# Patient Record
Sex: FEMALE | Race: BLACK OR AFRICAN AMERICAN | NOT HISPANIC OR LATINO | URBAN - METROPOLITAN AREA
[De-identification: names, ages, dates, MRNs, and addresses within clinical notes are randomized per-mention and may not be internally consistent; named-entity substitution may affect disease eponyms.]

---

## 2018-11-18 ENCOUNTER — EMERGENCY (EMERGENCY)
Facility: HOSPITAL | Age: 68
LOS: 1 days | Discharge: HOME | End: 2018-11-18
Admitting: EMERGENCY MEDICINE
Payer: SELF-PAY

## 2018-11-18 VITALS
OXYGEN SATURATION: 98 % | DIASTOLIC BLOOD PRESSURE: 79 MMHG | RESPIRATION RATE: 16 BRPM | HEART RATE: 75 BPM | TEMPERATURE: 99 F | SYSTOLIC BLOOD PRESSURE: 172 MMHG

## 2018-11-18 VITALS
HEART RATE: 83 BPM | WEIGHT: 143.52 LBS | DIASTOLIC BLOOD PRESSURE: 91 MMHG | OXYGEN SATURATION: 98 % | RESPIRATION RATE: 17 BRPM | TEMPERATURE: 98 F | SYSTOLIC BLOOD PRESSURE: 143 MMHG

## 2018-11-18 DIAGNOSIS — M54.5 LOW BACK PAIN: ICD-10-CM

## 2018-11-18 DIAGNOSIS — K59.00 CONSTIPATION, UNSPECIFIED: ICD-10-CM

## 2018-11-18 DIAGNOSIS — I10 ESSENTIAL (PRIMARY) HYPERTENSION: ICD-10-CM

## 2018-11-18 DIAGNOSIS — E11.9 TYPE 2 DIABETES MELLITUS WITHOUT COMPLICATIONS: ICD-10-CM

## 2018-11-18 LAB
ALBUMIN SERPL ELPH-MCNC: 3.4 G/DL — SIGNIFICANT CHANGE UP (ref 3.4–5)
ALP SERPL-CCNC: 42 U/L — SIGNIFICANT CHANGE UP (ref 40–120)
ALT FLD-CCNC: 14 U/L — SIGNIFICANT CHANGE UP (ref 12–42)
ANION GAP SERPL CALC-SCNC: 12 MMOL/L — SIGNIFICANT CHANGE UP (ref 9–16)
APPEARANCE UR: CLEAR — SIGNIFICANT CHANGE UP
AST SERPL-CCNC: 22 U/L — SIGNIFICANT CHANGE UP (ref 15–37)
BASOPHILS NFR BLD AUTO: 0.4 % — SIGNIFICANT CHANGE UP (ref 0–2)
BILIRUB SERPL-MCNC: 0.7 MG/DL — SIGNIFICANT CHANGE UP (ref 0.2–1.2)
BILIRUB UR-MCNC: NEGATIVE — SIGNIFICANT CHANGE UP
BUN SERPL-MCNC: 14 MG/DL — SIGNIFICANT CHANGE UP (ref 7–23)
CALCIUM SERPL-MCNC: 8.8 MG/DL — SIGNIFICANT CHANGE UP (ref 8.5–10.5)
CHLORIDE SERPL-SCNC: 98 MMOL/L — SIGNIFICANT CHANGE UP (ref 96–108)
CO2 SERPL-SCNC: 24 MMOL/L — SIGNIFICANT CHANGE UP (ref 22–31)
COLOR SPEC: YELLOW — SIGNIFICANT CHANGE UP
CREAT SERPL-MCNC: 0.71 MG/DL — SIGNIFICANT CHANGE UP (ref 0.5–1.3)
DIFF PNL FLD: NEGATIVE — SIGNIFICANT CHANGE UP
EOSINOPHIL NFR BLD AUTO: 2.4 % — SIGNIFICANT CHANGE UP (ref 0–6)
GLUCOSE SERPL-MCNC: 328 MG/DL — HIGH (ref 70–99)
GLUCOSE UR QL: >=1000
HCT VFR BLD CALC: 40.8 % — SIGNIFICANT CHANGE UP (ref 34.5–45)
HGB BLD-MCNC: 13.5 G/DL — SIGNIFICANT CHANGE UP (ref 11.5–15.5)
IMM GRANULOCYTES NFR BLD AUTO: 0.2 % — SIGNIFICANT CHANGE UP (ref 0–1.5)
KETONES UR-MCNC: 15 MG/DL
LEUKOCYTE ESTERASE UR-ACNC: NEGATIVE — SIGNIFICANT CHANGE UP
LYMPHOCYTES # BLD AUTO: 47.5 % — HIGH (ref 13–44)
MAGNESIUM SERPL-MCNC: 1.4 MG/DL — LOW (ref 1.6–2.6)
MCHC RBC-ENTMCNC: 26.2 PG — LOW (ref 27–34)
MCHC RBC-ENTMCNC: 33.1 G/DL — SIGNIFICANT CHANGE UP (ref 32–36)
MCV RBC AUTO: 79.1 FL — LOW (ref 80–100)
MONOCYTES NFR BLD AUTO: 6.1 % — SIGNIFICANT CHANGE UP (ref 2–14)
NEUTROPHILS NFR BLD AUTO: 43.4 % — SIGNIFICANT CHANGE UP (ref 43–77)
NITRITE UR-MCNC: NEGATIVE — SIGNIFICANT CHANGE UP
PH UR: 6 — SIGNIFICANT CHANGE UP (ref 5–8)
PLATELET # BLD AUTO: 271 K/UL — SIGNIFICANT CHANGE UP (ref 150–400)
POTASSIUM SERPL-MCNC: 4.6 MMOL/L — SIGNIFICANT CHANGE UP (ref 3.5–5.3)
POTASSIUM SERPL-SCNC: 4.6 MMOL/L — SIGNIFICANT CHANGE UP (ref 3.5–5.3)
PROT SERPL-MCNC: 7.2 G/DL — SIGNIFICANT CHANGE UP (ref 6.4–8.2)
PROT UR-MCNC: NEGATIVE MG/DL — SIGNIFICANT CHANGE UP
RBC # BLD: 5.16 M/UL — SIGNIFICANT CHANGE UP (ref 3.8–5.2)
RBC # FLD: 12.2 % — SIGNIFICANT CHANGE UP (ref 10.3–14.5)
SODIUM SERPL-SCNC: 134 MMOL/L — SIGNIFICANT CHANGE UP (ref 132–145)
SP GR SPEC: 1.02 — SIGNIFICANT CHANGE UP (ref 1–1.03)
UROBILINOGEN FLD QL: 0.2 E.U./DL — SIGNIFICANT CHANGE UP
WBC # BLD: 4.6 K/UL — SIGNIFICANT CHANGE UP (ref 3.8–10.5)
WBC # FLD AUTO: 4.6 K/UL — SIGNIFICANT CHANGE UP (ref 3.8–10.5)

## 2018-11-18 PROCEDURE — 93010 ELECTROCARDIOGRAM REPORT: CPT

## 2018-11-18 PROCEDURE — 99284 EMERGENCY DEPT VISIT MOD MDM: CPT | Mod: 25

## 2018-11-18 PROCEDURE — 74177 CT ABD & PELVIS W/CONTRAST: CPT | Mod: 26

## 2018-11-18 RX ORDER — SODIUM CHLORIDE 9 MG/ML
1000 INJECTION INTRAMUSCULAR; INTRAVENOUS; SUBCUTANEOUS ONCE
Qty: 0 | Refills: 0 | Status: COMPLETED | OUTPATIENT
Start: 2018-11-18 | End: 2018-11-18

## 2018-11-18 RX ORDER — TRAMADOL HYDROCHLORIDE 50 MG/1
50 TABLET ORAL ONCE
Qty: 0 | Refills: 0 | Status: DISCONTINUED | OUTPATIENT
Start: 2018-11-18 | End: 2018-11-18

## 2018-11-18 RX ADMIN — TRAMADOL HYDROCHLORIDE 50 MILLIGRAM(S): 50 TABLET ORAL at 10:46

## 2018-11-18 RX ADMIN — SODIUM CHLORIDE 2000 MILLILITER(S): 9 INJECTION INTRAMUSCULAR; INTRAVENOUS; SUBCUTANEOUS at 09:58

## 2018-11-18 RX ADMIN — Medication 10 MILLIGRAM(S): at 13:35

## 2018-11-18 RX ADMIN — TRAMADOL HYDROCHLORIDE 50 MILLIGRAM(S): 50 TABLET ORAL at 11:46

## 2018-11-18 NOTE — ED ADULT NURSE NOTE - OBJECTIVE STATEMENT
Pt presents to ED with c/o lower abdominal pain. Apparently she was diagnosed with UTI, but she was unable to  her Rx. Patient is nontoxic appearing, no fever or CVA tenderness.

## 2018-11-18 NOTE — ED PROVIDER NOTE - MEDICAL DECISION MAKING DETAILS
Labs, UA, EKG. UTI vs Pyelonephritis vs MSK pain. Pt appearing well and tolerating PO. Labs, UA, EKG. UTI vs Pyelonephritis vs MSK pain. Pt appearing well and tolerating PO.  constipation on CT, labs and ua all wnl.  given IVF.  Pt given dulcolax to go suppository.  advised f/u with pmd re other nonspecific findings on CT and need for further nonemergent imaging.  pt tolerating PO and very well appearing.

## 2018-11-18 NOTE — ED PROVIDER NOTE - OBJECTIVE STATEMENT
67 y/o Female with a PMHx of type 2 DM and HTN reports several mechanical falls this past year and now has ongoing pain. She reports lower abdominal pain radiating to the right lower back for the last 1 week. Denies recent falls. She went to see her primary care MD earlier this week for pain and was told she had a UTI but she did not  her abx. Denies fever, chills, nausea, vomiting, diarrhea, CP, SOB, vaginal discharge, or vaginal bleeding. 67 y/o Female with a PMHx of type 2 DM and HTN reports several mechanical falls this past year and now has ongoing pain. She reports lower abdominal pain radiating to the right lower back for the last 1 week. Denies recent falls. She went to see her primary care MD earlier this week for pain and was told she had a UTI but she did not  her abx. Denies fever, chills, nausea, vomiting, diarrhea, CP, SOB, vaginal discharge, or vaginal bleeding.  Pt reports small hard bowel movement for the past several days.

## 2018-11-18 NOTE — ED ADULT NURSE NOTE - CHPI ED NUR SYMPTOMS NEG
no fever/no chills/no diarrhea/no blood in stool/no abdominal distension/no nausea/no dysuria/no hematuria

## 2018-11-18 NOTE — ED ADULT TRIAGE NOTE - CHIEF COMPLAINT QUOTE
c/o pelvic pain, right flank pain, and right leg pain x 1 week with nausea and one episode of vomiting approx 2 days ago. denies diarrhea, urinary sx, fevers. h/o DM2.

## 2018-11-18 NOTE — ED ADULT NURSE NOTE - NSIMPLEMENTINTERV_GEN_ALL_ED
Implemented All Universal Safety Interventions:  [place] to call system. Call bell, personal items and telephone within reach. Instruct patient to call for assistance. Room bathroom lighting operational. Non-slip footwear when patient is off stretcher. Physically safe environment: no spills, clutter or unnecessary equipment. Stretcher in lowest position, wheels locked, appropriate side rails in place.

## 2018-11-18 NOTE — ED PROVIDER NOTE - CARE PROVIDER_API CALL
Esteban Díaz), Gastroenterology  [address] [address]  Phone: [phone]  Fax: [phone]

## 2018-11-21 LAB
CULTURE RESULTS: SIGNIFICANT CHANGE UP
SPECIMEN SOURCE: SIGNIFICANT CHANGE UP

## 2018-12-27 ENCOUNTER — EMERGENCY (EMERGENCY)
Facility: HOSPITAL | Age: 68
LOS: 1 days | Discharge: HOME | End: 2018-12-27
Attending: EMERGENCY MEDICINE | Admitting: EMERGENCY MEDICINE
Payer: SELF-PAY

## 2018-12-27 VITALS
HEART RATE: 101 BPM | OXYGEN SATURATION: 98 % | SYSTOLIC BLOOD PRESSURE: 120 MMHG | DIASTOLIC BLOOD PRESSURE: 81 MMHG | RESPIRATION RATE: 17 BRPM | TEMPERATURE: 98 F

## 2018-12-27 DIAGNOSIS — R10.2 PELVIC AND PERINEAL PAIN: ICD-10-CM

## 2018-12-27 DIAGNOSIS — M79.669 PAIN IN UNSPECIFIED LOWER LEG: ICD-10-CM

## 2018-12-27 LAB
ALBUMIN SERPL ELPH-MCNC: 3.7 G/DL — SIGNIFICANT CHANGE UP (ref 3.4–5)
ALP SERPL-CCNC: 59 U/L — SIGNIFICANT CHANGE UP (ref 40–120)
ALT FLD-CCNC: 13 U/L — SIGNIFICANT CHANGE UP (ref 12–42)
ANION GAP SERPL CALC-SCNC: 9 MMOL/L — SIGNIFICANT CHANGE UP (ref 9–16)
AST SERPL-CCNC: 10 U/L — LOW (ref 15–37)
BASOPHILS NFR BLD AUTO: 0.3 % — SIGNIFICANT CHANGE UP (ref 0–2)
BILIRUB SERPL-MCNC: 0.4 MG/DL — SIGNIFICANT CHANGE UP (ref 0.2–1.2)
BUN SERPL-MCNC: 19 MG/DL — SIGNIFICANT CHANGE UP (ref 7–23)
CALCIUM SERPL-MCNC: 9.7 MG/DL — SIGNIFICANT CHANGE UP (ref 8.5–10.5)
CHLORIDE SERPL-SCNC: 97 MMOL/L — SIGNIFICANT CHANGE UP (ref 96–108)
CO2 SERPL-SCNC: 26 MMOL/L — SIGNIFICANT CHANGE UP (ref 22–31)
CREAT SERPL-MCNC: 0.93 MG/DL — SIGNIFICANT CHANGE UP (ref 0.5–1.3)
EOSINOPHIL NFR BLD AUTO: 1.4 % — SIGNIFICANT CHANGE UP (ref 0–6)
GLUCOSE SERPL-MCNC: 472 MG/DL — CRITICAL HIGH (ref 70–99)
HCT VFR BLD CALC: 44.2 % — SIGNIFICANT CHANGE UP (ref 34.5–45)
HGB BLD-MCNC: 14.5 G/DL — SIGNIFICANT CHANGE UP (ref 11.5–15.5)
IMM GRANULOCYTES NFR BLD AUTO: 0.2 % — SIGNIFICANT CHANGE UP (ref 0–1.5)
LYMPHOCYTES # BLD AUTO: 39.7 % — SIGNIFICANT CHANGE UP (ref 13–44)
MCHC RBC-ENTMCNC: 26.5 PG — LOW (ref 27–34)
MCHC RBC-ENTMCNC: 32.8 G/DL — SIGNIFICANT CHANGE UP (ref 32–36)
MCV RBC AUTO: 80.7 FL — SIGNIFICANT CHANGE UP (ref 80–100)
MONOCYTES NFR BLD AUTO: 6.3 % — SIGNIFICANT CHANGE UP (ref 2–14)
NEUTROPHILS NFR BLD AUTO: 52.1 % — SIGNIFICANT CHANGE UP (ref 43–77)
PLATELET # BLD AUTO: 291 K/UL — SIGNIFICANT CHANGE UP (ref 150–400)
POTASSIUM SERPL-MCNC: 4.6 MMOL/L — SIGNIFICANT CHANGE UP (ref 3.5–5.3)
POTASSIUM SERPL-SCNC: 4.6 MMOL/L — SIGNIFICANT CHANGE UP (ref 3.5–5.3)
PROT SERPL-MCNC: 7.8 G/DL — SIGNIFICANT CHANGE UP (ref 6.4–8.2)
RBC # BLD: 5.48 M/UL — HIGH (ref 3.8–5.2)
RBC # FLD: 13.2 % — SIGNIFICANT CHANGE UP (ref 10.3–14.5)
SODIUM SERPL-SCNC: 132 MMOL/L — SIGNIFICANT CHANGE UP (ref 132–145)
WBC # BLD: 5.9 K/UL — SIGNIFICANT CHANGE UP (ref 3.8–10.5)
WBC # FLD AUTO: 5.9 K/UL — SIGNIFICANT CHANGE UP (ref 3.8–10.5)

## 2018-12-27 PROCEDURE — 99053 MED SERV 10PM-8AM 24 HR FAC: CPT

## 2018-12-27 PROCEDURE — 99284 EMERGENCY DEPT VISIT MOD MDM: CPT | Mod: 25

## 2018-12-27 RX ORDER — INSULIN HUMAN 100 [IU]/ML
10 INJECTION, SOLUTION SUBCUTANEOUS ONCE
Refills: 0 | Status: COMPLETED | OUTPATIENT
Start: 2018-12-27 | End: 2018-12-27

## 2018-12-27 RX ORDER — SODIUM CHLORIDE 9 MG/ML
1000 INJECTION INTRAMUSCULAR; INTRAVENOUS; SUBCUTANEOUS ONCE
Refills: 0 | Status: COMPLETED | OUTPATIENT
Start: 2018-12-27 | End: 2018-12-27

## 2018-12-27 RX ORDER — TRAMADOL HYDROCHLORIDE 50 MG/1
50 TABLET ORAL ONCE
Refills: 0 | Status: DISCONTINUED | OUTPATIENT
Start: 2018-12-27 | End: 2018-12-27

## 2018-12-27 RX ADMIN — TRAMADOL HYDROCHLORIDE 50 MILLIGRAM(S): 50 TABLET ORAL at 23:06

## 2018-12-27 RX ADMIN — SODIUM CHLORIDE 1000 MILLILITER(S): 9 INJECTION INTRAMUSCULAR; INTRAVENOUS; SUBCUTANEOUS at 23:06

## 2018-12-27 NOTE — ED PROVIDER NOTE - MUSCULOSKELETAL, MLM
Spine appears normal, range of motion is not limited, no muscle or joint tenderness Spine appears normal, range of motion is not limited, no muscle or joint tenderness. RLE DPI NVI no swelling no cellulitis no deformity

## 2018-12-27 NOTE — ED PROVIDER NOTE - OBJECTIVE STATEMENT
68 y.o. female with hc DM on insulin metformin with c/o RLE pain ongoing for 3-4 days assoc with multiple gen sx, pt is a poor historian and vague when questioned about sx. states she feels weak and "unwell". Tried to take some tylenol at home with no relief of sx. no hx trauma no hx DVT, denies fever/chills, no urinary sx, no vomiting no abd pain no chest pain, no SOB.

## 2018-12-27 NOTE — ED PROVIDER NOTE - PROGRESS NOTE DETAILS
RLE US negative fr DVT now pt states her pain may be in the abdomen pelvis area, will order CT. also states shes run out of her meds, doesn't know type of insulin or dosage. spoke with daughter jacinto she gave me medication list. sign out to day team pending CT reading CT (+) constipation, will r x miralax, PMD followup

## 2018-12-27 NOTE — ED ADULT TRIAGE NOTE - CHIEF COMPLAINT QUOTE
Pt walked into clinic c.o pain of 7/10 in groin that radiates down right leg. Pt states pain started today suddenly. Pt states she was relaxing and could not find a comfortably position, Pt states foot feels like it is burning at this time. Pt noted OOB with steady gait.

## 2018-12-28 VITALS
OXYGEN SATURATION: 99 % | HEART RATE: 70 BPM | TEMPERATURE: 98 F | RESPIRATION RATE: 18 BRPM | SYSTOLIC BLOOD PRESSURE: 148 MMHG | DIASTOLIC BLOOD PRESSURE: 83 MMHG

## 2018-12-28 LAB
APPEARANCE UR: CLEAR — SIGNIFICANT CHANGE UP
BILIRUB UR-MCNC: NEGATIVE — SIGNIFICANT CHANGE UP
COLOR SPEC: YELLOW — SIGNIFICANT CHANGE UP
DIFF PNL FLD: NEGATIVE — SIGNIFICANT CHANGE UP
GLUCOSE BLDC GLUCOMTR-MCNC: 101 MG/DL — HIGH (ref 70–99)
GLUCOSE BLDC GLUCOMTR-MCNC: 303 MG/DL — HIGH (ref 70–99)
GLUCOSE UR QL: >=1000
KETONES UR-MCNC: 15 MG/DL
LEUKOCYTE ESTERASE UR-ACNC: NEGATIVE — SIGNIFICANT CHANGE UP
NITRITE UR-MCNC: NEGATIVE — SIGNIFICANT CHANGE UP
PH UR: 5.5 — SIGNIFICANT CHANGE UP (ref 5–8)
PROT UR-MCNC: NEGATIVE MG/DL — SIGNIFICANT CHANGE UP
SP GR SPEC: 1.01 — SIGNIFICANT CHANGE UP (ref 1–1.03)
UROBILINOGEN FLD QL: 0.2 E.U./DL — SIGNIFICANT CHANGE UP

## 2018-12-28 PROCEDURE — 76705 ECHO EXAM OF ABDOMEN: CPT | Mod: 26

## 2018-12-28 PROCEDURE — 73502 X-RAY EXAM HIP UNI 2-3 VIEWS: CPT | Mod: 26,RT

## 2018-12-28 PROCEDURE — 74177 CT ABD & PELVIS W/CONTRAST: CPT | Mod: 26

## 2018-12-28 RX ORDER — LISINOPRIL 2.5 MG/1
1 TABLET ORAL
Qty: 30 | Refills: 0
Start: 2018-12-28 | End: 2019-01-26

## 2018-12-28 RX ORDER — INSULIN HUMAN 100 [IU]/ML
6 INJECTION, SOLUTION SUBCUTANEOUS ONCE
Refills: 0 | Status: COMPLETED | OUTPATIENT
Start: 2018-12-28 | End: 2018-12-28

## 2018-12-28 RX ORDER — GABAPENTIN 400 MG/1
1 CAPSULE ORAL
Qty: 30 | Refills: 0
Start: 2018-12-28 | End: 2019-01-26

## 2018-12-28 RX ORDER — INSULIN ASPART 100 [IU]/ML
10 INJECTION, SUSPENSION SUBCUTANEOUS
Qty: 1 | Refills: 0
Start: 2018-12-28 | End: 2019-01-26

## 2018-12-28 RX ORDER — INSULIN DETEMIR 100/ML (3)
15 INSULIN PEN (ML) SUBCUTANEOUS
Qty: 1 | Refills: 0
Start: 2018-12-28 | End: 2019-01-26

## 2018-12-28 RX ORDER — METFORMIN HYDROCHLORIDE 850 MG/1
1 TABLET ORAL
Qty: 60 | Refills: 0
Start: 2018-12-28 | End: 2019-01-26

## 2018-12-28 RX ORDER — POLYETHYLENE GLYCOL 3350 17 G/17G
17 POWDER, FOR SOLUTION ORAL
Qty: 1 | Refills: 0
Start: 2018-12-28 | End: 2019-01-03

## 2018-12-28 RX ADMIN — INSULIN HUMAN 10 UNIT(S): 100 INJECTION, SOLUTION SUBCUTANEOUS at 01:18

## 2018-12-28 RX ADMIN — INSULIN HUMAN 6 UNIT(S): 100 INJECTION, SOLUTION SUBCUTANEOUS at 04:31

## 2018-12-28 RX ADMIN — TRAMADOL HYDROCHLORIDE 50 MILLIGRAM(S): 50 TABLET ORAL at 01:18

## 2020-03-17 NOTE — ED ADULT NURSE NOTE - CARDIO WDL
Normal rate, regular rhythm, normal S1, S2 heart sounds heard. ambulatory at [facility]

## 2020-08-31 NOTE — ED PROVIDER NOTE - NS ED MD DISPO DISCHARGE CCDA
--Clinically and biochemically euthyroid  --Continue levothyroxine 88 mcg PO daily  
--patient found to have elevated PTH with normal calcium  --In review of his labs he had normal serum calcium levels in 2012 and 2013 when creatinine was normal  --PTH was first checked when he developed INDIO on CKD and was elevated, and PTH has remained elevated now that he has CKD4  --Calcium has been low to normal for the most part so this most likely represents secondary HPT from CKD, phos has only been low on one occasion  --Will check urine calcium/creatinine ratio and bone density with forearm measurement to be sure this is not primary HPT given hx of renal stones which could be seen with primary HPT  --If Ca/Cr and bone density are normal would continue to monitor  --If serum calcium becomes consistently elevated over time would need to consider primary or tertiary HPT  
Patient/Caregiver provided printed discharge information.

## 2021-01-14 NOTE — ED ADULT NURSE NOTE - CHIEF COMPLAINT QUOTE
Will let them know that there is not yet a timeframe for when they will be able to receive the vaccine. Could you please advise on the medication they are inquiring about?    Pt walked into clinic c.o pain of 7/10 in groin that radiates down right leg. Pt states pain started today suddenly. Pt states she was relaxing and could not find a comfortably position, Pt states foot feels like it is burning at this time. Pt noted OOB with steady gait.

## 2021-04-08 ENCOUNTER — EMERGENCY (EMERGENCY)
Facility: HOSPITAL | Age: 71
LOS: 1 days | Discharge: ANOTHER HOSPITAL | End: 2021-04-08
Attending: EMERGENCY MEDICINE | Admitting: EMERGENCY MEDICINE
Payer: SELF-PAY

## 2021-04-08 VITALS
TEMPERATURE: 98 F | SYSTOLIC BLOOD PRESSURE: 199 MMHG | OXYGEN SATURATION: 96 % | DIASTOLIC BLOOD PRESSURE: 78 MMHG | WEIGHT: 199.96 LBS | RESPIRATION RATE: 18 BRPM | HEART RATE: 98 BPM | HEIGHT: 66 IN

## 2021-04-08 VITALS
HEART RATE: 78 BPM | RESPIRATION RATE: 16 BRPM | OXYGEN SATURATION: 94 % | TEMPERATURE: 98 F | DIASTOLIC BLOOD PRESSURE: 78 MMHG | SYSTOLIC BLOOD PRESSURE: 168 MMHG

## 2021-04-08 DIAGNOSIS — R42 DIZZINESS AND GIDDINESS: ICD-10-CM

## 2021-04-08 DIAGNOSIS — R11.2 NAUSEA WITH VOMITING, UNSPECIFIED: ICD-10-CM

## 2021-04-08 DIAGNOSIS — E11.9 TYPE 2 DIABETES MELLITUS WITHOUT COMPLICATIONS: ICD-10-CM

## 2021-04-08 DIAGNOSIS — I10 ESSENTIAL (PRIMARY) HYPERTENSION: ICD-10-CM

## 2021-04-08 DIAGNOSIS — M21.371 FOOT DROP, RIGHT FOOT: ICD-10-CM

## 2021-04-08 LAB
ALBUMIN SERPL ELPH-MCNC: 3.7 G/DL — SIGNIFICANT CHANGE UP (ref 3.4–5)
ALP SERPL-CCNC: 43 U/L — SIGNIFICANT CHANGE UP (ref 40–120)
ALT FLD-CCNC: 14 U/L — SIGNIFICANT CHANGE UP (ref 12–42)
ANION GAP SERPL CALC-SCNC: 7 MMOL/L — LOW (ref 9–16)
APPEARANCE UR: CLEAR — SIGNIFICANT CHANGE UP
APTT BLD: 28.8 SEC — SIGNIFICANT CHANGE UP (ref 27.5–35.5)
AST SERPL-CCNC: 16 U/L — SIGNIFICANT CHANGE UP (ref 15–37)
BACTERIA # UR AUTO: PRESENT /HPF
BASOPHILS # BLD AUTO: 0.02 K/UL — SIGNIFICANT CHANGE UP (ref 0–0.2)
BASOPHILS NFR BLD AUTO: 0.3 % — SIGNIFICANT CHANGE UP (ref 0–2)
BILIRUB SERPL-MCNC: 0.4 MG/DL — SIGNIFICANT CHANGE UP (ref 0.2–1.2)
BILIRUB UR-MCNC: NEGATIVE — SIGNIFICANT CHANGE UP
BUN SERPL-MCNC: 19 MG/DL — SIGNIFICANT CHANGE UP (ref 7–23)
CALCIUM SERPL-MCNC: 9.2 MG/DL — SIGNIFICANT CHANGE UP (ref 8.5–10.5)
CHLORIDE SERPL-SCNC: 98 MMOL/L — SIGNIFICANT CHANGE UP (ref 96–108)
CO2 SERPL-SCNC: 31 MMOL/L — SIGNIFICANT CHANGE UP (ref 22–31)
COLOR SPEC: YELLOW — SIGNIFICANT CHANGE UP
CREAT SERPL-MCNC: 0.68 MG/DL — SIGNIFICANT CHANGE UP (ref 0.5–1.3)
DIFF PNL FLD: NEGATIVE — SIGNIFICANT CHANGE UP
EOSINOPHIL # BLD AUTO: 0.05 K/UL — SIGNIFICANT CHANGE UP (ref 0–0.5)
EOSINOPHIL NFR BLD AUTO: 0.8 % — SIGNIFICANT CHANGE UP (ref 0–6)
EPI CELLS # UR: ABNORMAL /HPF (ref 0–5)
GLUCOSE BLDC GLUCOMTR-MCNC: 229 MG/DL — HIGH (ref 70–99)
GLUCOSE SERPL-MCNC: 271 MG/DL — HIGH (ref 70–99)
GLUCOSE UR QL: >=1000
HCT VFR BLD CALC: 41 % — SIGNIFICANT CHANGE UP (ref 34.5–45)
HGB BLD-MCNC: 13.4 G/DL — SIGNIFICANT CHANGE UP (ref 11.5–15.5)
IMM GRANULOCYTES NFR BLD AUTO: 0.5 % — SIGNIFICANT CHANGE UP (ref 0–1.5)
INR BLD: 0.87 — LOW (ref 0.88–1.16)
KETONES UR-MCNC: 15 MG/DL
LACTATE SERPL-SCNC: 0.9 MMOL/L — SIGNIFICANT CHANGE UP (ref 0.4–2)
LEUKOCYTE ESTERASE UR-ACNC: ABNORMAL
LIDOCAIN IGE QN: 169 U/L — SIGNIFICANT CHANGE UP (ref 73–393)
LYMPHOCYTES # BLD AUTO: 1.35 K/UL — SIGNIFICANT CHANGE UP (ref 1–3.3)
LYMPHOCYTES # BLD AUTO: 22.4 % — SIGNIFICANT CHANGE UP (ref 13–44)
MAGNESIUM SERPL-MCNC: 1.8 MG/DL — SIGNIFICANT CHANGE UP (ref 1.6–2.6)
MCHC RBC-ENTMCNC: 27 PG — SIGNIFICANT CHANGE UP (ref 27–34)
MCHC RBC-ENTMCNC: 32.7 GM/DL — SIGNIFICANT CHANGE UP (ref 32–36)
MCV RBC AUTO: 82.7 FL — SIGNIFICANT CHANGE UP (ref 80–100)
MONOCYTES # BLD AUTO: 0.24 K/UL — SIGNIFICANT CHANGE UP (ref 0–0.9)
MONOCYTES NFR BLD AUTO: 4 % — SIGNIFICANT CHANGE UP (ref 2–14)
NEUTROPHILS # BLD AUTO: 4.35 K/UL — SIGNIFICANT CHANGE UP (ref 1.8–7.4)
NEUTROPHILS NFR BLD AUTO: 72 % — SIGNIFICANT CHANGE UP (ref 43–77)
NITRITE UR-MCNC: NEGATIVE — SIGNIFICANT CHANGE UP
NRBC # BLD: 0 /100 WBCS — SIGNIFICANT CHANGE UP (ref 0–0)
PH UR: 7 — SIGNIFICANT CHANGE UP (ref 5–8)
PLATELET # BLD AUTO: 261 K/UL — SIGNIFICANT CHANGE UP (ref 150–400)
POTASSIUM SERPL-MCNC: 3.9 MMOL/L — SIGNIFICANT CHANGE UP (ref 3.5–5.3)
POTASSIUM SERPL-SCNC: 3.9 MMOL/L — SIGNIFICANT CHANGE UP (ref 3.5–5.3)
PROT SERPL-MCNC: 8 G/DL — SIGNIFICANT CHANGE UP (ref 6.4–8.2)
PROT UR-MCNC: NEGATIVE MG/DL — SIGNIFICANT CHANGE UP
PROTHROM AB SERPL-ACNC: 10.5 SEC — LOW (ref 10.6–13.6)
RBC # BLD: 4.96 M/UL — SIGNIFICANT CHANGE UP (ref 3.8–5.2)
RBC # FLD: 12.8 % — SIGNIFICANT CHANGE UP (ref 10.3–14.5)
RBC CASTS # UR COMP ASSIST: < 5 /HPF — SIGNIFICANT CHANGE UP
SARS-COV-2 RNA SPEC QL NAA+PROBE: SIGNIFICANT CHANGE UP
SODIUM SERPL-SCNC: 136 MMOL/L — SIGNIFICANT CHANGE UP (ref 132–145)
SP GR SPEC: 1.01 — SIGNIFICANT CHANGE UP (ref 1–1.03)
TROPONIN I SERPL-MCNC: <0.017 NG/ML — LOW (ref 0.02–0.06)
UROBILINOGEN FLD QL: 0.2 E.U./DL — SIGNIFICANT CHANGE UP
WBC # BLD: 6.04 K/UL — SIGNIFICANT CHANGE UP (ref 3.8–10.5)
WBC # FLD AUTO: 6.04 K/UL — SIGNIFICANT CHANGE UP (ref 3.8–10.5)
WBC UR QL: > 10 /HPF

## 2021-04-08 PROCEDURE — 70496 CT ANGIOGRAPHY HEAD: CPT | Mod: 26

## 2021-04-08 PROCEDURE — 72131 CT LUMBAR SPINE W/O DYE: CPT | Mod: 26

## 2021-04-08 PROCEDURE — 71045 X-RAY EXAM CHEST 1 VIEW: CPT | Mod: 26

## 2021-04-08 PROCEDURE — 93010 ELECTROCARDIOGRAM REPORT: CPT

## 2021-04-08 PROCEDURE — 72125 CT NECK SPINE W/O DYE: CPT | Mod: 26

## 2021-04-08 PROCEDURE — 70450 CT HEAD/BRAIN W/O DYE: CPT | Mod: 26,59

## 2021-04-08 PROCEDURE — 99285 EMERGENCY DEPT VISIT HI MDM: CPT | Mod: 25

## 2021-04-08 PROCEDURE — 70498 CT ANGIOGRAPHY NECK: CPT | Mod: 26

## 2021-04-08 RX ORDER — MECLIZINE HCL 12.5 MG
25 TABLET ORAL ONCE
Refills: 0 | Status: COMPLETED | OUTPATIENT
Start: 2021-04-08 | End: 2021-04-08

## 2021-04-08 RX ORDER — SODIUM CHLORIDE 9 MG/ML
1000 INJECTION INTRAMUSCULAR; INTRAVENOUS; SUBCUTANEOUS ONCE
Refills: 0 | Status: DISCONTINUED | OUTPATIENT
Start: 2021-04-08 | End: 2021-04-08

## 2021-04-08 RX ORDER — AMLODIPINE BESYLATE 2.5 MG/1
5 TABLET ORAL ONCE
Refills: 0 | Status: COMPLETED | OUTPATIENT
Start: 2021-04-08 | End: 2021-04-08

## 2021-04-08 RX ORDER — LISINOPRIL 2.5 MG/1
40 TABLET ORAL ONCE
Refills: 0 | Status: COMPLETED | OUTPATIENT
Start: 2021-04-08 | End: 2021-04-08

## 2021-04-08 RX ORDER — SODIUM CHLORIDE 9 MG/ML
3 INJECTION INTRAMUSCULAR; INTRAVENOUS; SUBCUTANEOUS EVERY 8 HOURS
Refills: 0 | Status: DISCONTINUED | OUTPATIENT
Start: 2021-04-08 | End: 2021-04-08

## 2021-04-08 RX ADMIN — SODIUM CHLORIDE 3 MILLILITER(S): 9 INJECTION INTRAMUSCULAR; INTRAVENOUS; SUBCUTANEOUS at 10:00

## 2021-04-08 RX ADMIN — Medication 25 MILLIGRAM(S): at 14:34

## 2021-04-08 RX ADMIN — AMLODIPINE BESYLATE 5 MILLIGRAM(S): 2.5 TABLET ORAL at 14:34

## 2021-04-08 RX ADMIN — LISINOPRIL 40 MILLIGRAM(S): 2.5 TABLET ORAL at 14:34

## 2021-04-08 NOTE — ED PROVIDER NOTE - NSRISKOFTRANSFER_ED_A_ED
Detail Level: Detailed X Size Of Lesion In Cm (Optional): 0 Deterioration of Condition En Route/Death or Disability/Increased Pain/Transportation Risk (There is always a risk of traffic delays resulting in deterioration of condition.)

## 2021-04-08 NOTE — ED PROVIDER NOTE - ATTENDING CONTRIBUTION TO CARE
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

## 2021-04-08 NOTE — ED PROVIDER NOTE - CROS ED NEURO ALL NEG
Quality 110: Preventive Care And Screening: Influenza Immunization: Influenza Immunization previously received during influenza season Quality 400a: One-Time Screening For Hepatitis C Virus (Hcv) For All Patients: Patient received one-time screening for HCV infection Quality 130: Documentation Of Current Medications In The Medical Record: Current Medications Documented Quality 358: Patient-Centered Surgical Risk Assessment And Communication: Documentation of patient-specific risk assessment with a risk calculator based on multi-institutional clinical data, the specific risk calculator used, and communication of risk assessment from risk calculator with the patient or family. Quality 265: Biopsy Follow-Up: Biopsy results reviewed, communicated, tracked, and documented Detail Level: Detailed - - -

## 2021-04-08 NOTE — ED PROVIDER NOTE - CLINICAL SUMMARY MEDICAL DECISION MAKING FREE TEXT BOX
72 y/o F presents to ED c/o dizziness described as spinning this morning.  Pt well appearing.  VS notable HTN, normal HR.  No numbness or headache. Labs unremarkable.  CXR wet read negative.  Exam notable for RLE weakness and foot drop.  Daughter called who describes her gait change as "draggy" x 2-3 weeks.  Pt has chronic poor vision of R eye.  These findings limit a good cerebellar exam.  CT head, CTA head and neck do not show any acute or subacute findings.  CT lumbar spine shows disc bulges abutting L3 and L4 nerve root.  Will admit for possible central CVA vs BPPV and eval of RLE weakness/foot drop and PT eval.

## 2021-04-08 NOTE — ED PROVIDER NOTE - PROGRESS NOTE DETAILS
Contacts: Isaac (granddaughter) [phone], Robbin (family) [phone], Laura (mother-in-law) [phone]. Pt discussed with Dr. Isaac, neurology.  Pt accepted to regional neuro.  Daughter, Charles updated.  Med list obtained from daughter. Contacts: Isaac (granddaughter) [phone], Robbin (family) [phone], Laura (daughter's mother-in-law) [phone]. Pt now requesting [facility] transfer.  Pt discussed with Dr. Marianela Hernandez, [facility] attending and accepted.  Will transfer to [facility].  Pt amenable.

## 2021-04-08 NOTE — ED ADULT TRIAGE NOTE - CHIEF COMPLAINT QUOTE
Patient to ED with complaint of nausea, vomiting, and generalized weakness beginning this morning.  Patient hx of type 2 diabetes.   in field.

## 2021-04-08 NOTE — ED ADULT NURSE NOTE - OBJECTIVE STATEMENT
Pt BIB EMS c/o generalized body weakness w/n/v for 1 x wk that has worsen since this AM, hx dm , meningitis tumor, a&ox3,in no acute distress jessica rn  CT complete ,pending bloodwork for cta scan jessica corbin

## 2021-04-10 LAB
CULTURE RESULTS: SIGNIFICANT CHANGE UP
SPECIMEN SOURCE: SIGNIFICANT CHANGE UP

## 2022-03-25 VITALS
RESPIRATION RATE: 20 BRPM | HEART RATE: 85 BPM | DIASTOLIC BLOOD PRESSURE: 95 MMHG | HEIGHT: 66 IN | SYSTOLIC BLOOD PRESSURE: 180 MMHG | TEMPERATURE: 98 F | OXYGEN SATURATION: 98 %

## 2022-03-25 LAB
ALBUMIN SERPL ELPH-MCNC: 3.5 G/DL — SIGNIFICANT CHANGE UP (ref 3.4–5)
ALP SERPL-CCNC: 44 U/L — SIGNIFICANT CHANGE UP (ref 40–120)
ALT FLD-CCNC: 9 U/L — LOW (ref 12–42)
ANION GAP SERPL CALC-SCNC: 7 MMOL/L — LOW (ref 9–16)
APAP SERPL-MCNC: <2 UG/ML — LOW (ref 10–30)
AST SERPL-CCNC: 29 U/L — SIGNIFICANT CHANGE UP (ref 15–37)
BASOPHILS # BLD AUTO: 0.01 K/UL — SIGNIFICANT CHANGE UP (ref 0–0.2)
BASOPHILS NFR BLD AUTO: 0.1 % — SIGNIFICANT CHANGE UP (ref 0–2)
BILIRUB SERPL-MCNC: 0.7 MG/DL — SIGNIFICANT CHANGE UP (ref 0.2–1.2)
BUN SERPL-MCNC: 14 MG/DL — SIGNIFICANT CHANGE UP (ref 7–23)
CALCIUM SERPL-MCNC: 9.5 MG/DL — SIGNIFICANT CHANGE UP (ref 8.5–10.5)
CHLORIDE SERPL-SCNC: 95 MMOL/L — LOW (ref 96–108)
CO2 SERPL-SCNC: 30 MMOL/L — SIGNIFICANT CHANGE UP (ref 22–31)
CREAT SERPL-MCNC: 0.51 MG/DL — SIGNIFICANT CHANGE UP (ref 0.5–1.3)
EGFR: 98 ML/MIN/1.73M2 — SIGNIFICANT CHANGE UP
EOSINOPHIL # BLD AUTO: 0.02 K/UL — SIGNIFICANT CHANGE UP (ref 0–0.5)
EOSINOPHIL NFR BLD AUTO: 0.3 % — SIGNIFICANT CHANGE UP (ref 0–6)
GLUCOSE SERPL-MCNC: 293 MG/DL — HIGH (ref 70–99)
HCT VFR BLD CALC: 41.7 % — SIGNIFICANT CHANGE UP (ref 34.5–45)
HGB BLD-MCNC: 13.3 G/DL — SIGNIFICANT CHANGE UP (ref 11.5–15.5)
IMM GRANULOCYTES NFR BLD AUTO: 0.4 % — SIGNIFICANT CHANGE UP (ref 0–1.5)
LYMPHOCYTES # BLD AUTO: 1.24 K/UL — SIGNIFICANT CHANGE UP (ref 1–3.3)
LYMPHOCYTES # BLD AUTO: 16.6 % — SIGNIFICANT CHANGE UP (ref 13–44)
MCHC RBC-ENTMCNC: 26.1 PG — LOW (ref 27–34)
MCHC RBC-ENTMCNC: 31.9 GM/DL — LOW (ref 32–36)
MCV RBC AUTO: 81.9 FL — SIGNIFICANT CHANGE UP (ref 80–100)
MONOCYTES # BLD AUTO: 0.37 K/UL — SIGNIFICANT CHANGE UP (ref 0–0.9)
MONOCYTES NFR BLD AUTO: 4.9 % — SIGNIFICANT CHANGE UP (ref 2–14)
NEUTROPHILS # BLD AUTO: 5.82 K/UL — SIGNIFICANT CHANGE UP (ref 1.8–7.4)
NEUTROPHILS NFR BLD AUTO: 77.7 % — HIGH (ref 43–77)
NRBC # BLD: 0 /100 WBCS — SIGNIFICANT CHANGE UP (ref 0–0)
PCO2 BLDV: 58 MMHG — HIGH (ref 39–42)
PH BLDV: 7.4 — SIGNIFICANT CHANGE UP (ref 7.32–7.43)
PLATELET # BLD AUTO: 292 K/UL — SIGNIFICANT CHANGE UP (ref 150–400)
PO2 BLDV: <35 MMHG — LOW (ref 25–45)
POTASSIUM SERPL-MCNC: 4.8 MMOL/L — SIGNIFICANT CHANGE UP (ref 3.5–5.3)
POTASSIUM SERPL-SCNC: 4.8 MMOL/L — SIGNIFICANT CHANGE UP (ref 3.5–5.3)
PROT SERPL-MCNC: 8 G/DL — SIGNIFICANT CHANGE UP (ref 6.4–8.2)
RBC # BLD: 5.09 M/UL — SIGNIFICANT CHANGE UP (ref 3.8–5.2)
RBC # FLD: 13.7 % — SIGNIFICANT CHANGE UP (ref 10.3–14.5)
SALICYLATES SERPL-MCNC: 1.7 MG/DL — LOW (ref 2.8–20)
SAO2 % BLDV: 24.9 % — LOW (ref 67–88)
SODIUM SERPL-SCNC: 132 MMOL/L — SIGNIFICANT CHANGE UP (ref 132–145)
WBC # BLD: 7.49 K/UL — SIGNIFICANT CHANGE UP (ref 3.8–10.5)
WBC # FLD AUTO: 7.49 K/UL — SIGNIFICANT CHANGE UP (ref 3.8–10.5)

## 2022-03-25 PROCEDURE — 93010 ELECTROCARDIOGRAM REPORT: CPT

## 2022-03-25 PROCEDURE — 71045 X-RAY EXAM CHEST 1 VIEW: CPT | Mod: 26

## 2022-03-25 PROCEDURE — 99284 EMERGENCY DEPT VISIT MOD MDM: CPT | Mod: 25

## 2022-03-25 RX ORDER — SODIUM CHLORIDE 9 MG/ML
1000 INJECTION INTRAMUSCULAR; INTRAVENOUS; SUBCUTANEOUS
Refills: 0 | Status: DISCONTINUED | OUTPATIENT
Start: 2022-03-25 | End: 2022-03-26

## 2022-03-25 RX ORDER — PANTOPRAZOLE SODIUM 20 MG/1
40 TABLET, DELAYED RELEASE ORAL ONCE
Refills: 0 | Status: COMPLETED | OUTPATIENT
Start: 2022-03-25 | End: 2022-03-25

## 2022-03-25 NOTE — ED ADULT TRIAGE NOTE - CHIEF COMPLAINT QUOTE
Pt BIBA from home after 1 episode of vomiting today and increased urine output. EMS states pts finger stick was 328. Pt admits to hx of DM and HTN, but overall poor medical historian, pt states her daughter takes care of her medications.

## 2022-03-25 NOTE — ED PROVIDER NOTE - PROGRESS NOTE DETAILS
CBC stable, no vomiting in ED, labs otherwise OK, BG elevated but no AG. will admit to [facility] medicine for GI eval and further workup of hemeatemesis

## 2022-03-25 NOTE — ED PROVIDER NOTE - OBJECTIVE STATEMENT
71 yo female - poor historian, as per daughter she had episode of vomiting with (+) blood and blood clots, no coffee ground material. No recent fever or chills, takes baby aspirin daily. Pt was in the shower and her daughter was helping her shower, then she felt nauseated and vomiting some blood with clots. 71 yo female - poor historian, as per daughter she had episode of vomiting with (+) blood and blood clots, no coffee ground material. No recent fever or chills, takes baby aspirin daily. Pt was in the shower and her daughter was helping her shower, then she felt nauseated and vomiting some blood with clots. Has had dec PO intake for a few days.

## 2022-03-25 NOTE — ED ADULT NURSE NOTE - OBJECTIVE STATEMENT
BIBA for episode of vomiting and nausea today glucose elevated as per patient at home; sleeping in stretcher and poor historian

## 2022-03-25 NOTE — ED PROVIDER NOTE - PHYSICAL EXAMINATION
VSS in NAD non toxic appearing   NCAT EOMI PERRL OP clear  heart RRR no murmur   lungs CTA no wheezing no rales no rhonchi   abd soft NT ND no CVAT no guarding no rebound

## 2022-03-26 ENCOUNTER — INPATIENT (INPATIENT)
Facility: HOSPITAL | Age: 72
LOS: 9 days | Discharge: HOME | DRG: 369 | End: 2022-04-05
Attending: STUDENT IN AN ORGANIZED HEALTH CARE EDUCATION/TRAINING PROGRAM
Payer: MEDICAID

## 2022-03-26 DIAGNOSIS — R29.898 OTHER SYMPTOMS AND SIGNS INVOLVING THE MUSCULOSKELETAL SYSTEM: ICD-10-CM

## 2022-03-26 DIAGNOSIS — R63.8 OTHER SYMPTOMS AND SIGNS CONCERNING FOOD AND FLUID INTAKE: ICD-10-CM

## 2022-03-26 DIAGNOSIS — I10 ESSENTIAL (PRIMARY) HYPERTENSION: ICD-10-CM

## 2022-03-26 DIAGNOSIS — E78.5 HYPERLIPIDEMIA, UNSPECIFIED: ICD-10-CM

## 2022-03-26 DIAGNOSIS — E11.40 TYPE 2 DIABETES MELLITUS WITH DIABETIC NEUROPATHY, UNSPECIFIED: ICD-10-CM

## 2022-03-26 DIAGNOSIS — I45.10 UNSPECIFIED RIGHT BUNDLE-BRANCH BLOCK: ICD-10-CM

## 2022-03-26 DIAGNOSIS — K92.0 HEMATEMESIS: ICD-10-CM

## 2022-03-26 DIAGNOSIS — E11.9 TYPE 2 DIABETES MELLITUS WITHOUT COMPLICATIONS: ICD-10-CM

## 2022-03-26 DIAGNOSIS — R93.3 ABNORMAL FINDINGS ON DIAGNOSTIC IMAGING OF OTHER PARTS OF DIGESTIVE TRACT: ICD-10-CM

## 2022-03-26 DIAGNOSIS — R11.2 NAUSEA WITH VOMITING, UNSPECIFIED: ICD-10-CM

## 2022-03-26 LAB
BASOPHILS # BLD AUTO: 0.01 K/UL — SIGNIFICANT CHANGE UP (ref 0–0.2)
BASOPHILS NFR BLD AUTO: 0.1 % — SIGNIFICANT CHANGE UP (ref 0–2)
EOSINOPHIL # BLD AUTO: 0.02 K/UL — SIGNIFICANT CHANGE UP (ref 0–0.5)
EOSINOPHIL NFR BLD AUTO: 0.3 % — SIGNIFICANT CHANGE UP (ref 0–6)
GLUCOSE BLDC GLUCOMTR-MCNC: 134 MG/DL — HIGH (ref 70–99)
GLUCOSE BLDC GLUCOMTR-MCNC: 156 MG/DL — HIGH (ref 70–99)
GLUCOSE BLDC GLUCOMTR-MCNC: 191 MG/DL — HIGH (ref 70–99)
HCT VFR BLD CALC: 36.7 % — SIGNIFICANT CHANGE UP (ref 34.5–45)
HGB BLD-MCNC: 11.9 G/DL — SIGNIFICANT CHANGE UP (ref 11.5–15.5)
IMM GRANULOCYTES NFR BLD AUTO: 0.3 % — SIGNIFICANT CHANGE UP (ref 0–1.5)
LYMPHOCYTES # BLD AUTO: 1.89 K/UL — SIGNIFICANT CHANGE UP (ref 1–3.3)
LYMPHOCYTES # BLD AUTO: 26.9 % — SIGNIFICANT CHANGE UP (ref 13–44)
MCHC RBC-ENTMCNC: 26.5 PG — LOW (ref 27–34)
MCHC RBC-ENTMCNC: 32.4 GM/DL — SIGNIFICANT CHANGE UP (ref 32–36)
MCV RBC AUTO: 81.7 FL — SIGNIFICANT CHANGE UP (ref 80–100)
MONOCYTES # BLD AUTO: 0.41 K/UL — SIGNIFICANT CHANGE UP (ref 0–0.9)
MONOCYTES NFR BLD AUTO: 5.8 % — SIGNIFICANT CHANGE UP (ref 2–14)
NEUTROPHILS # BLD AUTO: 4.67 K/UL — SIGNIFICANT CHANGE UP (ref 1.8–7.4)
NEUTROPHILS NFR BLD AUTO: 66.6 % — SIGNIFICANT CHANGE UP (ref 43–77)
NRBC # BLD: 0 /100 WBCS — SIGNIFICANT CHANGE UP (ref 0–0)
PLATELET # BLD AUTO: 269 K/UL — SIGNIFICANT CHANGE UP (ref 150–400)
RBC # BLD: 4.49 M/UL — SIGNIFICANT CHANGE UP (ref 3.8–5.2)
RBC # FLD: 13.6 % — SIGNIFICANT CHANGE UP (ref 10.3–14.5)
SARS-COV-2 RNA SPEC QL NAA+PROBE: SIGNIFICANT CHANGE UP
WBC # BLD: 7.02 K/UL — SIGNIFICANT CHANGE UP (ref 3.8–10.5)
WBC # FLD AUTO: 7.02 K/UL — SIGNIFICANT CHANGE UP (ref 3.8–10.5)

## 2022-03-26 PROCEDURE — 74176 CT ABD & PELVIS W/O CONTRAST: CPT | Mod: 26

## 2022-03-26 PROCEDURE — 93010 ELECTROCARDIOGRAM REPORT: CPT

## 2022-03-26 PROCEDURE — 99223 1ST HOSP IP/OBS HIGH 75: CPT | Mod: GC

## 2022-03-26 PROCEDURE — 99222 1ST HOSP IP/OBS MODERATE 55: CPT

## 2022-03-26 RX ORDER — LISINOPRIL 2.5 MG/1
40 TABLET ORAL DAILY
Refills: 0 | Status: DISCONTINUED | OUTPATIENT
Start: 2022-03-26 | End: 2022-04-05

## 2022-03-26 RX ORDER — DEXTROSE 50 % IN WATER 50 %
12.5 SYRINGE (ML) INTRAVENOUS ONCE
Refills: 0 | Status: DISCONTINUED | OUTPATIENT
Start: 2022-03-26 | End: 2022-03-27

## 2022-03-26 RX ORDER — INSULIN GLARGINE 100 [IU]/ML
15 INJECTION, SOLUTION SUBCUTANEOUS AT BEDTIME
Refills: 0 | Status: DISCONTINUED | OUTPATIENT
Start: 2022-03-26 | End: 2022-03-27

## 2022-03-26 RX ORDER — SODIUM CHLORIDE 9 MG/ML
1000 INJECTION, SOLUTION INTRAVENOUS
Refills: 0 | Status: DISCONTINUED | OUTPATIENT
Start: 2022-03-26 | End: 2022-04-05

## 2022-03-26 RX ORDER — SODIUM CHLORIDE 9 MG/ML
1000 INJECTION, SOLUTION INTRAVENOUS
Refills: 0 | Status: DISCONTINUED | OUTPATIENT
Start: 2022-03-26 | End: 2022-03-27

## 2022-03-26 RX ORDER — INSULIN LISPRO 100/ML
VIAL (ML) SUBCUTANEOUS EVERY 6 HOURS
Refills: 0 | Status: DISCONTINUED | OUTPATIENT
Start: 2022-03-26 | End: 2022-03-27

## 2022-03-26 RX ORDER — PANTOPRAZOLE SODIUM 20 MG/1
40 TABLET, DELAYED RELEASE ORAL EVERY 12 HOURS
Refills: 0 | Status: DISCONTINUED | OUTPATIENT
Start: 2022-03-26 | End: 2022-03-27

## 2022-03-26 RX ORDER — DEXTROSE 50 % IN WATER 50 %
25 SYRINGE (ML) INTRAVENOUS ONCE
Refills: 0 | Status: DISCONTINUED | OUTPATIENT
Start: 2022-03-26 | End: 2022-03-27

## 2022-03-26 RX ORDER — GABAPENTIN 400 MG/1
300 CAPSULE ORAL EVERY 8 HOURS
Refills: 0 | Status: DISCONTINUED | OUTPATIENT
Start: 2022-03-26 | End: 2022-03-31

## 2022-03-26 RX ORDER — DEXTROSE 50 % IN WATER 50 %
15 SYRINGE (ML) INTRAVENOUS ONCE
Refills: 0 | Status: DISCONTINUED | OUTPATIENT
Start: 2022-03-26 | End: 2022-04-05

## 2022-03-26 RX ORDER — GLUCAGON INJECTION, SOLUTION 0.5 MG/.1ML
1 INJECTION, SOLUTION SUBCUTANEOUS ONCE
Refills: 0 | Status: DISCONTINUED | OUTPATIENT
Start: 2022-03-26 | End: 2022-04-05

## 2022-03-26 RX ORDER — INSULIN GLARGINE 100 [IU]/ML
15 INJECTION, SOLUTION SUBCUTANEOUS
Qty: 0 | Refills: 0 | DISCHARGE

## 2022-03-26 RX ADMIN — Medication 2: at 12:40

## 2022-03-26 RX ADMIN — PANTOPRAZOLE SODIUM 40 MILLIGRAM(S): 20 TABLET, DELAYED RELEASE ORAL at 00:19

## 2022-03-26 RX ADMIN — Medication 2: at 18:10

## 2022-03-26 RX ADMIN — GABAPENTIN 300 MILLIGRAM(S): 400 CAPSULE ORAL at 15:02

## 2022-03-26 RX ADMIN — INSULIN GLARGINE 15 UNIT(S): 100 INJECTION, SOLUTION SUBCUTANEOUS at 22:57

## 2022-03-26 RX ADMIN — LISINOPRIL 40 MILLIGRAM(S): 2.5 TABLET ORAL at 15:02

## 2022-03-26 RX ADMIN — GABAPENTIN 300 MILLIGRAM(S): 400 CAPSULE ORAL at 22:56

## 2022-03-26 RX ADMIN — PANTOPRAZOLE SODIUM 40 MILLIGRAM(S): 20 TABLET, DELAYED RELEASE ORAL at 22:57

## 2022-03-26 RX ADMIN — SODIUM CHLORIDE 1000 MILLILITER(S): 9 INJECTION INTRAMUSCULAR; INTRAVENOUS; SUBCUTANEOUS at 01:30

## 2022-03-26 RX ADMIN — SODIUM CHLORIDE 250 MILLILITER(S): 9 INJECTION INTRAMUSCULAR; INTRAVENOUS; SUBCUTANEOUS at 00:19

## 2022-03-26 RX ADMIN — PANTOPRAZOLE SODIUM 40 MILLIGRAM(S): 20 TABLET, DELAYED RELEASE ORAL at 12:41

## 2022-03-26 NOTE — ED ADULT NURSE REASSESSMENT NOTE - NS ED NURSE REASSESS COMMENT FT1
Pt received from [facility]. Pt sleeping  comfortably in bed. No s/s of acute distress. Will continue to monitor. Waiting for transport to [facility]
Pts daughter #[phone].
Assumed care of pt at this time. Pt is axo x2, resting comfortably, in NAD. Rpt CBC sent.

## 2022-03-26 NOTE — CONSULT NOTE ADULT - SUBJECTIVE AND OBJECTIVE BOX
GASTROENTEROLOGY CONSULT NOTE  HPI:  HPI: Pt is a 72F w/ PMHx of HTN, DM c/b peripheral neuropathy BIBEMS w/ 1 episode of bloody vomit with clots. Per daughter, pt was feeling more lethargic than usual yesterday morning. After eating breakfast and lunch, pt reported nausea w/ multiple episodes of vomiting, pt unsure if bloody and not witnessed by daughter. Shortly thereafter, pt was helped into the shower by daughter where pt then had witnessed episode of emesis w/ blood clots and EMS was called. Pt denies any abdominal pain, but reports a "pulling sensation" in the midepigastric region. Denies any diarrhea or constipation, and is not aware of any blood in her stool. Pt reported last colonoscopy > 10 years prior, no endoscopy in past. Pt reports feeling well other than hunger, denies nausea, and her main complaint is lower extremity pain. Pt reports chronic, progressive paresthesias w/ pain of bilateral lower extremities, associated with heaviness. Pt has been unable to ambulate without a walker and assistance for several months, and has mostly been bedbound during this time. Pt denies diffuse headache, changes in vision. Denies cough, dyspnea, dysuria.    In the ED:  VS: Tmax: 97.8, HR: 85, BP: 180/95, RR: 20, O2: 98% on RA   Pertinent Labs: CBC wnl including Hgb of 13.3. Glucose 293, mild hypochloremia on BMP, otherwise wnl. VBG consistent w/ metabolic alkalosis.  Imaging:   - CXR: WNL  - CT abd: L mid abd w/ island of mesenteric fat devoid of bowel loops w/ swirling of mesenteric vessels. 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 No bowel dilatation, obstruction, or evidence of strangulation.  - EKG: Per provider note, RBBB.  Treatment/interventions: Started on NS 250cc/hr for 9 hours (2.25L total). IV protonix once.   (26 Mar 2022 10:37)    Allergies    No Known Allergies    Intolerances      Home Medications:  aspirin 81 mg oral tablet, chewable: 1 tab(s) orally once a day (26 Mar 2022 12:57)  gabapentin 300 mg oral capsule: 1 cap(s) orally 3 times a day (26 Mar 2022 12:56)  Lantus 100 units/mL subcutaneous solution: 18 unit(s) subcutaneous once a day (at bedtime) (26 Mar 2022 13:58)  lisinopril 40 mg oral tablet: 1 tab(s) orally once a day (26 Mar 2022 12:56)  metFORMIN 1000 mg oral tablet: 1 tab(s) orally 2 times a day (26 Mar 2022 12:56)  rosuvastatin 20 mg oral tablet: 1 tab(s) orally once a day (26 Mar 2022 12:56)    MEDICATIONS:  MEDICATIONS  (STANDING):  dextrose 5%. 1000 milliLiter(s) (50 mL/Hr) IV Continuous <Continuous>  dextrose 5%. 1000 milliLiter(s) (100 mL/Hr) IV Continuous <Continuous>  dextrose 50% Injectable 25 Gram(s) IV Push once  dextrose 50% Injectable 12.5 Gram(s) IV Push once  dextrose 50% Injectable 25 Gram(s) IV Push once  gabapentin 300 milliGRAM(s) Oral every 8 hours  glucagon  Injectable 1 milliGRAM(s) IntraMuscular once  insulin glargine Injectable (LANTUS) 15 Unit(s) SubCutaneous at bedtime  insulin lispro (ADMELOG) corrective regimen sliding scale   SubCutaneous every 6 hours  lisinopril 40 milliGRAM(s) Oral daily  pantoprazole  Injectable 40 milliGRAM(s) IV Push every 12 hours    MEDICATIONS  (PRN):  dextrose Oral Gel 15 Gram(s) Oral once PRN Blood Glucose LESS THAN 70 milliGRAM(s)/deciliter    PAST MEDICAL & SURGICAL HISTORY:  HTN (hypertension)    DM (diabetes mellitus)      FAMILY HISTORY:    SOCIAL HISTORY:  [place]: [ ] Current, [ ] Former, [ ] Never; Pack Years:  Alcohol:  Illicit Drugs:    REVIEW OF SYSTEMS:  CONSTITUTIONAL: No fevers or chills  HEENT: No visual changes; No vertigo or throat pain   NECK: No pain or stiffness  RESPIRATORY: No cough, wheezing, hemoptysis; No shortness of breath  CARDIOVASCULAR: No chest pain or palpitations  GASTROINTESTINAL: as above  GENITOURINARY: No dysuria, frequency or hematuria  NEUROLOGICAL: No numbness or weakness  SKIN: No itching, burning, rashes, or lesions   All other 10 review of systems is negative unless indicated above.    Vital Signs Last 24 Hrs  T(C): 36.3 (26 Mar 2022 13:56), Max: 36.8 (26 Mar 2022 09:17)  T(F): 97.4 (26 Mar 2022 13:56), Max: 98.3 (26 Mar 2022 09:17)  HR: 79 (26 Mar 2022 13:56) (77 - 95)  BP: 156/84 (26 Mar 2022 13:56) (156/84 - 191/90)  BP(mean): --  RR: 18 (26 Mar 2022 13:56) (16 - 20)  SpO2: 98% (26 Mar 2022 13:56) (97% - 100%)      PHYSICAL EXAM:    General: Well developed; in no acute distress  Eyes: Anicteric sclerae, moist conjunctivae  HENT: Moist mucous membranes  Neck: Trachea midline, supple  Lungs: Normal respiratory effort  Cardiovascular: RRR  Abdomen: Soft, non-tender non-distended; No rebound or guarding  Extremities: Normal range of motion, No clubbing, cyanosis or edema  Neurological: Alert and oriented x3  Skin: Warm and dry. No obvious rash    LABS:                        11.9   7.02  )-----------( 269      ( 26 Mar 2022 02:17 )             36.7     03-25    132  |  95<L>  |  14  ----------------------------<  293<H>  4.8   |  30  |  0.51    Ca    9.5      25 Mar 2022 22:16    TPro  8.0  /  Alb  3.5  /  TBili  0.7  /  DBili  x   /  AST  29  /  ALT  9<L>  /  AlkPhos  44  03-25            RADIOLOGY & ADDITIONAL STUDIES:     Reviewed

## 2022-03-26 NOTE — H&P ADULT - NSHPSOCIALHISTORY_GEN_ALL_CORE
Pt lives at home w/ daughter and granddaughters. Pt denies any history of alcohol, smoking, or illicit drug use.

## 2022-03-26 NOTE — H&P ADULT - PROBLEM SELECTOR PLAN 7
F: S/p 2.25L over 9 hrs in ED, No IVF  E: replete K<4, Mg<2  N: NPO pending GI recs    VTE Prophylaxis: SCDs given UGIB  GI: Pantoprazole 40 IV BID  C: Full Code (began discussion w/ daughter who is next of kin and primary caretaker)  D: Pending PT recs On rosuvastatin 20 qd  - C/w home rosuvastatin

## 2022-03-26 NOTE — H&P ADULT - PROBLEM SELECTOR PLAN 2
Likely 2/2 diabetic neuropathy, aggravated by deconditioning from long-term bedrest.   - F/u PT consult  - Management of diabetic neuropathy as below Most likely 2/2 gastroenteritis. Denies diarrhea or abdominal pain. Labs suggestive of multiple vomiting given metabolic acidosis w/ hypochloremia.  - F/u GI PCR

## 2022-03-26 NOTE — H&P ADULT - NSHPLABSRESULTS_GEN_ALL_CORE
LABS:                          11.9   7.02  )-----------( 269      ( 26 Mar 2022 02:17 )             36.7     03-25    132  |  95<L>  |  14  ----------------------------<  293<H>  4.8   |  30  |  0.51    Ca    9.5      25 Mar 2022 22:16    TPro  8.0  /  Alb  3.5  /  TBili  0.7  /  DBili  x   /  AST  29  /  ALT  9<L>  /  AlkPhos  44  03-25        IMAGING:

## 2022-03-26 NOTE — H&P ADULT - NSHPPHYSICALEXAM_GEN_ALL_CORE
T(C): 36.6 (03-26-22 @ 11:04), Max: 36.8 (03-26-22 @ 09:17)  HR: 80 (03-26-22 @ 11:04) (77 - 95)  BP: 180/91 (03-26-22 @ 11:04) (159/102 - 191/90)  RR: 18 (03-26-22 @ 11:04) (16 - 20)  SpO2: 100% (03-26-22 @ 11:04) (97% - 100%)    General: NAD, laying in bed, speaking in full sentences  HEENT: No conjunctival injection, EOMI, MMM  Neck: supple, JVD not visualized  Cardio: RRR, +S1/S2, no M/R/G  Resp: lungs CTAB, no cough/wheezes/rales/rhonchi  Abdo: soft, NT, ND, +bowel sounds x4, no organomegaly or palpable mass    Extremities: WWP, no edema/cyanosis/clubbing   Vasc: 2+ radial and DP pulses b/l  Neuro: A&Ox3. 5/5 strength of b/l UEs, 5/5 strength of b/l hips. 3/5 strength of b/l knees. 1/5 strength of b/l ankles.  Psych: speech non-pressured, thoughts goal-oriented  Skin: dry, intact, no visible jaundice   MSK: no joint swelling T(C): 36.6 (03-26-22 @ 11:04), Max: 36.8 (03-26-22 @ 09:17)  HR: 80 (03-26-22 @ 11:04) (77 - 95)  BP: 180/91 (03-26-22 @ 11:04) (159/102 - 191/90)  RR: 18 (03-26-22 @ 11:04) (16 - 20)  SpO2: 100% (03-26-22 @ 11:04) (97% - 100%)    General: NAD, laying in bed, speaking in full sentences  HEENT: No conjunctival injection, EOMI, MMM  Neck: supple, JVD not visualized  Cardio: RRR, +S1/S2, no M/R/G  Resp: lungs CTAB, no cough/wheezes/rales/rhonchi  Abdo: soft, NT, ND, +bowel sounds x4, no organomegaly or palpable mass    Extremities: WWP, no edema/cyanosis/clubbing   Vasc: 2+ radial and DP pulses b/l  Neuro: A&Ox3. 5/5 strength of b/l UEs, 5/5 strength of b/l hips. 3/5 strength of b/l knees. 1/5 strength of b/l ankles. Sensation intact and equal.  Psych: speech non-pressured, thoughts goal-oriented  Skin: dry, intact, no visible jaundice   MSK: no joint swelling

## 2022-03-26 NOTE — H&P ADULT - PROBLEM SELECTOR PLAN 5
Per daughter, pt was previously on gabapentin 600 TID which was decreased to 300 TID two days prior due to concern for contribution to weakness.  - C/w gabapentin 300 TID Pt presenting w/ HTNsive urgency w/  systolic. Asymptomatic. Appears euvolemic. Per daughter, pt on lisinopril and amlodipine, but only the former was confirmed by pharmacy.  - C/w home lisinopril 40 qd

## 2022-03-26 NOTE — CONSULT NOTE ADULT - ASSESSMENT
72F w/ PMHx of HTN, DM c/b peripheral neuropathy BIBEMS w/ 1 episode of bloody vomit following multiple episodes of vomiting, as well as pulling sensation" in the midepigastric region. Pt reported last colonoscopy > 10 years prior, no endoscopy in past.   VS stable Hb 13.3-->11.9 after IVF  CT abdomen: In the left mid abdomen, there is an island of mesenteric fat devoid of bowel loops which demonstrates swirling of the mesenteric vessels. ? partial mesenteric volvulus or presence   of an internal hernia.     # MV tear  # Vomiting likely related to gastroparesis  - PPI BID x 2 weeks then daily x 6 weeks  - check Hb a1c and optimize glycemic control  - low fat, low kendall diet with soluble fibers (avoid insoluble fibers)  - would need non-urgent EGD and GES inpatient vs outpatient  - discuss CT findings with surgery given no significant abd pain at this time    Recommendations discussed with primary team  Plan discussed with GI service attending    Avila Mireles MD  PGY-5 GI fellow  Pager: [phone]

## 2022-03-26 NOTE — H&P ADULT - PROBLEM SELECTOR PLAN 4
Per daughter, pt is on metformin 1000 BID, Lantus 20u, and Victoza, but only metformin was able to be confirmed.  - mISS, FSG QID  - F/u a1c Per daughter, pt is on metformin 1000 BID, Lantus 20u, and Victoza, but only metformin was confirmed by pharmacy.  - mISS, FSG QID  - F/u a1c Likely 2/2 diabetic neuropathy, aggravated by deconditioning from long-term bedrest.   - F/u PT consult  - Management of diabetic neuropathy as below

## 2022-03-26 NOTE — H&P ADULT - PROBLEM SELECTOR PLAN 1
One episode, none in ED or hospital thus far. Asymptomatic. Hgb stable, not hypotensive. CT showing possible partial volvulus vs internal hernia, indeterminate if this is cause for pain. Ddx gastritis, partial volvulus, gastric malignancy  - NPO pending GI recs  - IV Pantoprazole 40 bid One episode, none in ED or hospital thus far. Asymptomatic. Hgb stable, not hypotensive. CT showing possible partial volvulus vs internal hernia, indeterminate if this is cause for pain. Ddx gastritis, Naty Sanchez tear, gastric malignancy  - NPO pending GI recs  - IV Pantoprazole 40 bid One episode, none in ED or hospital thus far. Preceded by several likely non-bloody vomiting episodes. Asymptomatic. Hgb stable, not hypotensive. CT showing possible partial volvulus vs internal hernia, indeterminate if this is cause for pain. Ddx gastritis, Naty Sanhcez tear, gastric malignancy  - NPO pending GI recs  - IV Pantoprazole 40 bid One episode, none in ED or hospital thus far. Preceded by several likely non-bloody vomiting episodes. No pain. Hgb stable, not hypotensive. Most likely Naty pimentel tear, less likely gastritic ulcer vs gastric malignancy  - CLD advance, as tolerated  - F/u GI recs  - IV Pantoprazole 40 bid  - Can hold aspirin, unclear indication  - On discharge, pantoprazole 40 BID for 2 weeks followed by QD for 6 weeks, follow up with GI One episode, none in ED or hospital thus far. Preceded by several likely non-bloody vomiting episodes. No pain. Hgb stable, not hypotensive. Most likely Naty Sanchez tear, labs not consistent w/ chronic bleed given normal Hgb.  - CLD advance, as tolerated  - F/u GI recs  - IV Pantoprazole 40 bid  - Can hold aspirin, unclear indication  - On discharge, pantoprazole 40 BID for 2 weeks followed by QD for 6 weeks, follow up with GI

## 2022-03-26 NOTE — CONSULT NOTE ADULT - SUBJECTIVE AND OBJECTIVE BOX
Patient is a 72y old  Female who presents with a chief complaint of Hematemesis (26 Mar 2022 10:37)       HPI:  HPI: Pt is a 72F w/ PMHx of HTN, DM c/b peripheral neuropathy BIBEMS w/ 1 episode of bloody vomit with clots. Per daughter, pt was feeling more lethargic than usual yesterday morning. After eating breakfast and lunch, pt reported nausea w/ multiple episodes of vomiting, pt unsure if bloody and not witnessed by daughter. Shortly thereafter, pt was helped into the shower by daughter where she had witnessed episode of emesis w/ blood clots and EMS was called. Pt denies any abdominal pain, but reports a "pulling sensation" in the midepigastric region. Denies any diarrhea or constipation, and is not aware of any blood in her stool. Pt reports feeling well other than hunger, denies nausea, and her main complaint is lower extremity pain. Pt reports chronic, progressive paresthesias w/ pain of bilateral lower extremities, associated with heaviness. Pt has been unable to ambulate without a walker and assistance for several months, and has mostly been bedbound during this time. Pt denies diffuse headache, changes in vision. Denies cough, dyspnea, dysuria.    In the ED:  VS: Tmax: 97.8, HR: 85, BP: 180/95, RR: 20, O2: 98% on RA   Pertinent Labs: CBC wnl including Hgb of 13.3. Glucose 293, mild hypochloremia on BMP, otherwise wnl. VBG consistent w/ metabolic alkalosis.  Imaging:   - CXR: WNL  - CT abd: L mid abd w/ island of mesenteric fat devoid of bowel loops w/ swirling of mesenteric vessels. 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 No bowel dilatation, obstruction, or evidence of strangulation.  - EKG: Per provider note, RBBB.  Treatment/interventions: Started on NS 250cc/hr for 9 hours (2.25L total). IV protonix once.   (26 Mar 2022 10:37)      PAST MEDICAL & SURGICAL HISTORY:  HTN (hypertension)    DM (diabetes mellitus)        MEDICATIONS  (STANDING):  dextrose 5%. 1000 milliLiter(s) (50 mL/Hr) IV Continuous <Continuous>  dextrose 5%. 1000 milliLiter(s) (100 mL/Hr) IV Continuous <Continuous>  dextrose 50% Injectable 25 Gram(s) IV Push once  dextrose 50% Injectable 12.5 Gram(s) IV Push once  dextrose 50% Injectable 25 Gram(s) IV Push once  gabapentin 300 milliGRAM(s) Oral every 8 hours  glucagon  Injectable 1 milliGRAM(s) IntraMuscular once  insulin lispro (ADMELOG) corrective regimen sliding scale   SubCutaneous every 6 hours  lisinopril 40 milliGRAM(s) Oral daily  pantoprazole  Injectable 40 milliGRAM(s) IV Push every 12 hours    MEDICATIONS  (PRN):  dextrose Oral Gel 15 Gram(s) Oral once PRN Blood Glucose LESS THAN 70 milliGRAM(s)/deciliter        FAMILY HISTORY:      CBC Full  -  ( 26 Mar 2022 02:17 )  WBC Count : 7.02 K/uL  RBC Count : 4.49 M/uL  Hemoglobin : 11.9 g/dL  Hematocrit : 36.7 %  Platelet Count - Automated : 269 K/uL  Mean Cell Volume : 81.7 fl  Mean Cell Hemoglobin : 26.5 pg  Mean Cell Hemoglobin Concentration : 32.4 gm/dL  Auto Neutrophil # : 4.67 K/uL  Auto Lymphocyte # : 1.89 K/uL  Auto Monocyte # : 0.41 K/uL  Auto Eosinophil # : 0.02 K/uL  Auto Basophil # : 0.01 K/uL  Auto Neutrophil % : 66.6 %  Auto Lymphocyte % : 26.9 %  Auto Monocyte % : 5.8 %  Auto Eosinophil % : 0.3 %  Auto Basophil % : 0.1 %      03-25    132  |  95<L>  |  14  ----------------------------<  293<H>  4.8   |  30  |  0.51    Ca    9.5      25 Mar 2022 22:16    TPro  8.0  /  Alb  3.5  /  TBili  0.7  /  DBili  x   /  AST  29  /  ALT  9<L>  /  AlkPhos  44  03-25            Radiology:    < from: CT Abdomen and Pelvis No Cont (03.26.22 @ 03:40) >    ACC: [id number] EXAM:  CT ABDOMEN AND PELVIS                          PROCEDURE DATE:  03/26/2022          INTERPRETATION:  ATTENDING RADIOLOGIST ADDENDUM: AGREE WITH THE BELOW   REPORT WITH THE FOLLOWING ADDENDUM:    No bowel obstruction or internalhernia. Constipation.    Thickened distal esophagus, correlate with endoscopy if clinically   warranted.    ==================================================    PROCEDURE INFORMATION:  Exam: CT Abdomen And Pelvis Without Contrast  Exam date and time: 3/26/2022 3:36 AM  Age: 72 years old    Clinical indication: Abdominal pain; Patient HX: Rule out obstruction    TECHNIQUE:  Imaging protocol: Computed tomography of the abdomen and pelvis without   contrast.    COMPARISON:  CT ABDOMEN AND PELVIS WITH IV CONTRAST 11/18/2018 12:37 PM    FINDINGS:  Liver: Normal. No mass.  Gallbladder and bile ducts: Normal. No calcified stones. No ductal   dilation.  Pancreas: Unremarkable.  Spleen: Normal.  Adrenal glands: Normal. No mass.  Kidneys and ureters: Normal. No hydronephrosis.  Stomach and bowel: In the left mid abdomen, there is an island of   mesenteric fat devoid of bowel  loops which demonstrates swirling of the mesenteric vessels. This is new   compared to the prior study  from November 2018 and may signify a partial mesenteric volvulus or   presence of an internal hernia;  however, at this time there is no abnormal bowel dilatation, bowel   obstruction, closed loop  obstruction, or mesenteric edema/bowel wall thickening to indicate   strangulation. Whether or not this  is contributing to the patient's pain is indeterminate.  Appendix: Normal appendix.  Intraperitoneal space: No ascites, hemoperitoneum, inflammation, or   abscess.  Vasculature: Unremarkable.  Lymph nodes: Unremarkable.  Urinary bladder: Unremarkable as visualized.  Reproductive: Unremarkable as visualized.  Bones/joints: Unremarkable. No acute fracture.  Soft tissues: See "Stomach and bowel" finding.    IMPRESSION:  In the left mid abdomen, there is an island of mesenteric fat devoid of   bowel loops which  demonstrates swirling of the mesenteric vessels. This is new compared to   the prior study from  November 2018 and may signify a partial mesenteric volvulus or presence   of an internal hernia;  however, atthis time there is no abnormal bowel dilatation, bowel   obstruction, closed loop  obstruction, or mesenteric edema/bowel wall thickening to indicate   strangulation. Whether or not this  is contributing to the patient's pain is indeterminate.              Vital Signs Last 24 Hrs  T(C): 36.6 (26 Mar 2022 11:04), Max: 36.8 (26 Mar 2022 09:17)  T(F): 97.8 (26 Mar 2022 11:04), Max: 98.3 (26 Mar 2022 09:17)  HR: 80 (26 Mar 2022 11:04) (77 - 95)  BP: 180/91 (26 Mar 2022 11:04) (159/102 - 191/90)  BP(mean): --  RR: 18 (26 Mar 2022 11:04) (16 - 20)  SpO2: 100% (26 Mar 2022 11:04) (97% - 100%)        REVIEW OF SYSTEMS:    CONSTITUTIONAL: No fever, weight loss, or fatigue  EYES: No eye pain, visual disturbances, or discharge  ENMT:  No difficulty hearing, tinnitus, vertigo; No sinus or throat pain  NECK: No pain or stiffness  BREASTS: No pain, masses, or nipple discharge  RESPIRATORY: No cough, wheezing, chills or hemoptysis; No shortness of breath  CARDIOVASCULAR: No chest pain, palpitations, dizziness, or leg swelling  GASTROINTESTINAL: per HPI  GENITOURINARY: No dysuria, frequency, hematuria, or incontinence  NEUROLOGICAL: No headaches, memory loss, loss of strength, numbness, or tremors  SKIN: No itching, burning, rashes, or lesions   LYMPH NODES: No enlarged glands  ENDOCRINE: No heat or cold intolerance; No hair loss  MUSCULOSKELETAL: No joint pain or swelling; No muscle, back, or extremity pain  PSYCHIATRIC: No depression, anxiety, mood swings, or difficulty sleeping  HEME/LYMPH: No easy bruising, or bleeding gums  ALLERGY AND IMMUNOLOGIC: No hives or eczema  VASCULAR: no swelling, erythema,           Physical Exam:   71 yo AA woman lying in semi Anton's position, awake, no acute complaints    Head: normocephalic, atraumatic    Eyes: PERRLA, EOMI, no nystagmus, sclera anicteric    ENT: nasal discharge, uvula midline, no oropharyngeal erythema/exudate    Neck: supple, negative JVD, negative carotid bruits, no thyromegaly    Chest: CTA bilaterally, neg wheeze/ rhonchi/ rales/ crackles/ egophany    Cardiovascular: regular rate and rhythm, neg murmurs/rubs/gallops    Abdomen: soft, non distended, non tender to palpation in all 4 quadrants, negative rebound/guarding, normal bowel sounds    Extremities: WWP, neg cyanosis/clubbing/edema, negative calf tenderness to palpation, negative Luly's sign    Neurologic Exam:    Alert and oriented x 3  Motor Exam:    Right UE:             > 4/5    Left UE:               > 4/5      Right LE:             3-/5    Left LE:              3-/5                 Sensation:           intact to light touch x 4 extremities                                                  DTR:                  biceps/brachioradialis: equal                                                      patella/ankle: equal                         Gait:  not tested              PM&R Impression:    1) deconditioned  2) LE weakness    Recommendations/ Plan :    1) Physical / Occupational therapy focusing on therapeutic exercises, bed mobility/transfer out of bed evaluation, progressive ambulation with assistive devices prn.    2) Anticipated Disposition Plan/Recs:   pending functional progress, probable FELICIA

## 2022-03-26 NOTE — H&P ADULT - ASSESSMENT
Pt is a 72F w/ PMHx of HTN, DM c/b peripheral neuropathy and mostly bedbound for several months, BIBEMS w/ 1 episode of bloody vomit with clots, admitted for hematemesis evaluation. Pt is a 72F w/ PMHx of HTN, DM c/b peripheral neuropathy and mostly bedbound for several months, BIBEMS w/ 1 episode of bloody vomit with clots following a day of nausea/vomiting, admitted for hematemesis evaluation, found to have most likely MW tear.

## 2022-03-26 NOTE — CONSULT NOTE ADULT - ASSESSMENT
per Internal Medicine    72 y o F w/ PMHx of HTN, DM c/b peripheral neuropathy and mostly bedbound for several months, BIBEMS w/ 1 episode of bloody vomit with clots, admitted for hematemesis evaluation.    Problem/Plan - 1:  ·  Problem: Hematemesis.   ·  Plan: One episode, none in ED or hospital thus far. Preceded by several likely non-bloody vomiting episodes. Asymptomatic. Hgb stable, not hypotensive. CT showing possible partial volvulus vs internal hernia, indeterminate if this is cause for pain. Ddx gastritis, Naty Sanchez tear, gastric malignancy  - NPO pending GI recs  - IV Pantoprazole 40 bid.    Problem/Plan - 2:  ·  Problem: Leg weakness.   ·  Plan: Likely 2/2 diabetic neuropathy, aggravated by deconditioning from long-term bedrest.   - F/u PT consult  - Management of diabetic neuropathy as below.    Problem/Plan - 3:  ·  Problem: HTN (hypertension).   ·  Plan: Pt presenting w/ HTNsive urgency w/  systolic. Asymptomatic. Appears euvolemic. Per daughter, pt on lisinopril and amlodipine, but only the former was confirmed by pharmacy.  - C/w home lisinopril 40 qd.    Problem/Plan - 4:  ·  Problem: DM (diabetes mellitus).   ·  Plan: Per daughter, pt is on metformin 1000 BID, Lantus 20u, and Victoza, but only metformin was confirmed by pharmacy.  - mISS, FSG QID  - F/u a1c.    Problem/Plan - 5:  ·  Problem: Diabetic neuropathy.   ·  Plan: Per daughter, pt was previously on gabapentin 600 TID which was decreased to 300 TID two days prior due to concern for contribution to weakness.  - C/w gabapentin 300 TID.    Problem/Plan - 6:  ·  Problem: Right bundle branch block.   ·  Plan: Noted by ED physician. EKG not in pt's chart on admission.  - F/u repeat EKG.    Problem/Plan - 7:  ·  Problem: Nutrition, metabolism, and development symptoms.   ·  Plan: F: S/p 2.25L over 9 hrs in ED, No IVF  E: replete K<4, Mg<2  N: NPO pending GI recs    VTE Prophylaxis: SCDs given UGIB  GI: Pantoprazole 40 IV BID  C: Full Code (began discussion w/ daughter who is next of kin and primary caretaker)  D: Pending PT recs.

## 2022-03-26 NOTE — H&P ADULT - ATTENDING COMMENTS
73 y/o female w/ hx of DM complicated by diabetic neuropathy who presents from home following multiple episodes of nausea and vomiting, ultimately with mild hematemesis which has since stopped.     Hematemesis  - Patient with hematemesis after multiple episodes of vomiting clinically consistent with Naty Sanchez tear. No alarming symptoms noted on CT of the abdomen and pelvis. No further bleeding noted per patient and no melena as of yet.   - PPI BID  - GI f/u, appreciate recommendations     Nausea/vomiting   - Nausea and vomiting, suspect 2/2 gastroparesis given DM w/ multiple micro/macrovascular complications of DM.   - strict glycemic control   - outpatient EGD with GI to rule out physical obstruction, may consider trial of reglan if patient significantly nauseous     DM II  - complicated by peripheral neuropathy, vision loss and likely diabetic gastroparesis   - HbA1c  - lantus 15 U QHS   - PT given profound weakness     Dispo: pending PT, suspect may need FELICIA

## 2022-03-26 NOTE — H&P ADULT - PROBLEM SELECTOR PLAN 6
F: S/p 2.25L over 9 hrs in ED, No IVF  E: replete K<4, Mg<2  N: NPO pending GI recs    VTE Prophylaxis: SCDs given UGIB  GI: Pantoprazole 40 IV BID  C: Full Code (began discussion w/ daughter who is next of kin and primary caretaker)  D: Pending PT recs Noted by ED physician. EKG not in pt's chart on admission.  - F/u repeat EKG Per daughter, pt is on metformin 1000 BID, Lantus 18u, and Victoza, but only metformin was confirmed by pharmacy.  - C/w Lantus 15u for now  - mISS, FSG QID  - F/u a1c Per daughter, pt is on metformin 1000 BID, Lantus 18u, and Victoza, but only metformin was confirmed by pharmacy.  - C/w Lantus 15u for now, uptitrate as necessary  - mISS, FSG QID  - F/u a1c

## 2022-03-26 NOTE — H&P ADULT - HISTORY OF PRESENT ILLNESS
HPI: Pt is a 72F w/ PMHx of HTN, DM c/b peripheral neuropathy BIBEMS w/ 1 episode of bloody vomit with clots. Per daughter, pt was feeling more lethargic than usual yesterday morning. After eating breakfast and lunch, pt reported nausea w/ vomiting, unknown if bloody. Shortly thereafter, pt was helped into the shower by daughter where she had a witnessed emesis w/ blood clots and EMS was called. Pt denies any abdominal pain, but reports a "pulling sensation" in the midepigastric region. Denies any diarrhea or constipation, and is not aware of any blood in her stool. Pt reports feeling well other than hunger, and her main complaint is lower extremity pain. Pt reports paresthesias w/ pain of bilateral lower extremities, associated with weakness. Pt has been unable to ambulate without a walker and assistance for several months, and has mostly been bedbound. Pt denies diffuse headache, changes in vision. Denies cough, dyspnea, dysuria.    In the ED:  VS: Tmax: 97.8, HR: 85, BP: 180/95, RR: 20, O2: 98% on RA   Pertinent Labs: CBC wnl including Hgb of 13.3. Glucose 293, mild hypochloremia on BMP, otherwise wnl. VBG consistent w/ metabolic alkalosis.  Imaging:   - CXR: WNL  - CT abd: L mid abd w/ island of mesenteric fat devoid of bowel loops w/ swirling of mesenteric vessels. 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 No bowel dilatation, obstruction, or evidence of strangulation.  - EKG: Per provider note, RBBB.  Treatment/interventions: Started on NS 250cc/hr for 9 hours (2.25L total). IV protonix once.   HPI: Pt is a 72F w/ PMHx of HTN, DM c/b peripheral neuropathy BIBEMS w/ 1 episode of bloody vomit with clots. Per daughter, pt was feeling more lethargic than usual yesterday morning. After eating breakfast and lunch, pt reported nausea w/ vomiting, unknown if bloody. Shortly thereafter, pt was helped into the shower by daughter where she had a witnessed emesis w/ blood clots and EMS was called. Pt denies any abdominal pain, but reports a "pulling sensation" in the midepigastric region. Denies any diarrhea or constipation, and is not aware of any blood in her stool. Pt reports feeling well other than hunger, denies nausea, and her main complaint is lower extremity pain. Pt reports chronic, progressive paresthesias w/ pain of bilateral lower extremities, associated with heaviness. Pt has been unable to ambulate without a walker and assistance for several months, and has mostly been bedbound during this time. Pt denies diffuse headache, changes in vision. Denies cough, dyspnea, dysuria.    In the ED:  VS: Tmax: 97.8, HR: 85, BP: 180/95, RR: 20, O2: 98% on RA   Pertinent Labs: CBC wnl including Hgb of 13.3. Glucose 293, mild hypochloremia on BMP, otherwise wnl. VBG consistent w/ metabolic alkalosis.  Imaging:   - CXR: WNL  - CT abd: L mid abd w/ island of mesenteric fat devoid of bowel loops w/ swirling of mesenteric vessels. 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 No bowel dilatation, obstruction, or evidence of strangulation.  - EKG: Per provider note, RBBB.  Treatment/interventions: Started on NS 250cc/hr for 9 hours (2.25L total). IV protonix once.   HPI: Pt is a 72F w/ PMHx of HTN, DM c/b peripheral neuropathy BIBEMS w/ 1 episode of bloody vomit with clots. Per daughter, pt was feeling more lethargic than usual yesterday morning. After eating breakfast and lunch, pt reported nausea w/ multiple episodes of vomiting, pt unsure if bloody and not witnessed by daughter. Shortly thereafter, pt was helped into the shower by daughter where she had witnessed episode of emesis w/ blood clots and EMS was called. Pt denies any abdominal pain, but reports a "pulling sensation" in the midepigastric region. Denies any diarrhea or constipation, and is not aware of any blood in her stool. Pt reports feeling well other than hunger, denies nausea, and her main complaint is lower extremity pain. Pt reports chronic, progressive paresthesias w/ pain of bilateral lower extremities, associated with heaviness. Pt has been unable to ambulate without a walker and assistance for several months, and has mostly been bedbound during this time. Pt denies diffuse headache, changes in vision. Denies cough, dyspnea, dysuria.    In the ED:  VS: Tmax: 97.8, HR: 85, BP: 180/95, RR: 20, O2: 98% on RA   Pertinent Labs: CBC wnl including Hgb of 13.3. Glucose 293, mild hypochloremia on BMP, otherwise wnl. VBG consistent w/ metabolic alkalosis.  Imaging:   - CXR: WNL  - CT abd: L mid abd w/ island of mesenteric fat devoid of bowel loops w/ swirling of mesenteric vessels. 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 No bowel dilatation, obstruction, or evidence of strangulation.  - EKG: Per provider note, RBBB.  Treatment/interventions: Started on NS 250cc/hr for 9 hours (2.25L total). IV protonix once.   HPI: Pt is a 72F w/ PMHx of HTN, DM c/b peripheral neuropathy BIBEMS w/ 1 episode of bloody vomit with clots. Per daughter, pt was feeling more lethargic than usual yesterday morning. After eating breakfast and lunch, pt reported nausea w/ multiple episodes of vomiting, pt unsure if bloody and not witnessed by daughter. Shortly thereafter, pt was helped into the shower by daughter where she had witnessed episode of emesis w/ blood clots and EMS was called. Pt denies any abdominal pain, but reports a "pulling sensation" in the midepigastric region. Denies any diarrhea or constipation, and is not aware of any blood in her stool. Pt reported last colonoscopy > 10 years prior, no endoscopy in past. Pt reports feeling well other than hunger, denies nausea, and her main complaint is lower extremity pain. Pt reports chronic, progressive paresthesias w/ pain of bilateral lower extremities, associated with heaviness. Pt has been unable to ambulate without a walker and assistance for several months, and has mostly been bedbound during this time. Pt denies diffuse headache, changes in vision. Denies cough, dyspnea, dysuria.    In the ED:  VS: Tmax: 97.8, HR: 85, BP: 180/95, RR: 20, O2: 98% on RA   Pertinent Labs: CBC wnl including Hgb of 13.3. Glucose 293, mild hypochloremia on BMP, otherwise wnl. VBG consistent w/ metabolic alkalosis.  Imaging:   - CXR: WNL  - CT abd: L mid abd w/ island of mesenteric fat devoid of bowel loops w/ swirling of mesenteric vessels. 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 No bowel dilatation, obstruction, or evidence of strangulation.  - EKG: Per provider note, RBBB.  Treatment/interventions: Started on NS 250cc/hr for 9 hours (2.25L total). IV protonix once.   HPI: Pt is a 72F w/ PMHx of HTN, DM c/b peripheral neuropathy BIBEMS w/ 1 episode of bloody vomit with clots. Per daughter, pt was feeling more lethargic than usual yesterday morning. After eating breakfast and lunch, pt reported nausea w/ multiple episodes of vomiting, pt unsure if bloody and not witnessed by daughter. Shortly thereafter, pt was helped into the shower by daughter where pt then had witnessed episode of emesis w/ blood clots and EMS was called. Pt denies any abdominal pain, but reports a "pulling sensation" in the midepigastric region. Denies any diarrhea or constipation, and is not aware of any blood in her stool. Pt reported last colonoscopy > 10 years prior, no endoscopy in past. Pt reports feeling well other than hunger, denies nausea, and her main complaint is lower extremity pain. Pt reports chronic, progressive paresthesias w/ pain of bilateral lower extremities, associated with heaviness. Pt has been unable to ambulate without a walker and assistance for several months, and has mostly been bedbound during this time. Pt denies diffuse headache, changes in vision. Denies cough, dyspnea, dysuria.    In the ED:  VS: Tmax: 97.8, HR: 85, BP: 180/95, RR: 20, O2: 98% on RA   Pertinent Labs: CBC wnl including Hgb of 13.3. Glucose 293, mild hypochloremia on BMP, otherwise wnl. VBG consistent w/ metabolic alkalosis.  Imaging:   - CXR: WNL  - CT abd: L mid abd w/ island of mesenteric fat devoid of bowel loops w/ swirling of mesenteric vessels. 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 No bowel dilatation, obstruction, or evidence of strangulation.  - EKG: Per provider note, RBBB.  Treatment/interventions: Started on NS 250cc/hr for 9 hours (2.25L total). IV protonix once.   HPI: Pt is a 72F w/ PMHx of HTN, DM c/b peripheral neuropathy BIBEMS w/ 1 episode of bloody vomit with clots. Per daughter, pt was feeling more lethargic than usual yesterday morning. After eating breakfast and lunch, pt reported nausea w/ multiple episodes of vomiting, pt unsure if bloody and not witnessed by daughter. Shortly thereafter, pt was helped into the shower by daughter where pt then had witnessed episode of emesis w/ blood clots and EMS was called. Pt denies any abdominal pain, but reports a "pulling sensation" in the midepigastric region. Denies any diarrhea or constipation, and is not aware of any blood in her stool. Pt reported last colonoscopy > 10 years prior, no endoscopy in past. Pt reports feeling well other than hunger, denies nausea, and her main complaint is lower extremity pain. Pt reports chronic, progressive paresthesias w/ pain of bilateral lower extremities, associated with heaviness. Pt has been unable to ambulate without a walker and assistance for several months, and has mostly been bedbound during this time. Pt denies diffuse headache, changes in vision. Denies cough, dyspnea, dysuria.    In the ED:  VS: Tmax: 97.8, HR: 85, BP: 180/95, RR: 20, O2: 98% on RA   Pertinent Labs: CBC wnl including Hgb of 13.3. Glucose 293, mild hypochloremia on BMP, otherwise wnl. VBG consistent w/ metabolic alkalosis.  Imaging:   - CXR: WNL  - CT abd: L mid abd w/ island of mesenteric fat devoid of bowel loops w/ swirling of mesenteric vessels. 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 No bowel dilatation, obstruction, or evidence of strangulation.  - EKG: Incomplete RBBB  Treatment/interventions: Started on NS 250cc/hr for 9 hours (2.25L total). IV protonix once.

## 2022-03-26 NOTE — PATIENT PROFILE ADULT - HAVE YOU EXPERIENCED VIOLENCE OR A TRAUMATIC EVENT?
Erythromycin Counseling:  I discussed with the patient the risks of erythromycin including but not limited to GI upset, allergic reaction, drug rash, diarrhea, increase in liver enzymes, and yeast infections. no

## 2022-03-26 NOTE — PATIENT PROFILE ADULT - FALL HARM RISK - HARM RISK INTERVENTIONS

## 2022-03-26 NOTE — H&P ADULT - PROBLEM SELECTOR PLAN 8
Per daughter, pt was previously on gabapentin 600 TID which was decreased to 300 TID two days prior due to concern for contribution to weakness.  - C/w gabapentin 300 TID

## 2022-03-26 NOTE — H&P ADULT - PROBLEM SELECTOR PLAN 9
Noted by ED physician. EKG not in pt's chart on admission.  - F/u repeat EKG Noted by ED physician. EKG not in pt's chart on admission.  - EKG at [facility] showing incomplete RBBB.

## 2022-03-26 NOTE — H&P ADULT - PROBLEM SELECTOR PLAN 10
F: S/p 2.25L over 9 hrs in ED, No IVF  E: replete K<4, Mg<2  N: NPO pending GI recs    VTE Prophylaxis: SCDs given UGIB  GI: Pantoprazole 40 IV BID  C: Full Code (began discussion w/ daughter who is next of kin and primary caretaker)  D: Pending PT recs

## 2022-03-26 NOTE — H&P ADULT - PROBLEM SELECTOR PLAN 3
Pt presenting w/ HTNsive urgency w/  systolic. Asymptomatic. Per daughter, pt on lisinopril and amlodipine, but only the former could be confirmed.  - C/w home lisinopril 40 qd Pt presenting w/ HTNsive urgency w/  systolic. Asymptomatic. Appears euvolemic. Per daughter, pt on lisinopril and amlodipine, but only the former was confirmed by pharmacy.  - C/w home lisinopril 40 qd CT showing possible partial volvulus vs internal hernia, indeterminate if this is cause for pain.   - Consider surgery consult CT showing possible partial volvulus vs internal hernia, indeterminate if this is cause for pain.   - F/u surgery recs CT showing possible partial volvulus vs internal hernia, indeterminate if this is cause for pain. Per attending read, no internal hernia. Pt having BMs, no obstruction on imaging.  - F/u CT scan addendum to confirm no partial volvulus  - If signs of bowel obstruction e.g. continued vomiting, lack of BMs, consider consult surgery

## 2022-03-26 NOTE — PATIENT PROFILE ADULT - SURGICAL SITE INCISION
Consent: The patient's consent was obtained including but not limited to risks of crusting, scabbing, blistering, scarring, darker or lighter pigmentary change, recurrence, incomplete removal and infection.
Duration Of Freeze Thaw-Cycle (Seconds): 3
Render Post-Care Instructions In Note?: no
no
Detail Level: Detailed
Number Of Freeze-Thaw Cycles: 3 freeze-thaw cycles
Post-Care Instructions: I reviewed with the patient in detail post-care instructions. Patient is to wear sunprotection, and avoid picking at any of the treated lesions. Pt may apply Vaseline to crusted or scabbing areas.

## 2022-03-27 LAB
A1C WITH ESTIMATED AVERAGE GLUCOSE RESULT: 11.6 % — HIGH (ref 4–5.6)
ALBUMIN SERPL ELPH-MCNC: 3.1 G/DL — LOW (ref 3.3–5)
ALP SERPL-CCNC: 29 U/L — LOW (ref 40–120)
ALT FLD-CCNC: <5 U/L — LOW (ref 10–45)
ANION GAP SERPL CALC-SCNC: 9 MMOL/L — SIGNIFICANT CHANGE UP (ref 5–17)
AST SERPL-CCNC: 13 U/L — SIGNIFICANT CHANGE UP (ref 10–40)
BASOPHILS # BLD AUTO: 0.02 K/UL — SIGNIFICANT CHANGE UP (ref 0–0.2)
BASOPHILS NFR BLD AUTO: 0.4 % — SIGNIFICANT CHANGE UP (ref 0–2)
BILIRUB SERPL-MCNC: 0.4 MG/DL — SIGNIFICANT CHANGE UP (ref 0.2–1.2)
BUN SERPL-MCNC: 12 MG/DL — SIGNIFICANT CHANGE UP (ref 7–23)
CALCIUM SERPL-MCNC: 8.5 MG/DL — SIGNIFICANT CHANGE UP (ref 8.4–10.5)
CHLORIDE SERPL-SCNC: 99 MMOL/L — SIGNIFICANT CHANGE UP (ref 96–108)
CO2 SERPL-SCNC: 26 MMOL/L — SIGNIFICANT CHANGE UP (ref 22–31)
CREAT SERPL-MCNC: 0.53 MG/DL — SIGNIFICANT CHANGE UP (ref 0.5–1.3)
EGFR: 97 ML/MIN/1.73M2 — SIGNIFICANT CHANGE UP
EOSINOPHIL # BLD AUTO: 0.1 K/UL — SIGNIFICANT CHANGE UP (ref 0–0.5)
EOSINOPHIL NFR BLD AUTO: 1.9 % — SIGNIFICANT CHANGE UP (ref 0–6)
ESTIMATED AVERAGE GLUCOSE: 286 MG/DL — HIGH (ref 68–114)
GLUCOSE BLDC GLUCOMTR-MCNC: 121 MG/DL — HIGH (ref 70–99)
GLUCOSE BLDC GLUCOMTR-MCNC: 187 MG/DL — HIGH (ref 70–99)
GLUCOSE BLDC GLUCOMTR-MCNC: 230 MG/DL — HIGH (ref 70–99)
GLUCOSE BLDC GLUCOMTR-MCNC: 237 MG/DL — HIGH (ref 70–99)
GLUCOSE BLDC GLUCOMTR-MCNC: 270 MG/DL — HIGH (ref 70–99)
GLUCOSE BLDC GLUCOMTR-MCNC: 60 MG/DL — LOW (ref 70–99)
GLUCOSE BLDC GLUCOMTR-MCNC: 62 MG/DL — LOW (ref 70–99)
GLUCOSE SERPL-MCNC: 265 MG/DL — HIGH (ref 70–99)
HCT VFR BLD CALC: 34.3 % — LOW (ref 34.5–45)
HCV AB S/CO SERPL IA: 0.04 S/CO — SIGNIFICANT CHANGE UP
HCV AB SERPL-IMP: SIGNIFICANT CHANGE UP
HGB BLD-MCNC: 10.7 G/DL — LOW (ref 11.5–15.5)
IMM GRANULOCYTES NFR BLD AUTO: 0.4 % — SIGNIFICANT CHANGE UP (ref 0–1.5)
LYMPHOCYTES # BLD AUTO: 1.79 K/UL — SIGNIFICANT CHANGE UP (ref 1–3.3)
LYMPHOCYTES # BLD AUTO: 33.6 % — SIGNIFICANT CHANGE UP (ref 13–44)
MAGNESIUM SERPL-MCNC: 1.5 MG/DL — LOW (ref 1.6–2.6)
MCHC RBC-ENTMCNC: 26 PG — LOW (ref 27–34)
MCHC RBC-ENTMCNC: 31.2 GM/DL — LOW (ref 32–36)
MCV RBC AUTO: 83.3 FL — SIGNIFICANT CHANGE UP (ref 80–100)
MONOCYTES # BLD AUTO: 0.49 K/UL — SIGNIFICANT CHANGE UP (ref 0–0.9)
MONOCYTES NFR BLD AUTO: 9.2 % — SIGNIFICANT CHANGE UP (ref 2–14)
NEUTROPHILS # BLD AUTO: 2.9 K/UL — SIGNIFICANT CHANGE UP (ref 1.8–7.4)
NEUTROPHILS NFR BLD AUTO: 54.5 % — SIGNIFICANT CHANGE UP (ref 43–77)
NRBC # BLD: 0 /100 WBCS — SIGNIFICANT CHANGE UP (ref 0–0)
PHOSPHATE SERPL-MCNC: 4.2 MG/DL — SIGNIFICANT CHANGE UP (ref 2.5–4.5)
PLATELET # BLD AUTO: 237 K/UL — SIGNIFICANT CHANGE UP (ref 150–400)
POTASSIUM SERPL-MCNC: 3.6 MMOL/L — SIGNIFICANT CHANGE UP (ref 3.5–5.3)
POTASSIUM SERPL-SCNC: 3.6 MMOL/L — SIGNIFICANT CHANGE UP (ref 3.5–5.3)
PROT SERPL-MCNC: 5.4 G/DL — LOW (ref 6–8.3)
RBC # BLD: 4.12 M/UL — SIGNIFICANT CHANGE UP (ref 3.8–5.2)
RBC # FLD: 13.9 % — SIGNIFICANT CHANGE UP (ref 10.3–14.5)
SODIUM SERPL-SCNC: 134 MMOL/L — LOW (ref 135–145)
WBC # BLD: 5.32 K/UL — SIGNIFICANT CHANGE UP (ref 3.8–10.5)
WBC # FLD AUTO: 5.32 K/UL — SIGNIFICANT CHANGE UP (ref 3.8–10.5)

## 2022-03-27 PROCEDURE — 99232 SBSQ HOSP IP/OBS MODERATE 35: CPT

## 2022-03-27 RX ORDER — POTASSIUM CHLORIDE 20 MEQ
40 PACKET (EA) ORAL ONCE
Refills: 0 | Status: DISCONTINUED | OUTPATIENT
Start: 2022-03-27 | End: 2022-03-30

## 2022-03-27 RX ORDER — DEXTROSE 50 % IN WATER 50 %
12.5 SYRINGE (ML) INTRAVENOUS ONCE
Refills: 0 | Status: DISCONTINUED | OUTPATIENT
Start: 2022-03-27 | End: 2022-03-27

## 2022-03-27 RX ORDER — DEXTROSE 10 % IN WATER 10 %
150 INTRAVENOUS SOLUTION INTRAVENOUS ONCE
Refills: 0 | Status: COMPLETED | OUTPATIENT
Start: 2022-03-27 | End: 2022-03-27

## 2022-03-27 RX ORDER — INSULIN LISPRO 100/ML
VIAL (ML) SUBCUTANEOUS
Refills: 0 | Status: DISCONTINUED | OUTPATIENT
Start: 2022-03-27 | End: 2022-04-05

## 2022-03-27 RX ORDER — MAGNESIUM SULFATE 500 MG/ML
2 VIAL (ML) INJECTION ONCE
Refills: 0 | Status: DISCONTINUED | OUTPATIENT
Start: 2022-03-27 | End: 2022-03-29

## 2022-03-27 RX ORDER — PANTOPRAZOLE SODIUM 20 MG/1
40 TABLET, DELAYED RELEASE ORAL EVERY 12 HOURS
Refills: 0 | Status: DISCONTINUED | OUTPATIENT
Start: 2022-03-27 | End: 2022-03-30

## 2022-03-27 RX ORDER — DEXTROSE 10 % IN WATER 10 %
1000 INTRAVENOUS SOLUTION INTRAVENOUS
Refills: 0 | Status: DISCONTINUED | OUTPATIENT
Start: 2022-03-27 | End: 2022-03-27

## 2022-03-27 RX ADMIN — GABAPENTIN 300 MILLIGRAM(S): 400 CAPSULE ORAL at 21:48

## 2022-03-27 RX ADMIN — GABAPENTIN 300 MILLIGRAM(S): 400 CAPSULE ORAL at 13:47

## 2022-03-27 RX ADMIN — PANTOPRAZOLE SODIUM 40 MILLIGRAM(S): 20 TABLET, DELAYED RELEASE ORAL at 12:23

## 2022-03-27 RX ADMIN — Medication 30 MILLILITER(S): at 12:00

## 2022-03-27 RX ADMIN — Medication 4: at 11:45

## 2022-03-27 RX ADMIN — GABAPENTIN 300 MILLIGRAM(S): 400 CAPSULE ORAL at 06:43

## 2022-03-27 RX ADMIN — PANTOPRAZOLE SODIUM 40 MILLIGRAM(S): 20 TABLET, DELAYED RELEASE ORAL at 21:48

## 2022-03-27 RX ADMIN — Medication 3: at 21:47

## 2022-03-27 RX ADMIN — LISINOPRIL 40 MILLIGRAM(S): 2.5 TABLET ORAL at 06:43

## 2022-03-27 RX ADMIN — Medication 300 MILLILITER(S): at 09:30

## 2022-03-27 RX ADMIN — Medication 1: at 17:46

## 2022-03-27 NOTE — PHYSICAL THERAPY INITIAL EVALUATION ADULT - GROSSLY INTACT, SENSORY
Grossly intact to light touch sensation throughout BLE above knees. Reports dec sensation/numbness throughout BLE below knees. Reports no light touch sensation intact in R foot dorsal surface.

## 2022-03-27 NOTE — PROGRESS NOTE ADULT - SUBJECTIVE AND OBJECTIVE BOX
INTERVAL HPI/OVERNIGHT EVENTS: CAIT o/n. This AM slightly confused, found to be hypoglycemic. Given d10 with good response - now has returned to bsaeline. She continues to have electric shooting pains in the legs which is unchanged. 12 point ROS otherwise unchanged.     VITAL SIGNS:  T(F): 98.3 (03-27-22 @ 05:58)  HR: 70 (03-27-22 @ 05:58)  BP: 114/65 (03-27-22 @ 05:58)  RR: 19 (03-27-22 @ 05:58)  SpO2: 99% (03-27-22 @ 05:58)  Wt(kg): --    PHYSICAL EXAM:      Constitutional: NAD, well-groomed, well-developed  HEENT: PERRLA, EOMI, Normal Hearing, MMM  Neck: No LAD, No JVD  Back: Normal spine flexure, No CVA tenderness  Respiratory: CTAB  Cardiovascular: S1 and S2, RRR, apical holosystolic murmur radiating to axilla   Gastrointestinal: BS+, soft, NT/ND  Extremities: No peripheral edema  Vascular: 2+ peripheral pulses  Neurological: A/O x 3, no focal deficits  Psychiatric: Normal mood, normal affect  Musculoskeletal: 4/5 strength b/l upper and lower extremities  Skin: No rashes        MEDICATIONS  (STANDING):  dextrose 10%. 1000 milliLiter(s) (30 mL/Hr) IV Continuous <Continuous>  dextrose 5%. 1000 milliLiter(s) (100 mL/Hr) IV Continuous <Continuous>  dextrose 50% Injectable 25 Gram(s) IV Push once  dextrose 50% Injectable 12.5 Gram(s) IV Push once  dextrose 50% Injectable 25 Gram(s) IV Push once  gabapentin 300 milliGRAM(s) Oral every 8 hours  glucagon  Injectable 1 milliGRAM(s) IntraMuscular once  insulin lispro (ADMELOG) corrective regimen sliding scale   SubCutaneous every 6 hours  lisinopril 40 milliGRAM(s) Oral daily  pantoprazole  Injectable 40 milliGRAM(s) IV Push every 12 hours    MEDICATIONS  (PRN):  dextrose Oral Gel 15 Gram(s) Oral once PRN Blood Glucose LESS THAN 70 milliGRAM(s)/deciliter      Allergies    No Known Allergies    Intolerances        LABS:                        10.7   5.32  )-----------( 237      ( 27 Mar 2022 11:40 )             34.3     03-25    132  |  95<L>  |  14  ----------------------------<  293<H>  4.8   |  30  |  0.51    Ca    9.5      25 Mar 2022 22:16  Phos  4.2     03-27    TPro  8.0  /  Alb  3.5  /  TBili  0.7  /  DBili  x   /  AST  29  /  ALT  9<L>  /  AlkPhos  44  03-25          RADIOLOGY & ADDITIONAL TESTS:

## 2022-03-27 NOTE — PHYSICAL THERAPY INITIAL EVALUATION ADULT - PERTINENT HX OF CURRENT PROBLEM, REHAB EVAL
72F w/ PMHx of HTN, DM c/b peripheral neuropathy BIBEMS w/ 1 episode of bloody vomit with clots. Per daughter, pt was feeling more lethargic than usual yesterday morning. After eating breakfast and lunch, pt reported nausea w/ multiple episodes of vomiting, pt unsure if bloody and not witnessed by daughter. Shortly thereafter, pt was helped into the shower by daughter where pt then had witnessed episode of emesis w/ blood clots and EMS was called.

## 2022-03-27 NOTE — PROGRESS NOTE ADULT - PROBLEM SELECTOR PLAN 1
- likely 2/2 gwen pimentel tear from repeated vomiting. vomiting likely attributable to gastroparesis.

## 2022-03-27 NOTE — PHYSICAL THERAPY INITIAL EVALUATION ADULT - ADDITIONAL COMMENTS
Pt currently resides in elevator apartment w/ daughter and granddaughters, no VICTOR HUGO. Denies HHA, states family is able to assist w/ transfers, ambulation, groceries and meals however are not home 24/7 due to work/school. Primarily amb w/ RW & assist however has not amb "for a while now" due to BLE weakness. Encountered 1 fall within past year, and was brought to hospital due to head trauma.

## 2022-03-27 NOTE — PHYSICAL THERAPY INITIAL EVALUATION ADULT - TRANSFER SAFETY CONCERNS NOTED: SIT/STAND, REHAB EVAL
Attempted to perform transfer w/o success x2. Pt w/ difficulty clearing bilat ischial tuberosities off bed surface despite VC for proper transfer sequencing./decreased sequencing ability/decreased weight-shifting ability

## 2022-03-27 NOTE — PROGRESS NOTE ADULT - PROBLEM SELECTOR PLAN 6
Per daughter, pt is on metformin 1000 BID, Lantus 18u, and Victoza, but only metformin was confirmed by pharmacy.  - C/w Lantus 15u for now, uptitrate as necessary  - mISS, FSG QID  - F/u a1c

## 2022-03-27 NOTE — PROGRESS NOTE ADULT - PROBLEM SELECTOR PLAN 9
Noted by ED physician. EKG not in pt's chart on admission.  - EKG at [facility] showing incomplete RBBB.

## 2022-03-27 NOTE — PROGRESS NOTE ADULT - ASSESSMENT
Pt is a 72F w/ PMHx of HTN, DM c/b peripheral neuropathy and mostly bedbound for several months, BIBEMS w/ 1 episode of bloody vomit with clots following a day of nausea/vomiting, admitted for hematemesis evaluation, found to have most likely MW tear.

## 2022-03-27 NOTE — PHYSICAL THERAPY INITIAL EVALUATION ADULT - ACTIVE RANGE OF MOTION EXAMINATION, REHAB EVAL
bilat knee flex/ext WNL, bilat ankles in PF position at rest, w/ difficulty to DF 2/2 pain and weakness./Left UE Active ROM was WFL (within functional limits)/Right UE Active ROM was WFL (within functional limits)

## 2022-03-27 NOTE — PROGRESS NOTE ADULT - PROBLEM SELECTOR PLAN 5
Pt presenting w/ HTNsive urgency w/  systolic. Asymptomatic. Appears euvolemic. Per daughter, pt on lisinopril and amlodipine, but only the former was confirmed by pharmacy.  - C/w home lisinopril 40 qd

## 2022-03-27 NOTE — PHYSICAL THERAPY INITIAL EVALUATION ADULT - MANUAL MUSCLE TESTING RESULTS, REHAB EVAL
Grossly 3+/5 throughout BUE in shoulder flex/ext and elbow flex/ext movements. Grossly good minus bilateral  strength. Grossly 3-/5 throughout bilat knee ext and bilat hip flex. 1/5 throughout bilat ankle DF/PF limited by pain and weakness.

## 2022-03-27 NOTE — PROGRESS NOTE ADULT - PROBLEM SELECTOR PLAN 4
Likely 2/2 diabetic neuropathy, aggravated by deconditioning from long-term bedrest.   - F/u PT consult  - Management of diabetic neuropathy as below

## 2022-03-27 NOTE — PROGRESS NOTE ADULT - PROBLEM SELECTOR PLAN 8
Per daughter, pt was previously on gabapentin 600 TID which was decreased to 300 TID two days prior due to concern for contribution to weakness.  - C/w gabapentin 300 TID  - Pain management consult in AM

## 2022-03-28 LAB
ANION GAP SERPL CALC-SCNC: 10 MMOL/L — SIGNIFICANT CHANGE UP (ref 5–17)
BUN SERPL-MCNC: 7 MG/DL — SIGNIFICANT CHANGE UP (ref 7–23)
CALCIUM SERPL-MCNC: 8.5 MG/DL — SIGNIFICANT CHANGE UP (ref 8.4–10.5)
CHLORIDE SERPL-SCNC: 103 MMOL/L — SIGNIFICANT CHANGE UP (ref 96–108)
CO2 SERPL-SCNC: 24 MMOL/L — SIGNIFICANT CHANGE UP (ref 22–31)
CREAT SERPL-MCNC: 0.48 MG/DL — LOW (ref 0.5–1.3)
EGFR: 99 ML/MIN/1.73M2 — SIGNIFICANT CHANGE UP
GLUCOSE BLDC GLUCOMTR-MCNC: 201 MG/DL — HIGH (ref 70–99)
GLUCOSE BLDC GLUCOMTR-MCNC: 251 MG/DL — HIGH (ref 70–99)
GLUCOSE BLDC GLUCOMTR-MCNC: 380 MG/DL — HIGH (ref 70–99)
GLUCOSE BLDC GLUCOMTR-MCNC: 413 MG/DL — HIGH (ref 70–99)
GLUCOSE SERPL-MCNC: 229 MG/DL — HIGH (ref 70–99)
HCT VFR BLD CALC: 36.2 % — SIGNIFICANT CHANGE UP (ref 34.5–45)
HGB BLD-MCNC: 11.3 G/DL — LOW (ref 11.5–15.5)
MAGNESIUM SERPL-MCNC: 1.6 MG/DL — SIGNIFICANT CHANGE UP (ref 1.6–2.6)
MCHC RBC-ENTMCNC: 26 PG — LOW (ref 27–34)
MCHC RBC-ENTMCNC: 31.2 GM/DL — LOW (ref 32–36)
MCV RBC AUTO: 83.4 FL — SIGNIFICANT CHANGE UP (ref 80–100)
NRBC # BLD: 0 /100 WBCS — SIGNIFICANT CHANGE UP (ref 0–0)
PLATELET # BLD AUTO: 236 K/UL — SIGNIFICANT CHANGE UP (ref 150–400)
POTASSIUM SERPL-MCNC: 4.2 MMOL/L — SIGNIFICANT CHANGE UP (ref 3.5–5.3)
POTASSIUM SERPL-SCNC: 4.2 MMOL/L — SIGNIFICANT CHANGE UP (ref 3.5–5.3)
RBC # BLD: 4.34 M/UL — SIGNIFICANT CHANGE UP (ref 3.8–5.2)
RBC # FLD: 13.9 % — SIGNIFICANT CHANGE UP (ref 10.3–14.5)
SODIUM SERPL-SCNC: 137 MMOL/L — SIGNIFICANT CHANGE UP (ref 135–145)
WBC # BLD: 5.31 K/UL — SIGNIFICANT CHANGE UP (ref 3.8–10.5)
WBC # FLD AUTO: 5.31 K/UL — SIGNIFICANT CHANGE UP (ref 3.8–10.5)

## 2022-03-28 PROCEDURE — 99222 1ST HOSP IP/OBS MODERATE 55: CPT | Mod: GC

## 2022-03-28 PROCEDURE — 72148 MRI LUMBAR SPINE W/O DYE: CPT | Mod: 26

## 2022-03-28 PROCEDURE — 99231 SBSQ HOSP IP/OBS SF/LOW 25: CPT

## 2022-03-28 PROCEDURE — 99233 SBSQ HOSP IP/OBS HIGH 50: CPT | Mod: GC

## 2022-03-28 RX ORDER — INSULIN LISPRO 100/ML
6 VIAL (ML) SUBCUTANEOUS
Refills: 0 | Status: DISCONTINUED | OUTPATIENT
Start: 2022-03-28 | End: 2022-03-29

## 2022-03-28 RX ORDER — INSULIN GLARGINE 100 [IU]/ML
10 INJECTION, SOLUTION SUBCUTANEOUS AT BEDTIME
Refills: 0 | Status: DISCONTINUED | OUTPATIENT
Start: 2022-03-28 | End: 2022-03-29

## 2022-03-28 RX ADMIN — GABAPENTIN 300 MILLIGRAM(S): 400 CAPSULE ORAL at 21:54

## 2022-03-28 RX ADMIN — Medication 5: at 12:04

## 2022-03-28 RX ADMIN — Medication 3: at 21:53

## 2022-03-28 RX ADMIN — Medication 2: at 08:43

## 2022-03-28 RX ADMIN — INSULIN GLARGINE 10 UNIT(S): 100 INJECTION, SOLUTION SUBCUTANEOUS at 21:54

## 2022-03-28 RX ADMIN — GABAPENTIN 300 MILLIGRAM(S): 400 CAPSULE ORAL at 05:10

## 2022-03-28 RX ADMIN — PANTOPRAZOLE SODIUM 40 MILLIGRAM(S): 20 TABLET, DELAYED RELEASE ORAL at 21:54

## 2022-03-28 RX ADMIN — Medication 6: at 17:58

## 2022-03-28 RX ADMIN — PANTOPRAZOLE SODIUM 40 MILLIGRAM(S): 20 TABLET, DELAYED RELEASE ORAL at 09:18

## 2022-03-28 RX ADMIN — Medication 6 UNIT(S): at 18:01

## 2022-03-28 RX ADMIN — GABAPENTIN 300 MILLIGRAM(S): 400 CAPSULE ORAL at 12:58

## 2022-03-28 RX ADMIN — LISINOPRIL 40 MILLIGRAM(S): 2.5 TABLET ORAL at 05:11

## 2022-03-28 NOTE — PROGRESS NOTE ADULT - ASSESSMENT
per Internal Medicine     72 y o F w/ PMHx of HTN, DM c/b peripheral neuropathy and mostly bedbound for several months, BIBEMS w/ 1 episode of bloody vomit with clots following a day of nausea/vomiting, admitted for hematemesis evaluation, found to have most likely MW tear.    Problem/Plan - 1:  ·  Problem: Hematemesis.   ·  Plan: One episode, none in ED or hospital thus far. Preceded by several likely non-bloody vomiting episodes. No pain. Hgb stable, not hypotensive. Most likely Naty Sanchez tear, labs not consistent w/ chronic bleed given normal Hgb.    Plan:   - Advanced diet to CC w/ low fat   - F/u GI recs  - IV Pantoprazole 40 bid  - Can hold aspirin, unclear indication  - On discharge, pantoprazole 40 BID for 2 weeks followed by QD for 6 weeks, follow up with GI.    Problem/Plan - 2:  ·  Problem: Nausea and vomiting.   ·  Plan: Likely due to gastroparesis 2/2 to poor DM control.    Plan:   - Pending GI PCR.    Problem/Plan - 3:  ·  Problem: Leg weakness.   ·  Plan: Likely 2/2 diabetic neuropathy, aggravated by deconditioning from long-term bedrest.     Plan:   - F/u PT consult  - Management of diabetic neuropathy as below.    Problem/Plan - 4:  ·  Problem: HTN (hypertension).   ·  Plan: Pt presenting w/ HTNsive urgency w/  systolic. Asymptomatic. Appears euvolemic. Per daughter, pt on lisinopril and amlodipine, but only the former was confirmed by pharmacy.    Plan:  - C/w home lisinopril 40 qd.    Problem/Plan - 5:  ·  Problem: DM (diabetes mellitus).   ·  Plan: Per daughter, pt is on metformin 1000 BID, Lantus 18u, and Victoza, but only metformin was confirmed by pharmacy. A1C 11.6     Plan:   - Consulted jey whitmore recs   - mISS, FSG QID.    Problem/Plan - 6:  ·  Problem: Hyperlipidemia.   ·  Plan: On rosuvastatin 20 qd.    Plan:   - C/w home rosuvastatin.    Problem/Plan - 7:  ·  Problem: Diabetic neuropathy.   ·  Plan: Per daughter, pt was previously on gabapentin 600 TID which was decreased to 300 TID two days prior due to concern for contribution to weakness.    Plan:   - C/w gabapentin 300 TID  - F/u on lumbar MRI.    Problem/Plan - 8:  ·  Problem: Right bundle branch block.   ·  Plan: Noted by ED physician. EKG not in pt's chart on admission.  - EKG at [facility] showing incomplete RBBB.    Problem/Plan - 9:  ·  Problem: Nutrition, metabolism, and development symptoms.   ·  Plan: F: S/p 2.25L   E: replete K<4, Mg<2  N: CC  VTE Prophylaxis: SCDs given UGIB  GI: Pantoprazole 40 IV BID  C: Full Code (began discussion w/ daughter who is next of kin and primary caretaker)

## 2022-03-28 NOTE — CONSULT NOTE ADULT - SUBJECTIVE AND OBJECTIVE BOX
73 YO F admitted for hematemesis thought to be 2/2 MW tear, found to be in uncontrolled DM, for which endocrine is consulted.    73 YO F with PMH of DM2 (requiring insulin), HTN, DM presented 2 days ago after episodes of vomiting, with one episode of hematemesis, which is thought to be 2/2 Naty Sanchez tear. Upon my assessment, he did not have any complaints, her vomiting already stopped.     Of note, she appeared to be a poor historian possibly i/s/o some degree of dementia. She was oriented to place (hospital, but was not sure which city) and herself, not to time. She was deferring all questions to her daughter, whom I could not reach.    She stated that her DM was diagnosed 15 years. She is on insulin "for many years". She takes "different doses". She checks her FS - "sometimes it's high, sometimes low". She eats "food for breakfast, dinner, lunch - different things, ask my daughter".    VSS    Labs:  BMP with normal kidney function  CBC mostly unremarkable  A1C 11.6     Finger sticks on 3/27 after Lantus of 15 on 26/03  AM 62   L 237 - 0 + 4 Lispro  Dinner 187 0 + 1  E 270 3 + 0  (NO LANTUS give)    2 + 0   L 361    Physical Exam:  Gen: Elderly female in NAD, pleasantly confused  Neuro: AAOx2 (herself and hospital). Grossly no focal deficits, but reduced strength in bilateral lower extremities which seems to be her baseline  Heart: RRR, no rmg  Lungs: CTAB, no signs of increased WOB   Ext: Good distal pulses. Light touch sensation grossly intact

## 2022-03-28 NOTE — PROGRESS NOTE ADULT - ATTENDING COMMENTS
Pt seen and examined by me. Pt reports falls at home and difficult with ambulation along with numbness/pain in LE.    otherwise no acute complaints  VSS  exam  decreased motor strength LE 3/5 BL  decreased sensation/proprioception    1- Blood loss anemia- no further hematemesis  h/h stable  no plan for scope for GI  c/w PPI  2- Diabetic neuropathy  3- LE weakness/falls- obtain MRI spine   4- HTN- c/w current regimen Pt seen and examined by me. Pt reports falls at home and difficult with ambulation along with numbness/pain in LE.    otherwise no acute complaints  VSS  exam  decreased motor strength LE 3/5 BL  decreased sensation/proprioception    1- Blood loss anemia- no further hematemesis  h/h stable  no plan for scope for GI  c/w PPI  2- Diabetic neuropathy  3- LE weakness/falls- obtain MRI spine   4- HTN- c/w current regimen  5- Uncontrolled DM- AIc 11- endo consulted    plan - FELICIA - requested CM to send MUKUND  Tentative DC date- tomorrow

## 2022-03-28 NOTE — CONSULT NOTE ADULT - ASSESSMENT
71 YO F with PMH of DM2 (requiring insulin), HTN, DM presented with hematemesis, now resolved, thought to be 2/2 Naty Sanchez tear. She is also found to be in uncontrolled DM i/s/o A1C of 11.6.      # DM2. Uncontrolled - A1C is 11.6  - history is somewhat limited 2/2 her being an extremely poor historian i/s/o her ?dementia  - unclear what she is on at home - primary team spoke with her daughter - apparently she is on Metformin 1g BID, Victoza 1.2, and 18 units of Lantus daily. We were unable to reach her daughter today  - reports some tingling/burning in her feet/hands  - her FS dropped to 62 after Lantus 15, therefore will need decrease in basal insulin  - her pre lunch, pre dinner and evening FS are in 180 - 360 range, will need addition of premeal insulin    Plan   - start Lantus 10 units tonight  - start premeal Lispro 6 units premeal    Endocrine will continue to follow

## 2022-03-28 NOTE — PROGRESS NOTE ADULT - SUBJECTIVE AND OBJECTIVE BOX
INTERVAL HPI/OVERNIGHT EVENTS: CAIT o/n. Lantus was held last night due to hypoglycemia episode on 3/27. Continues to have electric shooting pains in the legs which is unchanged. No more nausea/vomiting. No complaints of HA, dizziness, palpitations, CP, skin lesions/bruising, or lower extremity swelling.     OBJECTIVE  Vital Signs Last 24 Hrs  T(F): 97.7 (28 Mar 2022 06:03), Max: 98.9 (27 Mar 2022 21:28)  HR: 86 (28 Mar 2022 06:03) (76 - 86)  BP: 136/77 (28 Mar 2022 06:03) (122/73 - 145/74)  RR: 18 (28 Mar 2022 06:03) (18 - 19)  SpO2: 98% (28 Mar 2022 06:03) (96% - 98%) RA      Physical Exam  Constitutional: NAD, well-groomed, well-developed  HEENT: PERRLA, EOMI, Normal Hearing, MMM  Neck: No LAD, No JVD  Back: Normal spine flexure, No CVA tenderness  Respiratory: CTAB  Cardiovascular: S1 and S2, RRR, apical holosystolic murmur radiating to axilla   Gastrointestinal: BS+, soft, NT/ND  Extremities: No peripheral edema  Vascular: 2+ peripheral pulses  Neuro: A&Ox3. 5/5 strength of b/l UEs, 5/5 strength of b/l hips. 3/5 strength of b/l knees. 1/5 strength of b/l ankles. Sensation intact and equal.  Skin: No rashes  Psychiatric: Normal mood, normal affect      MEDICATIONS  (STANDING):  dextrose 10%. 1000 milliLiter(s) (30 mL/Hr) IV Continuous <Continuous>  dextrose 5%. 1000 milliLiter(s) (100 mL/Hr) IV Continuous <Continuous>  dextrose 50% Injectable 25 Gram(s) IV Push once  dextrose 50% Injectable 12.5 Gram(s) IV Push once  dextrose 50% Injectable 25 Gram(s) IV Push once  gabapentin 300 milliGRAM(s) Oral every 8 hours  glucagon  Injectable 1 milliGRAM(s) IntraMuscular once  insulin lispro (ADMELOG) corrective regimen sliding scale   SubCutaneous every 6 hours  lisinopril 40 milliGRAM(s) Oral daily  pantoprazole  Injectable 40 milliGRAM(s) IV Push every 12 hours    MEDICATIONS  (PRN):  dextrose Oral Gel 15 Gram(s) Oral once PRN Blood Glucose LESS THAN 70 milliGRAM(s)/deciliter      Allergies    No Known Allergies    Intolerances      LABS:                                   11.3   5.31  )-----------( 236      ( 28 Mar 2022 09:05 )             36.2     03-28    137  |  103  |  7   ----------------------------<  229<H>  4.2   |  24  |  0.48<L>    Ca    8.5      28 Mar 2022 09:05  Phos  4.2     03-27  Mg     1.6     03-28    TPro  5.4<L>  /  Alb  3.1<L>  /  TBili  0.4  /  DBili  x   /  AST  13  /  ALT  <5<L>  /  AlkPhos  29<L>  03-27      RADIOLOGY & ADDITIONAL TESTS: reviewed

## 2022-03-28 NOTE — PROGRESS NOTE ADULT - PROBLEM SELECTOR PLAN 9
F: S/p 2.25L   E: replete K<4, Mg<2  N: CC  VTE Prophylaxis: SCDs given UGIB  GI: Pantoprazole 40 IV BID  C: Full Code (began discussion w/ daughter who is next of kin and primary caretaker)  D: Pending PT recs

## 2022-03-28 NOTE — PROGRESS NOTE ADULT - PROBLEM SELECTOR PLAN 4
Pt presenting w/ HTNsive urgency w/  systolic. Asymptomatic. Appears euvolemic. Per daughter, pt on lisinopril and amlodipine, but only the former was confirmed by pharmacy.    Plan:  - C/w home lisinopril 40 qd

## 2022-03-28 NOTE — PROGRESS NOTE ADULT - SUBJECTIVE AND OBJECTIVE BOX
Physical Medicine and Rehabilitation Progress Note:    Patient is a 72y old  Female who presents with a chief complaint of Hematemesis (28 Mar 2022 12:20)      HPI:  HPI: Pt is a 72F w/ PMHx of HTN, DM c/b peripheral neuropathy BIBEMS w/ 1 episode of bloody vomit with clots. Per daughter, pt was feeling more lethargic than usual yesterday morning. After eating breakfast and lunch, pt reported nausea w/ multiple episodes of vomiting, pt unsure if bloody and not witnessed by daughter. Shortly thereafter, pt was helped into the shower by daughter where pt then had witnessed episode of emesis w/ blood clots and EMS was called. Pt denies any abdominal pain, but reports a "pulling sensation" in the midepigastric region. Denies any diarrhea or constipation, and is not aware of any blood in her stool. Pt reported last colonoscopy > 10 years prior, no endoscopy in past. Pt reports feeling well other than hunger, denies nausea, and her main complaint is lower extremity pain. Pt reports chronic, progressive paresthesias w/ pain of bilateral lower extremities, associated with heaviness. Pt has been unable to ambulate without a walker and assistance for several months, and has mostly been bedbound during this time. Pt denies diffuse headache, changes in vision. Denies cough, dyspnea, dysuria.    In the ED:  VS: Tmax: 97.8, HR: 85, BP: 180/95, RR: 20, O2: 98% on RA   Pertinent Labs: CBC wnl including Hgb of 13.3. Glucose 293, mild hypochloremia on BMP, otherwise wnl. VBG consistent w/ metabolic alkalosis.  Imaging:   - CXR: WNL  - CT abd: L mid abd w/ island of mesenteric fat devoid of bowel loops w/ swirling of mesenteric vessels. 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 No bowel dilatation, obstruction, or evidence of strangulation.  - EKG: Incomplete RBBB  Treatment/interventions: Started on NS 250cc/hr for 9 hours (2.25L total). IV protonix once.   (26 Mar 2022 10:37)                            11.3   5.31  )-----------( 236      ( 28 Mar 2022 09:05 )             36.2       03-28    137  |  103  |  7   ----------------------------<  229<H>  4.2   |  24  |  0.48<L>    Ca    8.5      28 Mar 2022 09:05  Phos  4.2     03-27  Mg     1.6     03-28    TPro  5.4<L>  /  Alb  3.1<L>  /  TBili  0.4  /  DBili  x   /  AST  13  /  ALT  <5<L>  /  AlkPhos  29<L>  03-27    Vital Signs Last 24 Hrs  T(C): 36.8 (28 Mar 2022 13:12), Max: 37.2 (27 Mar 2022 21:28)  T(F): 98.3 (28 Mar 2022 13:12), Max: 98.9 (27 Mar 2022 21:28)  HR: 86 (28 Mar 2022 13:12) (76 - 86)  BP: 147/74 (28 Mar 2022 13:12) (136/77 - 147/74)  BP(mean): --  RR: 18 (28 Mar 2022 13:12) (18 - 18)  SpO2: 95% (28 Mar 2022 13:12) (95% - 98%)    MEDICATIONS  (STANDING):  dextrose 5%. 1000 milliLiter(s) (100 mL/Hr) IV Continuous <Continuous>  gabapentin 300 milliGRAM(s) Oral every 8 hours  glucagon  Injectable 1 milliGRAM(s) IntraMuscular once  insulin lispro (ADMELOG) corrective regimen sliding scale   SubCutaneous Before meals and at bedtime  lisinopril 40 milliGRAM(s) Oral daily  magnesium sulfate  IVPB 2 Gram(s) IV Intermittent once  pantoprazole    Tablet 40 milliGRAM(s) Oral every 12 hours  potassium chloride   Powder 40 milliEquivalent(s) Oral once    MEDICATIONS  (PRN):  dextrose Oral Gel 15 Gram(s) Oral once PRN Blood Glucose LESS THAN 70 milliGRAM(s)/deciliter    Currently Undergoing Physical/ Occupational Therapy at bedside.    PT/OT Functional Status Assessment:       Previous Level of Function:     · Additional Comments	Pt currently resides in elevator apartment w/ daughter and granddaughters, no VICTOR HUGO. Denies HHA, states family is able to assist w/ transfers, ambulation, groceries and meals however are not home 24/7 due to work/school. Primarily amb w/ RW & assist however has not amb "for a while now" due to BLE weakness. Encountered 1 fall within past year, and was brought to hospital due to head trauma.    Cognitive Status Examination:   · Orientation	oriented to person, place, time and situation  · Level of Consciousness	alert  · Follows Commands and Answers Questions	100% of the time  · Personal Safety and Judgment	intact    Range of Motion Exam:   · Active Range of Motion Examination	Left UE Active ROM was WFL (within functional limits); Right UE Active ROM was WFL (within functional limits); bilat knee flex/ext WNL, bilat ankles in PF position at rest, w/ difficulty to DF 2/2 pain and weakness.    Manual Muscle Testing:   · Manual Muscle Testing Results	Grossly 3+/5 throughout BUE in shoulder flex/ext and elbow flex/ext movements. Grossly good minus bilateral  strength. Grossly 3-/5 throughout bilat knee ext and bilat hip flex. 1/5 throughout bilat ankle DF/PF limited by pain and weakness.    Bed Mobility: Rolling/Turning:     · Level of [place]	minimum assist (75% patients effort)  · Physical Assist/Nonphysical Assist	1 person assist    Bed Mobility: Scooting/Bridging:     · Level of [place]	minimum assist (75% patients effort)  · Physical Assist/Nonphysical Assist	1 person assist    Bed Mobility: Sit to Supine:     · Level of [place]	moderate assist (50% patients effort)  · Physical Assist/Nonphysical Assist	1 person assist    Bed Mobility: Supine to Sit:     · Level of [place]	moderate assist (50% patients effort)  · Physical Assist/Nonphysical Assist	1 person assist    Bed Mobility Analysis:     · Bed Mobility Limitations	impaired ability to control trunk for mobility; decreased ability to use legs for bridging/pushing; decreased ability to use arms for pushing/pulling  · Impairments Contributing to Impaired Bed Mobility	impaired balance; impaired postural control; decreased strength; pain    Transfer: Sit to Stand:     · Level of [place]	maximum assist (25% patients effort)  · Physical Assist/Nonphysical Assist	1 person assist  · Assistive Device	rolling walker    Transfer: Stand to Sit:     · Level of [place]	maximum assist (25% patients effort)  · Physical Assist/Nonphysical Assist	1 person assist  · Assistive Device	rolling walker    Sit/Stand Transfer Safety Analysis:     · Transfer Safety Concerns Noted	decreased weight-shifting ability; decreased sequencing ability; Attempted to perform transfer w/o success x2. Pt w/ difficulty clearing bilat ischial tuberosities off bed surface despite VC for proper transfer sequencing.  · Impairments Contributing to Impaired Transfers	impaired balance; pain; impaired postural control; decreased strength    Balance Skills Assessment:     · Sitting Balance: Static	good minus  · Sitting Balance: Dynamic	good minus  · Systems Impairment Contributing to Balance Disturbance	musculoskeletal  · Identified Impairments Contributing to Balance Disturbance	decreased strength; pain    Sensory Examination:   Sensory Examination:    Grossly Intact:   · Gross Sensory Examination	Grossly intact to light touch sensation throughout BLE above knees. Reports dec sensation/numbness throughout BLE below knees. Reports no light touch sensation intact in R foot dorsal surface.      Clinical Impressions:   · Criteria for Skilled Therapeutic Interventions	impairments found; functional limitations in following categories; risk reduction/prevention; rehab potential; therapy frequency; anticipated discharge recommendation  · Impairments Found (describe specific impairments)	muscle strength; gross motor; gait, locomotion, and balance; posture; ROM  · Functional Limitations in Following Categories (describe specific limitations)	self-care; home management; work; community/leisure  · Rehab Potential	good, to achieve stated therapy goals  · Therapy Frequency	2-3x/week        PM&R Impression: as above    Current Disposition Plan Recommendations:    subacute rehab placement

## 2022-03-28 NOTE — PROGRESS NOTE ADULT - PROBLEM SELECTOR PLAN 3
Likely 2/2 diabetic neuropathy, aggravated by deconditioning from long-term bedrest.     Plan:   - F/u PT consult  - Management of diabetic neuropathy as below

## 2022-03-28 NOTE — PROGRESS NOTE ADULT - PROBLEM SELECTOR PLAN 1
One episode, none in ED or hospital thus far. Preceded by several likely non-bloody vomiting episodes. No pain. Hgb stable, not hypotensive. Most likely Naty Sanchez tear, labs not consistent w/ chronic bleed given normal Hgb.    Plan:   - Advanced diet to CC w/ low fat   - F/u GI recs  - IV Pantoprazole 40 bid  - Can hold aspirin, unclear indication  - On discharge, pantoprazole 40 BID for 2 weeks followed by QD for 6 weeks, follow up with GI.

## 2022-03-28 NOTE — PROGRESS NOTE ADULT - PROBLEM SELECTOR PLAN 5
Per daughter, pt is on metformin 1000 BID, Lantus 18u, and Victoza, but only metformin was confirmed by pharmacy. A1C 11.6     Plan:   - Consulted myranda, appreciate recs   - mISS, FSG QID

## 2022-03-28 NOTE — PROGRESS NOTE ADULT - PROBLEM SELECTOR PLAN 8
Noted by ED physician. EKG not in pt's chart on admission.  - EKG at [facility] showing incomplete RBBB.

## 2022-03-28 NOTE — PROGRESS NOTE ADULT - ASSESSMENT
72F w/ PMHx of HTN, DM c/b peripheral neuropathy BIBEMS w/ 1 episode of bloody vomit following multiple episodes of vomiting, as well as pulling sensation" in the midepigastric region. GI consulted for hematemesis.      # Naty-Sanchez Tear  # Vomiting likely related to gastroparesis  AVSS and hemodynamically stable, suspect initial hgb drop was from hemoconcentration.  Last colonoscopy >10 year and no prior endoscopy previous.  A1c of 11.6 on presentation, which is suggestive of diabetic gastroparesis as the etiology of nausea and vomiting.  Abd pain resolved.  - PPI BID x 2 weeks then daily x 6 weeks  - Optimize glycemic control  - Low fat, low kendall diet with soluble fibers (avoid insoluble fibers)  - Would need non-urgent EGD and GES inpatient vs outpatient, patient and daughter are amenable if remains inpatient    Recommendations d/w primary team  Case d/w [facility] [place]    Oscar Peterson MD  Gastroenterology Fellow  c: [phone]

## 2022-03-28 NOTE — PROGRESS NOTE ADULT - ATTENDING COMMENTS
Initial episode of hematemesis, resolved now, no recurrent episode, Hb stable, tolerating diet   Limited communication with patient, just answers yes and no  Plan for EGD inpatient vs outpatient pending disposition by team, family in agreement with the same.  Rest plan as per fellow note

## 2022-03-28 NOTE — PROGRESS NOTE ADULT - PROBLEM SELECTOR PLAN 7
Per daughter, pt was previously on gabapentin 600 TID which was decreased to 300 TID two days prior due to concern for contribution to weakness.    Plan:   - C/w gabapentin 300 TID  - F/u on lumbar MRI

## 2022-03-28 NOTE — PROGRESS NOTE ADULT - SUBJECTIVE AND OBJECTIVE BOX
Pt seen and examined at bedside.  No acute event overnight and no new complaints.  Reporting some discomfort from hunger and LE.  Denies melena, hematochezia, or hematemesis.    Allergies    No Known Allergies    Intolerances    MEDICATIONS:  MEDICATIONS  (STANDING):  dextrose 5%. 1000 milliLiter(s) (100 mL/Hr) IV Continuous <Continuous>  gabapentin 300 milliGRAM(s) Oral every 8 hours  glucagon  Injectable 1 milliGRAM(s) IntraMuscular once  insulin lispro (ADMELOG) corrective regimen sliding scale   SubCutaneous Before meals and at bedtime  lisinopril 40 milliGRAM(s) Oral daily  magnesium sulfate  IVPB 2 Gram(s) IV Intermittent once  pantoprazole    Tablet 40 milliGRAM(s) Oral every 12 hours  potassium chloride   Powder 40 milliEquivalent(s) Oral once    MEDICATIONS  (PRN):  dextrose Oral Gel 15 Gram(s) Oral once PRN Blood Glucose LESS THAN 70 milliGRAM(s)/deciliter    Vital Signs Last 24 Hrs  T(C): 36.5 (28 Mar 2022 06:03), Max: 37.2 (27 Mar 2022 21:28)  T(F): 97.7 (28 Mar 2022 06:03), Max: 98.9 (27 Mar 2022 21:28)  HR: 86 (28 Mar 2022 06:03) (76 - 86)  BP: 136/77 (28 Mar 2022 06:03) (122/73 - 145/74)  BP(mean): --  RR: 18 (28 Mar 2022 06:03) (18 - 19)  SpO2: 98% (28 Mar 2022 06:03) (96% - 98%)    PHYSICAL EXAM:    General: Comfortable in bed, in no acute distress  HEENT: MMM, conjunctiva and sclera clear  Gastrointestinal: Soft, nontender, non-distended; No rebound or guarding  Skin: Warm and dry    LABS:                        11.3   5.31  )-----------( 236      ( 28 Mar 2022 09:05 )             36.2     03-28    137  |  103  |  7   ----------------------------<  229<H>  4.2   |  24  |  0.48<L>    Ca    8.5      28 Mar 2022 09:05  Phos  4.2     03-27  Mg     1.6     03-28    TPro  5.4<L>  /  Alb  3.1<L>  /  TBili  0.4  /  DBili  x   /  AST  13  /  ALT  <5<L>  /  AlkPhos  29<L>  03-27    RADIOLOGY & ADDITIONAL STUDIES: Reviewed

## 2022-03-29 ENCOUNTER — FORM ENCOUNTER (OUTPATIENT)
Age: 72
End: 2022-03-29

## 2022-03-29 LAB
ANION GAP SERPL CALC-SCNC: 10 MMOL/L — SIGNIFICANT CHANGE UP (ref 5–17)
BUN SERPL-MCNC: 10 MG/DL — SIGNIFICANT CHANGE UP (ref 7–23)
CALCIUM SERPL-MCNC: 8.5 MG/DL — SIGNIFICANT CHANGE UP (ref 8.4–10.5)
CHLORIDE SERPL-SCNC: 98 MMOL/L — SIGNIFICANT CHANGE UP (ref 96–108)
CO2 SERPL-SCNC: 26 MMOL/L — SIGNIFICANT CHANGE UP (ref 22–31)
CREAT SERPL-MCNC: 0.53 MG/DL — SIGNIFICANT CHANGE UP (ref 0.5–1.3)
EGFR: 97 ML/MIN/1.73M2 — SIGNIFICANT CHANGE UP
GLUCOSE BLDC GLUCOMTR-MCNC: 156 MG/DL — HIGH (ref 70–99)
GLUCOSE BLDC GLUCOMTR-MCNC: 285 MG/DL — HIGH (ref 70–99)
GLUCOSE BLDC GLUCOMTR-MCNC: 307 MG/DL — HIGH (ref 70–99)
GLUCOSE BLDC GLUCOMTR-MCNC: 315 MG/DL — HIGH (ref 70–99)
GLUCOSE BLDC GLUCOMTR-MCNC: 350 MG/DL — HIGH (ref 70–99)
GLUCOSE SERPL-MCNC: 212 MG/DL — HIGH (ref 70–99)
HCT VFR BLD CALC: 34.2 % — LOW (ref 34.5–45)
HGB BLD-MCNC: 10.7 G/DL — LOW (ref 11.5–15.5)
MAGNESIUM SERPL-MCNC: 1.4 MG/DL — LOW (ref 1.6–2.6)
MCHC RBC-ENTMCNC: 26.2 PG — LOW (ref 27–34)
MCHC RBC-ENTMCNC: 31.3 GM/DL — LOW (ref 32–36)
MCV RBC AUTO: 83.8 FL — SIGNIFICANT CHANGE UP (ref 80–100)
NRBC # BLD: 0 /100 WBCS — SIGNIFICANT CHANGE UP (ref 0–0)
PHOSPHATE SERPL-MCNC: 2.4 MG/DL — LOW (ref 2.5–4.5)
PLATELET # BLD AUTO: 225 K/UL — SIGNIFICANT CHANGE UP (ref 150–400)
POTASSIUM SERPL-MCNC: 3.7 MMOL/L — SIGNIFICANT CHANGE UP (ref 3.5–5.3)
POTASSIUM SERPL-SCNC: 3.7 MMOL/L — SIGNIFICANT CHANGE UP (ref 3.5–5.3)
RBC # BLD: 4.08 M/UL — SIGNIFICANT CHANGE UP (ref 3.8–5.2)
RBC # FLD: 13.7 % — SIGNIFICANT CHANGE UP (ref 10.3–14.5)
SODIUM SERPL-SCNC: 134 MMOL/L — LOW (ref 135–145)
WBC # BLD: 3.52 K/UL — LOW (ref 3.8–10.5)
WBC # FLD AUTO: 3.52 K/UL — LOW (ref 3.8–10.5)

## 2022-03-29 PROCEDURE — 99231 SBSQ HOSP IP/OBS SF/LOW 25: CPT | Mod: GC

## 2022-03-29 PROCEDURE — 99222 1ST HOSP IP/OBS MODERATE 55: CPT

## 2022-03-29 PROCEDURE — 99233 SBSQ HOSP IP/OBS HIGH 50: CPT | Mod: GC

## 2022-03-29 RX ORDER — POTASSIUM CHLORIDE 20 MEQ
20 PACKET (EA) ORAL
Refills: 0 | Status: COMPLETED | OUTPATIENT
Start: 2022-03-29 | End: 2022-03-29

## 2022-03-29 RX ORDER — INSULIN LISPRO 100/ML
9 VIAL (ML) SUBCUTANEOUS
Refills: 0 | Status: DISCONTINUED | OUTPATIENT
Start: 2022-03-29 | End: 2022-03-30

## 2022-03-29 RX ORDER — INSULIN GLARGINE 100 [IU]/ML
13 INJECTION, SOLUTION SUBCUTANEOUS AT BEDTIME
Refills: 0 | Status: DISCONTINUED | OUTPATIENT
Start: 2022-03-29 | End: 2022-03-30

## 2022-03-29 RX ORDER — ACETAMINOPHEN 500 MG
650 TABLET ORAL EVERY 6 HOURS
Refills: 0 | Status: DISCONTINUED | OUTPATIENT
Start: 2022-03-29 | End: 2022-04-05

## 2022-03-29 RX ORDER — MAGNESIUM SULFATE 500 MG/ML
2 VIAL (ML) INJECTION ONCE
Refills: 0 | Status: COMPLETED | OUTPATIENT
Start: 2022-03-29 | End: 2022-03-29

## 2022-03-29 RX ADMIN — Medication 6 UNIT(S): at 13:00

## 2022-03-29 RX ADMIN — Medication 6 UNIT(S): at 08:37

## 2022-03-29 RX ADMIN — INSULIN GLARGINE 13 UNIT(S): 100 INJECTION, SOLUTION SUBCUTANEOUS at 22:19

## 2022-03-29 RX ADMIN — Medication 1: at 13:00

## 2022-03-29 RX ADMIN — Medication 4: at 22:19

## 2022-03-29 RX ADMIN — Medication 3: at 18:11

## 2022-03-29 RX ADMIN — GABAPENTIN 300 MILLIGRAM(S): 400 CAPSULE ORAL at 22:19

## 2022-03-29 RX ADMIN — PANTOPRAZOLE SODIUM 40 MILLIGRAM(S): 20 TABLET, DELAYED RELEASE ORAL at 10:25

## 2022-03-29 RX ADMIN — GABAPENTIN 300 MILLIGRAM(S): 400 CAPSULE ORAL at 12:36

## 2022-03-29 RX ADMIN — Medication 20 MILLIEQUIVALENT(S): at 17:40

## 2022-03-29 RX ADMIN — Medication 20 MILLIEQUIVALENT(S): at 13:23

## 2022-03-29 RX ADMIN — GABAPENTIN 300 MILLIGRAM(S): 400 CAPSULE ORAL at 05:37

## 2022-03-29 RX ADMIN — Medication 9 UNIT(S): at 18:11

## 2022-03-29 RX ADMIN — Medication 25 GRAM(S): at 13:22

## 2022-03-29 RX ADMIN — Medication 4: at 08:36

## 2022-03-29 RX ADMIN — LISINOPRIL 40 MILLIGRAM(S): 2.5 TABLET ORAL at 05:37

## 2022-03-29 NOTE — PROGRESS NOTE ADULT - PROBLEM SELECTOR PLAN 3
Likely 2/2 diabetic neuropathy, aggravated by deconditioning from long-term bedrest.     Plan:   - F/u PT consult and neurology, appreciate recs   - Management of diabetic neuropathy as below

## 2022-03-29 NOTE — CONSULT NOTE ADULT - SUBJECTIVE AND OBJECTIVE BOX
***INCOMPLETE***  NEUROLOGY CONSULT    HPI:  HPI: Pt is a 72F w/ PMHx of HTN, DM c/b peripheral neuropathy BIBEMS w/ 1 episode of bloody vomit with clots. Per daughter, pt was feeling more lethargic than usual yesterday morning. After eating breakfast and lunch, pt reported nausea w/ multiple episodes of vomiting, pt unsure if bloody and not witnessed by daughter. Shortly thereafter, pt was helped into the shower by daughter where pt then had witnessed episode of emesis w/ blood clots and EMS was called. Pt denies any abdominal pain, but reports a "pulling sensation" in the midepigastric region. Denies any diarrhea or constipation, and is not aware of any blood in her stool. Pt reported last colonoscopy > 10 years prior, no endoscopy in past. Pt reports feeling well other than hunger, denies nausea, and her main complaint is lower extremity pain. Pt reports chronic, progressive paresthesias w/ pain of bilateral lower extremities, associated with heaviness. Pt has been unable to ambulate without a walker and assistance for several months, and has mostly been bedbound during this time. Pt denies diffuse headache, changes in vision. Denies cough, dyspnea, dysuria.    In the ED:  VS: Tmax: 97.8, HR: 85, BP: 180/95, RR: 20, O2: 98% on RA   Pertinent Labs: CBC wnl including Hgb of 13.3. Glucose 293, mild hypochloremia on BMP, otherwise wnl. VBG consistent w/ metabolic alkalosis.  Imaging:   - CXR: WNL  - CT abd: L mid abd w/ island of mesenteric fat devoid of bowel loops w/ swirling of mesenteric vessels. 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 No bowel dilatation, obstruction, or evidence of strangulation.  - EKG: Incomplete RBBB  Treatment/interventions: Started on NS 250cc/hr for 9 hours (2.25L total). IV protonix once.   (26 Mar 2022 10:37)      HOSPITAL COURSE:    SUBJECTIVE: Pt seen and examined at bedside. States her lower extremity pain has been ongoing since this hospitalization. However per collateral from her daughter and primary team, the weakness began about a year ago in her feet, and has been getting progressively worse. For the past 6 months the patient has been essentially bedbound, unable to ambulate at all. Pt describes electrical type pain starting at bilateral knees and radiates down to the foot. Has never followed up with a neurologist or podiatrist as far as she knows.     REVIEW OF SYSTEMS:  Constitutional: No fever, chills, fatigue, weakness  Eyes: no eye pain, visual disturbances, or discharge  ENT:  No difficulty hearing, tinnitus, vertigo; No sinus or throat pain  Neck: No pain or stiffness  Respiratory: No cough, dyspnea, wheezing   Cardiovascular: No chest pain, palpitations,   Gastrointestinal: per HPI  Genitourinary: No dysuria, frequency, hematuria or incontinence  Neurological: No headaches, lightheadedness, vertigo, numbness or tremors  Psychiatric: No depression, anxiety, mood swings or difficulty sleeping  Musculoskeletal: No joint pain or swelling; No muscle, back or extremity pain  Skin: No itching, burning, rashes or lesions   Lymph Nodes: No enlarged glands  Endocrine: No heat or cold intolerance; No hair loss, No h/o diabetes or thyroid dysfunction  Allergy and Immunologic: No hives or eczema    MEDICATIONS  Home Medications:  aspirin 81 mg oral tablet, chewable: 1 tab(s) orally once a day (26 Mar 2022 12:57)  gabapentin 300 mg oral capsule: 1 cap(s) orally 3 times a day (26 Mar 2022 12:56)  Lantus 100 units/mL subcutaneous solution: 18 unit(s) subcutaneous once a day (at bedtime) (26 Mar 2022 13:58)  lisinopril 40 mg oral tablet: 1 tab(s) orally once a day (26 Mar 2022 12:56)  metFORMIN 1000 mg oral tablet: 1 tab(s) orally 2 times a day (26 Mar 2022 12:56)  rosuvastatin 20 mg oral tablet: 1 tab(s) orally once a day (26 Mar 2022 12:56)    MEDICATIONS  (STANDING):  dextrose 5%. 1000 milliLiter(s) (100 mL/Hr) IV Continuous <Continuous>  gabapentin 300 milliGRAM(s) Oral every 8 hours  glucagon  Injectable 1 milliGRAM(s) IntraMuscular once  insulin glargine Injectable (LANTUS) 10 Unit(s) SubCutaneous at bedtime  insulin lispro (ADMELOG) corrective regimen sliding scale   SubCutaneous Before meals and at bedtime  insulin lispro Injectable (ADMELOG) 6 Unit(s) SubCutaneous three times a day before meals  lisinopril 40 milliGRAM(s) Oral daily  pantoprazole    Tablet 40 milliGRAM(s) Oral every 12 hours  potassium chloride    Tablet ER 20 milliEquivalent(s) Oral every 2 hours  potassium chloride   Powder 40 milliEquivalent(s) Oral once    MEDICATIONS  (PRN):  acetaminophen     Tablet .. 650 milliGRAM(s) Oral every 6 hours PRN Temp greater or equal to 38C (100.4F), Mild Pain (1 - 3), Moderate Pain (4 - 6)  dextrose Oral Gel 15 Gram(s) Oral once PRN Blood Glucose LESS THAN 70 milliGRAM(s)/deciliter      FAMILY HISTORY:  No pertinent family history in first degree relatives      SOCIAL HISTORY: negative for tobacco, alcohol, or ilicit drug use.    Allergies    No Known Allergies    Intolerances            Vital Signs Last 24 Hrs  T(C): 37.1 (29 Mar 2022 14:18), Max: 37.1 (29 Mar 2022 06:14)  T(F): 98.7 (29 Mar 2022 14:18), Max: 98.8 (29 Mar 2022 06:14)  HR: 72 (29 Mar 2022 14:18) (72 - 90)  BP: 144/85 (29 Mar 2022 14:18) (132/69 - 177/90)  BP(mean): 105 (29 Mar 2022 14:18) (105 - 105)  RR: 18 (29 Mar 2022 14:18) (18 - 18)  SpO2: 98% (29 Mar 2022 14:18) (96% - 99%)      PHYSICAL EXAMINATION:  General: Comfortable, pleasant/anxious/agitated, Ill-appearing, well-nourished/frail/cachectic, comfortable / in distress  Neurologic:     -Mental Status: AAOx2 to person and [facility], year was 2002. Speech is fluent with intact naming, repetition, and comprehension, no dysarthria. Recent and remote memory intact. Follows commands. Attention/concentration intact. Fund of knowledge appropriate.     -Cranial Nerves:          II: Completely impaired vision in L eye.           III, IV, VI: EOMI without nystagmus. Pupils reactive in R eye, L eye 4mm and fixed.           V:  Facial sensation V1-V3 equal and intact           VII: Face is symmetric with normal eye closure and smile          VIII: Hearing is bilaterally intact to finger rub          IX, X: Uvula is midline and soft palate rises symmetrically          XI: Head turning and shoulder shrug are intact.          XII: Tongue protrudes midline     -Motor: Normal bulk and tone. No involuntary movements (tremor, myoclonus, chorea, athetosis, dystonia)          Upper extremities: 5/5 shoulder abduction, 5/5 elbow flexion/extension,5/5 wrist flexion/extension, 5/5 handgrip in bilateral UE.           Lower extremities: 2/5 hip flexion, 4/5 knee extension/flexion, plantar flexion RLE 2/5 LLE 3/5, ankle dorsiflexion 0/5 RLE 3/5 LLE          No pronator drift. Rapid alternating movements intact and symmetric     -Sensation: Intact to light touch.           No neglect or extinction on double simultaneous testing.     -Coordination: No dysmetria on finger-to-nose     -Reflexes: 2+ and symmetric at biceps, brachioradialis. Unable to elicit LLE and RLE ankle and patellar reflex.            Plantar reflexes up going bilaterally.      -Gait: narrow and steady      LABS:                        10.7   3.52  )-----------( 225      ( 29 Mar 2022 11:27 )             34.2     03-29    134<L>  |  98  |  10  ----------------------------<  212<H>  3.7   |  26  |  0.53    Ca    8.5      29 Mar 2022 11:27  Phos  2.4     03-29  Mg     1.4     03-29      Hemoglobin A1C:   Vitamin B12     CAPILLARY BLOOD GLUCOSE      POCT Blood Glucose.: 156 mg/dL (29 Mar 2022 12:13)              Microbiology:      RADIOLOGY, EKG AND ADDITIONAL TESTS: Reviewed.    < from: MR Lumbar Spine No Cont (03.28.22 @ 16:21) >  Grade 1 anterolisthesis of L3 on L4. Degenerative disc   disease at L3/4 and L4/5 with superimposed small left foraminal disc   herniation at L3/4. When accounting for differences in angulation and   technique, these findings appear stable.    < end of copied text >         NEUROLOGY CONSULT    HPI:  HPI: Pt is a 72F w/ PMHx of HTN, DM c/b peripheral neuropathy BIBEMS w/ 1 episode of bloody vomit with clots. Per daughter, pt was feeling more lethargic than usual yesterday morning. After eating breakfast and lunch, pt reported nausea w/ multiple episodes of vomiting, pt unsure if bloody and not witnessed by daughter. Shortly thereafter, pt was helped into the shower by daughter where pt then had witnessed episode of emesis w/ blood clots and EMS was called. Pt denies any abdominal pain, but reports a "pulling sensation" in the midepigastric region. Denies any diarrhea or constipation, and is not aware of any blood in her stool. Pt reported last colonoscopy > 10 years prior, no endoscopy in past. Pt reports feeling well other than hunger, denies nausea, and her main complaint is lower extremity pain. Pt reports chronic, progressive paresthesias w/ pain of bilateral lower extremities, associated with heaviness. Pt has been unable to ambulate without a walker and assistance for several months, and has mostly been bedbound during this time. Pt denies diffuse headache, changes in vision. Denies cough, dyspnea, dysuria.    In the ED:  VS: Tmax: 97.8, HR: 85, BP: 180/95, RR: 20, O2: 98% on RA   Pertinent Labs: CBC wnl including Hgb of 13.3. Glucose 293, mild hypochloremia on BMP, otherwise wnl. VBG consistent w/ metabolic alkalosis.  Imaging:   - CXR: WNL  - CT abd: L mid abd w/ island of mesenteric fat devoid of bowel loops w/ swirling of mesenteric vessels. 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 No bowel dilatation, obstruction, or evidence of strangulation.  - EKG: Incomplete RBBB  Treatment/interventions: Started on NS 250cc/hr for 9 hours (2.25L total). IV protonix once.   (26 Mar 2022 10:37)      HOSPITAL COURSE:    SUBJECTIVE: Pt seen and examined at bedside. States her lower extremity pain has been ongoing since this hospitalization. However per collateral from her daughter and primary team, the weakness began about a year ago in her feet, and has been getting progressively worse. For the past 6 months the patient has been essentially bedbound, unable to ambulate at all. Pt describes electrical type pain starting at bilateral knees and radiates down to the foot. Has never followed up with a neurologist or podiatrist as far as she knows.     REVIEW OF SYSTEMS:  Constitutional: No fever, chills, fatigue, weakness  Eyes: no eye pain, visual disturbances, or discharge  ENT:  No difficulty hearing, tinnitus, vertigo; No sinus or throat pain  Neck: No pain or stiffness  Respiratory: No cough, dyspnea, wheezing   Cardiovascular: No chest pain, palpitations,   Gastrointestinal: per HPI  Genitourinary: No dysuria, frequency, hematuria or incontinence  Neurological: No headaches, lightheadedness, vertigo, numbness or tremors  Psychiatric: No depression, anxiety, mood swings or difficulty sleeping  Musculoskeletal: No joint pain or swelling; No muscle, back or extremity pain  Skin: No itching, burning, rashes or lesions   Lymph Nodes: No enlarged glands  Endocrine: No heat or cold intolerance; No hair loss, No h/o diabetes or thyroid dysfunction  Allergy and Immunologic: No hives or eczema    MEDICATIONS  Home Medications:  aspirin 81 mg oral tablet, chewable: 1 tab(s) orally once a day (26 Mar 2022 12:57)  gabapentin 300 mg oral capsule: 1 cap(s) orally 3 times a day (26 Mar 2022 12:56)  Lantus 100 units/mL subcutaneous solution: 18 unit(s) subcutaneous once a day (at bedtime) (26 Mar 2022 13:58)  lisinopril 40 mg oral tablet: 1 tab(s) orally once a day (26 Mar 2022 12:56)  metFORMIN 1000 mg oral tablet: 1 tab(s) orally 2 times a day (26 Mar 2022 12:56)  rosuvastatin 20 mg oral tablet: 1 tab(s) orally once a day (26 Mar 2022 12:56)    MEDICATIONS  (STANDING):  dextrose 5%. 1000 milliLiter(s) (100 mL/Hr) IV Continuous <Continuous>  gabapentin 300 milliGRAM(s) Oral every 8 hours  glucagon  Injectable 1 milliGRAM(s) IntraMuscular once  insulin glargine Injectable (LANTUS) 10 Unit(s) SubCutaneous at bedtime  insulin lispro (ADMELOG) corrective regimen sliding scale   SubCutaneous Before meals and at bedtime  insulin lispro Injectable (ADMELOG) 6 Unit(s) SubCutaneous three times a day before meals  lisinopril 40 milliGRAM(s) Oral daily  pantoprazole    Tablet 40 milliGRAM(s) Oral every 12 hours  potassium chloride    Tablet ER 20 milliEquivalent(s) Oral every 2 hours  potassium chloride   Powder 40 milliEquivalent(s) Oral once    MEDICATIONS  (PRN):  acetaminophen     Tablet .. 650 milliGRAM(s) Oral every 6 hours PRN Temp greater or equal to 38C (100.4F), Mild Pain (1 - 3), Moderate Pain (4 - 6)  dextrose Oral Gel 15 Gram(s) Oral once PRN Blood Glucose LESS THAN 70 milliGRAM(s)/deciliter      FAMILY HISTORY:  No pertinent family history in first degree relatives      SOCIAL HISTORY: negative for tobacco, alcohol, or ilicit drug use.    Allergies    No Known Allergies    Intolerances            Vital Signs Last 24 Hrs  T(C): 37.1 (29 Mar 2022 14:18), Max: 37.1 (29 Mar 2022 06:14)  T(F): 98.7 (29 Mar 2022 14:18), Max: 98.8 (29 Mar 2022 06:14)  HR: 72 (29 Mar 2022 14:18) (72 - 90)  BP: 144/85 (29 Mar 2022 14:18) (132/69 - 177/90)  BP(mean): 105 (29 Mar 2022 14:18) (105 - 105)  RR: 18 (29 Mar 2022 14:18) (18 - 18)  SpO2: 98% (29 Mar 2022 14:18) (96% - 99%)      PHYSICAL EXAMINATION:  General: Comfortable, pleasant/anxious/agitated, Ill-appearing, well-nourished/frail/cachectic, comfortable / in distress  Neurologic:     -Mental Status: AAOx2 to [facility], year was 2002. Speech is fluent with intact naming, repetition, and comprehension, no dysarthria. Recent and remote memory intact. Follows commands. Attention/concentration intact. Fund of knowledge appropriate.     -Cranial Nerves:          II: Completely impaired vision in L eye.           III, IV, VI: EOMI without nystagmus. Pupils reactive in R eye, L eye 4mm and fixed.           V:  Facial sensation V1-V3 equal and intact           VII: Face is symmetric with normal eye closure and smile          VIII: Hearing is bilaterally intact to finger rub          IX, X: Uvula is midline and soft palate rises symmetrically          XI: Head turning and shoulder shrug are intact.          XII: Tongue protrudes midline     -Motor: Normal bulk and tone. No involuntary movements (tremor, myoclonus, chorea, athetosis, dystonia)          Upper extremities: 5/5 shoulder abduction, 5/5 elbow flexion/extension,5/5 wrist flexion/extension, 5/5 handgrip in bilateral UE.           Lower extremities: 2/5 hip flexion, 4/5 knee extension/flexion, plantar flexion RLE 2/5 LLE 3/5, ankle dorsiflexion 0/5 RLE 3/5 LLE          No pronator drift. Rapid alternating movements intact and symmetric     -Sensation: Intact to light touch.           No neglect or extinction on double simultaneous testing.     -Coordination: No dysmetria on finger-to-nose     -Reflexes: 2+ and symmetric at biceps, brachioradialis. Unable to elicit LLE and RLE ankle and patellar reflex.            Plantar reflexes up going bilaterally.      -Gait: narrow and steady      LABS:                        10.7   3.52  )-----------( 225      ( 29 Mar 2022 11:27 )             34.2     03-29    134<L>  |  98  |  10  ----------------------------<  212<H>  3.7   |  26  |  0.53    Ca    8.5      29 Mar 2022 11:27  Phos  2.4     03-29  Mg     1.4     03-29      Hemoglobin A1C:   Vitamin B12     CAPILLARY BLOOD GLUCOSE      POCT Blood Glucose.: 156 mg/dL (29 Mar 2022 12:13)              Microbiology:      RADIOLOGY, EKG AND ADDITIONAL TESTS: Reviewed.    < from: MR Lumbar Spine No Cont (03.28.22 @ 16:21) >  Grade 1 anterolisthesis of L3 on L4. Degenerative disc   disease at L3/4 and L4/5 with superimposed small left foraminal disc   herniation at L3/4. When accounting for differences in angulation and   technique, these findings appear stable.    < end of copied text >         NEUROLOGY CONSULT    HPI:  HPI: Pt is a 72F w/ PMHx of HTN, DM c/b peripheral neuropathy BIBEMS w/ 1 episode of bloody vomit with clots. Per daughter, pt was feeling more lethargic than usual yesterday morning. After eating breakfast and lunch, pt reported nausea w/ multiple episodes of vomiting, pt unsure if bloody and not witnessed by daughter. Shortly thereafter, pt was helped into the shower by daughter where pt then had witnessed episode of emesis w/ blood clots and EMS was called. Pt denies any abdominal pain, but reports a "pulling sensation" in the midepigastric region. Denies any diarrhea or constipation, and is not aware of any blood in her stool. Pt reported last colonoscopy > 10 years prior, no endoscopy in past. Pt reports feeling well other than hunger, denies nausea, and her main complaint is lower extremity pain. Pt reports chronic, progressive paresthesias w/ pain of bilateral lower extremities, associated with heaviness. Pt has been unable to ambulate without a walker and assistance for several months, and has mostly been bedbound during this time. Pt denies diffuse headache, changes in vision. Denies cough, dyspnea, dysuria.    In the ED:  VS: Tmax: 97.8, HR: 85, BP: 180/95, RR: 20, O2: 98% on RA   Pertinent Labs: CBC wnl including Hgb of 13.3. Glucose 293, mild hypochloremia on BMP, otherwise wnl. VBG consistent w/ metabolic alkalosis.  Imaging:   - CXR: WNL  - CT abd: L mid abd w/ island of mesenteric fat devoid of bowel loops w/ swirling of mesenteric vessels. 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 No bowel dilatation, obstruction, or evidence of strangulation.  - EKG: Incomplete RBBB  Treatment/interventions: Started on NS 250cc/hr for 9 hours (2.25L total). IV protonix once.   (26 Mar 2022 10:37)      HOSPITAL COURSE:    SUBJECTIVE: Pt seen and examined at bedside. States her lower extremity pain has been ongoing since this hospitalization. However per collateral from her daughter and primary team, the weakness began about a year ago in her feet, and has been getting progressively worse. For the past 6 months the patient has been essentially bedbound, unable to ambulate at all. Pt describes electrical type pain starting at bilateral knees and radiates down to the foot. Has never followed up with a neurologist or podiatrist as far as she knows.     REVIEW OF SYSTEMS:  Constitutional: No fever, chills, fatigue, weakness  Eyes: no eye pain, visual disturbances, or discharge  ENT:  No difficulty hearing, tinnitus, vertigo; No sinus or throat pain  Neck: No pain or stiffness  Respiratory: No cough, dyspnea, wheezing   Cardiovascular: No chest pain, palpitations,   Gastrointestinal: per HPI  Genitourinary: No dysuria, frequency, hematuria or incontinence  Neurological: No headaches, lightheadedness, vertigo, numbness or tremors  Psychiatric: No depression, anxiety, mood swings or difficulty sleeping  Musculoskeletal: No joint pain or swelling; No muscle, back or extremity pain  Skin: No itching, burning, rashes or lesions   Lymph Nodes: No enlarged glands  Endocrine: No heat or cold intolerance; No hair loss, No h/o diabetes or thyroid dysfunction  Allergy and Immunologic: No hives or eczema    MEDICATIONS  Home Medications:  aspirin 81 mg oral tablet, chewable: 1 tab(s) orally once a day (26 Mar 2022 12:57)  gabapentin 300 mg oral capsule: 1 cap(s) orally 3 times a day (26 Mar 2022 12:56)  Lantus 100 units/mL subcutaneous solution: 18 unit(s) subcutaneous once a day (at bedtime) (26 Mar 2022 13:58)  lisinopril 40 mg oral tablet: 1 tab(s) orally once a day (26 Mar 2022 12:56)  metFORMIN 1000 mg oral tablet: 1 tab(s) orally 2 times a day (26 Mar 2022 12:56)  rosuvastatin 20 mg oral tablet: 1 tab(s) orally once a day (26 Mar 2022 12:56)    MEDICATIONS  (STANDING):  dextrose 5%. 1000 milliLiter(s) (100 mL/Hr) IV Continuous <Continuous>  gabapentin 300 milliGRAM(s) Oral every 8 hours  glucagon  Injectable 1 milliGRAM(s) IntraMuscular once  insulin glargine Injectable (LANTUS) 10 Unit(s) SubCutaneous at bedtime  insulin lispro (ADMELOG) corrective regimen sliding scale   SubCutaneous Before meals and at bedtime  insulin lispro Injectable (ADMELOG) 6 Unit(s) SubCutaneous three times a day before meals  lisinopril 40 milliGRAM(s) Oral daily  pantoprazole    Tablet 40 milliGRAM(s) Oral every 12 hours  potassium chloride    Tablet ER 20 milliEquivalent(s) Oral every 2 hours  potassium chloride   Powder 40 milliEquivalent(s) Oral once    MEDICATIONS  (PRN):  acetaminophen     Tablet .. 650 milliGRAM(s) Oral every 6 hours PRN Temp greater or equal to 38C (100.4F), Mild Pain (1 - 3), Moderate Pain (4 - 6)  dextrose Oral Gel 15 Gram(s) Oral once PRN Blood Glucose LESS THAN 70 milliGRAM(s)/deciliter      FAMILY HISTORY:  No pertinent family history in first degree relatives      SOCIAL HISTORY: negative for tobacco, alcohol, or ilicit drug use.    Allergies    No Known Allergies    Intolerances            Vital Signs Last 24 Hrs  T(C): 37.1 (29 Mar 2022 14:18), Max: 37.1 (29 Mar 2022 06:14)  T(F): 98.7 (29 Mar 2022 14:18), Max: 98.8 (29 Mar 2022 06:14)  HR: 72 (29 Mar 2022 14:18) (72 - 90)  BP: 144/85 (29 Mar 2022 14:18) (132/69 - 177/90)  BP(mean): 105 (29 Mar 2022 14:18) (105 - 105)  RR: 18 (29 Mar 2022 14:18) (18 - 18)  SpO2: 98% (29 Mar 2022 14:18) (96% - 99%)      PHYSICAL EXAMINATION:  General: Comfortable, pleasant/anxious/agitated, Ill-appearing, well-nourished/frail/cachectic, comfortable / in distress  Neurologic:     -Mental Status: AAOx2 to [facility], year was 2002. Speech is fluent with intact naming, repetition, and comprehension, no dysarthria. Recent and remote memory intact. Follows commands. Attention/concentration intact. Fund of knowledge appropriate.     -Cranial Nerves:          II: Completely impaired vision in L eye.           III, IV, VI: EOMI without nystagmus. Pupils reactive in R eye, L eye 4mm and fixed.           V:  Facial sensation V1-V3 equal and intact           VII: Face is symmetric with normal eye closure and smile          VIII: Hearing is bilaterally intact to finger rub          IX, X: Uvula is midline and soft palate rises symmetrically          XI: Head turning and shoulder shrug are intact.          XII: Tongue protrudes midline     -Motor: Normal bulk and tone. No involuntary movements (tremor, myoclonus, chorea, athetosis, dystonia)          Upper extremities: 5/5 shoulder abduction, 5/5 elbow flexion/extension,5/5 wrist flexion/extension, 5/5 handgrip in bilateral UE.           Lower extremities: 2/5 hip flexion, 4/5 knee extension/flexion, plantar flexion RLE 2/5 LLE 3/5, ankle dorsiflexion 0/5 RLE 3/5 LLE          No pronator drift. Rapid alternating movements intact and symmetric     -Sensation: Intact to light touch.           No neglect or extinction on double simultaneous testing.     -Coordination: No dysmetria on finger-to-nose     -Reflexes: 2+ and symmetric at biceps, brachioradialis. 2+ patellar and ankle reflex on exam          Plantar reflexes up going bilaterally.      -Gait: narrow and steady      LABS:                        10.7   3.52  )-----------( 225      ( 29 Mar 2022 11:27 )             34.2     03-29    134<L>  |  98  |  10  ----------------------------<  212<H>  3.7   |  26  |  0.53    Ca    8.5      29 Mar 2022 11:27  Phos  2.4     03-29  Mg     1.4     03-29      Hemoglobin A1C:   Vitamin B12     CAPILLARY BLOOD GLUCOSE      POCT Blood Glucose.: 156 mg/dL (29 Mar 2022 12:13)              Microbiology:      RADIOLOGY, EKG AND ADDITIONAL TESTS: Reviewed.    < from: MR Lumbar Spine No Cont (03.28.22 @ 16:21) >  Grade 1 anterolisthesis of L3 on L4. Degenerative disc   disease at L3/4 and L4/5 with superimposed small left foraminal disc   herniation at L3/4. When accounting for differences in angulation and   technique, these findings appear stable.    < end of copied text >

## 2022-03-29 NOTE — CONSULT NOTE ADULT - ATTENDING COMMENTS
Pt seen on rounds this afternoon.  As noted above, pt is an extremely poor historian due to her dementia.  Daughter was not available to provide additional history in terms of insulin regimen at home and ?? compliance.  (Appears that she was prescribed basal insulin but was only taking metformin).  Was hypoglycemic to 62 mg% yesterday AM after 15 units of Lantus the night before, 201 this morning with no Lantus having been given last night.  Post-prandial glucoses are quite high with pt receiving only sliding scale pre-meal  Reason for the low basal insulin requirements is unclear, but will decrease the Lantus to 10 units.  Will start 6 units pre-meal for now.  The severity of the post-prandial hyperglycemia in the hospital makes it questionable whether an oral secretagogue will work as an outpatient.  Need to find out her living situation and whether the daughter would be giving her the insuin (and whether the pt is compliant with diet at home)
chronic and progressive LE weakness with upgoing toes warrants MRI T spine and labs to eval for myelopathy  also should have EMG/NCS but given that this problem is chronic can be done as an outpatient  no change in gabapentin 300mg TID for neuropathic pain

## 2022-03-29 NOTE — PROGRESS NOTE ADULT - PROBLEM SELECTOR PLAN 9
F: S/p 2.25L   E: replete K<4, Mg<2  N: CC  VTE Prophylaxis: SCDs given UGIB  GI: Pantoprazole 40 PO BID  C: Full Code (began discussion w/ daughter who is next of kin and primary caretaker)  D: Pending PT recs

## 2022-03-29 NOTE — PROGRESS NOTE ADULT - SUBJECTIVE AND OBJECTIVE BOX
INTERVAL HPI/OVERNIGHT EVENTS: CAIT o/n. endo recommended 10 lantus and 6 premeals; AM fingerstick, 307. Continues to have electric shooting pains in the legs which is unchanged. No complaints of HA, dizziness, palpitations, CP, abd pain, n/v/d, skin lesions/bruising, or lower extremity swelling.     OBJECTIVE  Vital Signs Last 24 Hrs  T(C): 37.1 (29 Mar 2022 14:18), Max: 37.1 (29 Mar 2022 06:14)  T(F): 98.7 (29 Mar 2022 14:18), Max: 98.8 (29 Mar 2022 06:14)  HR: 72 (29 Mar 2022 14:18) (72 - 90)  BP: 144/85 (29 Mar 2022 14:18) (132/69 - 177/90)  RR: 18 (29 Mar 2022 14:18) (18 - 18)  SpO2: 98% (29 Mar 2022 14:18) (96% - 99%) on RA       Physical Exam  Constitutional: NAD, well-groomed, well-developed  HEENT: PERRLA, EOMI, Normal Hearing, MMM  Neck: No LAD, No JVD  Back: Normal spine flexure, No CVA tenderness  Respiratory: CTAB  Cardiovascular: S1 and S2, RRR, apical holosystolic murmur radiating to axilla   Gastrointestinal: BS+, soft, NT/ND  Extremities: No peripheral edema  Vascular: 2+ peripheral pulses  Neuro: A&Ox3. 5/5 strength of b/l UEs, 5/5 strength of b/l hips. 3/5 strength of b/l knees. 1/5 strength of b/l ankles. Sensation intact and equal  Skin: No rashes  Psychiatric: Normal mood, normal affect      MEDICATIONS  (STANDING):  dextrose 10%. 1000 milliLiter(s) (30 mL/Hr) IV Continuous <Continuous>  dextrose 5%. 1000 milliLiter(s) (100 mL/Hr) IV Continuous <Continuous>  dextrose 50% Injectable 25 Gram(s) IV Push once  dextrose 50% Injectable 12.5 Gram(s) IV Push once  dextrose 50% Injectable 25 Gram(s) IV Push once  gabapentin 300 milliGRAM(s) Oral every 8 hours  glucagon  Injectable 1 milliGRAM(s) IntraMuscular once  insulin lispro (ADMELOG) corrective regimen sliding scale   SubCutaneous every 6 hours  lisinopril 40 milliGRAM(s) Oral daily  pantoprazole  Injectable 40 milliGRAM(s) IV Push every 12 hours    MEDICATIONS  (PRN):  dextrose Oral Gel 15 Gram(s) Oral once PRN Blood Glucose LESS THAN 70 milliGRAM(s)/deciliter      Allergies    No Known Allergies    Intolerances      LABS:                             10.7   3.52  )-----------( 225      ( 29 Mar 2022 11:27 )             34.2     03-29    134<L>  |  98  |  10  ----------------------------<  212<H>  3.7   |  26  |  0.53    Ca    8.5      29 Mar 2022 11:27  Phos  2.4     03-29  Mg     1.4     03-29      RADIOLOGY & ADDITIONAL TESTS: reviewed   < from: MR Lumbar Spine No Cont (03.28.22 @ 16:21) >  Grade 1 anterolisthesis of L3 on L4. Degenerative disc   disease at L3/4 and L4/5 with superimposed small left foraminal disc   herniation at L3/4. When accounting for differences in angulation and   technique, these findings appear stable.    < end of copied text >

## 2022-03-29 NOTE — PROGRESS NOTE ADULT - PROBLEM SELECTOR PLAN 5
Per daughter, pt is on metformin 1000 BID, Lantus 18u, and Victoza, but only metformin was confirmed by pharmacy. A1C 11.6     Plan:   - Consulted endo, appreciate recs: 6 premeal and 10 lantus   - mISS, FSG QID

## 2022-03-29 NOTE — CONSULT NOTE ADULT - ASSESSMENT
Pt is a 72F w/ PMHx of HTN, DM c/b peripheral neuropathy and mostly bedbound for several months, BIBEMS w/ 1 episode of bloody vomit with clots following a day of nausea/vomiting, admitted for hematemesis evaluation, found to have most likely MW tear.  Neurology consulted for lower extremity weakness bilaterally.     #Bilateral Neuropathy  Pt with chronic neuropathy for about one year described as electrical type pain. However pt also experiencing bilateral paresis slightly worse on R>L. Pt is only on gabapentin 300mg TID, however pt states this does not help with pain at all. MR lumbar with degenerative disc disease at L3/4 L4/5 with small disc herniation at L3/4 which is stable.   - Should be evaluated with outpatient EMG testing to evaluated bilateral paresis.   - C/w gabapentin 300 TID for neuropathy now.       Recommendations not finalized until discussed with attending.  Pt is a 72F w/ PMHx of HTN, DM c/b peripheral neuropathy and mostly bedbound for several months, BIBEMS w/ 1 episode of bloody vomit with clots following a day of nausea/vomiting, admitted for hematemesis evaluation, found to have most likely MW tear.  Neurology consulted for lower extremity weakness bilaterally.     #Bilateral Neuropathy  Pt with chronic neuropathy for about one year described as electrical type pain. However pt also experiencing bilateral paresis slightly worse on R>L. Pt is only on gabapentin 300mg TID, however pt states this does not help with pain at all. MR lumbar with degenerative disc disease at L3/4 L4/5 with small disc herniation at L3/4 which is stable.   - Order MR Thoracic non con to evaluate thoracic spine   - Check B12, Vitamin E, and thiamine levels.  - C/w gabapentin 300 TID for neuropathy now, will discuss other options with patient tomorrow      Discussed with attending, Dr. Griffith.

## 2022-03-29 NOTE — PROGRESS NOTE ADULT - ATTENDING COMMENTS
Pt seen on rounds this afternoon.  Cognitive status seems to have improved--was able to recall what she ate at her recent meals.  Glucoses still quite high late yesterday(413/251), did not decrease overnight with 10 units Lantus  Will increase the Lantus to 13 units, pre-meal lispro to 9 units as noted above

## 2022-03-29 NOTE — PROGRESS NOTE ADULT - PROBLEM SELECTOR PLAN 8
Noted by ED physician. EKG not in pt's chart on admission.  - EKG at [facility] showing incomplete RBBB.

## 2022-03-29 NOTE — PROGRESS NOTE ADULT - SUBJECTIVE AND OBJECTIVE BOX
INTERVAL HPI/OVERNIGHT EVENTS:    Patient is a 72y old  Female who presents with a chief complaint of Hematemesis (29 Mar 2022 15:47)      Pt reports the following symptoms:    CONSTITUTIONAL:  Negative fever or chills, feels well, good appetite  EYES:  Negative  blurry vision or double vision  CARDIOVASCULAR:  Negative for chest pain or palpitations  RESPIRATORY:  Negative for cough, wheezing, or SOB   GASTROINTESTINAL:  Negative for nausea, vomiting, diarrhea, constipation, or abdominal pain  GENITOURINARY:  Negative frequency, urgency or dysuria  NEUROLOGIC:  No headache, confusion, dizziness, lightheadedness    MEDICATIONS  (STANDING):  dextrose 5%. 1000 milliLiter(s) (100 mL/Hr) IV Continuous <Continuous>  gabapentin 300 milliGRAM(s) Oral every 8 hours  glucagon  Injectable 1 milliGRAM(s) IntraMuscular once  insulin glargine Injectable (LANTUS) 13 Unit(s) SubCutaneous at bedtime  insulin lispro (ADMELOG) corrective regimen sliding scale   SubCutaneous Before meals and at bedtime  insulin lispro Injectable (ADMELOG) 9 Unit(s) SubCutaneous three times a day before meals  lisinopril 40 milliGRAM(s) Oral daily  pantoprazole    Tablet 40 milliGRAM(s) Oral every 12 hours  potassium chloride   Powder 40 milliEquivalent(s) Oral once    MEDICATIONS  (PRN):  acetaminophen     Tablet .. 650 milliGRAM(s) Oral every 6 hours PRN Temp greater or equal to 38C (100.4F), Mild Pain (1 - 3), Moderate Pain (4 - 6)  dextrose Oral Gel 15 Gram(s) Oral once PRN Blood Glucose LESS THAN 70 milliGRAM(s)/deciliter      PHYSICAL EXAM  Vital Signs Last 24 Hrs  T(C): 37.1 (29 Mar 2022 14:18), Max: 37.1 (29 Mar 2022 06:14)  T(F): 98.7 (29 Mar 2022 14:18), Max: 98.8 (29 Mar 2022 06:14)  HR: 72 (29 Mar 2022 14:18) (72 - 90)  BP: 144/85 (29 Mar 2022 14:18) (132/69 - 177/90)  BP(mean): 105 (29 Mar 2022 14:18) (105 - 105)  RR: 18 (29 Mar 2022 14:18) (18 - 18)  SpO2: 98% (29 Mar 2022 14:18) (96% - 99%)    Constitutional: NAD.   HEENT: NCAT, MMM, OP clear, EOMI, , no proptosis or lid retraction  Neck: no thyromegaly or palpable thyroid nodules   Respiratory: lungs CTAB.  Cardiovascular: regular rhythm, normal S1 and S2, no audible murmurs, no peripheral edema  GI: soft, NT/ND, no masses/HSM appreciated.  Neurology: no tremors, DTR 2+  Skin: no visible rashes/lesions  Psychiatric: AAO x 2, normal affect/mood.    LABS:                        10.7   3.52  )-----------( 225      ( 29 Mar 2022 11:27 )             34.2     03-29    134<L>  |  98  |  10  ----------------------------<  212<H>  3.7   |  26  |  0.53    Ca    8.5      29 Mar 2022 11:27  Phos  2.4     03-29  Mg     1.4     03-29              HbA1C:         Insulin Sliding Scale requirements X 24 Hours:      RADIOLOGY & ADDITIONAL TESTS:      Assessment and Recommendation:   · Assessment	  73 YO F with PMH of DM2 (requiring insulin), HTN, DM presented with hematemesis, now resolved, thought to be 2/2 Naty Sanchez tear. She is also found to be in uncontrolled DM i/s/o A1C of 11.6.      # DM2. Uncontrolled - A1C is 11.6  - history is somewhat limited 2/2 her being an extremely poor historian i/s/o her ?dementia  - home regimen: Metformin 1g BID, Victoza 1.2, and 18 units of Lantus daily.       Plan   - start Lantus 13 units tonight  - start premeal Lispro 9 units premeal  Dispo: [facility]    Endocrine will continue to follow   INTERVAL HPI/OVERNIGHT EVENTS:    Patient is a 72y old  Female who presents with a chief complaint of Hematemesis (29 Mar 2022 15:47)  No additional episodes of vomiting. Appears more alert and aware today. She was able to recall what she ate for breakfast which was eggs and cream of wheat. No complaints today.     Pt reports the following symptoms:    CONSTITUTIONAL:  Negative fever or chills, feels well, good appetite  EYES:  Negative  blurry vision or double vision  CARDIOVASCULAR:  Negative for chest pain or palpitations  RESPIRATORY:  Negative for cough, wheezing, or SOB   GASTROINTESTINAL:  Negative for nausea, vomiting, diarrhea, constipation, or abdominal pain  GENITOURINARY:  Negative frequency, urgency or dysuria  NEUROLOGIC:  No headache, confusion, dizziness, lightheadedness    MEDICATIONS  (STANDING):  dextrose 5%. 1000 milliLiter(s) (100 mL/Hr) IV Continuous <Continuous>  gabapentin 300 milliGRAM(s) Oral every 8 hours  glucagon  Injectable 1 milliGRAM(s) IntraMuscular once  insulin glargine Injectable (LANTUS) 13 Unit(s) SubCutaneous at bedtime  insulin lispro (ADMELOG) corrective regimen sliding scale   SubCutaneous Before meals and at bedtime  insulin lispro Injectable (ADMELOG) 9 Unit(s) SubCutaneous three times a day before meals  lisinopril 40 milliGRAM(s) Oral daily  pantoprazole    Tablet 40 milliGRAM(s) Oral every 12 hours  potassium chloride   Powder 40 milliEquivalent(s) Oral once    MEDICATIONS  (PRN):  acetaminophen     Tablet .. 650 milliGRAM(s) Oral every 6 hours PRN Temp greater or equal to 38C (100.4F), Mild Pain (1 - 3), Moderate Pain (4 - 6)  dextrose Oral Gel 15 Gram(s) Oral once PRN Blood Glucose LESS THAN 70 milliGRAM(s)/deciliter      PHYSICAL EXAM  Vital Signs Last 24 Hrs  T(C): 37.1 (29 Mar 2022 14:18), Max: 37.1 (29 Mar 2022 06:14)  T(F): 98.7 (29 Mar 2022 14:18), Max: 98.8 (29 Mar 2022 06:14)  HR: 72 (29 Mar 2022 14:18) (72 - 90)  BP: 144/85 (29 Mar 2022 14:18) (132/69 - 177/90)  BP(mean): 105 (29 Mar 2022 14:18) (105 - 105)  RR: 18 (29 Mar 2022 14:18) (18 - 18)  SpO2: 98% (29 Mar 2022 14:18) (96% - 99%)    Physical Exam:  Gen: Elderly female in NAD, pleasantly confused  Neuro: AAOx2 (herself and hospital). Grossly no focal deficits, but reduced strength in bilateral lower extremities which seems to be her baseline  Heart: RRR, no rmg  Lungs: CTAB, no signs of increased WOB   Ext: Good distal pulses. Light touch sensation grossly intact      LABS:                        10.7   3.52  )-----------( 225      ( 29 Mar 2022 11:27 )             34.2     03-29    134<L>  |  98  |  10  ----------------------------<  212<H>  3.7   |  26  |  0.53    Ca    8.5      29 Mar 2022 11:27  Phos  2.4     03-29  Mg     1.4     03-29              HbA1C:         Insulin Sliding Scale requirements X 24 Hours:      RADIOLOGY & ADDITIONAL TESTS:      Assessment and Recommendation:   · Assessment	  71 YO F with PMH of DM2 (requiring insulin), HTN, DM presented with hematemesis, now resolved, thought to be 2/2 Naty Sanchez tear. She is also found to be in uncontrolled DM i/s/o A1C of 11.6.      # DM2. Uncontrolled - A1C is 11.6  - history is somewhat limited 2/2 her being an extremely poor historian i/s/o her ?dementia  - home regimen: Metformin 1g BID, Victoza 1.2, and 18 units of Lantus daily.       Plan:  AM blood sugar indicating 10 Units Lantus was not sufficient.    - start Lantus 13 units tonight  - start premeal Lispro 9 units premeal  Dispo: [facility]    Endocrine will continue to follow

## 2022-03-29 NOTE — PROGRESS NOTE ADULT - ATTENDING COMMENTS
Pt states she is hungry. No nausea, vomiting, abd pain. States she started using a walker approx 1 year ago. Feels her feet are heavier than usual which has worsened since being admitted. She reports pain radiating down back of L leg. No paresthesias. MRI Lumbar spine discussed w patient  Exam w 4-/5 in b/l hip and knee flex/ext. 0-1/5 in b/l plantarflex/ext. No patellar reflex.     Plan  Neuro C/S due to b/l foot drop  f/u Endocrine recs for insulin. BGs remain elevated  Collateral from family, PMD regarding chronicity of weakness and difficulty ambulating    DISPO: [facility] pending neuro recs, endo final recs - improvement in BGs. Anticipate 24-48h

## 2022-03-29 NOTE — PROGRESS NOTE ADULT - PROBLEM SELECTOR PLAN 1
One episode, none in ED or hospital thus far. Preceded by several likely non-bloody vomiting episodes. No pain. Hgb stable, not hypotensive. Most likely Naty Sanchez tear, labs not consistent w/ chronic bleed given normal Hgb.    Plan:   - Advanced diet to CC w/ low fat   - F/u GI recs  - IV Pantoprazole 40 bid  - Can hold aspirin, unclear indication  - On discharge, pantoprazole 40 BID for 2 weeks followed by QD for 6 weeks, follow up with GI.  - Pending outpatient v. inpatient gastroparesis study

## 2022-03-30 ENCOUNTER — TRANSCRIPTION ENCOUNTER (OUTPATIENT)
Age: 72
End: 2022-03-30

## 2022-03-30 DIAGNOSIS — I63.9 CEREBRAL INFARCTION, UNSPECIFIED: ICD-10-CM

## 2022-03-30 DIAGNOSIS — E11.43 TYPE 2 DIABETES MELLITUS WITH DIABETIC AUTONOMIC (POLY)NEUROPATHY: ICD-10-CM

## 2022-03-30 DIAGNOSIS — R41.89 OTHER SYMPTOMS AND SIGNS INVOLVING COGNITIVE FUNCTIONS AND AWARENESS: ICD-10-CM

## 2022-03-30 LAB
ALBUMIN SERPL ELPH-MCNC: 2.9 G/DL — LOW (ref 3.3–5)
ALBUMIN SERPL ELPH-MCNC: 3.2 G/DL — LOW (ref 3.3–5)
ALP SERPL-CCNC: 32 U/L — LOW (ref 40–120)
ALP SERPL-CCNC: SIGNIFICANT CHANGE UP U/L (ref 40–120)
ALT FLD-CCNC: <5 U/L — LOW (ref 10–45)
ALT FLD-CCNC: SIGNIFICANT CHANGE UP U/L (ref 10–45)
ANION GAP SERPL CALC-SCNC: 11 MMOL/L — SIGNIFICANT CHANGE UP (ref 5–17)
ANION GAP SERPL CALC-SCNC: 7 MMOL/L — SIGNIFICANT CHANGE UP (ref 5–17)
APTT BLD: 35.9 SEC — HIGH (ref 27.5–35.5)
AST SERPL-CCNC: 9 U/L — LOW (ref 10–40)
AST SERPL-CCNC: SIGNIFICANT CHANGE UP U/L (ref 10–40)
BILIRUB SERPL-MCNC: 0.2 MG/DL — SIGNIFICANT CHANGE UP (ref 0.2–1.2)
BILIRUB SERPL-MCNC: 0.3 MG/DL — SIGNIFICANT CHANGE UP (ref 0.2–1.2)
BUN SERPL-MCNC: 12 MG/DL — SIGNIFICANT CHANGE UP (ref 7–23)
BUN SERPL-MCNC: 14 MG/DL — SIGNIFICANT CHANGE UP (ref 7–23)
CALCIUM SERPL-MCNC: 8.1 MG/DL — LOW (ref 8.4–10.5)
CALCIUM SERPL-MCNC: 8.3 MG/DL — LOW (ref 8.4–10.5)
CHLORIDE SERPL-SCNC: 98 MMOL/L — SIGNIFICANT CHANGE UP (ref 96–108)
CHLORIDE SERPL-SCNC: 98 MMOL/L — SIGNIFICANT CHANGE UP (ref 96–108)
CO2 SERPL-SCNC: 25 MMOL/L — SIGNIFICANT CHANGE UP (ref 22–31)
CO2 SERPL-SCNC: 28 MMOL/L — SIGNIFICANT CHANGE UP (ref 22–31)
CREAT SERPL-MCNC: 0.51 MG/DL — SIGNIFICANT CHANGE UP (ref 0.5–1.3)
CREAT SERPL-MCNC: 0.62 MG/DL — SIGNIFICANT CHANGE UP (ref 0.5–1.3)
EGFR: 93 ML/MIN/1.73M2 — SIGNIFICANT CHANGE UP
EGFR: 98 ML/MIN/1.73M2 — SIGNIFICANT CHANGE UP
GLUCOSE BLDC GLUCOMTR-MCNC: 127 MG/DL — HIGH (ref 70–99)
GLUCOSE BLDC GLUCOMTR-MCNC: 152 MG/DL — HIGH (ref 70–99)
GLUCOSE BLDC GLUCOMTR-MCNC: 165 MG/DL — HIGH (ref 70–99)
GLUCOSE BLDC GLUCOMTR-MCNC: 259 MG/DL — HIGH (ref 70–99)
GLUCOSE BLDC GLUCOMTR-MCNC: 272 MG/DL — HIGH (ref 70–99)
GLUCOSE SERPL-MCNC: 153 MG/DL — HIGH (ref 70–99)
GLUCOSE SERPL-MCNC: 283 MG/DL — HIGH (ref 70–99)
HCT VFR BLD CALC: 33.8 % — LOW (ref 34.5–45)
HCT VFR BLD CALC: 37.7 % — SIGNIFICANT CHANGE UP (ref 34.5–45)
HGB BLD-MCNC: 10.6 G/DL — LOW (ref 11.5–15.5)
HGB BLD-MCNC: 12 G/DL — SIGNIFICANT CHANGE UP (ref 11.5–15.5)
INR BLD: 0.9 — SIGNIFICANT CHANGE UP (ref 0.88–1.16)
LACTATE SERPL-SCNC: 2.6 MMOL/L — HIGH (ref 0.5–2)
MAGNESIUM SERPL-MCNC: 1.8 MG/DL — SIGNIFICANT CHANGE UP (ref 1.6–2.6)
MCHC RBC-ENTMCNC: 26.5 PG — LOW (ref 27–34)
MCHC RBC-ENTMCNC: 27.1 PG — SIGNIFICANT CHANGE UP (ref 27–34)
MCHC RBC-ENTMCNC: 31.4 GM/DL — LOW (ref 32–36)
MCHC RBC-ENTMCNC: 31.8 GM/DL — LOW (ref 32–36)
MCV RBC AUTO: 84.5 FL — SIGNIFICANT CHANGE UP (ref 80–100)
MCV RBC AUTO: 85.1 FL — SIGNIFICANT CHANGE UP (ref 80–100)
NRBC # BLD: 0 /100 WBCS — SIGNIFICANT CHANGE UP (ref 0–0)
NRBC # BLD: 0 /100 WBCS — SIGNIFICANT CHANGE UP (ref 0–0)
PHOSPHATE SERPL-MCNC: 2.7 MG/DL — SIGNIFICANT CHANGE UP (ref 2.5–4.5)
PLATELET # BLD AUTO: 239 K/UL — SIGNIFICANT CHANGE UP (ref 150–400)
PLATELET # BLD AUTO: 245 K/UL — SIGNIFICANT CHANGE UP (ref 150–400)
POTASSIUM SERPL-MCNC: 4.1 MMOL/L — SIGNIFICANT CHANGE UP (ref 3.5–5.3)
POTASSIUM SERPL-MCNC: SIGNIFICANT CHANGE UP MMOL/L (ref 3.5–5.3)
POTASSIUM SERPL-SCNC: 4.1 MMOL/L — SIGNIFICANT CHANGE UP (ref 3.5–5.3)
POTASSIUM SERPL-SCNC: SIGNIFICANT CHANGE UP MMOL/L (ref 3.5–5.3)
PROT SERPL-MCNC: 5.8 G/DL — LOW (ref 6–8.3)
PROT SERPL-MCNC: 6.5 G/DL — SIGNIFICANT CHANGE UP (ref 6–8.3)
PROTHROM AB SERPL-ACNC: 10.7 SEC — SIGNIFICANT CHANGE UP (ref 10.5–13.4)
RBC # BLD: 4 M/UL — SIGNIFICANT CHANGE UP (ref 3.8–5.2)
RBC # BLD: 4.43 M/UL — SIGNIFICANT CHANGE UP (ref 3.8–5.2)
RBC # FLD: 13.5 % — SIGNIFICANT CHANGE UP (ref 10.3–14.5)
RBC # FLD: 14 % — SIGNIFICANT CHANGE UP (ref 10.3–14.5)
SODIUM SERPL-SCNC: 133 MMOL/L — LOW (ref 135–145)
SODIUM SERPL-SCNC: 134 MMOL/L — LOW (ref 135–145)
WBC # BLD: 3.81 K/UL — SIGNIFICANT CHANGE UP (ref 3.8–10.5)
WBC # BLD: 4.33 K/UL — SIGNIFICANT CHANGE UP (ref 3.8–10.5)
WBC # FLD AUTO: 3.81 K/UL — SIGNIFICANT CHANGE UP (ref 3.8–10.5)
WBC # FLD AUTO: 4.33 K/UL — SIGNIFICANT CHANGE UP (ref 3.8–10.5)

## 2022-03-30 PROCEDURE — 0042T: CPT

## 2022-03-30 PROCEDURE — 70450 CT HEAD/BRAIN W/O DYE: CPT | Mod: 26,59

## 2022-03-30 PROCEDURE — 70498 CT ANGIOGRAPHY NECK: CPT | Mod: 26

## 2022-03-30 PROCEDURE — 99232 SBSQ HOSP IP/OBS MODERATE 35: CPT | Mod: GC

## 2022-03-30 PROCEDURE — 99291 CRITICAL CARE FIRST HOUR: CPT

## 2022-03-30 PROCEDURE — 72146 MRI CHEST SPINE W/O DYE: CPT | Mod: 26

## 2022-03-30 PROCEDURE — 71045 X-RAY EXAM CHEST 1 VIEW: CPT | Mod: 26

## 2022-03-30 PROCEDURE — 99233 SBSQ HOSP IP/OBS HIGH 50: CPT | Mod: GC

## 2022-03-30 PROCEDURE — 70551 MRI BRAIN STEM W/O DYE: CPT | Mod: 26

## 2022-03-30 PROCEDURE — 70496 CT ANGIOGRAPHY HEAD: CPT | Mod: 26

## 2022-03-30 PROCEDURE — 95718 EEG PHYS/QHP 2-12 HR W/VEEG: CPT

## 2022-03-30 RX ORDER — ATORVASTATIN CALCIUM 80 MG/1
80 TABLET, FILM COATED ORAL AT BEDTIME
Refills: 0 | Status: DISCONTINUED | OUTPATIENT
Start: 2022-03-30 | End: 2022-04-05

## 2022-03-30 RX ORDER — METOCLOPRAMIDE HCL 10 MG
5 TABLET ORAL EVERY 8 HOURS
Refills: 0 | Status: DISCONTINUED | OUTPATIENT
Start: 2022-03-30 | End: 2022-03-31

## 2022-03-30 RX ORDER — ASPIRIN/CALCIUM CARB/MAGNESIUM 324 MG
81 TABLET ORAL DAILY
Refills: 0 | Status: DISCONTINUED | OUTPATIENT
Start: 2022-03-30 | End: 2022-04-05

## 2022-03-30 RX ORDER — INSULIN LISPRO 100/ML
11 VIAL (ML) SUBCUTANEOUS
Refills: 0 | Status: DISCONTINUED | OUTPATIENT
Start: 2022-03-30 | End: 2022-04-01

## 2022-03-30 RX ORDER — PANTOPRAZOLE SODIUM 20 MG/1
40 TABLET, DELAYED RELEASE ORAL EVERY 12 HOURS
Refills: 0 | Status: DISCONTINUED | OUTPATIENT
Start: 2022-03-30 | End: 2022-03-31

## 2022-03-30 RX ORDER — INSULIN GLARGINE 100 [IU]/ML
18 INJECTION, SOLUTION SUBCUTANEOUS AT BEDTIME
Refills: 0 | Status: DISCONTINUED | OUTPATIENT
Start: 2022-03-30 | End: 2022-04-01

## 2022-03-30 RX ORDER — HYDRALAZINE HCL 50 MG
10 TABLET ORAL ONCE
Refills: 0 | Status: COMPLETED | OUTPATIENT
Start: 2022-03-30 | End: 2022-03-30

## 2022-03-30 RX ORDER — MAGNESIUM SULFATE 500 MG/ML
1 VIAL (ML) INJECTION ONCE
Refills: 0 | Status: COMPLETED | OUTPATIENT
Start: 2022-03-30 | End: 2022-03-30

## 2022-03-30 RX ADMIN — LISINOPRIL 40 MILLIGRAM(S): 2.5 TABLET ORAL at 05:27

## 2022-03-30 RX ADMIN — Medication 30 MILLILITER(S): at 21:30

## 2022-03-30 RX ADMIN — Medication 3: at 08:58

## 2022-03-30 RX ADMIN — Medication 10 MILLIGRAM(S): at 17:30

## 2022-03-30 RX ADMIN — ATORVASTATIN CALCIUM 80 MILLIGRAM(S): 80 TABLET, FILM COATED ORAL at 21:41

## 2022-03-30 RX ADMIN — PANTOPRAZOLE SODIUM 40 MILLIGRAM(S): 20 TABLET, DELAYED RELEASE ORAL at 09:47

## 2022-03-30 RX ADMIN — INSULIN GLARGINE 18 UNIT(S): 100 INJECTION, SOLUTION SUBCUTANEOUS at 21:40

## 2022-03-30 RX ADMIN — PANTOPRAZOLE SODIUM 40 MILLIGRAM(S): 20 TABLET, DELAYED RELEASE ORAL at 21:40

## 2022-03-30 RX ADMIN — GABAPENTIN 300 MILLIGRAM(S): 400 CAPSULE ORAL at 21:41

## 2022-03-30 RX ADMIN — PANTOPRAZOLE SODIUM 40 MILLIGRAM(S): 20 TABLET, DELAYED RELEASE ORAL at 02:55

## 2022-03-30 RX ADMIN — Medication 3: at 21:39

## 2022-03-30 RX ADMIN — GABAPENTIN 300 MILLIGRAM(S): 400 CAPSULE ORAL at 05:27

## 2022-03-30 RX ADMIN — Medication 100 GRAM(S): at 09:45

## 2022-03-30 RX ADMIN — Medication 81 MILLIGRAM(S): at 21:40

## 2022-03-30 RX ADMIN — Medication 5 MILLIGRAM(S): at 17:38

## 2022-03-30 RX ADMIN — Medication 5 MILLIGRAM(S): at 21:16

## 2022-03-30 RX ADMIN — Medication 9 UNIT(S): at 08:59

## 2022-03-30 NOTE — PROGRESS NOTE ADULT - PROBLEM SELECTOR PLAN 6
Per daughter, pt is on metformin 1000 BID, Lantus 18u, and Victoza, but only metformin was confirmed by pharmacy. A1C 11.6     Plan:   - Consulted endo, appreciate recs  - basal bolus with ISS Pt presenting w/ HTNsive urgency w/  systolic. Asymptomatic. Appears euvolemic. Per daughter, pt on lisinopril and amlodipine, but only the former was confirmed by pharmacy.    Plan:  - C/w home lisinopril 40 qd

## 2022-03-30 NOTE — DIETITIAN INITIAL EVALUATION ADULT. - ORAL INTAKE PTA/DIET HISTORY
Patient poor historian.RD will attempt to speak with daughter regarding diet at home.Suspected poor compliance based on HGBA1C of 11.6

## 2022-03-30 NOTE — DIETITIAN INITIAL EVALUATION ADULT. - PROBLEM SELECTOR PLAN 2
Most likely 2/2 gastroenteritis. Denies diarrhea or abdominal pain. Labs suggestive of multiple vomiting given metabolic acidosis w/ hypochloremia.  - F/u GI PCR

## 2022-03-30 NOTE — CHART NOTE - NSCHARTNOTEFT_GEN_A_CORE
~12pm PCA identified patient slumped over with food in mouth, immediately brought RN to bedside. RN noted patient unresponsive and called Rapid Response; simultaneously myself and floor attending entered room to evaluate. Patient slumped with head tilted anteriorly to R side with prominent food in mouth. Unresponsive, strong pulse, confirmed unresponsive. Immediate efforts made to clear mouth. CN[facility] staff arrived and additional RRT help allowed patient to be transferred to bed and aggressive suctioning done in mouth. Intubation was planned for unprotected airway with GCS 3. Bagged with 100% FiO2 in meantime. Then ~2minutes into rapid patient spontaneously opened eyes and was noted to be breathing normally without increased WOB. Around this time, patient hooked up to monitor with adequate pulse ox waveform showing 100% SpO2, patient de-escalated to NRB then to RA saturating 96%. HR and BP within normal ranges at this time. On neurologic exam, patient with altered sensorium, slurring speech, prominent R lower facial droop with forehead sparing, strong L gaze preference not blinking to threat on R side, CNXI and CNXII intact, patient responding to verbal commands. Upper extremities at baseline weak 5/5 strength. Stroke code called due to cranial nerve deficits. Last known normal between 11 and 11:30 when interviewed and examined by primary team.  Assessment:  Unclear if encephalopathy led to aspiration or mechanical aspiration led to hypoxia and encephalopathy. High suspicion of vascular event given sudden onset and focal deficits. May be residual deficits from previously undiagnosed CVA in setting of post-ictal period, but no tongue biting or voiding. Mechanical aspiration leading to transient hypoxia and loss of consciousness also possible given rapid improvement with brief bagging. It is unclear if she was ever truly hypoxic as there was no cyanosis or SpO2 finding showing low values with adequate waveform. Metabolic encephalopathy less likely given acute onset and limited lab derangements but also possible  Plan:  - code stroke imaging, consider MR brain if negative  - lactate slightly elevated 2.6 on fresh stick; unclear etiology still, reasonable to trend  - low enough suspicion of seizure that probably limited utility in vEEG, can consider  - CXR unremarkable during RRT, trend to assess for aspiration pneumonia  - consider ceftriaxone prophylaxis given observed aspiration  - can defer MR for LE weakness until stability of mental status confirmed

## 2022-03-30 NOTE — DISCHARGE NOTE PROVIDER - NSDCCPCAREPLAN_GEN_ALL_CORE_FT
PRINCIPAL DISCHARGE DIAGNOSIS  Diagnosis: Naty-Sanchez tear  Assessment and Plan of Treatment:       SECONDARY DISCHARGE DIAGNOSES  Diagnosis: Transient ischemic attack (TIA)  Assessment and Plan of Treatment:      PRINCIPAL DISCHARGE DIAGNOSIS  Diagnosis: Naty-Sanchez tear  Assessment and Plan of Treatment: You were initially admitted because you had an episode of bloody vomit. Our GI team saw you and want you to be on a medication to help with your acid production. Please follow-up with the gastroenterologists to see if you will need further studying. Your hemoglobin was stable since your admission and didn't need a transfusion.      SECONDARY DISCHARGE DIAGNOSES  Diagnosis: Transient ischemic attack (TIA)  Assessment and Plan of Treatment: You had a couple of episodes where you became unresponsive. We did extensive testing to see if you had any acute changes and found that you had chronic infarcts. Please visit our neurologist(s) information located in the paperwork to make a follow-up appointment. We did testing to see what could be the cause of your peripheral neuropathy/weakness other than deconditioning from your diabetes. We also want you to continue the current medications:  - lipitor 80 mg everyday  - aspirin 81 mg everyday  - insulin 14 units at night; 14 units before breakfast; 10 units before lunch and dinner  Please visit with your primary care physician to follow-up on your blood sugar levels and to provide resources for other needs for healthcare maintenance.     PRINCIPAL DISCHARGE DIAGNOSIS  Diagnosis: Naty-Sanchez tear  Assessment and Plan of Treatment: You were initially admitted because you had an episode of bloody vomit. Our GI team saw you and want you to be on a medication to help with your acid production. Please follow-up with the gastroenterologists to see if you will need further studying. Your hemoglobin was stable since your admission and didn't need a transfusion.      SECONDARY DISCHARGE DIAGNOSES  Diagnosis: Transient ischemic attack (TIA)  Assessment and Plan of Treatment: You had a couple of episodes where you became unresponsive. We did extensive testing to see if you had any acute changes and found that you had chronic infarcts. Please visit our neurologist(s) information located in the paperwork to make a follow-up appointment. We did testing to see what could be the cause of your peripheral neuropathy/weakness other than deconditioning from your diabetes. We also want you to continue the current medications:  - lipitor 80 mg everyday  - aspirin 81 mg everyday  - insulin 14 units at night; 14 units before breakfast; 10 units before lunch and dinner  Please visit with your primary care physician to follow-up on your blood sugar levels and to provide resources for other needs for healthcare maintenance.    Diagnosis: Type 2 diabetes mellitus  Assessment and Plan of Treatment: By not controlling your blood sugars, you are at risk of having stroke, hypertension, neuropathy, and peripheral vascular disease. Your Hgb A1C was severely elevated with the home regimen that you had. Our endocrinology team provided you with a home regimen that helped control your blood sugars. We want you to visit them in clinic to continue managing your diabetes. Your current insulin regimen is the followin units of lispro before breakfast, 10 units of lispro before lunch, 10 units before dinner, and 14 units at night.

## 2022-03-30 NOTE — PROGRESS NOTE ADULT - ATTENDING COMMENTS
Pt seen earlier in AM. Seated in chair. States she felt tired but otherwise no complaints. A/O x self, place but not date, moving all extremities. 0/5 in b/l plantarflex/ext which is same vs yesterday.     Rapid called approx 1200h. Pt found unresponsive with food in her mouth. Pulses present, tachypneic. . pt did NOT receive pre meal insulin. Food suctioned out, RRT team prepared BVM for intubation when patient became more responsive. Opening eyes and able to state name but noted to have dysarthria, decreased mentation, R sided facial droop, weakness in BUE. Stroke code called. Satting well on RA, SBP 150s.     DDx include CVA, Seizure, Aspiration. Lactate noted to be elevated.     Regarding workup for b/l foot drop - neuro following. MRI T spine ordered. Would consider adding C spine as well. Daughter reports pt has been bedbound for months; previously ambulated using a walker    DISPO: Transferred to . For [facility] - however SW noted pt does not have [facility] benefits.

## 2022-03-30 NOTE — DISCHARGE NOTE PROVIDER - NPI NUMBER (FOR SYSADMIN USE ONLY) :
[id number]] [id number] [[id number]] [[id number]]

## 2022-03-30 NOTE — CONSULT NOTE ADULT - SUBJECTIVE AND OBJECTIVE BOX
STROKE CODE CONSULT NOTE    Last known well time/Time of onset of symptoms: ~ 11am on 03/30 ( pt was evaluated appr 30minutes prior to stroke code and stroke code was called @ 11:35am on 03/30).    HPI: This pt is a 71 yo F w/ PMHx of HTN, DM c/b peripheral neuropathy BIBEMS w/ 1 episode of bloody vomit with clots. Per daughter, pt was feeling more lethargic than usual the day prior to admission (3/26). She was admitted for concern of gwen-pimentel tear after having 1 episode of bloody emesis w/ blood clots. Per daughter, she had multiple episodes of vomiting prior to admission. Pt reported last colonoscopy > 10 years prior, no endoscopy in past; GI was consulted and recommended that she be started on PPI IV 40 mg BID for 2 weeks and then upon discharge switch to Qd. Per collateral from the daughter, the patient has had progressive weakness over the past 1 year wherein she has had less walking. She attributed this weakness to her diabetic neuropathy as the patient c/o of chronic burning pain; it has gotten worse over the past 6 months where the patient has been bedbound and unable to ambulate without a walker. On 3/28 MRI of lumbar spine showed no acute processes to explain the leg weakness. On 03/30, RRT was called initially as pt was found unresponsive while eating her lunch, slumped over in the chair in sitting position, unresponsive to sternal rub with food bolus in her mouth. Pt was transferred to bed and was preparing to intubate 2/2 C/F airway protection, Pt noted to be more responsive but at that point noted to have R facial droop and slurred speech and hence stroke code was called. Upon initial assessment, pt minimally responsive to sternal rub/noxious by moaning, unable to answer questions or follow commands. Pt had stat labs being drawn and a CXR potable being done at bedside for C/F aspiration and hence delay in transporting pt to CT scanner in 3rd floor. Stroke code CTH showed no acute intracranial pathology, CTA showing no LVO or high grade stenosis and a CTP with mismatch volume of 121ml, but exam degraded by motion and incomplete transit of contrast bolus. Enroute from CT to floor, pt noted to be much more alert, awake, following commands and no facial droop noted at this point of time. Case D/W Neuro stroke attending Dr. Cross and agreed that pt is not a candidate for tPA 2/2 rapidly improving symptoms and recent hematemesis. Pt not an EVT candidate 2/2 absence of LVO.        T(C): 36.8 (03-30-22 @ 06:22), Max: 36.8 (03-30-22 @ 06:22)  HR: 70 (03-30-22 @ 13:13) (70 - 76)  BP: 158/86 (03-30-22 @ 13:13) (158/86 - 168/80)  RR: 18 (03-30-22 @ 13:13) (18 - 19)  SpO2: 94% (03-30-22 @ 13:13) (94% - 100%)    PAST MEDICAL & SURGICAL HISTORY:  HTN (hypertension)    DM (diabetes mellitus)    No significant past surgical history        FAMILY HISTORY:  No pertinent family history in first degree relatives    ROS: Unable to obtain 2/2 pt's mental status and limited participation    MEDICATIONS  (STANDING):  dextrose 5%. 1000 milliLiter(s) (100 mL/Hr) IV Continuous <Continuous>  gabapentin 300 milliGRAM(s) Oral every 8 hours  glucagon  Injectable 1 milliGRAM(s) IntraMuscular once  insulin glargine Injectable (LANTUS) 13 Unit(s) SubCutaneous at bedtime  insulin lispro (ADMELOG) corrective regimen sliding scale   SubCutaneous Before meals and at bedtime  insulin lispro Injectable (ADMELOG) 9 Unit(s) SubCutaneous three times a day before meals  lisinopril 40 milliGRAM(s) Oral daily  pantoprazole    Tablet 40 milliGRAM(s) Oral every 12 hours  potassium chloride   Powder 40 milliEquivalent(s) Oral once    MEDICATIONS  (PRN):  acetaminophen     Tablet .. 650 milliGRAM(s) Oral every 6 hours PRN Temp greater or equal to 38C (100.4F), Mild Pain (1 - 3), Moderate Pain (4 - 6)  dextrose Oral Gel 15 Gram(s) Oral once PRN Blood Glucose LESS THAN 70 milliGRAM(s)/deciliter    Allergies    No Known Allergies    Intolerances      Vital Signs Last 24 Hrs  T(C): 36.8 (30 Mar 2022 06:22), Max: 36.8 (30 Mar 2022 06:22)  T(F): 98.2 (30 Mar 2022 06:22), Max: 98.2 (30 Mar 2022 06:22)  HR: 70 (30 Mar 2022 13:13) (70 - 76)  BP: 158/86 (30 Mar 2022 13:13) (158/86 - 168/80)  BP(mean): 117 (30 Mar 2022 13:13) (117 - 117)  RR: 18 (30 Mar 2022 13:13) (18 - 19)  SpO2: 94% (30 Mar 2022 13:13) (94% - 100%)    Physical exam(Prior to CT, upon initial assessment during stroke code)  Constitutional: minimally responsive with moaning for noxious stimuli. Not interactive. Unable to follow commands.  Eyes: Anicteric sclerae.  Neck: trachea midline, FROM.  Cardiovascular: Regular rate and rhythm on the monitor  Pulmonary: Anterior breath sounds clear bilaterally.  GI: Abdomen soft, non-distended, non-tender  Extremities : No edema.      Neurologic:  -Mental status: minimally responsive to noxious stimuli by moaning. Unable to follow commands.   -Cranial nerves:   II: Attempts to resist eye opening.  III, IV, VI:  Pupils equally round and reactive to light. L gaze preference but able to cross midline.  V:  unable to test 2/2 pt's mental status and participation.  VII: Face appears asymmetric with R facial droop.  Motor: Normal tone. spontaneous movements + RUE, No withdrawal to noxious stimuli on LUE initially. B/L LE no withdrawal to noxious stimuli(pt with known B/L LE weakness and is being s/b gen Neuro).  Sensation: Withdraws to noxious stimuli RUE, no withdrawal to noxious stimuli LUE and B/L LE.  Coordination: Pt unable to follow commands.  Reflexes: Downgoing toes bilaterally   Gait: Deferred    Physical exam(Post CT, upon Re evaluation returning from CT scanner to floor)  Constitutional: resting in bed, NAD  Eyes: Anicteric sclerae.  Neck: trachea midline, FROM.  Cardiovascular: Regular rate and rhythm on the monitor  Pulmonary: Anterior breath sounds clear bilaterally.  GI: Abdomen soft, non-distended, non-tender  Extremities : No edema.    NIHSS: 25 (Known B/L LE weakness taken into consideration)    Neurologic:  -Mental status: Awake, alert, oriented X 2(self and place-states as hospital) and not oriented to time. Poor historian. Unable to state why she is in the hospital or what happened to her. States"my daughter says Im sick". Mild dysarthria +, but can be understood completely. Dysarthria appreciated when pt attempts to speak fast.  -Cranial nerves:   II: Visual fields are full to confrontation.  III, IV, VI: Extraocular movements are intact without nystagmus. Pupils equally round and reactive to light  V:  Facial sensation V1-V3 equal and intact   VII: Face is symmetric with normal eye closure and smile (Resolved facial droop at this point of time)  IX, X: Uvula is midline and soft palate rises symmetrically  XI: Head turning and shoulder shrug are intact.  XII: Tongue protrudes midline  Motor: Normal bulk and tone. No pronator drift. Strength bilateral upper extremity 4+/5, bilateral lower extremities 2/5. Unable to lift B/L LE off the bed, bends her knees with help.  Rapid alternating movements intact and symmetric  Sensation: Intact to light touch bilaterally. No neglect or extinction on double simultaneous testing.  Coordination: No dysmetria on finger-to-nose.  Reflexes: Downgoing toes bilaterally   Gait: Deferred.    NIHSS: 8    Fingerstick Blood Glucose: CAPILLARY BLOOD GLUCOSE  152 (30 Mar 2022 13:34)      LABS:                        12.0   4.33  )-----------( 245      ( 30 Mar 2022 12:45 )             37.7     03-30    134<L>  |  98  |  12  ----------------------------<  153<H>  see note   |  25  |  0.51    Ca    8.3<L>      30 Mar 2022 12:45  Phos  2.7     03-30  Mg     1.8     03-30    TPro  6.5  /  Alb  3.2<L>  /  TBili  0.2  /  DBili  x   /  AST  see note  /  ALT  see note  /  AlkPhos  see note  03-30    PT/INR - ( 30 Mar 2022 12:45 )   PT: 10.7 sec;   INR: 0.90          PTT - ( 30 Mar 2022 12:45 )  PTT:35.9 sec      RADIOLOGY & ADDITIONAL STUDIES:     CT Brain Stroke Protocol (03.30.22 @ 13:04)   IMPRESSION: No intracranial hemorrhage or acute transcortical infarct.   Stable exam.    CT Angio Head w/ IV Cont (03.30.22 @ 13:09)   IMPRESSION:    Intracranial CTA: No large vessel occlusion or high-grade stenosis.    Extracranial CTA: Unremarkable CTA neck.    CT Perfusion w/ Maps w/ IV Cont (03.30.22 @ 13:05)   FINDINGS: The examination is degraded by motion as well as incomplete   transit of contrast bolus. The CBF <30% volume is 0 mL.  The Tmax > 6s   volume is 121 mL in the right greater than left parieto-occipital lobes   as well as the right frontal lobe.  The mismatch volume is 121 mL.    IMPRESSION: Examination is degraded by motion and incomplete transit of   contrast bolus. CT perfusion metrics as above.          -----------------------------------------------------------------------------------------------------------------  IV-tPA (Y/N):   No                               Reason IV-tPA not given: Rapidly improving symptoms    STROKE CODE CONSULT NOTE    Last known well time/Time of onset of symptoms: ~ 11am on 03/30 ( pt was evaluated appr 30minutes prior to stroke code and stroke code was called @ 11:35am on 03/30).    HPI: This pt is a 71 yo F w/ PMHx of HTN, DM c/b peripheral neuropathy BIBEMS w/ 1 episode of bloody vomit with clots. Per daughter, pt was feeling more lethargic than usual the day prior to admission (3/26). She was admitted for concern of gwen-pimentel tear after having 1 episode of bloody emesis w/ blood clots. Per daughter, she had multiple episodes of vomiting prior to admission. Pt reported last colonoscopy > 10 years prior, no endoscopy in past; GI was consulted and recommended that she be started on PPI IV 40 mg BID for 2 weeks and then upon discharge switch to Qd. Per collateral from the daughter, the patient has had progressive weakness over the past 1 year wherein she has had less walking. She attributed this weakness to her diabetic neuropathy as the patient c/o of chronic burning pain; it has gotten worse over the past 6 months where the patient has been bedbound and unable to ambulate without a walker. On 3/28 MRI of lumbar spine showed no acute processes to explain the leg weakness. On 03/30, RRT was called initially as pt was found unresponsive while eating her lunch, slumped over in the chair in sitting position, unresponsive to sternal rub with food bolus in her mouth. Pt was transferred to bed and was preparing to intubate 2/2 C/F airway protection, Pt noted to be more responsive but at that point noted to have R facial droop and slurred speech and hence stroke code was called. Upon initial assessment, pt minimally responsive to sternal rub/noxious by moaning, unable to answer questions or follow commands. Pt had stat labs being drawn and a CXR potable being done at bedside for C/F aspiration and hence delay in transporting pt to CT scanner in 3rd floor. Stroke code CTH showed no acute intracranial pathology, CTA showing no LVO or high grade stenosis and a CTP with mismatch volume of 121ml, but exam degraded by motion and incomplete transit of contrast bolus. Enroute from CT to floor, pt noted to be much more alert, awake, following commands and no facial droop noted at this point of time. Case D/W Neuro stroke attending Dr. Cross and agreed that pt is not a candidate for tPA 2/2 rapidly improving symptoms and recent hematemesis. Pt not an EVT candidate 2/2 absence of LVO.        T(C): 36.8 (03-30-22 @ 06:22), Max: 36.8 (03-30-22 @ 06:22)  HR: 70 (03-30-22 @ 13:13) (70 - 76)  BP: 158/86 (03-30-22 @ 13:13) (158/86 - 168/80)  RR: 18 (03-30-22 @ 13:13) (18 - 19)  SpO2: 94% (03-30-22 @ 13:13) (94% - 100%)    PAST MEDICAL & SURGICAL HISTORY:  HTN (hypertension)    DM (diabetes mellitus)    No significant past surgical history        FAMILY HISTORY:  No pertinent family history in first degree relatives    ROS: Unable to obtain 2/2 pt's mental status and limited participation    MEDICATIONS  (STANDING):  dextrose 5%. 1000 milliLiter(s) (100 mL/Hr) IV Continuous <Continuous>  gabapentin 300 milliGRAM(s) Oral every 8 hours  glucagon  Injectable 1 milliGRAM(s) IntraMuscular once  insulin glargine Injectable (LANTUS) 13 Unit(s) SubCutaneous at bedtime  insulin lispro (ADMELOG) corrective regimen sliding scale   SubCutaneous Before meals and at bedtime  insulin lispro Injectable (ADMELOG) 9 Unit(s) SubCutaneous three times a day before meals  lisinopril 40 milliGRAM(s) Oral daily  pantoprazole    Tablet 40 milliGRAM(s) Oral every 12 hours  potassium chloride   Powder 40 milliEquivalent(s) Oral once    MEDICATIONS  (PRN):  acetaminophen     Tablet .. 650 milliGRAM(s) Oral every 6 hours PRN Temp greater or equal to 38C (100.4F), Mild Pain (1 - 3), Moderate Pain (4 - 6)  dextrose Oral Gel 15 Gram(s) Oral once PRN Blood Glucose LESS THAN 70 milliGRAM(s)/deciliter    Allergies    No Known Allergies    Intolerances      Vital Signs Last 24 Hrs  T(C): 36.8 (30 Mar 2022 06:22), Max: 36.8 (30 Mar 2022 06:22)  T(F): 98.2 (30 Mar 2022 06:22), Max: 98.2 (30 Mar 2022 06:22)  HR: 70 (30 Mar 2022 13:13) (70 - 76)  BP: 158/86 (30 Mar 2022 13:13) (158/86 - 168/80)  BP(mean): 117 (30 Mar 2022 13:13) (117 - 117)  RR: 18 (30 Mar 2022 13:13) (18 - 19)  SpO2: 94% (30 Mar 2022 13:13) (94% - 100%)    Physical exam(Prior to CT, upon initial assessment during stroke code)  Constitutional: minimally responsive with moaning for noxious stimuli. Not interactive. Unable to follow commands.  Eyes: Anicteric sclerae.  Neck: trachea midline, FROM.  Cardiovascular: Regular rate and rhythm on the monitor  Pulmonary: Anterior breath sounds clear bilaterally.  GI: Abdomen soft, non-distended, non-tender  Extremities : No edema.      Neurologic:  -Mental status: minimally responsive to noxious stimuli by moaning. Unable to follow commands.   -Cranial nerves:   II: Attempts to resist eye opening.  III, IV, VI:  Pupils equally round and reactive to light. L gaze preference but able to cross midline.  V:  unable to test 2/2 pt's mental status and participation.  VII: Face appears asymmetric with R facial droop.  Motor: Normal tone. spontaneous movements + RUE, No withdrawal to noxious stimuli on LUE initially. B/L LE no withdrawal to noxious stimuli(pt with known B/L LE weakness and is being s/b gen Neuro). R foot drop +.   Sensation: Withdraws to noxious stimuli RUE, no withdrawal to noxious stimuli LUE and B/L LE.  Coordination: Pt unable to follow commands.  Reflexes: B/L mute toes ?2/2 peripheral neuropathy.  Gait: Deferred    Physical exam(Post CT, upon Re evaluation returning from CT scanner to floor)  Constitutional: resting in bed, NAD  Eyes: Anicteric sclerae.  Neck: trachea midline, FROM.  Cardiovascular: Regular rate and rhythm on the monitor  Pulmonary: Anterior breath sounds clear bilaterally.  GI: Abdomen soft, non-distended, non-tender  Extremities : No edema.    NIHSS: 25 (Known B/L LE weakness taken into consideration)    Neurologic:  -Mental status: Awake, alert, oriented X 2(self and place-states as hospital) and not oriented to time. Poor historian. Unable to state why she is in the hospital or what happened to her. States"my daughter says Im sick". Mild dysarthria +, but can be understood completely. Dysarthria appreciated when pt attempts to speak fast.  -Cranial nerves:   II: Visual fields are full to confrontation.  III, IV, VI: Extraocular movements are intact without nystagmus. Pupils equally round and reactive to light  V:  Facial sensation V1-V3 equal and intact   VII: Face is symmetric with normal eye closure and smile (Resolved facial droop at this point of time)  IX, X: Uvula is midline and soft palate rises symmetrically  XI: Head turning and shoulder shrug are intact.  XII: Tongue protrudes midline  Motor: Normal bulk and tone. No pronator drift. Strength bilateral upper extremity 4+/5, bilateral lower extremities 2/5. Unable to lift B/L LE off the bed, bends her knees with help.  Rapid alternating movements intact and symmetric  Sensation: Intact to light touch bilaterally. No neglect or extinction on double simultaneous testing.  Coordination: No dysmetria on finger-to-nose.  Reflexes: Downgoing toes bilaterally   Gait: Deferred.    NIHSS: 8    Fingerstick Blood Glucose: CAPILLARY BLOOD GLUCOSE  152 (30 Mar 2022 13:34)      LABS:                        12.0   4.33  )-----------( 245      ( 30 Mar 2022 12:45 )             37.7     03-30    134<L>  |  98  |  12  ----------------------------<  153<H>  see note   |  25  |  0.51    Ca    8.3<L>      30 Mar 2022 12:45  Phos  2.7     03-30  Mg     1.8     03-30    TPro  6.5  /  Alb  3.2<L>  /  TBili  0.2  /  DBili  x   /  AST  see note  /  ALT  see note  /  AlkPhos  see note  03-30    PT/INR - ( 30 Mar 2022 12:45 )   PT: 10.7 sec;   INR: 0.90          PTT - ( 30 Mar 2022 12:45 )  PTT:35.9 sec      RADIOLOGY & ADDITIONAL STUDIES:     CT Brain Stroke Protocol (03.30.22 @ 13:04)   IMPRESSION: No intracranial hemorrhage or acute transcortical infarct.   Stable exam.    CT Angio Head w/ IV Cont (03.30.22 @ 13:09)   IMPRESSION:    Intracranial CTA: No large vessel occlusion or high-grade stenosis.    Extracranial CTA: Unremarkable CTA neck.    CT Perfusion w/ Maps w/ IV Cont (03.30.22 @ 13:05)   FINDINGS: The examination is degraded by motion as well as incomplete   transit of contrast bolus. The CBF <30% volume is 0 mL.  The Tmax > 6s   volume is 121 mL in the right greater than left parieto-occipital lobes   as well as the right frontal lobe.  The mismatch volume is 121 mL.    IMPRESSION: Examination is degraded by motion and incomplete transit of   contrast bolus. CT perfusion metrics as above.          -----------------------------------------------------------------------------------------------------------------  IV-tPA (Y/N):   No                               Reason IV-tPA not given: Rapidly improving symptoms    STROKE CODE CONSULT NOTE    Last known well time/Time of onset of symptoms: ~ 11am on 03/30 ( pt was evaluated appr 30minutes prior to stroke code and stroke code was called @ 11:35am on 03/30).    HPI: This pt is a 73 yo F w/ PMHx of HTN, DM c/b peripheral neuropathy BIBEMS w/ 1 episode of bloody vomit with clots. Per daughter, pt was feeling more lethargic than usual the day prior to admission (3/26). She was admitted for concern of gwen-pimentel tear after having 1 episode of bloody emesis w/ blood clots. Per daughter, she had multiple episodes of vomiting prior to admission. Pt reported last colonoscopy > 10 years prior, no endoscopy in past; GI was consulted and recommended that she be started on PPI IV 40 mg BID for 2 weeks and then upon discharge switch to Qd. Per collateral from the daughter, the patient has had progressive weakness over the past 1 year wherein she has had less walking. She attributed this weakness to her diabetic neuropathy as the patient c/o of chronic burning pain; it has gotten worse over the past 6 months where the patient has been bedbound and unable to ambulate without a walker. On 3/28 MRI of lumbar spine showed no acute processes to explain the leg weakness. On 03/30, RRT was called initially as pt was found unresponsive while eating her lunch, slumped over in the chair in sitting position, unresponsive to sternal rub with food bolus in her mouth. Pt was transferred to bed and was preparing to intubate 2/2 C/F airway protection, Pt noted to be more responsive but at that point noted to have R facial droop and slurred speech and hence stroke code was called. Upon initial assessment, pt minimally responsive to sternal rub/noxious by moaning, unable to answer questions or follow commands. Pt had stat labs being drawn and a CXR potable being done at bedside for C/F aspiration and hence delay in transporting pt to CT scanner in 3rd floor. Stroke code CTH showed no acute intracranial pathology, CTA showing no LVO or high grade stenosis and a CTP with mismatch volume of 121ml, but exam degraded by motion and incomplete transit of contrast bolus. Enroute from CT to floor, pt noted to be much more alert, awake, following commands and no facial droop noted at this point of time. Case D/W Neuro stroke attending Dr. Cross and agreed that pt is not a candidate for tPA 2/2 rapidly improving symptoms and recent hematemesis. Pt not an EVT candidate 2/2 absence of LVO.        T(C): 36.8 (03-30-22 @ 06:22), Max: 36.8 (03-30-22 @ 06:22)  HR: 70 (03-30-22 @ 13:13) (70 - 76)  BP: 158/86 (03-30-22 @ 13:13) (158/86 - 168/80)  RR: 18 (03-30-22 @ 13:13) (18 - 19)  SpO2: 94% (03-30-22 @ 13:13) (94% - 100%)    PAST MEDICAL & SURGICAL HISTORY:  HTN (hypertension)    DM (diabetes mellitus)    No significant past surgical history        FAMILY HISTORY:  No pertinent family history in first degree relatives    ROS: Unable to obtain 2/2 pt's mental status and limited participation    MEDICATIONS  (STANDING):  dextrose 5%. 1000 milliLiter(s) (100 mL/Hr) IV Continuous <Continuous>  gabapentin 300 milliGRAM(s) Oral every 8 hours  glucagon  Injectable 1 milliGRAM(s) IntraMuscular once  insulin glargine Injectable (LANTUS) 13 Unit(s) SubCutaneous at bedtime  insulin lispro (ADMELOG) corrective regimen sliding scale   SubCutaneous Before meals and at bedtime  insulin lispro Injectable (ADMELOG) 9 Unit(s) SubCutaneous three times a day before meals  lisinopril 40 milliGRAM(s) Oral daily  pantoprazole    Tablet 40 milliGRAM(s) Oral every 12 hours  potassium chloride   Powder 40 milliEquivalent(s) Oral once    MEDICATIONS  (PRN):  acetaminophen     Tablet .. 650 milliGRAM(s) Oral every 6 hours PRN Temp greater or equal to 38C (100.4F), Mild Pain (1 - 3), Moderate Pain (4 - 6)  dextrose Oral Gel 15 Gram(s) Oral once PRN Blood Glucose LESS THAN 70 milliGRAM(s)/deciliter    Allergies    No Known Allergies    Intolerances      Vital Signs Last 24 Hrs  T(C): 36.8 (30 Mar 2022 06:22), Max: 36.8 (30 Mar 2022 06:22)  T(F): 98.2 (30 Mar 2022 06:22), Max: 98.2 (30 Mar 2022 06:22)  HR: 70 (30 Mar 2022 13:13) (70 - 76)  BP: 158/86 (30 Mar 2022 13:13) (158/86 - 168/80)  BP(mean): 117 (30 Mar 2022 13:13) (117 - 117)  RR: 18 (30 Mar 2022 13:13) (18 - 19)  SpO2: 94% (30 Mar 2022 13:13) (94% - 100%)    Physical exam(Prior to CT, upon initial assessment during stroke code)  Constitutional: minimally responsive with moaning for noxious stimuli. Not interactive. Unable to follow commands.  Eyes: Anicteric sclerae.  Neck: trachea midline, FROM.  Cardiovascular: Regular rate and rhythm on the monitor  Pulmonary: Anterior breath sounds clear bilaterally.  GI: Abdomen soft, non-distended, non-tender  Extremities : No edema.      Neurologic:  -Mental status: minimally responsive to noxious stimuli by moaning. Unable to follow commands.   -Cranial nerves:   II: Attempts to resist eye opening.  III, IV, VI:  Pupils equally round and reactive to light. L gaze preference but able to cross midline.  V:  unable to test 2/2 pt's mental status and participation.  VII: Face appears asymmetric with R facial droop.  Motor: Normal tone. spontaneous movements + RUE, No withdrawal to noxious stimuli on LUE initially. B/L LE no withdrawal to noxious stimuli(pt with known B/L LE weakness and is being s/b gen Neuro). R foot drop +.   Sensation: Withdraws to noxious stimuli RUE, no withdrawal to noxious stimuli LUE and B/L LE.  Coordination: Pt unable to follow commands.  Reflexes: B/L mute toes ?2/2 peripheral neuropathy.  Gait: Deferred    Physical exam(Post CT, upon Re evaluation returning from CT scanner to floor)  Constitutional: resting in bed, NAD  Eyes: Anicteric sclerae.  Neck: trachea midline, FROM.  Cardiovascular: Regular rate and rhythm on the monitor  Pulmonary: Anterior breath sounds clear bilaterally.  GI: Abdomen soft, non-distended, non-tender  Extremities : No edema.    NIHSS: 25 (Known B/L LE weakness taken into consideration)    Neurologic:  -Mental status: Awake, alert, oriented X 2(self and place-states as hospital) and not oriented to time. Poor historian. Unable to state why she is in the hospital or what happened to her. States"my daughter says Im sick". Mild dysarthria +, but can be understood completely. Dysarthria appreciated when pt attempts to speak fast.  -Cranial nerves:   II: Visual fields are full to confrontation.  III, IV, VI: Extraocular movements are intact without nystagmus. Pupils equally round and reactive to light  V:  Facial sensation V1-V3 equal and intact   VII: Face is symmetric with normal eye closure and smile (Resolved facial droop at this point of time)  IX, X: Uvula is midline and soft palate rises symmetrically  XI: Head turning and shoulder shrug are intact.  XII: Tongue protrudes midline  Motor: Normal bulk and tone. No pronator drift. Strength bilateral upper extremity 4+/5, bilateral lower extremities 2/5. Unable to lift B/L LE off the bed, bends her knees with help. R foot drop +.  Rapid alternating movements intact and symmetric  Sensation: Intact to light touch bilaterally. No neglect or extinction on double simultaneous testing.  Coordination: No dysmetria on finger-to-nose.  Reflexes:  B/L mute toes ?2/2 peripheral neuropathy.  Gait: Deferred.    NIHSS: 8    Fingerstick Blood Glucose: CAPILLARY BLOOD GLUCOSE  152 (30 Mar 2022 13:34)      LABS:                        12.0   4.33  )-----------( 245      ( 30 Mar 2022 12:45 )             37.7     03-30    134<L>  |  98  |  12  ----------------------------<  153<H>  see note   |  25  |  0.51    Ca    8.3<L>      30 Mar 2022 12:45  Phos  2.7     03-30  Mg     1.8     03-30    TPro  6.5  /  Alb  3.2<L>  /  TBili  0.2  /  DBili  x   /  AST  see note  /  ALT  see note  /  AlkPhos  see note  03-30    PT/INR - ( 30 Mar 2022 12:45 )   PT: 10.7 sec;   INR: 0.90          PTT - ( 30 Mar 2022 12:45 )  PTT:35.9 sec      RADIOLOGY & ADDITIONAL STUDIES:     CT Brain Stroke Protocol (03.30.22 @ 13:04)   IMPRESSION: No intracranial hemorrhage or acute transcortical infarct.   Stable exam.    CT Angio Head w/ IV Cont (03.30.22 @ 13:09)   IMPRESSION:    Intracranial CTA: No large vessel occlusion or high-grade stenosis.    Extracranial CTA: Unremarkable CTA neck.    CT Perfusion w/ Maps w/ IV Cont (03.30.22 @ 13:05)   FINDINGS: The examination is degraded by motion as well as incomplete   transit of contrast bolus. The CBF <30% volume is 0 mL.  The Tmax > 6s   volume is 121 mL in the right greater than left parieto-occipital lobes   as well as the right frontal lobe.  The mismatch volume is 121 mL.    IMPRESSION: Examination is degraded by motion and incomplete transit of   contrast bolus. CT perfusion metrics as above.          -----------------------------------------------------------------------------------------------------------------  IV-tPA (Y/N):   No                               Reason IV-tPA not given: Rapidly improving symptoms

## 2022-03-30 NOTE — PROCEDURE NOTE - NSICDXPROCEDURE_GEN_ALL_CORE_FT
PROCEDURES:  Insertion, needle, vein 30-Mar-2022 13:52:14  Kristy Zarco  Critical care for 30 to 74 minutes 30-Mar-2022 13:52:24  Kristy Zarco

## 2022-03-30 NOTE — PROGRESS NOTE ADULT - PROBLEM SELECTOR PLAN 8
Per daughter, pt was previously on gabapentin 600 TID which was decreased to 300 TID two days prior due to concern for contribution to weakness. Lumbar MRI - negative     Plan:   - C/w gabapentin 300 TID

## 2022-03-30 NOTE — PROGRESS NOTE ADULT - PROBLEM SELECTOR PLAN 5
Pt presenting w/ HTNsive urgency w/  systolic. Asymptomatic. Appears euvolemic. Per daughter, pt on lisinopril and amlodipine, but only the former was confirmed by pharmacy.    Plan:  - C/w home lisinopril 40 qd Likely 2/2 diabetic neuropathy, aggravated by deconditioning from long-term bedrest.     Plan:   - F/u PT consult and neurology, appreciate recs   - Management of diabetic neuropathy as below

## 2022-03-30 NOTE — PROGRESS NOTE ADULT - ATTENDING COMMENTS
Pt seen on rounds this afternoon.  As noted by Dr. Tamez, RRT was called early this afternoon after pt was found unconscious over her lunch tray.  Apparently had neuro findings (facial droop, etc) which subsequently resolved.  Needed to have food suctioned out of her mouth.  Pt herself says simply that she "fell asleep" because she did not sleep last night.   She was quite alert and interactive at the time of our visit in the late afternoon.  Glucoses remain quite high (280-350) except for a mild drop to 165 pre-lunch today.    Said that she was quite hungry and was asking for supper.  Clearly needs a higher dose of Lantus--increased only to 13 units yesterday because of previous episode of AM hypoglycemia on 15 units  Will increase the Lantus to 18 units, increase the pre-meal slightly to 11 units

## 2022-03-30 NOTE — DISCHARGE NOTE PROVIDER - CARE PROVIDERS DIRECT ADDRESSES
,DirectAddress_Unknown ,DirectAddress_Unknown,DirectAddress_Unknown ,DirectAddress_Unknown,DirectAddress_Unknown,DirectAddress_Unknown ,DirectAddress_Unknown,DirectAddress_Unknown,DirectAddress_Unknown,[email or web address]

## 2022-03-30 NOTE — PROGRESS NOTE ADULT - PROBLEM SELECTOR PLAN 1
Pt was found to be unresponsive at 1200. Fingerstick ~160s, /80s, SpO2 100% on RA, no iwob, no accessory muscle use. Pt developed R facial droop with slurred speech. Stroke code called, stroke workup negative. Ddx: rule out stroke, rule out seizure, possible aspiration event     Plan:  - vEEG to R/O Seizure   - f/u CXR to r/o aspiration event   - Obtain MRI Brain  - Obtain TTE  - Obtain stroke labs - TSH, Lipid panel.  - SCDs for DVT PPX. Pt was found to be unresponsive at 1200. Fingerstick ~160s, /80s, SpO2 100% on RA, no iwob, no accessory muscle use. Pt developed R facial droop with slurred speech. Stroke code called, stroke workup negative. Ddx: rule out stroke, rule out seizure, possible aspiration event     Plan:  -start ASA 81mg po daily and atorvastatin 80mg daily   - vEEG to R/O Seizure   - f/u CXR to r/o aspiration event   - Obtain MRI Brain  - Obtain TTE  - SCDs for DVT PPX.

## 2022-03-30 NOTE — DISCHARGE NOTE PROVIDER - PROVIDER TOKENS
PROVIDER:[TOKEN:[[id number]],FOLLOWUP:[1 week]] PROVIDER:[TOKEN:[[id number]],FOLLOWUP:[1 week]],PROVIDER:[TOKEN:[[id number]],FOLLOWUP:[1 week]] PROVIDER:[TOKEN:[[id number]],FOLLOWUP:[1 week]],PROVIDER:[TOKEN:[[id number]],FOLLOWUP:[1 week]],PROVIDER:[TOKEN:[[id number]],FOLLOWUP:[1 week]] PROVIDER:[TOKEN:[[id number]],FOLLOWUP:[1 week]],PROVIDER:[TOKEN:[[id number]],FOLLOWUP:[1 week]],PROVIDER:[TOKEN:[[id number]],FOLLOWUP:[1 week]],PROVIDER:[TOKEN:[[id number]],FOLLOWUP:[1 week]]

## 2022-03-30 NOTE — DISCHARGE NOTE PROVIDER - CARE PROVIDER_API CALL
Bradly Griffith)  Clinical Neurophysiology; Neurology  [address] [id number]  Phone: [phone]  Fax: [phone]  Follow Up Time: 1 week   Bradly Griffith)  Clinical Neurophysiology; Neurology  [address] [place] [address]  Phone: [phone]  Fax: [phone]  Follow Up Time: 1 week    Wei Mehta)  Internal Medicine  [address] [place] [address]  Phone: [phone]  Fax: [phone]  Follow Up Time: 1 week   Bradly Griffith)  Clinical Neurophysiology; Neurology  [address] [place] [address]  Phone: [phone]  Fax: [phone]  Follow Up Time: 1 week    Wei Mehta)  Internal Medicine  [address] [place] [address]  Phone: [phone]  Fax: [phone]  Follow Up Time: 1 week    Hafsa Livingston)  Internal Medicine  [address] [place] [address], Room [address]  [place] [address]  Phone: [phone]  Fax: [phone]  Follow Up Time: 1 week   Bradly Griffith)  Clinical Neurophysiology; Neurology  [address] [address]  Phone: [phone]  Fax: [phone]  Follow Up Time: 1 week    Wei Mehta)  Gastroenterology; Internal Medicine  [address] [address]  Phone: [phone]  Fax: [phone]  Follow Up Time: 1 week    Hafsa Livingston)  Internal Medicine  [address] [place] [address], Room [address]61  [place] [address]  Phone: [phone]  Fax: [phone]  Follow Up Time: 1 week    Ke Hassan)  EndocrinologyMetabDiabetes; Internal Medicine  [address] [place] [address]  Phone: [phone]  Fax: [phone]  Follow Up Time: 1 week

## 2022-03-30 NOTE — PROVIDER CONTACT NOTE (OTHER) - ACTION/TREATMENT ORDERED:
Rapid response and code stroke called.  Oral suctioned to clear airway, airway clearance restored, pt remains on Room Air.  Labs collected, Chest x-ray performed. Stroke bundle ordered.

## 2022-03-30 NOTE — PROGRESS NOTE ADULT - PROBLEM SELECTOR PLAN 10
F: S/p 2.25L   E: replete K<4, Mg<2  N: CC  VTE Prophylaxis: SCDs given UGIB  GI: Pantoprazole 40 PO BID  C: Full Code (began discussion w/ daughter who is next of kin and primary caretaker)  D: Pending PT recs Noted by ED physician. EKG not in pt's chart on admission.  - EKG at [facility] showing incomplete RBBB.

## 2022-03-30 NOTE — PROGRESS NOTE ADULT - SUBJECTIVE AND OBJECTIVE BOX
***Transfer Note 7Uris to Tele***  Hospital Course  This pt is a 73 yo F w/ PMHx of HTN, DM c/b peripheral neuropathy BIBEMS w/ 1 episode of bloody vomit with clots. Per daughter, pt was feeling more lethargic than usual the day prior to admission (3/26). She was admitted for concern of gwen-pimentel tear after having 1 episode of bloody emesis w/ blood clots. Per daughter, she had multiple episodes of vomiting prior to admission. Pt reported last colonoscopy > 10 years prior, no endoscopy in past; GI was consulted and recommended that she be started on PPI IV 40 mg BID for 2 weeks and then upon discharge switch to Qd. Per collateral from the daughter, the patient has had progressive weakness over the past 1 year wherein she has had less walking. She attributed this weakness to her diabetic neuropathy as the patient c/o of chronic burning pain; it has gotten worse over the past 6 months where the patient has been bedbound and unable to ambulate without a walker. On 3/28 MRI of lumbar spine showed no acute processes to explain the leg weakness. A foot drop was elicited upon exam on 3/29, but pt didn't report any other focal deficits. On 3/30, a stroke code was called as the patient was found unresponsive while eating her lunch for about 2 minutes. On neuro exam, the patient developed a R facial droop and slurred speech which was not at her baseline; baseline neuro exam: b/l 1/5 weakness in ankle, 3/5 weakness in knee; 5/5 weakness in b/l UE; no pronator drift; CN 2-12 intact; decreased sensation in the b/l shins L > R from shins down.     INTERVAL HPI/OVERNIGHT EVENTS: CAIT o/n. endo recommended 13 lantus and 9 premeals; AM fingerstick, 259. Continues to have electric shooting pains in the legs which is unchanged. No complaints of HA, dizziness, palpitations, CP, abd pain, n/v/d, skin lesions/bruising, or lower extremity swelling. Stroke code at ~1200, with symptoms/physical exam as above.     OBJECTIVE  Vital Signs Last 24 Hrs  T(F): 98.2 (30 Mar 2022 06:22), Max: 98.7 (29 Mar 2022 14:18)  HR: 76 (30 Mar 2022 06:22) (72 - 76)  BP: 168/80 (30 Mar 2022 06:22) (144/85 - 168/80)  RR: 19 (30 Mar 2022 06:22) (18 - 19)  SpO2: 100% (30 Mar 2022 06:22) (98% - 100%) on RA     Physical Exam  Constitutional: NAD, well-groomed, well-developed  HEENT: PERRLA, EOMI, Normal Hearing, MMM  Neck: No LAD, No JVD  Back: Normal spine flexure, No CVA tenderness  Respiratory: CTAB  Cardiovascular: S1 and S2, RRR, apical holosystolic murmur radiating to axilla   Gastrointestinal: BS+, soft, NT/ND  Extremities: No peripheral edema  Vascular: 2+ peripheral pulses  Neuro: A&Ox2. 5/5 strength of b/l UEs, 5/5 strength of b/l hips. 3/5 strength of b/l knees. 1/5 strength of b/l ankles. Sensation intact but decreased L > R. Slurred speech. R facial deficits.   Skin: No rashes  Psychiatric: Normal mood, normal affect      MEDICATIONS  (STANDING):  dextrose 10%. 1000 milliLiter(s) (30 mL/Hr) IV Continuous <Continuous>  dextrose 5%. 1000 milliLiter(s) (100 mL/Hr) IV Continuous <Continuous>  dextrose 50% Injectable 25 Gram(s) IV Push once  dextrose 50% Injectable 12.5 Gram(s) IV Push once  dextrose 50% Injectable 25 Gram(s) IV Push once  gabapentin 300 milliGRAM(s) Oral every 8 hours  glucagon  Injectable 1 milliGRAM(s) IntraMuscular once  insulin lispro (ADMELOG) corrective regimen sliding scale   SubCutaneous every 6 hours  lisinopril 40 milliGRAM(s) Oral daily  pantoprazole  Injectable 40 milliGRAM(s) IV Push every 12 hours    MEDICATIONS  (PRN):  dextrose Oral Gel 15 Gram(s) Oral once PRN Blood Glucose LESS THAN 70 milliGRAM(s)/deciliter      Allergies    No Known Allergies    Intolerances      LABS:                                        12.0   4.33  )-----------( 245      ( 30 Mar 2022 12:45 )             37.7     03-30    134<L>  |  98  |  12  ----------------------------<  153<H>  see note   |  25  |  0.51    Ca    8.3<L>      30 Mar 2022 12:45  Phos  2.7     03-30  Mg     1.8     03-30    TPro  6.5  /  Alb  3.2<L>  /  TBili  0.2  /  DBili  x   /  AST  see note  /  ALT  see note  /  AlkPhos  see note  03-30      RADIOLOGY & ADDITIONAL TESTS: reviewed     Pending CT head 3/30    < from: MR Lumbar Spine No Cont (03.28.22 @ 16:21) >  Grade 1 anterolisthesis of L3 on L4. Degenerative disc   disease at L3/4 and L4/5 with superimposed small left foraminal disc   herniation at L3/4. When accounting for differences in angulation and   technique, these findings appear stable.    < end of copied text >

## 2022-03-30 NOTE — CONSULT NOTE ADULT - CONSULT REASON
lower extremity weakness
new R facial droop and slurred speech
Rehab evaluation
Management of diabetes
diarrhea

## 2022-03-30 NOTE — DISCHARGE NOTE PROVIDER - HOSPITAL COURSE
#Discharge: do not delete    Patient is __ yo M/F with past medical history of _____     Presented with _____, found to have _____    Inpatient treatment course:    Problem List/Main Diagnoses (system-based):         Patient was discharged to: [facility]  New medications:  Changes to old medications:  Medications that were stopped:   Items to follow up as outpatient:  Physical exam at the time of discharge: #Discharge: do not delete      Problem List/Main Diagnoses (system-based):         Patient was discharged to: [facility]  New medications: cymbalta 30 mg QD  Changes to old medications: none  Medications that were stopped: gabapentin  Items to follow up as outpatient: neurology follow-up appointment     Physical exam at the time of discharge: #Discharge: do not delete  This pt is a 71 yo F w/ PMHx of HTN, DM c/b peripheral neuropathy BIBEMS w/ 1 episode of bloody vomit with clots. Per daughter, pt was feeling more lethargic than usual the day prior to admission (3/26). She was admitted for concern of gwen-sanchez tear after having 1 episode of bloody emesis w/ blood clots. Per daughter, she had multiple episodes of vomiting prior to admission. Pt reported last colonoscopy > 10 years prior, no endoscopy in past; GI was consulted and recommended that she be started on PPI IV 40 mg BID for 2 weeks and then upon discharge switch to Qd. Per collateral from the daughter, the patient has had progressive weakness over the past 1 year wherein she has had less walking. She attributed this weakness to her diabetic neuropathy as the patient c/o of chronic burning pain; it has gotten worse over the past 6 months where the patient has been bedbound and unable to ambulate without a walker. On 3/28 MRI of lumbar spine showed no acute processes to explain the leg weakness. A foot drop was elicited upon exam on 3/29, but pt didn't report any other focal deficits. On 3/30, a stroke code was called as the patient was found unresponsive while eating her lunch for about 2 minutes. On neuro exam, the patient developed a R facial droop and slurred speech which was not at her baseline; baseline neuro exam: b/l 1/5 weakness in ankle, 3/5 weakness in knee; 5/5 weakness in b/l UE; no pronator drift; CN 2-12 intact; decreased sensation in the b/l shins L > R from shins down. Differential of unresponsiveness during rapid response includes TIA/stroke vs. aspiration event vs. seizure. On 7Lach, patient did NOT have right facial droop, no slurred speech. On exam, CN were intact and sensation intact throughout. Bilateral LE were 1/5 (chronic) with sensation intact. Per neuro recs, patient started on ASA 81mg and atorva 80. Patient placed on vEEG to rule out seizure. Patient was initially NPO out of concern for aspiration event during rapid but passed bedside dysphagia and resumed diet. CT head, CTA, MRI head were negative for acute pathology. Patient stable for stepdown to [facility].      Problem List/Main Diagnoses (system-based):  Problem: TIA  Pt was found to be unresponsive at 1200 on 3/30/31. Fingerstick ~160s, /80s, SpO2 100% on RA, no iwob, no accessory muscle use. Pt developed R facial droop with slurred speech. Stroke code called, patient stepped up to 7Lach. CTH and CTA from stroke code are negative. 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 Patient now stable for stepdown to [facility]. Ddx: rule out stroke/TIA, rule out seizure, possible aspiration event. Neurology following - recommend vEEG; possibly that patient is having seizures and in between episodes is in a post-ictal state related to an amyloid etiology. vEEG shows R cerebral hemisphere dysfunction but no epileptiform activity. F/U CT head noncont and MRI show no acute pathology. [place] that there is a hx of meningioma back in 2020 that has not been resected. But current imaging shows no increased growth of tumor. TTE negative for PFO, thrombus, mild reg    Plan:  - ASA 81mg po daily and atorvastatin 80mg.    Problem: Hematemesis.   ·  Plan: One episode, none in ED or hospital thus far. Preceded by several likely non-bloody vomiting episodes. No pain. Hgb stable, not hypotensive. Most likely Gwen Sanchez tear, labs not consistent w/ chronic bleed given normal Hgb.    Plan:   - Pantoprazole 40 BID for 2 weeks followed by QD for 6 weeks, follow up with GI.    Problem: Diabetic gastroparesis.   Gastroparesis 2/2 to poor DM control.    Plan:   - Reglan 5mg IV q8h PRN for motility  - F/u w/ outpatient GI for possible gastroporesis study     Problem: Leg weakness.  Chronic and present on arrival. Likely 2/2 diabetic neuropathy, aggravated by deconditioning from long-term bedrest. MR thoracic without signs of cord compression. However Mild posterior bulge at T2-3, and T-4-5 to T10-11 with mild to moderate canal narrowing worst at T10-11 level    Plan:   - F/u PT consult and neurology, appreciate recs   - Started on 30 mg QD cymbalta for neuropathy 4/4; discontinued gabapentin   - F/u on MMA level, B12, SPEP, serum immunofixation, RPR, copper level.    Problem: HTN (hypertension).  Pt presenting w/ HTNsive urgency w/  systolic. Asymptomatic. Appears euvolemic. Per daughter, pt on lisinopril and amlodipine, but only the former was confirmed by pharmacy.    Plan:  - C/w home lisinopril 40 qd.    Problem: DM (diabetes mellitus).  Per daughter, pt is on metformin 1000 BID, Lantus 18u, and Victoza, but only metformin was confirmed by pharmacy. A1C 11.6     Plan:   - Consulted endo, appreciate recs  - c/w basal bolus regimen with ISS; lantus 14 and 10 bolus.    Problem: Hyperlipidemia.  Plan:   - start 80 atorvastatin per neurology.    Problem: Diabetic neuropathy.  Per daughter, pt was previously on gabapentin 600 TID which was decreased to 300 TID two days prior due to concern for contribution to weakness. Lumbar MRI - negative     Plan:   - Rx cymbalta 30 mg QD 4/4.    Problem: Right bundle branch block.  Noted by ED physician. EKG not in pt's chart on admission.  - EKG at [facility] showing incomplete RBBB.    Problem: Bacterial UTI.  Urine cx positive on 4/2 for staph aureus. Pt currently states no dysuria, frequency, urgency, or bleeding.    Plan:  - IV CFTX 4/2-4/4, pending sensitivities.    Patient was discharged to: [facility]  Medications that were stopped: gabapentin 300 mg; METFORMIN??   Items to follow up as outpatient: neurology follow-up appointment     Physical exam at the time of discharge: #Discharge: do not delete  This pt is a 71 yo F w/ PMHx of HTN, DM c/b peripheral neuropathy BIBEMS w/ 1 episode of bloody vomit with clots. Per daughter, pt was feeling more lethargic than usual the day prior to admission (3/26). She was admitted for concern of gwen-sanchez tear after having 1 episode of bloody emesis w/ blood clots. Per daughter, she had multiple episodes of vomiting prior to admission. Pt reported last colonoscopy > 10 years prior, no endoscopy in past; GI was consulted and recommended that she be started on PPI IV 40 mg BID for 2 weeks and then upon discharge switch to Qd. Per collateral from the daughter, the patient has had progressive weakness over the past 1 year wherein she has had less walking. She attributed this weakness to her diabetic neuropathy as the patient c/o of chronic burning pain; it has gotten worse over the past 6 months where the patient has been bedbound and unable to ambulate without a walker. On 3/28 MRI of lumbar spine showed no acute processes to explain the leg weakness. A foot drop was elicited upon exam on 3/29, but pt didn't report any other focal deficits. On 3/30, a stroke code was called as the patient was found unresponsive while eating her lunch for about 2 minutes. On neuro exam, the patient developed a R facial droop and slurred speech which was not at her baseline; baseline neuro exam: b/l 1/5 weakness in ankle, 3/5 weakness in knee; 5/5 weakness in b/l UE; no pronator drift; CN 2-12 intact; decreased sensation in the b/l shins L > R from shins down. Differential of unresponsiveness during rapid response includes TIA/stroke vs. aspiration event vs. seizure. On 7Lach, patient did NOT have right facial droop, no slurred speech. On exam, CN were intact and sensation intact throughout. Bilateral LE were 1/5 (chronic) with sensation intact. Per neuro recs, patient started on ASA 81mg and atorva 80. Patient placed on vEEG to rule out seizure. Patient was initially NPO out of concern for aspiration event during rapid but passed bedside dysphagia and resumed diet. CT head, CTA, MRI head were negative for acute pathology. Patient stable for stepdown to [facility].      Problem List/Main Diagnoses (system-based):  Problem: TIA  Pt was found to be unresponsive at 1200 on 3/30/31. Fingerstick ~160s, /80s, SpO2 100% on RA, no iwob, no accessory muscle use. Pt developed R facial droop with slurred speech. Stroke code called, patient stepped up to 7Lach. CTH and CTA from stroke code are negative. 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 Patient now stable for stepdown to [facility]. Ddx: rule out stroke/TIA, rule out seizure, possible aspiration event. Neurology following - recommend vEEG; possibly that patient is having seizures and in between episodes is in a post-ictal state related to an amyloid etiology. vEEG shows R cerebral hemisphere dysfunction but no epileptiform activity. F/U CT head noncont and MRI show no acute pathology. [place] that there is a hx of meningioma back in 2020 that has not been resected. But current imaging shows no increased growth of tumor. TTE negative for PFO, thrombus, mild reg    Plan:  - ASA 81mg po daily and atorvastatin 80mg.    Problem: Hematemesis.   ·  Plan: One episode, none in ED or hospital thus far. Preceded by several likely non-bloody vomiting episodes. No pain. Hgb stable, not hypotensive. Most likely Gwen Sanchez tear, labs not consistent w/ chronic bleed given normal Hgb.    Plan:   - Pantoprazole 40 BID for 2 weeks (finish on 4/8) followed by QD for 6 weeks, follow up with GI.    Problem: Diabetic gastroparesis.   Gastroparesis 2/2 to poor DM control.    Plan:   - Reglan 5mg IV q8h PRN for motility  - F/u w/ outpatient GI for possible gastroporesis study     Problem: Leg weakness.  Chronic and present on arrival. Likely 2/2 diabetic neuropathy, aggravated by deconditioning from long-term bedrest. MR thoracic without signs of cord compression. However Mild posterior bulge at T2-3, and T-4-5 to T10-11 with mild to moderate canal narrowing worst at T10-11 level    Plan:   - F/u PT consult and neurology, appreciate recs   - Started on 30 mg QD cymbalta for neuropathy 4/4; discontinued gabapentin   - F/u on MMA level, B12, SPEP, serum immunofixation, RPR, copper level.    Problem: HTN (hypertension).  Pt presenting w/ HTNsive urgency w/  systolic. Asymptomatic. Appears euvolemic. Per daughter, pt on lisinopril and amlodipine, but only the former was confirmed by pharmacy.    Plan:  - C/w home lisinopril 40 qd.    Problem: DM (diabetes mellitus).  Per daughter, pt is on metformin 1000 BID, Lantus 18u, and Victoza, but only metformin was confirmed by pharmacy. A1C 11.6     Plan:   - Consulted endo, appreciate recs  - c/w basal bolus regimen with ISS; lantus 14 and 10 bolus.    Problem: Hyperlipidemia.  Plan:   - start 80 atorvastatin per neurology.    Problem: Diabetic neuropathy.  Per daughter, pt was previously on gabapentin 600 TID which was decreased to 300 TID two days prior due to concern for contribution to weakness. Lumbar MRI - negative     Plan:   - Rx cymbalta 30 mg QD 4/4.    Problem: Right bundle branch block.  Noted by ED physician. EKG not in pt's chart on admission.  - EKG at [facility] showing incomplete RBBB.    Problem: Bacterial UTI.  Urine cx positive on 4/2 for staph aureus. Pt currently states no dysuria, frequency, urgency, or bleeding.    Plan:  - IV CFTX 4/2-4/4, pending sensitivities.    Patient was discharged to: [facility]  Medications that were stopped: gabapentin 300 mg; METFORMIN??   Items to follow up as outpatient: neurology follow-up appointment     Physical exam at the time of discharge: #Discharge: do not delete  This pt is a 71 yo F w/ PMHx of HTN, DM c/b peripheral neuropathy BIBEMS w/ 1 episode of bloody vomit with clots. Per daughter, pt was feeling more lethargic than usual the day prior to admission (3/26). She was admitted for concern of gwen-sanchez tear after having 1 episode of bloody emesis w/ blood clots. Per daughter, she had multiple episodes of vomiting prior to admission. Pt reported last colonoscopy > 10 years prior, no endoscopy in past; GI was consulted and recommended that she be started on PPI IV 40 mg BID for 2 weeks and then upon discharge switch to Qd. Per collateral from the daughter, the patient has had progressive weakness over the past 1 year wherein she has had less walking. She attributed this weakness to her diabetic neuropathy as the patient c/o of chronic burning pain; it has gotten worse over the past 6 months where the patient has been bedbound and unable to ambulate without a walker. On 3/28 MRI of lumbar spine showed no acute processes to explain the leg weakness. A foot drop was elicited upon exam on 3/29, but pt didn't report any other focal deficits. On 3/30, a stroke code was called as the patient was found unresponsive while eating her lunch for about 2 minutes. On neuro exam, the patient developed a R facial droop and slurred speech which was not at her baseline; baseline neuro exam: b/l 1/5 weakness in ankle, 3/5 weakness in knee; 5/5 weakness in b/l UE; no pronator drift; CN 2-12 intact; decreased sensation in the b/l shins L > R from shins down. Differential of unresponsiveness during rapid response includes TIA/stroke vs. aspiration event vs. seizure. On 7Lach, patient did NOT have right facial droop, no slurred speech. On exam, CN were intact and sensation intact throughout. Bilateral LE were 1/5 (chronic) with sensation intact. Per neuro recs, patient started on ASA 81mg and atorva 80. Patient placed on vEEG to rule out seizure. Patient was initially NPO out of concern for aspiration event during rapid but passed bedside dysphagia and resumed diet. CT head, CTA, MRI head were negative for acute pathology. Patient stable for stepdown to [facility].      Problem List/Main Diagnoses (system-based):  Problem: TIA  Pt was found to be unresponsive at 1200 on 3/30/31. Fingerstick ~160s, /80s, SpO2 100% on RA, no iwob, no accessory muscle use. Pt developed R facial droop with slurred speech. Stroke code called, patient stepped up to 7Lach. CTH and CTA from stroke code are negative. 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 Patient now stable for stepdown to [facility]. Ddx: rule out stroke/TIA, rule out seizure, possible aspiration event. Neurology following - recommend vEEG; possibly that patient is having seizures and in between episodes is in a post-ictal state related to an amyloid etiology. vEEG shows R cerebral hemisphere dysfunction but no epileptiform activity. F/U CT head noncont and MRI show no acute pathology. [place] that there is a hx of meningioma back in 2020 that has not been resected. But current imaging shows no increased growth of tumor. TTE negative for PFO, thrombus, mild reg    Plan:  - ASA 81mg po daily and atorvastatin 80mg.    Problem: Hematemesis.   ·  Plan: One episode, none in ED or hospital thus far. Preceded by several likely non-bloody vomiting episodes. No pain. Hgb stable, not hypotensive. Most likely Gwen Sanchez tear, labs not consistent w/ chronic bleed given normal Hgb.    Plan:   - Pantoprazole 40 BID for 2 weeks (finish on 4/8) followed by QD for 6 weeks, follow up with GI.    Problem: Diabetic gastroparesis.   Gastroparesis 2/2 to poor DM control.    Plan:   - Reglan 5mg IV q8h PRN for motility  - F/u w/ outpatient GI for possible gastroporesis study     Problem: Leg weakness.  Chronic and present on arrival. Likely 2/2 diabetic neuropathy, aggravated by deconditioning from long-term bedrest. MR thoracic without signs of cord compression. However Mild posterior bulge at T2-3, and T-4-5 to T10-11 with mild to moderate canal narrowing worst at T10-11 level    Plan:   - F/u PT consult and neurology, appreciate recs   - Started on 30 mg QD cymbalta for neuropathy 4/4; discontinued gabapentin   - F/u on MMA level, B12, SPEP, serum immunofixation, RPR, copper level.    Problem: HTN (hypertension).  Pt presenting w/ HTNsive urgency w/  systolic. Asymptomatic. Appears euvolemic. Per daughter, pt on lisinopril and amlodipine, but only the former was confirmed by pharmacy.    Plan:  - C/w home lisinopril 40 qd.    Problem: DM (diabetes mellitus).  Per daughter, pt is on metformin 1000 BID, Lantus 18u, and Victoza, but only metformin was confirmed by pharmacy. A1C 11.6     Plan:   - Consulted endo, appreciate recs  - c/w basal bolus regimen with ISS; lantus 14 and 10 bolus.    Problem: Hyperlipidemia.  Plan:   - start 80 atorvastatin per neurology.    Problem: Diabetic neuropathy.  Per daughter, pt was previously on gabapentin 600 TID which was decreased to 300 TID two days prior due to concern for contribution to weakness. Lumbar MRI - negative     Plan:   - Rx cymbalta 30 mg QD 4/4.    Problem: Right bundle branch block.  Noted by ED physician. EKG not in pt's chart on admission.  - EKG at [facility] showing incomplete RBBB.    Problem: Bacterial UTI - RESOLVED.  Urine cx positive on 4/2 for staph aureus. Pt currently states no dysuria, frequency, urgency, or bleeding. IV CFTX 4/2-4/4    Plan:  - NTD    Patient was discharged to: [facility]  New medications: cymbalta 20 mg QD  Medications that were stopped: gabapentin  Items to follow up as outpatient: neurology follow-up appointment     Physical exam at the time of discharge:  General: frail elderly woman in NAD  HEENT: NC/AT; EOMI, PERRLA, anicteric sclera; dry mucosal membranes; poor dentition  Neck: supple, trachea midline  Cardiovascular: RRR, +S1/S2; NO M/R/G  Respiratory: CTA B/L; no W/R/R  Gastrointestinal: soft, NT/ND; +BSx4  Extremities: WWP; no edema or cyanosis  Vascular: 2+ radial, DP/PT pulses B/L  Neuro: A&Ox2. 5/5 strength of b/l UEs; 1/5 strength of b/l ankles. Sensation intact but decreased b/l; CN 2-12 intact.

## 2022-03-30 NOTE — PROGRESS NOTE ADULT - SUBJECTIVE AND OBJECTIVE BOX
INTERVAL HPI/OVERNIGHT EVENTS:  Patient is a 72y old  Female who presents with a chief complaint of Hematemesis (29 Mar 2022 15:47)  Stroke called today for LOC while eating lunch, dysarthria and right facial droop. CTH negative. Now on EEG. Moved to 7L.  She remembers falling asleep during lunch because she was tired. She did not remember being suctioned during the event.   She reports being hungry for dinner and will be on modified diet.    FSG & insulin:  Yesterday:  Dinner . Lispro 9+3  Bedtime . Lantus 13 and lispro 4.  Today:  Breakfast . Lispro 9.3  Lunch     Pt reports the following symptoms:    CONSTITUTIONAL:  Negative fever or chills, good appetite  EYES:  Negative  blurry vision or double vision  CARDIOVASCULAR:  Negative for chest pain or palpitations  RESPIRATORY:  Negative for cough, wheezing, or SOB   GASTROINTESTINAL:  Negative for nausea, vomiting, diarrhea, constipation, or abdominal pain  GENITOURINARY:  Negative frequency, urgency or dysuria  NEUROLOGIC:  No headache, confusion, dizziness, lightheadedness    MEDICATIONS  (STANDING):  aspirin  chewable 81 milliGRAM(s) Oral daily  atorvastatin 80 milliGRAM(s) Oral at bedtime  dextrose 5%. 1000 milliLiter(s) (100 mL/Hr) IV Continuous <Continuous>  gabapentin 300 milliGRAM(s) Oral every 8 hours  glucagon  Injectable 1 milliGRAM(s) IntraMuscular once  hydrALAZINE Injectable 10 milliGRAM(s) IV Push once  insulin glargine Injectable (LANTUS) 18 Unit(s) SubCutaneous at bedtime  insulin lispro (ADMELOG) corrective regimen sliding scale   SubCutaneous Before meals and at bedtime  insulin lispro Injectable (ADMELOG) 11 Unit(s) SubCutaneous three times a day before meals  lisinopril 40 milliGRAM(s) Oral daily  metoclopramide Injectable 5 milliGRAM(s) IV Push every 8 hours  pantoprazole  Injectable 40 milliGRAM(s) IV Push every 12 hours    MEDICATIONS  (PRN):  acetaminophen     Tablet .. 650 milliGRAM(s) Oral every 6 hours PRN Temp greater or equal to 38C (100.4F), Mild Pain (1 - 3), Moderate Pain (4 - 6)  dextrose Oral Gel 15 Gram(s) Oral once PRN Blood Glucose LESS THAN 70 milliGRAM(s)/deciliter      PHYSICAL EXAM  Vital Signs Last 24 Hrs  T(C): 36.4 (30 Mar 2022 14:00), Max: 36.8 (30 Mar 2022 06:22)  T(F): 97.6 (30 Mar 2022 14:00), Max: 98.2 (30 Mar 2022 06:22)  HR: 78 (30 Mar 2022 18:05) (63 - 78)  BP: 155/74 (30 Mar 2022 18:05) (118/59 - 192/88)  BP(mean): 106 (30 Mar 2022 18:05) (106 - 126)  RR: 18 (30 Mar 2022 18:05) (12 - 19)  SpO2: 99% (30 Mar 2022 18:05) (94% - 100%)    Physical Exam:  Gen: Elderly female in NAD, pleasantly confused  Neuro: AAOx2 (herself and hospital). Grossly no focal deficits, but reduced strength in bilateral lower extremities which seems to be her baseline  Heart: RRR, no rmg  Lungs: CTAB, no signs of increased WOB   Ext: Good distal pulses. Light touch sensation grossly intact    LABS:                        12.0   4.33  )-----------( 245      ( 30 Mar 2022 12:45 )             37.7     03-30    134<L>  |  98  |  12  ----------------------------<  153<H>  see note   |  25  |  0.51    Ca    8.3<L>      30 Mar 2022 12:45  Phos  2.7     03-30  Mg     1.8     03-30    TPro  6.5  /  Alb  3.2<L>  /  TBili  0.2  /  DBili  x   /  AST  see note  /  ALT  see note  /  AlkPhos  see note  03-30    PT/INR - ( 30 Mar 2022 12:45 )   PT: 10.7 sec;   INR: 0.90          PTT - ( 30 Mar 2022 12:45 )  PTT:35.9 sec        HbA1C:   CAPILLARY BLOOD GLUCOSE  152 (30 Mar 2022 13:34)      POCT Blood Glucose.: 127 mg/dL (30 Mar 2022 17:06)  POCT Blood Glucose.: 152 mg/dL (30 Mar 2022 12:18)  POCT Blood Glucose.: 165 mg/dL (30 Mar 2022 12:04)  POCT Blood Glucose.: 259 mg/dL (30 Mar 2022 08:31)  POCT Blood Glucose.: 350 mg/dL (29 Mar 2022 21:48)

## 2022-03-30 NOTE — PROGRESS NOTE ADULT - ASSESSMENT
Pt is a 72F w/ PMHx of HTN, DM c/b peripheral neuropathy and mostly bedbound for several months, BIBEMS w/ 1 episode of bloody vomit with clots following a day of nausea/vomiting, admitted for hematemesis evaluation, found to have most likely MW tear. Stroke code call on 3/30 and found to have R facial droop w/ slurred speech.  Stepped up to tele for further workup/observation.

## 2022-03-30 NOTE — PROGRESS NOTE ADULT - PROBLEM SELECTOR PLAN 1
Uncontrolled - A1C is 11.6  - history is somewhat limited 2/2 her being an extremely poor historian i/s/o her ?dementia  - home regimen: Metformin 1g BID, Victoza 1.2, and 18 units of Lantus daily.     Plan:  Increase Lantus to 18 units tonight  Increase nutritional Lispro to 11 units before meals.  Continue lispo JANICE before meals and at bedtime.  Dispo: [facility]    Endocrine will continue to follow

## 2022-03-30 NOTE — CHART NOTE - NSCHARTNOTEFT_GEN_A_CORE
***Rapid Response Clinical Impact Advanced Care Provider Note***    Patient is a 72y w/ PMHx of HTN, DM c/b peripheral neuropathy p/w one episode of bloody emesis w/clots, HDS, likely iso Naty Sanchez tear, admitted to [facility] for further management.    Rapid response team called because patient found unresponsive in chair while eating lunch. Upon arrival, patient sitting slumped over in chair, unresponsive to sternal rub, with food bolus in mouth. Transferred patient to bed and placed on monitor, all VSS, POC glucose 150s, and began suctioning patient. Patient remained unresponsive without a gag reflex, so began bagging patient with BVM while preparing for intubation. At this time, patient became more responsive, was gradually able to state name and follows commands. Sitting up in bed, patient able to successfully remove remainder of food bolus, HR 70s, BP 160s/80s, O2 100% on RA, RR 15, protecting airway. Upon continuation of exam, patient noted to be more somnolent than baseline with a new right sided facial droop, so stroke code called. Additional PIV placed and patient transported to CT w/stroke team at bedside than transferred to [facility] for further management.    Patient was seen and examined at the bedside by the rapid response team.    Review of Systems: Unable to obtain d/t patient obtunded.    Allergies    No Known Allergies    Intolerances        PAST MEDICAL & SURGICAL HISTORY:  HTN (hypertension)    DM (diabetes mellitus)    No significant past surgical history        Vital Signs Last 24 Hrs  T(C): 36.8 (30 Mar 2022 06:22), Max: 37.1 (29 Mar 2022 14:18)  T(F): 98.2 (30 Mar 2022 06:22), Max: 98.7 (29 Mar 2022 14:18)  HR: 76 (30 Mar 2022 06:22) (72 - 76)  BP: 168/80 (30 Mar 2022 06:22) (144/85 - 168/80)  BP(mean): 105 (29 Mar 2022 14:18) (105 - 105)  RR: 19 (30 Mar 2022 06:22) (18 - 19)  SpO2: 100% (30 Mar 2022 06:22) (98% - 100%)          GENERAL: The patient appears frail & somnolent in bed w/right sided facial droop noted at rest.  HEENT: Head is normocephalic and atraumatic. Extraocular muscles are intact. Mucous membranes are moist. No throat erythema/exudates no lymphadenopathy, no JVD,   NECK: Supple.  LUNGS: Clear to auscultation BL without wheezing, rales or rhonchi; respirations unlabored  HEART: Regular rate and rhythm ,+S1/+S2, no murmurs, rubs, gallops  ABDOMEN: Soft, nontender, and nondistended, no rebound, guarding rigidity, bowel sounds in all 4 quadrants  EXTREMITIES: Without any cyanosis, clubbing, rash, lesions or edema.  SKIN: No new rashes or lesions.  MSK: BUE w/purposeful ROM, strength weak bilaterally; BLE weakness  VASCULAR: Radial and Dorsal pedal pulses palpable BL  NEUROLOGIC: AAOx1, somnolent, w/new right sided facial droop, otherwise intact, pupils 3mm AMPARO intact  PSYCH: Increased somnolence, otherwise, no new changes.                          12.0   4.33  )-----------( 245      ( 30 Mar 2022 12:45 )             37.7     03-30    134<L>  |  98  |  12  ----------------------------<  153<H>  see note   |  25  |  0.51    Ca    8.3<L>      30 Mar 2022 12:45  Phos  2.7     03-30  Mg     1.8     03-30    TPro  6.5  /  Alb  3.2<L>  /  TBili  0.2  /  DBili  x   /  AST  see note  /  ALT  see note  /  AlkPhos  see note  03-30         LIVER FUNCTIONS - ( 30 Mar 2022 12:45 )  Alb: 3.2 g/dL / Pro: 6.5 g/dL / ALK PHOS: see note U/L / ALT: see note U/L / AST: see note U/L / GGT: x              PT/INR - ( 30 Mar 2022 12:45 )   PT: 10.7 sec;   INR: 0.90          PTT - ( 30 Mar 2022 12:45 )  PTT:35.9 sec    MEDICATIONS  (STANDING):  dextrose 5%. 1000 milliLiter(s) (100 mL/Hr) IV Continuous <Continuous>  gabapentin 300 milliGRAM(s) Oral every 8 hours  glucagon  Injectable 1 milliGRAM(s) IntraMuscular once  insulin glargine Injectable (LANTUS) 13 Unit(s) SubCutaneous at bedtime  insulin lispro (ADMELOG) corrective regimen sliding scale   SubCutaneous Before meals and at bedtime  insulin lispro Injectable (ADMELOG) 9 Unit(s) SubCutaneous three times a day before meals  lisinopril 40 milliGRAM(s) Oral daily  pantoprazole    Tablet 40 milliGRAM(s) Oral every 12 hours  potassium chloride   Powder 40 milliEquivalent(s) Oral once    MEDICATIONS  (PRN):  acetaminophen     Tablet .. 650 milliGRAM(s) Oral every 6 hours PRN Temp greater or equal to 38C (100.4F), Mild Pain (1 - 3), Moderate Pain (4 - 6)  dextrose Oral Gel 15 Gram(s) Oral once PRN Blood Glucose LESS THAN 70 milliGRAM(s)/deciliter      Assessment & Plan- Rapid Response called for 72y year old Female w/PMHx of HTN, DM c/b peripheral neuropathy admitted to [facility] for Naty Sanchez tear (HDS), rapid response called for AMS while eating lunch likely iso stroke vs. aspiration/choking vs seizure, transferred to [facility] for further management.  -f/u head CT & neuro recs  -stat CBC, CMP, lactate, coags, troponin, type & screens sent  -stat CXR to assess for ?aspiration  -NPO iso of ?aspiration event, will need speech & swallow consult  -if lactate elevated, and c/f seizure, obtain vEEG  -transfer to monitored setting ([facility])  -frequent neuro checks  -avoid sedatives or meds that may decrease LOC    I have personally and independently provided 31 minutes of critical care services.  This excludes any time spent on separate procedures or teaching. ***Rapid Response Clinical Impact Advanced Care Provider Note***    Patient is a 72y w/ PMHx of HTN, DM c/b peripheral neuropathy p/w one episode of bloody emesis w/clots, HDS, likely iso Naty Sanchez tear, admitted to [facility] for further management.    Rapid response team called because patient found unresponsive in chair while eating lunch. Upon arrival, patient sitting slumped over in chair, unresponsive to sternal rub, with food bolus in mouth. Transferred patient to bed and placed on monitor, all VSS, POC glucose 150s, and began suctioning patient. Patient remained unresponsive without a gag reflex, so began bagging patient with BVM while preparing for intubation. At this time, patient became more responsive, was gradually able to state name and follows commands. Sitting up in bed, patient able to successfully remove remainder of food bolus, HR 70s, BP 160s/80s, O2 100% on RA, RR 15, protecting airway. Upon continuation of exam, patient noted to be more somnolent than baseline with a new right sided facial droop, so stroke code called. Additional PIV placed and patient transported to CT w/stroke team at bedside than transferred to [facility] for further management.    Patient was seen and examined at the bedside by the rapid response team.    Review of Systems: Unable to obtain d/t patient obtunded.    Allergies    No Known Allergies    Intolerances        PAST MEDICAL & SURGICAL HISTORY:  HTN (hypertension)    DM (diabetes mellitus)    No significant past surgical history        Vital Signs Last 24 Hrs  T(C): 36.8 (30 Mar 2022 06:22), Max: 37.1 (29 Mar 2022 14:18)  T(F): 98.2 (30 Mar 2022 06:22), Max: 98.7 (29 Mar 2022 14:18)  HR: 76 (30 Mar 2022 06:22) (72 - 76)  BP: 168/80 (30 Mar 2022 06:22) (144/85 - 168/80)  BP(mean): 105 (29 Mar 2022 14:18) (105 - 105)  RR: 19 (30 Mar 2022 06:22) (18 - 19)  SpO2: 100% (30 Mar 2022 06:22) (98% - 100%)          GENERAL: The patient appears frail & somnolent in bed w/right sided facial droop noted at rest.  HEENT: Head is normocephalic and atraumatic. Extraocular muscles are intact. Mucous membranes are moist. No throat erythema/exudates no lymphadenopathy, no JVD,   NECK: Supple.  LUNGS: Clear to auscultation BL without wheezing, rales or rhonchi; respirations unlabored  HEART: Regular rate and rhythm ,+S1/+S2, no murmurs, rubs, gallops  ABDOMEN: Soft, nontender, and nondistended, no rebound, guarding rigidity, bowel sounds in all 4 quadrants  EXTREMITIES: Without any cyanosis, clubbing, rash, lesions or edema.  SKIN: No new rashes or lesions.  MSK: BUE w/purposeful ROM, strength weak bilaterally; BLE weakness  VASCULAR: Radial and Dorsal pedal pulses palpable BL  NEUROLOGIC: AAOx1, somnolent, w/new right sided facial droop, otherwise intact, pupils 3mm AMPARO intact  PSYCH: Increased somnolence, otherwise, no new changes.                          12.0   4.33  )-----------( 245      ( 30 Mar 2022 12:45 )             37.7     03-30    134<L>  |  98  |  12  ----------------------------<  153<H>  see note   |  25  |  0.51    Ca    8.3<L>      30 Mar 2022 12:45  Phos  2.7     03-30  Mg     1.8     03-30    TPro  6.5  /  Alb  3.2<L>  /  TBili  0.2  /  DBili  x   /  AST  see note  /  ALT  see note  /  AlkPhos  see note  03-30         LIVER FUNCTIONS - ( 30 Mar 2022 12:45 )  Alb: 3.2 g/dL / Pro: 6.5 g/dL / ALK PHOS: see note U/L / ALT: see note U/L / AST: see note U/L / GGT: x              PT/INR - ( 30 Mar 2022 12:45 )   PT: 10.7 sec;   INR: 0.90          PTT - ( 30 Mar 2022 12:45 )  PTT:35.9 sec    MEDICATIONS  (STANDING):  dextrose 5%. 1000 milliLiter(s) (100 mL/Hr) IV Continuous <Continuous>  gabapentin 300 milliGRAM(s) Oral every 8 hours  glucagon  Injectable 1 milliGRAM(s) IntraMuscular once  insulin glargine Injectable (LANTUS) 13 Unit(s) SubCutaneous at bedtime  insulin lispro (ADMELOG) corrective regimen sliding scale   SubCutaneous Before meals and at bedtime  insulin lispro Injectable (ADMELOG) 9 Unit(s) SubCutaneous three times a day before meals  lisinopril 40 milliGRAM(s) Oral daily  pantoprazole    Tablet 40 milliGRAM(s) Oral every 12 hours  potassium chloride   Powder 40 milliEquivalent(s) Oral once    MEDICATIONS  (PRN):  acetaminophen     Tablet .. 650 milliGRAM(s) Oral every 6 hours PRN Temp greater or equal to 38C (100.4F), Mild Pain (1 - 3), Moderate Pain (4 - 6)  dextrose Oral Gel 15 Gram(s) Oral once PRN Blood Glucose LESS THAN 70 milliGRAM(s)/deciliter      Assessment & Plan- Rapid Response called for 72y year old Female w/PMHx of HTN, DM c/b peripheral neuropathy admitted to [facility] for Naty Sanchez tear (HDS), rapid response called for AMS while eating lunch likely iso aspiration/choking event vs stroke vs seizure, transferred to [facility] for further management.  -f/u head CT & neuro recs  -stat CBC, CMP, lactate, coags, troponin, type & screens sent  -stat CXR to assess for ?aspiration  -NPO iso of ?aspiration event, will need speech & swallow consult  -if lactate elevated, and c/f seizure, obtain vEEG  -transfer to monitored setting ([facility])  -frequent neuro checks  -avoid sedatives or meds that may decrease LOC    I have personally and independently provided 31 minutes of critical care services.  This excludes any time spent on separate procedures or teaching.

## 2022-03-30 NOTE — PROGRESS NOTE ADULT - PROBLEM SELECTOR PLAN 7
On rosuvastatin 20 qd.    Plan:   - C/w home rosuvastatin Per daughter, pt is on metformin 1000 BID, Lantus 18u, and Victoza, but only metformin was confirmed by pharmacy. A1C 11.6     Plan:   - Consulted endo, appreciate recs  - basal bolus with ISS

## 2022-03-30 NOTE — DIETITIAN INITIAL EVALUATION ADULT. - PROBLEM SELECTOR PLAN 1
One episode, none in ED or hospital thus far. Preceded by several likely non-bloody vomiting episodes. No pain. Hgb stable, not hypotensive. Most likely Naty Sanchez tear, labs not consistent w/ chronic bleed given normal Hgb.  - CLD advance, as tolerated  - F/u GI recs  - IV Pantoprazole 40 bid  - Can hold aspirin, unclear indication  - On discharge, pantoprazole 40 BID for 2 weeks followed by QD for 6 weeks, follow up with GI

## 2022-03-30 NOTE — DIETITIAN INITIAL EVALUATION ADULT. - PROBLEM SELECTOR PROBLEM 9
[address] [address]  Phone: [phone]  Fax: [phone]    March 3, 2021        Joel Pike  [id number]  [facility] [address]          To whom it may concern:    RE: Joel Pike    Patient was seen and treated today at our clinic. Patient may return to work starting Monday March 15th the following restrictions:  -Weeks of March 15th and March 22nd; can work a maximum of 6 hours a day and 1 day a week  -Weeks of March 29th and April 5th: can work a maximum of 6 hours a day and 2 days a week  -Starting week of April 12th: can return to 8 hours a day and 3 days a week.         Please contact me for questions or concerns.      Sincerely,        SYLVIA Stevens, CNP  Orthopedic Surgery       Right bundle branch block

## 2022-03-30 NOTE — PROGRESS NOTE ADULT - ASSESSMENT
per Internal Medicine     72 y o F w/ PMHx of HTN, DM c/b peripheral neuropathy and mostly bedbound for several months, BIBEMS w/ 1 episode of bloody vomit with clots following a day of nausea/vomiting, admitted for hematemesis evaluation, found to have most likely MW tear.    Problem/Plan - 1:  ·  Problem: Hematemesis.   ·  Plan: One episode, none in ED or hospital thus far. Preceded by several likely non-bloody vomiting episodes. No pain. Hgb stable, not hypotensive. Most likely Naty Sanchez tear, labs not consistent w/ chronic bleed given normal Hgb.    Plan:   - Advanced diet to CC w/ low fat   - F/u GI recs  - IV Pantoprazole 40 bid  - Can hold aspirin, unclear indication  - On discharge, pantoprazole 40 BID for 2 weeks followed by QD for 6 weeks, follow up with GI.  - Pending outpatient v. inpatient gastroparesis study.    Problem/Plan - 2:  ·  Problem: Nausea and vomiting.   ·  Plan: RESOLVED. Likely due to gastroparesis 2/2 to poor DM control.    Problem/Plan - 3:  ·  Problem: Leg weakness.   ·  Plan: Likely 2/2 diabetic neuropathy, aggravated by deconditioning from long-term bedrest.     Plan:   - F/u PT consult and neurology, appreciate recs   - Management of diabetic neuropathy as below.    Problem/Plan - 4:  ·  Problem: HTN (hypertension).   ·  Plan: Pt presenting w/ HTNsive urgency w/  systolic. Asymptomatic. Appears euvolemic. Per daughter, pt on lisinopril and amlodipine, but only the former was confirmed by pharmacy.    Plan:  - C/w home lisinopril 40 qd.    Problem/Plan - 5:  ·  Problem: DM (diabetes mellitus).   ·  Plan: Per daughter, pt is on metformin 1000 BID, Lantus 18u, and Victoza, but only metformin was confirmed by pharmacy. A1C 11.6     Plan:   - Consulted endo, appreciate recs: 6 premeal and 10 lantus   - mISS, FSG QID.    Problem/Plan - 6:  ·  Problem: Hyperlipidemia.   ·  Plan: On rosuvastatin 20 qd.    Plan:   - C/w home rosuvastatin.    Problem/Plan - 7:  ·  Problem: Diabetic neuropathy.   ·  Plan: Per daughter, pt was previously on gabapentin 600 TID which was decreased to 300 TID two days prior due to concern for contribution to weakness. Lumbar MRI - negative     Plan:   - C/w gabapentin 300 TID.    Problem/Plan - 8:  ·  Problem: Right bundle branch block.   ·  Plan: Noted by ED physician. EKG not in pt's chart on admission.  - EKG at [facility] showing incomplete RBBB.    Problem/Plan - 9:  ·  Problem: Nutrition, metabolism, and development symptoms.   ·  Plan: F: S/p 2.25L   E: replete K<4, Mg<2  N: CC  VTE Prophylaxis: SCDs given UGIB  GI: Pantoprazole 40 PO BID  C: Full Code (began discussion w/ daughter who is next of kin and primary caretaker)

## 2022-03-30 NOTE — PROGRESS NOTE ADULT - PROBLEM SELECTOR PLAN 8
Per daughter, pt was previously on gabapentin 600 TID which was decreased to 300 TID two days prior due to concern for contribution to weakness. Lumbar MRI - negative     Plan:   - C/w gabapentin 300 TID On rosuvastatin 20 qd.    Plan:   - C/w home rosuvastatin On rosuvastatin 20 qd.    Plan:   - start 80 atorvastatin after stroke code

## 2022-03-30 NOTE — DIETITIAN INITIAL EVALUATION ADULT. - PROBLEM SELECTOR PLAN 3
CT showing possible partial volvulus vs internal hernia, indeterminate if this is cause for pain. Per attending read, no internal hernia. Pt having BMs, no obstruction on imaging.  - F/u CT scan addendum to confirm no partial volvulus  - If signs of bowel obstruction e.g. continued vomiting, lack of BMs, consider consult surgery

## 2022-03-30 NOTE — PROGRESS NOTE ADULT - PROBLEM SELECTOR PLAN 3
RESOLVED. Likely due to gastroparesis 2/2 to poor DM control. Gastroparesis 2/2 to poor DM control.  -reglan 5mg IV q8h standing

## 2022-03-30 NOTE — PROGRESS NOTE ADULT - ATTENDING COMMENTS
Events noted. Pt found altered and stroke code called. Food stuff in her mouth noted and unclear if aspirated. Agree with transfer to [facility] for monitoring and further Neuro eval. Upon arrival pt more a wake and alert. EEG now in place. No evidence of pneumonia or sepsis.

## 2022-03-30 NOTE — PROGRESS NOTE ADULT - SUBJECTIVE AND OBJECTIVE BOX
***Transfer Note [facility] to Tele***  Hospital Course  This pt is a 71 yo F w/ PMHx of HTN, DM c/b peripheral neuropathy BIBEMS w/ 1 episode of bloody vomit with clots. Per daughter, pt was feeling more lethargic than usual the day prior to admission (3/26). She was admitted for concern of gwen-ipmentel tear after having 1 episode of bloody emesis w/ blood clots. Per daughter, she had multiple episodes of vomiting prior to admission. Pt reported last colonoscopy > 10 years prior, no endoscopy in past; GI was consulted and recommended that she be started on PPI IV 40 mg BID for 2 weeks and then upon discharge switch to Qd. Per collateral from the daughter, the patient has had progressive weakness over the past 1 year wherein she has had less walking. She attributed this weakness to her diabetic neuropathy as the patient c/o of chronic burning pain; it has gotten worse over the past 6 months where the patient has been bedbound and unable to ambulate without a walker. On 3/28 MRI of lumbar spine showed no acute processes to explain the leg weakness. A foot drop was elicited upon exam on 3/29, but pt didn't report any other focal deficits. On 3/30, a stroke code was called as the patient was found unresponsive while eating her lunch for about 2 minutes. On neuro exam, the patient developed a R facial droop and slurred speech which was not at her baseline; baseline neuro exam: b/l 1/5 weakness in ankle, 3/5 weakness in knee; 5/5 weakness in b/l UE; no pronator drift; CN 2-12 intact; decreased sensation in the b/l shins L > R from shins down.       MORGAN RAVI, 72y, Female  MRN-[id number]  Patient is a 72y old  Female who presents with a chief complaint of Hematemesis (30 Mar 2022 14:35)      OVERNIGHT EVENTS: naeo     SUBJECTIVE: Patient seen and examined at bedside. Reports feeling hungry. Denies fevers, chills, HA, chest pain, sob, nausea, vomiting, abdominal pain, diarrhea, constipation, dysuria.     12 Point ROS Negative unless noted otherwise above.  -------------------------------------------------------------------------------  VITAL SIGNS:  Vital Signs Last 24 Hrs  T(C): 36.4 (30 Mar 2022 14:00), Max: 36.8 (30 Mar 2022 06:22)  T(F): 97.6 (30 Mar 2022 14:00), Max: 98.2 (30 Mar 2022 06:22)  HR: 70 (30 Mar 2022 13:13) (63 - 76)  BP: 158/86 (30 Mar 2022 13:13) (118/59 - 168/80)  BP(mean): 117 (30 Mar 2022 13:13) (117 - 117)  RR: 18 (30 Mar 2022 13:13) (12 - 19)  SpO2: 94% (30 Mar 2022 13:13) (94% - 100%)  I&O's Summary      PHYSICAL EXAM:    General: NAD, well developed  HEENT: NC/AT; EOMI, PERRLA, anicteric sclera; moist mucosal membranes.  Neck: supple, trachea midline  Cardiovascular: RRR, +S1/S2; NO M/R/G  Respiratory: CTA B/L; no W/R/R  Gastrointestinal: soft, NT/ND; +BSx4  Extremities: WWP; no edema or cyanosis  Vascular: 2+ radial, DP/PT pulses B/L  Neurological: AAOx3; no focal deficits    ALLERGIES:  Allergies    No Known Allergies    Intolerances        MEDICATIONS:  MEDICATIONS  (STANDING):  dextrose 5%. 1000 milliLiter(s) (100 mL/Hr) IV Continuous <Continuous>  gabapentin 300 milliGRAM(s) Oral every 8 hours  glucagon  Injectable 1 milliGRAM(s) IntraMuscular once  insulin glargine Injectable (LANTUS) 13 Unit(s) SubCutaneous at bedtime  insulin lispro (ADMELOG) corrective regimen sliding scale   SubCutaneous Before meals and at bedtime  insulin lispro Injectable (ADMELOG) 9 Unit(s) SubCutaneous three times a day before meals  lisinopril 40 milliGRAM(s) Oral daily  metoclopramide Injectable 5 milliGRAM(s) IV Push every 8 hours  pantoprazole  Injectable 40 milliGRAM(s) IV Push every 12 hours  potassium chloride   Powder 40 milliEquivalent(s) Oral once    MEDICATIONS  (PRN):  acetaminophen     Tablet .. 650 milliGRAM(s) Oral every 6 hours PRN Temp greater or equal to 38C (100.4F), Mild Pain (1 - 3), Moderate Pain (4 - 6)  dextrose Oral Gel 15 Gram(s) Oral once PRN Blood Glucose LESS THAN 70 milliGRAM(s)/deciliter      -------------------------------------------------------------------------------  LABS:                        12.0   4.33  )-----------( 245      ( 30 Mar 2022 12:45 )             37.7     03-30    134<L>  |  98  |  12  ----------------------------<  153<H>  see note   |  25  |  0.51    Ca    8.3<L>      30 Mar 2022 12:45  Phos  2.7     03-30  Mg     1.8     03-30    TPro  6.5  /  Alb  3.2<L>  /  TBili  0.2  /  DBili  x   /  AST  see note  /  ALT  see note  /  AlkPhos  see note  03-30    LIVER FUNCTIONS - ( 30 Mar 2022 12:45 )  Alb: 3.2 g/dL / Pro: 6.5 g/dL / ALK PHOS: see note U/L / ALT: see note U/L / AST: see note U/L / GGT: x           PT/INR - ( 30 Mar 2022 12:45 )   PT: 10.7 sec;   INR: 0.90          PTT - ( 30 Mar 2022 12:45 )  PTT:35.9 sec    CAPILLARY BLOOD GLUCOSE  152 (30 Mar 2022 13:34)      POCT Blood Glucose.: 152 mg/dL (30 Mar 2022 12:18)      COVID-19 PCR: NotDetec (26 Mar 2022 00:22)      RADIOLOGY & ADDITIONAL TESTS: Reviewed.   ***Transfer Note [facility] to Tele***  Hospital Course  This pt is a 73 yo F w/ PMHx of HTN, DM c/b peripheral neuropathy BIBEMS w/ 1 episode of bloody vomit with clots. Per daughter, pt was feeling more lethargic than usual the day prior to admission (3/26). She was admitted for concern of gewn-pimentel tear after having 1 episode of bloody emesis w/ blood clots. Per daughter, she had multiple episodes of vomiting prior to admission. Pt reported last colonoscopy > 10 years prior, no endoscopy in past; GI was consulted and recommended that she be started on PPI IV 40 mg BID for 2 weeks and then upon discharge switch to Qd. Per collateral from the daughter, the patient has had progressive weakness over the past 1 year wherein she has had less walking. She attributed this weakness to her diabetic neuropathy as the patient c/o of chronic burning pain; it has gotten worse over the past 6 months where the patient has been bedbound and unable to ambulate without a walker. On 3/28 MRI of lumbar spine showed no acute processes to explain the leg weakness. A foot drop was elicited upon exam on 3/29, but pt didn't report any other focal deficits. On 3/30, a stroke code was called as the patient was found unresponsive while eating her lunch for about 2 minutes. On neuro exam, the patient developed a R facial droop and slurred speech which was not at her baseline; baseline neuro exam: b/l 1/5 weakness in ankle, 3/5 weakness in knee; 5/5 weakness in b/l UE; no pronator drift; CN 2-12 intact; decreased sensation in the b/l shins L > R from shins down.       MORGAN RAVI, 72y, Female  MRN-[id number]  Patient is a 72y old  Female who presents with a chief complaint of Hematemesis (30 Mar 2022 14:35)      OVERNIGHT EVENTS: naeo     SUBJECTIVE: Patient seen and examined at bedside. Reports feeling hungry. Denies fevers, chills, HA, chest pain, sob, nausea, vomiting, abdominal pain, diarrhea, constipation, dysuria.     12 Point ROS Negative unless noted otherwise above.  -------------------------------------------------------------------------------  VITAL SIGNS:  Vital Signs Last 24 Hrs  T(C): 36.4 (30 Mar 2022 14:00), Max: 36.8 (30 Mar 2022 06:22)  T(F): 97.6 (30 Mar 2022 14:00), Max: 98.2 (30 Mar 2022 06:22)  HR: 70 (30 Mar 2022 13:13) (63 - 76)  BP: 158/86 (30 Mar 2022 13:13) (118/59 - 168/80)  BP(mean): 117 (30 Mar 2022 13:13) (117 - 117)  RR: 18 (30 Mar 2022 13:13) (12 - 19)  SpO2: 94% (30 Mar 2022 13:13) (94% - 100%)  I&O's Summary      PHYSICAL EXAM:    General: frail elderly woman in NAD  HEENT: NC/AT; EOMI, PERRLA, anicteric sclera; dry mucosal membranes.  Neck: supple, trachea midline  Cardiovascular: RRR, +S1/S2; NO M/R/G  Respiratory: CTA B/L; no W/R/R  Gastrointestinal: soft, NT/ND; +BSx4  Extremities: WWP; no edema or cyanosis  Vascular: 2+ radial, DP/PT pulses B/L  Neurological: AAOx3; CN intact; decreased strength in LE 1/5    ALLERGIES:  Allergies    No Known Allergies    Intolerances        MEDICATIONS:  MEDICATIONS  (STANDING):  dextrose 5%. 1000 milliLiter(s) (100 mL/Hr) IV Continuous <Continuous>  gabapentin 300 milliGRAM(s) Oral every 8 hours  glucagon  Injectable 1 milliGRAM(s) IntraMuscular once  insulin glargine Injectable (LANTUS) 13 Unit(s) SubCutaneous at bedtime  insulin lispro (ADMELOG) corrective regimen sliding scale   SubCutaneous Before meals and at bedtime  insulin lispro Injectable (ADMELOG) 9 Unit(s) SubCutaneous three times a day before meals  lisinopril 40 milliGRAM(s) Oral daily  metoclopramide Injectable 5 milliGRAM(s) IV Push every 8 hours  pantoprazole  Injectable 40 milliGRAM(s) IV Push every 12 hours  potassium chloride   Powder 40 milliEquivalent(s) Oral once    MEDICATIONS  (PRN):  acetaminophen     Tablet .. 650 milliGRAM(s) Oral every 6 hours PRN Temp greater or equal to 38C (100.4F), Mild Pain (1 - 3), Moderate Pain (4 - 6)  dextrose Oral Gel 15 Gram(s) Oral once PRN Blood Glucose LESS THAN 70 milliGRAM(s)/deciliter      -------------------------------------------------------------------------------  LABS:                        12.0   4.33  )-----------( 245      ( 30 Mar 2022 12:45 )             37.7     03-30    134<L>  |  98  |  12  ----------------------------<  153<H>  see note   |  25  |  0.51    Ca    8.3<L>      30 Mar 2022 12:45  Phos  2.7     03-30  Mg     1.8     03-30    TPro  6.5  /  Alb  3.2<L>  /  TBili  0.2  /  DBili  x   /  AST  see note  /  ALT  see note  /  AlkPhos  see note  03-30    LIVER FUNCTIONS - ( 30 Mar 2022 12:45 )  Alb: 3.2 g/dL / Pro: 6.5 g/dL / ALK PHOS: see note U/L / ALT: see note U/L / AST: see note U/L / GGT: x           PT/INR - ( 30 Mar 2022 12:45 )   PT: 10.7 sec;   INR: 0.90          PTT - ( 30 Mar 2022 12:45 )  PTT:35.9 sec    CAPILLARY BLOOD GLUCOSE  152 (30 Mar 2022 13:34)      POCT Blood Glucose.: 152 mg/dL (30 Mar 2022 12:18)      COVID-19 PCR: NotDetec (26 Mar 2022 00:22)      RADIOLOGY & ADDITIONAL TESTS: Reviewed.

## 2022-03-30 NOTE — CONSULT NOTE ADULT - ASSESSMENT
ASSESSMENT AND PLAN:   This pt is a 73 yo F w/ PMHx of HTN, DM c/b peripheral neuropathy BIBEMS w/ 1 episode of bloody vomit with clots. Per daughter, pt was feeling more lethargic than usual the day prior to admission (3/26). Poor Historian and as per chart review most of the history obtained from pt's daughter. pt admitted for hematemesis to R/O Naty pimentel tear 2/2 DM gastroparesis with GI consult recommending PPI. Per pt's daughter, pt has been having progressive weakness X 1year ? DM neuropathy B/L LE as pt has had less walking and also C/O chronic burning pain and has been bed bound X 6 months, has been using walker to ambulate. MRi was obtained on 3/28 by primary team for B/L LE weakness and Gen Neuro consulted. On 03/30, pt was found unresponsive while eating lunch sitting in the chair, slumped over, unresponsive to noxious stimuli with food bolus in her mouth. Initially RRT was called, pt was transferred to bed and as the team was preparing to intubate the pt, pt was found to be more responsive, more alert, but noted to have a new R sided facial droop and slurred speech for which stroke code was called. Stroke code CTH negative for any acute intracranial pathology, CTA with no LVO/high grade stenosis and CTP with mismatch volume of 121ml, but exam degraded by motion as well as incomplete transit of contrast bolus. Case D/W Neuro stroke attending Dr. Cross and agreed that pt not a candidate for tPA 2/2 rapidly improving symptoms and not an EVT candidate 2/2 absence of LVO. Pt noted to have rapid improvement in mental status(back to baseline), facial droop and slurred speech with improvement of NIHSS from 25 to 8 and also with L gaze preference with L sided weakness which rapidly improved, pt woke up confused in CT scanner with eventual return to baseline upon returning to floor is likely not vascular(stroke) in etiology and may possibly be a seizure.    Plan :  - Would recommend ASA 81mg po daily  - Would recommend vEEG to R/O Seizure   - Obtain MRI Brain  - Poorly controlled DM with A1C of 11.6  - Diabetic education  - Obtain stroke labs - TSH, Lipid panel.  - SCDs for DVT PPX.  - recommend q4hr stroke neuro checks        Discussed with Neurology Attending Dr. Cross. ASSESSMENT AND PLAN:   This pt is a 71 yo F w/ PMHx of HTN, DM c/b peripheral neuropathy BIBEMS w/ 1 episode of bloody vomit with clots. Per daughter, pt was feeling more lethargic than usual the day prior to admission (3/26). Poor Historian and as per chart review most of the history obtained from pt's daughter. pt admitted for hematemesis to R/O Naty pimentel tear 2/2 DM gastroparesis with GI consult recommending PPI. Per pt's daughter, pt has been having progressive weakness X 1year ? DM neuropathy B/L LE as pt has had less walking and also C/O chronic burning pain and has been bed bound X 6 months, has been using walker to ambulate. MRi was obtained on 3/28 by primary team for B/L LE weakness and Gen Neuro consulted. On 03/30, pt was found unresponsive while eating lunch sitting in the chair, slumped over, unresponsive to noxious stimuli with food bolus in her mouth. Initially RRT was called, pt was transferred to bed and as the team was preparing to intubate the pt, pt was found to be more responsive, more alert, but noted to have a new R sided facial droop and slurred speech for which stroke code was called. Stroke code CTH negative for any acute intracranial pathology, CTA with no LVO/high grade stenosis and CTP with mismatch volume of 121ml, but exam degraded by motion as well as incomplete transit of contrast bolus. Case D/W Neuro stroke attending Dr. Cross and agreed that pt not a candidate for tPA 2/2 rapidly improving symptoms and not an EVT candidate 2/2 absence of LVO. Pt noted to have rapid improvement in mental status(back to baseline), facial droop and slurred speech with improvement of NIHSS from 25 to 8 and also with L gaze preference with L sided weakness which rapidly improved, pt woke up confused in CT scanner with eventual return to baseline upon returning to floor is likely not vascular(stroke) in etiology and may possibly be a seizure.    Plan :  - Would recommend ASA 81mg po daily and Atorvastatin  - Would recommend vEEG to R/O Seizure   - Obtain MRI Brain  - Obtain TTE  - Poorly controlled DM with A1C of 11.6  - Diabetic education  - Obtain stroke labs - TSH, Lipid panel.  - SCDs for DVT PPX.  - recommend q4hr  neuro checks        Discussed with Neurology Attending Dr. Cross. ASSESSMENT AND PLAN:   This pt is a 71 yo F w/ PMHx of HTN, DM c/b peripheral neuropathy BIBEMS w/ 1 episode of bloody vomit with clots. Per daughter, pt was feeling more lethargic than usual the day prior to admission (3/26). Poor Historian and as per chart review most of the history obtained from pt's daughter. pt admitted for hematemesis to R/O Naty pimentel tear 2/2 DM gastroparesis with GI consult recommending PPI. Per pt's daughter, pt has been having progressive weakness X 1year ? DM neuropathy B/L LE as pt has had less walking and also C/O chronic burning pain and has been bed bound X 6 months, has been using walker to ambulate. MRi was obtained on 3/28 by primary team for B/L LE weakness and Gen Neuro consulted. On 03/30, pt was found unresponsive while eating lunch sitting in the chair, slumped over, unresponsive to noxious stimuli with food bolus in her mouth. Initially RRT was called, pt was transferred to bed and as the team was preparing to intubate the pt, pt was found to be more responsive, more alert, but noted to have a new R sided facial droop and slurred speech for which stroke code was called. Stroke code CTH negative for any acute intracranial pathology, CTA with no LVO/high grade stenosis and CTP with mismatch volume of 121ml, but exam degraded by motion as well as incomplete transit of contrast bolus. Case D/W Neuro stroke attending Dr. Cross and agreed that pt not a candidate for tPA 2/2 rapidly improving symptoms and not an EVT candidate 2/2 absence of LVO. Pt noted to have rapid improvement in mental status(back to baseline), facial droop and slurred speech with improvement of NIHSS from 25 to 8 and also with L gaze preference with L sided weakness which rapidly improved, pt woke up confused in CT scanner with eventual return to baseline upon returning to floor is likely not vascular(stroke) in etiology and may be highly suggestive of TIA Vs Seizure.     Plan :  - Would recommend ASA 81mg po daily and Atorvastatin  - Would recommend vEEG to R/O Seizure   - Obtain MRI Brain  - Obtain TTE  - Poorly controlled DM with A1C of 11.6  - Diabetic education  - Obtain stroke labs - TSH, Lipid panel.  - SCDs for DVT PPX.  - recommend q4hr  neuro checks        Discussed with Neurology Attending Dr. rCoss. ASSESSMENT AND PLAN:   This pt is a 71 yo F w/ PMHx of HTN, DM c/b peripheral neuropathy BIBEMS w/ 1 episode of bloody vomit with clots. Per daughter, pt was feeling more lethargic than usual the day prior to admission (3/26). Poor Historian and as per chart review most of the history obtained from pt's daughter. pt admitted for hematemesis to R/O Naty pimentel tear 2/2 DM gastroparesis with GI consult recommending PPI. Per pt's daughter, pt has been having progressive weakness X 1year ? DM neuropathy B/L LE as pt has had less walking and also C/O chronic burning pain and has been bed bound X 6 months, has been using walker to ambulate. MRi was obtained on 3/28 by primary team for B/L LE weakness and Gen Neuro consulted. On 03/30, pt was found unresponsive while eating lunch sitting in the chair, slumped over, unresponsive to noxious stimuli with food bolus in her mouth. Initially RRT was called, pt was transferred to bed and as the team was preparing to intubate the pt, pt was found to be more responsive, more alert, but noted to have a new R sided facial droop and slurred speech for which stroke code was called. Stroke code CTH negative for any acute intracranial pathology, CTA with no LVO/high grade stenosis and CTP with mismatch volume of 121ml, but exam degraded by motion as well as incomplete transit of contrast bolus. Case D/W Neuro stroke attending Dr. Cross and agreed that pt not a candidate for tPA 2/2 rapidly improving symptoms and not an EVT candidate 2/2 absence of LVO. Pt noted to have rapid improvement in mental status(back to baseline), facial droop and slurred speech with improvement of NIHSS from 25 to 8 and also with L gaze preference with L sided weakness which rapidly improved, pt woke up confused in CT scanner with eventual return to baseline upon returning to floor is likely not vascular(stroke) in etiology and may be highly suggestive of TIA Vs Seizure.     Plan :  - Would recommend ASA 81mg po daily and Atorvastatin  - Would recommend vEEG to R/O Seizure   - Obtain MRI Brain  - Obtain TTE  - Poorly controlled DM with A1C of 11.6  - Dysphagia screen  - Diabetic education  - Obtain stroke labs - TSH, Lipid panel.  - SCDs for DVT PPX.  - recommend q4hr  neuro checks        Discussed with Neurology Attending Dr. Cross. ASSESSMENT AND PLAN:   This pt is a 73 yo F w/ PMHx of HTN, DM c/b peripheral neuropathy BIBEMS w/ 1 episode of bloody vomit with clots. Per daughter, pt was feeling more lethargic than usual the day prior to admission (3/26). Poor Historian and as per chart review most of the history obtained from pt's daughter. pt admitted for hematemesis to R/O Naty pimentel tear 2/2 DM gastroparesis with GI consult recommending PPI. Per pt's daughter, pt has been having progressive weakness X 1year ? DM neuropathy B/L LE as pt has had less walking and also C/O chronic burning pain and has been bed bound X 6 months, has been using walker to ambulate. MRi was obtained on 3/28 by primary team for B/L LE weakness and Gen Neuro consulted. On 03/30, pt was found unresponsive while eating lunch sitting in the chair, slumped over, unresponsive to noxious stimuli with food bolus in her mouth. Initially RRT was called, pt was transferred to bed and as the team was preparing to intubate the pt, pt was found to be more responsive, more alert, but noted to have a new R sided facial droop and slurred speech for which stroke code was called. Stroke code CTH negative for any acute intracranial pathology, CTA with no LVO/high grade stenosis and CTP with mismatch volume of 121ml, but exam degraded by motion as well as incomplete transit of contrast bolus. Case D/W Neuro stroke attending Dr. Cross and agreed that pt not a candidate for tPA 2/2 rapidly improving symptoms and not an EVT candidate 2/2 absence of LVO. Pt noted to have rapid improvement in mental status(back to baseline), facial droop and slurred speech with improvement of NIHSS from 25 to 8 and also with L gaze preference with L sided weakness which rapidly improved, pt woke up confused in CT scanner with eventual return to baseline upon returning to floor is likely not vascular(stroke) in etiology and may be highly suggestive of TIA Vs Seizure.     Plan :  - Would recommend ASA 81mg po daily and Atorvastatin  - Would recommend vEEG to R/O Seizure   - Obtain MRI Brain  - Obtain TTE  - Poorly controlled DM with A1C of 11.6  - Dysphagia screen  - Diabetic education  - Obtain stroke labs - TSH, Lipid panel.  - SCDs for DVT PPX.  - would recommend SQL for DVT PPx.  - recommend q4hr  neuro checks        Discussed with Neurology Attending Dr. Cross.

## 2022-03-30 NOTE — PROGRESS NOTE ADULT - PROBLEM SELECTOR PLAN 1
Pt was found to be unresponsive at 1200. Fingerstick ~160s, /80s, SpO2 100% on RA, no iwob, no accessory muscle use. Pt developed R facial droop with slurred speech. Stroke code called, awaiting CT head.     Plan:  - F/u CT head, EKG  - F/u stroke recs  - Frequent neuro checks, Q15min  - F/u MRI thoracic  - vEEG   - F/u on trops, lactate, CMP, CBC, T&S from code Pt was found to be unresponsive at 1200. Fingerstick ~160s, /80s, SpO2 100% on RA, no iwob, no accessory muscle use. Pt developed R facial droop with slurred speech. Stroke code called, awaiting CT head. Multifactorial etiology - CVA - ischemia v. hemorrhagic, metabolic, ACS, pulmonology, seizure    Plan:  - F/u CT head, EKG, CXR   - F/u stroke recs  - Frequent neuro checks, Q15min  - F/u MRI thoracic  - vEEG   - F/u on trops, lactate, CMP, CBC, T&S from code

## 2022-03-30 NOTE — PROGRESS NOTE ADULT - PROBLEM SELECTOR PLAN 4
Likely 2/2 diabetic neuropathy, aggravated by deconditioning from long-term bedrest.     Plan:   - F/u PT consult and neurology, appreciate recs   - Management of diabetic neuropathy as below RESOLVED. Likely due to gastroparesis 2/2 to poor DM control.

## 2022-03-30 NOTE — PROGRESS NOTE ADULT - PROBLEM SELECTOR PLAN 9
Noted by ED physician. EKG not in pt's chart on admission.  - EKG at [facility] showing incomplete RBBB.

## 2022-03-30 NOTE — PROGRESS NOTE ADULT - SUBJECTIVE AND OBJECTIVE BOX
Physical Medicine and Rehabilitation Progress Note:    Patient is a 72y old  Female who presents with a chief complaint of Hematemesis and Weakness (30 Mar 2022 09:12)      HPI:  HPI: Pt is a 72F w/ PMHx of HTN, DM c/b peripheral neuropathy BIBEMS w/ 1 episode of bloody vomit with clots. Per daughter, pt was feeling more lethargic than usual yesterday morning. After eating breakfast and lunch, pt reported nausea w/ multiple episodes of vomiting, pt unsure if bloody and not witnessed by daughter. Shortly thereafter, pt was helped into the shower by daughter where pt then had witnessed episode of emesis w/ blood clots and EMS was called. Pt denies any abdominal pain, but reports a "pulling sensation" in the midepigastric region. Denies any diarrhea or constipation, and is not aware of any blood in her stool. Pt reported last colonoscopy > 10 years prior, no endoscopy in past. Pt reports feeling well other than hunger, denies nausea, and her main complaint is lower extremity pain. Pt reports chronic, progressive paresthesias w/ pain of bilateral lower extremities, associated with heaviness. Pt has been unable to ambulate without a walker and assistance for several months, and has mostly been bedbound during this time. Pt denies diffuse headache, changes in vision. Denies cough, dyspnea, dysuria.    In the ED:  VS: Tmax: 97.8, HR: 85, BP: 180/95, RR: 20, O2: 98% on RA   Pertinent Labs: CBC wnl including Hgb of 13.3. Glucose 293, mild hypochloremia on BMP, otherwise wnl. VBG consistent w/ metabolic alkalosis.  Imaging:   - CXR: WNL  - CT abd: L mid abd w/ island of mesenteric fat devoid of bowel loops w/ swirling of mesenteric vessels. 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 No bowel dilatation, obstruction, or evidence of strangulation.  - EKG: Incomplete RBBB  Treatment/interventions: Started on NS 250cc/hr for 9 hours (2.25L total). IV protonix once.   (26 Mar 2022 10:37)                            12.0   4.33  )-----------( 245      ( 30 Mar 2022 12:45 )             37.7       03-30    x   |  x   |  12  ----------------------------<  153<H>  x    |  25  |  0.51    Ca    8.1<L>      30 Mar 2022 07:37  Phos  2.7     03-30  Mg     1.8     03-30    TPro  5.8<L>  /  Alb  2.9<L>  /  TBili  0.3  /  DBili  x   /  AST  9<L>  /  ALT  <5<L>  /  AlkPhos  32<L>  03-30    Vital Signs Last 24 Hrs  T(C): 36.8 (30 Mar 2022 06:22), Max: 37.1 (29 Mar 2022 14:18)  T(F): 98.2 (30 Mar 2022 06:22), Max: 98.7 (29 Mar 2022 14:18)  HR: 76 (30 Mar 2022 06:22) (72 - 76)  BP: 168/80 (30 Mar 2022 06:22) (144/85 - 168/80)  BP(mean): 105 (29 Mar 2022 14:18) (105 - 105)  RR: 19 (30 Mar 2022 06:22) (18 - 19)  SpO2: 100% (30 Mar 2022 06:22) (98% - 100%)    MEDICATIONS  (STANDING):  dextrose 5%. 1000 milliLiter(s) (100 mL/Hr) IV Continuous <Continuous>  gabapentin 300 milliGRAM(s) Oral every 8 hours  glucagon  Injectable 1 milliGRAM(s) IntraMuscular once  insulin glargine Injectable (LANTUS) 13 Unit(s) SubCutaneous at bedtime  insulin lispro (ADMELOG) corrective regimen sliding scale   SubCutaneous Before meals and at bedtime  insulin lispro Injectable (ADMELOG) 9 Unit(s) SubCutaneous three times a day before meals  lisinopril 40 milliGRAM(s) Oral daily  pantoprazole    Tablet 40 milliGRAM(s) Oral every 12 hours  potassium chloride   Powder 40 milliEquivalent(s) Oral once    MEDICATIONS  (PRN):  acetaminophen     Tablet .. 650 milliGRAM(s) Oral every 6 hours PRN Temp greater or equal to 38C (100.4F), Mild Pain (1 - 3), Moderate Pain (4 - 6)  dextrose Oral Gel 15 Gram(s) Oral once PRN Blood Glucose LESS THAN 70 milliGRAM(s)/deciliter    Currently Undergoing Physical/ Occupational Therapy at bedside.    PT/OT Functional Status Assessment:       Therapeutic Interventions      Bed Mobility  Bed Mobility Training Rehab Potential: fair, will monitor progress closely  Bed Mobility Training Symptoms Noted During/After Treatment: fatigue  Bed Mobility Training Rolling/Turning: moderate assist (50% patient effort);  1 person assist;  nonverbal cues (demo/gestures)  Bed Mobility Training Scooting: moderate assist (50% patient effort);  2 person assist  Bed Mobility Training Supine-to-Sit: moderate assist (50% patient effort);  2 person assist  Bed Mobility Training Limitations: decreased strength;  impaired balance;  impaired motor control;  impaired postural control;  decreased ability to use legs for bridging/pushing    Sit-Stand Transfer Training  Sit-to-Stand Transfer Training Rehab Potential: fair, will monitor progress closely  Sit-to-Stand Transfer Training Symptoms Noted During/After Treatment: fatigue  Transfer Training Sit-to-Stand Transfer: moderate assist (50% patient effort);  2 person assist;  rolling walker  Transfer Training Stand-to-Sit Transfer: moderate assist (50% patient effort);  2 person assist;  rolling walker  Sit-to-Stand Transfer Training Transfer Safety Analysis: decreased balance;  decreased strength    Gait Training  Gait Training Rehab Potential: fair, will monitor progress closely  Gait Training Symptoms Noted During/After Treatment: fatigue  Gait Training: moderate assist (50% patient effort);  2 person assist;  rolling walker;  bed to chair;  4 shuffle side steps  Gait Analysis: increased time in double stance;  retropulsion;  shuffling;  decreased step length;  decreased toe clearance;  dec ability to advance LLE posteriorly;  decreased strength;  impaired balance;  impaired motor control;  impaired postural control    Therapeutic Exercise  Therapeutic Exercise Detail: supine therapeutic exercise: AAROM heel slides, AAROM RLE ankle pumps x 10 repsseated therapeutic exercise: B long arc quads, marching, ankle pumps , elbow flex/ext x 10 reps           PM&R Impression: as above    Current Disposition Plan Recommendations:    subacute rehab placement

## 2022-03-30 NOTE — DISCHARGE NOTE PROVIDER - NSDCMRMEDTOKEN_GEN_ALL_CORE_FT
aspirin 81 mg oral tablet, chewable: 1 tab(s) orally once a day  gabapentin 300 mg oral capsule: 1 cap(s) orally 3 times a day  Lantus 100 units/mL subcutaneous solution: 18 unit(s) subcutaneous once a day (at bedtime)  lisinopril 40 mg oral tablet: 1 tab(s) orally once a day  metFORMIN 1000 mg oral tablet: 1 tab(s) orally 2 times a day  rosuvastatin 20 mg oral tablet: 1 tab(s) orally once a day   aspirin 81 mg oral tablet, chewable: 1 tab(s) orally once a day  atorvastatin 80 mg oral tablet: 1 tab(s) orally once a day (at bedtime)  DULoxetine 30 mg oral delayed release capsule: 1 cap(s) orally once a day  insulin lispro 100 units/mL injectable solution: 14 unit(s) injectable before breakfast. 10 unit(s) injectable before lunch and dinner.   Lantus 100 units/mL subcutaneous solution: 18 unit(s) subcutaneous once a day (at bedtime)  lisinopril 40 mg oral tablet: 1 tab(s) orally once a day  pantoprazole 40 mg oral delayed release tablet: 1 tab(s) orally 2 times a day  pantoprazole 40 mg oral delayed release tablet: 1 tab(s) orally once a day    aspirin 81 mg oral tablet, chewable: 1 tab(s) orally once a day  atorvastatin 80 mg oral tablet: 1 tab(s) orally once a day (at bedtime)  DULoxetine 30 mg oral delayed release capsule: 1 cap(s) orally once a day  insulin lispro 100 units/mL injectable solution: 14 unit(s) injectable before breakfast. 10 unit(s) injectable before lunch and dinner.   Lantus 100 units/mL subcutaneous solution: 18 unit(s) subcutaneous once a day (at bedtime)  lisinopril 40 mg oral tablet: 1 tab(s) orally once a day  pantoprazole 40 mg oral delayed release tablet: 1 tab(s) orally 2 times a day until 4/8. Then take 1 tablet once a day for 6 weeks.    aspirin 81 mg oral tablet, chewable: 1 tab(s) orally once a day  atorvastatin 80 mg oral tablet: 1 tab(s) orally once a day (at bedtime)  Cymbalta 20 mg oral delayed release capsule: 1 cap(s) orally once a day   insulin lispro 100 units/mL injectable solution: 14 unit(s) injectable before breakfast. 10 unit(s) injectable before lunch and dinner.   Lantus 100 units/mL subcutaneous solution: 18 unit(s) subcutaneous once a day (at bedtime)  lisinopril 40 mg oral tablet: 1 tab(s) orally once a day  pantoprazole 40 mg oral delayed release tablet: 1 tab(s) orally 2 times a day until 4/8. Then take 1 tablet once a day for 6 weeks.

## 2022-03-30 NOTE — DIETITIAN INITIAL EVALUATION ADULT. - OTHER INFO
Pt is a 72F w/ PMHx of HTN, DM c/b peripheral neuropathy and mostly bedbound for several months, BIBEMS w/ 1 episode of bloody vomit with clots following a day of nausea/vomiting, admitted for hematemesis evaluation, found to have most likely MW tear.  This am, patient able to feed self with slight assistance. Complaining of not receiving enough food" I want bread". No N/V/D/C .BM 3/26.Skin intact PU wise. Patient with complaints of pain in legs. Ate 80% of breakfast. Patient unable to quantify UBW at this time. Question weights from April 2021 [facility] admission:90.7kg,Noted 2018 weight :65.1kg,Present weight:57.6kg,IBW:59,98% of IBW .RD will recommend d/c of low fat restriction .RD to follow up with daughter regarding diet PTA. Now pending FELICIA.

## 2022-03-30 NOTE — CONSULT NOTE ADULT - NS ATTEND AMEND GEN_ALL_CORE FT
The patient is a 72-year-old female with a history of hypertension, hyperlipidemia, and type 2 diabetes with neuropathy who was admitted 3/26 after experiencing hematemesis thought secondary to Naty-Sanchez tear.  Stroke was consulted today after the patient was found unresponsive with right facial droop, dysarthria and left-sided weakness upon awakening.  She had a left-gaze preference as well. CT head, CTA, and CTP were unremarkable.  The patient's mental status gradually improved as did her focal deficits. Differential includes brainstem TIA vs seizure. Plan for MRI and cEEG. TTE. Consider restarting aspirin and statin. Agree with telemetry. Q4H Neuro checks.

## 2022-03-30 NOTE — PROGRESS NOTE ADULT - PROBLEM SELECTOR PLAN 9
Noted by ED physician. EKG not in pt's chart on admission.  - EKG at [facility] showing incomplete RBBB. Per daughter, pt was previously on gabapentin 600 TID which was decreased to 300 TID two days prior due to concern for contribution to weakness. Lumbar MRI - negative     Plan:   - C/w gabapentin 300 TID

## 2022-03-30 NOTE — DIETITIAN INITIAL EVALUATION ADULT. - PROBLEM SELECTOR PLAN 9
Noted by ED physician. EKG not in pt's chart on admission.  - EKG at [facility] showing incomplete RBBB.

## 2022-03-30 NOTE — PROGRESS NOTE ADULT - PROBLEM SELECTOR PLAN 2
One episode, none in ED or hospital thus far. Preceded by several likely non-bloody vomiting episodes. No pain. Hgb stable, not hypotensive. Most likely Naty Sanchez tear, labs not consistent w/ chronic bleed given normal Hgb.    Plan:   - NPO after rapid response today 3/30  - F/u GI recs  - IV Pantoprazole 40 bid  - Can hold aspirin, unclear indication  - On discharge, pantoprazole 40 BID for 2 weeks followed by QD for 6 weeks, follow up with GI.  - Pending outpatient v. inpatient gastroparesis study

## 2022-03-30 NOTE — PROGRESS NOTE ADULT - ASSESSMENT
71 YO F with PMH of DM2 (requiring insulin), HTN, DM presented with hematemesis, now resolved, thought to be 2/2 Naty Sanchez tear. She is also found to be in uncontrolled DM i/s/o A1C of 11.6.

## 2022-03-31 LAB
ALBUMIN SERPL ELPH-MCNC: 2.8 G/DL — LOW (ref 3.3–5)
ALP SERPL-CCNC: 32 U/L — LOW (ref 40–120)
ALT FLD-CCNC: <5 U/L — LOW (ref 10–45)
ANION GAP SERPL CALC-SCNC: 8 MMOL/L — SIGNIFICANT CHANGE UP (ref 5–17)
ANION GAP SERPL CALC-SCNC: 8 MMOL/L — SIGNIFICANT CHANGE UP (ref 5–17)
AST SERPL-CCNC: 12 U/L — SIGNIFICANT CHANGE UP (ref 10–40)
BASE EXCESS BLDA CALC-SCNC: 4.8 MMOL/L — HIGH (ref -2–3)
BASOPHILS # BLD AUTO: 0.02 K/UL — SIGNIFICANT CHANGE UP (ref 0–0.2)
BASOPHILS NFR BLD AUTO: 0.4 % — SIGNIFICANT CHANGE UP (ref 0–2)
BILIRUB SERPL-MCNC: 0.2 MG/DL — SIGNIFICANT CHANGE UP (ref 0.2–1.2)
BUN SERPL-MCNC: 12 MG/DL — SIGNIFICANT CHANGE UP (ref 7–23)
BUN SERPL-MCNC: 9 MG/DL — SIGNIFICANT CHANGE UP (ref 7–23)
CALCIUM SERPL-MCNC: 8 MG/DL — LOW (ref 8.4–10.5)
CALCIUM SERPL-MCNC: 8.3 MG/DL — LOW (ref 8.4–10.5)
CHLORIDE SERPL-SCNC: 100 MMOL/L — SIGNIFICANT CHANGE UP (ref 96–108)
CHLORIDE SERPL-SCNC: 100 MMOL/L — SIGNIFICANT CHANGE UP (ref 96–108)
CO2 BLDA-SCNC: 31 MMOL/L — HIGH (ref 19–24)
CO2 SERPL-SCNC: 26 MMOL/L — SIGNIFICANT CHANGE UP (ref 22–31)
CO2 SERPL-SCNC: 28 MMOL/L — SIGNIFICANT CHANGE UP (ref 22–31)
CREAT SERPL-MCNC: 0.57 MG/DL — SIGNIFICANT CHANGE UP (ref 0.5–1.3)
CREAT SERPL-MCNC: 0.68 MG/DL — SIGNIFICANT CHANGE UP (ref 0.5–1.3)
EGFR: 91 ML/MIN/1.73M2 — SIGNIFICANT CHANGE UP
EGFR: 95 ML/MIN/1.73M2 — SIGNIFICANT CHANGE UP
EOSINOPHIL # BLD AUTO: 0.06 K/UL — SIGNIFICANT CHANGE UP (ref 0–0.5)
EOSINOPHIL NFR BLD AUTO: 1.2 % — SIGNIFICANT CHANGE UP (ref 0–6)
GLUCOSE BLDC GLUCOMTR-MCNC: 118 MG/DL — HIGH (ref 70–99)
GLUCOSE BLDC GLUCOMTR-MCNC: 143 MG/DL — HIGH (ref 70–99)
GLUCOSE BLDC GLUCOMTR-MCNC: 167 MG/DL — HIGH (ref 70–99)
GLUCOSE BLDC GLUCOMTR-MCNC: 194 MG/DL — HIGH (ref 70–99)
GLUCOSE BLDC GLUCOMTR-MCNC: 196 MG/DL — HIGH (ref 70–99)
GLUCOSE BLDC GLUCOMTR-MCNC: 241 MG/DL — HIGH (ref 70–99)
GLUCOSE SERPL-MCNC: 212 MG/DL — HIGH (ref 70–99)
GLUCOSE SERPL-MCNC: 94 MG/DL — SIGNIFICANT CHANGE UP (ref 70–99)
HCO3 BLDA-SCNC: 30 MMOL/L — HIGH (ref 21–28)
HCT VFR BLD CALC: 33.2 % — LOW (ref 34.5–45)
HCT VFR BLD CALC: 37.2 % — SIGNIFICANT CHANGE UP (ref 34.5–45)
HGB BLD-MCNC: 10.6 G/DL — LOW (ref 11.5–15.5)
HGB BLD-MCNC: 11.5 G/DL — SIGNIFICANT CHANGE UP (ref 11.5–15.5)
IMM GRANULOCYTES NFR BLD AUTO: 0.2 % — SIGNIFICANT CHANGE UP (ref 0–1.5)
LACTATE SERPL-SCNC: 1.7 MMOL/L — SIGNIFICANT CHANGE UP (ref 0.5–2)
LYMPHOCYTES # BLD AUTO: 1.86 K/UL — SIGNIFICANT CHANGE UP (ref 1–3.3)
LYMPHOCYTES # BLD AUTO: 35.7 % — SIGNIFICANT CHANGE UP (ref 13–44)
MAGNESIUM SERPL-MCNC: 1.8 MG/DL — SIGNIFICANT CHANGE UP (ref 1.6–2.6)
MAGNESIUM SERPL-MCNC: 1.9 MG/DL — SIGNIFICANT CHANGE UP (ref 1.6–2.6)
MCHC RBC-ENTMCNC: 26.6 PG — LOW (ref 27–34)
MCHC RBC-ENTMCNC: 26.8 PG — LOW (ref 27–34)
MCHC RBC-ENTMCNC: 30.9 GM/DL — LOW (ref 32–36)
MCHC RBC-ENTMCNC: 31.9 GM/DL — LOW (ref 32–36)
MCV RBC AUTO: 83.8 FL — SIGNIFICANT CHANGE UP (ref 80–100)
MCV RBC AUTO: 85.9 FL — SIGNIFICANT CHANGE UP (ref 80–100)
MONOCYTES # BLD AUTO: 0.77 K/UL — SIGNIFICANT CHANGE UP (ref 0–0.9)
MONOCYTES NFR BLD AUTO: 14.8 % — HIGH (ref 2–14)
NEUTROPHILS # BLD AUTO: 2.49 K/UL — SIGNIFICANT CHANGE UP (ref 1.8–7.4)
NEUTROPHILS NFR BLD AUTO: 47.7 % — SIGNIFICANT CHANGE UP (ref 43–77)
NRBC # BLD: 0 /100 WBCS — SIGNIFICANT CHANGE UP (ref 0–0)
NRBC # BLD: 0 /100 WBCS — SIGNIFICANT CHANGE UP (ref 0–0)
PCO2 BLDA: 45 MMHG — HIGH (ref 32–35)
PH BLDA: 7.43 — SIGNIFICANT CHANGE UP (ref 7.35–7.45)
PHOSPHATE SERPL-MCNC: 2.6 MG/DL — SIGNIFICANT CHANGE UP (ref 2.5–4.5)
PHOSPHATE SERPL-MCNC: 3 MG/DL — SIGNIFICANT CHANGE UP (ref 2.5–4.5)
PLATELET # BLD AUTO: 244 K/UL — SIGNIFICANT CHANGE UP (ref 150–400)
PLATELET # BLD AUTO: 252 K/UL — SIGNIFICANT CHANGE UP (ref 150–400)
PO2 BLDA: 72 MMHG — LOW (ref 83–108)
POTASSIUM SERPL-MCNC: 4.2 MMOL/L — SIGNIFICANT CHANGE UP (ref 3.5–5.3)
POTASSIUM SERPL-MCNC: 4.6 MMOL/L — SIGNIFICANT CHANGE UP (ref 3.5–5.3)
POTASSIUM SERPL-SCNC: 4.2 MMOL/L — SIGNIFICANT CHANGE UP (ref 3.5–5.3)
POTASSIUM SERPL-SCNC: 4.6 MMOL/L — SIGNIFICANT CHANGE UP (ref 3.5–5.3)
PROT SERPL-MCNC: 6 G/DL — SIGNIFICANT CHANGE UP (ref 6–8.3)
RBC # BLD: 3.96 M/UL — SIGNIFICANT CHANGE UP (ref 3.8–5.2)
RBC # BLD: 4.33 M/UL — SIGNIFICANT CHANGE UP (ref 3.8–5.2)
RBC # FLD: 13.8 % — SIGNIFICANT CHANGE UP (ref 10.3–14.5)
RBC # FLD: 14.1 % — SIGNIFICANT CHANGE UP (ref 10.3–14.5)
SAO2 % BLDA: 95.6 % — SIGNIFICANT CHANGE UP (ref 94–98)
SODIUM SERPL-SCNC: 134 MMOL/L — LOW (ref 135–145)
SODIUM SERPL-SCNC: 136 MMOL/L — SIGNIFICANT CHANGE UP (ref 135–145)
WBC # BLD: 4.11 K/UL — SIGNIFICANT CHANGE UP (ref 3.8–10.5)
WBC # BLD: 5.21 K/UL — SIGNIFICANT CHANGE UP (ref 3.8–10.5)
WBC # FLD AUTO: 4.11 K/UL — SIGNIFICANT CHANGE UP (ref 3.8–10.5)
WBC # FLD AUTO: 5.21 K/UL — SIGNIFICANT CHANGE UP (ref 3.8–10.5)

## 2022-03-31 PROCEDURE — 99231 SBSQ HOSP IP/OBS SF/LOW 25: CPT | Mod: GC

## 2022-03-31 PROCEDURE — 93010 ELECTROCARDIOGRAM REPORT: CPT

## 2022-03-31 PROCEDURE — 99232 SBSQ HOSP IP/OBS MODERATE 35: CPT | Mod: GC

## 2022-03-31 PROCEDURE — 99233 SBSQ HOSP IP/OBS HIGH 50: CPT | Mod: GC

## 2022-03-31 PROCEDURE — 99232 SBSQ HOSP IP/OBS MODERATE 35: CPT

## 2022-03-31 PROCEDURE — 71045 X-RAY EXAM CHEST 1 VIEW: CPT | Mod: 26

## 2022-03-31 RX ORDER — METOCLOPRAMIDE HCL 10 MG
5 TABLET ORAL EVERY 8 HOURS
Refills: 0 | Status: DISCONTINUED | OUTPATIENT
Start: 2022-03-31 | End: 2022-04-05

## 2022-03-31 RX ORDER — ENOXAPARIN SODIUM 100 MG/ML
40 INJECTION SUBCUTANEOUS EVERY 24 HOURS
Refills: 0 | Status: DISCONTINUED | OUTPATIENT
Start: 2022-04-01 | End: 2022-04-05

## 2022-03-31 RX ORDER — PANTOPRAZOLE SODIUM 20 MG/1
40 TABLET, DELAYED RELEASE ORAL
Refills: 0 | Status: DISCONTINUED | OUTPATIENT
Start: 2022-03-31 | End: 2022-04-05

## 2022-03-31 RX ORDER — HEPARIN SODIUM 5000 [USP'U]/ML
5000 INJECTION INTRAVENOUS; SUBCUTANEOUS EVERY 12 HOURS
Refills: 0 | Status: DISCONTINUED | OUTPATIENT
Start: 2022-03-31 | End: 2022-03-31

## 2022-03-31 RX ADMIN — Medication 11 UNIT(S): at 18:29

## 2022-03-31 RX ADMIN — Medication 81 MILLIGRAM(S): at 13:20

## 2022-03-31 RX ADMIN — Medication 5 MILLIGRAM(S): at 06:43

## 2022-03-31 RX ADMIN — Medication 11 UNIT(S): at 08:38

## 2022-03-31 RX ADMIN — Medication 11 UNIT(S): at 13:20

## 2022-03-31 RX ADMIN — INSULIN GLARGINE 18 UNIT(S): 100 INJECTION, SOLUTION SUBCUTANEOUS at 23:12

## 2022-03-31 RX ADMIN — Medication 2: at 06:44

## 2022-03-31 RX ADMIN — GABAPENTIN 300 MILLIGRAM(S): 400 CAPSULE ORAL at 13:19

## 2022-03-31 RX ADMIN — PANTOPRAZOLE SODIUM 40 MILLIGRAM(S): 20 TABLET, DELAYED RELEASE ORAL at 09:24

## 2022-03-31 RX ADMIN — PANTOPRAZOLE SODIUM 40 MILLIGRAM(S): 20 TABLET, DELAYED RELEASE ORAL at 18:34

## 2022-03-31 RX ADMIN — GABAPENTIN 300 MILLIGRAM(S): 400 CAPSULE ORAL at 06:43

## 2022-03-31 RX ADMIN — Medication 1: at 18:29

## 2022-03-31 RX ADMIN — HEPARIN SODIUM 5000 UNIT(S): 5000 INJECTION INTRAVENOUS; SUBCUTANEOUS at 09:25

## 2022-03-31 RX ADMIN — LISINOPRIL 40 MILLIGRAM(S): 2.5 TABLET ORAL at 06:43

## 2022-03-31 NOTE — OCCUPATIONAL THERAPY INITIAL EVALUATION ADULT - PERTINENT HX OF CURRENT PROBLEM, REHAB EVAL
Pt is a 72F w/ PMHx of HTN, DM c/b peripheral neuropathy and mostly bedbound for several months, BIBEMS w/ 1 episode of bloody vomit with clots following a day of nausea/vomiting, admitted for hematemesis evaluation, found to have most likely MW tear.  Neurology consulted for lower extremity weakness bilaterally.

## 2022-03-31 NOTE — OCCUPATIONAL THERAPY INITIAL EVALUATION ADULT - ADDITIONAL COMMENTS
Pt has not amb "for a while now" due to BLE weakness. Encountered 1 fall within past year, and was brought to hospital due to head trauma.

## 2022-03-31 NOTE — PROGRESS NOTE ADULT - SUBJECTIVE AND OBJECTIVE BOX
Stepdown 7L to [facility]     Hospital Course  This pt is a 73 yo F w/ PMHx of HTN, DM c/b peripheral neuropathy BIBEMS w/ 1 episode of bloody vomit with clots. Per daughter, pt was feeling more lethargic than usual the day prior to admission (3/26). She was admitted for concern of gwen-pimentel tear after having 1 episode of bloody emesis w/ blood clots. Per daughter, she had multiple episodes of vomiting prior to admission. Pt reported last colonoscopy > 10 years prior, no endoscopy in past; GI was consulted and recommended that she be started on PPI IV 40 mg BID for 2 weeks and then upon discharge switch to Qd. Per collateral from the daughter, the patient has had progressive weakness over the past 1 year wherein she has had less walking. She attributed this weakness to her diabetic neuropathy as the patient c/o of chronic burning pain; it has gotten worse over the past 6 months where the patient has been bedbound and unable to ambulate without a walker. On 3/28 MRI of lumbar spine showed no acute processes to explain the leg weakness. A foot drop was elicited upon exam on 3/29, but pt didn't report any other focal deficits. On 3/30, a stroke code was called as the patient was found unresponsive while eating her lunch for about 2 minutes. On neuro exam, the patient developed a R facial droop and slurred speech which was not at her baseline; baseline neuro exam: b/l 1/5 weakness in ankle, 3/5 weakness in knee; 5/5 weakness in b/l UE; no pronator drift; CN 2-12 intact; decreased sensation in the b/l shins L > R from shins down. Differential of unresponsiveness during rapid response includes TIA/stroke vs. aspiration event vs. seizure. On 7Lach, patient did NOT have right facial droop, no slurred speech. On exam, CN were intact and sensation intact throughout. Bilateral LE were 1/5 (chronic) with sensation intact. Per neuro recs, patient started on ASA 81mg and atorva 80. Patient placed on vEEG to rule out seizure. Patient was initially NPO out of concern for aspiration event during rapid but passed bedside dysphagia and resumed diet. CT head, CTA, MRI head were negative for acute pathology. Patient stable for stepdown to [facility].          MORGAN RAVI, 72y, Female  MRN-[id number]  Patient is a 72y old  Female who presents with a chief complaint of Hematemesis (30 Mar 2022 18:08)      OVERNIGHT EVENTS: nauseous after eating dinner - given Maalox x 1 and reglan early. Rectal T 100. Went for MRI    SUBJECTIVE: Patient seen and examined at bedside. Reports feeling fine. Denies fevers, chills, HA, chest pain, sob, nausea, vomiting, abdominal pain, diarrhea, constipation, dysuria.     12 Point ROS Negative unless noted otherwise above.  -------------------------------------------------------------------------------  VITAL SIGNS:  Vital Signs Last 24 Hrs  T(C): 37.3 (31 Mar 2022 07:01), Max: 37.8 (31 Mar 2022 00:23)  T(F): 99.2 (31 Mar 2022 07:01), Max: 100 (31 Mar 2022 00:23)  HR: 80 (31 Mar 2022 08:33) (63 - 106)  BP: 131/69 (31 Mar 2022 08:33) (118/59 - 192/88)  BP(mean): 94 (31 Mar 2022 08:33) (94 - 126)  RR: 18 (31 Mar 2022 08:33) (12 - 19)  SpO2: 93% (31 Mar 2022 08:33) (93% - 100%)  I&O's Summary    30 Mar 2022 07:01  -  31 Mar 2022 07:00  --------------------------------------------------------  IN: 0 mL / OUT: 800 mL / NET: -800 mL        PHYSICAL EXAM:    General: frail elderly woman in NAD  HEENT: NC/AT; EOMI, PERRLA, anicteric sclera; dry mucosal membranes.  Neck: supple, trachea midline  Cardiovascular: RRR, +S1/S2; NO M/R/G  Respiratory: CTA B/L; no W/R/R  Gastrointestinal: soft, NT/ND; +BSx4  Extremities: WWP; no edema or cyanosis  Vascular: 2+ radial, DP/PT pulses B/L  Neurological: AAOx3; LE weakness 1/5, UE 5/5, CN intact and no sensory deficits noted    ALLERGIES:  Allergies    No Known Allergies    Intolerances        MEDICATIONS:  MEDICATIONS  (STANDING):  aspirin  chewable 81 milliGRAM(s) Oral daily  atorvastatin 80 milliGRAM(s) Oral at bedtime  dextrose 5%. 1000 milliLiter(s) (100 mL/Hr) IV Continuous <Continuous>  gabapentin 300 milliGRAM(s) Oral every 8 hours  glucagon  Injectable 1 milliGRAM(s) IntraMuscular once  heparin   Injectable 5000 Unit(s) SubCutaneous every 12 hours  insulin glargine Injectable (LANTUS) 18 Unit(s) SubCutaneous at bedtime  insulin lispro (ADMELOG) corrective regimen sliding scale   SubCutaneous Before meals and at bedtime  insulin lispro Injectable (ADMELOG) 11 Unit(s) SubCutaneous three times a day before meals  lisinopril 40 milliGRAM(s) Oral daily  metoclopramide Injectable 5 milliGRAM(s) IV Push every 8 hours  pantoprazole  Injectable 40 milliGRAM(s) IV Push every 12 hours    MEDICATIONS  (PRN):  acetaminophen     Tablet .. 650 milliGRAM(s) Oral every 6 hours PRN Temp greater or equal to 38C (100.4F), Mild Pain (1 - 3), Moderate Pain (4 - 6)  dextrose Oral Gel 15 Gram(s) Oral once PRN Blood Glucose LESS THAN 70 milliGRAM(s)/deciliter      -------------------------------------------------------------------------------  LABS:                        11.5   5.21  )-----------( 252      ( 31 Mar 2022 06:07 )             37.2     03-31    134<L>  |  100  |  9   ----------------------------<  212<H>  4.2   |  26  |  0.57    Ca    8.0<L>      31 Mar 2022 06:07  Phos  3.0     03-31  Mg     1.9     03-31    TPro  6.0  /  Alb  2.8<L>  /  TBili  0.2  /  DBili  x   /  AST  12  /  ALT  <5<L>  /  AlkPhos  32<L>  03-31    LIVER FUNCTIONS - ( 31 Mar 2022 06:07 )  Alb: 2.8 g/dL / Pro: 6.0 g/dL / ALK PHOS: 32 U/L / ALT: <5 U/L / AST: 12 U/L / GGT: x           PT/INR - ( 30 Mar 2022 12:45 )   PT: 10.7 sec;   INR: 0.90          PTT - ( 30 Mar 2022 12:45 )  PTT:35.9 sec    CAPILLARY BLOOD GLUCOSE  152 (30 Mar 2022 13:34)      POCT Blood Glucose.: 194 mg/dL (31 Mar 2022 08:38)      COVID-19 PCR: NotDetec (26 Mar 2022 00:22)      RADIOLOGY & ADDITIONAL TESTS: Reviewed.   Stepdown 7L to [facility]     Hospital Course  This pt is a 73 yo F w/ PMHx of HTN, DM c/b peripheral neuropathy BIBEMS w/ 1 episode of bloody vomit with clots. Per daughter, pt was feeling more lethargic than usual the day prior to admission (3/26). She was admitted for concern of gwen-pimentel tear after having 1 episode of bloody emesis w/ blood clots. Per daughter, she had multiple episodes of vomiting prior to admission. Pt reported last colonoscopy > 10 years prior, no endoscopy in past; GI was consulted and recommended that she be started on PPI IV 40 mg BID for 2 weeks and then upon discharge switch to Qd. Per collateral from the daughter, the patient has had progressive weakness over the past 1 year wherein she has had less walking. She attributed this weakness to her diabetic neuropathy as the patient c/o of chronic burning pain; it has gotten worse over the past 6 months where the patient has been bedbound and unable to ambulate without a walker. On 3/28 MRI of lumbar spine showed no acute processes to explain the leg weakness. A foot drop was elicited upon exam on 3/29, but pt didn't report any other focal deficits. On 3/30, a stroke code was called as the patient was found unresponsive while eating her lunch for about 2 minutes. On neuro exam, the patient developed a R facial droop and slurred speech which was not at her baseline; baseline neuro exam: b/l 1/5 weakness in ankle, 3/5 weakness in knee; 5/5 weakness in b/l UE; no pronator drift; CN 2-12 intact; decreased sensation in the b/l shins L > R from shins down. Differential of unresponsiveness during rapid response includes TIA/stroke vs. aspiration event vs. seizure. On 7Lach, patient did NOT have right facial droop, no slurred speech. On exam, CN were intact and sensation intact throughout. Bilateral LE were 1/5 (chronic) with sensation intact. Per neuro recs, patient started on ASA 81mg and atorva 80. Patient placed on vEEG to rule out seizure. Patient was initially NPO out of concern for aspiration event during rapid but passed bedside dysphagia and resumed diet. CT head, CTA, MRI head were negative for acute pathology. Patient stable for stepdown to [facility].        OVERNIGHT EVENTS: nauseous after eating dinner - given Maalox x 1 and reglan early. Rectal T 100. Went for MRI    SUBJECTIVE: Patient seen and examined at bedside. Reports feeling fine. When asked about the events that occurred yesterday, she attributes to being very tired. Denies fevers, chills, HA, chest pain, sob, nausea, vomiting, abdominal pain, diarrhea, constipation, dysuria.     12 Point ROS Negative unless noted otherwise above.  -------------------------------------------------------------------------------  VITAL SIGNS:  Vital Signs Last 24 Hrs  T(C): 36.2 (31 Mar 2022 10:18), Max: 37.8 (31 Mar 2022 00:23)  T(F): 97.2 (31 Mar 2022 10:18), Max: 100 (31 Mar 2022 00:23)  HR: 87 (31 Mar 2022 10:15) (69 - 106)  BP: 137/72 (31 Mar 2022 10:15) (131/60 - 192/88)  RR: 18 (31 Mar 2022 10:15) (16 - 19)  SpO2: 97% (31 Mar 2022 10:15) (93% - 100%) on RA       PHYSICAL EXAM  General: frail elderly woman in NAD  HEENT: NC/AT; EOMI, PERRLA, anicteric sclera; dry mucosal membranes.  Neck: supple, trachea midline  Cardiovascular: RRR, +S1/S2; NO M/R/G  Respiratory: CTA B/L; no W/R/R  Gastrointestinal: soft, NT/ND; +BSx4  Extremities: WWP; no edema or cyanosis  Vascular: 2+ radial, DP/PT pulses B/L  Neuro: A&Ox2. 5/5 strength of b/l UEs, 5/5 strength of b/l hips. 3/5 strength of b/l knees. 1/5 strength of b/l ankles. Sensation intact but decreased b/l    ALLERGIES:  Allergies    No Known Allergies    Intolerances      MEDICATIONS:  MEDICATIONS  (STANDING):  aspirin  chewable 81 milliGRAM(s) Oral daily  atorvastatin 80 milliGRAM(s) Oral at bedtime  dextrose 5%. 1000 milliLiter(s) (100 mL/Hr) IV Continuous <Continuous>  gabapentin 300 milliGRAM(s) Oral every 8 hours  glucagon  Injectable 1 milliGRAM(s) IntraMuscular once  heparin   Injectable 5000 Unit(s) SubCutaneous every 12 hours  insulin glargine Injectable (LANTUS) 18 Unit(s) SubCutaneous at bedtime  insulin lispro (ADMELOG) corrective regimen sliding scale   SubCutaneous Before meals and at bedtime  insulin lispro Injectable (ADMELOG) 11 Unit(s) SubCutaneous three times a day before meals  lisinopril 40 milliGRAM(s) Oral daily  metoclopramide Injectable 5 milliGRAM(s) IV Push every 8 hours  pantoprazole  Injectable 40 milliGRAM(s) IV Push every 12 hours    MEDICATIONS  (PRN):  acetaminophen     Tablet .. 650 milliGRAM(s) Oral every 6 hours PRN Temp greater or equal to 38C (100.4F), Mild Pain (1 - 3), Moderate Pain (4 - 6)  dextrose Oral Gel 15 Gram(s) Oral once PRN Blood Glucose LESS THAN 70 milliGRAM(s)/deciliter      -------------------------------------------------------------------------------  LABS:                        11.5   5.21  )-----------( 252      ( 31 Mar 2022 06:07 )             37.2     03-31    134<L>  |  100  |  9   ----------------------------<  212<H>  4.2   |  26  |  0.57    Ca    8.0<L>      31 Mar 2022 06:07  Phos  3.0     03-31  Mg     1.9     03-31    TPro  6.0  /  Alb  2.8<L>  /  TBili  0.2  /  DBili  x   /  AST  12  /  ALT  <5<L>  /  AlkPhos  32<L>  03-31    LIVER FUNCTIONS - ( 31 Mar 2022 06:07 )  Alb: 2.8 g/dL / Pro: 6.0 g/dL / ALK PHOS: 32 U/L / ALT: <5 U/L / AST: 12 U/L / GGT: x           PT/INR - ( 30 Mar 2022 12:45 )   PT: 10.7 sec;   INR: 0.90          PTT - ( 30 Mar 2022 12:45 )  PTT:35.9 sec    CAPILLARY BLOOD GLUCOSE  152 (30 Mar 2022 13:34)      POCT Blood Glucose.: 194 mg/dL (31 Mar 2022 08:38)      COVID-19 PCR: NotDetec (26 Mar 2022 00:22)      RADIOLOGY & ADDITIONAL TESTS: Reviewed.  < from: MR Head No Cont (03.30.22 @ 23:02) >  No acute intracranial abnormality.    < end of copied text >  < from: MR Head No Cont (03.30.22 @ 23:02) >  chronic lacunar infarctions   within the bilateral basal ganglia and thalami.      < end of copied text >  < from: MR Thoracic Spine No Cont (03.30.22 @ 23:09) >  Mild-to-moderate degenerative disc changes. No evidence of cord   compression.    < end of copied text >

## 2022-03-31 NOTE — PROGRESS NOTE ADULT - ATTENDING COMMENTS
Pt seen on rounds this afternoon.  Was fast asleep at the time of our visit and could not be aroused except with extremely loud verbal stimuli.  Extremities were completely limp.  She barely responded even to noxious stimuli.  EEG may (unfortunately) not have been recording at that time.  Glucoses have been intermittently elevated, cordell overnight--AM was still high at 241 today  --Would obviously be curious to see EEG during episodes of what appear to be uncontrolled sleep during the day  --Increase the Lantus further to 24 units, but decrease the lispro premeal to 10 units given the post-breakfast drop to 118 today

## 2022-03-31 NOTE — PROGRESS NOTE ADULT - SUBJECTIVE AND OBJECTIVE BOX
OVERNIGHT EVENTS:    SUBJECTIVE / INTERVAL HPI:   When seen by resident alone patient was AAOx2, sitting on a bed, responsive. On PM rounds she was again initially unresponsive, then wake up and followed basic commands, but still obtunded. Primary team made aware            VITAL SIGNS:  T(C): 36.2 (03-31-22 @ 10:18), Max: 37.8 (03-31-22 @ 00:23)  HR: 87 (03-31-22 @ 10:15) (74 - 106)  BP: 137/72 (03-31-22 @ 10:15) (131/60 - 159/79)  RR: 18 (03-31-22 @ 10:15) (17 - 19)  SpO2: 97% (03-31-22 @ 10:15) (93% - 99%)    MEDICATIONS:  MEDICATIONS  (STANDING):  aspirin  chewable 81 milliGRAM(s) Oral daily  atorvastatin 80 milliGRAM(s) Oral at bedtime  dextrose 5%. 1000 milliLiter(s) (100 mL/Hr) IV Continuous <Continuous>  glucagon  Injectable 1 milliGRAM(s) IntraMuscular once  insulin glargine Injectable (LANTUS) 18 Unit(s) SubCutaneous at bedtime  insulin lispro (ADMELOG) corrective regimen sliding scale   SubCutaneous Before meals and at bedtime  insulin lispro Injectable (ADMELOG) 11 Unit(s) SubCutaneous three times a day before meals  lisinopril 40 milliGRAM(s) Oral daily  pantoprazole    Tablet 40 milliGRAM(s) Oral two times a day    MEDICATIONS  (PRN):  acetaminophen     Tablet .. 650 milliGRAM(s) Oral every 6 hours PRN Temp greater or equal to 38C (100.4F), Mild Pain (1 - 3), Moderate Pain (4 - 6)  dextrose Oral Gel 15 Gram(s) Oral once PRN Blood Glucose LESS THAN 70 milliGRAM(s)/deciliter  metoclopramide 5 milliGRAM(s) Oral every 8 hours PRN Nausea/Vomiting      LABS:                        10.6   4.11  )-----------( 244      ( 31 Mar 2022 15:49 )             33.2     03-31    136  |  100  |  12  ----------------------------<  94  4.6   |  28  |  0.68    Ca    8.3<L>      31 Mar 2022 15:49  Phos  2.6     03-31  Mg     1.8     03-31    TPro  6.0  /  Alb  2.8<L>  /  TBili  0.2  /  DBili  x   /  AST  12  /  ALT  <5<L>  /  AlkPhos  32<L>  03-31    PT/INR - ( 30 Mar 2022 12:45 )   PT: 10.7 sec;   INR: 0.90          PTT - ( 30 Mar 2022 12:45 )  PTT:35.9 sec          PHYSICAL EXAM:    Physical Exam:  Gen: Elderly female in NAD, pleasantly confused  Neuro: AAOx2 (herself and hospital). Grossly no focal deficits, but reduced strength in bilateral lower extremities which seems to be her baseline.  Heart: RRR, no rmg  Lungs: CTAB, no signs of increased WOB   Ext: Good distal pulses. Light touch sensation grossly intact    Above exam when seen by resident alone. Later on rounds patient initially unresponsive, then opening her eyes and following basic commands but obtunded. Primary team made aware            Microbiology:     RADIOLOGY & ADDITIONAL TESTS: Reviewed.

## 2022-03-31 NOTE — EEG REPORT - NS EEG TEXT BOX
[facility] Department of Neurology  Inpatient Continuous video-Electroencephalogram    Patient Name:	MORGAN RAVI    :	1950  MRN:	[id number]    Study Start Date/Time:  3/30/2022, 1:51:30 PM  Study End Date/Time:	in progress    Referred by: Felicia Cross MD    Brief Clinical History:  MORGAN RAVI is a 72 year old Female with hx of diabetes, hypertension, peripheral neuropathy, admitted for hematemesis, course remarkable for acute unresponsiveness episode; study performed to investigate for seizures or markers of epilepsy.    Diagnosis Code:   R56.9 convulsions/seizure  CPT: 38197 EEG with video 12-26h  Technical CPT: 67084 set-up +  03996 vEEG unmonitored 12-26h    Pertinent Medications:  n/a    Acquisition Details:  Electroencephalography was acquired using a minimum of 21 channels on an [employer] system v 8.5.1 with electrode placement according to the standard International 10-20 system following ACNS (American Clinical Neurophysiology Society) guidelines for Long-Term Video EEG monitoring.  Anterior temporal T1 and T2 electrodes were utilized whenever possible.   The [employer] automated spike & seizure detections were all reviewed in detail, in addition to extensive portions of raw EEG.    Day 1: 3/30/2022 @ 1:51 PM to 9:21 PM (leads removed for MRI)  Background:  continuous, with predominantly alpha and beta frequencies.  Symmetry:  No persistent asymmetries of voltage or frequency.  Posterior Dominant Rhythm:  9 Hz symmetric, well-organized, and well-modulated.  Organization: Appropriate anterior-posterior gradient.  Voltage:  Normal (20+ uV)  Variability: Yes. 		Reactivity: Yes.  N2 sleep: Not achieved  Spontaneous Activity:  No epileptiform discharges.  Periodic/rhythmic activity:  None.  Events:  No electrographic seizures or significant clinical events.  Provocations:  Hyperventilation and Photic stimulation: was not performed.    Daily Summary:    Normal awake and drowsy (~7 hour) continuous video EEG recording.     Benjamin Waterman DO  CNP Fellow    Abhilash Pimentel MD  Attending Neurologist, [facility]

## 2022-03-31 NOTE — PROGRESS NOTE ADULT - SUBJECTIVE AND OBJECTIVE BOX
Neurology stroke progress note :     Interval HPI/Overnight events : Pt examined @ bedside. Pt much more awake, alert, more participating in exam and conversant. Noted to be sitting comfortably in the bed with breakfast tray.       ROS: Unable to obtain 2/2 pt's mental status and limited participation      MEDICATIONS  (STANDING):  aspirin  chewable 81 milliGRAM(s) Oral daily  atorvastatin 80 milliGRAM(s) Oral at bedtime  dextrose 5%. 1000 milliLiter(s) (100 mL/Hr) IV Continuous <Continuous>  glucagon  Injectable 1 milliGRAM(s) IntraMuscular once  insulin glargine Injectable (LANTUS) 18 Unit(s) SubCutaneous at bedtime  insulin lispro (ADMELOG) corrective regimen sliding scale   SubCutaneous Before meals and at bedtime  insulin lispro Injectable (ADMELOG) 11 Unit(s) SubCutaneous three times a day before meals  lisinopril 40 milliGRAM(s) Oral daily  pantoprazole    Tablet 40 milliGRAM(s) Oral two times a day    MEDICATIONS  (PRN):  acetaminophen     Tablet .. 650 milliGRAM(s) Oral every 6 hours PRN Temp greater or equal to 38C (100.4F), Mild Pain (1 - 3), Moderate Pain (4 - 6)  dextrose Oral Gel 15 Gram(s) Oral once PRN Blood Glucose LESS THAN 70 milliGRAM(s)/deciliter  metoclopramide 5 milliGRAM(s) Oral every 8 hours PRN Nausea/Vomiting      Allergies    No Known Allergies    Intolerances      Vital Signs Last 24 Hrs  T(C): 36.2 (31 Mar 2022 10:18), Max: 37.8 (31 Mar 2022 00:23)  T(F): 97.2 (31 Mar 2022 10:18), Max: 100 (31 Mar 2022 00:23)  HR: 87 (31 Mar 2022 10:15) (74 - 106)  BP: 137/72 (31 Mar 2022 10:15) (131/60 - 159/79)  BP(mean): 87 (31 Mar 2022 10:04) (87 - 112)  RR: 18 (31 Mar 2022 10:15) (17 - 19)  SpO2: 97% (31 Mar 2022 10:15) (93% - 99%)      Physical exam  Constitutional: resting in bed, NAD  Eyes: Anicteric sclerae.  Neck: trachea midline, FROM.  Cardiovascular: Regular rate and rhythm on the monitor  Pulmonary: Anterior breath sounds clear bilaterally.  GI: Abdomen soft, non-distended, non-tender  Extremities : No edema.    Neurologic:  -Mental status: Awake, alert, oriented X 3(self and place-states as hospital and month and year. States"my daughter says Im sick and I vomited couple of times". No dysarthria appreciated.  -Cranial nerves:   II: Visual fields are full to confrontation.  III, IV, VI: Extraocular movements are intact without nystagmus. Pupils equally round and reactive to light  V:  Facial sensation V1-V3 equal and intact   VII: Face is symmetric with normal eye closure and smile . unsure if there is a R facial droop when pt speaks or notable difference in R lower face intermittently when speaking.  IX, X: Uvula is midline and soft palate rises symmetrically  XI: Head turning and shoulder shrug are intact.  XII: Tongue protrudes midline  Motor: Normal bulk and tone. No pronator drift. Strength bilateral upper extremity 4+/5, bilateral lower extremities 2/5. Unable to lift B/L LE off the bed, bends her knees with help. R foot drop +(not new, has been progressively getting worse).  Rapid alternating movements intact and symmetric  Sensation: Intact to light touch bilaterally. No neglect or extinction on double simultaneous testing.  Coordination: No dysmetria on finger-to-nose.  Reflexes:  B/L mute toes ?2/2 peripheral neuropathy.  Gait: Deferred.      LABS:                                   10.6   4.11  )-----------( 244      ( 31 Mar 2022 15:49 )             33.2     03-31    136  |  100  |  12  ----------------------------<  94  4.6   |  28  |  0.68    Ca    8.3<L>      31 Mar 2022 15:49  Phos  2.6     03-31  Mg     1.8     03-31    TPro  6.0  /  Alb  2.8<L>  /  TBili  0.2  /  DBili  x   /  AST  12  /  ALT  <5<L>  /  AlkPhos  32<L>  03-31      RADIOLOGY & ADDITIONAL STUDIES:     CT Brain Stroke Protocol (03.30.22 @ 13:04)   IMPRESSION: No intracranial hemorrhage or acute transcortical infarct.   Stable exam.    CT Angio Head w/ IV Cont (03.30.22 @ 13:09)   IMPRESSION:    Intracranial CTA: No large vessel occlusion or high-grade stenosis.    Extracranial CTA: Unremarkable CTA neck.    CT Perfusion w/ Maps w/ IV Cont (03.30.22 @ 13:05)   FINDINGS: The examination is degraded by motion as well as incomplete   transit of contrast bolus. The CBF <30% volume is 0 mL.  The Tmax > 6s   volume is 121 mL in the right greater than left parieto-occipital lobes   as well as the right frontal lobe.  The mismatch volume is 121 mL.    IMPRESSION: Examination is degraded by motion and incomplete transit of   contrast bolus. CT perfusion metrics as above.          -----------------------------------------------------------------------------------------------------------------

## 2022-03-31 NOTE — PROGRESS NOTE ADULT - PROBLEM SELECTOR PLAN 1
Pt was found to be unresponsive at 1200 on 3/30/31. Fingerstick ~160s, /80s, SpO2 100% on RA, no iwob, no accessory muscle use. Pt developed R facial droop with slurred speech. Stroke code called, patient stepped up to 7Lach. CTH and CTA from stroke code are negative. MRI negative. Patient now stable for stepdown to F. Ddx: rule out stroke/TIA, rule out seizure, possible aspiration event     Plan:  -start ASA 81mg po daily and atorvastatin 80mg daily per neurology rec   - vEEG to R/O Seizure   - f/u CXR to r/o aspiration event   - patient passed bedside dysphagia on 7Lach, resumed diet   - Obtain TTE  - SCDs for DVT PPX. Pt was found to be unresponsive at 1200 on 3/30/31. Fingerstick ~160s, /80s, SpO2 100% on RA, no iwob, no accessory muscle use. Pt developed R facial droop with slurred speech. Stroke code called, patient stepped up to 7Lach. CTH and CTA from stroke code are negative. MRI negative. Patient now stable for stepdown to F. Ddx: rule out stroke/TIA, rule out seizure, possible aspiration event     Plan:  - start ASA 81mg po daily and atorvastatin 80mg daily per neurology rec   - vEEG to R/O Seizure   - patient passed bedside dysphagia on 7Lach, resumed diet   - Obtain TTE  - SCDs for DVT PPX

## 2022-03-31 NOTE — PROGRESS NOTE ADULT - ATTENDING COMMENTS
Date of service 3/31/22    Pt seen after arriving on 7U. Somnolent - required vigourous shoulder shaking to awake. Able to state name, move all ext, stick out tongue. Progressively more awake w reassessement. Blood gas, CBC, CMP, BG, TSH ordered.     Plan  Encephalopathy multifactorial - ddx includes seizure - EEG not on during episode, meds (gabapentin), lack of sleep/delirium.  DC Gabapentin  Transition BID PPI from IV to PO; Reglan to TID PO PRN as pt tolerating diet, passing BM, no nausea    d/w medical team, Dr. Crews

## 2022-03-31 NOTE — PROGRESS NOTE ADULT - PROBLEM SELECTOR PLAN 5
Chronic and present on arrival. Likely 2/2 diabetic neuropathy, aggravated by deconditioning from long-term bedrest.     Plan:   - F/u PT consult and neurology, appreciate recs   - Management of diabetic neuropathy

## 2022-03-31 NOTE — PROGRESS NOTE ADULT - SUBJECTIVE AND OBJECTIVE BOX
NEUROLOGY CONSULT: PROGRESS NOTE    INTERVAL HISTORY:  Pt was a rapid response and then stroke code yesterday for unresponsiveness, R sided facial droop, L gaze preference, dysarthria, and L sided weakness. All VSS during rapid response. However all stroke imaging thus far has been negative for new acute stroke.    SUBJECTIVE:  Pt seen and examined at bedside this AM. Pt states she was sleeping yesterday, but does not clearly remember what happened. Pt very drowsy on exam and continues to go in and out of sleep.     MEDICATIONS:  acetaminophen     Tablet .. 650 milliGRAM(s) Oral every 6 hours PRN  aspirin  chewable 81 milliGRAM(s) Oral daily  atorvastatin 80 milliGRAM(s) Oral at bedtime  dextrose 5%. 1000 milliLiter(s) IV Continuous <Continuous>  dextrose Oral Gel 15 Gram(s) Oral once PRN  gabapentin 300 milliGRAM(s) Oral every 8 hours  glucagon  Injectable 1 milliGRAM(s) IntraMuscular once  heparin   Injectable 5000 Unit(s) SubCutaneous every 12 hours  insulin glargine Injectable (LANTUS) 18 Unit(s) SubCutaneous at bedtime  insulin lispro (ADMELOG) corrective regimen sliding scale   SubCutaneous Before meals and at bedtime  insulin lispro Injectable (ADMELOG) 11 Unit(s) SubCutaneous three times a day before meals  lisinopril 40 milliGRAM(s) Oral daily  metoclopramide Injectable 5 milliGRAM(s) IV Push every 8 hours  pantoprazole  Injectable 40 milliGRAM(s) IV Push every 12 hours    VITAL SIGNS:  Vital Signs Last 24 Hrs  T(C): 36.2 (31 Mar 2022 10:18), Max: 37.8 (31 Mar 2022 00:23)  T(F): 97.2 (31 Mar 2022 10:18), Max: 100 (31 Mar 2022 00:23)  HR: 87 (31 Mar 2022 10:15) (63 - 106)  BP: 137/72 (31 Mar 2022 10:15) (118/59 - 192/88)  BP(mean): 87 (31 Mar 2022 10:04) (87 - 126)  RR: 18 (31 Mar 2022 10:15) (12 - 19)  SpO2: 97% (31 Mar 2022 10:15) (93% - 100%)    PHYSICAL EXAMINATION:  General: Comfortable, drowsy  Neurologic:     -Mental Status: AAOx2 to person and [facility] and year. Speech is fluent with intact naming, repetition, and comprehension, no dysarthria. Recent and remote memory intact. Follows commands. Attention/concentration impaired as she continues to dose off during interview. Fund of knowledge appropriate.     -Cranial Nerves:          II: Impaired L sided vision.          III, IV, VI: EOMI without nystagmus. L eye pupil 3-4mm and fixed.          V:  Facial sensation V1-V3 equal and intact           VII: Face is symmetric with normal eye closure and smile          VIII: Hearing is bilaterally intact to finger rub          IX, X: Uvula is midline and soft palate rises symmetrically          XI: Head turning and shoulder shrug are intact.          XII: Tongue protrudes midline     -Motor: Normal bulk and tone. No involuntary movements (tremor, myoclonus, chorea, athetosis, dystonia)     -Sensation: Intact to light touch.  Vibration sense intact.           No neglect or extinction on double simultaneous testing.     -Coordination: Bilateral dysmetria on finger to nose testing.      -Reflexes: 2+ and symmetric at biceps, triceps patellar, ankles              LABS:                          11.5   5.21  )-----------( 252      ( 31 Mar 2022 06:07 )             37.2     03-31    134<L>  |  100  |  9   ----------------------------<  212<H>  4.2   |  26  |  0.57    Ca    8.0<L>      31 Mar 2022 06:07  Phos  3.0     03-31  Mg     1.9     03-31    TPro  6.0  /  Alb  2.8<L>  /  TBili  0.2  /  DBili  x   /  AST  12  /  ALT  <5<L>  /  AlkPhos  32<L>  03-31    PT/INR - ( 30 Mar 2022 12:45 )   PT: 10.7 sec;   INR: 0.90          PTT - ( 30 Mar 2022 12:45 )  PTT:35.9 sec      RADIOLOGY & ADDITIONAL STUDIES:  reviewed

## 2022-03-31 NOTE — PROGRESS NOTE ADULT - PROBLEM SELECTOR PLAN 7
Per daughter, pt is on metformin 1000 BID, Lantus 18u, and Victoza, but only metformin was confirmed by pharmacy. A1C 11.6     Plan:   - Consulted endo, appreciate recs  - c/w basal bolus regimen with ISS Per daughter, pt is on metformin 1000 BID, Lantus 18u, and Victoza, but only metformin was confirmed by pharmacy. A1C 11.6     Plan:   - Consulted endo, appreciate recs  - c/w basal bolus regimen with ISS; lantus 18 and 11 bolus

## 2022-03-31 NOTE — PROGRESS NOTE ADULT - ASSESSMENT
73 YO F with PMH of DM2 (requiring insulin), HTN, DM presented with hematemesis, now resolved, thought to be 2/2 Naty Sanchez tear. She is also found to be in uncontrolled DM i/s/o A1C of 11.6. Her hospital course is also notable for episodes of unresponsiveness (?TIA, ?Seizures).       # DM2. Uncontrolled - A1C is 11.6  - history is somewhat limited 2/2 her being an extremely poor historian i/s/o her ?dementia. Unclear what she is on at home - primary team spoke with her daughter - apparently she is on Metformin 1g BID, Victoza 1.2, and 18 units of Lantus daily. We were unable to reach her daughter today. Reports some tingling/burning in her feet/hands  - fasting sugars are uncontrolled on lantus 18 - today , therefore basal insulin needs to be increased  - postprandial FS somewhat to tight controlled, can decrease premeal Lispro        Plan   - increase Lantus to 24 unit in PM   - decrease Lispro to 10 premeals if she is able to tolerate PO     Endocrine will continue to follow

## 2022-03-31 NOTE — PROGRESS NOTE ADULT - ASSESSMENT
ASSESSMENT AND PLAN:   This pt is a 71 yo F w/ PMHx of HTN, DM c/b peripheral neuropathy BIBEMS w/ 1 episode of bloody vomit with clots. Per daughter, pt was feeling more lethargic than usual the day prior to admission (3/26). Poor Historian and as per chart review most of the history obtained from pt's daughter. pt admitted for hematemesis to R/O Naty pimentel tear 2/2 DM gastroparesis with GI consult recommending PPI. Per pt's daughter, pt has been having progressive weakness X 1year ? DM neuropathy B/L LE as pt has had less walking and also C/O chronic burning pain and has been bed bound X 6 months, has been using walker to ambulate. MRi was obtained on 3/28 by primary team for B/L LE weakness and Gen Neuro consulted. On 03/30, pt was found unresponsive while eating lunch sitting in the chair, slumped over, unresponsive to noxious stimuli with food bolus in her mouth. Initially RRT was called, pt was transferred to bed and as the team was preparing to intubate the pt, pt was found to be more responsive, more alert, but noted to have a new R sided facial droop and slurred speech for which stroke code was called. Stroke code CTH negative for any acute intracranial pathology, CTA with no LVO/high grade stenosis and CTP with mismatch volume of 121ml, but exam degraded by motion as well as incomplete transit of contrast bolus. Case D/W Neuro stroke attending Dr. Cross and agreed that pt not a candidate for tPA 2/2 rapidly improving symptoms and not an EVT candidate 2/2 absence of LVO. Pt noted to have rapid improvement in mental status(back to baseline), facial droop and slurred speech with improvement of NIHSS from 25 to 8 and also with L gaze preference with L sided weakness which rapidly improved, pt woke up confused in CT scanner with eventual return to baseline upon returning to floor is likely not vascular(stroke) in etiology and may be highly suggestive of TIA Vs Seizure.     Plan :  - C/W ASA 81mg po daily and Atorvastatin  - C/W vEEG to R/O Seizure   - MRI Brain ; negative  - Obtain TTE : pending  - Poorly controlled DM with A1C of 11.6  - Dysphagia screen  - Diabetic education  - Obtain stroke labs - TSH, Lipid panel.  - SCDs for DVT PPX.  - would recommend SQL for DVT PPx.  - recommend q4hr  neuro checks        Discussed with Neurology Attending Dr. Cross.

## 2022-03-31 NOTE — PROGRESS NOTE ADULT - ATTENDING COMMENTS
Pt awake and alert. Comfortable on RA and no evidence of stroke on diagnostic w/u or seizure activity.

## 2022-03-31 NOTE — PROGRESS NOTE ADULT - PROBLEM SELECTOR PLAN 10
Noted by ED physician. EKG not in pt's chart on admission.  - EKG at [facility] showing incomplete RBBB.

## 2022-03-31 NOTE — PROGRESS NOTE ADULT - ATTENDING COMMENTS
unclear etiology of spells, now that they are recurrent, TIA unlikely.  need to r/o seizures with vEEG    weakness in legs must be due to polyneuropathy plus deconditioning in light of negative MRI

## 2022-03-31 NOTE — SWALLOW BEDSIDE ASSESSMENT ADULT - SLP PERTINENT HISTORY OF CURRENT PROBLEM
Pt is a 72F w/ PMHx of HTN, DM c/b peripheral neuropathy and mostly bedbound for several months, BIBEMS w/ 1 episode of bloody vomit with clots following a day of nausea/vomiting, admitted for hematemesis evaluation, found to have most likely MW tear. Stroke code call on 3/30 and found to have R facial droop w/ slurred speech.  Stepped up to tele for further workup/observation with neuro following. Patient stable for step down on 3/31.

## 2022-03-31 NOTE — PROGRESS NOTE ADULT - PROBLEM SELECTOR PLAN 1
Pt was found to be unresponsive at 1200 on 3/30/31. Fingerstick ~160s, /80s, SpO2 100% on RA, no iwob, no accessory muscle use. Pt developed R facial droop with slurred speech. Stroke code called, patient stepped up to 7Lach. CTH and CTA from stroke code are negative. MRI negative. Patient now stable for stepdown to F. Ddx: rule out stroke/TIA, rule out seizure, possible aspiration event     Plan:  -start ASA 81mg po daily and atorvastatin 80mg daily per neurology rec   - vEEG to R/O Seizure   - f/u CXR to r/o aspiration event   - patient passed bedside dysphagia on 7Lach, resumed diet   - Obtain TTE  - SCDs for DVT PPX.

## 2022-03-31 NOTE — OCCUPATIONAL THERAPY INITIAL EVALUATION ADULT - STANDING BALANCE: DYNAMIC, REHAB EVAL
Pt performed 3 sit to stand transfers with maxAx2 and RW. Pt with limited b/l hip extension and requiring max verbal cues for postural awareness./poor minus

## 2022-03-31 NOTE — PROGRESS NOTE ADULT - ASSESSMENT
Pt is a 72F w/ PMHx of HTN, DM c/b peripheral neuropathy and mostly bedbound for several months, BIBEMS w/ 1 episode of bloody vomit with clots following a day of nausea/vomiting, admitted for hematemesis evaluation, found to have most likely MW tear. Stroke code call on 3/30 and found to have R facial droop w/ slurred speech.  Stepped up to tele for further workup/observation with neuro following. Patient stable for step down on 3/31

## 2022-03-31 NOTE — OCCUPATIONAL THERAPY INITIAL EVALUATION ADULT - MD ORDER
Pt was stroke code 3/30/22 with initial NIHSS 25 which reduced down to 8. Pt with improved symptoms today, no R facial droop, L sided weakness, or dysarthria. All imaging negative. TIA possible.

## 2022-03-31 NOTE — PROGRESS NOTE ADULT - PROBLEM SELECTOR PLAN 10
Noted by ED physician. EKG not in pt's chart on admission.  - EKG at [facility] showing incomplete RBBB.

## 2022-03-31 NOTE — PROGRESS NOTE ADULT - PROBLEM SELECTOR PLAN 2
One episode, none in ED or hospital thus far. Preceded by several likely non-bloody vomiting episodes. No pain. Hgb stable, not hypotensive. Most likely Naty Sanchez tear, labs not consistent w/ chronic bleed given normal Hgb.    Plan:   - F/u GI recs  - IV Pantoprazole 40 bid  - Can c/w aspirin per neurology rec after stroke code   - On discharge, pantoprazole 40 BID for 2 weeks followed by QD for 6 weeks, follow up with GI.  - Pending outpatient v. inpatient gastroparesis study

## 2022-03-31 NOTE — PROGRESS NOTE ADULT - SUBJECTIVE AND OBJECTIVE BOX
MORGAN RAVI, 72y, Female  MRN-[id number]  Patient is a 72y old  Female who presents with a chief complaint of Hematemesis (30 Mar 2022 18:08)      OVERNIGHT EVENTS: nauseous after eating dinner - given Maalox x 1 and reglan early. Rectal T 100. Went for MRI    SUBJECTIVE: Patient seen and examined at bedside. Reports feeling fine. Denies fevers, chills, HA, chest pain, sob, nausea, vomiting, abdominal pain, diarrhea, constipation, dysuria.     12 Point ROS Negative unless noted otherwise above.  -------------------------------------------------------------------------------  VITAL SIGNS:  Vital Signs Last 24 Hrs  T(C): 37.3 (31 Mar 2022 07:01), Max: 37.8 (31 Mar 2022 00:23)  T(F): 99.2 (31 Mar 2022 07:01), Max: 100 (31 Mar 2022 00:23)  HR: 80 (31 Mar 2022 08:33) (63 - 106)  BP: 131/69 (31 Mar 2022 08:33) (118/59 - 192/88)  BP(mean): 94 (31 Mar 2022 08:33) (94 - 126)  RR: 18 (31 Mar 2022 08:33) (12 - 19)  SpO2: 93% (31 Mar 2022 08:33) (93% - 100%)  I&O's Summary    30 Mar 2022 07:01  -  31 Mar 2022 07:00  --------------------------------------------------------  IN: 0 mL / OUT: 800 mL / NET: -800 mL        PHYSICAL EXAM:    General: frail elderly woman in NAD  HEENT: NC/AT; EOMI, PERRLA, anicteric sclera; dry mucosal membranes.  Neck: supple, trachea midline  Cardiovascular: RRR, +S1/S2; NO M/R/G  Respiratory: CTA B/L; no W/R/R  Gastrointestinal: soft, NT/ND; +BSx4  Extremities: WWP; no edema or cyanosis  Vascular: 2+ radial, DP/PT pulses B/L  Neurological: AAOx3; LE weakness 1/5, UE 5/5, CN intact and no sensory deficits noted    ALLERGIES:  Allergies    No Known Allergies    Intolerances        MEDICATIONS:  MEDICATIONS  (STANDING):  aspirin  chewable 81 milliGRAM(s) Oral daily  atorvastatin 80 milliGRAM(s) Oral at bedtime  dextrose 5%. 1000 milliLiter(s) (100 mL/Hr) IV Continuous <Continuous>  gabapentin 300 milliGRAM(s) Oral every 8 hours  glucagon  Injectable 1 milliGRAM(s) IntraMuscular once  heparin   Injectable 5000 Unit(s) SubCutaneous every 12 hours  insulin glargine Injectable (LANTUS) 18 Unit(s) SubCutaneous at bedtime  insulin lispro (ADMELOG) corrective regimen sliding scale   SubCutaneous Before meals and at bedtime  insulin lispro Injectable (ADMELOG) 11 Unit(s) SubCutaneous three times a day before meals  lisinopril 40 milliGRAM(s) Oral daily  metoclopramide Injectable 5 milliGRAM(s) IV Push every 8 hours  pantoprazole  Injectable 40 milliGRAM(s) IV Push every 12 hours    MEDICATIONS  (PRN):  acetaminophen     Tablet .. 650 milliGRAM(s) Oral every 6 hours PRN Temp greater or equal to 38C (100.4F), Mild Pain (1 - 3), Moderate Pain (4 - 6)  dextrose Oral Gel 15 Gram(s) Oral once PRN Blood Glucose LESS THAN 70 milliGRAM(s)/deciliter      -------------------------------------------------------------------------------  LABS:                        11.5   5.21  )-----------( 252      ( 31 Mar 2022 06:07 )             37.2     03-31    134<L>  |  100  |  9   ----------------------------<  212<H>  4.2   |  26  |  0.57    Ca    8.0<L>      31 Mar 2022 06:07  Phos  3.0     03-31  Mg     1.9     03-31    TPro  6.0  /  Alb  2.8<L>  /  TBili  0.2  /  DBili  x   /  AST  12  /  ALT  <5<L>  /  AlkPhos  32<L>  03-31    LIVER FUNCTIONS - ( 31 Mar 2022 06:07 )  Alb: 2.8 g/dL / Pro: 6.0 g/dL / ALK PHOS: 32 U/L / ALT: <5 U/L / AST: 12 U/L / GGT: x           PT/INR - ( 30 Mar 2022 12:45 )   PT: 10.7 sec;   INR: 0.90          PTT - ( 30 Mar 2022 12:45 )  PTT:35.9 sec    CAPILLARY BLOOD GLUCOSE  152 (30 Mar 2022 13:34)      POCT Blood Glucose.: 194 mg/dL (31 Mar 2022 08:38)      COVID-19 PCR: NotDetec (26 Mar 2022 00:22)      RADIOLOGY & ADDITIONAL TESTS: Reviewed.   Stepdown 7L to [facility]     Hospital Course  This pt is a 73 yo F w/ PMHx of HTN, DM c/b peripheral neuropathy BIBEMS w/ 1 episode of bloody vomit with clots. Per daughter, pt was feeling more lethargic than usual the day prior to admission (3/26). She was admitted for concern of gwen-pimentel tear after having 1 episode of bloody emesis w/ blood clots. Per daughter, she had multiple episodes of vomiting prior to admission. Pt reported last colonoscopy > 10 years prior, no endoscopy in past; GI was consulted and recommended that she be started on PPI IV 40 mg BID for 2 weeks and then upon discharge switch to Qd. Per collateral from the daughter, the patient has had progressive weakness over the past 1 year wherein she has had less walking. She attributed this weakness to her diabetic neuropathy as the patient c/o of chronic burning pain; it has gotten worse over the past 6 months where the patient has been bedbound and unable to ambulate without a walker. On 3/28 MRI of lumbar spine showed no acute processes to explain the leg weakness. A foot drop was elicited upon exam on 3/29, but pt didn't report any other focal deficits. On 3/30, a stroke code was called as the patient was found unresponsive while eating her lunch for about 2 minutes. On neuro exam, the patient developed a R facial droop and slurred speech which was not at her baseline; baseline neuro exam: b/l 1/5 weakness in ankle, 3/5 weakness in knee; 5/5 weakness in b/l UE; no pronator drift; CN 2-12 intact; decreased sensation in the b/l shins L > R from shins down. Differential of unresponsiveness during rapid response includes TIA/stroke vs. aspiration event vs. seizure. On 7Lach, patient did NOT have right facial droop, no slurred speech. On exam, CN were intact and sensation intact throughout. Bilateral LE were 1/5 (chronic) with sensation intact. Per neuro recs, patient started on ASA 81mg and atorva 80. Patient placed on vEEG to rule out seizure. Patient was initially NPO out of concern for aspiration event during rapid but passed bedside dysphagia and resumed diet. CT head, CTA, MRI head were negative for acute pathology. Patient stable for stepdown to [facility].          MORGAN RAVI, 72y, Female  MRN-[id number]  Patient is a 72y old  Female who presents with a chief complaint of Hematemesis (30 Mar 2022 18:08)      OVERNIGHT EVENTS: nauseous after eating dinner - given Maalox x 1 and reglan early. Rectal T 100. Went for MRI    SUBJECTIVE: Patient seen and examined at bedside. Reports feeling fine. Denies fevers, chills, HA, chest pain, sob, nausea, vomiting, abdominal pain, diarrhea, constipation, dysuria.     12 Point ROS Negative unless noted otherwise above.  -------------------------------------------------------------------------------  VITAL SIGNS:  Vital Signs Last 24 Hrs  T(C): 37.3 (31 Mar 2022 07:01), Max: 37.8 (31 Mar 2022 00:23)  T(F): 99.2 (31 Mar 2022 07:01), Max: 100 (31 Mar 2022 00:23)  HR: 80 (31 Mar 2022 08:33) (63 - 106)  BP: 131/69 (31 Mar 2022 08:33) (118/59 - 192/88)  BP(mean): 94 (31 Mar 2022 08:33) (94 - 126)  RR: 18 (31 Mar 2022 08:33) (12 - 19)  SpO2: 93% (31 Mar 2022 08:33) (93% - 100%)  I&O's Summary    30 Mar 2022 07:01  -  31 Mar 2022 07:00  --------------------------------------------------------  IN: 0 mL / OUT: 800 mL / NET: -800 mL        PHYSICAL EXAM:    General: frail elderly woman in NAD  HEENT: NC/AT; EOMI, PERRLA, anicteric sclera; dry mucosal membranes.  Neck: supple, trachea midline  Cardiovascular: RRR, +S1/S2; NO M/R/G  Respiratory: CTA B/L; no W/R/R  Gastrointestinal: soft, NT/ND; +BSx4  Extremities: WWP; no edema or cyanosis  Vascular: 2+ radial, DP/PT pulses B/L  Neurological: AAOx3; LE weakness 1/5, UE 5/5, CN intact and no sensory deficits noted    ALLERGIES:  Allergies    No Known Allergies    Intolerances        MEDICATIONS:  MEDICATIONS  (STANDING):  aspirin  chewable 81 milliGRAM(s) Oral daily  atorvastatin 80 milliGRAM(s) Oral at bedtime  dextrose 5%. 1000 milliLiter(s) (100 mL/Hr) IV Continuous <Continuous>  gabapentin 300 milliGRAM(s) Oral every 8 hours  glucagon  Injectable 1 milliGRAM(s) IntraMuscular once  heparin   Injectable 5000 Unit(s) SubCutaneous every 12 hours  insulin glargine Injectable (LANTUS) 18 Unit(s) SubCutaneous at bedtime  insulin lispro (ADMELOG) corrective regimen sliding scale   SubCutaneous Before meals and at bedtime  insulin lispro Injectable (ADMELOG) 11 Unit(s) SubCutaneous three times a day before meals  lisinopril 40 milliGRAM(s) Oral daily  metoclopramide Injectable 5 milliGRAM(s) IV Push every 8 hours  pantoprazole  Injectable 40 milliGRAM(s) IV Push every 12 hours    MEDICATIONS  (PRN):  acetaminophen     Tablet .. 650 milliGRAM(s) Oral every 6 hours PRN Temp greater or equal to 38C (100.4F), Mild Pain (1 - 3), Moderate Pain (4 - 6)  dextrose Oral Gel 15 Gram(s) Oral once PRN Blood Glucose LESS THAN 70 milliGRAM(s)/deciliter      -------------------------------------------------------------------------------  LABS:                        11.5   5.21  )-----------( 252      ( 31 Mar 2022 06:07 )             37.2     03-31    134<L>  |  100  |  9   ----------------------------<  212<H>  4.2   |  26  |  0.57    Ca    8.0<L>      31 Mar 2022 06:07  Phos  3.0     03-31  Mg     1.9     03-31    TPro  6.0  /  Alb  2.8<L>  /  TBili  0.2  /  DBili  x   /  AST  12  /  ALT  <5<L>  /  AlkPhos  32<L>  03-31    LIVER FUNCTIONS - ( 31 Mar 2022 06:07 )  Alb: 2.8 g/dL / Pro: 6.0 g/dL / ALK PHOS: 32 U/L / ALT: <5 U/L / AST: 12 U/L / GGT: x           PT/INR - ( 30 Mar 2022 12:45 )   PT: 10.7 sec;   INR: 0.90          PTT - ( 30 Mar 2022 12:45 )  PTT:35.9 sec    CAPILLARY BLOOD GLUCOSE  152 (30 Mar 2022 13:34)      POCT Blood Glucose.: 194 mg/dL (31 Mar 2022 08:38)      COVID-19 PCR: NotDetec (26 Mar 2022 00:22)      RADIOLOGY & ADDITIONAL TESTS: Reviewed.

## 2022-03-31 NOTE — PROGRESS NOTE ADULT - ASSESSMENT
Pt is a 72F w/ PMHx of HTN, DM c/b peripheral neuropathy and mostly bedbound for several months, BIBEMS w/ 1 episode of bloody vomit with clots following a day of nausea/vomiting, admitted for hematemesis evaluation, found to have most likely MW tear.  Neurology consulted for lower extremity weakness bilaterally.     #TIA  Pt was stroke code yesterday with initial NIHSS 25 which reduced down to 8. Pt with improved symptoms today, no R facial droop, L sided weakness, or dysarthria. vEEG was placed which reveals normal, yet drowsy pattern without evidence of seizure like activity.  - Agree with neuro stroke team that patient likely had TIA at the time that has now resolved. MR head with chronic microvascular ischemic changes and lacunar infarct in bilateral basal ganglia and thalami.  - Would optimize her risk factors such as her DM with A1c of 11.  - C/w AsA 81mg and statin    #Bilateral Neuropathy  Pt with chronic neuropathy for about one year described as electrical type pain. However pt also experiencing bilateral paresis slightly worse on R>L. Pt is only on gabapentin 300mg TID, however pt states this does not help with pain at all. MR lumbar with degenerative disc disease at L3/4 L4/5 with small disc herniation at L3/4 which is stable.  - MR thoracic without signs of cord compression. However Mild posterior bulge at T2-3, and T-4-5 to T10-11 with mild to moderate canal narrowing worst at T10-11 level.  - Pt neuropathy likely 2/2 to both poorly controlled diabetes and the above findings of MR thoracic spine.  - C/w gabapentin 300 TID for neuropathy   - Should receive outpatient neurology follow up.     Discussed with attending, Dr. Griffith.

## 2022-03-31 NOTE — SWALLOW BEDSIDE ASSESSMENT ADULT - SWALLOW EVAL: DIAGNOSIS
Pt presents with a functional oropharyngeal swallow. Please re-consult SLP with any concerns or change in status.

## 2022-03-31 NOTE — SWALLOW BEDSIDE ASSESSMENT ADULT - COMMENTS
Stroke code 3/30, pt was unresponsive while eating for about 2 minutes, with R facial droop and slurred speech, now resolved. Imaging negative for new CVA. Likely TIA per neuro.

## 2022-03-31 NOTE — OCCUPATIONAL THERAPY INITIAL EVALUATION ADULT - PLANNED THERAPY INTERVENTIONS, OT EVAL
ADL retraining/IADL retraining/balance training/bed mobility training/cognitive, visual perceptual/fine motor coordination training/strengthening/transfer training

## 2022-03-31 NOTE — PROGRESS NOTE ADULT - PROBLEM SELECTOR PLAN 7
Per daughter, pt is on metformin 1000 BID, Lantus 18u, and Victoza, but only metformin was confirmed by pharmacy. A1C 11.6     Plan:   - Consulted endo, appreciate recs  - c/w basal bolus regimen with ISS

## 2022-04-01 LAB
ALBUMIN SERPL ELPH-MCNC: 2.8 G/DL — LOW (ref 3.3–5)
ALP SERPL-CCNC: 31 U/L — LOW (ref 40–120)
ALT FLD-CCNC: <5 U/L — LOW (ref 10–45)
ANION GAP SERPL CALC-SCNC: 7 MMOL/L — SIGNIFICANT CHANGE UP (ref 5–17)
AST SERPL-CCNC: 12 U/L — SIGNIFICANT CHANGE UP (ref 10–40)
BASOPHILS # BLD AUTO: 0.02 K/UL — SIGNIFICANT CHANGE UP (ref 0–0.2)
BASOPHILS NFR BLD AUTO: 0.5 % — SIGNIFICANT CHANGE UP (ref 0–2)
BILIRUB SERPL-MCNC: 0.2 MG/DL — SIGNIFICANT CHANGE UP (ref 0.2–1.2)
BUN SERPL-MCNC: 10 MG/DL — SIGNIFICANT CHANGE UP (ref 7–23)
CALCIUM SERPL-MCNC: 8.2 MG/DL — LOW (ref 8.4–10.5)
CHLORIDE SERPL-SCNC: 102 MMOL/L — SIGNIFICANT CHANGE UP (ref 96–108)
CHOLEST SERPL-MCNC: 109 MG/DL — SIGNIFICANT CHANGE UP
CO2 SERPL-SCNC: 27 MMOL/L — SIGNIFICANT CHANGE UP (ref 22–31)
CREAT SERPL-MCNC: 0.58 MG/DL — SIGNIFICANT CHANGE UP (ref 0.5–1.3)
EGFR: 95 ML/MIN/1.73M2 — SIGNIFICANT CHANGE UP
EOSINOPHIL # BLD AUTO: 0.03 K/UL — SIGNIFICANT CHANGE UP (ref 0–0.5)
EOSINOPHIL NFR BLD AUTO: 0.7 % — SIGNIFICANT CHANGE UP (ref 0–6)
GLUCOSE BLDC GLUCOMTR-MCNC: 134 MG/DL — HIGH (ref 70–99)
GLUCOSE BLDC GLUCOMTR-MCNC: 165 MG/DL — HIGH (ref 70–99)
GLUCOSE BLDC GLUCOMTR-MCNC: 257 MG/DL — HIGH (ref 70–99)
GLUCOSE BLDC GLUCOMTR-MCNC: 72 MG/DL — SIGNIFICANT CHANGE UP (ref 70–99)
GLUCOSE SERPL-MCNC: 63 MG/DL — LOW (ref 70–99)
HCT VFR BLD CALC: 31.8 % — LOW (ref 34.5–45)
HDLC SERPL-MCNC: 56 MG/DL — SIGNIFICANT CHANGE UP
HGB BLD-MCNC: 10.1 G/DL — LOW (ref 11.5–15.5)
IMM GRANULOCYTES NFR BLD AUTO: 0.2 % — SIGNIFICANT CHANGE UP (ref 0–1.5)
LIPID PNL WITH DIRECT LDL SERPL: 42 MG/DL — SIGNIFICANT CHANGE UP
LYMPHOCYTES # BLD AUTO: 2.04 K/UL — SIGNIFICANT CHANGE UP (ref 1–3.3)
LYMPHOCYTES # BLD AUTO: 46.6 % — HIGH (ref 13–44)
MAGNESIUM SERPL-MCNC: 1.8 MG/DL — SIGNIFICANT CHANGE UP (ref 1.6–2.6)
MCHC RBC-ENTMCNC: 26.2 PG — LOW (ref 27–34)
MCHC RBC-ENTMCNC: 31.8 GM/DL — LOW (ref 32–36)
MCV RBC AUTO: 82.6 FL — SIGNIFICANT CHANGE UP (ref 80–100)
MONOCYTES # BLD AUTO: 0.51 K/UL — SIGNIFICANT CHANGE UP (ref 0–0.9)
MONOCYTES NFR BLD AUTO: 11.6 % — SIGNIFICANT CHANGE UP (ref 2–14)
NEUTROPHILS # BLD AUTO: 1.77 K/UL — LOW (ref 1.8–7.4)
NEUTROPHILS NFR BLD AUTO: 40.4 % — LOW (ref 43–77)
NON HDL CHOLESTEROL: 53 MG/DL — SIGNIFICANT CHANGE UP
NRBC # BLD: 0 /100 WBCS — SIGNIFICANT CHANGE UP (ref 0–0)
PHOSPHATE SERPL-MCNC: 3.4 MG/DL — SIGNIFICANT CHANGE UP (ref 2.5–4.5)
PLATELET # BLD AUTO: 253 K/UL — SIGNIFICANT CHANGE UP (ref 150–400)
POTASSIUM SERPL-MCNC: 4 MMOL/L — SIGNIFICANT CHANGE UP (ref 3.5–5.3)
POTASSIUM SERPL-SCNC: 4 MMOL/L — SIGNIFICANT CHANGE UP (ref 3.5–5.3)
PROT SERPL-MCNC: 5.8 G/DL — LOW (ref 6–8.3)
RBC # BLD: 3.85 M/UL — SIGNIFICANT CHANGE UP (ref 3.8–5.2)
RBC # FLD: 13.6 % — SIGNIFICANT CHANGE UP (ref 10.3–14.5)
SARS-COV-2 RNA SPEC QL NAA+PROBE: SIGNIFICANT CHANGE UP
SODIUM SERPL-SCNC: 136 MMOL/L — SIGNIFICANT CHANGE UP (ref 135–145)
TRIGL SERPL-MCNC: 55 MG/DL — SIGNIFICANT CHANGE UP
TSH SERPL-MCNC: 1.12 UIU/ML — SIGNIFICANT CHANGE UP (ref 0.27–4.2)
WBC # BLD: 4.38 K/UL — SIGNIFICANT CHANGE UP (ref 3.8–10.5)
WBC # FLD AUTO: 4.38 K/UL — SIGNIFICANT CHANGE UP (ref 3.8–10.5)

## 2022-04-01 PROCEDURE — 99231 SBSQ HOSP IP/OBS SF/LOW 25: CPT | Mod: GC

## 2022-04-01 PROCEDURE — 99232 SBSQ HOSP IP/OBS MODERATE 35: CPT

## 2022-04-01 PROCEDURE — 70450 CT HEAD/BRAIN W/O DYE: CPT | Mod: 26

## 2022-04-01 PROCEDURE — 93306 TTE W/DOPPLER COMPLETE: CPT | Mod: 26

## 2022-04-01 PROCEDURE — 99223 1ST HOSP IP/OBS HIGH 75: CPT

## 2022-04-01 PROCEDURE — 95720 EEG PHY/QHP EA INCR W/VEEG: CPT

## 2022-04-01 PROCEDURE — 99233 SBSQ HOSP IP/OBS HIGH 50: CPT | Mod: GC

## 2022-04-01 RX ORDER — INSULIN LISPRO 100/ML
10 VIAL (ML) SUBCUTANEOUS
Refills: 0 | Status: DISCONTINUED | OUTPATIENT
Start: 2022-04-01 | End: 2022-04-04

## 2022-04-01 RX ORDER — INSULIN GLARGINE 100 [IU]/ML
16 INJECTION, SOLUTION SUBCUTANEOUS AT BEDTIME
Refills: 0 | Status: DISCONTINUED | OUTPATIENT
Start: 2022-04-01 | End: 2022-04-02

## 2022-04-01 RX ADMIN — LISINOPRIL 40 MILLIGRAM(S): 2.5 TABLET ORAL at 06:20

## 2022-04-01 RX ADMIN — Medication 81 MILLIGRAM(S): at 13:27

## 2022-04-01 RX ADMIN — Medication 1: at 22:12

## 2022-04-01 RX ADMIN — Medication 11 UNIT(S): at 13:28

## 2022-04-01 RX ADMIN — ATORVASTATIN CALCIUM 80 MILLIGRAM(S): 80 TABLET, FILM COATED ORAL at 22:11

## 2022-04-01 RX ADMIN — PANTOPRAZOLE SODIUM 40 MILLIGRAM(S): 20 TABLET, DELAYED RELEASE ORAL at 06:20

## 2022-04-01 RX ADMIN — Medication 3: at 13:27

## 2022-04-01 RX ADMIN — Medication 10 UNIT(S): at 18:09

## 2022-04-01 RX ADMIN — INSULIN GLARGINE 16 UNIT(S): 100 INJECTION, SOLUTION SUBCUTANEOUS at 22:11

## 2022-04-01 RX ADMIN — PANTOPRAZOLE SODIUM 40 MILLIGRAM(S): 20 TABLET, DELAYED RELEASE ORAL at 17:40

## 2022-04-01 RX ADMIN — ENOXAPARIN SODIUM 40 MILLIGRAM(S): 100 INJECTION SUBCUTANEOUS at 09:45

## 2022-04-01 NOTE — PROGRESS NOTE ADULT - SUBJECTIVE AND OBJECTIVE BOX
INTERVAL HPI/OVERNIGHT EVENTS:  Patient is a 72y old  Female who presents with a chief complaint of Hematemesis (29 Mar 2022 15:47)  Per chart, she had AMS overnight. Still on EEG. Pending CTH and TTE.    FSG & insulin:  Yesterday:  Dinner . Lispro 11+1  Bedtime . Lantus 18  Today:  Breakfast FSG 72. Lispro held.  Lunch FSG     Pt reports the following symptoms:    CONSTITUTIONAL:  Negative fever or chills, good appetite  EYES:  Negative  blurry vision or double vision  CARDIOVASCULAR:  Negative for chest pain or palpitations  RESPIRATORY:  Negative for cough, wheezing, or SOB   GASTROINTESTINAL:  Negative for nausea, vomiting, diarrhea, constipation, or abdominal pain  GENITOURINARY:  Negative frequency, urgency or dysuria  NEUROLOGIC:  No headache, confusion, dizziness, lightheadedness    MEDICATIONS  (STANDING):  aspirin  chewable 81 milliGRAM(s) Oral daily  atorvastatin 80 milliGRAM(s) Oral at bedtime  dextrose 5%. 1000 milliLiter(s) (100 mL/Hr) IV Continuous <Continuous>  gabapentin 300 milliGRAM(s) Oral every 8 hours  glucagon  Injectable 1 milliGRAM(s) IntraMuscular once  hydrALAZINE Injectable 10 milliGRAM(s) IV Push once  insulin glargine Injectable (LANTUS) 18 Unit(s) SubCutaneous at bedtime  insulin lispro (ADMELOG) corrective regimen sliding scale   SubCutaneous Before meals and at bedtime  insulin lispro Injectable (ADMELOG) 11 Unit(s) SubCutaneous three times a day before meals  lisinopril 40 milliGRAM(s) Oral daily  metoclopramide Injectable 5 milliGRAM(s) IV Push every 8 hours  pantoprazole  Injectable 40 milliGRAM(s) IV Push every 12 hours    MEDICATIONS  (PRN):  acetaminophen     Tablet .. 650 milliGRAM(s) Oral every 6 hours PRN Temp greater or equal to 38C (100.4F), Mild Pain (1 - 3), Moderate Pain (4 - 6)  dextrose Oral Gel 15 Gram(s) Oral once PRN Blood Glucose LESS THAN 70 milliGRAM(s)/deciliter      PHYSICAL EXAM  Vital Signs Last 24 Hrs  T(C): 36.4 (30 Mar 2022 14:00), Max: 36.8 (30 Mar 2022 06:22)  T(F): 97.6 (30 Mar 2022 14:00), Max: 98.2 (30 Mar 2022 06:22)  HR: 78 (30 Mar 2022 18:05) (63 - 78)  BP: 155/74 (30 Mar 2022 18:05) (118/59 - 192/88)  BP(mean): 106 (30 Mar 2022 18:05) (106 - 126)  RR: 18 (30 Mar 2022 18:05) (12 - 19)  SpO2: 99% (30 Mar 2022 18:05) (94% - 100%)    Physical Exam:  Gen: Elderly female in NAD, pleasantly confused  Neuro: AAOx2 (herself and hospital). Grossly no focal deficits, but reduced strength in bilateral lower extremities which seems to be her baseline  Heart: RRR, no rmg  Lungs: CTAB, no signs of increased WOB   Ext: Good distal pulses. Light touch sensation grossly intact    LABS:                        12.0   4.33  )-----------( 245      ( 30 Mar 2022 12:45 )             37.7     03-30    134<L>  |  98  |  12  ----------------------------<  153<H>  see note   |  25  |  0.51    Ca    8.3<L>      30 Mar 2022 12:45  Phos  2.7     03-30  Mg     1.8     03-30    TPro  6.5  /  Alb  3.2<L>  /  TBili  0.2  /  DBili  x   /  AST  see note  /  ALT  see note  /  AlkPhos  see note  03-30    PT/INR - ( 30 Mar 2022 12:45 )   PT: 10.7 sec;   INR: 0.90          PTT - ( 30 Mar 2022 12:45 )  PTT:35.9 sec        HbA1C:   CAPILLARY BLOOD GLUCOSE  152 (30 Mar 2022 13:34)      POCT Blood Glucose.: 127 mg/dL (30 Mar 2022 17:06)  POCT Blood Glucose.: 152 mg/dL (30 Mar 2022 12:18)  POCT Blood Glucose.: 165 mg/dL (30 Mar 2022 12:04)  POCT Blood Glucose.: 259 mg/dL (30 Mar 2022 08:31)  POCT Blood Glucose.: 350 mg/dL (29 Mar 2022 21:48)

## 2022-04-01 NOTE — BH CONSULTATION LIAISON ASSESSMENT NOTE - HPI (INCLUDE ILLNESS QUALITY, SEVERITY, DURATION, TIMING, CONTEXT, MODIFYING FACTORS, ASSOCIATED SIGNS AND SYMPTOMS)
Pt found awake, alert, calm, oriented to self, hospital in [place] (unable to state name), month/date but not year (""), largely disoriented to situation. Pt initially denied knowing daughter sitting at bedside, with prompting from daughter she was able to state daughter's name. Pt reports that she has been experiencing foot pain but denies weakness or trouble ambulating (states that she just spends too much time in bed). She denies any episodes of altered mental status or decreased responsiveness in the hospital, and states that she was "just asleep" when informed about the concern from her doctors. Pt reports emotional adjustment to her mother's death (from ?covid) four months ago but denies sadness or sustained low mood, denies anhedonia, denies hopelessness/helplessness, denies disturbance in sleep or appetite, states that it is the course of life when asked about how the death has affected her. States "are you crazy? I love life and I love God" when asked about any SI. Denies subjective memory loss. Denies any alcohol or illicit drug use. No abnormal movements appreciated on exam.    Per discussion with pt's daughter (with pt's consent), pt has never been in psychiatric care and never been diagnosed with any psychiatric condition. Over the last ~year, daughter has noticed short term memory loss and personality change (particularly verbal aggression, never physical), similar to that in pt's mother before she . In the last several ?days-weeks, daughter has noticed pt to appear much weaker, with more slowed and quiet speech, cognitively slowed. Daughter wondered whether this could be due to depression, though pt denies. Daughter confirmed never any concern for SI. Per H&P, "72F w/ PMHx of HTN, DM c/b peripheral neuropathy BIBEMS w/ 1 episode of bloody vomit with clots. Per daughter, pt was feeling more lethargic than usual yesterday morning. After eating breakfast and lunch, pt reported nausea w/ multiple episodes of vomiting, pt unsure if bloody and not witnessed by daughter. Shortly thereafter, pt was helped into the shower by daughter where pt then had witnessed episode of emesis w/ blood clots and EMS was called. Pt denies any abdominal pain, but reports a "pulling sensation" in the midepigastric region. Denies any diarrhea or constipation, and is not aware of any blood in her stool. Pt reported last colonoscopy > 10 years prior, no endoscopy in past. Pt reports feeling well other than hunger, denies nausea, and her main complaint is lower extremity pain. Pt reports chronic, progressive paresthesias w/ pain of bilateral lower extremities, associated with heaviness. Pt has been unable to ambulate without a walker and assistance for several months, and has mostly been bedbound during this time. Pt denies diffuse headache, changes in vision. Denies cough, dyspnea, dysuria." Pt admitted for further evaluation, found to have Naty Sanchez tears, lower extremity weakness. Admission also complicated by episodes of unresponsiveness for which pt undergoing further evaluation with head imaging, vEEG with concern for possible TIA or seizure. Psychiatry consulted for possible role of depression (noted recent death of mother) and possible intermittent catatonic features.    On evaluation this afternoon, pt found awake, alert, calm, oriented to self, hospital in [place] (unable to state name), month/date but not year (""), largely disoriented to situation. Pt initially denied knowing daughter sitting at bedside, with prompting from daughter she was able to state daughter's name. Pt reports that she has been experiencing foot pain but denies weakness or trouble ambulating (states that she just spends too much time in bed). She denies any episodes of altered mental status or decreased responsiveness in the hospital, and states that she was "just asleep" when informed about the concern from her doctors. Pt reports emotional adjustment to her mother's death (from ?covid) four months ago but denies sadness or sustained low mood, denies anhedonia, denies hopelessness/helplessness, denies disturbance in sleep or appetite, states that it is the course of life when asked about how the death has affected her. States "are you crazy? I love life and I love God" when asked about any SI. Denies subjective memory loss. Denies any alcohol or illicit drug use. No abnormal movements, no echophenomena appreciated on exam.    Per discussion with pt's daughter (with pt's consent), pt has never been in psychiatric care and never been diagnosed with any psychiatric condition. Over the last ~year, daughter has noticed short term memory loss and personality change (particularly verbal aggression, never physical), similar to that in pt's mother before she . In the last several ?days-weeks, daughter has noticed pt to appear much weaker, with more slowed and quiet speech, cognitively slowed. Daughter wondered whether this could be due to depression, though pt denies. Daughter confirmed never any concern for SI.

## 2022-04-01 NOTE — PROGRESS NOTE ADULT - ASSESSMENT
Pt is a 72F w/ PMHx of HTN, DM c/b peripheral neuropathy and mostly bedbound for several months, BIBEMS w/ 1 episode of bloody vomit with clots following a day of nausea/vomiting, admitted for hematemesis evaluation, found to have most likely MW tear.  Neurology consulted for lower extremity weakness bilaterally.     #Fluctuating Cognition.   Pt was stroke code3/30 with initial NIHSS 25 which reduced down to 8. Pt with improved symptoms no R facial droop, L sided weakness, or dysarthria. vEEG was placed which reveals normal, yet drowsy pattern without evidence of seizure like activity. However pt with recurrent episodes of waxing and waning cognition most recently 4/1 at 1AM, however pt had pulled off VEEG 15 mins prior to event. Pt not likely to be having TIA event given that cognition fluctuation is recurrent. DDx includes seizures vs. delirium vs. possible catatonia as well.   - Obtain repeat MR Head non contrast to compare to prior for any evolvement or change.   - Epilepsy team notified to reattach leads to patient for VEEG. Please keep VEEG to assess for possible seizure activity during changes in cognition. Would fully rule out seizures prior to involving psychiatry for catatonia evaluation.   - Could consider 5mg Valium or 2mg IVP Ativan during periods of decreased cognition to see if improvement pointing to either seizure or catatonia cause to episodes.   - D/c gabapentin.     #Bilateral Neuropathy  Pt with chronic neuropathy for about one year described as electrical type pain. However pt also experiencing bilateral paresis slightly worse on R>L. Pt is only on gabapentin 300mg TID, however pt states this does not help with pain at all. MR lumbar with degenerative disc disease at L3/4 L4/5 with small disc herniation at L3/4 which is stable.  - MR thoracic without signs of cord compression. However Mild posterior bulge at T2-3, and T-4-5 to T10-11 with mild to moderate canal narrowing worst at T10-11 level.  - Pt neuropathy likely 2/2 to both poorly controlled diabetes and the above findings of MR thoracic spine.  - Should receive outpatient neurology follow up.   - Stop gabapentin given changing cognition.     Discussed with attending, Dr. Griffith.

## 2022-04-01 NOTE — BH CONSULTATION LIAISON ASSESSMENT NOTE - DIFFERENTIAL
Delirium, dementia, r/o dementia with lewy bodies given episodes of fluctuating arousal if not better explained  No catatonic features at the time of exam Delirium, dementia, r/o dementia with lewy bodies given episodes of fluctuating arousal if not better explained  No catatonic features at the time of exam  bereavement  r/o depressive d/o

## 2022-04-01 NOTE — BH CONSULTATION LIAISON ASSESSMENT NOTE - NSBHCHARTREVIEWINVESTIGATE_PSY_A_CORE FT
CT HEAD 4/1  Findings:    The ventricles are stable in size and configuration from prior   examination.    No acute intracranial hemorrhage, extra-axial fluid collection, mass   effect or midline shift.. Again demonstrated is midline parietal   partially calcified lesion stable from prior exam.    There is no interval demarcated territorial infarction..    There is no acute calvarial fracture..    The paranasal sinuses and bilateral mastoid complexes are well aerated.    Impression:    No acute intracranial hemorrhage, mass effect or demarcated territorial   infarction. Stable examination.    MR BRAIN 3/30  Agree that there is no acute infarction.    Again demonstrated is a partially calcified lesion in the parietal region   extending into the posterior sagittal sinus as well as the overlying   calvarium as demonstrated on prior CTA head and neck.    There are patchy areas of T2/FLAIR hyperintensity within the   periventricular subcortical white matter the basis of chronic   microvascular ischemic changes. There are chronic lacunar infarctions   within the bilateral basal ganglia and thalami.    ******PRELIMINARY REPORT******      PROCEDURE INFORMATION:  Exam: MR Head Without Contrast  Exam date and time: 3/30/2022 10:56 PM  Age: 72 years old  Clinical indication: Speech disturbance; Slurred speech    TECHNIQUE:  Imaging protocol: MR of the head without contrast.    COMPARISON:  CT HEAD STROKE PROTOCOL 3/30/2022 12:50 PM    FINDINGS:  Brain: Age appropriate atrophy and small vessel ischemic change. No   evidence of intracranial hemorrhage, mass effect, midline shift or   extra-axial fluid collections. Midline structures are normal.  Gray-white matter differentiation is normal. There is no restricted   diffusion.  Cerebral ventricles: Normal. No ventriculomegaly.  Bones/joints: Unremarkable.  Paranasal sinuses: Normal as visualized. No acute sinusitis.  Mastoid air cells: Normal as visualized. No mastoid effusion.  Orbital cavities: Unremarkable.  Soft tissues: Unremarkable.    IMPRESSION:    No acute intracranial abnormality.

## 2022-04-01 NOTE — BH CONSULTATION LIAISON ASSESSMENT NOTE - NSBHADMITCOUNSELOTHER_PSY_A_CORE FT
counseling about purpose of psychiatric evaluation, assessment of cognitive function  emotional support re: loss of mother  clarification of hx with pt's daughter

## 2022-04-01 NOTE — BH CONSULTATION LIAISON ASSESSMENT NOTE - OTHER
grossly attentive throughout, impaired concentration, many errors in spelling WORLD in reverse on testing suspiciousness about purpose of questions asked. No clear voiced delusions. No reported hopelessness or guilt. No SI or HI impaired normal strength and tone in upper extremities grossly linear, occasionally illogical/with impaired reasoning limited

## 2022-04-01 NOTE — BH CONSULTATION LIAISON ASSESSMENT NOTE - CURRENT MEDICATION
MEDICATIONS  (STANDING):  aspirin  chewable 81 milliGRAM(s) Oral daily  atorvastatin 80 milliGRAM(s) Oral at bedtime  dextrose 5%. 1000 milliLiter(s) (100 mL/Hr) IV Continuous <Continuous>  enoxaparin Injectable 40 milliGRAM(s) SubCutaneous every 24 hours  glucagon  Injectable 1 milliGRAM(s) IntraMuscular once  insulin glargine Injectable (LANTUS) 18 Unit(s) SubCutaneous at bedtime  insulin lispro (ADMELOG) corrective regimen sliding scale   SubCutaneous Before meals and at bedtime  insulin lispro Injectable (ADMELOG) 11 Unit(s) SubCutaneous three times a day before meals  lisinopril 40 milliGRAM(s) Oral daily  pantoprazole    Tablet 40 milliGRAM(s) Oral two times a day    MEDICATIONS  (PRN):  acetaminophen     Tablet .. 650 milliGRAM(s) Oral every 6 hours PRN Temp greater or equal to 38C (100.4F), Mild Pain (1 - 3), Moderate Pain (4 - 6)  dextrose Oral Gel 15 Gram(s) Oral once PRN Blood Glucose LESS THAN 70 milliGRAM(s)/deciliter  LORazepam   Injectable 2 milliGRAM(s) IV Push once PRN seizure > 1 minute  metoclopramide 5 milliGRAM(s) Oral every 8 hours PRN Nausea/Vomiting

## 2022-04-01 NOTE — BH CONSULTATION LIAISON ASSESSMENT NOTE - NSBHCHARTREVIEWLAB_PSY_A_CORE FT
10.1   4.38  )-----------( 253      ( 01 Apr 2022 07:54 )             31.8     03-31    136  |  100  |  12  ----------------------------<  94  4.6   |  28  |  0.68    Ca    8.3<L>      31 Mar 2022 15:49  Phos  2.6     03-31  Mg     1.8     03-31    TPro  6.0  /  Alb  2.8<L>  /  TBili  0.2  /  DBili  x   /  AST  12  /  ALT  <5<L>  /  AlkPhos  32<L>  03-31    TSH WNL  B12, folate pending

## 2022-04-01 NOTE — BH CONSULTATION LIAISON ASSESSMENT NOTE - SUMMARY
RECOMMENDATIONS  -no need for 1:1 for suicidality or indication for psychiatric admission  -agree with evaluation for reversible contributors to cognitive decline (check vitamin levels, consider RPR). Appreciate neuro input  -no acute indication for psychotropic medication for mood. Could consider low dose SSRI if irritability persists beyond acute phase of hospitalization  -delirium precautions  -consider obtaining records from [place] admission in 2020  -d/w pt's daughter 71yo woman with no reported psychiatric hx, concern for cognitive and physical decline over last year per daughter, additional PMH including HTN, DMT2, and ?meningioma per outside records who presents with irritable affect and fluctuations in arousal, disorientation, short-term memory and concentration deficits, suggestive of delirium potentially superimposed on a neurocognitive d/o. No signs of catatonia appreciated at the time of my evaluation. Bereavement likely in setting of death of mother but no significant s/s suggestive of a major depressive episode. Agree with additional neuro workup - would hold off on any standing psychotropic medication for now.    RECOMMENDATIONS  -no need for 1:1 for suicidality or indication for psychiatric admission  -agree with evaluation for reversible contributors to cognitive decline (check vitamin levels, consider RPR). Appreciate neuro input and seizure evaluation.  -no acute indication for psychotropic medication for mood. Could consider low dose SSRI if irritability persists beyond acute phase of hospitalization  -delirium precautions  -consider obtaining records from [place] admission in 2020, ?if past similar episodes of AMS  -d/w pt's daughter

## 2022-04-01 NOTE — PROGRESS NOTE ADULT - SUBJECTIVE AND OBJECTIVE BOX
NEUROLOGY CONSULT: PROGRESS NOTE    INTERVAL HISTORY:  noted.    SUBJECTIVE:  Pt seen and examined at bedside this AM. Pt is A&Ox2 to person and hospital this morning. Speech is mildly slowed. Still complaining of cramping within feet.     MEDICATIONS:  acetaminophen     Tablet .. 650 milliGRAM(s) Oral every 6 hours PRN  aspirin  chewable 81 milliGRAM(s) Oral daily  atorvastatin 80 milliGRAM(s) Oral at bedtime  dextrose 5%. 1000 milliLiter(s) IV Continuous <Continuous>  dextrose Oral Gel 15 Gram(s) Oral once PRN  enoxaparin Injectable 40 milliGRAM(s) SubCutaneous every 24 hours  glucagon  Injectable 1 milliGRAM(s) IntraMuscular once  insulin glargine Injectable (LANTUS) 18 Unit(s) SubCutaneous at bedtime  insulin lispro (ADMELOG) corrective regimen sliding scale   SubCutaneous Before meals and at bedtime  insulin lispro Injectable (ADMELOG) 11 Unit(s) SubCutaneous three times a day before meals  lisinopril 40 milliGRAM(s) Oral daily  metoclopramide 5 milliGRAM(s) Oral every 8 hours PRN  pantoprazole    Tablet 40 milliGRAM(s) Oral two times a day    VITAL SIGNS:  Vital Signs Last 24 Hrs  T(C): 36.9 (01 Apr 2022 06:13), Max: 37.8 (31 Mar 2022 14:56)  T(F): 98.5 (01 Apr 2022 06:13), Max: 100.1 (31 Mar 2022 14:56)  HR: 68 (01 Apr 2022 06:13) (68 - 77)  BP: 144/73 (01 Apr 2022 06:13) (102/57 - 177/84)  BP(mean): --  RR: 18 (01 Apr 2022 06:13) (18 - 18)  SpO2: 100% (01 Apr 2022 06:13) (96% - 100%)    PHYSICAL EXAMINATION:  General: Comfortable.  Neurologic:     -Mental Status: AAOx2. Speech is fluent with intact naming, repetition, and comprehension, no dysarthria. Recent and remote memory intact. Follows commands. Attention/concentration intact. Fund of knowledge appropriate.     -Cranial Nerves:          II: Impaired L vision.           III, IV, VI: EOMI without nystagmus. L pupil 4mm and fixed. R pupil responsive to light.           V:  Facial sensation V1-V3 equal and intact           VII: Face is symmetric with normal eye closure and smile          VIII: Hearing is bilaterally intact to finger rub          IX, X: Uvula is midline and soft palate rises symmetrically          XI: Head turning and shoulder shrug are intact.          XII: Tongue protrudes midline     -Motor: Normal bulk and tone. No involuntary movements (tremor, myoclonus, chorea, athetosis, dystonia)          Upper extremities: 5/5 shoulder abduction,5/5 elbow flexion/extension, 5/5 wrist flexion/extension, 5/5 handgrip          Lower extremities: 3/5 hip flexion, 3/5  knee extension/flexion,1/5 plantar flexion, 1/5ankle dorsiflexion          No pronator drift. Rapid alternating movements intact and symmetric     -Sensation: Intact to light touch. Vibratory sense decreased bilateral lower extremity.          -Coordination: Dysmetria on finger to nose particularly on L side, however improved from yesterday     -Reflexes: 2+ and symmetric at biceps, triceps, brachioradialis, patellar, ankles          Plantar reflexes mute.      LABS:                          10.1   4.38  )-----------( 253      ( 01 Apr 2022 07:54 )             31.8     04-01    136  |  102  |  10  ----------------------------<  63<L>  4.0   |  27  |  0.58    Ca    8.2<L>      01 Apr 2022 07:54  Phos  3.4     04-01  Mg     1.8     04-01    TPro  5.8<L>  /  Alb  2.8<L>  /  TBili  0.2  /  DBili  x   /  AST  12  /  ALT  <5<L>  /  AlkPhos  31<L>  04-01    PT/INR - ( 30 Mar 2022 12:45 )   PT: 10.7 sec;   INR: 0.90          PTT - ( 30 Mar 2022 12:45 )  PTT:35.9 sec      RADIOLOGY & ADDITIONAL STUDIES:  reviewed

## 2022-04-01 NOTE — BH CONSULTATION LIAISON ASSESSMENT NOTE - NSSUICPROTFACT_PSY_ALL_CORE
Identifies reasons for living/Supportive social network of family or friends/Cultural, spiritual and/or moral attitudes against suicide/Taoist beliefs

## 2022-04-01 NOTE — PROGRESS NOTE ADULT - ATTENDING COMMENTS
72F w HTN, DM c/b peripheral neuropathy, HLD, recent history of BLE weakness p/w bloody vomiting c/f MW tear - improving however noted to have b/l foot drop noted for months, MRI T/L showing mild disc disease, rapid to 7L for encephalopathy, stroke code 3/30 - CTA/CTP neg but MRI stroke protocol shows chronic microvascular ischemic changes an lacunar infarcts in b/l Basal ganglia and thalami - thought to be TIA - started on ASA/Atorva, started on vEEG, returned to [facility] w continued episodes of somnolence - now pending MRI brain w/wo contrast    Pt seen working w PT. States legs feel weak. Mild pain in b/l feet. No lightheadedness, dizziness. A/O to self, location. 0/5 in plantarflex/ext - daughter states this has been baseline now for several months. Has grown weaker since Oct-Nov 2021. Feels more recently pt is less like herself, appearing more tired, less engaged, and did not recognize her grandchildren.     #Encephalopathy - continues to occur - manifests w somnolence. no lab abnormalities. DDx includes Seizure d/o, recent ?TIA, delirium  #b/l weakness - seems to have progressed over several months. pt w b/l foot drop for months. DDx includes peripheral neuropathy. MRI T/L spine w some disc/spinal disease  #DM - BGs controlled. c/w basal bolus; endo following   #Gastroparesis - pt tolerating diet. Passing gas and BMs wo nausea   - PRN PO reglan  #HTN - c/w ACEi  #?TIA -   #MW tear - no further episodes of vomiting, hgb stable. c/w BID PPI x2wk    Plan  More awake today - working w PT  Psych c/s - eval for possible adjustment disorder, complicated grief, depression contributing to delirium  MRI brain w/wo contrast  Note -- pt's gabapentin DCd on 3/31 - would slowly restart if BLE pain becomes uncontrolled  f/u vEEG, neuro recs    DISPO: [facility] - however pt does not have [facility] benefit - thus will need to return home w family support (daughter

## 2022-04-01 NOTE — PROGRESS NOTE ADULT - ATTENDING COMMENTS
we had the opportunity to discuss her condition with her daughter this morning.  in addition to spells, the daughter reports the patient is not at her baseline throughout the hospitalization, apathetic, less responsive than usual, not remembering things. also confirms that there were pre-hospital memory issues.  so far, neurological and medical workup cannot explain her change from baseline.    she had another spell but had taken off her EEG leads prior so we did not capture it.  we should capture a spell to r/o seizure as the cause with vEEG during an episode    right now I am considering that the best explanation for both inter-ictal departure from baseline and ictal events could be manifestations of catatonia.  we first need to r/o epilepsy.  also would recommend MRI brain w/o to r/o any developing brain pathology. I am considering a therapeutic trial of ativan 2mg IV the next time she has an event

## 2022-04-01 NOTE — PROGRESS NOTE ADULT - NS ATTEND AMEND GEN_ALL_CORE FT
The patient is a 72-year-old female with a history of hypertension, hyperlipidemia, and type 2 diabetes with neuropathy who was admitted 3/26 after experiencing hematemesis thought secondary to Naty-Sanchez tear.  Stroke was consulted yesterday after the patient was found unresponsive with right facial droop (?baseline), dysarthria and left-sided weakness upon awakening.  She had a left-gaze preference as well. .  The patient's mental status gradually improved as did her focal deficits. Differential includes brainstem TIA vs seizure. EEG negative thus far. Will d/c if negative this PM.  MRI negative. TTE. Continue aspirin and statin.
The patient is a 72-year-old female with a history of hypertension, hyperlipidemia, and type 2 diabetes with neuropathy who was admitted 3/26 after experiencing hematemesis thought secondary to Naty-Sanchez tear.  Stroke was consulted yesterday after the patient was found unresponsive with right facial droop (?baseline), dysarthria and left-sided weakness upon awakening.  She had a left-gaze preference as well. .  The patient's mental status gradually improved as did her focal deficits. Differential includes brainstem TIA vs seizure. EEG negative thus far. Will d/c if negative this PM.  MRI negative. TTE. Continue aspirin and statin.
Pt seen on rounds this afternoon.  Was reasonably alert at the time of our visit, but was unable to recall what she ate at her last two meals.  Fingersticks were in target range late yesterday, but glucose dropped overnight to 72 this morning after 18 units of Lantus last night.  The spike to 257 pre-lunch was the expected result of her not having received her standing pre-breakfast insulin  Will decrease the Lantus back to 16 units.  The reason for the fluctuating basal insulin requirements is unclear  Will also decrease the premeal lispro to 10 units

## 2022-04-01 NOTE — BH CONSULTATION LIAISON ASSESSMENT NOTE - NSBHCONSULTFOLLOWAFTERCARE_PSY_A_CORE FT
Encourage additional neurocognitive assessment as outpt. please provide referral information for:  •	[facility])  o	[email or web address]  o	Geared towards an aging population, with the understanding that their clients will need increasingly comprehensive services.  Must be over 55.    o	[address] [place] [address]  [id number] [phone]

## 2022-04-01 NOTE — PROGRESS NOTE ADULT - SUBJECTIVE AND OBJECTIVE BOX
OVERNIGHT EVENTS: Please see chart note from neuro. Pt had a similar episode of unresponsiveness/somnolence; agitated and pulled off vEEG leads. waxing and waning mental status. pending CT noncon head    SUBJECTIVE: Patient seen and examined at bedside. Reports feeling fine. Answers questions but hard to maintain alertness during the exam. Complains of throat pain. Denies fevers, chills, HA, chest pain, sob, nausea, vomiting, abdominal pain, diarrhea, constipation, dysuria.     OBJECTIVE  Vital Signs Last 24 Hrs  T(C): 36.9 (01 Apr 2022 06:13), Max: 37.8 (31 Mar 2022 14:56)  T(F): 98.5 (01 Apr 2022 06:13), Max: 100.1 (31 Mar 2022 14:56)  HR: 68 (01 Apr 2022 06:13) (68 - 87)  BP: 144/73 (01 Apr 2022 06:13) (102/57 - 177/84)  RR: 18 (01 Apr 2022 06:13) (18 - 18)  SpO2: 100% (01 Apr 2022 06:13) (93% - 100%) on RA       PHYSICAL EXAM  General: frail elderly woman in NAD  HEENT: NC/AT; EOMI, PERRLA, anicteric sclera; dry mucosal membranes; poor dentition  Neck: supple, trachea midline  Cardiovascular: RRR, +S1/S2; NO M/R/G  Respiratory: CTA B/L; no W/R/R  Gastrointestinal: soft, NT/ND; +BSx4  Extremities: WWP; no edema or cyanosis  Vascular: 2+ radial, DP/PT pulses B/L  Neuro: A&Ox2. 5/5 strength of b/l UEs; 1/5 strength of b/l ankles. Sensation intact but decreased b/l; CN 2-12 intact.     ALLERGIES:  Allergies    No Known Allergies    Intolerances      MEDICATIONS:  MEDICATIONS  (STANDING):  aspirin  chewable 81 milliGRAM(s) Oral daily  atorvastatin 80 milliGRAM(s) Oral at bedtime  dextrose 5%. 1000 milliLiter(s) (100 mL/Hr) IV Continuous <Continuous>  gabapentin 300 milliGRAM(s) Oral every 8 hours  glucagon  Injectable 1 milliGRAM(s) IntraMuscular once  heparin   Injectable 5000 Unit(s) SubCutaneous every 12 hours  insulin glargine Injectable (LANTUS) 18 Unit(s) SubCutaneous at bedtime  insulin lispro (ADMELOG) corrective regimen sliding scale   SubCutaneous Before meals and at bedtime  insulin lispro Injectable (ADMELOG) 11 Unit(s) SubCutaneous three times a day before meals  lisinopril 40 milliGRAM(s) Oral daily  metoclopramide Injectable 5 milliGRAM(s) IV Push every 8 hours  pantoprazole  Injectable 40 milliGRAM(s) IV Push every 12 hours    MEDICATIONS  (PRN):  acetaminophen     Tablet .. 650 milliGRAM(s) Oral every 6 hours PRN Temp greater or equal to 38C (100.4F), Mild Pain (1 - 3), Moderate Pain (4 - 6)  dextrose Oral Gel 15 Gram(s) Oral once PRN Blood Glucose LESS THAN 70 milliGRAM(s)/deciliter      -------------------------------------------------------------------------------  LABS:                        10.1   4.38  )-----------( 253      ( 01 Apr 2022 07:54 )             31.8     03-31    136  |  100  |  12  ----------------------------<  94  4.6   |  28  |  0.68    Ca    8.3<L>      31 Mar 2022 15:49  Phos  2.6     03-31  Mg     1.8     03-31    TPro  6.0  /  Alb  2.8<L>  /  TBili  0.2  /  DBili  x   /  AST  12  /  ALT  <5<L>  /  AlkPhos  32<L>  03-31    Blood Gas Profile - Arterial (03.31.22 @ 16:05)   pH, Arterial: 7.43   pCO2, Arterial: 45 mmHg   pO2, Arterial: 72 mmHg   HCO3, Arterial: 30 mmol/L   Base Excess, Arterial: 4.8 mmol/L   Oxygen Saturation, Arterial: 95.6 %   Total CO2, Arterial: 31 mmol/L     COVID-19 PCR: NotDetec (26 Mar 2022 00:22)      RADIOLOGY & ADDITIONAL TESTS: Reviewed.  < from: MR Head No Cont (03.30.22 @ 23:02) >  No acute intracranial abnormality.    < end of copied text >  < from: MR Head No Cont (03.30.22 @ 23:02) >  chronic lacunar infarctions   within the bilateral basal ganglia and thalami.      < end of copied text >  < from: MR Thoracic Spine No Cont (03.30.22 @ 23:09) >  Mild-to-moderate degenerative disc changes. No evidence of cord   compression.    < end of copied text >

## 2022-04-01 NOTE — PROGRESS NOTE ADULT - PROBLEM SELECTOR PLAN 1
Uncontrolled - A1C is 11.6  - history is somewhat limited 2/2 her being an extremely poor historian i/s/o her ?dementia  - home regimen: Metformin 1g BID, Victoza 1.2, and 18 units of Lantus daily.     Plan:  Increase Lantus to  units tonight  Increase nutritional Lispro to  units before meals.  Continue lispo JANICE before meals and at bedtime.  Dispo: [facility]    Endocrine will continue to follow Uncontrolled - A1C is 11.6  - history is somewhat limited 2/2 her being an extremely poor historian i/s/o her ?dementia  - home regimen: Metformin 1g BID, Victoza 1.2, and 18 units of Lantus daily.     Plan:   Lantus 16  units tonight   nutritional Lispro 10  units before meals.  Continue lispo JANICE before meals and at bedtime.  Dispo: [facility]    Endocrine will continue to follow

## 2022-04-01 NOTE — PROGRESS NOTE ADULT - ASSESSMENT
This pt is a 71 yo F w/ PMHx of HTN, DM c/b peripheral neuropathy BIBEMS w/ 1 episode of bloody vomit with clots. Per daughter, pt was feeling more lethargic than usual the day prior to admission (3/26). Poor Historian and as per chart review most of the history obtained from pt's daughter. pt admitted for hematemesis to R/O Naty pimentel tear 2/2 DM gastroparesis with GI consult recommending PPI. Per pt's daughter, pt has been having progressive weakness X 1year ? DM neuropathy B/L LE as pt has had less walking and also C/O chronic burning pain and has been bed bound X 6 months, has been using walker to ambulate. MRi was obtained on 3/28 by primary team for B/L LE weakness and Gen Neuro consulted. On 03/30, pt was found unresponsive while eating lunch sitting in the chair, slumped over, unresponsive to noxious stimuli with food bolus in her mouth. Initially RRT was called, pt was transferred to bed and as the team was preparing to intubate the pt, pt was found to be more responsive, more alert, but noted to have a new R sided facial droop and slurred speech for which stroke code was called. Stroke code CTH negative for any acute intracranial pathology, CTA with no LVO/high grade stenosis and CTP with mismatch volume of 121ml, but exam degraded by motion as well as incomplete transit of contrast bolus. Case D/W Neuro stroke attending Dr. Cross and agreed that pt not a candidate for tPA 2/2 rapidly improving symptoms and not an EVT candidate 2/2 absence of LVO. Pt noted to have rapid improvement in mental status(back to baseline), facial droop and slurred speech with improvement of NIHSS from 25 to 8 and also with L gaze preference with L sided weakness which rapidly improved, pt woke up confused in CT scanner with eventual return to baseline upon returning to floor is likely not vascular(stroke) in etiology and may be highly suggestive of TIA Vs Seizure vs. hospital delirium.    Plan :  - C/W ASA 81mg po daily and Atorvastatin  - EEG negative for seizure activity, okay to be discontinued  - MRI Brain ; negative  - Obtain TTE : pending  - Poorly controlled DM with A1C of 11.6 - diabetes specialist has been consulted  - Diabetic education  - TSH - 1.120  - LDL - 42  - SCDs and lovenox for DVT PPX  - continue q4hr neuro checks    Pending TTE results, stroke will sign off. Please reach out with any further questions or concerns.     Case discussed with Dr. Felicia Cross This pt is a 71 yo F w/ PMHx of HTN, DM c/b peripheral neuropathy BIBEMS w/ 1 episode of bloody vomit with clots. Per daughter, pt was feeling more lethargic than usual the day prior to admission (3/26). Poor Historian and as per chart review most of the history obtained from pt's daughter. pt admitted for hematemesis to R/O Naty pimentel tear 2/2 DM gastroparesis with GI consult recommending PPI. Per pt's daughter, pt has been having progressive weakness X 1year ? DM neuropathy B/L LE as pt has had less walking and also C/O chronic burning pain and has been bed bound X 6 months, has been using walker to ambulate. MRi was obtained on 3/28 by primary team for B/L LE weakness and Gen Neuro consulted. On 03/30, pt was found unresponsive while eating lunch sitting in the chair, slumped over, unresponsive to noxious stimuli with food bolus in her mouth. Initially RRT was called, pt was transferred to bed and as the team was preparing to intubate the pt, pt was found to be more responsive, more alert, but noted to have a new R sided facial droop and slurred speech for which stroke code was called. Stroke code CTH negative for any acute intracranial pathology, CTA with no LVO/high grade stenosis and CTP with mismatch volume of 121ml, but exam degraded by motion as well as incomplete transit of contrast bolus. Case D/W Neuro stroke attending Dr. Cross and agreed that pt not a candidate for tPA 2/2 rapidly improving symptoms and not an EVT candidate 2/2 absence of LVO. Pt noted to have rapid improvement in mental status(back to baseline), facial droop and slurred speech with improvement of NIHSS from 25 to 8 and also with L gaze preference with L sided weakness which rapidly improved, pt woke up confused in CT scanner with eventual return to baseline upon returning to floor is likely not vascular(stroke) in etiology and may be highly suggestive of seizure vs. hospital delirium.    Plan :  - C/W ASA 81mg po daily and Atorvastatin  - EEG negative for seizure activity, however, patient pulled off leads prior to event overnight. Consider reattaching as patient is less agitated currently.  - MRI Brain ; negative on 3/30.  - Obtain TTE : pending  - Poorly controlled DM with A1C of 11.6 - diabetes specialist has been consulted  - Diabetic education  - TSH - 1.120  - LDL - 42  - SCDs and lovenox for DVT PPX  - continue q4hr neuro checks    Pending TTE results, stroke will sign off. Please reach out with any further questions or concerns.     Case discussed with Dr. Felicia Cross This pt is a 71 yo F w/ PMHx of HTN, DM c/b peripheral neuropathy BIBEMS w/ 1 episode of bloody vomit with clots. Per daughter, pt was feeling more lethargic than usual the day prior to admission (3/26). Poor Historian and as per chart review most of the history obtained from pt's daughter. pt admitted for hematemesis to R/O Naty pimentel tear 2/2 DM gastroparesis with GI consult recommending PPI. Per pt's daughter, pt has been having progressive weakness X 1year ? DM neuropathy B/L LE as pt has had less walking and also C/O chronic burning pain and has been bed bound X 6 months, has been using walker to ambulate. MRi was obtained on 3/28 by primary team for B/L LE weakness and Gen Neuro consulted. On 03/30, pt was found unresponsive while eating lunch sitting in the chair, slumped over, unresponsive to noxious stimuli with food bolus in her mouth. Initially RRT was called, pt was transferred to bed and as the team was preparing to intubate the pt, pt was found to be more responsive, more alert, but noted to have a new R sided facial droop and slurred speech for which stroke code was called. Stroke code CTH negative for any acute intracranial pathology, CTA with no LVO/high grade stenosis and CTP with mismatch volume of 121ml, but exam degraded by motion as well as incomplete transit of contrast bolus. Case D/W Neuro stroke attending Dr. Cross and agreed that pt not a candidate for tPA 2/2 rapidly improving symptoms and not an EVT candidate 2/2 absence of LVO. Pt noted to have rapid improvement in mental status(back to baseline), facial droop and slurred speech with improvement of NIHSS from 25 to 8 and also with L gaze preference with L sided weakness which rapidly improved, pt woke up confused in CT scanner with eventual return to baseline upon returning to floor is likely not vascular(stroke) in etiology and may be highly suggestive of seizure vs. hospital delirium.    Plan :  - C/W ASA 81mg po daily and Atorvastatin  - EEG negative for seizure activity, however, patient pulled off leads prior to event overnight. Consider reattaching as patient is less agitated currently.  - MRI Brain ; negative on 3/30.  - Obtain TTE : EF 83%, LA normal in size. mild symmetric LVH, mild/moderate aortic stenosis and trace AR  - Poorly controlled DM with A1C of 11.6 - diabetes specialist has been consulted  - Diabetic education  - TSH - 1.120  - LDL - 42  - SCDs and lovenox for DVT PPX  - continue q4hr neuro checks    Stroke will sign off. Please reach out with any further questions or concerns.     Case discussed with Dr. Felicia Cross

## 2022-04-01 NOTE — CHART NOTE - NSCHARTNOTEFT_GEN_A_CORE
Informed by primary team of change in mental status. Early in the evening, patient was seen talking on her phone and at her baseline. Around 1:15am, patient was found altered and agitated, talking nonsense (mentioning people "Corin" who was not there), and not answering questions appropriately. Blood pressure elevated SBP 170s. Examined patient around 1:30, was resting in bed with eyes closed, eeg leads pulled off, exam below. Patient was calm enough for blood pressure to be repeated () while before, she was aggressive toward staff.     Re-assessed patient around 2:30am and found patient fiddling with gown, oriented to self only, similar to exam as prior.     Mental status: Eyes closed, but will intermittently open them with repeat verbal stimuli. Oriented to person only, not place (says home). Answers some questions inappropriately. Does mumble when not prompted, but unable to make out words, otherwise one word answers with no dysarthria appreciated. Follows commands with repeated encouragement.   -Cranial nerves:   II: Visual fields are grossly full, able to count fingers.   III, IV, VI: Extraocular movements are intact without nystagmus. Pupils equally round and reactive to light  VII: Face is appears symmetric  Motor: Moving b/l UE more than LE. B/l UE at least 3/5 strength, able to lift off bed and grab target. B/l at least 2/5, able to bend at knee.   Sensation: Grimaces and groans to noxious stimuli along all four extremities.     A/P:  73 yo F w/ PMHx of HTN, DM c/b peripheral neuropathy BIBEMS w/ 1 episode of bloody vomit with clots. Admitted to r/p Naty Sanchez tear 2/2 DM gastroparesis. General neurology consulted for progressive b/l LE weakness x 1 year. Stroke code called 3/30 when patient was found unresponsive while eating lunch and noted with R sided facial droop and slurred speech. During course of code, mental status improved back to baseline. Etiology suggestive of TIA vs seizure.  EEG placed.     Symptoms seem episodic in nature. Event may be seizure related with post-ictal confusion as it would be unlikely for an transient ischemic event to occur in the same location with no steno-occlusive disease seen on CTA head and neck. There may also be a component of hospital acquired delirium.      - f/u eeg read. If negative, can consider CTH non con  - no evidence of toxic metabolic processes   - will discuss with neurology team in am gait training/strengthening/transfer training/balance training/stairs training

## 2022-04-01 NOTE — BH CONSULTATION LIAISON ASSESSMENT NOTE - NSBHCHARTREVIEWVS_PSY_A_CORE FT
Vital Signs Last 24 Hrs  T(C): 36.9 (01 Apr 2022 06:13), Max: 37.8 (31 Mar 2022 14:56)  T(F): 98.5 (01 Apr 2022 06:13), Max: 100.1 (31 Mar 2022 14:56)  HR: 68 (01 Apr 2022 06:13) (68 - 77)  BP: 144/73 (01 Apr 2022 06:13) (102/57 - 177/84)  BP(mean): --  RR: 18 (01 Apr 2022 06:13) (18 - 18)  SpO2: 100% (01 Apr 2022 06:13) (96% - 100%)

## 2022-04-01 NOTE — PROGRESS NOTE ADULT - PROBLEM SELECTOR PLAN 10
Noted by ED physician. EKG not in pt's chart on admission.  - EKG at [facility] showing incomplete RBBB.

## 2022-04-01 NOTE — PROGRESS NOTE ADULT - PROBLEM SELECTOR PLAN 7
Per daughter, pt is on metformin 1000 BID, Lantus 18u, and Victoza, but only metformin was confirmed by pharmacy. A1C 11.6     Plan:   - Consulted endo, appreciate recs  - c/w basal bolus regimen with ISS; lantus 18 and 11 bolus

## 2022-04-01 NOTE — EEG REPORT - NS EEG TEXT BOX
Day 2: 3/31/2022 @ 10:07 AM to 4/1/2022 @ 12:57 AM (~ 14 hrs; patient removed leads)  Pertinent medications: n/a  Awake Background:  continuous, with predominantly alpha and theta frequencies.  Symmetry:  No persistent asymmetries of voltage or frequency.  Organization: Appropriate anterior-posterior gradient.  Posterior Dominant Rhythm: 8.5 Hz symmetric, well-organized, and well-modulated.  Voltage:  Normal (20+ uV)  Variability: Yes. 		Reactivity: Yes.  State changes: Present with rudimentary N2 sleep transients  Spontaneous Activity:  No epileptiform discharges.  Periodic/rhythmic activity: None.  Events:    1)	At ~ 0100, a neurology staff member can be seen examining patient and notes she is confused – when asked what her last name his, replies “Iris.” Patient appears drowsy, and was a restless sleep immediately prior to this (patient pulled her EEG leads while in a drowsy state out of sleep). Patient is then seen following commands.   2)         There were no abnormal movements or staring just prior to this exam.   2)	There was no epileptiform activity or electrographic seizures seen throughout the recording.   Provocations:  Hyperventilation and Photic stimulation: not performed.    Daily Summary:    Normal awake and asleep EEG recording.   Noted clinical confusion at 1:00 AM is more consistent with acute delirium, though EEG electrodes were removed just prior to the event. Clinical correlation is advised.     Benjamin Waterman DO  CNP Fellow    Abhilash Pimentel MD  Attending Neurologist, [facility]ilepsy [facility]

## 2022-04-01 NOTE — BH CONSULTATION LIAISON ASSESSMENT NOTE - RISK ASSESSMENT
Assessed at low acute risk for suicide Assessed at low acute risk for suicide. No current or known past SI, future oriented, strong [employer] cathy, support from daughter  static risk factors include age, recent death of mother  modifiable risk factors include acute illness in hospital, lack of engagement in mental health treatment

## 2022-04-01 NOTE — PROGRESS NOTE ADULT - PROBLEM SELECTOR PLAN 1
Pt was found to be unresponsive at 1200 on 3/30/31. Fingerstick ~160s, /80s, SpO2 100% on RA, no iwob, no accessory muscle use. Pt developed R facial droop with slurred speech. Stroke code called, patient stepped up to [facility]. CTH and CTA from stroke code are negative. 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 Patient now stable for stepdown to [facility]. Ddx: rule out stroke/TIA, rule out seizure, possible aspiration event. Most likely TIA.    Plan:  - start ASA 81mg po daily and atorvastatin 80mg daily per neurology rec   - vEEG to R/O Seizure   - F/u CT head noncont   - Obtain TTE  - SCDs for DVT PPX  - F/u lipid panel Pt was found to be unresponsive at 1200 on 3/30/31. Fingerstick ~160s, /80s, SpO2 100% on RA, no iwob, no accessory muscle use. Pt developed R facial droop with slurred speech. Stroke code called, patient stepped up to [facility]. CTH and CTA from stroke code are negative. 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 Patient now stable for stepdown to [facility]. Ddx: rule out stroke/TIA, rule out seizure, possible aspiration event. Neurology following - recommend vEEG; possibly that patient is having seizures and in between episodes is in a post-ictal state related to an amyloid etiology.     Plan:  - start ASA 81mg po daily and atorvastatin 80mg daily per neurology rec   - vEEG to R/O Seizure   - F/u CT head noncont   - Obtain TTE  - SCDs for DVT PPX  - Rx PRN ativan 2 mg for seizure-like activity

## 2022-04-01 NOTE — PROGRESS NOTE ADULT - ASSESSMENT
per Internal Medicine    72 y o F w/ PMHx of HTN, DM c/b peripheral neuropathy and mostly bedbound for several months, BIBEMS w/ 1 episode of bloody vomit with clots following a day of nausea/vomiting, admitted for hematemesis evaluation, found to have most likely MW tear. Stroke code call on 3/30 and found to have R facial droop w/ slurred speech.  Stepped up to [facility] for further workup/observation with neuro following. Patient stable for step down on 3/31     Problem/Plan - 1:  ·  Problem: Episode of unresponsiveness.   ·  Plan: Pt was found to be unresponsive at 1200 on 3/30/31. Fingerstick ~160s, /80s, SpO2 100% on RA, no iwob, no accessory muscle use. Pt developed R facial droop with slurred speech. Stroke code called, patient stepped up to [facility]. CTH and CTA from stroke code are negative. 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 Patient now stable for stepdown to [facility]. Ddx: rule out stroke/TIA, rule out seizure, possible aspiration event. Most likely TIA.    Plan:  - start ASA 81mg po daily and atorvastatin 80mg daily per neurology rec   - vEEG to R/O Seizure   - F/u CT head noncont   - Obtain TTE  - SCDs for DVT PPX  - F/u lipid panel.    Problem/Plan - 2:  ·  Problem: Hematemesis.   ·  Plan: One episode, none in ED or hospital thus far. Preceded by several likely non-bloody vomiting episodes. No pain. Hgb stable, not hypotensive. Most likely Naty Sanchez tear, labs not consistent w/ chronic bleed given normal Hgb.    Plan:   - F/u GI recs  - IV Pantoprazole 40 bid  - Can c/w aspirin per neurology rec after stroke code   - On discharge, pantoprazole 40 BID for 2 weeks followed by QD for 6 weeks, follow up with GI.  - Pending outpatient v. inpatient gastroparesis study.    Problem/Plan - 3:  ·  Problem: Diabetic gastroparesis.   ·  Plan: Gastroparesis 2/2 to poor DM control.  -reglan 5mg IV q8h PRN for motility.    Problem/Plan - 4:  ·  Problem: Nausea and vomiting.   ·  Plan: RESOLVED. Likely due to gastroparesis 2/2 to poor DM control.    Problem/Plan - 5:  ·  Problem: Leg weakness.   ·  Plan: Chronic and present on arrival. Likely 2/2 diabetic neuropathy, aggravated by deconditioning from long-term bedrest.     Plan:   - F/u PT consult and neurology, appreciate recs   - Management of diabetic neuropathy.    Problem/Plan - 6:  ·  Problem: HTN (hypertension).   ·  Plan: Pt presenting w/ HTNsive urgency w/  systolic. Asymptomatic. Appears euvolemic. Per daughter, pt on lisinopril and amlodipine, but only the former was confirmed by pharmacy.    Plan:  - C/w home lisinopril 40 qd.    Problem/Plan - 7:  ·  Problem: DM (diabetes mellitus).   ·  Plan: Per daughter, pt is on metformin 1000 BID, Lantus 18u, and Victoza, but only metformin was confirmed by pharmacy. A1C 11.6     Plan:   - Consulted endo, appreciate recs  - c/w basal bolus regimen with ISS; lantus 18 and 11 bolus.    Problem/Plan - 8:  ·  Problem: Hyperlipidemia.   ·  Plan: On rosuvastatin 20 qd.    Plan:   - start 80 atorvastatin per neurology rec after stroke code.    Problem/Plan - 9:  ·  Problem: Diabetic neuropathy.   ·  Plan: Per daughter, pt was previously on gabapentin 600 TID which was decreased to 300 TID two days prior due to concern for contribution to weakness. Lumbar MRI - negative     Plan:   - C/w gabapentin 300 TID.    Problem/Plan - 10:  ·  Problem: Right bundle branch block.   ·  Plan; Noted by ED physician. EKG not in pt's chart on admission.  - EKG at [facility] showing incomplete RBBB.    Problem/Plan - 11:  ·  Problem: Nutrition, metabolism, and development symptoms.   ·  Plan: F: S/p 2.25L   E: replete K<4, Mg<2  N: CC  VTE Prophylaxis: SCDs given UGIB  GI: Pantoprazole 40 PO BID  C: Full Code (began discussion w/ daughter who is next of kin and primary caretaker)

## 2022-04-01 NOTE — PROGRESS NOTE ADULT - SUBJECTIVE AND OBJECTIVE BOX
Neurology Stroke Progress Note    INTERVAL HPI/OVERNIGHT EVENTS:  Patient seen and examined. Patient is sitting upright in bed having breakfast, denies having any complaints at this time.    MEDICATIONS  (STANDING):  aspirin  chewable 81 milliGRAM(s) Oral daily  atorvastatin 80 milliGRAM(s) Oral at bedtime  dextrose 5%. 1000 milliLiter(s) (100 mL/Hr) IV Continuous <Continuous>  enoxaparin Injectable 40 milliGRAM(s) SubCutaneous every 24 hours  glucagon  Injectable 1 milliGRAM(s) IntraMuscular once  insulin glargine Injectable (LANTUS) 18 Unit(s) SubCutaneous at bedtime  insulin lispro (ADMELOG) corrective regimen sliding scale   SubCutaneous Before meals and at bedtime  insulin lispro Injectable (ADMELOG) 11 Unit(s) SubCutaneous three times a day before meals  lisinopril 40 milliGRAM(s) Oral daily  pantoprazole    Tablet 40 milliGRAM(s) Oral two times a day    MEDICATIONS  (PRN):  acetaminophen     Tablet .. 650 milliGRAM(s) Oral every 6 hours PRN Temp greater or equal to 38C (100.4F), Mild Pain (1 - 3), Moderate Pain (4 - 6)  dextrose Oral Gel 15 Gram(s) Oral once PRN Blood Glucose LESS THAN 70 milliGRAM(s)/deciliter  LORazepam   Injectable 2 milliGRAM(s) IV Push once PRN seizure > 1 minute  metoclopramide 5 milliGRAM(s) Oral every 8 hours PRN Nausea/Vomiting      Allergies    No Known Allergies    Intolerances    Vital Signs Last 24 Hrs  T(C): 36.9 (01 Apr 2022 06:13), Max: 37.8 (31 Mar 2022 14:56)  T(F): 98.5 (01 Apr 2022 06:13), Max: 100.1 (31 Mar 2022 14:56)  HR: 68 (01 Apr 2022 06:13) (68 - 77)  BP: 144/73 (01 Apr 2022 06:13) (102/57 - 177/84)  BP(mean): --  RR: 18 (01 Apr 2022 06:13) (18 - 18)  SpO2: 100% (01 Apr 2022 06:13) (96% - 100%)    Physical exam:  General: No acute distress, awake and alert  Eyes: Anicteric sclerae, moist conjunctivae, see below for CNs  Neck: trachea midline  Cardiovascular: Regular rate and rhythm  Pulmonary: No use of accessory muscles  GI: Abdomen soft, non-distended, non-tender  Extremities: no edema    Neurologic:  -Mental status: Awake, alert, flat affect. Able to correctly state name, when asked where she is she says "hospital" but unable to state which hospital. When asked the year she first states "2020" but then corrects herself and states "2022"  -Cranial nerves:   II: Visual fields are full to confrontation.  III, IV, VI: Pupils equally round and reactive to light  VII: Face is symmetric with normal eye closure and smile  XII: Tongue protrudes midline  Motor: Normal bulk and tone. Strength bilateral upper extremity 5/5. Left lower extremity 2/5, right lower extremity 2-/5 with weak plantar flexion compared to left lower extremity.  Sensation: Intact to light touch bilaterally. Extinction present of left lower extremity on double simultaneous testing  Coordination: No dysmetria on finger-to-nose     LABS:                        10.1   4.38  )-----------( 253      ( 01 Apr 2022 07:54 )             31.8     04-01    136  |  102  |  10  ----------------------------<  63<L>  4.0   |  27  |  0.58    Ca    8.2<L>      01 Apr 2022 07:54  Phos  3.4     04-01  Mg     1.8     04-01    TPro  5.8<L>  /  Alb  2.8<L>  /  TBili  0.2  /  DBili  x   /  AST  12  /  ALT  <5<L>  /  AlkPhos  31<L>  04-01    PT/INR - ( 30 Mar 2022 12:45 )   PT: 10.7 sec;   INR: 0.90          PTT - ( 30 Mar 2022 12:45 )  PTT:35.9 sec      RADIOLOGY & ADDITIONAL TESTS:  < from: CT Head No Cont (04.01.22 @ 11:14) >  Impression:    No acute intracranial hemorrhage, mass effect or demarcated territorial   infarction. Stable examination.    < end of copied text >

## 2022-04-01 NOTE — PROGRESS NOTE ADULT - SUBJECTIVE AND OBJECTIVE BOX
Physical Medicine and Rehabilitation Progress Note:    Patient is a 72y old  Female who presents with a chief complaint of Hematemesis (01 Apr 2022 08:23)      HPI:  HPI: Pt is a 72F w/ PMHx of HTN, DM c/b peripheral neuropathy BIBEMS w/ 1 episode of bloody vomit with clots. Per daughter, pt was feeling more lethargic than usual yesterday morning. After eating breakfast and lunch, pt reported nausea w/ multiple episodes of vomiting, pt unsure if bloody and not witnessed by daughter. Shortly thereafter, pt was helped into the shower by daughter where pt then had witnessed episode of emesis w/ blood clots and EMS was called. Pt denies any abdominal pain, but reports a "pulling sensation" in the midepigastric region. Denies any diarrhea or constipation, and is not aware of any blood in her stool. Pt reported last colonoscopy > 10 years prior, no endoscopy in past. Pt reports feeling well other than hunger, denies nausea, and her main complaint is lower extremity pain. Pt reports chronic, progressive paresthesias w/ pain of bilateral lower extremities, associated with heaviness. Pt has been unable to ambulate without a walker and assistance for several months, and has mostly been bedbound during this time. Pt denies diffuse headache, changes in vision. Denies cough, dyspnea, dysuria.    In the ED:  VS: Tmax: 97.8, HR: 85, BP: 180/95, RR: 20, O2: 98% on RA   Pertinent Labs: CBC wnl including Hgb of 13.3. Glucose 293, mild hypochloremia on BMP, otherwise wnl. VBG consistent w/ metabolic alkalosis.  Imaging:   - CXR: WNL  - CT abd: L mid abd w/ island of mesenteric fat devoid of bowel loops w/ swirling of mesenteric vessels. 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 No bowel dilatation, obstruction, or evidence of strangulation.  - EKG: Incomplete RBBB  Treatment/interventions: Started on NS 250cc/hr for 9 hours (2.25L total). IV protonix once.   (26 Mar 2022 10:37)                            10.1   4.38  )-----------( 253      ( 01 Apr 2022 07:54 )             31.8       04-01    136  |  102  |  10  ----------------------------<  63<L>  4.0   |  27  |  0.58    Ca    8.2<L>      01 Apr 2022 07:54  Phos  3.4     04-01  Mg     1.8     04-01    TPro  5.8<L>  /  Alb  2.8<L>  /  TBili  0.2  /  DBili  x   /  AST  12  /  ALT  <5<L>  /  AlkPhos  31<L>  04-01    Vital Signs Last 24 Hrs  T(C): 36.9 (01 Apr 2022 06:13), Max: 37.8 (31 Mar 2022 14:56)  T(F): 98.5 (01 Apr 2022 06:13), Max: 100.1 (31 Mar 2022 14:56)  HR: 68 (01 Apr 2022 06:13) (68 - 87)  BP: 144/73 (01 Apr 2022 06:13) (102/57 - 177/84)  BP(mean): 87 (31 Mar 2022 10:04) (87 - 87)  RR: 18 (01 Apr 2022 06:13) (18 - 18)  SpO2: 100% (01 Apr 2022 06:13) (94% - 100%)    MEDICATIONS  (STANDING):  aspirin  chewable 81 milliGRAM(s) Oral daily  atorvastatin 80 milliGRAM(s) Oral at bedtime  dextrose 5%. 1000 milliLiter(s) (100 mL/Hr) IV Continuous <Continuous>  enoxaparin Injectable 40 milliGRAM(s) SubCutaneous every 24 hours  glucagon  Injectable 1 milliGRAM(s) IntraMuscular once  insulin glargine Injectable (LANTUS) 18 Unit(s) SubCutaneous at bedtime  insulin lispro (ADMELOG) corrective regimen sliding scale   SubCutaneous Before meals and at bedtime  insulin lispro Injectable (ADMELOG) 11 Unit(s) SubCutaneous three times a day before meals  lisinopril 40 milliGRAM(s) Oral daily  pantoprazole    Tablet 40 milliGRAM(s) Oral two times a day    MEDICATIONS  (PRN):  acetaminophen     Tablet .. 650 milliGRAM(s) Oral every 6 hours PRN Temp greater or equal to 38C (100.4F), Mild Pain (1 - 3), Moderate Pain (4 - 6)  dextrose Oral Gel 15 Gram(s) Oral once PRN Blood Glucose LESS THAN 70 milliGRAM(s)/deciliter  metoclopramide 5 milliGRAM(s) Oral every 8 hours PRN Nausea/Vomiting    Currently Undergoing Physical/ Occupational Therapy at bedside.    PT/OT Functional Status Assessment:   3/31/2022    Cognitive/Neuro/Behavioral  Level of Consciousness: somnolent  Arousal Level: arouses to voice  Orientation: oriented x 4  Speech: hypophonic  Mood/Behavior: cooperative;  calm    Language Assistance  Preferred Language to Address Healthcare Preferred Language to Address Healthcare: English    Therapeutic Interventions      Bed Mobility  Bed Mobility Training Rolling/Turning: minimum assist (75% patient effort);  1 person assist  Bed Mobility Training Scooting: minimum assist (75% patient effort);  2 person assist  Bed Mobility Training Sit-to-Supine: maximum assist (25% patient effort);  2 person assist  Bed Mobility Training Supine-to-Sit: maximum assist (25% patient effort);  2 person assist  Bed Mobility Training Limitations: decreased ability to use legs for bridging/pushing;  impaired ability to control trunk for mobility;  decreased ability to use arms for pushing/pulling;  decreased strength;  impaired balance;  impaired postural control    Sit-Stand Transfer Training  Transfer Training Sit-to-Stand Transfer: maximum assist (25% patient effort);  2 person assist;  rolling walker  Transfer Training Stand-to-Sit Transfer: maximum assist (25% patient effort);  2 person assist;  standard walker  Sit-to-Stand Transfer Training Transfer Safety Analysis: decreased weight-shifting ability;  decreased balance;  decreased sequencing ability;  impaired postural control;  decreased strength;  rolling walker    Gait Training  Gait Training Treatment not Performed: Pt w/ dec standing balance, w/ difficulty negotiating side steps at EOB      Visual Assessment:   · Visual Acuity	Pt with L eye blindness.  · Visual Field Cuts	Pt with L eye blindness. Pt with field cut of nasal upper/lower quadrant of R eye.  · Visual Scanning	moderate impairment    Fine Motor Coordination:     Fine Motor Coordination:   · Left Hand, Finger to Nose	normal performance  · Right Hand, Finger to Nose	mild impairment  · Left Hand Thumb/Finger Opposition Skills	normal performance  · Right Hand Thumb/Finger Opposition Skills	normal performance  · Left Hand, Manipulation of Objects	normal performance  · Right Hand, Manipulation of Objects	normal performance                PM&R Impression: as above    Current Disposition Plan Recommendations:    subacute rehab placement

## 2022-04-01 NOTE — PROGRESS NOTE ADULT - ASSESSMENT
73 YO F with PMH of DM2 (requiring insulin), HTN, DM presented with hematemesis, now resolved, thought to be 2/2 Naty Sanchez tear. She is also found to be in uncontrolled DM i/s/o A1C of 11.6.

## 2022-04-02 LAB
ALBUMIN SERPL ELPH-MCNC: 3.2 G/DL — LOW (ref 3.3–5)
ALP SERPL-CCNC: 36 U/L — LOW (ref 40–120)
ALT FLD-CCNC: 6 U/L — LOW (ref 10–45)
AMPHET UR-MCNC: NEGATIVE — SIGNIFICANT CHANGE UP
ANION GAP SERPL CALC-SCNC: 11 MMOL/L — SIGNIFICANT CHANGE UP (ref 5–17)
APPEARANCE UR: ABNORMAL
AST SERPL-CCNC: 15 U/L — SIGNIFICANT CHANGE UP (ref 10–40)
BACTERIA # UR AUTO: ABNORMAL /HPF
BARBITURATES UR SCN-MCNC: NEGATIVE — SIGNIFICANT CHANGE UP
BENZODIAZ UR-MCNC: NEGATIVE — SIGNIFICANT CHANGE UP
BILIRUB SERPL-MCNC: 0.3 MG/DL — SIGNIFICANT CHANGE UP (ref 0.2–1.2)
BILIRUB UR-MCNC: NEGATIVE — SIGNIFICANT CHANGE UP
BUN SERPL-MCNC: 12 MG/DL — SIGNIFICANT CHANGE UP (ref 7–23)
CALCIUM SERPL-MCNC: 8.8 MG/DL — SIGNIFICANT CHANGE UP (ref 8.4–10.5)
CHLORIDE SERPL-SCNC: 101 MMOL/L — SIGNIFICANT CHANGE UP (ref 96–108)
CO2 SERPL-SCNC: 27 MMOL/L — SIGNIFICANT CHANGE UP (ref 22–31)
COCAINE METAB.OTHER UR-MCNC: NEGATIVE — SIGNIFICANT CHANGE UP
COLOR SPEC: YELLOW — SIGNIFICANT CHANGE UP
COMMENT - URINE: SIGNIFICANT CHANGE UP
CREAT SERPL-MCNC: 0.63 MG/DL — SIGNIFICANT CHANGE UP (ref 0.5–1.3)
DIFF PNL FLD: ABNORMAL
EGFR: 93 ML/MIN/1.73M2 — SIGNIFICANT CHANGE UP
EPI CELLS # UR: SIGNIFICANT CHANGE UP /HPF (ref 0–5)
GLUCOSE BLDC GLUCOMTR-MCNC: 117 MG/DL — HIGH (ref 70–99)
GLUCOSE BLDC GLUCOMTR-MCNC: 137 MG/DL — HIGH (ref 70–99)
GLUCOSE BLDC GLUCOMTR-MCNC: 141 MG/DL — HIGH (ref 70–99)
GLUCOSE BLDC GLUCOMTR-MCNC: 291 MG/DL — HIGH (ref 70–99)
GLUCOSE SERPL-MCNC: 73 MG/DL — SIGNIFICANT CHANGE UP (ref 70–99)
GLUCOSE UR QL: 100
HCT VFR BLD CALC: 38.9 % — SIGNIFICANT CHANGE UP (ref 34.5–45)
HGB BLD-MCNC: 12.2 G/DL — SIGNIFICANT CHANGE UP (ref 11.5–15.5)
KETONES UR-MCNC: NEGATIVE — SIGNIFICANT CHANGE UP
LEUKOCYTE ESTERASE UR-ACNC: ABNORMAL
MAGNESIUM SERPL-MCNC: 1.7 MG/DL — SIGNIFICANT CHANGE UP (ref 1.6–2.6)
MCHC RBC-ENTMCNC: 26.3 PG — LOW (ref 27–34)
MCHC RBC-ENTMCNC: 31.4 GM/DL — LOW (ref 32–36)
MCV RBC AUTO: 83.8 FL — SIGNIFICANT CHANGE UP (ref 80–100)
METHADONE UR-MCNC: NEGATIVE — SIGNIFICANT CHANGE UP
NITRITE UR-MCNC: POSITIVE
NRBC # BLD: 0 /100 WBCS — SIGNIFICANT CHANGE UP (ref 0–0)
OPIATES UR-MCNC: NEGATIVE — SIGNIFICANT CHANGE UP
PCP SPEC-MCNC: SIGNIFICANT CHANGE UP
PCP SPEC-MCNC: SIGNIFICANT CHANGE UP
PCP UR-MCNC: NEGATIVE — SIGNIFICANT CHANGE UP
PH UR: 7 — SIGNIFICANT CHANGE UP (ref 5–8)
PHOSPHATE SERPL-MCNC: 3.8 MG/DL — SIGNIFICANT CHANGE UP (ref 2.5–4.5)
PLATELET # BLD AUTO: 293 K/UL — SIGNIFICANT CHANGE UP (ref 150–400)
POTASSIUM SERPL-MCNC: 4.3 MMOL/L — SIGNIFICANT CHANGE UP (ref 3.5–5.3)
POTASSIUM SERPL-SCNC: 4.3 MMOL/L — SIGNIFICANT CHANGE UP (ref 3.5–5.3)
PROT SERPL-MCNC: 6.6 G/DL — SIGNIFICANT CHANGE UP (ref 6–8.3)
PROT UR-MCNC: 30 MG/DL
RBC # BLD: 4.64 M/UL — SIGNIFICANT CHANGE UP (ref 3.8–5.2)
RBC # FLD: 13.7 % — SIGNIFICANT CHANGE UP (ref 10.3–14.5)
RBC CASTS # UR COMP ASSIST: ABNORMAL /HPF
SODIUM SERPL-SCNC: 139 MMOL/L — SIGNIFICANT CHANGE UP (ref 135–145)
SP GR SPEC: 1.02 — SIGNIFICANT CHANGE UP (ref 1–1.03)
THC UR QL: NEGATIVE — SIGNIFICANT CHANGE UP
UROBILINOGEN FLD QL: 0.2 E.U./DL — SIGNIFICANT CHANGE UP
WBC # BLD: 4.66 K/UL — SIGNIFICANT CHANGE UP (ref 3.8–10.5)
WBC # FLD AUTO: 4.66 K/UL — SIGNIFICANT CHANGE UP (ref 3.8–10.5)
WBC UR QL: ABNORMAL /HPF

## 2022-04-02 PROCEDURE — 70551 MRI BRAIN STEM W/O DYE: CPT | Mod: 26

## 2022-04-02 PROCEDURE — 99233 SBSQ HOSP IP/OBS HIGH 50: CPT | Mod: GC

## 2022-04-02 PROCEDURE — 99232 SBSQ HOSP IP/OBS MODERATE 35: CPT

## 2022-04-02 PROCEDURE — 95720 EEG PHY/QHP EA INCR W/VEEG: CPT

## 2022-04-02 RX ORDER — LABETALOL HCL 100 MG
10 TABLET ORAL ONCE
Refills: 0 | Status: COMPLETED | OUTPATIENT
Start: 2022-04-02 | End: 2022-04-02

## 2022-04-02 RX ORDER — CEFTRIAXONE 500 MG/1
1000 INJECTION, POWDER, FOR SOLUTION INTRAMUSCULAR; INTRAVENOUS EVERY 24 HOURS
Refills: 0 | Status: COMPLETED | OUTPATIENT
Start: 2022-04-02 | End: 2022-04-04

## 2022-04-02 RX ORDER — HYDRALAZINE HCL 50 MG
10 TABLET ORAL ONCE
Refills: 0 | Status: COMPLETED | OUTPATIENT
Start: 2022-04-02 | End: 2022-04-02

## 2022-04-02 RX ORDER — HYDRALAZINE HCL 50 MG
10 TABLET ORAL ONCE
Refills: 0 | Status: DISCONTINUED | OUTPATIENT
Start: 2022-04-02 | End: 2022-04-02

## 2022-04-02 RX ORDER — INSULIN GLARGINE 100 [IU]/ML
12 INJECTION, SOLUTION SUBCUTANEOUS AT BEDTIME
Refills: 0 | Status: DISCONTINUED | OUTPATIENT
Start: 2022-04-02 | End: 2022-04-03

## 2022-04-02 RX ADMIN — CEFTRIAXONE 100 MILLIGRAM(S): 500 INJECTION, POWDER, FOR SOLUTION INTRAMUSCULAR; INTRAVENOUS at 17:34

## 2022-04-02 RX ADMIN — Medication 81 MILLIGRAM(S): at 11:26

## 2022-04-02 RX ADMIN — INSULIN GLARGINE 12 UNIT(S): 100 INJECTION, SOLUTION SUBCUTANEOUS at 22:09

## 2022-04-02 RX ADMIN — Medication 10 UNIT(S): at 09:15

## 2022-04-02 RX ADMIN — Medication 10 UNIT(S): at 17:38

## 2022-04-02 RX ADMIN — Medication 3: at 17:37

## 2022-04-02 RX ADMIN — PANTOPRAZOLE SODIUM 40 MILLIGRAM(S): 20 TABLET, DELAYED RELEASE ORAL at 16:57

## 2022-04-02 RX ADMIN — Medication 10 UNIT(S): at 13:35

## 2022-04-02 RX ADMIN — ENOXAPARIN SODIUM 40 MILLIGRAM(S): 100 INJECTION SUBCUTANEOUS at 08:15

## 2022-04-02 RX ADMIN — Medication 10 MILLIGRAM(S): at 06:03

## 2022-04-02 RX ADMIN — LISINOPRIL 40 MILLIGRAM(S): 2.5 TABLET ORAL at 05:43

## 2022-04-02 RX ADMIN — Medication 10 MILLIGRAM(S): at 07:31

## 2022-04-02 RX ADMIN — ATORVASTATIN CALCIUM 80 MILLIGRAM(S): 80 TABLET, FILM COATED ORAL at 22:09

## 2022-04-02 RX ADMIN — PANTOPRAZOLE SODIUM 40 MILLIGRAM(S): 20 TABLET, DELAYED RELEASE ORAL at 05:43

## 2022-04-02 NOTE — PROGRESS NOTE ADULT - ATTENDING COMMENTS
Pt seen at bedside  Agree with above  pt on regular, non diabetic diet, multiple sugar packets at bedside  can continue 12 units lantus at bedtime, lispro 10 units tid with meals  will follow

## 2022-04-02 NOTE — PROGRESS NOTE ADULT - ASSESSMENT
This is a 73 yo F w/ PMHx of HTN, DM c/b peripheral neuropathy and mostly bedbound for several months, BIBEMS w/ 1 episode of bloody vomit with clots following a day of nausea/vomiting, admitted for hematemesis evaluation, found to have most likely MW tear.  Neurology consulted for waxing and waning cognition and lower extremity weakness bilaterally. DDx for cognitive changes includes hospital acquired delirium for altering cognition vs less likely catatonia. DDx for LE weakness, sensory issues can be related to polyneuropathy vs diabetic neuropathy.    Plan:   - MR Head non contrast reviewed, no acute ischemia, showed extensive small vessel ischemic disease and chronic right thalamic lacunar infarct  - MR thoracic without signs of cord compression. However Mild posterior bulge at T2-3, and T-4-5 to T10-11 with mild to moderate canal narrowing worst at T10-11 level  - EEG monitoring revealed cerebral dysfunction in the right hemisphere, mild diffuse nonspecific dysfunction, no epileptiform abnormalities noted  - Can d/c EEG at this time  - For suspected polyneuropathy, will recommend neuropathy labs including MMA level, B12, SPEP, serum immunofixation, RPR, copper level  - Continue to hold gabapentin given fluctuation in cognition  - Recommend outpatient neurology follow up  - Neurology will continue to follow  - Continue rest of care as per primary team

## 2022-04-02 NOTE — PROGRESS NOTE ADULT - PROBLEM SELECTOR PLAN 1
Pt was found to be unresponsive at 1200 on 3/30/31. Fingerstick ~160s, /80s, SpO2 100% on RA, no iwob, no accessory muscle use. Pt developed R facial droop with slurred speech. Stroke code called, patient stepped up to 7[facility]. CTH and CTA from stroke code are negative. 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 Patient now stable for stepdown to [facility]. Ddx: rule out stroke/TIA, rule out seizure, possible aspiration event. Neurology following - recommend vEEG; possibly that patient is having seizures and in between episodes is in a post-ictal state related to an amyloid etiology.     Plan:  - start ASA 81mg po daily and atorvastatin 80mg daily per neurology rec   - vEEG to R/O Seizure   - F/u CT head noncont   - Obtain TTE  - SCDs for DVT PPX  - Rx PRN ativan 2 mg for seizure-like activity

## 2022-04-02 NOTE — PROGRESS NOTE ADULT - PROBLEM SELECTOR PLAN 9
Per daughter, pt was previously on gabapentin 600 TID which was decreased to 300 TID two days prior due to concern for contribution to weakness. Lumbar MRI - negative     Plan:   - gabapentin on hold

## 2022-04-02 NOTE — PROGRESS NOTE ADULT - SUBJECTIVE AND OBJECTIVE BOX
INTERVAL HPI/OVERNIGHT EVENTS:    Patient is a 72y old  Female who presents with a chief complaint of Hematemesis (02 Apr 2022 14:52)  Patient found to have UTI after workup for worsening AMS. Blood sugars moderately in control. Reports to be eating well, however unreliable historian.      ROS unable to obtain due to poor historian     MEDICATIONS  (STANDING):  aspirin  chewable 81 milliGRAM(s) Oral daily  atorvastatin 80 milliGRAM(s) Oral at bedtime  cefTRIAXone   IVPB 1000 milliGRAM(s) IV Intermittent every 24 hours  dextrose 5%. 1000 milliLiter(s) (100 mL/Hr) IV Continuous <Continuous>  enoxaparin Injectable 40 milliGRAM(s) SubCutaneous every 24 hours  glucagon  Injectable 1 milliGRAM(s) IntraMuscular once  insulin glargine Injectable (LANTUS) 16 Unit(s) SubCutaneous at bedtime  insulin lispro (ADMELOG) corrective regimen sliding scale   SubCutaneous Before meals and at bedtime  insulin lispro Injectable (ADMELOG) 10 Unit(s) SubCutaneous three times a day before meals  lisinopril 40 milliGRAM(s) Oral daily  pantoprazole    Tablet 40 milliGRAM(s) Oral two times a day    MEDICATIONS  (PRN):  acetaminophen     Tablet .. 650 milliGRAM(s) Oral every 6 hours PRN Temp greater or equal to 38C (100.4F), Mild Pain (1 - 3), Moderate Pain (4 - 6)  dextrose Oral Gel 15 Gram(s) Oral once PRN Blood Glucose LESS THAN 70 milliGRAM(s)/deciliter  LORazepam   Injectable 2 milliGRAM(s) IV Push once PRN seizure > 1 minute  metoclopramide 5 milliGRAM(s) Oral every 8 hours PRN Nausea/Vomiting      PHYSICAL EXAM  Vital Signs Last 24 Hrs  T(C): 36.6 (02 Apr 2022 15:51), Max: 36.9 (01 Apr 2022 20:36)  T(F): 97.8 (02 Apr 2022 15:51), Max: 98.4 (01 Apr 2022 20:36)  HR: 77 (02 Apr 2022 15:51) (70 - 77)  BP: 155/79 (02 Apr 2022 15:51) (123/63 - 201/89)  BP(mean): --  RR: 17 (02 Apr 2022 15:51) (17 - 19)  SpO2: 98% (02 Apr 2022 15:51) (97% - 99%)    Physical Exam:  Gen: Elderly female in NAD, pleasantly confused  Neuro: AAOx1 (herself). Grossly no focal deficits, but reduced strength in bilateral lower extremities which seems to be her baseline  Heart: RRR, no rmg  Lungs: CTAB, no signs of increased WOB   Ext: Good distal pulses. Light touch sensation grossly intact    LABS:                        12.2   4.66  )-----------( 293      ( 02 Apr 2022 07:29 )             38.9     04-02    139  |  101  |  12  ----------------------------<  73  4.3   |  27  |  0.63    Ca    8.8      02 Apr 2022 07:29  Phos  3.8     04-02  Mg     1.7     04-02    TPro  6.6  /  Alb  3.2<L>  /  TBili  0.3  /  DBili  x   /  AST  15  /  ALT  6<L>  /  AlkPhos  36<L>  04-02      Urinalysis Basic - ( 02 Apr 2022 13:58 )    Color: x / Appearance: x / SG: x / pH: x  Gluc: x / Ketone: x  / Bili: x / Urobili: x   Blood: x / Protein: x / Nitrite: x   Leuk Esterase: x / RBC: 5-10 /HPF / WBC Many /HPF   Sq Epi: x / Non Sq Epi: 0-5 /HPF / Bacteria: Many /HPF      Thyroid Stimulating Hormone, Serum: 1.120 uIU/mL (04-01 @ 07:54)      HbA1C:         Insulin Sliding Scale requirements X 24 Hours:      RADIOLOGY & ADDITIONAL TESTS:      Assessment and Plan:   · Assessment	  73 YO F with PMH of DM2 (requiring insulin), HTN, DM presented with hematemesis, now resolved, thought to be 2/2 Naty Sanchez tear. She is also found to be in uncontrolled DM i/s/o A1C of 11.6.     Problem/Plan - 1:  ·  Problem: DM (diabetes mellitus).   ·  Plan: Uncontrolled - A1C is 11.6  - history is somewhat limited 2/2 her being an extremely poor historian i/s/o her ?dementia  - home regimen: Metformin 1g BID, Victoza 1.2, and 18 units of Lantus daily.     Plan:  Lantus 12  units tonight  nutritional Lispro 10  units before meals.  Continue lispo JANICE before meals and at bedtime.  Dispo: [facility]    Endocrine will continue to follow.

## 2022-04-02 NOTE — PROGRESS NOTE ADULT - SUBJECTIVE AND OBJECTIVE BOX
Patient is a 72y old  Female who presents with a chief complaint of Hematemesis (01 Apr 2022 13:00)        SUBJECTIVE:  Patient was seen and examined at bedside.    Overnight Events : resting in bed, was able to follow 1 step instructions , denied headache ,visual changes       Review of systems: 12 point Review of systems negative unless otherwise documented elsewhere in note.         MEDICATIONS:  MEDICATIONS  (STANDING):  aspirin  chewable 81 milliGRAM(s) Oral daily  atorvastatin 80 milliGRAM(s) Oral at bedtime  dextrose 5%. 1000 milliLiter(s) (100 mL/Hr) IV Continuous <Continuous>  enoxaparin Injectable 40 milliGRAM(s) SubCutaneous every 24 hours  glucagon  Injectable 1 milliGRAM(s) IntraMuscular once  insulin glargine Injectable (LANTUS) 16 Unit(s) SubCutaneous at bedtime  insulin lispro (ADMELOG) corrective regimen sliding scale   SubCutaneous Before meals and at bedtime  insulin lispro Injectable (ADMELOG) 10 Unit(s) SubCutaneous three times a day before meals  lisinopril 40 milliGRAM(s) Oral daily  pantoprazole    Tablet 40 milliGRAM(s) Oral two times a day    MEDICATIONS  (PRN):  acetaminophen     Tablet .. 650 milliGRAM(s) Oral every 6 hours PRN Temp greater or equal to 38C (100.4F), Mild Pain (1 - 3), Moderate Pain (4 - 6)  dextrose Oral Gel 15 Gram(s) Oral once PRN Blood Glucose LESS THAN 70 milliGRAM(s)/deciliter  LORazepam   Injectable 2 milliGRAM(s) IV Push once PRN seizure > 1 minute  metoclopramide 5 milliGRAM(s) Oral every 8 hours PRN Nausea/Vomiting      Allergies    No Known Allergies    Intolerances        OBJECTIVE:  Vital Signs Last 24 Hrs  T(C): 36.7 (02 Apr 2022 12:01), Max: 36.9 (01 Apr 2022 20:36)  T(F): 98 (02 Apr 2022 12:01), Max: 98.4 (01 Apr 2022 20:36)  HR: 74 (02 Apr 2022 12:01) (70 - 76)  BP: 166/80 (02 Apr 2022 12:01) (150/70 - 201/89)  BP(mean): --  RR: 18 (02 Apr 2022 12:01) (18 - 19)  SpO2: 98% (02 Apr 2022 12:01) (97% - 99%)  I&O's Summary      PHYSICAL EXAM:  General: frail elderly woman in NAD  HEENT: NC/AT; EOMI, PERRLA, anicteric sclera; dry mucosal membranes; poor dentition  Neck: supple, trachea midline  Cardiovascular: RRR, +S1/S2; NO M/R/G  Respiratory: CTA B/L; no W/R/R  Gastrointestinal: soft, NT/ND; +BSx4  Extremities: WWP; no edema or cyanosis  Vascular: 2+ radial, DP/PT pulses B/L  Neuro: A&Ox2. 5/5 strength of b/l UEs; 1/5 strength of b/l ankles. Sensation intact but decreased b/l; CN 2-12 intact.   Psych: affect and behavior appropriate, pleasant at time of interview    LABS:                        12.2   4.66  )-----------( 293      ( 02 Apr 2022 07:29 )             38.9     04-02    139  |  101  |  12  ----------------------------<  73  4.3   |  27  |  0.63    Ca    8.8      02 Apr 2022 07:29  Phos  3.8     04-02  Mg     1.7     04-02    TPro  6.6  /  Alb  3.2<L>  /  TBili  0.3  /  DBili  x   /  AST  15  /  ALT  6<L>  /  AlkPhos  36<L>  04-02    LIVER FUNCTIONS - ( 02 Apr 2022 07:29 )  Alb: 3.2 g/dL / Pro: 6.6 g/dL / ALK PHOS: 36 U/L / ALT: 6 U/L / AST: 15 U/L / GGT: x             CAPILLARY BLOOD GLUCOSE      POCT Blood Glucose.: 141 mg/dL (02 Apr 2022 13:32)  POCT Blood Glucose.: 117 mg/dL (02 Apr 2022 09:10)  POCT Blood Glucose.: 165 mg/dL (01 Apr 2022 21:42)  POCT Blood Glucose.: 134 mg/dL (01 Apr 2022 17:12)    Urinalysis Basic - ( 02 Apr 2022 13:58 )    Color: x / Appearance: x / SG: x / pH: x  Gluc: x / Ketone: x  / Bili: x / Urobili: x   Blood: x / Protein: x / Nitrite: x   Leuk Esterase: x / RBC: 5-10 /HPF / WBC Many /HPF   Sq Epi: x / Non Sq Epi: 0-5 /HPF / Bacteria: Many /HPF        MICRODATA:    Urinalysis with Rflx Culture (collected 02 Apr 2022 13:55)        RADIOLOGY/OTHER STUDIES:

## 2022-04-02 NOTE — EEG REPORT - NS EEG TEXT BOX
Day 3: 4/1/2022 @ 14:08 PM to 4/2/2022 @ 7:00 AM   Pertinent medications: n/a  Awake Background:  , with predominantly alpha and theta frequencies.  Intermittent polymorphic delta and theta slowing  Symmetry:  Intermittent polymorphic delta slowing over the right hemisphere  Organization: .  Posterior Dominant Rhythm:  .  Voltage:    Variability: . 		Reactivity: .  State changes: Present with rudimentary N2 sleep transients  Spontaneous Activity:  .  Periodic/rhythmic activity: .  Events:  None  Provocations:  Hyperventilation and Photic stimulation: not performed.    Daily Summary:    Cerebral dysfunction in the right hemisphere, and mild diffuse nonspecific dysfunction.  No epileptiform abnormalities were recorded.    Arun Arenas MD  Attending Neurologist, [facility]

## 2022-04-02 NOTE — PROGRESS NOTE ADULT - ATTENDING COMMENTS
Mood seems relatively improved today.  No epileptiform abnormalities on EEG.  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    Arun Arenas MD  Neurology Attending Physician

## 2022-04-02 NOTE — PROGRESS NOTE ADULT - SUBJECTIVE AND OBJECTIVE BOX
Neurology Progress Note    Interval History:    Patient seen and examined at bedside. There were no acute events overnight. Patient complaining of lower extremity cramping, denied any other complaints.      PAST MEDICAL & SURGICAL HISTORY:  HTN (hypertension)  DM (diabetes mellitus)  No significant past surgical history      Medications:  acetaminophen     Tablet .. 650 milliGRAM(s) Oral every 6 hours PRN  aspirin  chewable 81 milliGRAM(s) Oral daily  atorvastatin 80 milliGRAM(s) Oral at bedtime  dextrose 5%. 1000 milliLiter(s) IV Continuous <Continuous>  dextrose Oral Gel 15 Gram(s) Oral once PRN  enoxaparin Injectable 40 milliGRAM(s) SubCutaneous every 24 hours  glucagon  Injectable 1 milliGRAM(s) IntraMuscular once  insulin glargine Injectable (LANTUS) 16 Unit(s) SubCutaneous at bedtime  insulin lispro (ADMELOG) corrective regimen sliding scale   SubCutaneous Before meals and at bedtime  insulin lispro Injectable (ADMELOG) 10 Unit(s) SubCutaneous three times a day before meals  lisinopril 40 milliGRAM(s) Oral daily  LORazepam   Injectable 2 milliGRAM(s) IV Push once PRN  metoclopramide 5 milliGRAM(s) Oral every 8 hours PRN  pantoprazole    Tablet 40 milliGRAM(s) Oral two times a day      Vital Signs Last 24 Hrs  T(C): 36.7 (02 Apr 2022 12:01), Max: 36.9 (01 Apr 2022 20:36)  T(F): 98 (02 Apr 2022 12:01), Max: 98.4 (01 Apr 2022 20:36)  HR: 74 (02 Apr 2022 12:01) (70 - 76)  BP: 166/80 (02 Apr 2022 12:01) (150/70 - 201/89)  RR: 18 (02 Apr 2022 12:01) (18 - 19)  SpO2: 98% (02 Apr 2022 12:01) (97% - 99%)      Neurological Exam:   Mental status: Awake, alert and oriented x2 to person, place. Speech is slow. No dysarthria, no aphasia. Follows commands.  Cranial nerves: L pupil fixed, 4mm, R pupil responsive to light, extraocular muscles intact, L visual field deficit, V1 through V3 intact bilaterally and symmetric, face symmetric, tongue was midline.  Motor:  MRC grading 5/5 B/L UE.  4-/5 B/L LE hip flexion, 3/5 B/L LE knee flexion/extension, 2/5 B/L plantarflexion and dorsiflexion.  strength 5/5.  Normal tone and bulk.  No abnormal movements.    Sensation: Intact to light touch. Not intact to vibration and proprioception in B/L LE.   Coordination: Slight dysmetria on finger-to-nose on L side.  Reflexes: 1+ in B/L knee, ankle reflexes. 2+ in bilateral UE, downgoing toes bilaterally.  Gait: Narrow and steady. No ataxia.  Romberg negative      Labs:  CBC Full  -  ( 02 Apr 2022 07:29 )  WBC Count : 4.66 K/uL  RBC Count : 4.64 M/uL  Hemoglobin : 12.2 g/dL  Hematocrit : 38.9 %  Platelet Count - Automated : 293 K/uL  Mean Cell Volume : 83.8 fl  Mean Cell Hemoglobin : 26.3 pg  Mean Cell Hemoglobin Concentration : 31.4 gm/dL      04-02    139  |  101  |  12  ----------------------------<  73  4.3   |  27  |  0.63    Ca    8.8      02 Apr 2022 07:29  Phos  3.8     04-02  Mg     1.7     04-02    TPro  6.6  /  Alb  3.2<L>  /  TBili  0.3  /  DBili  x   /  AST  15  /  ALT  6<L>  /  AlkPhos  36<L>  04-02    LIVER FUNCTIONS - ( 02 Apr 2022 07:29 )  Alb: 3.2 g/dL / Pro: 6.6 g/dL / ALK PHOS: 36 U/L / ALT: 6 U/L / AST: 15 U/L / GGT: x             Urinalysis Basic - ( 02 Apr 2022 13:58 )    Color: x / Appearance: x / SG: x / pH: x  Gluc: x / Ketone: x  / Bili: x / Urobili: x   Blood: x / Protein: x / Nitrite: x   Leuk Esterase: x / RBC: 5-10 /HPF / WBC Many /HPF   Sq Epi: x / Non Sq Epi: 0-5 /HPF / Bacteria: Many /HPF

## 2022-04-02 NOTE — PROGRESS NOTE ADULT - PROBLEM SELECTOR PLAN 7
Per daughter, pt is on metformin 1000 BID, Lantus 18u, and Victoza, but only metformin was confirmed by pharmacy. A1C 11.6     Plan:   - Consulted endo, appreciate recs  - c/w basal bolus regimen with ISS; lantus 16 and 10 bolus

## 2022-04-03 DIAGNOSIS — Z29.9 ENCOUNTER FOR PROPHYLACTIC MEASURES, UNSPECIFIED: ICD-10-CM

## 2022-04-03 LAB
ALBUMIN SERPL ELPH-MCNC: 2.8 G/DL — LOW (ref 3.3–5)
ALP SERPL-CCNC: 34 U/L — LOW (ref 40–120)
ALT FLD-CCNC: 6 U/L — LOW (ref 10–45)
ANION GAP SERPL CALC-SCNC: 7 MMOL/L — SIGNIFICANT CHANGE UP (ref 5–17)
AST SERPL-CCNC: 12 U/L — SIGNIFICANT CHANGE UP (ref 10–40)
BILIRUB SERPL-MCNC: 0.2 MG/DL — SIGNIFICANT CHANGE UP (ref 0.2–1.2)
BUN SERPL-MCNC: 17 MG/DL — SIGNIFICANT CHANGE UP (ref 7–23)
CALCIUM SERPL-MCNC: 8.3 MG/DL — LOW (ref 8.4–10.5)
CHLORIDE SERPL-SCNC: 101 MMOL/L — SIGNIFICANT CHANGE UP (ref 96–108)
CO2 SERPL-SCNC: 27 MMOL/L — SIGNIFICANT CHANGE UP (ref 22–31)
CREAT SERPL-MCNC: 0.59 MG/DL — SIGNIFICANT CHANGE UP (ref 0.5–1.3)
EGFR: 95 ML/MIN/1.73M2 — SIGNIFICANT CHANGE UP
GLUCOSE BLDC GLUCOMTR-MCNC: 145 MG/DL — HIGH (ref 70–99)
GLUCOSE BLDC GLUCOMTR-MCNC: 236 MG/DL — HIGH (ref 70–99)
GLUCOSE BLDC GLUCOMTR-MCNC: 244 MG/DL — HIGH (ref 70–99)
GLUCOSE BLDC GLUCOMTR-MCNC: 312 MG/DL — HIGH (ref 70–99)
GLUCOSE SERPL-MCNC: 260 MG/DL — HIGH (ref 70–99)
HCT VFR BLD CALC: 31.8 % — LOW (ref 34.5–45)
HGB BLD-MCNC: 10.2 G/DL — LOW (ref 11.5–15.5)
MAGNESIUM SERPL-MCNC: 1.7 MG/DL — SIGNIFICANT CHANGE UP (ref 1.6–2.6)
MCHC RBC-ENTMCNC: 26.9 PG — LOW (ref 27–34)
MCHC RBC-ENTMCNC: 32.1 GM/DL — SIGNIFICANT CHANGE UP (ref 32–36)
MCV RBC AUTO: 83.9 FL — SIGNIFICANT CHANGE UP (ref 80–100)
NRBC # BLD: 0 /100 WBCS — SIGNIFICANT CHANGE UP (ref 0–0)
PHOSPHATE SERPL-MCNC: 4.1 MG/DL — SIGNIFICANT CHANGE UP (ref 2.5–4.5)
PLATELET # BLD AUTO: 283 K/UL — SIGNIFICANT CHANGE UP (ref 150–400)
POTASSIUM SERPL-MCNC: 4.7 MMOL/L — SIGNIFICANT CHANGE UP (ref 3.5–5.3)
POTASSIUM SERPL-SCNC: 4.7 MMOL/L — SIGNIFICANT CHANGE UP (ref 3.5–5.3)
PROT SERPL-MCNC: 5.4 G/DL — LOW (ref 6–8.3)
PROT SERPL-MCNC: 5.4 G/DL — LOW (ref 6–8.3)
PROT SERPL-MCNC: 5.8 G/DL — LOW (ref 6–8.3)
RBC # BLD: 3.79 M/UL — LOW (ref 3.8–5.2)
RBC # FLD: 13.7 % — SIGNIFICANT CHANGE UP (ref 10.3–14.5)
SODIUM SERPL-SCNC: 135 MMOL/L — SIGNIFICANT CHANGE UP (ref 135–145)
VIT B12 SERPL-MCNC: 712 PG/ML — SIGNIFICANT CHANGE UP (ref 232–1245)
WBC # BLD: 4.35 K/UL — SIGNIFICANT CHANGE UP (ref 3.8–10.5)
WBC # FLD AUTO: 4.35 K/UL — SIGNIFICANT CHANGE UP (ref 3.8–10.5)

## 2022-04-03 PROCEDURE — 99233 SBSQ HOSP IP/OBS HIGH 50: CPT | Mod: GC

## 2022-04-03 PROCEDURE — 99232 SBSQ HOSP IP/OBS MODERATE 35: CPT

## 2022-04-03 RX ORDER — MAGNESIUM SULFATE 500 MG/ML
1 VIAL (ML) INJECTION ONCE
Refills: 0 | Status: COMPLETED | OUTPATIENT
Start: 2022-04-03 | End: 2022-04-03

## 2022-04-03 RX ORDER — INSULIN GLARGINE 100 [IU]/ML
14 INJECTION, SOLUTION SUBCUTANEOUS AT BEDTIME
Refills: 0 | Status: DISCONTINUED | OUTPATIENT
Start: 2022-04-03 | End: 2022-04-05

## 2022-04-03 RX ORDER — MAGNESIUM SULFATE 500 MG/ML
2 VIAL (ML) INJECTION ONCE
Refills: 0 | Status: COMPLETED | OUTPATIENT
Start: 2022-04-03 | End: 2022-04-03

## 2022-04-03 RX ADMIN — Medication 2: at 08:12

## 2022-04-03 RX ADMIN — PANTOPRAZOLE SODIUM 40 MILLIGRAM(S): 20 TABLET, DELAYED RELEASE ORAL at 06:32

## 2022-04-03 RX ADMIN — Medication 25 GRAM(S): at 09:44

## 2022-04-03 RX ADMIN — ENOXAPARIN SODIUM 40 MILLIGRAM(S): 100 INJECTION SUBCUTANEOUS at 08:32

## 2022-04-03 RX ADMIN — Medication 10 UNIT(S): at 08:13

## 2022-04-03 RX ADMIN — ATORVASTATIN CALCIUM 80 MILLIGRAM(S): 80 TABLET, FILM COATED ORAL at 21:37

## 2022-04-03 RX ADMIN — Medication 10 UNIT(S): at 17:22

## 2022-04-03 RX ADMIN — Medication 2: at 17:22

## 2022-04-03 RX ADMIN — Medication 81 MILLIGRAM(S): at 12:03

## 2022-04-03 RX ADMIN — PANTOPRAZOLE SODIUM 40 MILLIGRAM(S): 20 TABLET, DELAYED RELEASE ORAL at 17:17

## 2022-04-03 RX ADMIN — Medication 4: at 12:21

## 2022-04-03 RX ADMIN — LISINOPRIL 40 MILLIGRAM(S): 2.5 TABLET ORAL at 06:32

## 2022-04-03 RX ADMIN — INSULIN GLARGINE 14 UNIT(S): 100 INJECTION, SOLUTION SUBCUTANEOUS at 21:38

## 2022-04-03 RX ADMIN — Medication 10 UNIT(S): at 12:21

## 2022-04-03 RX ADMIN — CEFTRIAXONE 100 MILLIGRAM(S): 500 INJECTION, POWDER, FOR SOLUTION INTRAMUSCULAR; INTRAVENOUS at 17:08

## 2022-04-03 RX ADMIN — Medication 100 GRAM(S): at 12:03

## 2022-04-03 NOTE — PROGRESS NOTE ADULT - SUBJECTIVE AND OBJECTIVE BOX
Neurology Progress Note    INTERVAL HPI/OVERNIGHT EVENTS: NAEO    SUBJECTIVE: Patient seen and examined at bedside. Patient reports some difficulty seeing, but claims that shes had issues for years. Denies fever, headache, nausea, vomiting, chest pain, shortness of breath, abdominal pain, changes in bowel movements or urination.     OBJECTIVE:    VITAL SIGNS:  ICU Vital Signs Last 24 Hrs  T(C): 36.4 (03 Apr 2022 12:59), Max: 36.9 (03 Apr 2022 06:28)  T(F): 97.6 (03 Apr 2022 12:59), Max: 98.5 (03 Apr 2022 06:28)  HR: 77 (03 Apr 2022 12:59) (75 - 77)  BP: 155/30 (03 Apr 2022 12:59) (121/74 - 155/79)  BP(mean): --  ABP: --  ABP(mean): --  RR: 17 (03 Apr 2022 12:59) (17 - 19)  SpO2: 97% (03 Apr 2022 12:59) (96% - 98%)        CAPILLARY BLOOD GLUCOSE      POCT Blood Glucose.: 312 mg/dL (03 Apr 2022 12:10)      PHYSICAL EXAM:  Neurological Exam:   Mental status: Awake, alert and oriented x2 to person, place. Speech is slow. No dysarthria, no aphasia. Follows commands.  Cranial nerves: L pupil fixed, 4mm, R pupil responsive to light, extraocular muscles intact, L visual field deficit (no light or movement), V1 through V3 intact bilaterally and symmetric, face symmetric, tongue was midline.  Motor:  MRC grading 5/5 B/L UE.  4-/5 B/L LE hip flexion, 3/5 B/L LE knee flexion/extension, 2/5 B/L plantarflexion and dorsiflexion.  strength 5/5.  Normal tone and bulk.  No abnormal movements.    Sensation: Intact to light touch. Not intact to vibration and proprioception in B/L LE.   Coordination: Slight dysmetria on finger-to-nose on L side.  Reflexes: 1+ in B/L knee, ankle reflexes. 2+ in bilateral UE, downgoing toes bilaterally.  Gait: Deferred today    MEDICATIONS:  MEDICATIONS  (STANDING):  aspirin  chewable 81 milliGRAM(s) Oral daily  atorvastatin 80 milliGRAM(s) Oral at bedtime  cefTRIAXone   IVPB 1000 milliGRAM(s) IV Intermittent every 24 hours  dextrose 5%. 1000 milliLiter(s) (100 mL/Hr) IV Continuous <Continuous>  enoxaparin Injectable 40 milliGRAM(s) SubCutaneous every 24 hours  glucagon  Injectable 1 milliGRAM(s) IntraMuscular once  insulin glargine Injectable (LANTUS) 14 Unit(s) SubCutaneous at bedtime  insulin lispro (ADMELOG) corrective regimen sliding scale   SubCutaneous Before meals and at bedtime  insulin lispro Injectable (ADMELOG) 10 Unit(s) SubCutaneous three times a day before meals  lisinopril 40 milliGRAM(s) Oral daily  pantoprazole    Tablet 40 milliGRAM(s) Oral two times a day    MEDICATIONS  (PRN):  acetaminophen     Tablet .. 650 milliGRAM(s) Oral every 6 hours PRN Temp greater or equal to 38C (100.4F), Mild Pain (1 - 3), Moderate Pain (4 - 6)  dextrose Oral Gel 15 Gram(s) Oral once PRN Blood Glucose LESS THAN 70 milliGRAM(s)/deciliter  LORazepam   Injectable 2 milliGRAM(s) IV Push once PRN seizure > 1 minute  metoclopramide 5 milliGRAM(s) Oral every 8 hours PRN Nausea/Vomiting      ALLERGIES:  Allergies    No Known Allergies    Intolerances        LABS:                        10.2   4.35  )-----------( 283      ( 03 Apr 2022 06:44 )             31.8     04-03    135  |  101  |  17  ----------------------------<  260<H>  4.7   |  27  |  0.59    Ca    8.3<L>      03 Apr 2022 06:44  Phos  4.1     04-03  Mg     1.7     04-03    TPro  5.4<L>  /  Alb  x   /  TBili  x   /  DBili  x   /  AST  x   /  ALT  x   /  AlkPhos  x   04-03      Urinalysis Basic - ( 02 Apr 2022 13:58 )    Color: x / Appearance: x / SG: x / pH: x  Gluc: x / Ketone: x  / Bili: x / Urobili: x   Blood: x / Protein: x / Nitrite: x   Leuk Esterase: x / RBC: 5-10 /HPF / WBC Many /HPF   Sq Epi: x / Non Sq Epi: 0-5 /HPF / Bacteria: Many /HPF                  RADIOLOGY & ADDITIONAL TESTS: Reviewed.

## 2022-04-03 NOTE — PROGRESS NOTE ADULT - ASSESSMENT
Pt is a 72F w/ PMHx of HTN, DM c/b peripheral neuropathy and mostly bedbound for several months, BIBEMS w/ 1 episode of bloody vomit with clots following a day of nausea/vomiting, admitted for hematemesis evaluation, found to have most likely MW tear. Stroke code call on 3/30 and found to have R facial droop w/ slurred speech.  Stepped up to tele for further workup/observation with neuro following, stepped back down 3/31. Course complicated by HTNsive urgency on 4/2 w/ headache and SBP >200. Now resolved.

## 2022-04-03 NOTE — PROGRESS NOTE ADULT - PROBLEM SELECTOR PLAN 6
Pt presented w/ HTNsive urgency w/ . Asymptomatic. Appeared euvolemic. Per daughter, pt on lisinopril and amlodipine, but only the former was confirmed by pharmacy.    Plan:  - c/w home lisinopril 40mg qd

## 2022-04-03 NOTE — PROGRESS NOTE ADULT - PROBLEM SELECTOR PLAN 1
Pt was found to be unresponsive at 1200 on 3/30/31. Fingerstick ~160s, /80s, SpO2 100% on RA, no iwob, no accessory muscle use. Pt developed R facial droop with slurred speech. Stroke code called, patient stepped up to 7[facility]. CTH and CTA from stroke code are negative. 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 Patient now stable for stepdown to [facility]. Ddx: rule out stroke/TIA, rule out seizure, possible aspiration event. Neurology following - recommend vEEG; possibly that patient is having seizures and in between episodes is in a post-ictal state related to an amyloid etiology.     Plan:  - c/w ASA 81mg qd and atorvastatin 80mg qd per neurology recs   - s/p vEEG monitoring, no seizure activity detected   - CTH noncon 4/1: No acute ICH, mass effect or demarcated territorial infarction. Stable examination.  - MR head noncon 4/2: No acute ischemia. Extensive small vessel ischemic disease. Old R thalamic lacunar infarct.  - TTE 4/1: no evidence of intracardiac shunt w/ bubble study, hyperdynamic LV w/ EF 83%, trace MR/TR/AR/VT  - lovenox 40mg qd for DVT ppx

## 2022-04-03 NOTE — PROGRESS NOTE ADULT - SUBJECTIVE AND OBJECTIVE BOX
OVERNIGHT EVENTS: jayro    SUBJECTIVE:  Patient seen and examined at bedside. States she feels well this morning, denies headaches and was eating her breakfast.    Vital Signs Last 12 Hrs  T(F): 97.6 (04-03-22 @ 12:59), Max: 98.5 (04-03-22 @ 06:28)  HR: 77 (04-03-22 @ 12:59) (75 - 77)  BP: 155/30 (04-03-22 @ 12:59) (121/74 - 155/30)  BP(mean): --  RR: 17 (04-03-22 @ 12:59) (17 - 19)  SpO2: 97% (04-03-22 @ 12:59) (96% - 97%)  I&O's Summary      PHYSICAL EXAM:  General: frail elderly woman sitting up in bed eating breakfast, in no apparent distress, blind in L eye  HEENT: NC/AT, PERRLA, anicteric sclera, dry MM, poor dentition  Neck: supple  Cardiovascular: RRR, +S1/S2; no murmurs  Respiratory: CTAB  Gastrointestinal: soft, NT/ND; +BSx4  Extremities: WWP; no edema or cyanosis  Vascular: 2+ radial, DP pulses b/l  Neuro: A&Ox2 (self, place).        LABS:                        10.2   4.35  )-----------( 283      ( 03 Apr 2022 06:44 )             31.8     04-03    135  |  101  |  17  ----------------------------<  260<H>  4.7   |  27  |  0.59    Ca    8.3<L>      03 Apr 2022 06:44  Phos  4.1     04-03  Mg     1.7     04-03    TPro  5.4<L>  /  Alb  x   /  TBili  x   /  DBili  x   /  AST  x   /  ALT  x   /  AlkPhos  x   04-03      Urinalysis Basic - ( 02 Apr 2022 13:58 )    Color: x / Appearance: x / SG: x / pH: x  Gluc: x / Ketone: x  / Bili: x / Urobili: x   Blood: x / Protein: x / Nitrite: x   Leuk Esterase: x / RBC: 5-10 /HPF / WBC Many /HPF   Sq Epi: x / Non Sq Epi: 0-5 /HPF / Bacteria: Many /HPF          RADIOLOGY & ADDITIONAL TESTS:    MEDICATIONS  (STANDING):  aspirin  chewable 81 milliGRAM(s) Oral daily  atorvastatin 80 milliGRAM(s) Oral at bedtime  cefTRIAXone   IVPB 1000 milliGRAM(s) IV Intermittent every 24 hours  dextrose 5%. 1000 milliLiter(s) (100 mL/Hr) IV Continuous <Continuous>  enoxaparin Injectable 40 milliGRAM(s) SubCutaneous every 24 hours  glucagon  Injectable 1 milliGRAM(s) IntraMuscular once  insulin glargine Injectable (LANTUS) 14 Unit(s) SubCutaneous at bedtime  insulin lispro (ADMELOG) corrective regimen sliding scale   SubCutaneous Before meals and at bedtime  insulin lispro Injectable (ADMELOG) 10 Unit(s) SubCutaneous three times a day before meals  lisinopril 40 milliGRAM(s) Oral daily  pantoprazole    Tablet 40 milliGRAM(s) Oral two times a day    MEDICATIONS  (PRN):  acetaminophen     Tablet .. 650 milliGRAM(s) Oral every 6 hours PRN Temp greater or equal to 38C (100.4F), Mild Pain (1 - 3), Moderate Pain (4 - 6)  dextrose Oral Gel 15 Gram(s) Oral once PRN Blood Glucose LESS THAN 70 milliGRAM(s)/deciliter  LORazepam   Injectable 2 milliGRAM(s) IV Push once PRN seizure > 1 minute  metoclopramide 5 milliGRAM(s) Oral every 8 hours PRN Nausea/Vomiting

## 2022-04-03 NOTE — PROGRESS NOTE ADULT - PROBLEM SELECTOR PLAN 2
One episode, none in ED or hospital thus far. Preceded by several likely non-bloody vomiting episodes. No pain. Hgb stable, not hypotensive. Most likely Naty Sanchez tear, labs not consistent w/ chronic bleed given normal Hgb.    Plan:   - F/u GI recs  - c/w Pantoprazole 40mg PO BID  - c/w ASA (ok w/ neuro)  - On discharge, pantoprazole 40mg BID for 2 weeks followed by QD for 6 weeks, follow up with GI.  - Pending outpatient v. inpatient gastroparesis study

## 2022-04-03 NOTE — PROGRESS NOTE ADULT - ASSESSMENT
This is a 71 yo F w/ PMHx of HTN, DM c/b peripheral neuropathy and mostly bedbound for several months, BIBEMS w/ 1 episode of bloody vomit with clots following a day of nausea/vomiting, admitted for hematemesis evaluation, found to have most likely MW tear.  Neurology consulted for waxing and waning cognition and lower extremity weakness bilaterally. DDx for cognitive changes includes hospital acquired delirium for altering cognition vs less likely catatonia. DDx for LE weakness, sensory issues can be related to polyneuropathy vs diabetic neuropathy.    Plan:   - MR Head non contrast reviewed, no acute ischemia, showed extensive small vessel ischemic disease and chronic right thalamic lacunar infarct  - MR thoracic without signs of cord compression. However Mild posterior bulge at T2-3, and T-4-5 to T10-11 with mild to moderate canal narrowing worst at T10-11 level  - EEG monitoring revealed cerebral dysfunction in the right hemisphere, mild diffuse nonspecific dysfunction, no epileptiform abnormalities noted  - For suspected polyneuropathy, will recommend neuropathy labs including MMA level, B12, SPEP, serum immunofixation, RPR, copper level  - Continue to hold gabapentin given fluctuation in cognition  - Recommend outpatient neurology follow up  - Neurology will continue to follow  - Continue rest of care as per primary team    Discussed with Attending Neurologist Dr. Arenas. Recommendations are considered final after attending attestation.

## 2022-04-03 NOTE — PROGRESS NOTE ADULT - PROBLEM SELECTOR PLAN 5
Chronic and present on arrival. Likely 2/2 diabetic neuropathy, aggravated by deconditioning from long-term bedrest.     Plan:   - PT/OT recs 3/27 - [facility]  - appreciate neurology recs   - Management of diabetic neuropathy

## 2022-04-03 NOTE — PROGRESS NOTE ADULT - PROBLEM SELECTOR PLAN 9
Per daughter, pt was previously on gabapentin 600mg TID which was decreased to 300mg TID two days prior to admission due to concern for contribution to weakness. Lumbar MRI - negative.    Plan:   - gabapentin on hold

## 2022-04-03 NOTE — PROGRESS NOTE ADULT - SUBJECTIVE AND OBJECTIVE BOX
INTERVAL HPI/OVERNIGHT EVENTS:    Patient is a 72y old  Female who presents with a chief complaint of Hematemesis (02 Apr 2022 19:20)  Patient noted to not be on consistent carbohydrate diet, had regular sugar at bedside. Blood sugars elevated.    Pt reports the following symptoms:    CONSTITUTIONAL:  Negative fever or chills, feels well, good appetite  EYES:  Negative  blurry vision or double vision  CARDIOVASCULAR:  Negative for chest pain or palpitations  RESPIRATORY:  Negative for cough, wheezing, or SOB   GASTROINTESTINAL:  Negative for nausea, vomiting, diarrhea, constipation, or abdominal pain  GENITOURINARY:  Negative frequency, urgency or dysuria  NEUROLOGIC:  No headache, confusion, dizziness, lightheadedness    MEDICATIONS  (STANDING):  aspirin  chewable 81 milliGRAM(s) Oral daily  atorvastatin 80 milliGRAM(s) Oral at bedtime  cefTRIAXone   IVPB 1000 milliGRAM(s) IV Intermittent every 24 hours  dextrose 5%. 1000 milliLiter(s) (100 mL/Hr) IV Continuous <Continuous>  enoxaparin Injectable 40 milliGRAM(s) SubCutaneous every 24 hours  glucagon  Injectable 1 milliGRAM(s) IntraMuscular once  insulin glargine Injectable (LANTUS) 12 Unit(s) SubCutaneous at bedtime  insulin lispro (ADMELOG) corrective regimen sliding scale   SubCutaneous Before meals and at bedtime  insulin lispro Injectable (ADMELOG) 10 Unit(s) SubCutaneous three times a day before meals  lisinopril 40 milliGRAM(s) Oral daily  pantoprazole    Tablet 40 milliGRAM(s) Oral two times a day    MEDICATIONS  (PRN):  acetaminophen     Tablet .. 650 milliGRAM(s) Oral every 6 hours PRN Temp greater or equal to 38C (100.4F), Mild Pain (1 - 3), Moderate Pain (4 - 6)  dextrose Oral Gel 15 Gram(s) Oral once PRN Blood Glucose LESS THAN 70 milliGRAM(s)/deciliter  LORazepam   Injectable 2 milliGRAM(s) IV Push once PRN seizure > 1 minute  metoclopramide 5 milliGRAM(s) Oral every 8 hours PRN Nausea/Vomiting      PHYSICAL EXAM  Vital Signs Last 24 Hrs  T(C): 36.9 (03 Apr 2022 06:28), Max: 36.9 (03 Apr 2022 06:28)  T(F): 98.5 (03 Apr 2022 06:28), Max: 98.5 (03 Apr 2022 06:28)  HR: 75 (03 Apr 2022 06:28) (75 - 77)  BP: 121/74 (03 Apr 2022 06:28) (121/74 - 155/79)  BP(mean): --  RR: 19 (03 Apr 2022 06:28) (17 - 19)  SpO2: 96% (03 Apr 2022 06:28) (96% - 99%)    Constitutional: wn/wd in NAD.   HEENT: NCAT, MMM, OP clear, EOMI, , no proptosis or lid retraction  Neck: no thyromegaly or palpable thyroid nodules   Respiratory: lungs CTAB.  Cardiovascular: regular rhythm, normal S1 and S2, no audible murmurs, no peripheral edema  GI: soft, NT/ND, no masses/HSM appreciated.  Neurology: no tremors, DTR 2+  Skin: no visible rashes/lesions  Psychiatric: AAO x 3, normal affect/mood.    LABS:                        10.2   4.35  )-----------( 283      ( 03 Apr 2022 06:44 )             31.8     04-03    135  |  101  |  17  ----------------------------<  260<H>  4.7   |  27  |  0.59    Ca    8.3<L>      03 Apr 2022 06:44  Phos  4.1     04-03  Mg     1.7     04-03    TPro  5.4<L>  /  Alb  x   /  TBili  x   /  DBili  x   /  AST  x   /  ALT  x   /  AlkPhos  x   04-03      Urinalysis Basic - ( 02 Apr 2022 13:58 )    Color: x / Appearance: x / SG: x / pH: x  Gluc: x / Ketone: x  / Bili: x / Urobili: x   Blood: x / Protein: x / Nitrite: x   Leuk Esterase: x / RBC: 5-10 /HPF / WBC Many /HPF   Sq Epi: x / Non Sq Epi: 0-5 /HPF / Bacteria: Many /HPF      Thyroid Stimulating Hormone, Serum: 1.120 uIU/mL (04-01 @ 07:54)      HbA1C:         Insulin Sliding Scale requirements X 24 Hours:      RADIOLOGY & ADDITIONAL TESTS:      Assessment and Plan:   · Assessment	  73 YO F with PMH of DM2 (requiring insulin), HTN, DM presented with hematemesis, now resolved, thought to be 2/2 Naty Sanchez tear. She is also found to be in uncontrolled DM i/s/o A1C of 11.6.     Problem/Plan - 1:  ·  Problem: DM (diabetes mellitus).   ·  Plan: Uncontrolled - A1C is 11.6  - history is somewhat limited 2/2 her being an extremely poor historian i/s/o her ?dementia  - home regimen: Metformin 1g BID, Victoza 1.2, and 18 units of Lantus daily.     Plan:  Lantus 14  units tonight  nutritional Lispro 10  units before meals.  Continue lispo JANICE before meals and at bedtime.  Dispo: [facility]    Endocrine will continue to follow.     INTERVAL HPI/OVERNIGHT EVENTS:    Patient is a 72y old  Female who presents with a chief complaint of Hematemesis (02 Apr 2022 19:20)  Patient noted to not be on consistent carbohydrate diet, had regular sugar at bedside. Blood sugars elevated.    Pt reports the following symptoms:    CONSTITUTIONAL:  Negative fever or chills, feels well, good appetite  EYES:  Negative  blurry vision or double vision  CARDIOVASCULAR:  Negative for chest pain or palpitations  RESPIRATORY:  Negative for cough, wheezing, or SOB   GASTROINTESTINAL:  Negative for nausea, vomiting, diarrhea, constipation, or abdominal pain  GENITOURINARY:  Negative frequency, urgency or dysuria  NEUROLOGIC:  No headache, confusion, dizziness, lightheadedness    MEDICATIONS  (STANDING):  aspirin  chewable 81 milliGRAM(s) Oral daily  atorvastatin 80 milliGRAM(s) Oral at bedtime  cefTRIAXone   IVPB 1000 milliGRAM(s) IV Intermittent every 24 hours  dextrose 5%. 1000 milliLiter(s) (100 mL/Hr) IV Continuous <Continuous>  enoxaparin Injectable 40 milliGRAM(s) SubCutaneous every 24 hours  glucagon  Injectable 1 milliGRAM(s) IntraMuscular once  insulin glargine Injectable (LANTUS) 12 Unit(s) SubCutaneous at bedtime  insulin lispro (ADMELOG) corrective regimen sliding scale   SubCutaneous Before meals and at bedtime  insulin lispro Injectable (ADMELOG) 10 Unit(s) SubCutaneous three times a day before meals  lisinopril 40 milliGRAM(s) Oral daily  pantoprazole    Tablet 40 milliGRAM(s) Oral two times a day    MEDICATIONS  (PRN):  acetaminophen     Tablet .. 650 milliGRAM(s) Oral every 6 hours PRN Temp greater or equal to 38C (100.4F), Mild Pain (1 - 3), Moderate Pain (4 - 6)  dextrose Oral Gel 15 Gram(s) Oral once PRN Blood Glucose LESS THAN 70 milliGRAM(s)/deciliter  LORazepam   Injectable 2 milliGRAM(s) IV Push once PRN seizure > 1 minute  metoclopramide 5 milliGRAM(s) Oral every 8 hours PRN Nausea/Vomiting      PHYSICAL EXAM  Vital Signs Last 24 Hrs  T(C): 36.9 (03 Apr 2022 06:28), Max: 36.9 (03 Apr 2022 06:28)  T(F): 98.5 (03 Apr 2022 06:28), Max: 98.5 (03 Apr 2022 06:28)  HR: 75 (03 Apr 2022 06:28) (75 - 77)  BP: 121/74 (03 Apr 2022 06:28) (121/74 - 155/79)  BP(mean): --  RR: 19 (03 Apr 2022 06:28) (17 - 19)  SpO2: 96% (03 Apr 2022 06:28) (96% - 99%)    Physical Exam:  Gen: Elderly female in NAD, pleasantly confused  Neuro: AAOx1 (herself). Grossly no focal deficits, but reduced strength in bilateral lower extremities which seems to be her baseline  Heart: RRR, no rmg  Lungs: CTAB, no signs of increased WOB   Ext: Good distal pulses. Light touch sensation grossly intact      LABS:                        10.2   4.35  )-----------( 283      ( 03 Apr 2022 06:44 )             31.8     04-03    135  |  101  |  17  ----------------------------<  260<H>  4.7   |  27  |  0.59    Ca    8.3<L>      03 Apr 2022 06:44  Phos  4.1     04-03  Mg     1.7     04-03    TPro  5.4<L>  /  Alb  x   /  TBili  x   /  DBili  x   /  AST  x   /  ALT  x   /  AlkPhos  x   04-03      Urinalysis Basic - ( 02 Apr 2022 13:58 )    Color: x / Appearance: x / SG: x / pH: x  Gluc: x / Ketone: x  / Bili: x / Urobili: x   Blood: x / Protein: x / Nitrite: x   Leuk Esterase: x / RBC: 5-10 /HPF / WBC Many /HPF   Sq Epi: x / Non Sq Epi: 0-5 /HPF / Bacteria: Many /HPF      Thyroid Stimulating Hormone, Serum: 1.120 uIU/mL (04-01 @ 07:54)      HbA1C:         Insulin Sliding Scale requirements X 24 Hours:      RADIOLOGY & ADDITIONAL TESTS:      Assessment and Plan:   · Assessment	  73 YO F with PMH of DM2 (requiring insulin), HTN, DM presented with hematemesis, now resolved, thought to be 2/2 Naty Sanchez tear. She is also found to be in uncontrolled DM i/s/o A1C of 11.6.     Problem/Plan - 1:  ·  Problem: DM (diabetes mellitus).   ·  Plan: Uncontrolled - A1C is 11.6  - history is somewhat limited 2/2 her being an extremely poor historian i/s/o her ?dementia  - home regimen: Metformin 1g BID, Victoza 1.2, and 18 units of Lantus daily.     Plan:  Lantus 14  units tonight  nutritional Lispro 10  units before meals.  Continue lispo JANICE before meals and at bedtime.  Diet changed to consistent carb   Dispo: [facility]    Endocrine will continue to follow.

## 2022-04-03 NOTE — PROGRESS NOTE ADULT - ATTENDING COMMENTS
Patient was seen and examined by me at bedside. I agree with resident's note, subjective, objective physical exam, assessment and plan with following modifications/additions.    UTI - started on ceftriaxone ( 4/2/22) for 3 days , f/u urine cx Patient was seen and examined by me at bedside. I agree with resident's note, subjective, objective physical exam, assessment and plan with following modifications/additions.    UTI - started on ceftriaxone ( 4/2/22) for 3 days , f/u urine cx  Insulin Dependent Diabetes Mellitus with hyperglycemia - endocrine managing , may need to increase insulin dose  , will leave it to endocrine team

## 2022-04-03 NOTE — PROGRESS NOTE ADULT - ATTENDING COMMENTS
Mood is improved. Labs for alternative causes of polyneuropathy pending.      Arun Arenas MD  Neurology Attending Physician

## 2022-04-03 NOTE — PROGRESS NOTE ADULT - PROBLEM SELECTOR PLAN 7
Per daughter, pt is on metformin 1000 BID, Lantus 18u, and Victoza, but only metformin was confirmed by pharmacy. A1C 11.6% (3/27).    Plan:   - Consulted endo, appreciate recs  - c/w basal bolus regimen with ISS; lantus 14u qhs and lispro 10u TID

## 2022-04-03 NOTE — PROGRESS NOTE ADULT - NSPROGADDITIONALINFOA_GEN_ALL_CORE
Agree with above  can continue 14 units lantus at bedtime,lispro 10 units tid with meals  consistent carb diet

## 2022-04-03 NOTE — PROGRESS NOTE ADULT - PROBLEM SELECTOR PLAN 10
F: none  E: replete PRN  N: minced and moist consistent carb  GI ppx: protonix 40mg PO BID  VTE ppx: lovenox 40mg qd  Dispo: [facility]

## 2022-04-03 NOTE — PROGRESS NOTE ADULT - PROBLEM SELECTOR PLAN 8
Known h/o HLD, takes Rosuvastatin 20mg qd at home.    Plan:   - c/w Atorvastatin 80mg qd per neurology recs; plan to dc on this

## 2022-04-04 DIAGNOSIS — N39.0 URINARY TRACT INFECTION, SITE NOT SPECIFIED: ICD-10-CM

## 2022-04-04 LAB
-  CEFAZOLIN: SIGNIFICANT CHANGE UP
-  LINEZOLID: SIGNIFICANT CHANGE UP
-  NITROFURANTOIN: SIGNIFICANT CHANGE UP
-  OXACILLIN: SIGNIFICANT CHANGE UP
-  RIFAMPIN: SIGNIFICANT CHANGE UP
-  TRIMETHOPRIM/SULFAMETHOXAZOLE: SIGNIFICANT CHANGE UP
-  VANCOMYCIN: SIGNIFICANT CHANGE UP
ANION GAP SERPL CALC-SCNC: 10 MMOL/L — SIGNIFICANT CHANGE UP (ref 5–17)
BUN SERPL-MCNC: 16 MG/DL — SIGNIFICANT CHANGE UP (ref 7–23)
CALCIUM SERPL-MCNC: 8.6 MG/DL — SIGNIFICANT CHANGE UP (ref 8.4–10.5)
CHLORIDE SERPL-SCNC: 101 MMOL/L — SIGNIFICANT CHANGE UP (ref 96–108)
CO2 SERPL-SCNC: 26 MMOL/L — SIGNIFICANT CHANGE UP (ref 22–31)
CREAT SERPL-MCNC: 0.6 MG/DL — SIGNIFICANT CHANGE UP (ref 0.5–1.3)
CULTURE RESULTS: SIGNIFICANT CHANGE UP
EGFR: 94 ML/MIN/1.73M2 — SIGNIFICANT CHANGE UP
GLUCOSE BLDC GLUCOMTR-MCNC: 121 MG/DL — HIGH (ref 70–99)
GLUCOSE BLDC GLUCOMTR-MCNC: 150 MG/DL — HIGH (ref 70–99)
GLUCOSE BLDC GLUCOMTR-MCNC: 185 MG/DL — HIGH (ref 70–99)
GLUCOSE BLDC GLUCOMTR-MCNC: 356 MG/DL — HIGH (ref 70–99)
GLUCOSE SERPL-MCNC: 143 MG/DL — HIGH (ref 70–99)
HCT VFR BLD CALC: 33.3 % — LOW (ref 34.5–45)
HGB BLD-MCNC: 10.6 G/DL — LOW (ref 11.5–15.5)
MAGNESIUM SERPL-MCNC: 2 MG/DL — SIGNIFICANT CHANGE UP (ref 1.6–2.6)
MCHC RBC-ENTMCNC: 26.8 PG — LOW (ref 27–34)
MCHC RBC-ENTMCNC: 31.8 GM/DL — LOW (ref 32–36)
MCV RBC AUTO: 84.3 FL — SIGNIFICANT CHANGE UP (ref 80–100)
METHOD TYPE: SIGNIFICANT CHANGE UP
NRBC # BLD: 0 /100 WBCS — SIGNIFICANT CHANGE UP (ref 0–0)
ORGANISM # SPEC MICROSCOPIC CNT: SIGNIFICANT CHANGE UP
ORGANISM # SPEC MICROSCOPIC CNT: SIGNIFICANT CHANGE UP
PLATELET # BLD AUTO: 304 K/UL — SIGNIFICANT CHANGE UP (ref 150–400)
POTASSIUM SERPL-MCNC: 4.1 MMOL/L — SIGNIFICANT CHANGE UP (ref 3.5–5.3)
POTASSIUM SERPL-SCNC: 4.1 MMOL/L — SIGNIFICANT CHANGE UP (ref 3.5–5.3)
RBC # BLD: 3.95 M/UL — SIGNIFICANT CHANGE UP (ref 3.8–5.2)
RBC # FLD: 13.3 % — SIGNIFICANT CHANGE UP (ref 10.3–14.5)
SODIUM SERPL-SCNC: 137 MMOL/L — SIGNIFICANT CHANGE UP (ref 135–145)
SPECIMEN SOURCE: SIGNIFICANT CHANGE UP
T PALLIDUM AB TITR SER: NEGATIVE — SIGNIFICANT CHANGE UP
WBC # BLD: 5.03 K/UL — SIGNIFICANT CHANGE UP (ref 3.8–10.5)
WBC # FLD AUTO: 5.03 K/UL — SIGNIFICANT CHANGE UP (ref 3.8–10.5)

## 2022-04-04 PROCEDURE — 99231 SBSQ HOSP IP/OBS SF/LOW 25: CPT | Mod: GC

## 2022-04-04 PROCEDURE — 99232 SBSQ HOSP IP/OBS MODERATE 35: CPT | Mod: GC

## 2022-04-04 PROCEDURE — 99232 SBSQ HOSP IP/OBS MODERATE 35: CPT

## 2022-04-04 RX ORDER — METFORMIN HYDROCHLORIDE 850 MG/1
1 TABLET ORAL
Qty: 0 | Refills: 0 | DISCHARGE

## 2022-04-04 RX ORDER — DULOXETINE HYDROCHLORIDE 30 MG/1
20 CAPSULE, DELAYED RELEASE ORAL DAILY
Refills: 0 | Status: DISCONTINUED | OUTPATIENT
Start: 2022-04-04 | End: 2022-04-05

## 2022-04-04 RX ORDER — GABAPENTIN 400 MG/1
1 CAPSULE ORAL
Qty: 0 | Refills: 0 | DISCHARGE

## 2022-04-04 RX ORDER — DULOXETINE HYDROCHLORIDE 30 MG/1
1 CAPSULE, DELAYED RELEASE ORAL
Qty: 14 | Refills: 0
Start: 2022-04-04 | End: 2022-04-17

## 2022-04-04 RX ORDER — ATORVASTATIN CALCIUM 80 MG/1
1 TABLET, FILM COATED ORAL
Qty: 14 | Refills: 0
Start: 2022-04-04 | End: 2022-04-17

## 2022-04-04 RX ORDER — INSULIN LISPRO 100/ML
14 VIAL (ML) SUBCUTANEOUS
Qty: 0 | Refills: 0 | DISCHARGE
Start: 2022-04-04

## 2022-04-04 RX ORDER — INSULIN LISPRO 100/ML
10 VIAL (ML) SUBCUTANEOUS
Refills: 0 | Status: DISCONTINUED | OUTPATIENT
Start: 2022-04-04 | End: 2022-04-05

## 2022-04-04 RX ORDER — DULOXETINE HYDROCHLORIDE 30 MG/1
30 CAPSULE, DELAYED RELEASE ORAL DAILY
Refills: 0 | Status: DISCONTINUED | OUTPATIENT
Start: 2022-04-04 | End: 2022-04-04

## 2022-04-04 RX ORDER — INSULIN LISPRO 100/ML
14 VIAL (ML) SUBCUTANEOUS
Refills: 0 | Status: DISCONTINUED | OUTPATIENT
Start: 2022-04-05 | End: 2022-04-05

## 2022-04-04 RX ORDER — INSULIN LISPRO 100/ML
10 VIAL (ML) SUBCUTANEOUS
Refills: 0 | Status: DISCONTINUED | OUTPATIENT
Start: 2022-04-04 | End: 2022-04-04

## 2022-04-04 RX ORDER — INSULIN LISPRO 100/ML
10 VIAL (ML) SUBCUTANEOUS
Refills: 0 | Status: DISCONTINUED | OUTPATIENT
Start: 2022-04-05 | End: 2022-04-05

## 2022-04-04 RX ORDER — ROSUVASTATIN CALCIUM 5 MG/1
1 TABLET ORAL
Qty: 0 | Refills: 0 | DISCHARGE

## 2022-04-04 RX ADMIN — Medication 1: at 22:41

## 2022-04-04 RX ADMIN — Medication 5: at 13:08

## 2022-04-04 RX ADMIN — PANTOPRAZOLE SODIUM 40 MILLIGRAM(S): 20 TABLET, DELAYED RELEASE ORAL at 17:48

## 2022-04-04 RX ADMIN — DULOXETINE HYDROCHLORIDE 30 MILLIGRAM(S): 30 CAPSULE, DELAYED RELEASE ORAL at 12:33

## 2022-04-04 RX ADMIN — Medication 10 UNIT(S): at 18:04

## 2022-04-04 RX ADMIN — INSULIN GLARGINE 14 UNIT(S): 100 INJECTION, SOLUTION SUBCUTANEOUS at 22:44

## 2022-04-04 RX ADMIN — Medication 10 UNIT(S): at 09:12

## 2022-04-04 RX ADMIN — PANTOPRAZOLE SODIUM 40 MILLIGRAM(S): 20 TABLET, DELAYED RELEASE ORAL at 06:37

## 2022-04-04 RX ADMIN — CEFTRIAXONE 100 MILLIGRAM(S): 500 INJECTION, POWDER, FOR SOLUTION INTRAMUSCULAR; INTRAVENOUS at 17:48

## 2022-04-04 RX ADMIN — ATORVASTATIN CALCIUM 80 MILLIGRAM(S): 80 TABLET, FILM COATED ORAL at 22:44

## 2022-04-04 RX ADMIN — ENOXAPARIN SODIUM 40 MILLIGRAM(S): 100 INJECTION SUBCUTANEOUS at 09:14

## 2022-04-04 RX ADMIN — Medication 10 UNIT(S): at 13:09

## 2022-04-04 RX ADMIN — Medication 81 MILLIGRAM(S): at 12:33

## 2022-04-04 RX ADMIN — LISINOPRIL 40 MILLIGRAM(S): 2.5 TABLET ORAL at 06:37

## 2022-04-04 NOTE — PROGRESS NOTE ADULT - ASSESSMENT
Pt is a 72F w/ PMHx of HTN, DM c/b peripheral neuropathy and mostly bedbound for several months, BIBEMS w/ 1 episode of bloody vomit with clots following a day of nausea/vomiting, admitted for hematemesis evaluation, found to have most likely MW tear. Stroke code call on 3/30 and found to have R facial droop w/ slurred speech.  Stepped up to tele for further workup/observation with neuro following. Patient stable for step down on 3/31  Pt is a 72F w/ PMHx of HTN, DM c/b peripheral neuropathy and mostly bedbound for several months, BIBEMS w/ 1 episode of bloody vomit with clots following a day of nausea/vomiting, admitted for hematemesis evaluation, found to have most likely MW tear. Stroke code call on 3/30 and found to have R facial droop w/ slurred speech.  Stepped up to tele for further workup/observation with neuro following. Patient stable for step down on 3/31 and awaiting disposition, [facility] v. home.

## 2022-04-04 NOTE — PROGRESS NOTE ADULT - PROBLEM SELECTOR PLAN 11
F: S/p 2.25L   E: replete K<4, Mg<2  N: CC  VTE Prophylaxis: SCDs given UGIB  GI: Pantoprazole 40 PO BID  C: Full Code (began discussion w/ daughter who is next of kin and primary caretaker)  D: Pending PT recs F: S/p 2.25L   E: replete K<4, Mg<2  N: CC  VTE Prophylaxis: SCDs given UGIB  GI: Pantoprazole 40 PO BID  C: Full Code (began discussion w/ daughter who is next of kin and primary caretaker)

## 2022-04-04 NOTE — PROGRESS NOTE ADULT - PROBLEM SELECTOR PLAN 1
Pt was found to be unresponsive at 1200 on 3/30/31. Fingerstick ~160s, /80s, SpO2 100% on RA, no iwob, no accessory muscle use. Pt developed R facial droop with slurred speech. Stroke code called, patient stepped up to [facility]. CTH and CTA from stroke code are negative. 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 Patient now stable for stepdown to [facility]. Ddx: rule out stroke/TIA, rule out seizure, possible aspiration event. Neurology following - recommend vEEG; possibly that patient is having seizures and in between episodes is in a post-ictal state related to an amyloid etiology.     Plan:  - start ASA 81mg po daily and atorvastatin 80mg daily per neurology rec   - vEEG to R/O Seizure   - F/u CT head noncont   - Obtain TTE  - SCDs for DVT PPX  - Rx PRN ativan 2 mg for seizure-like activity Pt was found to be unresponsive at 1200 on 3/30/31. Fingerstick ~160s, /80s, SpO2 100% on RA, no iwob, no accessory muscle use. Pt developed R facial droop with slurred speech. Stroke code called, patient stepped up to [facility]. CTH and CTA from stroke code are negative. 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 Patient now stable for stepdown to RMF. Ddx: rule out stroke/TIA, rule out seizure, possible aspiration event. Neurology following - recommend vEEG; possibly that patient is having seizures and in between episodes is in a post-ictal state related to an amyloid etiology. vEEG shows R cerebral hemisphere dysfunction but no epileptiform activity. F/U CT head noncont and MRI show no acute pathology. [place] that there is a hx of meningioma back in 2020 that has not been resected. But current imaging shows no increased growth of tumor. TTE negative for PFO, thrombus, mild reg    Plan:  - ASA 81mg po daily and atorvastatin 80mg

## 2022-04-04 NOTE — PROGRESS NOTE ADULT - PROBLEM SELECTOR PLAN 9
Per daughter, pt was previously on gabapentin 600 TID which was decreased to 300 TID two days prior due to concern for contribution to weakness. Lumbar MRI - negative     Plan:   - C/w gabapentin 300 TID Noted by ED physician. EKG not in pt's chart on admission.  - EKG at [facility] showing incomplete RBBB.

## 2022-04-04 NOTE — PROGRESS NOTE ADULT - TIME BILLING
review of patient information including recent vital signs, labs, imaging, and notes; assessing, examining patient; updating patient/family; discussion and coordination of care with multidisciplinary team.
Insulin adjustment
review of patient information including recent vital signs, labs, imaging, and notes; assessing, examining patient; updating patient/family; discussion and coordination of care with multidisciplinary team.
Insulin adjustment
Insulin adjustment

## 2022-04-04 NOTE — PROGRESS NOTE ADULT - PROBLEM SELECTOR PLAN 2
One episode, none in ED or hospital thus far. Preceded by several likely non-bloody vomiting episodes. No pain. Hgb stable, not hypotensive. Most likely Naty Sanchez tear, labs not consistent w/ chronic bleed given normal Hgb.    Plan:   - F/u GI recs  - IV Pantoprazole 40 bid  - Can c/w aspirin per neurology rec after stroke code   - On discharge, pantoprazole 40 BID for 2 weeks followed by QD for 6 weeks, follow up with GI.  - Pending outpatient v. inpatient gastroparesis study One episode, none in ED or hospital thus far. Preceded by several likely non-bloody vomiting episodes. No pain. Hgb stable, not hypotensive. Most likely Naty Sanchez tear, labs not consistent w/ chronic bleed given normal Hgb.    Plan:   - PPI 40 BID   - On discharge, pantoprazole 40 BID for 2 weeks followed by QD for 6 weeks, follow up with GI

## 2022-04-04 NOTE — PROGRESS NOTE ADULT - SUBJECTIVE AND OBJECTIVE BOX
NEUROLOGY CONSULT: PROGRESS NOTE    INTERVAL HISTORY: Noted.     SUBJECTIVE:  Pt seen and examined at bedside this AM. No further episodes of waxing and waning cognition over the weekend. Still complains of pain and cramping in bilateral LE.     MEDICATIONS:  acetaminophen     Tablet .. 650 milliGRAM(s) Oral every 6 hours PRN  aspirin  chewable 81 milliGRAM(s) Oral daily  atorvastatin 80 milliGRAM(s) Oral at bedtime  cefTRIAXone   IVPB 1000 milliGRAM(s) IV Intermittent every 24 hours  dextrose 5%. 1000 milliLiter(s) IV Continuous <Continuous>  dextrose Oral Gel 15 Gram(s) Oral once PRN  DULoxetine 30 milliGRAM(s) Oral daily  enoxaparin Injectable 40 milliGRAM(s) SubCutaneous every 24 hours  glucagon  Injectable 1 milliGRAM(s) IntraMuscular once  insulin glargine Injectable (LANTUS) 14 Unit(s) SubCutaneous at bedtime  insulin lispro (ADMELOG) corrective regimen sliding scale   SubCutaneous Before meals and at bedtime  insulin lispro Injectable (ADMELOG) 10 Unit(s) SubCutaneous three times a day before meals  lisinopril 40 milliGRAM(s) Oral daily  LORazepam   Injectable 2 milliGRAM(s) IV Push once PRN  metoclopramide 5 milliGRAM(s) Oral every 8 hours PRN  pantoprazole    Tablet 40 milliGRAM(s) Oral two times a day    VITAL SIGNS:  Vital Signs Last 24 Hrs  T(C): 36.8 (04 Apr 2022 09:45), Max: 37.3 (03 Apr 2022 20:47)  T(F): 98.3 (04 Apr 2022 09:45), Max: 99.2 (03 Apr 2022 20:47)  HR: 75 (04 Apr 2022 09:45) (68 - 78)  BP: 106/65 (04 Apr 2022 09:45) (106/65 - 155/30)  BP(mean): --  RR: 18 (04 Apr 2022 09:45) (17 - 18)  SpO2: 100% (04 Apr 2022 09:45) (97% - 100%)    PHYSICAL EXAMINATION:  General: Comfortable.  Neurologic:     -Mental Status: AAOx2. Speech is fluent with intact naming, repetition, and comprehension, no dysarthria. Recent and remote memory intact. Follows commands. Attention/concentration intact. Fund of knowledge appropriate.     -Cranial Nerves:          II: Impaired L vision.           III, IV, VI: EOMI without nystagmus. L pupil 4mm and fixed. R pupil responsive to light.           V:  Facial sensation V1-V3 equal and intact           VII: Face is symmetric with normal eye closure and smile          VIII: Hearing is bilaterally intact to finger rub          IX, X: Uvula is midline and soft palate rises symmetrically          XI: Head turning and shoulder shrug are intact.          XII: Tongue protrudes midline     -Motor: Normal bulk and tone. No involuntary movements (tremor, myoclonus, chorea, athetosis, dystonia)          Upper extremities: 5/5 shoulder abduction,5/5 elbow flexion/extension, 5/5 wrist flexion/extension, 5/5 handgrip          Lower extremities: 3/5 hip flexion, 3/5  knee extension/flexion,0/5 plantar flexion, 0/5ankle dorsiflexion          No pronator drift. Rapid alternating movements intact and symmetric     -Sensation: Intact to light touch. Vibratory sense decreased bilateral lower extremity.      -Coordination: Dysmetria on finger to nose improved.      -Reflexes: 2+ and symmetric at biceps, triceps, brachioradialis, patellar, ankles          Plantar reflexes mute.    LABS:                          10.6   5.03  )-----------( 304      ( 04 Apr 2022 06:42 )             33.3     04-04    137  |  101  |  16  ----------------------------<  143<H>  4.1   |  26  |  0.60    Ca    8.6      04 Apr 2022 06:42  Phos  4.1     04-03  Mg     2.0     04-04    TPro  5.4<L>  /  Alb  x   /  TBili  x   /  DBili  x   /  AST  x   /  ALT  x   /  AlkPhos  x   04-03      Urinalysis Basic - ( 02 Apr 2022 13:58 )    Color: x / Appearance: x / SG: x / pH: x  Gluc: x / Ketone: x  / Bili: x / Urobili: x   Blood: x / Protein: x / Nitrite: x   Leuk Esterase: x / RBC: 5-10 /HPF / WBC Many /HPF   Sq Epi: x / Non Sq Epi: 0-5 /HPF / Bacteria: Many /HPF        RADIOLOGY & ADDITIONAL STUDIES:  reviewed

## 2022-04-04 NOTE — PROGRESS NOTE ADULT - PROBLEM SELECTOR PLAN 6
Pt presenting w/ HTNsive urgency w/  systolic. Asymptomatic. Appears euvolemic. Per daughter, pt on lisinopril and amlodipine, but only the former was confirmed by pharmacy.    Plan:  - C/w home lisinopril 40 qd Per daughter, pt is on metformin 1000 BID, Lantus 18u, and Victoza, but only metformin was confirmed by pharmacy. A1C 11.6     Plan:   - Consulted endo, appreciate recs  - c/w basal bolus regimen with ISS; lantus 14 and 10 bolus

## 2022-04-04 NOTE — PROGRESS NOTE ADULT - PROBLEM SELECTOR PLAN 7
Per daughter, pt is on metformin 1000 BID, Lantus 18u, and Victoza, but only metformin was confirmed by pharmacy. A1C 11.6     Plan:   - Consulted endo, appreciate recs  - c/w basal bolus regimen with ISS; lantus 18 and 11 bolus On rosuvastatin 20 qd.    Plan:   - start 80 atorvastatin per neurology

## 2022-04-04 NOTE — PROGRESS NOTE ADULT - NS ATTEST RISK GEN_ALL_CORE
Risk Statement (NON-critical care)

## 2022-04-04 NOTE — PROGRESS NOTE ADULT - ATTENDING COMMENTS
Pt seen on rounds this afternoon, and events of the weekend reviewed.  Insulin requirements continue to fluctuate, though pattern of spiking after breakfast persists.  Needed to be changed back to consistent carb diet yesterday.  PO intake improved--seen after lunch today, and she had finished everything on the tray  Is more alert than last week  --Glucoses stable at satisfactory overnight (145 to 150), so will stay with 14 units of Lantus  --Will increase breakfast to 14 units, keep the other meals at 10 units, though all of these doses may need further increase with better PO intake

## 2022-04-04 NOTE — PROGRESS NOTE ADULT - ATTENDING SUPERVISION STATEMENT
Fellow
Resident
Fellow
Resident
Fellow
Fellow
Resident
Fellow
Resident

## 2022-04-04 NOTE — PROGRESS NOTE ADULT - PROBLEM SELECTOR PLAN 4
RESOLVED. Likely due to gastroparesis 2/2 to poor DM control. Chronic and present on arrival. Likely 2/2 diabetic neuropathy, aggravated by deconditioning from long-term bedrest. MR thoracic without signs of cord compression. However Mild posterior bulge at T2-3, and T-4-5 to T10-11 with mild to moderate canal narrowing worst at T10-11 level    Plan:   - F/u PT consult and neurology, appreciate recs   - Started on 30 mg QD cymbalta for neuropathy 4/4; discontinued gabapentin   - F/u on MMA level, B12, SPEP, serum immunofixation, RPR, copper level

## 2022-04-04 NOTE — PROGRESS NOTE ADULT - SUBJECTIVE AND OBJECTIVE BOX
INTERVAL HPI/OVERNIGHT EVENTS:    Patient is a 72y old  Female who presents with a chief complaint of Hematemesis (04 Apr 2022 11:51)      Pt reports the following symptoms:    CONSTITUTIONAL:  Negative fever or chills, feels well, good appetite  EYES:  Negative  blurry vision or double vision  CARDIOVASCULAR:  Negative for chest pain or palpitations  RESPIRATORY:  Negative for cough, wheezing, or SOB   GASTROINTESTINAL:  Negative for nausea, vomiting, diarrhea, constipation, or abdominal pain  GENITOURINARY:  Negative frequency, urgency or dysuria  NEUROLOGIC:  No headache, confusion, dizziness, lightheadedness    MEDICATIONS  (STANDING):  aspirin  chewable 81 milliGRAM(s) Oral daily  atorvastatin 80 milliGRAM(s) Oral at bedtime  cefTRIAXone   IVPB 1000 milliGRAM(s) IV Intermittent every 24 hours  dextrose 5%. 1000 milliLiter(s) (100 mL/Hr) IV Continuous <Continuous>  DULoxetine 30 milliGRAM(s) Oral daily  enoxaparin Injectable 40 milliGRAM(s) SubCutaneous every 24 hours  glucagon  Injectable 1 milliGRAM(s) IntraMuscular once  insulin glargine Injectable (LANTUS) 14 Unit(s) SubCutaneous at bedtime  insulin lispro (ADMELOG) corrective regimen sliding scale   SubCutaneous Before meals and at bedtime  insulin lispro Injectable (ADMELOG) 10 Unit(s) SubCutaneous three times a day before meals  lisinopril 40 milliGRAM(s) Oral daily  pantoprazole    Tablet 40 milliGRAM(s) Oral two times a day    MEDICATIONS  (PRN):  acetaminophen     Tablet .. 650 milliGRAM(s) Oral every 6 hours PRN Temp greater or equal to 38C (100.4F), Mild Pain (1 - 3), Moderate Pain (4 - 6)  dextrose Oral Gel 15 Gram(s) Oral once PRN Blood Glucose LESS THAN 70 milliGRAM(s)/deciliter  LORazepam   Injectable 2 milliGRAM(s) IV Push once PRN seizure > 1 minute  metoclopramide 5 milliGRAM(s) Oral every 8 hours PRN Nausea/Vomiting      PHYSICAL EXAM  Vital Signs Last 24 Hrs  T(C): 36.8 (04 Apr 2022 09:45), Max: 37.3 (03 Apr 2022 20:47)  T(F): 98.3 (04 Apr 2022 09:45), Max: 99.2 (03 Apr 2022 20:47)  HR: 75 (04 Apr 2022 09:45) (68 - 78)  BP: 106/65 (04 Apr 2022 09:45) (106/65 - 151/78)  BP(mean): --  RR: 18 (04 Apr 2022 09:45) (18 - 18)  SpO2: 100% (04 Apr 2022 09:45) (97% - 100%)    Constitutional: wn/wd in NAD.   HEENT: NCAT, MMM, OP clear, EOMI, , no proptosis or lid retraction  Neck: no thyromegaly or palpable thyroid nodules   Respiratory: lungs CTAB.  Cardiovascular: regular rhythm, normal S1 and S2, no audible murmurs, no peripheral edema  GI: soft, NT/ND, no masses/HSM appreciated.  Neurology: no tremors, DTR 2+  Skin: no visible rashes/lesions  Psychiatric: AAO x 3, normal affect/mood.    LABS:                        10.6   5.03  )-----------( 304      ( 04 Apr 2022 06:42 )             33.3     04-04    137  |  101  |  16  ----------------------------<  143<H>  4.1   |  26  |  0.60    Ca    8.6      04 Apr 2022 06:42  Phos  4.1     04-03  Mg     2.0     04-04    TPro  5.4<L>  /  Alb  x   /  TBili  x   /  DBili  x   /  AST  x   /  ALT  x   /  AlkPhos  x   04-03      Urinalysis Basic - ( 02 Apr 2022 13:58 )    Color: x / Appearance: x / SG: x / pH: x  Gluc: x / Ketone: x  / Bili: x / Urobili: x   Blood: x / Protein: x / Nitrite: x   Leuk Esterase: x / RBC: 5-10 /HPF / WBC Many /HPF   Sq Epi: x / Non Sq Epi: 0-5 /HPF / Bacteria: Many /HPF      Thyroid Stimulating Hormone, Serum: 1.120 uIU/mL (04-01 @ 07:54)      HbA1C:         Insulin Sliding Scale requirements X 24 Hours:      RADIOLOGY & ADDITIONAL TESTS:      Assessment and Plan:   · Assessment	  71 YO F with PMH of DM2 (requiring insulin), HTN, DM presented with hematemesis, now resolved, thought to be 2/2 Naty Sanchez tear. She is also found to be in uncontrolled DM i/s/o A1C of 11.6.     Problem/Plan - 1:  ·  Problem: DM (diabetes mellitus).   ·  Plan: Uncontrolled - A1C is 11.6  - history is somewhat limited 2/2 her being an extremely poor historian i/s/o her ?dementia  - home regimen: Metformin 1g BID, Victoza 1.2, and 18 units of Lantus daily.     Plan:  Lantus   units tonight  nutritional Lispro   units before meals.  Continue lispo JANICE before meals and at bedtime.  Diet changed to consistent carb   Dispo: [facility]    Endocrine will continue to follow.       INTERVAL HPI/OVERNIGHT EVENTS:    Patient is a 72y old  Female who presents with a chief complaint of Hematemesis (04 Apr 2022 11:51)  Patient eating well, appears to eat all her food. Diet was changed to consistent carbohydrates yesterday, sugar remains high for lunch.     Pt reports the following symptoms:    CONSTITUTIONAL:  Negative fever or chills, feels well, good appetite  EYES:  Negative  blurry vision or double vision  CARDIOVASCULAR:  Negative for chest pain or palpitations  RESPIRATORY:  Negative for cough, wheezing, or SOB   GASTROINTESTINAL:  Negative for nausea, vomiting, diarrhea, constipation, or abdominal pain  GENITOURINARY:  Negative frequency, urgency or dysuria  NEUROLOGIC:  No headache, confusion, dizziness, lightheadedness    MEDICATIONS  (STANDING):  aspirin  chewable 81 milliGRAM(s) Oral daily  atorvastatin 80 milliGRAM(s) Oral at bedtime  cefTRIAXone   IVPB 1000 milliGRAM(s) IV Intermittent every 24 hours  dextrose 5%. 1000 milliLiter(s) (100 mL/Hr) IV Continuous <Continuous>  DULoxetine 30 milliGRAM(s) Oral daily  enoxaparin Injectable 40 milliGRAM(s) SubCutaneous every 24 hours  glucagon  Injectable 1 milliGRAM(s) IntraMuscular once  insulin glargine Injectable (LANTUS) 14 Unit(s) SubCutaneous at bedtime  insulin lispro (ADMELOG) corrective regimen sliding scale   SubCutaneous Before meals and at bedtime  insulin lispro Injectable (ADMELOG) 10 Unit(s) SubCutaneous three times a day before meals  lisinopril 40 milliGRAM(s) Oral daily  pantoprazole    Tablet 40 milliGRAM(s) Oral two times a day    MEDICATIONS  (PRN):  acetaminophen     Tablet .. 650 milliGRAM(s) Oral every 6 hours PRN Temp greater or equal to 38C (100.4F), Mild Pain (1 - 3), Moderate Pain (4 - 6)  dextrose Oral Gel 15 Gram(s) Oral once PRN Blood Glucose LESS THAN 70 milliGRAM(s)/deciliter  LORazepam   Injectable 2 milliGRAM(s) IV Push once PRN seizure > 1 minute  metoclopramide 5 milliGRAM(s) Oral every 8 hours PRN Nausea/Vomiting      PHYSICAL EXAM  Vital Signs Last 24 Hrs  T(C): 36.8 (04 Apr 2022 09:45), Max: 37.3 (03 Apr 2022 20:47)  T(F): 98.3 (04 Apr 2022 09:45), Max: 99.2 (03 Apr 2022 20:47)  HR: 75 (04 Apr 2022 09:45) (68 - 78)  BP: 106/65 (04 Apr 2022 09:45) (106/65 - 151/78)  BP(mean): --  RR: 18 (04 Apr 2022 09:45) (18 - 18)  SpO2: 100% (04 Apr 2022 09:45) (97% - 100%)    Physical Exam:  Gen: Elderly female in NAD, pleasantly confused  Neuro: AAOx2 (herself). Grossly no focal deficits, but reduced strength in bilateral lower extremities which seems to be her baseline  Heart: RRR, no rmg  Lungs: CTAB, no signs of increased WOB   Ext: Good distal pulses. Light touch sensation grossly     LABS:                        10.6   5.03  )-----------( 304      ( 04 Apr 2022 06:42 )             33.3     04-04    137  |  101  |  16  ----------------------------<  143<H>  4.1   |  26  |  0.60    Ca    8.6      04 Apr 2022 06:42  Phos  4.1     04-03  Mg     2.0     04-04    TPro  5.4<L>  /  Alb  x   /  TBili  x   /  DBili  x   /  AST  x   /  ALT  x   /  AlkPhos  x   04-03      Urinalysis Basic - ( 02 Apr 2022 13:58 )    Color: x / Appearance: x / SG: x / pH: x  Gluc: x / Ketone: x  / Bili: x / Urobili: x   Blood: x / Protein: x / Nitrite: x   Leuk Esterase: x / RBC: 5-10 /HPF / WBC Many /HPF   Sq Epi: x / Non Sq Epi: 0-5 /HPF / Bacteria: Many /HPF      Thyroid Stimulating Hormone, Serum: 1.120 uIU/mL (04-01 @ 07:54)      HbA1C:         Insulin Sliding Scale requirements X 24 Hours:      RADIOLOGY & ADDITIONAL TESTS:      Assessment and Plan:   · Assessment	  71 YO F with PMH of DM2 (requiring insulin), HTN, DM presented with hematemesis, now resolved, thought to be 2/2 Naty Sanchez tear. She is also found to be in uncontrolled DM i/s/o A1C of 11.6.     Problem/Plan - 1:  ·  Problem: DM (diabetes mellitus).   ·  Plan: Uncontrolled - A1C is 11.6  - history is somewhat limited 2/2 her being an extremely poor historian i/s/o her ?dementia  - home regimen: Metformin 1g BID, Victoza 1.2, and 18 units of Lantus daily.     Plan:  Lantus 14  units tonight  nutritional Lispro 14  units before breakfast and 10 units before lunch and dinner.  Continue lispo JANICE before meals and at bedtime.  Diet changed to consistent carb   Dispo: [facility]    Endocrine will continue to follow.

## 2022-04-04 NOTE — PROGRESS NOTE ADULT - SUBJECTIVE AND OBJECTIVE BOX
*INCOMPLETE NOTE* OVERNIGHT/INTERVAL EVENTS: pt started on IV ABX for UTI.     SUBJECTIVE: Patient seen and examined at bedside. Reports feeling fine. She says that she continues to have pain. Denies any fevers, chills, HA, chest pain, sob, nausea, vomiting, abdominal pain, diarrhea, constipation, dysuria.     OBJECTIVE        PHYSICAL EXAM  General: frail elderly woman in NAD  HEENT: NC/AT; EOMI, PERRLA, anicteric sclera; dry mucosal membranes; poor dentition  Neck: supple, trachea midline  Cardiovascular: RRR, +S1/S2; NO M/R/G  Respiratory: CTA B/L; no W/R/R  Gastrointestinal: soft, NT/ND; +BSx4  Extremities: WWP; no edema or cyanosis  Vascular: 2+ radial, DP/PT pulses B/L  Neuro: A&Ox2. 5/5 strength of b/l UEs; 1/5 strength of b/l ankles. Sensation intact but decreased b/l; CN 2-12 intact.     ALLERGIES:  Allergies    No Known Allergies    Intolerances      MEDICATIONS:  MEDICATIONS  (STANDING):  aspirin  chewable 81 milliGRAM(s) Oral daily  atorvastatin 80 milliGRAM(s) Oral at bedtime  dextrose 5%. 1000 milliLiter(s) (100 mL/Hr) IV Continuous <Continuous>  gabapentin 300 milliGRAM(s) Oral every 8 hours  glucagon  Injectable 1 milliGRAM(s) IntraMuscular once  heparin   Injectable 5000 Unit(s) SubCutaneous every 12 hours  insulin glargine Injectable (LANTUS) 18 Unit(s) SubCutaneous at bedtime  insulin lispro (ADMELOG) corrective regimen sliding scale   SubCutaneous Before meals and at bedtime  insulin lispro Injectable (ADMELOG) 11 Unit(s) SubCutaneous three times a day before meals  lisinopril 40 milliGRAM(s) Oral daily  metoclopramide Injectable 5 milliGRAM(s) IV Push every 8 hours  pantoprazole  Injectable 40 milliGRAM(s) IV Push every 12 hours    MEDICATIONS  (PRN):  acetaminophen     Tablet .. 650 milliGRAM(s) Oral every 6 hours PRN Temp greater or equal to 38C (100.4F), Mild Pain (1 - 3), Moderate Pain (4 - 6)  dextrose Oral Gel 15 Gram(s) Oral once PRN Blood Glucose LESS THAN 70 milliGRAM(s)/deciliter      -------------------------------------------------------------------------------  LABS:                        10.1   4.38  )-----------( 253      ( 01 Apr 2022 07:54 )             31.8     03-31    136  |  100  |  12  ----------------------------<  94  4.6   |  28  |  0.68    Ca    8.3<L>      31 Mar 2022 15:49  Phos  2.6     03-31  Mg     1.8     03-31    TPro  6.0  /  Alb  2.8<L>  /  TBili  0.2  /  DBili  x   /  AST  12  /  ALT  <5<L>  /  AlkPhos  32<L>  03-31    Blood Gas Profile - Arterial (03.31.22 @ 16:05)   pH, Arterial: 7.43   pCO2, Arterial: 45 mmHg   pO2, Arterial: 72 mmHg   HCO3, Arterial: 30 mmol/L   Base Excess, Arterial: 4.8 mmol/L   Oxygen Saturation, Arterial: 95.6 %   Total CO2, Arterial: 31 mmol/L     COVID-19 PCR: NotDetec (26 Mar 2022 00:22)      RADIOLOGY & ADDITIONAL TESTS: Reviewed.  < from: MR Head No Cont (03.30.22 @ 23:02) >  No acute intracranial abnormality.    < end of copied text >  < from: MR Head No Cont (03.30.22 @ 23:02) >  chronic lacunar infarctions   within the bilateral basal ganglia and thalami.      < end of copied text >  < from: MR Thoracic Spine No Cont (03.30.22 @ 23:09) >  Mild-to-moderate degenerative disc changes. No evidence of cord   compression.    < end of copied text >     OVERNIGHT/INTERVAL EVENTS: pt started on IV ABX for UTI on 4/2. No other episodes of unresponsiveness.    SUBJECTIVE: Patient seen and examined at bedside. Reports feeling fine. She says that she continues to have pain. Denies any fevers, chills, HA, chest pain, sob, nausea, vomiting, abdominal pain, diarrhea, constipation, dysuria.     OBJECTIVE  Vital Signs Last 24 Hrs  T(C): 36.8 (04 Apr 2022 09:45), Max: 37.3 (03 Apr 2022 20:47)  T(F): 98.3 (04 Apr 2022 09:45), Max: 99.2 (03 Apr 2022 20:47)  HR: 75 (04 Apr 2022 09:45) (68 - 78)  BP: 106/65 (04 Apr 2022 09:45) (106/65 - 155/30)  RR: 18 (04 Apr 2022 09:45) (17 - 18)  SpO2: 100% (04 Apr 2022 09:45) (97% - 100%) on RA       PHYSICAL EXAM  General: frail elderly woman in NAD  HEENT: NC/AT; EOMI, PERRLA, anicteric sclera; dry mucosal membranes; poor dentition  Neck: supple, trachea midline  Cardiovascular: RRR, +S1/S2; NO M/R/G  Respiratory: CTA B/L; no W/R/R  Gastrointestinal: soft, NT/ND; +BSx4  Extremities: WWP; no edema or cyanosis  Vascular: 2+ radial, DP/PT pulses B/L  Neuro: A&Ox2. 5/5 strength of b/l UEs; 1/5 strength of b/l ankles. Sensation intact but decreased b/l; CN 2-12 intact.     ALLERGIES:  Allergies    No Known Allergies    Intolerances      MEDICATIONS:  MEDICATIONS  (STANDING):  aspirin  chewable 81 milliGRAM(s) Oral daily  atorvastatin 80 milliGRAM(s) Oral at bedtime  dextrose 5%. 1000 milliLiter(s) (100 mL/Hr) IV Continuous <Continuous>  gabapentin 300 milliGRAM(s) Oral every 8 hours  glucagon  Injectable 1 milliGRAM(s) IntraMuscular once  heparin   Injectable 5000 Unit(s) SubCutaneous every 12 hours  insulin glargine Injectable (LANTUS) 18 Unit(s) SubCutaneous at bedtime  insulin lispro (ADMELOG) corrective regimen sliding scale   SubCutaneous Before meals and at bedtime  insulin lispro Injectable (ADMELOG) 11 Unit(s) SubCutaneous three times a day before meals  lisinopril 40 milliGRAM(s) Oral daily  metoclopramide Injectable 5 milliGRAM(s) IV Push every 8 hours  pantoprazole  Injectable 40 milliGRAM(s) IV Push every 12 hours    MEDICATIONS  (PRN):  acetaminophen     Tablet .. 650 milliGRAM(s) Oral every 6 hours PRN Temp greater or equal to 38C (100.4F), Mild Pain (1 - 3), Moderate Pain (4 - 6)  dextrose Oral Gel 15 Gram(s) Oral once PRN Blood Glucose LESS THAN 70 milliGRAM(s)/deciliter      -------------------------------------------------------------------------------  LABS:                          10.6   5.03  )-----------( 304      ( 04 Apr 2022 06:42 )             33.3     04-04    137  |  101  |  16  ----------------------------<  143<H>  4.1   |  26  |  0.60    Ca    8.6      04 Apr 2022 06:42  Phos  4.1     04-03  Mg     2.0     04-04    TPro  5.4<L>  /  Alb  x   /  TBili  x   /  DBili  x   /  AST  x   /  ALT  x   /  AlkPhos  x   04-03      RADIOLOGY & ADDITIONAL TESTS: Reviewed.  < from: MR Head No Cont (03.30.22 @ 23:02) >  No acute intracranial abnormality.    < end of copied text >  < from: MR Head No Cont (03.30.22 @ 23:02) >  chronic lacunar infarctions   within the bilateral basal ganglia and thalami.      < end of copied text >  < from: MR Thoracic Spine No Cont (03.30.22 @ 23:09) >  Mild-to-moderate degenerative disc changes. No evidence of cord   compression.    < end of copied text >    < from: Echocardiogram w/ Bubble and Doppler (04.01.22 @ 11:52) >  CONCLUSIONS:     1. Normal left ventricular cavity size.   2. Mild symmetric left ventricular hypertrophy.   3. Hyperdynamic left ventricular systolic function.   4. Grade I left ventricular diastolic dysfunction with normal filling   pressure.   5. Normal right ventricular cavity size and systolic function.  6. Mild-to-moderate aortic stenosis.   7. Trace aortic regurgitation.   8. No evidence of pulmonary hypertension, pulmonary artery systolic   pressure is 18 mmHg.   9. No pericardial effusion.  10. No evidence of an intracardiac shunt via agitated saline injection   (negative "bubble" study).    < end of copied text >

## 2022-04-04 NOTE — PROGRESS NOTE ADULT - ATTENDING COMMENTS
72F w HTN, DM c/b peripheral neuropathy, HLD, recent history of BLE weakness p/w bloody vomiting c/f MW tear - improving however noted to have b/l foot drop noted for months, MRI T/L showing mild disc disease, rapid to 7L for encephalopathy, stroke code 3/30 - CTA/CTP neg but MRI stroke protocol shows chronic microvascular ischemic changes an lacunar infarcts in b/l Basal ganglia and thalami suspicious for TIA, somnolence episodes, EEG negative, gabapentin DCd, MRI w/wo contrast negative, now improving.     Pt reports pain and heaviness in b/l feet and legs. No further episodes of somnolence. More alert. A/O x2.     #Encephalopathy - Appears to have resolved. vEEG negative. Psych c/s, no acute psych process. Possibly 2/2 gabapentin vs after effects of TIA  #b/l weakness - seems to have progressed over several months. pt w b/l foot drop for months. DDx includes peripheral neuropathy. MRI T/L spine w some disc/spinal disease. MRI brain w/wo showing chronic microvascular changes  #DM - BGs controlled. c/w basal bolus; endo following   #Gastroparesis - pt tolerating diet. Passing gas and BMs wo nausea   - PRN PO reglan  #HTN - c/w ACEi  #?TIA - on ASA, Atorva  #MW tear - no further episodes of vomiting, hgb stable. c/w BID PPI x2wk  #UTI - Sensitive Staph. to 3d course of IV CTX thru 4/4    Plan  More awake today. Start trial of duloxetine for neuropathy  Optimized for disposition to home w family support; f/u PMD and Neuro as outpatient    DISPO: FELIC[facility] - however pt does not have FELICIA benefit - thus will need to return home w family support (daughter .

## 2022-04-04 NOTE — PROGRESS NOTE ADULT - ASSESSMENT
71 yo F w/ PMHx of HTN, DM c/b peripheral neuropathy and mostly bedbound for several months, BIBEMS w/ 1 episode of bloody vomit with clots following a day of nausea/vomiting, admitted for hematemesis evaluation, found to have most likely MW tear.  Neurology consulted for waxing and waning cognition and lower extremity weakness bilaterally.     #Medication induced AMS  Pt was stroke code3/30 with initial NIHSS 25 which reduced down to 8. Pt with improved symptoms no R facial droop, L sided weakness, or dysarthria. vEEG was placed which reveals normal, yet drowsy pattern without evidence of seizure like activity. However pt with recurrent episodes of waxing and waning cognition most recently 4/1 at 1AM, however pt had pulled off VEEG 15 mins prior to event. Pt not likely to be having TIA event given that cognition fluctuation is recurrent.   - Pt likely had gabapentin related sedation and AMS in the setting of baseline mild cognitive dysfunction   - D/c gabapentin.     #Bilateral Neuropathy  Pt with chronic neuropathy for about one year described as electrical type pain. However pt also experiencing bilateral paresis slightly worse on R>L.  MR lumbar with degenerative disc disease at L3/4 L4/5 with small disc herniation at L3/4 which is stable.  - MR thoracic without signs of cord compression. However Mild posterior bulge at T2-3, and T-4-5 to T10-11 with mild to moderate canal narrowing worst at T10-11 level.  - Pt neuropathy likely 2/2 to both poorly controlled diabetes and the above findings of MR thoracic spine.  - Start Cymbalta 20mg for neuropathy.

## 2022-04-04 NOTE — PROGRESS NOTE ADULT - ATTENDING COMMENTS
no further episodes of AMS, makes them most likely due to gabapentin.  no need for further workup for these.  regarding polyneuropathy, she does have neuropathic pain in feet, instead of gabapentin would try cymbalta 20mg daily for this pain  she should f/u outpatient for both polyneuropathy and memory issues

## 2022-04-04 NOTE — PROGRESS NOTE ADULT - PROBLEM SELECTOR PLAN 8
On rosuvastatin 20 qd.    Plan:   - start 80 atorvastatin per neurology rec after stroke code Per daughter, pt was previously on gabapentin 600 TID which was decreased to 300 TID two days prior due to concern for contribution to weakness. Lumbar MRI - negative     Plan:   - Rx cymbalta 30 mg QD 4/4

## 2022-04-04 NOTE — PROGRESS NOTE ADULT - PROBLEM SELECTOR PLAN 10
Noted by ED physician. EKG not in pt's chart on admission.  - EKG at [facility] showing incomplete RBBB. Urine cx positive on 4/2 for staph aureus. Pt currently states no dysuria, frequency, urgency, or bleeding.    Plan:  - IV CFTX 4/2-4/4, pending sensitivities

## 2022-04-04 NOTE — PROGRESS NOTE ADULT - NS ATTEST RISK PROBLEM GEN_ALL_CORE FT
Continued hyperglycemia
Persistent hyperglycemia despite insulin
Uncontrolled type 2 DM
Fluctuations in glucose do not impact her overall clinical status
Significant hyperglycemia in pt with recent neuro event

## 2022-04-04 NOTE — CHART NOTE - NSCHARTNOTEFT_GEN_A_CORE
Admitting Diagnosis:   Patient is a 72y old  Female who presents with a chief complaint of Hematemesis (04 Apr 2022 08:49)      PAST MEDICAL & SURGICAL HISTORY:  HTN (hypertension)    DM (diabetes mellitus)    No significant past surgical history        Current Nutrition Order:Minced moist/C-CHO with no snack/Low fat       PO Intake: Good (%) [ x  ]  Fair (50-75%) [   ] Poor (<25%) [   ]Observed eating >75% of meals    GI Issues: BM 4/2    Pain:denied    Skin Integrity:Intact    Labs:   04-04    137  |  101  |  16  ----------------------------<  143<H>  4.1   |  26  |  0.60    Ca    8.6      04 Apr 2022 06:42  Phos  4.1     04-03  Mg     2.0     04-04    TPro  5.4<L>  /  Alb  x   /  TBili  x   /  DBili  x   /  AST  x   /  ALT  x   /  AlkPhos  x   04-03    CAPILLARY BLOOD GLUCOSE      POCT Blood Glucose.: 150 mg/dL (04 Apr 2022 09:05)  POCT Blood Glucose.: 145 mg/dL (03 Apr 2022 21:37)  POCT Blood Glucose.: 236 mg/dL (03 Apr 2022 17:16)  POCT Blood Glucose.: 312 mg/dL (03 Apr 2022 12:10)      Medications:  MEDICATIONS  (STANDING):  aspirin  chewable 81 milliGRAM(s) Oral daily  atorvastatin 80 milliGRAM(s) Oral at bedtime  cefTRIAXone   IVPB 1000 milliGRAM(s) IV Intermittent every 24 hours  dextrose 5%. 1000 milliLiter(s) (100 mL/Hr) IV Continuous <Continuous>  DULoxetine 30 milliGRAM(s) Oral daily  enoxaparin Injectable 40 milliGRAM(s) SubCutaneous every 24 hours  glucagon  Injectable 1 milliGRAM(s) IntraMuscular once  insulin glargine Injectable (LANTUS) 14 Unit(s) SubCutaneous at bedtime  insulin lispro (ADMELOG) corrective regimen sliding scale   SubCutaneous Before meals and at bedtime  insulin lispro Injectable (ADMELOG) 10 Unit(s) SubCutaneous three times a day before meals  lisinopril 40 milliGRAM(s) Oral daily  pantoprazole    Tablet 40 milliGRAM(s) Oral two times a day    MEDICATIONS  (PRN):  acetaminophen     Tablet .. 650 milliGRAM(s) Oral every 6 hours PRN Temp greater or equal to 38C (100.4F), Mild Pain (1 - 3), Moderate Pain (4 - 6)  dextrose Oral Gel 15 Gram(s) Oral once PRN Blood Glucose LESS THAN 70 milliGRAM(s)/deciliter  LORazepam   Injectable 2 milliGRAM(s) IV Push once PRN seizure > 1 minute  metoclopramide 5 milliGRAM(s) Oral every 8 hours PRN Nausea/Vomiting      Weight:57.6kg  Daily     Daily     Weight Change: No updated weights.IBW:59kg,98% of IBW    Estimated energy needs: Based on ABW due to between % of IBW.ABW:57.6kg k23-52mgqn:1440-1728kcal and 1-1.2gmprotein:57.6-69.1gmprotein and fluids per team    Subjective: Pt is a 72F w/ PMHx of HTN, DM c/b peripheral neuropathy and mostly bedbound for several months, BIBEMS w/ 1 episode of bloody vomit with clots following a day of nausea/vomiting, admitted for hematemesis evaluation, found to have most likely MW tear. Stroke code call on 3/30 and found to have R facial droop w/ slurred speech.  Stepped up to tele for further workup/observation with neuro following. Patient stable for step down on 3/31 and awaiting disposition, [facility] v. home.   This am patient sitting up in chair OOB eating 80% of meals. No complaints offered. Receiving diabetic diet as prescribed.    Previous Nutrition Diagnosis: Altered nutritionally related labs r/t suspected poor diet compliance AEB: HGBA1C:11.6    Active [ x  ]  Resolved [   ]Improved with close endocrine follow-up    If resolved, new PES:     Goal :Endocrine improvement with FS<120  Recommendations:1.Updated weights    Education: initiated    Risk Level: High [   ] Moderate [  x ] Low [   ]

## 2022-04-04 NOTE — PROGRESS NOTE ADULT - PROBLEM SELECTOR PROBLEM 2
Hematemesis
Nausea and vomiting
Hematemesis
Hematemesis
Nausea and vomiting
Hematemesis
Nausea and vomiting
Hematemesis
Hematemesis

## 2022-04-04 NOTE — PROGRESS NOTE ADULT - PROBLEM SELECTOR PLAN 5
Chronic and present on arrival. Likely 2/2 diabetic neuropathy, aggravated by deconditioning from long-term bedrest.     Plan:   - F/u PT consult and neurology, appreciate recs   - Management of diabetic neuropathy Pt presenting w/ HTNsive urgency w/  systolic. Asymptomatic. Appears euvolemic. Per daughter, pt on lisinopril and amlodipine, but only the former was confirmed by pharmacy.    Plan:  - C/w home lisinopril 40 qd

## 2022-04-05 ENCOUNTER — TRANSCRIPTION ENCOUNTER (OUTPATIENT)
Age: 72
End: 2022-04-05

## 2022-04-05 VITALS
SYSTOLIC BLOOD PRESSURE: 164 MMHG | RESPIRATION RATE: 19 BRPM | HEART RATE: 70 BPM | TEMPERATURE: 98 F | DIASTOLIC BLOOD PRESSURE: 77 MMHG | OXYGEN SATURATION: 97 %

## 2022-04-05 LAB
ANION GAP SERPL CALC-SCNC: 8 MMOL/L — SIGNIFICANT CHANGE UP (ref 5–17)
BUN SERPL-MCNC: 14 MG/DL — SIGNIFICANT CHANGE UP (ref 7–23)
CALCIUM SERPL-MCNC: 8.4 MG/DL — SIGNIFICANT CHANGE UP (ref 8.4–10.5)
CHLORIDE SERPL-SCNC: 99 MMOL/L — SIGNIFICANT CHANGE UP (ref 96–108)
CO2 SERPL-SCNC: 27 MMOL/L — SIGNIFICANT CHANGE UP (ref 22–31)
CREAT SERPL-MCNC: 0.51 MG/DL — SIGNIFICANT CHANGE UP (ref 0.5–1.3)
EGFR: 98 ML/MIN/1.73M2 — SIGNIFICANT CHANGE UP
GLUCOSE BLDC GLUCOMTR-MCNC: 160 MG/DL — HIGH (ref 70–99)
GLUCOSE BLDC GLUCOMTR-MCNC: 167 MG/DL — HIGH (ref 70–99)
GLUCOSE BLDC GLUCOMTR-MCNC: 184 MG/DL — HIGH (ref 70–99)
GLUCOSE SERPL-MCNC: 185 MG/DL — HIGH (ref 70–99)
HCT VFR BLD CALC: 32.5 % — LOW (ref 34.5–45)
HGB BLD-MCNC: 10.5 G/DL — LOW (ref 11.5–15.5)
MAGNESIUM SERPL-MCNC: 1.8 MG/DL — SIGNIFICANT CHANGE UP (ref 1.6–2.6)
MCHC RBC-ENTMCNC: 26.9 PG — LOW (ref 27–34)
MCHC RBC-ENTMCNC: 32.3 GM/DL — SIGNIFICANT CHANGE UP (ref 32–36)
MCV RBC AUTO: 83.1 FL — SIGNIFICANT CHANGE UP (ref 80–100)
NRBC # BLD: 0 /100 WBCS — SIGNIFICANT CHANGE UP (ref 0–0)
PHOSPHATE SERPL-MCNC: 3.1 MG/DL — SIGNIFICANT CHANGE UP (ref 2.5–4.5)
PLATELET # BLD AUTO: 327 K/UL — SIGNIFICANT CHANGE UP (ref 150–400)
POTASSIUM SERPL-MCNC: 4.2 MMOL/L — SIGNIFICANT CHANGE UP (ref 3.5–5.3)
POTASSIUM SERPL-SCNC: 4.2 MMOL/L — SIGNIFICANT CHANGE UP (ref 3.5–5.3)
RBC # BLD: 3.91 M/UL — SIGNIFICANT CHANGE UP (ref 3.8–5.2)
RBC # FLD: 13.3 % — SIGNIFICANT CHANGE UP (ref 10.3–14.5)
SODIUM SERPL-SCNC: 134 MMOL/L — LOW (ref 135–145)
WBC # BLD: 4.39 K/UL — SIGNIFICANT CHANGE UP (ref 3.8–10.5)
WBC # FLD AUTO: 4.39 K/UL — SIGNIFICANT CHANGE UP (ref 3.8–10.5)

## 2022-04-05 PROCEDURE — 70450 CT HEAD/BRAIN W/O DYE: CPT

## 2022-04-05 PROCEDURE — 80053 COMPREHEN METABOLIC PANEL: CPT

## 2022-04-05 PROCEDURE — 36415 COLL VENOUS BLD VENIPUNCTURE: CPT

## 2022-04-05 PROCEDURE — 72148 MRI LUMBAR SPINE W/O DYE: CPT

## 2022-04-05 PROCEDURE — 93306 TTE W/DOPPLER COMPLETE: CPT

## 2022-04-05 PROCEDURE — 81001 URINALYSIS AUTO W/SCOPE: CPT

## 2022-04-05 PROCEDURE — 85730 THROMBOPLASTIN TIME PARTIAL: CPT

## 2022-04-05 PROCEDURE — 84165 PROTEIN E-PHORESIS SERUM: CPT

## 2022-04-05 PROCEDURE — 82607 VITAMIN B-12: CPT

## 2022-04-05 PROCEDURE — 96374 THER/PROPH/DIAG INJ IV PUSH: CPT

## 2022-04-05 PROCEDURE — 84155 ASSAY OF PROTEIN SERUM: CPT

## 2022-04-05 PROCEDURE — 86334 IMMUNOFIX E-PHORESIS SERUM: CPT

## 2022-04-05 PROCEDURE — 99285 EMERGENCY DEPT VISIT HI MDM: CPT

## 2022-04-05 PROCEDURE — 83921 ORGANIC ACID SINGLE QUANT: CPT

## 2022-04-05 PROCEDURE — 87186 SC STD MICRODIL/AGAR DIL: CPT

## 2022-04-05 PROCEDURE — 82962 GLUCOSE BLOOD TEST: CPT

## 2022-04-05 PROCEDURE — 97116 GAIT TRAINING THERAPY: CPT

## 2022-04-05 PROCEDURE — 82525 ASSAY OF COPPER: CPT

## 2022-04-05 PROCEDURE — 84443 ASSAY THYROID STIM HORMONE: CPT

## 2022-04-05 PROCEDURE — 97535 SELF CARE MNGMENT TRAINING: CPT

## 2022-04-05 PROCEDURE — 85025 COMPLETE CBC W/AUTO DIFF WBC: CPT

## 2022-04-05 PROCEDURE — 85027 COMPLETE CBC AUTOMATED: CPT

## 2022-04-05 PROCEDURE — 83036 HEMOGLOBIN GLYCOSYLATED A1C: CPT

## 2022-04-05 PROCEDURE — U0003: CPT

## 2022-04-05 PROCEDURE — 97161 PT EVAL LOW COMPLEX 20 MIN: CPT

## 2022-04-05 PROCEDURE — 74176 CT ABD & PELVIS W/O CONTRAST: CPT

## 2022-04-05 PROCEDURE — 85610 PROTHROMBIN TIME: CPT

## 2022-04-05 PROCEDURE — 84100 ASSAY OF PHOSPHORUS: CPT

## 2022-04-05 PROCEDURE — 86803 HEPATITIS C AB TEST: CPT

## 2022-04-05 PROCEDURE — 97530 THERAPEUTIC ACTIVITIES: CPT

## 2022-04-05 PROCEDURE — 86780 TREPONEMA PALLIDUM: CPT

## 2022-04-05 PROCEDURE — 86900 BLOOD TYPING SEROLOGIC ABO: CPT

## 2022-04-05 PROCEDURE — 99231 SBSQ HOSP IP/OBS SF/LOW 25: CPT

## 2022-04-05 PROCEDURE — U0005: CPT

## 2022-04-05 PROCEDURE — 80048 BASIC METABOLIC PNL TOTAL CA: CPT

## 2022-04-05 PROCEDURE — 95714 VEEG EA 12-26 HR UNMNTR: CPT

## 2022-04-05 PROCEDURE — 80061 LIPID PANEL: CPT

## 2022-04-05 PROCEDURE — 72146 MRI CHEST SPINE W/O DYE: CPT

## 2022-04-05 PROCEDURE — 70496 CT ANGIOGRAPHY HEAD: CPT

## 2022-04-05 PROCEDURE — 97164 PT RE-EVAL EST PLAN CARE: CPT

## 2022-04-05 PROCEDURE — 70551 MRI BRAIN STEM W/O DYE: CPT

## 2022-04-05 PROCEDURE — 82803 BLOOD GASES ANY COMBINATION: CPT

## 2022-04-05 PROCEDURE — 0042T: CPT

## 2022-04-05 PROCEDURE — 86850 RBC ANTIBODY SCREEN: CPT

## 2022-04-05 PROCEDURE — 71045 X-RAY EXAM CHEST 1 VIEW: CPT

## 2022-04-05 PROCEDURE — 86901 BLOOD TYPING SEROLOGIC RH(D): CPT

## 2022-04-05 PROCEDURE — 70498 CT ANGIOGRAPHY NECK: CPT

## 2022-04-05 PROCEDURE — 80307 DRUG TEST PRSMV CHEM ANLYZR: CPT

## 2022-04-05 PROCEDURE — 99233 SBSQ HOSP IP/OBS HIGH 50: CPT | Mod: GC

## 2022-04-05 PROCEDURE — 83735 ASSAY OF MAGNESIUM: CPT

## 2022-04-05 PROCEDURE — 95700 EEG CONT REC W/VID EEG TECH: CPT

## 2022-04-05 PROCEDURE — 87635 SARS-COV-2 COVID-19 AMP PRB: CPT

## 2022-04-05 PROCEDURE — 97110 THERAPEUTIC EXERCISES: CPT

## 2022-04-05 PROCEDURE — 83605 ASSAY OF LACTIC ACID: CPT

## 2022-04-05 PROCEDURE — 93005 ELECTROCARDIOGRAM TRACING: CPT

## 2022-04-05 PROCEDURE — 87086 URINE CULTURE/COLONY COUNT: CPT

## 2022-04-05 PROCEDURE — 92610 EVALUATE SWALLOWING FUNCTION: CPT

## 2022-04-05 RX ORDER — PANTOPRAZOLE SODIUM 20 MG/1
1 TABLET, DELAYED RELEASE ORAL
Qty: 8 | Refills: 0
Start: 2022-04-05 | End: 2022-04-08

## 2022-04-05 RX ORDER — PANTOPRAZOLE SODIUM 20 MG/1
1 TABLET, DELAYED RELEASE ORAL
Qty: 50 | Refills: 0
Start: 2022-04-05 | End: 2022-05-20

## 2022-04-05 RX ADMIN — LISINOPRIL 40 MILLIGRAM(S): 2.5 TABLET ORAL at 07:20

## 2022-04-05 RX ADMIN — Medication 14 UNIT(S): at 10:07

## 2022-04-05 RX ADMIN — PANTOPRAZOLE SODIUM 40 MILLIGRAM(S): 20 TABLET, DELAYED RELEASE ORAL at 07:20

## 2022-04-05 RX ADMIN — ENOXAPARIN SODIUM 40 MILLIGRAM(S): 100 INJECTION SUBCUTANEOUS at 10:00

## 2022-04-05 RX ADMIN — Medication 81 MILLIGRAM(S): at 12:05

## 2022-04-05 RX ADMIN — DULOXETINE HYDROCHLORIDE 20 MILLIGRAM(S): 30 CAPSULE, DELAYED RELEASE ORAL at 12:05

## 2022-04-05 RX ADMIN — Medication 1: at 10:07

## 2022-04-05 NOTE — PROGRESS NOTE ADULT - PROVIDER SPECIALTY LIST ADULT
Internal Medicine
Endocrinology
Endocrinology
Internal Medicine
Internal Medicine
Neurology
Endocrinology
Neurology
Rehab Medicine
Rehab Medicine
Endocrinology
Gastroenterology
Rehab Medicine
Endocrinology
Hospitalist
Internal Medicine

## 2022-04-05 NOTE — PROGRESS NOTE ADULT - REASON FOR ADMISSION
Hematemesis

## 2022-04-05 NOTE — PROGRESS NOTE ADULT - SUBJECTIVE AND OBJECTIVE BOX
INTERVAL HPI/OVERNIGHT EVENTS:  Patient is a 72y old  Female who presents with a chief complaint of Hematemesis (29 Mar 2022 15:47)  Pt seen at bedside eating lunch. Minced and moist diet, which she did not like. Otherwise no complaints. Planned for discharge home today. Daughter will assist with insulin injections.    FSG & insulin:  Yesterday:  Dinner FSG  121. Lispro 10.  Bedtime . Lantus 14 + lispro 1  Today:  Breakfast . Lispro 14+1  Lunch FSG  160.    Pt reports the following symptoms:    CONSTITUTIONAL:  Negative fever or chills, good appetite  EYES:  Negative  blurry vision or double vision  CARDIOVASCULAR:  Negative for chest pain or palpitations  RESPIRATORY:  Negative for cough, wheezing, or SOB   GASTROINTESTINAL:  Negative for nausea, vomiting, diarrhea, constipation, or abdominal pain  GENITOURINARY:  Negative frequency, urgency or dysuria  NEUROLOGIC:  No headache, confusion, dizziness, lightheadedness    MEDICATIONS  (STANDING):  aspirin  chewable 81 milliGRAM(s) Oral daily  atorvastatin 80 milliGRAM(s) Oral at bedtime  dextrose 5%. 1000 milliLiter(s) (100 mL/Hr) IV Continuous <Continuous>  gabapentin 300 milliGRAM(s) Oral every 8 hours  glucagon  Injectable 1 milliGRAM(s) IntraMuscular once  hydrALAZINE Injectable 10 milliGRAM(s) IV Push once  insulin glargine Injectable (LANTUS) 18 Unit(s) SubCutaneous at bedtime  insulin lispro (ADMELOG) corrective regimen sliding scale   SubCutaneous Before meals and at bedtime  insulin lispro Injectable (ADMELOG) 11 Unit(s) SubCutaneous three times a day before meals  lisinopril 40 milliGRAM(s) Oral daily  metoclopramide Injectable 5 milliGRAM(s) IV Push every 8 hours  pantoprazole  Injectable 40 milliGRAM(s) IV Push every 12 hours    MEDICATIONS  (PRN):  acetaminophen     Tablet .. 650 milliGRAM(s) Oral every 6 hours PRN Temp greater or equal to 38C (100.4F), Mild Pain (1 - 3), Moderate Pain (4 - 6)  dextrose Oral Gel 15 Gram(s) Oral once PRN Blood Glucose LESS THAN 70 milliGRAM(s)/deciliter      PHYSICAL EXAM  Vital Signs Last 24 Hrs  T(C): 36.4 (30 Mar 2022 14:00), Max: 36.8 (30 Mar 2022 06:22)  T(F): 97.6 (30 Mar 2022 14:00), Max: 98.2 (30 Mar 2022 06:22)  HR: 78 (30 Mar 2022 18:05) (63 - 78)  BP: 155/74 (30 Mar 2022 18:05) (118/59 - 192/88)  BP(mean): 106 (30 Mar 2022 18:05) (106 - 126)  RR: 18 (30 Mar 2022 18:05) (12 - 19)  SpO2: 99% (30 Mar 2022 18:05) (94% - 100%)    Physical Exam:  Gen: Elderly female in NAD, pleasantly confused  Neuro: AAOx2 (herself and hospital). Grossly no focal deficits, but reduced strength in bilateral lower extremities which seems to be her baseline  Heart: RRR, no rmg  Lungs: CTAB, no signs of increased WOB   Ext: Good distal pulses. Light touch sensation grossly intact    LABS:                        12.0   4.33  )-----------( 245      ( 30 Mar 2022 12:45 )             37.7     03-30    134<L>  |  98  |  12  ----------------------------<  153<H>  see note   |  25  |  0.51    Ca    8.3<L>      30 Mar 2022 12:45  Phos  2.7     03-30  Mg     1.8     03-30    TPro  6.5  /  Alb  3.2<L>  /  TBili  0.2  /  DBili  x   /  AST  see note  /  ALT  see note  /  AlkPhos  see note  03-30    PT/INR - ( 30 Mar 2022 12:45 )   PT: 10.7 sec;   INR: 0.90          PTT - ( 30 Mar 2022 12:45 )  PTT:35.9 sec        HbA1C:   CAPILLARY BLOOD GLUCOSE  152 (30 Mar 2022 13:34)      POCT Blood Glucose.: 127 mg/dL (30 Mar 2022 17:06)  POCT Blood Glucose.: 152 mg/dL (30 Mar 2022 12:18)  POCT Blood Glucose.: 165 mg/dL (30 Mar 2022 12:04)  POCT Blood Glucose.: 259 mg/dL (30 Mar 2022 08:31)  POCT Blood Glucose.: 350 mg/dL (29 Mar 2022 21:48)

## 2022-04-05 NOTE — PROGRESS NOTE ADULT - PROBLEM SELECTOR PROBLEM 1
DM (diabetes mellitus)
DM (diabetes mellitus)
Episode of unresponsiveness
Episode of unresponsiveness
Hematemesis
DM (diabetes mellitus)
Episode of unresponsiveness
Hematemesis
Episode of unresponsiveness
Hematemesis
Episode of unresponsiveness

## 2022-04-05 NOTE — PROGRESS NOTE ADULT - PROBLEM SELECTOR PLAN 1
Uncontrolled - A1C is 11.6  - history is somewhat limited 2/2 her being an extremely poor historian i/s/o her ?dementia  - home regimen: Metformin 1g BID, Victoza 1.2, and 18 units of Lantus daily.     Plan:   Lantus 14  units at bedtime   nutritional Lispro 14  units before breakfast and 10 units before lunch and dinner.  Continue lispo JANICE before meals and at bedtime.  Dispo: home.  Lantus 14  units at bedtime, and nutritional Lispro 14  units before breakfast and 10 units before lunch and dinner. No metformin or victoza    Endocrine will continue to follow

## 2022-04-05 NOTE — DISCHARGE NOTE NURSING/CASE MANAGEMENT/SOCIAL WORK - PATIENT PORTAL LINK FT
You can access the FollowMyHealth Patient Portal offered by [facility] by registering at the following website: [email or web address] By joining [employer]’s FollowMyHealth portal, you will also be able to view your health information using other applications (apps) compatible with our system.

## 2022-04-05 NOTE — DISCHARGE NOTE NURSING/CASE MANAGEMENT/SOCIAL WORK - NSDCPEFALRISK_GEN_ALL_CORE
For information on Fall & Injury Prevention, visit: [email or web address] OR  [email or web address] OR  https://www.cdc.gov/steadi/patient.html

## 2022-04-07 LAB
% ALBUMIN: 47 % — SIGNIFICANT CHANGE UP
% ALPHA 1: 5.4 % — SIGNIFICANT CHANGE UP
% ALPHA 2: 14.5 % — SIGNIFICANT CHANGE UP
% BETA: 13 % — SIGNIFICANT CHANGE UP
% GAMMA: 20.1 % — SIGNIFICANT CHANGE UP
ALBUMIN SERPL ELPH-MCNC: 2.5 G/DL — LOW (ref 3.6–5.5)
ALBUMIN/GLOB SERPL ELPH: 0.9 RATIO — SIGNIFICANT CHANGE UP
ALPHA1 GLOB SERPL ELPH-MCNC: 0.3 G/DL — SIGNIFICANT CHANGE UP (ref 0.1–0.4)
ALPHA2 GLOB SERPL ELPH-MCNC: 0.8 G/DL — SIGNIFICANT CHANGE UP (ref 0.5–1)
B-GLOBULIN SERPL ELPH-MCNC: 0.7 G/DL — SIGNIFICANT CHANGE UP (ref 0.5–1)
COPPER SERPL-MCNC: 127 UG/DL — SIGNIFICANT CHANGE UP (ref 80–158)
GAMMA GLOBULIN: 1.1 G/DL — SIGNIFICANT CHANGE UP (ref 0.6–1.6)
INTERPRETATION SERPL IFE-IMP: SIGNIFICANT CHANGE UP
PROT PATTERN SERPL ELPH-IMP: SIGNIFICANT CHANGE UP

## 2022-04-08 ENCOUNTER — INPATIENT (INPATIENT)
Facility: HOSPITAL | Age: 72
LOS: 79 days | Discharge: HOME HEALTH | DRG: 689 | End: 2022-06-27
Attending: PSYCHIATRY & NEUROLOGY | Admitting: PSYCHIATRY & NEUROLOGY
Payer: MEDICAID

## 2022-04-08 ENCOUNTER — FORM ENCOUNTER (OUTPATIENT)
Age: 72
End: 2022-04-08

## 2022-04-08 VITALS
RESPIRATION RATE: 18 BRPM | HEART RATE: 64 BPM | DIASTOLIC BLOOD PRESSURE: 76 MMHG | WEIGHT: 139.99 LBS | HEIGHT: 66 IN | SYSTOLIC BLOOD PRESSURE: 165 MMHG | OXYGEN SATURATION: 98 % | TEMPERATURE: 98 F

## 2022-04-08 DIAGNOSIS — Z20.822 CONTACT WITH AND (SUSPECTED) EXPOSURE TO COVID-19: ICD-10-CM

## 2022-04-08 DIAGNOSIS — K92.0 HEMATEMESIS: ICD-10-CM

## 2022-04-08 DIAGNOSIS — E87.3 ALKALOSIS: ICD-10-CM

## 2022-04-08 DIAGNOSIS — G45.9 TRANSIENT CEREBRAL ISCHEMIC ATTACK, UNSPECIFIED: ICD-10-CM

## 2022-04-08 DIAGNOSIS — R47.81 SLURRED SPEECH: ICD-10-CM

## 2022-04-08 DIAGNOSIS — E11.65 TYPE 2 DIABETES MELLITUS WITH HYPERGLYCEMIA: ICD-10-CM

## 2022-04-08 DIAGNOSIS — Z79.82 LONG TERM (CURRENT) USE OF ASPIRIN: ICD-10-CM

## 2022-04-08 DIAGNOSIS — Z79.899 OTHER LONG TERM (CURRENT) DRUG THERAPY: ICD-10-CM

## 2022-04-08 DIAGNOSIS — M21.371 FOOT DROP, RIGHT FOOT: ICD-10-CM

## 2022-04-08 DIAGNOSIS — G93.49 OTHER ENCEPHALOPATHY: ICD-10-CM

## 2022-04-08 DIAGNOSIS — K22.6 GASTRO-ESOPHAGEAL LACERATION-HEMORRHAGE SYNDROME: ICD-10-CM

## 2022-04-08 DIAGNOSIS — Z79.4 LONG TERM (CURRENT) USE OF INSULIN: ICD-10-CM

## 2022-04-08 DIAGNOSIS — R29.810 FACIAL WEAKNESS: ICD-10-CM

## 2022-04-08 DIAGNOSIS — F05 DELIRIUM DUE TO KNOWN PHYSIOLOGICAL CONDITION: ICD-10-CM

## 2022-04-08 DIAGNOSIS — N39.0 URINARY TRACT INFECTION, SITE NOT SPECIFIED: ICD-10-CM

## 2022-04-08 DIAGNOSIS — K31.84 GASTROPARESIS: ICD-10-CM

## 2022-04-08 DIAGNOSIS — E11.40 TYPE 2 DIABETES MELLITUS WITH DIABETIC NEUROPATHY, UNSPECIFIED: ICD-10-CM

## 2022-04-08 DIAGNOSIS — B95.61 METHICILLIN SUSCEPTIBLE STAPHYLOCOCCUS AUREUS INFECTION AS THE CAUSE OF DISEASES CLASSIFIED ELSEWHERE: ICD-10-CM

## 2022-04-08 DIAGNOSIS — E87.8 OTHER DISORDERS OF ELECTROLYTE AND FLUID BALANCE, NOT ELSEWHERE CLASSIFIED: ICD-10-CM

## 2022-04-08 DIAGNOSIS — D50.0 IRON DEFICIENCY ANEMIA SECONDARY TO BLOOD LOSS (CHRONIC): ICD-10-CM

## 2022-04-08 DIAGNOSIS — E11.43 TYPE 2 DIABETES MELLITUS WITH DIABETIC AUTONOMIC (POLY)NEUROPATHY: ICD-10-CM

## 2022-04-08 DIAGNOSIS — I45.10 UNSPECIFIED RIGHT BUNDLE-BRANCH BLOCK: ICD-10-CM

## 2022-04-08 DIAGNOSIS — E78.5 HYPERLIPIDEMIA, UNSPECIFIED: ICD-10-CM

## 2022-04-08 DIAGNOSIS — E11.649 TYPE 2 DIABETES MELLITUS WITH HYPOGLYCEMIA WITHOUT COMA: ICD-10-CM

## 2022-04-08 LAB
ALBUMIN SERPL ELPH-MCNC: 2.8 G/DL — LOW (ref 3.4–5)
ALP SERPL-CCNC: 39 U/L — LOW (ref 40–120)
ALT FLD-CCNC: 19 U/L — SIGNIFICANT CHANGE UP (ref 12–42)
AMMONIA BLD-MCNC: <10 UMOL/L — LOW (ref 11–32)
ANION GAP SERPL CALC-SCNC: 4 MMOL/L — LOW (ref 9–16)
APPEARANCE UR: CLEAR — SIGNIFICANT CHANGE UP
APTT BLD: 33.8 SEC — SIGNIFICANT CHANGE UP (ref 27.5–35.5)
AST SERPL-CCNC: 17 U/L — SIGNIFICANT CHANGE UP (ref 15–37)
BASOPHILS # BLD AUTO: 0.02 K/UL — SIGNIFICANT CHANGE UP (ref 0–0.2)
BASOPHILS NFR BLD AUTO: 0.4 % — SIGNIFICANT CHANGE UP (ref 0–2)
BILIRUB SERPL-MCNC: 0.4 MG/DL — SIGNIFICANT CHANGE UP (ref 0.2–1.2)
BILIRUB UR-MCNC: NEGATIVE — SIGNIFICANT CHANGE UP
BUN SERPL-MCNC: 9 MG/DL — SIGNIFICANT CHANGE UP (ref 7–23)
CALCIUM SERPL-MCNC: 8.6 MG/DL — SIGNIFICANT CHANGE UP (ref 8.5–10.5)
CHLORIDE SERPL-SCNC: 101 MMOL/L — SIGNIFICANT CHANGE UP (ref 96–108)
CO2 SERPL-SCNC: 32 MMOL/L — HIGH (ref 22–31)
COLOR SPEC: YELLOW — SIGNIFICANT CHANGE UP
CREAT SERPL-MCNC: 0.65 MG/DL — SIGNIFICANT CHANGE UP (ref 0.5–1.3)
DIFF PNL FLD: NEGATIVE — SIGNIFICANT CHANGE UP
EGFR: 92 ML/MIN/1.73M2 — SIGNIFICANT CHANGE UP
EOSINOPHIL # BLD AUTO: 0.15 K/UL — SIGNIFICANT CHANGE UP (ref 0–0.5)
EOSINOPHIL NFR BLD AUTO: 3.1 % — SIGNIFICANT CHANGE UP (ref 0–6)
FLUAV H1 2009 PAND RNA SPEC QL NAA+PROBE: SIGNIFICANT CHANGE UP
FLUAV H1 RNA SPEC QL NAA+PROBE: SIGNIFICANT CHANGE UP
FLUAV H3 RNA SPEC QL NAA+PROBE: SIGNIFICANT CHANGE UP
FLUAV SUBTYP SPEC NAA+PROBE: SIGNIFICANT CHANGE UP
FLUBV RNA SPEC QL NAA+PROBE: SIGNIFICANT CHANGE UP
GLUCOSE BLDC GLUCOMTR-MCNC: 163 MG/DL — HIGH (ref 70–99)
GLUCOSE BLDC GLUCOMTR-MCNC: 219 MG/DL — HIGH (ref 70–99)
GLUCOSE SERPL-MCNC: 149 MG/DL — HIGH (ref 70–99)
GLUCOSE UR QL: >=1000
HADV DNA SPEC QL NAA+PROBE: SIGNIFICANT CHANGE UP
HCOV PNL SPEC NAA+PROBE: SIGNIFICANT CHANGE UP
HCT VFR BLD CALC: 37.2 % — SIGNIFICANT CHANGE UP (ref 34.5–45)
HGB BLD-MCNC: 11.8 G/DL — SIGNIFICANT CHANGE UP (ref 11.5–15.5)
HMPV RNA SPEC QL NAA+PROBE: SIGNIFICANT CHANGE UP
HPIV1 RNA SPEC QL NAA+PROBE: SIGNIFICANT CHANGE UP
HPIV2 RNA SPEC QL NAA+PROBE: SIGNIFICANT CHANGE UP
HPIV3 RNA SPEC QL NAA+PROBE: SIGNIFICANT CHANGE UP
HPIV4 RNA SPEC QL NAA+PROBE: SIGNIFICANT CHANGE UP
IMM GRANULOCYTES NFR BLD AUTO: 0.6 % — SIGNIFICANT CHANGE UP (ref 0–1.5)
INR BLD: 1 — SIGNIFICANT CHANGE UP (ref 0.88–1.16)
KETONES UR-MCNC: NEGATIVE — SIGNIFICANT CHANGE UP
LACTATE SERPL-SCNC: 0.7 MMOL/L — SIGNIFICANT CHANGE UP (ref 0.4–2)
LEUKOCYTE ESTERASE UR-ACNC: NEGATIVE — SIGNIFICANT CHANGE UP
LIDOCAIN IGE QN: 117 U/L — SIGNIFICANT CHANGE UP (ref 73–393)
LYMPHOCYTES # BLD AUTO: 1.57 K/UL — SIGNIFICANT CHANGE UP (ref 1–3.3)
LYMPHOCYTES # BLD AUTO: 32.5 % — SIGNIFICANT CHANGE UP (ref 13–44)
MCHC RBC-ENTMCNC: 26.5 PG — LOW (ref 27–34)
MCHC RBC-ENTMCNC: 31.7 GM/DL — LOW (ref 32–36)
MCV RBC AUTO: 83.6 FL — SIGNIFICANT CHANGE UP (ref 80–100)
METHYLMALONATE SERPL-SCNC: 256 NMOL/L — SIGNIFICANT CHANGE UP (ref 0–378)
MONOCYTES # BLD AUTO: 0.34 K/UL — SIGNIFICANT CHANGE UP (ref 0–0.9)
MONOCYTES NFR BLD AUTO: 7 % — SIGNIFICANT CHANGE UP (ref 2–14)
NEUTROPHILS # BLD AUTO: 2.72 K/UL — SIGNIFICANT CHANGE UP (ref 1.8–7.4)
NEUTROPHILS NFR BLD AUTO: 56.4 % — SIGNIFICANT CHANGE UP (ref 43–77)
NITRITE UR-MCNC: NEGATIVE — SIGNIFICANT CHANGE UP
NRBC # BLD: 0 /100 WBCS — SIGNIFICANT CHANGE UP (ref 0–0)
PH UR: 7.5 — SIGNIFICANT CHANGE UP (ref 5–8)
PLATELET # BLD AUTO: 392 K/UL — SIGNIFICANT CHANGE UP (ref 150–400)
POTASSIUM SERPL-MCNC: 3.6 MMOL/L — SIGNIFICANT CHANGE UP (ref 3.5–5.3)
POTASSIUM SERPL-SCNC: 3.6 MMOL/L — SIGNIFICANT CHANGE UP (ref 3.5–5.3)
PROT SERPL-MCNC: 7 G/DL — SIGNIFICANT CHANGE UP (ref 6.4–8.2)
PROT UR-MCNC: ABNORMAL MG/DL
PROTHROM AB SERPL-ACNC: 11.7 SEC — SIGNIFICANT CHANGE UP (ref 10.5–13.4)
RAPID RVP RESULT: SIGNIFICANT CHANGE UP
RBC # BLD: 4.45 M/UL — SIGNIFICANT CHANGE UP (ref 3.8–5.2)
RBC # FLD: 13.1 % — SIGNIFICANT CHANGE UP (ref 10.3–14.5)
RSV RNA SPEC QL NAA+PROBE: SIGNIFICANT CHANGE UP
RV+EV RNA SPEC QL NAA+PROBE: SIGNIFICANT CHANGE UP
SARS-COV-2 RNA SPEC QL NAA+PROBE: SIGNIFICANT CHANGE UP
SODIUM SERPL-SCNC: 137 MMOL/L — SIGNIFICANT CHANGE UP (ref 132–145)
SP GR SPEC: 1.02 — SIGNIFICANT CHANGE UP (ref 1–1.03)
TROPONIN I, HIGH SENSITIVITY RESULT: 10 NG/L — SIGNIFICANT CHANGE UP
UROBILINOGEN FLD QL: 0.2 E.U./DL — SIGNIFICANT CHANGE UP
WBC # BLD: 4.83 K/UL — SIGNIFICANT CHANGE UP (ref 3.8–10.5)
WBC # FLD AUTO: 4.83 K/UL — SIGNIFICANT CHANGE UP (ref 3.8–10.5)

## 2022-04-08 PROCEDURE — 70551 MRI BRAIN STEM W/O DYE: CPT | Mod: 26

## 2022-04-08 PROCEDURE — 71045 X-RAY EXAM CHEST 1 VIEW: CPT | Mod: 26

## 2022-04-08 PROCEDURE — 99285 EMERGENCY DEPT VISIT HI MDM: CPT

## 2022-04-08 PROCEDURE — 70450 CT HEAD/BRAIN W/O DYE: CPT | Mod: 26

## 2022-04-08 PROCEDURE — 99222 1ST HOSP IP/OBS MODERATE 55: CPT

## 2022-04-08 RX ORDER — LISINOPRIL 2.5 MG/1
40 TABLET ORAL ONCE
Refills: 0 | Status: COMPLETED | OUTPATIENT
Start: 2022-04-08 | End: 2022-04-08

## 2022-04-08 RX ORDER — INSULIN LISPRO 100/ML
10 VIAL (ML) SUBCUTANEOUS
Refills: 0 | Status: DISCONTINUED | OUTPATIENT
Start: 2022-04-09 | End: 2022-04-10

## 2022-04-08 RX ORDER — LISINOPRIL 2.5 MG/1
1 TABLET ORAL
Qty: 0 | Refills: 0 | DISCHARGE

## 2022-04-08 RX ORDER — SODIUM CHLORIDE 9 MG/ML
1000 INJECTION, SOLUTION INTRAVENOUS
Refills: 0 | Status: DISCONTINUED | OUTPATIENT
Start: 2022-04-08 | End: 2022-06-27

## 2022-04-08 RX ORDER — LISINOPRIL 2.5 MG/1
40 TABLET ORAL DAILY
Refills: 0 | Status: DISCONTINUED | OUTPATIENT
Start: 2022-04-08 | End: 2022-04-09

## 2022-04-08 RX ORDER — GLUCAGON INJECTION, SOLUTION 0.5 MG/.1ML
1 INJECTION, SOLUTION SUBCUTANEOUS ONCE
Refills: 0 | Status: DISCONTINUED | OUTPATIENT
Start: 2022-04-08 | End: 2022-06-27

## 2022-04-08 RX ORDER — DEXTROSE 50 % IN WATER 50 %
25 SYRINGE (ML) INTRAVENOUS ONCE
Refills: 0 | Status: DISCONTINUED | OUTPATIENT
Start: 2022-04-08 | End: 2022-06-27

## 2022-04-08 RX ORDER — HEPARIN SODIUM 5000 [USP'U]/ML
5000 INJECTION INTRAVENOUS; SUBCUTANEOUS EVERY 8 HOURS
Refills: 0 | Status: DISCONTINUED | OUTPATIENT
Start: 2022-04-08 | End: 2022-04-18

## 2022-04-08 RX ORDER — DEXTROSE 50 % IN WATER 50 %
12.5 SYRINGE (ML) INTRAVENOUS ONCE
Refills: 0 | Status: DISCONTINUED | OUTPATIENT
Start: 2022-04-08 | End: 2022-06-27

## 2022-04-08 RX ORDER — INSULIN GLARGINE 100 [IU]/ML
12 INJECTION, SOLUTION SUBCUTANEOUS AT BEDTIME
Refills: 0 | Status: DISCONTINUED | OUTPATIENT
Start: 2022-04-08 | End: 2022-04-10

## 2022-04-08 RX ORDER — INSULIN LISPRO 100/ML
8 VIAL (ML) SUBCUTANEOUS
Refills: 0 | Status: DISCONTINUED | OUTPATIENT
Start: 2022-04-09 | End: 2022-04-10

## 2022-04-08 RX ORDER — ATORVASTATIN CALCIUM 80 MG/1
40 TABLET, FILM COATED ORAL AT BEDTIME
Refills: 0 | Status: DISCONTINUED | OUTPATIENT
Start: 2022-04-08 | End: 2022-06-27

## 2022-04-08 RX ORDER — INSULIN LISPRO 100/ML
VIAL (ML) SUBCUTANEOUS
Refills: 0 | Status: DISCONTINUED | OUTPATIENT
Start: 2022-04-08 | End: 2022-04-12

## 2022-04-08 RX ORDER — DEXTROSE 50 % IN WATER 50 %
15 SYRINGE (ML) INTRAVENOUS ONCE
Refills: 0 | Status: DISCONTINUED | OUTPATIENT
Start: 2022-04-08 | End: 2022-06-27

## 2022-04-08 RX ORDER — PANTOPRAZOLE SODIUM 20 MG/1
40 TABLET, DELAYED RELEASE ORAL
Refills: 0 | Status: DISCONTINUED | OUTPATIENT
Start: 2022-04-08 | End: 2022-06-27

## 2022-04-08 RX ORDER — DULOXETINE HYDROCHLORIDE 30 MG/1
20 CAPSULE, DELAYED RELEASE ORAL DAILY
Refills: 0 | Status: DISCONTINUED | OUTPATIENT
Start: 2022-04-08 | End: 2022-04-16

## 2022-04-08 RX ORDER — ATORVASTATIN CALCIUM 80 MG/1
80 TABLET, FILM COATED ORAL AT BEDTIME
Refills: 0 | Status: DISCONTINUED | OUTPATIENT
Start: 2022-04-08 | End: 2022-04-08

## 2022-04-08 RX ORDER — ASPIRIN/CALCIUM CARB/MAGNESIUM 324 MG
81 TABLET ORAL DAILY
Refills: 0 | Status: DISCONTINUED | OUTPATIENT
Start: 2022-04-08 | End: 2022-04-08

## 2022-04-08 RX ADMIN — ATORVASTATIN CALCIUM 40 MILLIGRAM(S): 80 TABLET, FILM COATED ORAL at 21:25

## 2022-04-08 RX ADMIN — DULOXETINE HYDROCHLORIDE 20 MILLIGRAM(S): 30 CAPSULE, DELAYED RELEASE ORAL at 18:41

## 2022-04-08 RX ADMIN — Medication 2: at 18:40

## 2022-04-08 RX ADMIN — LISINOPRIL 40 MILLIGRAM(S): 2.5 TABLET ORAL at 14:03

## 2022-04-08 RX ADMIN — HEPARIN SODIUM 5000 UNIT(S): 5000 INJECTION INTRAVENOUS; SUBCUTANEOUS at 21:25

## 2022-04-08 RX ADMIN — Medication 1: at 21:25

## 2022-04-08 RX ADMIN — LISINOPRIL 40 MILLIGRAM(S): 2.5 TABLET ORAL at 18:40

## 2022-04-08 NOTE — ED PROVIDER NOTE - CHPI ED SYMPTOMS NEG
no chills, no dysuria, no chest pain, no shortness of breath, no dysarthria, no tingling/no fever/no nausea/no numbness/no vomiting

## 2022-04-08 NOTE — ED PROVIDER NOTE - CLINICAL SUMMARY MEDICAL DECISION MAKING FREE TEXT BOX
71 y/o female with recent admission and workup for Naty Sanchez tear and TIA presents with episode of confusion this morning. Patient no longer confused in ED upon presentation. Patient neuro intact and following commands. +A and O x 3. Will do infectious workup, get CT scan.    CT scan performed and is negative. 73 y/o female with recent admission and workup for Naty Sanchez tear and TIA presents with episode of confusion this morning. Patient no longer confused in ED upon presentation. Patient neuro intact and following commands. +A and O x 3. Will do infectious workup, get CT scan.    CT scan performed and is negative. labs wnl, ua neg    [facility] pt not a canidate for TPA given recent mallary sanchez tear, [facility]  for further evaluation

## 2022-04-08 NOTE — H&P ADULT - NSHPPHYSICALEXAM_GEN_ALL_CORE
Physical exam:  General: No acute distress, awake and alert, flat affect  Cardiovascular: Regular rate and rhythm, no murmurs, rubs, or gallops. No bruits  Pulmonary: Anterior breath sounds clear bilaterally, no crackles or wheezing. No use of accessory muscles  GI: Abdomen soft, non-tender, non-distended  Extremities: Radial and DP pulses +2, no edema, + lipoma on LUE    Neurologic:  -Mental status: Awake, alert, oriented to person, place, and time. Speech is moderate and slow with intact naming, repetition, and comprehension, no dysarthria. Recent and remote memory intact. Follows commands. Attention/concentration intact. Fund of knowledge appropriate.  -Cranial nerves:   II: Visual fields are full to confrontation.  III, IV, VI: Extraocular movements are intact without nystagmus. Pupils equally round and reactive to light  V:  Facial sensation V1-V3 equal and intact   VII: Face is symmetric with normal eye closure and smile  VIII: Hearing is grossly intact  IX, X: Uvula is midline and soft palate rises symmetrically  XI: Head turning and shoulder shrug are intact.  XII: Tongue protrudes midline  Motor: Normal bulk and tone in b/l UE. No pronator drift. Strength bilateral upper extremity 5-/5. Right LE 3-/5, +foot drop, increased tone. Left LE 3/5, increased tone.   Sensation: Intact to light touch bilaterally. No neglect or extinction on double simultaneous testing.  Coordination: No dysmetria on finger-to-nose bilaterally, unable to assess heel to shin on b/l LE d/t weakness and increased tone  Reflexes: Downgoing toes bilaterally   Gait: deferred    NIHSS: 2

## 2022-04-08 NOTE — H&P ADULT - NSHPSOCIALHISTORY_GEN_ALL_CORE
SOCIAL HISTORY:   Patient lives with daughter in apartment  Smoking status: denies  Drinking: denies  Drug Use: denies

## 2022-04-08 NOTE — ED PROVIDER NOTE - ATTENDING CONTRIBUTION TO CARE
I have seen the pt, reviewed all pertinent clinical data, and I agree with the documentation/care/plan executed by CLAUDE Scott.

## 2022-04-08 NOTE — ED ADULT TRIAGE NOTE - CHIEF COMPLAINT QUOTE
patient was found by daughter to a be a little " confused and not herself " (unknown onset) pt with h/o TIA, dm, htn , hld- on arrival by EMS pt was back to baseline- FS in the field 162

## 2022-04-08 NOTE — PATIENT PROFILE ADULT - TRANSPORTATION
Lieutenant Amaro   MRN: [id number]    Department:  [facility] Emergency Department   Date of Visit:  11/28/2017           Disclosure     Insurance plans vary and the physician(s) referred by the ER may not be covered by your plan.  Please contact your tell this physician (or your personal doctor if your instructions are to return to your personal doctor) about any new or lasting problems. The primary care or specialist physician will see patients referred from the [facility] Emergency Department.  Aman Maher no

## 2022-04-08 NOTE — H&P ADULT - ASSESSMENT
72y Female with PMHx of T2DM c/b peripheral neuropathy, HTN, recent admission and workup for Naty Sanchez tear, TIA on 3/30 (MR negative) presents to [facility] as transfer from [facility] ED for episode of AMS, back to baseline in [facility] ED. CTH negative in ED for acute pathology. Pt admitted to stroke service for TIA vs seizure.     Neuro  #CVA workup  - continue aspirin 81mg and plavix 75mg daily  - continue atorvastatin 80mg daily  - q4hr stroke neuro checks and vitals  - obtain MRI Brain without contrast  - Stroke Code HCT Results:   - Stroke Code CTA Results:  - Stroke education    Cards  #HTN  - permissive hypertension, Goal -180  - hold home blood pressure medication for now  - obtain TTE with bubble  - Stroke Code EKG Results:    #HLD  - high dose statin as above in CVA  - LDL results:     Pulm  - call provider if SPO2 < 94%    GI  #Nutrition/Fluids/Electrolytes   - replete K<4 and Mg <2  - Diet:  - IVF:     Renal  -     Infectious Disease  - Stroke Code CXR results:     Endocrine  #DM  - A1C results:   - ISS    - TSH results:    DVT Prophylaxis  - lovenox sq for DVT prophylaxis   - SCDs for DVT prophylaxis       IDR Goals: Goals reviewed at interdisciplinary rounds with case management, social work, physical therapy, occupational therapy, and speech language pathology.   Please see specific therapy  notes for in depth goals.  Dispo: **********(Acute rehab - can tolerate 3 hours of therapy)     Discussed daily hospital plans and goals with patient and family at bedside. (Called and updated family.)    Discussed with Neurology Attending   Problem/Plan - 2:  ·  Problem: Hematemesis.   ·  Plan: One episode, none in ED or hospital thus far. Preceded by several likely non-bloody vomiting episodes. No pain. Hgb stable, not hypotensive. Most likely Naty Sanchez tear, labs not consistent w/ chronic bleed given normal Hgb.    Plan:   - PPI 40 BID   - On discharge, pantoprazole 40 BID for 2 weeks followed by QD for 6 weeks, follow up with GI.     Problem/Plan - 3:  ·  Problem: Diabetic gastroparesis.   ·  Plan: Gastroparesis 2/2 to poor DM control.  -reglan 5mg IV q8h PRN for motility.     Problem/Plan - 4:  ·  Problem: Leg weakness.   ·  Plan: Chronic and present on arrival. Likely 2/2 diabetic neuropathy, aggravated by deconditioning from long-term bedrest. MR thoracic without signs of cord compression. However Mild posterior bulge at T2-3, and T-4-5 to T10-11 with mild to moderate canal narrowing worst at T10-11 level    Plan:   - F/u PT consult and neurology, appreciate recs   - Started on 30 mg QD cymbalta for neuropathy 4/4; discontinued gabapentin   - F/u on MMA level, B12, SPEP, serum immunofixation, RPR, copper level.     Problem/Plan - 5:  ·  Problem: HTN (hypertension).   ·  Plan: Pt presenting w/ HTNsive urgency w/  systolic. Asymptomatic. Appears euvolemic. Per daughter, pt on lisinopril and amlodipine, but only the former was confirmed by pharmacy.    Plan:  - C/w home lisinopril 40 qd.     Problem/Plan - 6:  ·  Problem: DM (diabetes mellitus).   ·  Plan: Per daughter, pt is on metformin 1000 BID, Lantus 18u, and Victoza, but only metformin was confirmed by pharmacy. A1C 11.6     Plan:   - Consulted endo, jey recs  - c/w basal bolus regimen with ISS; lantus 14 and 10 bolus.     Problem/Plan - 7:  ·  Problem: Hyperlipidemia.   ·  Plan: On rosuvastatin 20 qd.    Plan:   - start 80 atorvastatin per neurology.     Problem/Plan - 8:  ·  Problem: Diabetic neuropathy.   ·  Plan: Per daughter, pt was previously on gabapentin 600 TID which was decreased to 300 TID two days prior due to concern for contribution to weakness. Lumbar MRI - negative     Plan:   - Rx cymbalta 30 mg QD 4/4.     Problem/Plan - 9:  ·  Problem: Right bundle branch block.   ·  Plan: Noted by ED physician. EKG not in pt's chart on admission.  - EKG at [facility] showing incomplete RBBB.     Problem/Plan - 10:  ·  Problem: Bacterial UTI.   ·  Plan; Urine cx positive on 4/2 for staph aureus. Pt currently states no dysuria, frequency, urgency, or bleeding.    Plan:  - IV CFTX 4/2-4/4, pending sensitivities.     Problem/Plan - 11:  ·  Problem: Nutrition, metabolism, and development symptoms.   ·  Plan: F: S/p 2.25L   E: replete K<4, Mg<2  N: CC  VTE Prophylaxis: SCDs given UGIB  GI: Pantoprazole 40 PO BID  C: Full Code (began discussion w/ daughter who is next of kin and primary caretaker).   72y Female with PMHx of T2DM c/b peripheral neuropathy, HTN, recent admission and workup for Gwen Sanchez tear, TIA on 3/30 (MR negative) presents to [facility] as transfer from [facility] ED for episode of AMS, back to baseline in [facility] ED. CTH negative in ED for acute pathology. Pt admitted to stroke service for TIA vs seizure.     Neuro  #TIA vs Seizure workup  - hold dapt at this time due to hx of gwen sanchez tear in prior admission 3/25/22  - start atorvastatin 40mg daily  - q4hr stroke neuro checks and vitals  - obtain MRI Brain without contrast short stroke protocol  - ED HCT Results: negative for acute pathology  - Stroke education    #leg weakness likely 2/2 diabetic neuropathy  - continue home cymbalta 20mg qd for neuropathy    Cards  #HTN  - permissive hypertension, Goal -180  - hold home blood pressure medication for now (lisinopril 40mg qd.)  - TTE with bubble done prior admission 4/1: grade 1 left ventricular diastolic dysfunction, no PFO, EF >83% hyperdynamic left ventricular systolic function    #HLD  - continue lipitor 40mg qhs  - LDL results: 42    Pulm  - call provider if SPO2 < 94%    GI  #Nutrition/Fluids/Electrolytes   - replete K<4 and Mg <2  - Diet: Regular  - IVF: none    #hx of Gwen Sanchez tear  - continue home protonix 40mg IV bid    #diabetic gastroparesis  - reglan 5mg IV q8H prn for motility    Renal  - monitor and trend Cr  - monitor I&Os    Infectious Disease  - monitor and trend WBCs    Endocrine  #DM  - A1C results: 11.6  - ISS  - on home lantus 18u, will start with 12u and titrate up  - received lispro 10u at home prior to meals, will add as needed based on fingersticks  - may consult endocrine for diabetes management.     - TSH results: 1.120    DVT Prophylaxis  - lovenox sq for DVT prophylaxis   - SCDs for DVT prophylaxis     Dispo: Pending PT/OT     Discussed daily hospital plans and goals with patient and family at bedside. (Called and updated family.)    Discussed with Neurology Attending Dr. Srinivasan 72y Female with PMHx of T2DM c/b peripheral neuropathy, HTN, recent admission and workup for Gwen Sanchez tear, TIA on 3/30 (MR negative) presents to [facility] as transfer from [facility] ED for episode of AMS, back to baseline in [facility] ED. CTH negative in ED for acute pathology. Pt admitted to stroke service for TIA vs seizure.     Neuro  #TIA vs Seizure workup  - hold dapt at this time due to hx of gwen sanchez tear in prior admission 3/25/22  - start atorvastatin 40mg daily  - q4hr stroke neuro checks and vitals  - obtain MRI Brain without contrast short stroke protocol  - ED HCT Results: negative for acute pathology  - Stroke education    #leg weakness likely 2/2 diabetic neuropathy  - continue home cymbalta 20mg qd for neuropathy    Cards  #HTN  - permissive hypertension, Goal -180  - hold home blood pressure medication for now (lisinopril 40mg qd.)  - TTE with bubble done prior admission 4/1: grade 1 left ventricular diastolic dysfunction, no PFO, EF >83% hyperdynamic left ventricular systolic function    #HLD  - continue lipitor 40mg qhs  - LDL results: 42    Pulm  - call provider if SPO2 < 94%    GI  #Nutrition/Fluids/Electrolytes   - replete K<4 and Mg <2  - Diet: Regular  - IVF: none    #hx of Gwen Sanchez tear  - continue home protonix 40mg IV bid    #diabetic gastroparesis  - reglan 5mg IV q8H prn for motility    Renal  - monitor and trend Cr  - monitor I&Os    Infectious Disease  - monitor and trend WBCs    Endocrine  #DM  - A1C results: 11.6  - ISS  - on home lantus 18u, will start with 12u and titrate up  - started lispro 10u prior to breakfast, lispro 8u prior to lunch and dinner  - may consult endocrine for diabetes management.     - TSH results: 1.120    DVT Prophylaxis  - lovenox sq for DVT prophylaxis   - SCDs for DVT prophylaxis     Dispo: Pending PT/OT     Discussed daily hospital plans and goals with patient and family at bedside. (Called and updated family.)    Discussed with Neurology Attending Dr. Srinivasan

## 2022-04-08 NOTE — H&P ADULT - HISTORY OF PRESENT ILLNESS
71 y/o female with PMHx of DM, HTN, and recent admission and workup for Naty Sanchez tear, as well as TIA on 3/30 with negative workup including MRI (negative for chronic infarcts in lacuna and bilateral thalami) presents with episode of confusion which began at 8 am this morning, as per patient's daughter. Daughter states that patient had clothes on this morning when she went to check on her, and when patient was undressed, daughter noticed she appeared unwell. Patient was also not answering certain questions, so daughter called 911, and patient was brought to ED. Patient with chronic right eye blurriness from cataract and is alert and oriented x 3 in ED. Patient lives with daughter. Denies nausea, vomiting, fever, chills, dysuria, dysarthria, chest pain, shortness of breath, numbness or tingling.     **STROKE HPI***    HPI: 72y Female with PMHx of T2DM, HTN, recent admission and workup for Naty Sanchez tear, TIA on 3/30 (MR negative) presents to [facility] as transfer from [facility] ED for episode of confusion which began at 0800 this AM. Per patient's daughter     PAST MEDICAL & SURGICAL HISTORY:  HTN (hypertension)    DM (diabetes mellitus)    TIA (transient ischemic attack)    No significant past surgical history        FAMILY HISTORY:  No pertinent family history in first degree relatives        SOCIAL HISTORY:   Patient lives with *** at ***.   Smoking status:  Drinking:  Drug Use:     ROS: ***  Constitutional: No fever, weight loss or fatigue  Eyes: No eye pain, visual disturbances, or discharge  ENMT:  No difficulty hearing, tinnitus, vertigo; No sinus or throat pain  Neck: No pain or stiffness  Respiratory: No cough, wheezing, chills or hemoptysis  Cardiovascular: No chest pain, palpitations, shortness of breath, dizziness or leg swelling  Gastrointestinal: No abdominal pain. No nausea, vomiting or hematemesis; No diarrhea or constipation. Nohematochezia.  Genitourinary: No dysuria, frequency, hematuria or incontinence  Neurological: As per HPI  Skin: No itching, burning, rashes or lesions   Endocrine: No heat or cold intolerance; No hair loss  Musculoskeletal: No joint pain or swelling; No muscle, back or extremity pain  Psychiatric: No depression, anxiety, mood swings or difficulty sleeping  Heme/Lymph: No easy bruising or bleeding gums    T(C): 36.9 (22 @ 13:48), Max: 36.9 (22 @ 13:48)  HR: 61 (22 @ 15:02) (61 - 70)  BP: 138/79 (22 @ 15:02) (138/79 - )  RR: 18 (22 @ 15:02) (18 - 19)  SpO2: 99% (22 @ 15:02) (95% - 99%)    MEDICATION RECONCILIATION   MEDICATIONS  (STANDING):  aspirin  chewable 81 milliGRAM(s) Oral daily  atorvastatin 80 milliGRAM(s) Oral at bedtime  dextrose 5%. 1000 milliLiter(s) (50 mL/Hr) IV Continuous <Continuous>  dextrose 5%. 1000 milliLiter(s) (100 mL/Hr) IV Continuous <Continuous>  dextrose 50% Injectable 25 Gram(s) IV Push once  dextrose 50% Injectable 12.5 Gram(s) IV Push once  dextrose 50% Injectable 25 Gram(s) IV Push once  DULoxetine 20 milliGRAM(s) Oral daily  glucagon  Injectable 1 milliGRAM(s) IntraMuscular once  heparin   Injectable 5000 Unit(s) SubCutaneous every 8 hours  insulin glargine Injectable (LANTUS) 12 Unit(s) SubCutaneous at bedtime  insulin lispro (ADMELOG) corrective regimen sliding scale   SubCutaneous Before meals and at bedtime  lisinopril 40 milliGRAM(s) Oral daily  pantoprazole    Tablet 40 milliGRAM(s) Oral before breakfast    MEDICATIONS  (PRN):  dextrose Oral Gel 15 Gram(s) Oral once PRN Blood Glucose LESS THAN 70 milliGRAM(s)/deciliter    Allergies    No Known Allergies    Intolerances      Vital Signs Last 24 Hrs  T(C): 36.9 (2022 13:48), Max: 36.9 (2022 13:48)  T(F): 98.4 (2022 13:48), Max: 98.4 (2022 13:48)  HR: 61 (2022 15:02) (61 - 70)  BP: 138/79 (2022 15:02) (138/79 - )  BP(mean): 103 (2022 15:02) (103 - 103)  RR: 18 (2022 15:02) (18 - 19)  SpO2: 99% (2022 15:02) (95% - 99%)    Physical exam:  General: No acute distress, awake and alert  Cardiovascular: Regular rate and rhythm, no murmurs, rubs, or gallops. No bruits  Pulmonary: Anterior breath sounds clear bilaterally, no crackles or wheezing. No use of accessory muscles  GI: Abdomen soft, non-tender, non-distended    Neurologic:  -Mental status: Awake, alert, oriented to person, place, and time. Speech is fluent with intact naming, repetition, and comprehension, no dysarthria. Recent and remote memory intact. Follows commands. Attention/concentration intact. Fund of knowledge appropriate.  -Cranial nerves:   II: Visual fields are full to confrontation.  III, IV, VI: Extraocular movements are intact without nystagmus. Pupils equally round and reactive to light  V:  Facial sensation V1-V3 equal and intact   VII: Face is symmetric with normal eye closure and smile  VIII: Hearing is bilaterally intact to finger rub  IX, X: Uvula is midline and soft palate rises symmetrically  XI: Head turning and shoulder shrug are intact.  XII: Tongue protrudes midline  Motor: Normal bulk and tone. No pronator drift. Strength bilateral upper extremity 5/5, bilateral lower extremities 5/5.  Rapid alternating movements intact and symmetric  Sensation: Intact to light touch bilaterally. No neglect or extinction on double simultaneous testing.  Coordination: No dysmetria on finger-to-nose and heel-to-shin bilaterally  Reflexes: Downgoing toes bilaterally   Gait: Narrow gait and steady    NIHSS: **** ASPECT Score: *** ICH Score: *** (GCS)    Fingerstick Blood Glucose: CAPILLARY BLOOD GLUCOSE      POCT Blood Glucose.: 125 mg/dL (2022 10:04)    LABS:                        11.8   4.83  )-----------( 392      ( 2022 09:55 )             37.2     -    137  |  101  |  9   ----------------------------<  149<H>  3.6   |  32<H>  |  0.65    Ca    8.6      2022 09:55    TPro  7.0  /  Alb  2.8<L>  /  TBili  0.4  /  DBili  x   /  AST  17  /  ALT  19  /  AlkPhos  39<L>  04-08    PT/INR - ( 2022 09:55 )   PT: 11.7 sec;   INR: 1.00          PTT - ( 2022 09:55 )  PTT:33.8 sec      Urinalysis Basic - ( 2022 10:46 )    Color: Yellow / Appearance: Clear / S.020 / pH: x  Gluc: x / Ketone: NEGATIVE  / Bili: NEGATIVE / Urobili: 0.2 E.U./dL   Blood: x / Protein: Trace mg/dL / Nitrite: NEGATIVE   Leuk Esterase: NEGATIVE / RBC: < 5 /HPF / WBC > 10 /HPF   Sq Epi: x / Non Sq Epi: 0-5 /HPF / Bacteria: Present /HPF        RADIOLOGY & ADDITIONAL STUDIES:    HCT:     CTA:    -----------------------------------------------------------------------------------------    ASSESSMENT/PLAN:    72y Female w/ PMH ***. HCT showed ***. CTA showed ***. Given *** tPA was ***. Patient was admitted to **** for further workup   **STROKE HPI***    HPI: 72y Female with PMHx of T2DM, HTN, recent admission and workup for Naty Sanchez tear, TIA on 3/30 (MR negative) presents to [facility] as transfer from [facility] ED for episode of confusion which began at 0800 this AM. Spoke to patient's daughter Efe on the phone, states she was changing her and getting her ready for the day. Gave her blood pressure medication, went to give her breakfast and pt was found slumped over, was confused, mumbling words. Daughter states her eyes were glossy and pt could not recognize who she was. At that point daughter took her oxygen levels and was 93% RA. She states the episode at least lasted 15-20 mins, she was still altered when EMS came. Denies any obvious jerking/shaking. Pt was not answering some questions appropriately and daughter called 911, pt brought to [facility] ED. On arrival to ED, pt back to baseline, was A&Ox3. Pt was transferred to stroke service for TIA vs seizure. On arrival to [facility], pt is A&Ox3, answering questions appropriately, following all commands. Pt does not remember the episode this AM. States she is very hungry. Denies any pain, visual disturbance, headache, dizziness, difficulty speaking, chest pain, sob, abd pain, n/v, new weakness/numbness of extremities.     Of note, uses walker at home. Pt has had gait difficulties for years per daughter. No new weakness that she noted.

## 2022-04-08 NOTE — ED PROVIDER NOTE - NS ED ATTENDING STATEMENT MOD
This was a shared visit with the ROSINA. I reviewed and verified the documentation and independently performed the documented:

## 2022-04-08 NOTE — H&P ADULT - NSHPREVIEWOFSYSTEMS_GEN_ALL_CORE
ROS:  Constitutional: No fever, weight loss or fatigue  Eyes: No eye pain, visual disturbance  Neck: No pain or stiffness  Respiratory: No cough, wheezing, chills or hemoptysis  Cardiovascular: No chest pain, palpitations, shortness of breath, dizziness or leg swelling  Gastrointestinal: No abdominal pain. No nausea, vomiting or hematemesis  Genitourinary: No dysuria, frequency, hematuria or incontinence  Neurological: As per HPI

## 2022-04-08 NOTE — H&P ADULT - NSHPLABSRESULTS_GEN_ALL_CORE
Vital Signs Last 24 Hrs  T(C): 36.9 (2022 13:48), Max: 36.9 (2022 13:48)  T(F): 98.4 (2022 13:48), Max: 98.4 (2022 13:48)  HR: 61 (2022 15:02) (61 - 70)  BP: 138/79 (2022 15:02) (138/79 - 202/86)  BP(mean): 103 (2022 15:02) (103 - 103)  RR: 18 (2022 15:02) (18 - 19)  SpO2: 99% (2022 15:02) (95% - 99%)    Fingerstick Blood Glucose: CAPILLARY BLOOD GLUCOSE    POCT Blood Glucose.: 125 mg/dL (2022 10:04)    LABS:                        11.8   4.83  )-----------( 392      ( 2022 09:55 )             37.2     04-    137  |  101  |  9   ----------------------------<  149<H>  3.6   |  32<H>  |  0.65    Ca    8.6      2022 09:55    TPro  7.0  /  Alb  2.8<L>  /  TBili  0.4  /  DBili  x   /  AST  17  /  ALT  19  /  AlkPhos  39<L>  04-08    PT/INR - ( 2022 09:55 )   PT: 11.7 sec;   INR: 1.00          PTT - ( 2022 09:55 )  PTT:33.8 sec    Urinalysis Basic - ( 2022 10:46 )    Color: Yellow / Appearance: Clear / S.020 / pH: x  Gluc: x / Ketone: NEGATIVE  / Bili: NEGATIVE / Urobili: 0.2 E.U./dL   Blood: x / Protein: Trace mg/dL / Nitrite: NEGATIVE   Leuk Esterase: NEGATIVE / RBC: < 5 /HPF / WBC > 10 /HPF   Sq Epi: x / Non Sq Epi: 0-5 /HPF / Bacteria: Present /HPF    RADIOLOGY & ADDITIONAL STUDIES:    < from: CT Head No Cont (22 @ 10:06) >  IMPRESSION: No intracranial hemorrhage or acute transcortical infarct. Stable exam.

## 2022-04-08 NOTE — ED PROVIDER NOTE - NSICDXPASTMEDICALHX_GEN_ALL_CORE_FT
PAST MEDICAL HISTORY:  DM (diabetes mellitus)     HTN (hypertension)       TIA (transient ischemic attack)

## 2022-04-08 NOTE — ED PROVIDER NOTE - OBJECTIVE STATEMENT
71 y/o female with PMHx of recent admission and workup for Naty Sanchez tear and TIA presents with episode of confusion which began at 8 am this morning, as per patient's daughter. Daughter states that patient had clothes on this morning when she went to check on her, and when patient was undressed, daughter noticed she appeared unwell. Patient was also not answering certain questions, so daughter called 911, and patient was brought to ED. Patient with chronic right eye blurriness from cataract and is alert and oriented x 3 in ED. Patient lives with daughter. Denies nausea, vomiting, fever, chills, dysuria, dysarthria, chest pain, shortness of breath, numbness or tingling. 71 y/o female with PMHx of DM, HTN, and recent admission and workup for Naty Sanchez tear, as well as TIA on 3/30 with negative workup including MRI (negative for chronic infarcts in lacuna and bilateral thalami) presents with episode of confusion which began at 8 am this morning, as per patient's daughter. Daughter states that patient had clothes on this morning when she went to check on her, and when patient was undressed, daughter noticed she appeared unwell. Patient was also not answering certain questions, so daughter called 911, and patient was brought to ED. Patient with chronic right eye blurriness from cataract and is alert and oriented x 3 in ED. Patient lives with daughter. Denies nausea, vomiting, fever, chills, dysuria, dysarthria, chest pain, shortness of breath, numbness or tingling.

## 2022-04-08 NOTE — PATIENT PROFILE ADULT - FALL HARM RISK - HARM RISK INTERVENTIONS

## 2022-04-09 DIAGNOSIS — E11.40 TYPE 2 DIABETES MELLITUS WITH DIABETIC NEUROPATHY, UNSPECIFIED: ICD-10-CM

## 2022-04-09 DIAGNOSIS — I10 ESSENTIAL (PRIMARY) HYPERTENSION: ICD-10-CM

## 2022-04-09 DIAGNOSIS — G93.41 METABOLIC ENCEPHALOPATHY: ICD-10-CM

## 2022-04-09 DIAGNOSIS — E11.9 TYPE 2 DIABETES MELLITUS WITHOUT COMPLICATIONS: ICD-10-CM

## 2022-04-09 DIAGNOSIS — E78.5 HYPERLIPIDEMIA, UNSPECIFIED: ICD-10-CM

## 2022-04-09 DIAGNOSIS — K22.6 GASTRO-ESOPHAGEAL LACERATION-HEMORRHAGE SYNDROME: ICD-10-CM

## 2022-04-09 DIAGNOSIS — E11.43 TYPE 2 DIABETES MELLITUS WITH DIABETIC AUTONOMIC (POLY)NEUROPATHY: ICD-10-CM

## 2022-04-09 LAB
A1C WITH ESTIMATED AVERAGE GLUCOSE RESULT: 11 % — HIGH (ref 4–5.6)
ANION GAP SERPL CALC-SCNC: 6 MMOL/L — SIGNIFICANT CHANGE UP (ref 5–17)
BUN SERPL-MCNC: 12 MG/DL — SIGNIFICANT CHANGE UP (ref 7–23)
CALCIUM SERPL-MCNC: 8.7 MG/DL — SIGNIFICANT CHANGE UP (ref 8.4–10.5)
CHLORIDE SERPL-SCNC: 98 MMOL/L — SIGNIFICANT CHANGE UP (ref 96–108)
CHOLEST SERPL-MCNC: 118 MG/DL — SIGNIFICANT CHANGE UP
CO2 SERPL-SCNC: 31 MMOL/L — SIGNIFICANT CHANGE UP (ref 22–31)
CREAT SERPL-MCNC: 0.51 MG/DL — SIGNIFICANT CHANGE UP (ref 0.5–1.3)
EGFR: 98 ML/MIN/1.73M2 — SIGNIFICANT CHANGE UP
ESTIMATED AVERAGE GLUCOSE: 269 MG/DL — HIGH (ref 68–114)
GLUCOSE BLDC GLUCOMTR-MCNC: 131 MG/DL — HIGH (ref 70–99)
GLUCOSE BLDC GLUCOMTR-MCNC: 195 MG/DL — HIGH (ref 70–99)
GLUCOSE BLDC GLUCOMTR-MCNC: 214 MG/DL — HIGH (ref 70–99)
GLUCOSE BLDC GLUCOMTR-MCNC: 238 MG/DL — HIGH (ref 70–99)
GLUCOSE BLDC GLUCOMTR-MCNC: 77 MG/DL — SIGNIFICANT CHANGE UP (ref 70–99)
GLUCOSE SERPL-MCNC: 140 MG/DL — HIGH (ref 70–99)
HCT VFR BLD CALC: 35.3 % — SIGNIFICANT CHANGE UP (ref 34.5–45)
HDLC SERPL-MCNC: 66 MG/DL — SIGNIFICANT CHANGE UP
HGB BLD-MCNC: 11.2 G/DL — LOW (ref 11.5–15.5)
LIPID PNL WITH DIRECT LDL SERPL: 45 MG/DL — SIGNIFICANT CHANGE UP
MAGNESIUM SERPL-MCNC: 1.7 MG/DL — SIGNIFICANT CHANGE UP (ref 1.6–2.6)
MCHC RBC-ENTMCNC: 26.2 PG — LOW (ref 27–34)
MCHC RBC-ENTMCNC: 31.7 GM/DL — LOW (ref 32–36)
MCV RBC AUTO: 82.7 FL — SIGNIFICANT CHANGE UP (ref 80–100)
NON HDL CHOLESTEROL: 52 MG/DL — SIGNIFICANT CHANGE UP
NRBC # BLD: 0 /100 WBCS — SIGNIFICANT CHANGE UP (ref 0–0)
PHOSPHATE SERPL-MCNC: 3.8 MG/DL — SIGNIFICANT CHANGE UP (ref 2.5–4.5)
PLATELET # BLD AUTO: 400 K/UL — SIGNIFICANT CHANGE UP (ref 150–400)
POTASSIUM SERPL-MCNC: 3.7 MMOL/L — SIGNIFICANT CHANGE UP (ref 3.5–5.3)
POTASSIUM SERPL-SCNC: 3.7 MMOL/L — SIGNIFICANT CHANGE UP (ref 3.5–5.3)
RBC # BLD: 4.27 M/UL — SIGNIFICANT CHANGE UP (ref 3.8–5.2)
RBC # FLD: 13.4 % — SIGNIFICANT CHANGE UP (ref 10.3–14.5)
SODIUM SERPL-SCNC: 135 MMOL/L — SIGNIFICANT CHANGE UP (ref 135–145)
TRIGL SERPL-MCNC: 33 MG/DL — SIGNIFICANT CHANGE UP
TSH SERPL-MCNC: 1.87 UIU/ML — SIGNIFICANT CHANGE UP (ref 0.27–4.2)
WBC # BLD: 4.95 K/UL — SIGNIFICANT CHANGE UP (ref 3.8–10.5)
WBC # FLD AUTO: 4.95 K/UL — SIGNIFICANT CHANGE UP (ref 3.8–10.5)

## 2022-04-09 PROCEDURE — 99255 IP/OBS CONSLTJ NEW/EST HI 80: CPT

## 2022-04-09 PROCEDURE — 99233 SBSQ HOSP IP/OBS HIGH 50: CPT

## 2022-04-09 PROCEDURE — 95720 EEG PHY/QHP EA INCR W/VEEG: CPT

## 2022-04-09 RX ORDER — LISINOPRIL 2.5 MG/1
40 TABLET ORAL EVERY 24 HOURS
Refills: 0 | Status: DISCONTINUED | OUTPATIENT
Start: 2022-04-10 | End: 2022-06-27

## 2022-04-09 RX ORDER — POTASSIUM CHLORIDE 20 MEQ
20 PACKET (EA) ORAL ONCE
Refills: 0 | Status: COMPLETED | OUTPATIENT
Start: 2022-04-09 | End: 2022-04-09

## 2022-04-09 RX ORDER — AMLODIPINE BESYLATE 2.5 MG/1
5 TABLET ORAL EVERY 24 HOURS
Refills: 0 | Status: DISCONTINUED | OUTPATIENT
Start: 2022-04-09 | End: 2022-04-10

## 2022-04-09 RX ORDER — SODIUM CHLORIDE 9 MG/ML
500 INJECTION INTRAMUSCULAR; INTRAVENOUS; SUBCUTANEOUS ONCE
Refills: 0 | Status: COMPLETED | OUTPATIENT
Start: 2022-04-09 | End: 2022-04-09

## 2022-04-09 RX ORDER — MAGNESIUM SULFATE 500 MG/ML
2 VIAL (ML) INJECTION ONCE
Refills: 0 | Status: COMPLETED | OUTPATIENT
Start: 2022-04-09 | End: 2022-04-09

## 2022-04-09 RX ORDER — PANTOPRAZOLE SODIUM 20 MG/1
1 TABLET, DELAYED RELEASE ORAL
Qty: 14 | Refills: 0
Start: 2022-04-09 | End: 2022-04-22

## 2022-04-09 RX ADMIN — Medication 8 UNIT(S): at 12:38

## 2022-04-09 RX ADMIN — HEPARIN SODIUM 5000 UNIT(S): 5000 INJECTION INTRAVENOUS; SUBCUTANEOUS at 06:55

## 2022-04-09 RX ADMIN — Medication 25 GRAM(S): at 12:26

## 2022-04-09 RX ADMIN — Medication 2: at 22:53

## 2022-04-09 RX ADMIN — SODIUM CHLORIDE 500 MILLILITER(S): 9 INJECTION INTRAMUSCULAR; INTRAVENOUS; SUBCUTANEOUS at 12:26

## 2022-04-09 RX ADMIN — ATORVASTATIN CALCIUM 40 MILLIGRAM(S): 80 TABLET, FILM COATED ORAL at 22:51

## 2022-04-09 RX ADMIN — Medication 1: at 06:54

## 2022-04-09 RX ADMIN — INSULIN GLARGINE 12 UNIT(S): 100 INJECTION, SOLUTION SUBCUTANEOUS at 01:31

## 2022-04-09 RX ADMIN — HEPARIN SODIUM 5000 UNIT(S): 5000 INJECTION INTRAVENOUS; SUBCUTANEOUS at 14:57

## 2022-04-09 RX ADMIN — HEPARIN SODIUM 5000 UNIT(S): 5000 INJECTION INTRAVENOUS; SUBCUTANEOUS at 22:50

## 2022-04-09 RX ADMIN — PANTOPRAZOLE SODIUM 40 MILLIGRAM(S): 20 TABLET, DELAYED RELEASE ORAL at 06:54

## 2022-04-09 RX ADMIN — DULOXETINE HYDROCHLORIDE 20 MILLIGRAM(S): 30 CAPSULE, DELAYED RELEASE ORAL at 12:26

## 2022-04-09 RX ADMIN — INSULIN GLARGINE 12 UNIT(S): 100 INJECTION, SOLUTION SUBCUTANEOUS at 22:50

## 2022-04-09 RX ADMIN — Medication 10 UNIT(S): at 09:29

## 2022-04-09 RX ADMIN — LISINOPRIL 40 MILLIGRAM(S): 2.5 TABLET ORAL at 06:53

## 2022-04-09 RX ADMIN — AMLODIPINE BESYLATE 5 MILLIGRAM(S): 2.5 TABLET ORAL at 15:01

## 2022-04-09 RX ADMIN — Medication 20 MILLIEQUIVALENT(S): at 12:26

## 2022-04-09 NOTE — PHYSICAL THERAPY INITIAL EVALUATION ADULT - MODALITIES TREATMENT COMMENTS
Cranial Nerves II - XII: II: PERRLA; visual fields are full to confrontation III, IV, VI: EOMI, no nystagmus appreciated. Vision H-Test: bilateral tracking and smooth pursuit grossly intact; Vision Quadrant Test: unable to perform on L eye (baseline cataract), reporting decreased vision in R eye since admission V: facial sensation intact to light touch V1-V3 b/l VII: no ptosis, no facial droop, symmetric eyebrow raise and closure VIII: hearing intact to finger rub b/l ; unable to hear finger rub >5ft XI: head turning and shoulder shrug intact b/l XII: tongue protrusion midline.

## 2022-04-09 NOTE — PHYSICAL THERAPY INITIAL EVALUATION ADULT - ADDITIONAL COMMENTS
Per recent admission: Pt states family is able to assist w/ transfers, ambulation, groceries and meals however are not home 24/7 due to work/school. Primarily amb w/ RW & assist however has not amb "for a while now" due to BLE weakness. Encountered 1 fall within past year, and was brought to hospital due to head trauma.

## 2022-04-09 NOTE — PROGRESS NOTE ADULT - ASSESSMENT
72y Female with PMHx of T2DM c/b peripheral neuropathy, HTN, recent admission and workup for Gwen Sanchez tear, TIA on 3/30 (MR negative) presents to [facility] as transfer from [facility] ED for episode of AMS, back to baseline in [facility] ED. CTH negative in ED for acute pathology. Pt admitted to stroke service for TIA vs seizure.     Neuro  #TIA vs Seizure workup  - hold dapt at this time due to hx of gwen sanchez tear in prior admission 3/25/22  - start atorvastatin 40mg daily  - q4hr stroke neuro checks and vitals  - obtain MRI Brain without contrast short stroke protocol  - ED HCT Results: negative for acute pathology  - Stroke education    #leg weakness likely 2/2 diabetic neuropathy  - continue home cymbalta 20mg qd for neuropathy    Cards  #HTN  - permissive hypertension, Goal -180  - hold home blood pressure medication for now (lisinopril 40mg qd.)  - TTE with bubble done prior admission 4/1: grade 1 left ventricular diastolic dysfunction, no PFO, EF >83% hyperdynamic left ventricular systolic function    #HLD  - continue lipitor 40mg qhs  - LDL results: 42    Pulm  - call provider if SPO2 < 94%    GI  #Nutrition/Fluids/Electrolytes   - replete K<4 and Mg <2  - Diet: Regular  - IVF: none    #hx of Gwen Sanchez tear  - continue home protonix 40mg IV bid    #diabetic gastroparesis  - reglan 5mg IV q8H prn for motility    Renal  - monitor and trend Cr  - monitor I&Os    Infectious Disease  - monitor and trend WBCs    Endocrine  #DM  - A1C results: 11.6  - ISS  - on home lantus 18u, will start with 12u and titrate up  - started lispro 10u prior to breakfast, lispro 8u prior to lunch and dinner  - may consult endocrine for diabetes management.     - TSH results: 1.120    DVT Prophylaxis  - lovenox sq for DVT prophylaxis   - SCDs for DVT prophylaxis     Dispo: Pending PT/OT 72y Female with PMHx of T2DM c/b peripheral neuropathy, HTN, recent admission and workup for Gwen Sanchez tear, TIA on 3/30 (MR negative) presents to [facility] as transfer from [facility] ED for episode of AMS, back to baseline in [facility] ED. CTH negative in ED for acute pathology. Pt admitted to stroke service for TIA vs seizure.     Neuro  #TIA vs Seizure workup  - hold dapt at this time due to hx of gwen sanchez tear in prior admission 3/25/22  - start atorvastatin 40mg daily  - q4hr stroke neuro checks and vitals  - s/p MRI Brain without contrast short stroke protocol (pending final read)   - ED HCT Results: negative for acute pathology  - Stroke education    #leg weakness likely 2/2 diabetic neuropathy  - continue home cymbalta 20mg qd for neuropathy    Cards  #HTN  - permissive hypertension, Goal -180  - c/w home blood pressure medication for now (lisinopril 40mg qd.) due to high BPs   - TTE with bubble done prior admission 4/1: grade 1 left ventricular diastolic dysfunction, no PFO, EF >83% hyperdynamic left ventricular systolic function    #HLD  - continue lipitor 40mg qhs  - LDL results: 42    Pulm  - call provider if SPO2 < 94%    GI  #Nutrition/Fluids/Electrolytes   - replete K<4 and Mg <2  - Diet: Regular  - IVF: none    #hx of Gwen Sanchez tear  - continue home protonix 40mg IV bid    #diabetic gastroparesis  - reglan 5mg IV q8H prn for motility    Renal  - monitor and trend Cr  - monitor I&Os    Infectious Disease  - monitor and trend WBCs    Endocrine  #DM  - A1C results: 11.6  - ISS  - on home lantus 18u, will start with 12u and titrate up  - started lispro 10u prior to breakfast, lispro 8u prior to lunch and dinner    - TSH results: 1.120    DVT Prophylaxis  - heparin sq for DVT prophylaxis   - SCDs for DVT prophylaxis     Dispo: Pending PT/OT

## 2022-04-09 NOTE — CONSULT NOTE ADULT - ASSESSMENT
2y Female with PMHx of T2DM, HTN, recent Naty Sanchez tear, TIA on 3/30 (MR negative), possible diabetic gastroparesis, and severe diabetic neuropathy who presents from home for an episode of altered mental status.

## 2022-04-09 NOTE — OCCUPATIONAL THERAPY INITIAL EVALUATION ADULT - PLANNED THERAPY INTERVENTIONS, OT EVAL
ADL retraining/IADL retraining/balance training/bed mobility training/fine motor coordination training/strengthening/transfer training

## 2022-04-09 NOTE — PHYSICAL THERAPY INITIAL EVALUATION ADULT - GENERAL OBSERVATIONS, REHAB EVAL
PT IE Completed. MRS: #5. Pt received semi-supine in bed, +heplock, +tele, +RA,  in NAD, DINO Zhou notified and agreeable to work with PT. OT Montse present throughout. Pt left in semi-supine, +bed alarm, +call arguello within reach, DINO Zhou notified. Pt can benefit from PT in order to improve quality of life by increasing strength/endurance/balance with functional mobility and ADLS.

## 2022-04-09 NOTE — OCCUPATIONAL THERAPY INITIAL EVALUATION ADULT - MANUAL MUSCLE TESTING RESULTS, REHAB EVAL
BUE 5/5 except b/l elbow flexion 4-/5. CN Testing:  II: Visual fields are full to confrontation.  III, IV, VI: Extraocular movements are intact without nystagmus. Pupils equally round and reactive to light  V:  Facial sensation V1-V3 equal and intact   VII: Face is symmetric with normal eye closure and smile  XI: Head turning and shoulder shrug are intact.  XII: Tongue protrudes midline.

## 2022-04-09 NOTE — PHYSICAL THERAPY INITIAL EVALUATION ADULT - LEVEL OF INDEPENDENCE: SCOOT/BRIDGE, REHAB EVAL
Supervision for scooting to EOB in sitting; Chelsea x1 for scooting to HOB in supine/supervision/minimum assist (75% patients effort)

## 2022-04-09 NOTE — PHYSICAL THERAPY INITIAL EVALUATION ADULT - PERTINENT HX OF CURRENT PROBLEM, REHAB EVAL
72y Female with PMHx of T2DM c/b peripheral neuropathy, HTN, recent admission and workup for Naty Sanchez tear, TIA on 3/30 (MR negative) presents to [facility] as transfer from [facility] ED for episode of AMS, back to baseline in [facility] ED. CTH negative in ED for acute pathology. Pt admitted to stroke service for TIA vs seizure.

## 2022-04-09 NOTE — OCCUPATIONAL THERAPY INITIAL EVALUATION ADULT - VISUAL ACUITY
pt states no vision in L eye 2/2 cataract. Pt reports decreased vision in R eye since admission, although able to accurately read clock on wall from distance of 10 ft.

## 2022-04-09 NOTE — PROGRESS NOTE ADULT - SUBJECTIVE AND OBJECTIVE BOX
**Incomplete Note**    INTERVAL HPI/OVERNIGHT EVENTS:    SUBJECTIVE:   Patient seen and examined at bedside.    OBJECTIVE:    VITAL SIGNS:  ICU Vital Signs Last 24 Hrs  T(C): 36.4 (2022 05:28), Max: 37.1 (2022 01:32)  T(F): 97.6 (2022 05:28), Max: 98.8 (2022 01:32)  HR: 66 (2022 04:20) (61 - 70)  BP: 165/85 (2022 04:20) (138/79 - 202/86)  BP(mean): 118 (2022 04:20) (100 - 118)  ABP: --  ABP(mean): --  RR: 18 (2022 04:20) (17 - 19)  SpO2: 97% (2022 04:20) (95% - 100%)        04-08 @ 07:01  -  04-09 @ 07:00  --------------------------------------------------------  IN: 0 mL / OUT: 200 mL / NET: -200 mL      CAPILLARY BLOOD GLUCOSE      POCT Blood Glucose.: 195 mg/dL (2022 06:16)      PHYSICAL EXAM:    General: NAD; Lying comfortably in bed  HEENT: NC/AT; PERRL, clear conjunctiva; MMM  Neck: Supple. No JVD or thyromegaly   Respiratory: CTA b/l. No wheezes, rhonchi, rales.  Cardiovascular: +S1/S2; RRR; No murmurs, rubs, gallops.  Abdomen: soft, NT/ND; +BS x4  Extremities: WWP, 2+ peripheral pulses b/l; no LE edema  Skin: normal color and turgor; no rash  Neurological: AOx3    MEDICATIONS:  MEDICATIONS  (STANDING):  atorvastatin 40 milliGRAM(s) Oral at bedtime  dextrose 5%. 1000 milliLiter(s) (50 mL/Hr) IV Continuous <Continuous>  dextrose 5%. 1000 milliLiter(s) (100 mL/Hr) IV Continuous <Continuous>  dextrose 50% Injectable 25 Gram(s) IV Push once  dextrose 50% Injectable 12.5 Gram(s) IV Push once  dextrose 50% Injectable 25 Gram(s) IV Push once  DULoxetine 20 milliGRAM(s) Oral daily  glucagon  Injectable 1 milliGRAM(s) IntraMuscular once  heparin   Injectable 5000 Unit(s) SubCutaneous every 8 hours  insulin glargine Injectable (LANTUS) 12 Unit(s) SubCutaneous at bedtime  insulin lispro (ADMELOG) corrective regimen sliding scale   SubCutaneous Before meals and at bedtime  insulin lispro Injectable (ADMELOG) 10 Unit(s) SubCutaneous before breakfast  insulin lispro Injectable (ADMELOG) 8 Unit(s) SubCutaneous before lunch  insulin lispro Injectable (ADMELOG) 8 Unit(s) SubCutaneous before dinner  lisinopril 40 milliGRAM(s) Oral daily  pantoprazole    Tablet 40 milliGRAM(s) Oral before breakfast    MEDICATIONS  (PRN):  dextrose Oral Gel 15 Gram(s) Oral once PRN Blood Glucose LESS THAN 70 milliGRAM(s)/deciliter      ALLERGIES:  Allergies    No Known Allergies    Intolerances        LABS:                        11.8   4.83  )-----------( 392      ( 2022 09:55 )             37.2     04-    137  |  101  |  9   ----------------------------<  149<H>  3.6   |  32<H>  |  0.65    Ca    8.6      2022 09:55    TPro  7.0  /  Alb  2.8<L>  /  TBili  0.4  /  DBili  x   /  AST  17  /  ALT  19  /  AlkPhos  39<L>  04-08    PT/INR - ( 2022 09:55 )   PT: 11.7 sec;   INR: 1.00          PTT - ( 2022 09:55 )  PTT:33.8 sec  Urinalysis Basic - ( 2022 10:46 )    Color: Yellow / Appearance: Clear / S.020 / pH: x  Gluc: x / Ketone: NEGATIVE  / Bili: NEGATIVE / Urobili: 0.2 E.U./dL   Blood: x / Protein: Trace mg/dL / Nitrite: NEGATIVE   Leuk Esterase: NEGATIVE / RBC: < 5 /HPF / WBC > 10 /HPF   Sq Epi: x / Non Sq Epi: 0-5 /HPF / Bacteria: Present /HPF        RADIOLOGY & ADDITIONAL TESTS: Reviewed. INTERVAL HPI/OVERNIGHT EVENTS:  MRI performed.     SUBJECTIVE:   Patient seen and examined at bedside. States feeling down/tired from being in the hospital but without any acute medical complaints. No nausea/vomiting, diarrhea, dysuria. No abd pain. Eager to work with PT.     OBJECTIVE:    VITAL SIGNS:  ICU Vital Signs Last 24 Hrs  T(C): 36.4 (2022 05:28), Max: 37.1 (2022 01:32)  T(F): 97.6 (2022 05:28), Max: 98.8 (2022 01:32)  HR: 66 (2022 04:20) (61 - 70)  BP: 165/85 (2022 04:20) (138/79 - 202/86)  BP(mean): 118 (2022 04:20) (100 - 118)  RR: 18 (2022 04:20) (17 - 19)  SpO2: 97% (2022 04:20) (95% - 100%)        04-08 @ 07:01  -  04-09 @ 07:00  --------------------------------------------------------  IN: 0 mL / OUT: 200 mL / NET: -200 mL      CAPILLARY BLOOD GLUCOSE      POCT Blood Glucose.: 195 mg/dL (2022 06:16)      PHYSICAL EXAM:    General: NAD; Lying comfortably in bed   HEENT: NC/AT; PERRL, clear conjunctiva; dry MM  Neck: Supple. No JVD or thyromegaly   Respiratory: CTA b/l. No wheezes, rhonchi, rales.  Cardiovascular: +S1/S2; RRR; No murmurs, rubs, gallops.  Abdomen: soft, NT/ND; +BS x4  Extremities: WWP, 2+ peripheral pulses b/l; no LE edema. LUE lipoma.     Neurologic:  -Mental status: Awake, alert, oriented to person, place, and time. Speech is moderate and slow with intact naming, repetition, and comprehension, no dysarthria. Recent and remote memory intact. Follows commands. Attention/concentration intact. Fund of knowledge appropriate.  -Cranial nerves:   II: Visual fields are full to confrontation.  III, IV, VI: Extraocular movements are intact without nystagmus. Pupils equally round and reactive to light  V:  Facial sensation V1-V3 equal and intact   VII: Face is symmetric with normal eye closure and smile  VIII: Hearing is grossly intact  IX, X: Uvula is midline and soft palate rises symmetrically  XI: Head turning and shoulder shrug are intact.  XII: Tongue protrudes midline  Motor: Normal bulk and tone in b/l UE. No pronator drift. Strength bilateral upper extremity 5-/5. Right LE 3-/5, +foot drop, increased tone. Left LE 3/5, increased tone.   Sensation: Intact to light touch bilaterally. No neglect or extinction on double simultaneous testing.  Coordination: No dysmetria on finger-to-nose bilaterally, unable to assess heel to shin on b/l LE d/t weakness and increased tone  Reflexes: Downgoing toes bilaterally   Gait: deferred    MEDICATIONS:  MEDICATIONS  (STANDING):  atorvastatin 40 milliGRAM(s) Oral at bedtime  dextrose 5%. 1000 milliLiter(s) (50 mL/Hr) IV Continuous <Continuous>  dextrose 5%. 1000 milliLiter(s) (100 mL/Hr) IV Continuous <Continuous>  dextrose 50% Injectable 25 Gram(s) IV Push once  dextrose 50% Injectable 12.5 Gram(s) IV Push once  dextrose 50% Injectable 25 Gram(s) IV Push once  DULoxetine 20 milliGRAM(s) Oral daily  glucagon  Injectable 1 milliGRAM(s) IntraMuscular once  heparin   Injectable 5000 Unit(s) SubCutaneous every 8 hours  insulin glargine Injectable (LANTUS) 12 Unit(s) SubCutaneous at bedtime  insulin lispro (ADMELOG) corrective regimen sliding scale   SubCutaneous Before meals and at bedtime  insulin lispro Injectable (ADMELOG) 10 Unit(s) SubCutaneous before breakfast  insulin lispro Injectable (ADMELOG) 8 Unit(s) SubCutaneous before lunch  insulin lispro Injectable (ADMELOG) 8 Unit(s) SubCutaneous before dinner  lisinopril 40 milliGRAM(s) Oral daily  pantoprazole    Tablet 40 milliGRAM(s) Oral before breakfast    MEDICATIONS  (PRN):  dextrose Oral Gel 15 Gram(s) Oral once PRN Blood Glucose LESS THAN 70 milliGRAM(s)/deciliter      ALLERGIES:  Allergies    No Known Allergies    Intolerances        LABS:                        11.8   4.83  )-----------( 392      ( 2022 09:55 )             37.2     04-    137  |  101  |  9   ----------------------------<  149<H>  3.6   |  32<H>  |  0.65    Ca    8.6      2022 09:55    TPro  7.0  /  Alb  2.8<L>  /  TBili  0.4  /  DBili  x   /  AST  17  /  ALT  19  /  AlkPhos  39<L>  04-08    PT/INR - ( 2022 09:55 )   PT: 11.7 sec;   INR: 1.00          PTT - ( 2022 09:55 )  PTT:33.8 sec  Urinalysis Basic - ( 2022 10:46 )    Color: Yellow / Appearance: Clear / S.020 / pH: x  Gluc: x / Ketone: NEGATIVE  / Bili: NEGATIVE / Urobili: 0.2 E.U./dL   Blood: x / Protein: Trace mg/dL / Nitrite: NEGATIVE   Leuk Esterase: NEGATIVE / RBC: < 5 /HPF / WBC > 10 /HPF   Sq Epi: x / Non Sq Epi: 0-5 /HPF / Bacteria: Present /HPF        RADIOLOGY & ADDITIONAL TESTS: Reviewed.

## 2022-04-09 NOTE — CONSULT NOTE ADULT - SUBJECTIVE AND OBJECTIVE BOX
INTERNAL MEDICINE SERVICE INITIAL CONSULT NOTE    HPI: 72y Female with PMHx of T2DM, HTN, recent admission and workup for Naty Sanchez tear, TIA on 3/30 (MR negative) presents to [facility] as transfer from [facility] ED for episode of confusion which began at 0800 this AM. Spoke to patient's daughter Efe on the phone, states she was changing her and getting her ready for the day. Gave her blood pressure medication, went to give her breakfast and pt was found slumped over, was confused, mumbling words. Daughter states her eyes were glossy and pt could not recognize who she was. At that point daughter took her oxygen levels and was 93% RA. She states the episode at least lasted 15-20 mins, she was still altered when EMS came. Denies any obvious jerking/shaking. Pt was not answering some questions appropriately and daughter called 911, pt brought to [facility] ED. On arrival to ED, pt back to baseline, was A&Ox3. Pt was transferred to stroke service for TIA vs seizure. On arrival to [facility], pt is A&Ox3, answering questions appropriately, following all commands. Pt does not remember the episode this AM. States she is very hungry. Denies any pain, visual disturbance, headache, dizziness, difficulty speaking, chest pain, sob, abd pain, n/v, new weakness/numbness of extremities.     Of note, uses walker at home. Pt has had gait difficulties for years per daughter. No new weakness that she noted.   (2022 17:37)    REVIEW OF SYSTEMS:   Otherwise negative except as specified in HPI    PAST MEDICAL & SURGICAL HISTORY:  HTN (hypertension)    DM (diabetes mellitus)    TIA (transient ischemic attack)    No significant past surgical history      FAMILY HISTORY:  No pertinent family history in first degree relatives    Social History:  SOCIAL HISTORY:   Patient lives with daughter in apartment  Smoking status: denies  Drinking: denies  Drug Use: denies (2022 17:37)      MEDICATIONS:  MEDICATIONS  (STANDING):  atorvastatin 40 milliGRAM(s) Oral at bedtime  dextrose 5%. 1000 milliLiter(s) (50 mL/Hr) IV Continuous <Continuous>  dextrose 5%. 1000 milliLiter(s) (100 mL/Hr) IV Continuous <Continuous>  dextrose 50% Injectable 25 Gram(s) IV Push once  dextrose 50% Injectable 12.5 Gram(s) IV Push once  dextrose 50% Injectable 25 Gram(s) IV Push once  DULoxetine 20 milliGRAM(s) Oral daily  glucagon  Injectable 1 milliGRAM(s) IntraMuscular once  heparin   Injectable 5000 Unit(s) SubCutaneous every 8 hours  insulin glargine Injectable (LANTUS) 12 Unit(s) SubCutaneous at bedtime  insulin lispro (ADMELOG) corrective regimen sliding scale   SubCutaneous Before meals and at bedtime  insulin lispro Injectable (ADMELOG) 10 Unit(s) SubCutaneous before breakfast  insulin lispro Injectable (ADMELOG) 8 Unit(s) SubCutaneous before lunch  insulin lispro Injectable (ADMELOG) 8 Unit(s) SubCutaneous before dinner  lisinopril 40 milliGRAM(s) Oral daily  pantoprazole    Tablet 40 milliGRAM(s) Oral before breakfast    MEDICATIONS  (PRN):  dextrose Oral Gel 15 Gram(s) Oral once PRN Blood Glucose LESS THAN 70 milliGRAM(s)/deciliter      ALLERGIES:  Allergies    No Known Allergies    Intolerances        VITAL SIGNS:  Vital Signs Last 24 Hrs  T(C): 36.8 (2022 10:00), Max: 37.1 (2022 01:32)  T(F): 98.3 (2022 10:00), Max: 98.8 (2022 01:32)  HR: 64 (2022 10:31) (61 - 70)  BP: 175/82 (2022 10:31) (133/67 - 202/86)  BP(mean): 118 (2022 10:31) (94 - 118)  RR: 17 (2022 08:00) (17 - 19)  SpO2: 97% (2022 10:31) (95% - 100%)    22 @ 07:01  -  22 @ 07:00  --------------------------------------------------------  IN:  Total IN: 0 mL    OUT:    Voided (mL): 200 mL  Total OUT: 200 mL    Total NET: -200 mL    PHYSICAL EXAM:  Constitutional: WD resting comfortably in bed; NAD  Head: NC/AT. EEG leads in place  Eyes: PERRL, EOMI, anicteric sclera  ENT: no nasal discharge; uvula midline, no oropharyngeal erythema or exudates; DMM  Neck: supple; no JVD or thyromegaly  Respiratory: CTA B/L; no W/R/R, no retractions  Cardiac: +S1/S2; RRR  Gastrointestinal: abdomen soft, NT/ND; no rebound or guarding; +BSx4  Back: spine midline, no bony tenderness or step-offs; no CVAT B/L  Extremities: WWP, no clubbing or cyanosis; no peripheral edema  Musculoskeletal: NROM x4; no joint swelling, tenderness or erythema  Vascular: 2+ radial, femoral, DP/PT pulses B/L  Dermatologic: skin warm, dry and intact; no rashes, wounds, or scars  Lymphatic: no submandibular or cervical LAD  Neurologic: AAOx3; CNII-XII grossly intact; BLE 3/5 with sensory deficits - unchanged from previous. downgoing toes b/l    Psychiatric: affect and characteristics of appearance, verbalizations, behaviors are appropriate    LABS:                        11.2   4.95  )-----------( 400      ( 2022 08:50 )             35.3     04-09    135  |  98  |  12  ----------------------------<  140<H>  3.7   |  31  |  0.51    Ca    8.7      2022 08:50  Phos  3.8     04-09  Mg     1.7     04-09    TPro  7.0  /  Alb  2.8<L>  /  TBili  0.4  /  DBili  x   /  AST  17  /  ALT  19  /  AlkPhos  39<L>  04-08    PT/INR - ( 2022 09:55 )   PT: 11.7 sec;   INR: 1.00          PTT - ( 2022 09:55 )  PTT:33.8 sec  Urinalysis Basic - ( 2022 10:46 )    Color: Yellow / Appearance: Clear / S.020 / pH: x  Gluc: x / Ketone: NEGATIVE  / Bili: NEGATIVE / Urobili: 0.2 E.U./dL   Blood: x / Protein: Trace mg/dL / Nitrite: NEGATIVE   Leuk Esterase: NEGATIVE / RBC: < 5 /HPF / WBC > 10 /HPF   Sq Epi: x / Non Sq Epi: 0-5 /HPF / Bacteria: Present /HPF          CAPILLARY BLOOD GLUCOSE      POCT Blood Glucose.: 131 mg/dL (2022 11:41)  POCT Blood Glucose.: 195 mg/dL (2022 06:16)  POCT Blood Glucose.: 214 mg/dL (2022 01:30)  POCT Blood Glucose.: 163 mg/dL (2022 21:13)  POCT Blood Glucose.: 219 mg/dL (2022 18:11)          RADIOLOGY & ADDITIONAL TESTS: Reviewed.

## 2022-04-09 NOTE — OCCUPATIONAL THERAPY INITIAL EVALUATION ADULT - STANDING BALANCE: DYNAMIC, REHAB EVAL
Pt performed 2 sit to stand transfers with maxAx2 and b/l axillary assist. Pt unable to obtain full standing posture./poor minus

## 2022-04-09 NOTE — PHYSICAL THERAPY INITIAL EVALUATION ADULT - TRANSFER SAFETY CONCERNS NOTED: SIT/STAND, REHAB EVAL
Pt performed two STS from EOB. Pt required b/l knee blocking and unable to stand fully upright despite cuing to decreased anterior pelvic tilt./decreased weight-shifting ability

## 2022-04-10 LAB
ANION GAP SERPL CALC-SCNC: 7 MMOL/L — SIGNIFICANT CHANGE UP (ref 5–17)
BLD GP AB SCN SERPL QL: NEGATIVE — SIGNIFICANT CHANGE UP
BUN SERPL-MCNC: 17 MG/DL — SIGNIFICANT CHANGE UP (ref 7–23)
CALCIUM SERPL-MCNC: 8.4 MG/DL — SIGNIFICANT CHANGE UP (ref 8.4–10.5)
CHLORIDE SERPL-SCNC: 102 MMOL/L — SIGNIFICANT CHANGE UP (ref 96–108)
CO2 SERPL-SCNC: 26 MMOL/L — SIGNIFICANT CHANGE UP (ref 22–31)
CREAT SERPL-MCNC: 0.47 MG/DL — LOW (ref 0.5–1.3)
EGFR: 100 ML/MIN/1.73M2 — SIGNIFICANT CHANGE UP
GLUCOSE BLDC GLUCOMTR-MCNC: 116 MG/DL — HIGH (ref 70–99)
GLUCOSE BLDC GLUCOMTR-MCNC: 119 MG/DL — HIGH (ref 70–99)
GLUCOSE BLDC GLUCOMTR-MCNC: 131 MG/DL — HIGH (ref 70–99)
GLUCOSE BLDC GLUCOMTR-MCNC: 199 MG/DL — HIGH (ref 70–99)
GLUCOSE BLDC GLUCOMTR-MCNC: 216 MG/DL — HIGH (ref 70–99)
GLUCOSE BLDC GLUCOMTR-MCNC: 244 MG/DL — HIGH (ref 70–99)
GLUCOSE BLDC GLUCOMTR-MCNC: 72 MG/DL — SIGNIFICANT CHANGE UP (ref 70–99)
GLUCOSE BLDC GLUCOMTR-MCNC: 74 MG/DL — SIGNIFICANT CHANGE UP (ref 70–99)
GLUCOSE SERPL-MCNC: 82 MG/DL — SIGNIFICANT CHANGE UP (ref 70–99)
HCT VFR BLD CALC: 34.7 % — SIGNIFICANT CHANGE UP (ref 34.5–45)
HGB BLD-MCNC: 10.9 G/DL — LOW (ref 11.5–15.5)
MAGNESIUM SERPL-MCNC: 2.1 MG/DL — SIGNIFICANT CHANGE UP (ref 1.6–2.6)
MCHC RBC-ENTMCNC: 26.4 PG — LOW (ref 27–34)
MCHC RBC-ENTMCNC: 31.4 GM/DL — LOW (ref 32–36)
MCV RBC AUTO: 84 FL — SIGNIFICANT CHANGE UP (ref 80–100)
NRBC # BLD: 0 /100 WBCS — SIGNIFICANT CHANGE UP (ref 0–0)
PHOSPHATE SERPL-MCNC: 4.4 MG/DL — SIGNIFICANT CHANGE UP (ref 2.5–4.5)
PLATELET # BLD AUTO: 381 K/UL — SIGNIFICANT CHANGE UP (ref 150–400)
POTASSIUM SERPL-MCNC: 4.4 MMOL/L — SIGNIFICANT CHANGE UP (ref 3.5–5.3)
POTASSIUM SERPL-SCNC: 4.4 MMOL/L — SIGNIFICANT CHANGE UP (ref 3.5–5.3)
RBC # BLD: 4.13 M/UL — SIGNIFICANT CHANGE UP (ref 3.8–5.2)
RBC # FLD: 13.5 % — SIGNIFICANT CHANGE UP (ref 10.3–14.5)
RH IG SCN BLD-IMP: POSITIVE — SIGNIFICANT CHANGE UP
SODIUM SERPL-SCNC: 135 MMOL/L — SIGNIFICANT CHANGE UP (ref 135–145)
WBC # BLD: 4.79 K/UL — SIGNIFICANT CHANGE UP (ref 3.8–10.5)
WBC # FLD AUTO: 4.79 K/UL — SIGNIFICANT CHANGE UP (ref 3.8–10.5)

## 2022-04-10 PROCEDURE — 99233 SBSQ HOSP IP/OBS HIGH 50: CPT

## 2022-04-10 PROCEDURE — 95720 EEG PHY/QHP EA INCR W/VEEG: CPT

## 2022-04-10 RX ORDER — INSULIN LISPRO 100/ML
14 VIAL (ML) SUBCUTANEOUS
Refills: 0 | Status: DISCONTINUED | OUTPATIENT
Start: 2022-04-10 | End: 2022-04-11

## 2022-04-10 RX ORDER — INSULIN GLARGINE 100 [IU]/ML
14 INJECTION, SOLUTION SUBCUTANEOUS AT BEDTIME
Refills: 0 | Status: DISCONTINUED | OUTPATIENT
Start: 2022-04-10 | End: 2022-04-11

## 2022-04-10 RX ORDER — INSULIN GLARGINE 100 [IU]/ML
18 INJECTION, SOLUTION SUBCUTANEOUS AT BEDTIME
Refills: 0 | Status: DISCONTINUED | OUTPATIENT
Start: 2022-04-10 | End: 2022-04-10

## 2022-04-10 RX ORDER — DEXTROSE 10 % IN WATER 10 %
250 INTRAVENOUS SOLUTION INTRAVENOUS ONCE
Refills: 0 | Status: COMPLETED | OUTPATIENT
Start: 2022-04-10 | End: 2022-04-10

## 2022-04-10 RX ORDER — GABAPENTIN 400 MG/1
100 CAPSULE ORAL EVERY 8 HOURS
Refills: 0 | Status: DISCONTINUED | OUTPATIENT
Start: 2022-04-10 | End: 2022-04-14

## 2022-04-10 RX ORDER — AMLODIPINE BESYLATE 2.5 MG/1
10 TABLET ORAL DAILY
Refills: 0 | Status: DISCONTINUED | OUTPATIENT
Start: 2022-04-10 | End: 2022-06-27

## 2022-04-10 RX ORDER — INSULIN LISPRO 100/ML
10 VIAL (ML) SUBCUTANEOUS
Refills: 0 | Status: DISCONTINUED | OUTPATIENT
Start: 2022-04-10 | End: 2022-04-12

## 2022-04-10 RX ORDER — INSULIN LISPRO 100/ML
4 VIAL (ML) SUBCUTANEOUS ONCE
Refills: 0 | Status: COMPLETED | OUTPATIENT
Start: 2022-04-10 | End: 2022-04-10

## 2022-04-10 RX ORDER — DEXTROSE 50 % IN WATER 50 %
12.5 SYRINGE (ML) INTRAVENOUS ONCE
Refills: 0 | Status: DISCONTINUED | OUTPATIENT
Start: 2022-04-10 | End: 2022-04-10

## 2022-04-10 RX ORDER — HYDRALAZINE HCL 50 MG
10 TABLET ORAL ONCE
Refills: 0 | Status: COMPLETED | OUTPATIENT
Start: 2022-04-10 | End: 2022-04-10

## 2022-04-10 RX ADMIN — GABAPENTIN 100 MILLIGRAM(S): 400 CAPSULE ORAL at 14:46

## 2022-04-10 RX ADMIN — PANTOPRAZOLE SODIUM 40 MILLIGRAM(S): 20 TABLET, DELAYED RELEASE ORAL at 06:57

## 2022-04-10 RX ADMIN — DULOXETINE HYDROCHLORIDE 20 MILLIGRAM(S): 30 CAPSULE, DELAYED RELEASE ORAL at 12:08

## 2022-04-10 RX ADMIN — INSULIN GLARGINE 14 UNIT(S): 100 INJECTION, SOLUTION SUBCUTANEOUS at 22:19

## 2022-04-10 RX ADMIN — HEPARIN SODIUM 5000 UNIT(S): 5000 INJECTION INTRAVENOUS; SUBCUTANEOUS at 06:56

## 2022-04-10 RX ADMIN — Medication 1: at 12:24

## 2022-04-10 RX ADMIN — HEPARIN SODIUM 5000 UNIT(S): 5000 INJECTION INTRAVENOUS; SUBCUTANEOUS at 14:47

## 2022-04-10 RX ADMIN — Medication 10 UNIT(S): at 12:23

## 2022-04-10 RX ADMIN — HEPARIN SODIUM 5000 UNIT(S): 5000 INJECTION INTRAVENOUS; SUBCUTANEOUS at 21:00

## 2022-04-10 RX ADMIN — Medication 10 UNIT(S): at 18:21

## 2022-04-10 RX ADMIN — GABAPENTIN 100 MILLIGRAM(S): 400 CAPSULE ORAL at 21:01

## 2022-04-10 RX ADMIN — Medication 10 MILLIGRAM(S): at 09:40

## 2022-04-10 RX ADMIN — AMLODIPINE BESYLATE 10 MILLIGRAM(S): 2.5 TABLET ORAL at 08:48

## 2022-04-10 RX ADMIN — ATORVASTATIN CALCIUM 40 MILLIGRAM(S): 80 TABLET, FILM COATED ORAL at 21:01

## 2022-04-10 RX ADMIN — LISINOPRIL 40 MILLIGRAM(S): 2.5 TABLET ORAL at 06:57

## 2022-04-10 RX ADMIN — Medication 4 UNIT(S): at 01:08

## 2022-04-10 NOTE — PROGRESS NOTE ADULT - SUBJECTIVE AND OBJECTIVE BOX
OVERNIGHT EVENTS: No acute events reported overnight.    SUBJECTIVE / INTERVAL HPI: Patient seen and examined at bedside. Resting comfortably and in no acute distress.     VITAL SIGNS:  Vital Signs Last 24 Hrs  T(C): 36.4 (10 Apr 2022 05:21), Max: 37 (2022 22:58)  T(F): 97.5 (10 Apr 2022 05:21), Max: 98.6 (2022 22:58)  HR: 72 (10 Apr 2022 08:36) (60 - 78)  BP: 190/88 (10 Apr 2022 08:36) (133/67 - 190/88)  BP(mean): 126 (10 Apr 2022 08:36) (94 - 126)  RR: 18 (10 Apr 2022 08:36) (17 - 18)  SpO2: 100% (10 Apr 2022 08:36) (96% - 100%)    PHYSICAL EXAM:    General: WDWN  HEENT: NC/AT; PERRL, anicteric sclera; MMM  Neck: supple  Cardiovascular: +S1/S2; RRR  Respiratory: CTA B/L; no W/R/R  Gastrointestinal: soft, NT/ND; +BSx4  Extremities: WWP; no edema, clubbing or cyanosis  Vascular: 2+ radial, DP/PT pulses B/L  Neurological: AAOx3; CN 2-12 without deficit. Upper extremity with 5/5 strength bilaterally. 3/5 strength in the lower extremity bilaterally.    MEDICATIONS:  MEDICATIONS  (STANDING):  amLODIPine   Tablet 10 milliGRAM(s) Oral daily  atorvastatin 40 milliGRAM(s) Oral at bedtime  dextrose 5%. 1000 milliLiter(s) (50 mL/Hr) IV Continuous <Continuous>  dextrose 5%. 1000 milliLiter(s) (100 mL/Hr) IV Continuous <Continuous>  dextrose 50% Injectable 25 Gram(s) IV Push once  dextrose 50% Injectable 12.5 Gram(s) IV Push once  dextrose 50% Injectable 25 Gram(s) IV Push once  DULoxetine 20 milliGRAM(s) Oral daily  glucagon  Injectable 1 milliGRAM(s) IntraMuscular once  heparin   Injectable 5000 Unit(s) SubCutaneous every 8 hours  insulin glargine Injectable (LANTUS) 18 Unit(s) SubCutaneous at bedtime  insulin lispro (ADMELOG) corrective regimen sliding scale   SubCutaneous Before meals and at bedtime  insulin lispro Injectable (ADMELOG) 14 Unit(s) SubCutaneous before breakfast  insulin lispro Injectable (ADMELOG) 10 Unit(s) SubCutaneous before lunch  insulin lispro Injectable (ADMELOG) 10 Unit(s) SubCutaneous before dinner  lisinopril 40 milliGRAM(s) Oral every 24 hours  pantoprazole    Tablet 40 milliGRAM(s) Oral before breakfast    MEDICATIONS  (PRN):  dextrose Oral Gel 15 Gram(s) Oral once PRN Blood Glucose LESS THAN 70 milliGRAM(s)/deciliter      ALLERGIES:  Allergies    No Known Allergies    Intolerances        LABS:                        10.9   4.79  )-----------( 381      ( 10 Apr 2022 06:49 )             34.7     04-10    135  |  102  |  17  ----------------------------<  82  4.4   |  26  |  0.47<L>    Ca    8.4      10 Apr 2022 06:49  Phos  4.4     04-10  Mg     2.1     04-10    TPro  7.0  /  Alb  2.8<L>  /  TBili  0.4  /  DBili  x   /  AST  17  /  ALT  19  /  AlkPhos  39<L>  04-08    PT/INR - ( 2022 09:55 )   PT: 11.7 sec;   INR: 1.00          PTT - ( 2022 09:55 )  PTT:33.8 sec  Urinalysis Basic - ( 2022 10:46 )    Color: Yellow / Appearance: Clear / S.020 / pH: x  Gluc: x / Ketone: NEGATIVE  / Bili: NEGATIVE / Urobili: 0.2 E.U./dL   Blood: x / Protein: Trace mg/dL / Nitrite: NEGATIVE   Leuk Esterase: NEGATIVE / RBC: < 5 /HPF / WBC > 10 /HPF   Sq Epi: x / Non Sq Epi: 0-5 /HPF / Bacteria: Present /HPF      CAPILLARY BLOOD GLUCOSE      POCT Blood Glucose.: 131 mg/dL (10 Apr 2022 08:21)      RADIOLOGY & ADDITIONAL TESTS: Reviewed.

## 2022-04-10 NOTE — EEG REPORT - NS EEG TEXT BOX
[facility] Department of Neurology  Inpatient Continuous video-Electroencephalogram    Patient Name:	MORGAN RAVI    :	1950  MRN:	[id number]    Study Start Date/Time:  2022, 10:43:54 AM  Study End Date/Time:    Referred by: Lachelle Srinivasan MD    Brief Clinical History:  MORGAN RAVI is a 72 year old woman with altered mental status; study performed to investigate for seizures or markers of epilepsy.    Diagnosis Code:   R56.9 convulsions/seizure  CPT: 06494 EEG with video 12-26h  Technical CPT: 13973 set-up +  39054 vEEG unmonitored 12-26h    Pertinent Medications:  n/a    Acquisition Details:  Electroencephalography was acquired using a minimum of 21 channels on an [employer] system v 8.5.1 with electrode placement according to the standard International 10-20 system following ACNS (American Clinical Neurophysiology Society) guidelines for Long-Term Video EEG monitoring.  Anterior temporal T1 and T2 electrodes were utilized whenever possible.   The XLTEK automated spike & seizure detections were all reviewed in detail, in addition to extensive portions of raw EEG.    Day 1: 2022 @ 10:43:54 AM to next morning @ 07:00 am  Background:  continuous, with predominantly alpha/theta frequencies.  Symmetry:  Frequent (10-49%) right greater than left temporal delta frequency slowing.  Posterior Dominant Rhythm:  9 Hz symmetric, well-organized, and well-modulated.  Organization: Appropriate anterior-posterior gradient.  Voltage:  Normal (20+ uV)  Variability: Yes. 		Reactivity: Yes.  N2 sleep: Symmetric, synchronous spindles and K complexes.  Spontaneous Activity:  No epileptiform discharges.  Periodic/rhythmic activity:  None.  Events:  No electrographic seizures or significant clinical events.  Provocations:  Hyperventilation and Photic stimulation: was not performed.    Daily Summary:    1)	Right greater than left temporal focal slowing, indicating focal cerebral dysfunction.  2)	Mild diffuse background slowing, indicating a mild degree of diffuse or multifocal cerebral dysfunction.  3)	No epileptiform activity and no significant clinical events occurred.        Mallika Worrell MD  Attending Neurologist, [facility] Epilepsy [facility]

## 2022-04-10 NOTE — PROGRESS NOTE ADULT - ASSESSMENT
72y Female with PMHx of T2DM c/b peripheral neuropathy, HTN, recent admission and workup for Naty Sanchez tear, TIA on 3/30 (MR negative) presents to [facility] as transfer from [facility] ED for episode of AMS, back to baseline in [facility] ED. CTH negative in ED for acute pathology. 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     Neuro  #TIA vs Seizure workup  - hold dapt at this time due to hx of Naty Sanchez tear in prior admission 3/25/22  - c/w atorvastatin 40mg daily  - q4hr stroke neuro checks and vitals  - s/p MRI Brain without contrast short stroke protocol, read as severe chronic microvascular ischemic disease, chronic hypertensive encephalopathy and chronic infarct in the right thalamus.   - Stroke education provided    #leg weakness likely 2/2 diabetic neuropathy  - c/w home Cymbalta 20mg qd for neuropathy    Cards  #HTN  - permissive hypertension, Goal -180  - c/w home blood pressure medication for now (lisinopril 40mg qd.) due to high BPs, increased amlodipine to 10 mg PO Qd  - TTE with bubble done prior admission 4/1: grade 1 left ventricular diastolic dysfunction, no PFO, EF >83% hyperdynamic left ventricular systolic function    #HLD  - continue lipitor 40mg qhs  - LDL results: 42    Pulm  - call provider if SPO2 < 94%    GI  #Nutrition/Fluids/Electrolytes   - replete K<4 and Mg <2  - Diet: Regular  - IVF: none    #hx of Naty Sanchez tear  - continue home protonix 40mg IV bid    #diabetic gastroparesis  - reglan 5mg IV q8H prn for motility    Renal  - monitor and trend Cr  - monitor I&Os    Infectious Disease  - monitor and trend WBCs    Endocrine  #DM  - A1C results: 11.6  - ISS  - on home lantus 18u, will start with 12u and titrate up  - started lispro 10u prior to breakfast, lispro 8u prior to lunch and dinner    - TSH results: 1.120    DVT Prophylaxis  - heparin sq for DVT prophylaxis   - SCDs for DVT prophylaxis     Dispo: PT recommending [facility]

## 2022-04-11 ENCOUNTER — TRANSCRIPTION ENCOUNTER (OUTPATIENT)
Age: 72
End: 2022-04-11

## 2022-04-11 LAB
-  AMPICILLIN: SIGNIFICANT CHANGE UP
-  CIPROFLOXACIN: SIGNIFICANT CHANGE UP
-  LEVOFLOXACIN: SIGNIFICANT CHANGE UP
-  NITROFURANTOIN: SIGNIFICANT CHANGE UP
-  TETRACYCLINE: SIGNIFICANT CHANGE UP
-  VANCOMYCIN: SIGNIFICANT CHANGE UP
ALBUMIN SERPL ELPH-MCNC: 3.2 G/DL — LOW (ref 3.3–5)
ALP SERPL-CCNC: 32 U/L — LOW (ref 40–120)
ALT FLD-CCNC: 10 U/L — SIGNIFICANT CHANGE UP (ref 10–45)
ANION GAP SERPL CALC-SCNC: 7 MMOL/L — SIGNIFICANT CHANGE UP (ref 5–17)
AST SERPL-CCNC: 12 U/L — SIGNIFICANT CHANGE UP (ref 10–40)
BASOPHILS # BLD AUTO: 0.02 K/UL — SIGNIFICANT CHANGE UP (ref 0–0.2)
BASOPHILS NFR BLD AUTO: 0.4 % — SIGNIFICANT CHANGE UP (ref 0–2)
BILIRUB SERPL-MCNC: 0.3 MG/DL — SIGNIFICANT CHANGE UP (ref 0.2–1.2)
BUN SERPL-MCNC: 17 MG/DL — SIGNIFICANT CHANGE UP (ref 7–23)
CALCIUM SERPL-MCNC: 8.7 MG/DL — SIGNIFICANT CHANGE UP (ref 8.4–10.5)
CHLORIDE SERPL-SCNC: 100 MMOL/L — SIGNIFICANT CHANGE UP (ref 96–108)
CO2 SERPL-SCNC: 27 MMOL/L — SIGNIFICANT CHANGE UP (ref 22–31)
CREAT SERPL-MCNC: 0.45 MG/DL — LOW (ref 0.5–1.3)
CULTURE RESULTS: SIGNIFICANT CHANGE UP
EGFR: 101 ML/MIN/1.73M2 — SIGNIFICANT CHANGE UP
EOSINOPHIL # BLD AUTO: 0.23 K/UL — SIGNIFICANT CHANGE UP (ref 0–0.5)
EOSINOPHIL NFR BLD AUTO: 4.6 % — SIGNIFICANT CHANGE UP (ref 0–6)
GLUCOSE BLDC GLUCOMTR-MCNC: 111 MG/DL — HIGH (ref 70–99)
GLUCOSE BLDC GLUCOMTR-MCNC: 119 MG/DL — HIGH (ref 70–99)
GLUCOSE BLDC GLUCOMTR-MCNC: 145 MG/DL — HIGH (ref 70–99)
GLUCOSE BLDC GLUCOMTR-MCNC: 208 MG/DL — HIGH (ref 70–99)
GLUCOSE BLDC GLUCOMTR-MCNC: 91 MG/DL — SIGNIFICANT CHANGE UP (ref 70–99)
GLUCOSE SERPL-MCNC: 119 MG/DL — HIGH (ref 70–99)
HCT VFR BLD CALC: 34.2 % — LOW (ref 34.5–45)
HGB BLD-MCNC: 10.9 G/DL — LOW (ref 11.5–15.5)
IMM GRANULOCYTES NFR BLD AUTO: 0.4 % — SIGNIFICANT CHANGE UP (ref 0–1.5)
LYMPHOCYTES # BLD AUTO: 2.04 K/UL — SIGNIFICANT CHANGE UP (ref 1–3.3)
LYMPHOCYTES # BLD AUTO: 40.8 % — SIGNIFICANT CHANGE UP (ref 13–44)
MAGNESIUM SERPL-MCNC: 1.8 MG/DL — SIGNIFICANT CHANGE UP (ref 1.6–2.6)
MCHC RBC-ENTMCNC: 26.9 PG — LOW (ref 27–34)
MCHC RBC-ENTMCNC: 31.9 GM/DL — LOW (ref 32–36)
MCV RBC AUTO: 84.4 FL — SIGNIFICANT CHANGE UP (ref 80–100)
METHOD TYPE: SIGNIFICANT CHANGE UP
MONOCYTES # BLD AUTO: 0.34 K/UL — SIGNIFICANT CHANGE UP (ref 0–0.9)
MONOCYTES NFR BLD AUTO: 6.8 % — SIGNIFICANT CHANGE UP (ref 2–14)
NEUTROPHILS # BLD AUTO: 2.35 K/UL — SIGNIFICANT CHANGE UP (ref 1.8–7.4)
NEUTROPHILS NFR BLD AUTO: 47 % — SIGNIFICANT CHANGE UP (ref 43–77)
NRBC # BLD: 0 /100 WBCS — SIGNIFICANT CHANGE UP (ref 0–0)
ORGANISM # SPEC MICROSCOPIC CNT: SIGNIFICANT CHANGE UP
ORGANISM # SPEC MICROSCOPIC CNT: SIGNIFICANT CHANGE UP
PHOSPHATE SERPL-MCNC: 3.9 MG/DL — SIGNIFICANT CHANGE UP (ref 2.5–4.5)
PLATELET # BLD AUTO: 354 K/UL — SIGNIFICANT CHANGE UP (ref 150–400)
POTASSIUM SERPL-MCNC: 4.3 MMOL/L — SIGNIFICANT CHANGE UP (ref 3.5–5.3)
POTASSIUM SERPL-SCNC: 4.3 MMOL/L — SIGNIFICANT CHANGE UP (ref 3.5–5.3)
PROT SERPL-MCNC: 6.2 G/DL — SIGNIFICANT CHANGE UP (ref 6–8.3)
RBC # BLD: 4.05 M/UL — SIGNIFICANT CHANGE UP (ref 3.8–5.2)
RBC # FLD: 13.4 % — SIGNIFICANT CHANGE UP (ref 10.3–14.5)
SODIUM SERPL-SCNC: 134 MMOL/L — LOW (ref 135–145)
SPECIMEN SOURCE: SIGNIFICANT CHANGE UP
WBC # BLD: 5 K/UL — SIGNIFICANT CHANGE UP (ref 3.8–10.5)
WBC # FLD AUTO: 5 K/UL — SIGNIFICANT CHANGE UP (ref 3.8–10.5)

## 2022-04-11 PROCEDURE — 99233 SBSQ HOSP IP/OBS HIGH 50: CPT

## 2022-04-11 PROCEDURE — 95720 EEG PHY/QHP EA INCR W/VEEG: CPT

## 2022-04-11 PROCEDURE — 99222 1ST HOSP IP/OBS MODERATE 55: CPT | Mod: GC

## 2022-04-11 RX ORDER — CEFTRIAXONE 500 MG/1
1000 INJECTION, POWDER, FOR SOLUTION INTRAMUSCULAR; INTRAVENOUS EVERY 24 HOURS
Refills: 0 | Status: DISCONTINUED | OUTPATIENT
Start: 2022-04-11 | End: 2022-04-12

## 2022-04-11 RX ORDER — INSULIN LISPRO 100/ML
10 VIAL (ML) SUBCUTANEOUS
Refills: 0 | Status: DISCONTINUED | OUTPATIENT
Start: 2022-04-11 | End: 2022-04-12

## 2022-04-11 RX ORDER — INSULIN GLARGINE 100 [IU]/ML
12 INJECTION, SOLUTION SUBCUTANEOUS AT BEDTIME
Refills: 0 | Status: DISCONTINUED | OUTPATIENT
Start: 2022-04-11 | End: 2022-04-12

## 2022-04-11 RX ADMIN — HEPARIN SODIUM 5000 UNIT(S): 5000 INJECTION INTRAVENOUS; SUBCUTANEOUS at 13:18

## 2022-04-11 RX ADMIN — GABAPENTIN 100 MILLIGRAM(S): 400 CAPSULE ORAL at 21:04

## 2022-04-11 RX ADMIN — CEFTRIAXONE 100 MILLIGRAM(S): 500 INJECTION, POWDER, FOR SOLUTION INTRAMUSCULAR; INTRAVENOUS at 12:48

## 2022-04-11 RX ADMIN — PANTOPRAZOLE SODIUM 40 MILLIGRAM(S): 20 TABLET, DELAYED RELEASE ORAL at 07:03

## 2022-04-11 RX ADMIN — GABAPENTIN 100 MILLIGRAM(S): 400 CAPSULE ORAL at 05:26

## 2022-04-11 RX ADMIN — HEPARIN SODIUM 5000 UNIT(S): 5000 INJECTION INTRAVENOUS; SUBCUTANEOUS at 21:04

## 2022-04-11 RX ADMIN — Medication 10 UNIT(S): at 12:12

## 2022-04-11 RX ADMIN — Medication 2: at 12:12

## 2022-04-11 RX ADMIN — DULOXETINE HYDROCHLORIDE 20 MILLIGRAM(S): 30 CAPSULE, DELAYED RELEASE ORAL at 12:11

## 2022-04-11 RX ADMIN — LISINOPRIL 40 MILLIGRAM(S): 2.5 TABLET ORAL at 07:04

## 2022-04-11 RX ADMIN — INSULIN GLARGINE 12 UNIT(S): 100 INJECTION, SOLUTION SUBCUTANEOUS at 21:47

## 2022-04-11 RX ADMIN — Medication 10 UNIT(S): at 17:32

## 2022-04-11 RX ADMIN — AMLODIPINE BESYLATE 10 MILLIGRAM(S): 2.5 TABLET ORAL at 05:26

## 2022-04-11 RX ADMIN — HEPARIN SODIUM 5000 UNIT(S): 5000 INJECTION INTRAVENOUS; SUBCUTANEOUS at 05:27

## 2022-04-11 RX ADMIN — GABAPENTIN 100 MILLIGRAM(S): 400 CAPSULE ORAL at 13:19

## 2022-04-11 RX ADMIN — ATORVASTATIN CALCIUM 40 MILLIGRAM(S): 80 TABLET, FILM COATED ORAL at 21:04

## 2022-04-11 NOTE — PROGRESS NOTE ADULT - PROBLEM SELECTOR PLAN 7
F: None  E: Replete PRN   N: DASH  DVT: Heparin   Dispo: [facility]

## 2022-04-11 NOTE — DISCHARGE NOTE PROVIDER - HOSPITAL COURSE
#Discharge: do not delete    72y Female with PMHx of T2DM c/b peripheral neuropathy, HTN, recent admission and workup for Naty Sanchez tear, TIA on 3/30 (MR negative) presents to [facility] as transfer from [facility] ED for episode of altered mental status (staring, generalized weakness), back to baseline in [facility] ED, no acute pathology on CTH or MRI, found to have metabolic encephalopathy 2/2 UTI vs hypertension, also found to have new LE weakness of unclear origin, possible diabetic neuropathy    Problem List/Main Diagnoses:   #Leg weakness.   ·  Plan: Symmetrical LE weakness, worse distally compared to proximally. Symmetrical diminished sensation, longstanding diabetic peripheral neuropathy. Denies issues with incontinence, or back pain, no sensory line  MRI L/Spine: showing at the L3/4 leveI. There are degenerative changes involving the facets bilaterally as well as ligamentum flavum hypertrophy. This combination results in moderate central spinal canal stenosis. At L4/5 level, there is disc bulge abutting the L4 nerve roots bilaterally. There are degenerative changes involving the facets bilaterally (right greater than left) as well as ligamentum flavum hypertrophy. This combination results in mild central spinal canal stenosis. Likely 2/2 DM per Neurology team  - c/w cymbalta 90mg daily   - D/c'd Gabapentin 100 BID as making patient more lethargic  - c/w Custom AFO boots       #Neck pain.   Neck and shoulder pain on 5/3. Tender to palpation, does not radiate  - Supportive care    #Bilateral leg pain.   Needle like pain shins downward, interfering with sleep.   - Cymbalta 90 Qd for diabetic neuropathy  - f/u LE duplex for c/f worsening swelling    #Type II diabetes mellitus.   Pt with hx of DM, A1c 11.6. Endocrine following. TSH results: 1.120. Low C peptide patient will need insulin and NOT oral agents on dc. On Lantus 4U, Lispro 8U inpatient.  - f/u with PCP within 1 week of discharge   - c/w     #Hypertension.   Goal -160. TTE with bubble done prior admission 4/1: grade 1 left ventricular diastolic dysfunction, no PFO, EF >83% hyperdynamic left ventricular systolic function.  - c/w Amlodipine 10mg (new med), home Lisinopril 40mg     #Hyperlipidemia.   -C/w Lipitor 40mg.    #Naty-Sanchez tear.   Hx of Naty Sanchez tear in prior admission   - continue home protonix 40mg daily    #UTI (urinary tract infection) - RESOLVED  UA with +WBCs, UCx growing E faecalis. Will treat as complicated  -completed 7 day course of CTX and then ampicillin.    New medications/therapies:   New lines/hardware:  Labs to be followed outpatient:   Exam to be followed outpatient:     Discharge plan: discharge to Outpatient PT with one person assist vs pending paperwork of his son and daughter for possible FMLA.     Physical Examination upon discharge:  General: NAD; elderly female, lying comfortably in bed, speaking in full sentences  HEENT: NC/AT; PERRL; EOMI; MMM  Neck: supple; no JVD  Cardiac: RRR; +S1/S2, +systolic murmur II/IV RUSB  Pulm: CTA B/L; no W/R/R, looks comfortable on room air without increased WOB or accessory muscle use  GI: soft, NT/ND, +BSx4,   Extremities: WWP; no edema, clubbing or cyanosis, 4/5 strength throughout, sensation grossly intact  Vasc: 2+ radial, DP pulses B/L  Neuro: AAOx3; no focal deficits     #Discharge: do not delete    72y Female with PMHx of T2DM c/b peripheral neuropathy, HTN, recent admission and workup for Naty Sanchez tear, TIA on 3/30 (MR negative) presents to [facility] as transfer from [facility] ED for episode of altered mental status (staring, generalized weakness), back to baseline in [facility] ED, no acute pathology on CTH or MRI, found to have metabolic encephalopathy 2/2 UTI , c/b LE weakness 2/2 diabetic neuropathy     Problem List/Main Diagnoses:   #Leg weakness.   Symmetrical LE weakness, worse distally compared to proximally. Symmetrical diminished sensation 2/2 longstanding diabetic peripheral neuropathy. Denies issues with incontinence, or back pain, no sensory line  MRI L/Spine: showing at the L3/4 leveI. There are degenerative changes involving the facets bilaterally as well as ligamentum flavum hypertrophy. This combination results in moderate central spinal canal stenosis. At L4/5 level, there is disc bulge abutting the L4 nerve roots bilaterally. There are degenerative changes involving the facets bilaterally (right greater than left) as well as ligamentum flavum hypertrophy. This combination results in mild central spinal canal stenosis. Likely 2/2 DM per Neurology team  - c/w cymbalta 90mg daily   - c/w Custom AFO boots     #Neck pain.   Neck and shoulder pain on 5/3. Tender to palpation, does not radiate  - Supportive care    #Bilateral leg pain.   Needle like pain shins downward, interfering with sleep.   - Cymbalta 90 Qd for diabetic neuropathy    #Type II diabetes mellitus.   Pt with hx of DM, A1c 11.6. Endocrine following. TSH results: 1.120. Low C peptide patient will need insulin and NOT oral agents on dc. On Lantus 4U, Lispro 8U inpatient.  - f/u with PCP within 1 week of discharge   - c/w     #Hypertension.   Goal -160. TTE with bubble done prior admission 4/1: grade 1 left ventricular diastolic dysfunction, no PFO, EF >83% hyperdynamic left ventricular systolic function.  - c/w Amlodipine 10mg (new med), home Lisinopril 40mg     #Hyperlipidemia.   -C/w Lipitor 40mg.    #Naty-Sanchez tear.   Hx of Naty Sanchez tear in prior admission   - continue home protonix 40mg daily    #UTI (urinary tract infection) - RESOLVED  UA with +WBCs, UCx growing E faecalis. Will treat as complicated  -completed 7 day course of CTX and then ampicillin.    New medications/therapies:   New lines/hardware:  Labs to be followed outpatient:   Exam to be followed outpatient:     Discharge plan: discharge to Outpatient PT with one person assist vs pending paperwork of his son and daughter for possible FMLA.     Physical Examination upon discharge:  General: NAD; elderly female, lying comfortably in bed, speaking in full sentences  HEENT: NC/AT; PERRL; EOMI; MMM  Neck: supple; no JVD  Cardiac: RRR; +S1/S2, +systolic murmur II/IV RUSB  Pulm: CTA B/L; no W/R/R, looks comfortable on room air without increased WOB or accessory muscle use  GI: soft, NT/ND, +BSx4,   Extremities: WWP; no edema, clubbing or cyanosis, 4/5 strength throughout, sensation grossly intact  Vasc: 2+ radial, DP pulses B/L  Neuro: AAOx3; no focal deficits     #Discharge: do not delete    71yo F PMH T2DM c/b peripheral neuropathy, HTN, recent admission and workup for Naty Sanchez tear, TIA 3/30 (MR negative) presented for episode of AMS (staring, generalized weakness), course c/b metabolic encephalopathy 2/2 UTI (resolved and s/p abx x7d) vs HTN. Also found to have new LE weakness 2/2 diabetic neuropathy. Due to immigration and insurance status, unable to dc to [facility].    Problem List/Main Diagnoses:  #Leg weakness.   Symmetrical LE weakness, distal worse than proximal. Symmetrical diminished sensation, longstanding diabetic peripheral neuropathy. Attributed to progression of diabetic neuropathy and symptoms more severe than expected.  MRI L/Spine 3/28: L3/4 degenerative changes w/ moderate central spinal canal stenosis. L4/5 level disc bulge.  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  Completed IVIG 5d course (6/1-6/5), tx'd w/ Cymbalta 90mg qd. Became strong enough to be 1-person assist so was dc'd to home.    #Bilateral leg pain.   Needle-like pain in b/l shins extending downward, interfering with sleep.  LE dopplers 5/21: acute DVT of L peroneal vein  LE dopplers 6/3: resolution of L peroneal DVT  Tx'd w/ Cymbalta 90mg qd for diabetic neuropathy. Was on ASA but switched inpt to eliquis 5mg BID for 3 months (5/28-8/28) for acute DVT. Plan to resume ASA 81mg qd 8/29.    #Type II diabetes mellitus.   Known h/o T2DM, A1c 11-11.6% (3/27-4/9). Has low C peptide therefore needs insulin and NOT oral agents on dc. Lantus 4u qhs & Lispro 8u TID.    #Naty-Sanchez tear.   H/o of Naty Sanchez tear in prior admission. Takes Protonix 40mg qd at home. Continued inpt.    #Hypertension.   Known h/o HTN, takes Amlodipine 10mg qd and Lisinopril 40mg qd at home.  TTE with bubble 4/1: mild concentric LVH, hyperdynamic LV systolic function w/ EF >83%; grade I diastolic dysfunction w/o elevated filling pressure; no R to L shunt; mild-to-moderate AS; trace AR/MR/TR/MD  Continued amlodipine and lisinopril inpt.    #Hyperlipidemia.   Known HLD, takes Atorvastatin 40mg qhs at home. Continued atorvastatin inpt.    Changes to meds: Dc'd ASA, started eliquis 5mg BID (5/28-8/28) for acute DVT. Plan to resume ASA 81mg qd 8/29. Amlodipine 10mg qd, atorvastatin 80mg qhs.  Discharge to: home    Physical Examination upon discharge:  General: elderly female lying in bed in no apparent distress  HEENT: NC/AT; EOMI but R eye decreased acuity (baseline); MMM  Neck: supple  Cardiac: RRR; +S1/S2, +systolic murmur at RUSB  Pulm: CTAB  GI: +BS, soft, NT/ND   Extremities: WWP; no LE edema  Vasc: 2+ radial, DP pulses b/l  Neuro: AAOx3   Motor: full ROM and tremors in b/l UEs, b/l LEs symmetrically weak #Discharge: do not delete    73yo F PMH T2DM c/b peripheral neuropathy, HTN, recent admission and workup for Naty Sanchez tear, TIA 3/30 (MR negative) presented for episode of AMS (staring, generalized weakness), course c/b metabolic encephalopathy 2/2 UTI (resolved and s/p abx x7d) vs HTN. Also found to have new LE weakness 2/2 diabetic neuropathy. Due to immigration and insurance status, unable to dc to [facility], pt then became 1 person assist so was dc'd to home.    Problem List/Main Diagnoses:  #Leg weakness.   Symmetrical LE weakness, distal worse than proximal. Symmetrical diminished sensation, longstanding diabetic peripheral neuropathy. Attributed to progression of diabetic neuropathy and symptoms more severe than expected.  MRI L/Spine 3/28: L3/4 degenerative changes w/ moderate central spinal canal stenosis. L4/5 level disc bulge.  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  Completed IVIG 5d course (6/1-6/5), tx'd w/ Cymbalta 90mg qd. Became strong enough to be 1-person assist so was dc'd to home.    #Bilateral leg pain.   Needle-like pain in b/l shins extending downward, interfering with sleep.  LE dopplers 5/21: acute DVT of L peroneal vein  LE dopplers 6/3: resolution of L peroneal DVT  Tx'd w/ Cymbalta 90mg qd for diabetic neuropathy. Was on ASA but switched inpt to eliquis 5mg BID for 3 months (5/28-8/28) for acute DVT. Plan to resume ASA 81mg qd 8/29.    #Type II diabetes mellitus.   Known h/o T2DM, A1c 11-11.6% (3/27-4/9). Has low C peptide therefore needs insulin and NOT oral agents on dc. Lantus 4u qhs & Lispro 8u TID.    #Naty-Sanchez tear.   H/o of Naty Sanchez tear in prior admission. Takes Protonix 40mg qd at home. Continued inpt.    #Hypertension.   Known h/o HTN, takes Amlodipine 10mg qd and Lisinopril 40mg qd at home.  TTE with bubble 4/1: mild concentric LVH, hyperdynamic LV systolic function w/ EF >83%; grade I diastolic dysfunction w/o elevated filling pressure; no R to L shunt; mild-to-moderate AS; trace AR/MR/TR/DC  Continued amlodipine and lisinopril inpt.    #Hyperlipidemia.   Known HLD, takes Atorvastatin 40mg qhs at home. Continued atorvastatin inpt.    Changes to meds: Dc'd ASA, started eliquis 5mg BID (5/28-8/28) for acute DVT. Plan to resume ASA 81mg qd 8/29. Amlodipine 10mg qd, atorvastatin 80mg qhs, continued insulin.  Discharge to: home    Physical Examination upon discharge:  General: elderly female lying in bed in no apparent distress  HEENT: NC/AT; EOMI but R eye decreased acuity (baseline); MMM  Neck: supple  Cardiac: RRR; +S1/S2, +systolic murmur at RUSB  Pulm: CTAB  GI: +BS, soft, NT/ND   Extremities: WWP; no LE edema  Vasc: 2+ radial, DP pulses b/l  Neuro: AAOx3   Motor: full ROM and tremors in b/l UEs, b/l LEs symmetrically weak #Discharge: do not delete    73yo F PMH T2DM c/b peripheral neuropathy, HTN, recent admission and workup for Naty Sanchez tear, TIA 3/30 (MR negative) presented for episode of AMS (staring, generalized weakness), course c/b metabolic encephalopathy 2/2 UTI and also gabapentin (resolved and s/p abx x7d). Also found to have lower extremity weakness 2/2 diabetic neuropathy. Due to immigration and insurance status, unable to dc to [facility], pt then became 1 person assist which made home discharge safe    Problem List/Main Diagnoses:  #Leg weakness.   Symmetrical LE weakness, distal worse than proximal. Symmetrical diminished sensation, longstanding diabetic peripheral neuropathy. Attributed to progression of diabetic neuropathy and symptoms more severe than expected.  MRI L/Spine 3/28: L3/4 degenerative changes w/ moderate central spinal canal stenosis. L4/5 level disc bulge.  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  Completed IVIG 5d course (6/1-6/5) without any benefit, tx'd w/ Cymbalta 90mg qd. Became strong enough to be 1-person assist so was dc'd to home.    #Bilateral leg pain.  due to diabetic neuropathy - stabilized on Cymbalta 90mg daily.  gabapentin caused episodes of altered mental status so should not be used for diabetic pain control  Needle-like pain in b/l shins extending downward, interfering with sleep.  LE dopplers 5/21: acute DVT of L peroneal vein  LE dopplers 6/3: resolution of L peroneal DVT  Was on ASA but switched inpt to eliquis 5mg BID for 3 months (5/28-8/28) for acute DVT. Plan to resume ASA 81mg qd 8/29.    #Type II diabetes mellitus.   Known h/o T2DM, A1c 11-11.6% (3/27-4/9). Has low C peptide therefore needs insulin and NOT oral agents on dc. Lantus 4u qhs & Lispro 8u TID.    #Naty-Sanchez tear.   H/o of Naty Sanchez tear in prior admission. Takes Protonix 40mg qd at home. Continued inpt.    #Hypertension.   Known h/o HTN, takes Amlodipine 10mg qd and Lisinopril 40mg qd at home.  TTE with bubble 4/1: mild concentric LVH, hyperdynamic LV systolic function w/ EF >83%; grade I diastolic dysfunction w/o elevated filling pressure; no R to L shunt; mild-to-moderate AS; trace AR/MR/TR/MO  Continued amlodipine and lisinopril inpt.    #Hyperlipidemia.   Known HLD, takes Atorvastatin 40mg qhs at home. Continued atorvastatin inpt discharged on 40mg at bedtime    Changes to meds: Dc'd ASA, started eliquis 5mg BID (5/28-8/28) for acute DVT. Plan to resume ASA 81mg qd 8/29. Amlodipine 10mg qd, atorvastatin 80mg qhs, continued insulin.  Discharge to: home    Physical Examination upon discharge:  General: elderly female lying in bed in no apparent distress  HEENT: NC/AT; EOMI but R eye decreased acuity (baseline); MMM  Neck: supple  Cardiac: RRR; +S1/S2, +systolic murmur at RUSB  Pulm: CTAB  GI: +BS, soft, NT/ND   Extremities: WWP; no LE edema  Vasc: 2+ radial, DP pulses b/l  Neuro: AAOx3   Motor: full ROM and tremors in b/l UEs, b/l LEs symmetrically weak

## 2022-04-11 NOTE — CONSULT NOTE ADULT - SUBJECTIVE AND OBJECTIVE BOX
HPI: 72y Female with PMHx of T2DM, HTN, recent admission and workup for Naty Sanchez tear, TIA on 3/30 (MR negative) presents to [facility] as transfer from [facility] ED for episode of confusion which began at 0800 this AM. Spoke to patient's daughter Efe on the phone, states she was changing her and getting her ready for the day. Gave her blood pressure medication, went to give her breakfast and pt was found slumped over, was confused, mumbling words. Daughter states her eyes were glossy and pt could not recognize who she was. At that point daughter took her oxygen levels and was 93% RA. She states the episode at least lasted 15-20 mins, she was still altered when EMS came. Denies any obvious jerking/shaking. Pt was not answering some questions appropriately and daughter called 911, pt brought to [facility] ED. On arrival to ED, pt back to baseline, was A&Ox3. Pt was transferred to stroke service for TIA vs seizure. On arrival to [facility], pt is A&Ox3, answering questions appropriately, following all commands. Pt does not remember the episode this AM. States she is very hungry. Denies any pain, visual disturbance, headache, dizziness, difficulty speaking, chest pain, sob, abd pain, n/v, new weakness/numbness of extremities.     Of note, uses walker at home. Pt has had gait difficulties for years per daughter. No new weakness that she noted.        Age at Dx:  How dx:  Hx and duration of insulin:  Current Therapy:  Hx of hypoglycemia  Hx of DKA/HHS?    Home FSG:  Fasting  Lunch  Dinner  Bed    Hx of other regimens  Complications:  Outpatient Endo:    PMH & Surgical Hx:          FH:  DM:  Thyroid:  Autoimmune:  Other:    SH:  Smoking  Etoh:  Recreational Drugs:  Social Life:    Current Meds:  amLODIPine   Tablet 10 milliGRAM(s) Oral daily  atorvastatin 40 milliGRAM(s) Oral at bedtime  cefTRIAXone   IVPB 1000 milliGRAM(s) IV Intermittent every 24 hours  dextrose 5%. 1000 milliLiter(s) IV Continuous <Continuous>  dextrose 5%. 1000 milliLiter(s) IV Continuous <Continuous>  dextrose 50% Injectable 25 Gram(s) IV Push once  dextrose 50% Injectable 12.5 Gram(s) IV Push once  dextrose 50% Injectable 25 Gram(s) IV Push once  dextrose Oral Gel 15 Gram(s) Oral once PRN  DULoxetine 20 milliGRAM(s) Oral daily  gabapentin 100 milliGRAM(s) Oral every 8 hours  glucagon  Injectable 1 milliGRAM(s) IntraMuscular once  heparin   Injectable 5000 Unit(s) SubCutaneous every 8 hours  insulin glargine Injectable (LANTUS) 14 Unit(s) SubCutaneous at bedtime  insulin lispro (ADMELOG) corrective regimen sliding scale   SubCutaneous Before meals and at bedtime  insulin lispro Injectable (ADMELOG) 14 Unit(s) SubCutaneous before breakfast  insulin lispro Injectable (ADMELOG) 10 Unit(s) SubCutaneous before lunch  insulin lispro Injectable (ADMELOG) 10 Unit(s) SubCutaneous before dinner  lisinopril 40 milliGRAM(s) Oral every 24 hours  pantoprazole    Tablet 40 milliGRAM(s) Oral before breakfast      Allergies:  No Known Allergies      ROS:  Denies the following except as indicated.    General: weight loss/weight gain, decreased appetite, fatigue  Eyes: Blurry vision, double vision, visual changes  ENT: Throat pain, changes in voice,   CV: palpitations, SOB, CP, cough  GI: NVD, difficulty swallowing, abdominal pain  : polyuria, dysuria  Endo: abnormal menses, temperature intolerance, decreased libido  MSK: weakness, joint pain  Skin: rash, dryness, diaphoresis  Heme: Easy bruising,bleeding  Neuro: HA, dizziness, lightheadedness, numbness tingling  Psych: Anxiety, Depression    Vital Signs Last 24 Hrs  T(C): 36.3 (11 Apr 2022 12:46), Max: 37 (11 Apr 2022 01:00)  T(F): 97.4 (11 Apr 2022 12:46), Max: 98.6 (11 Apr 2022 01:00)  HR: 67 (11 Apr 2022 11:37) (60 - 84)  BP: 149/67 (11 Apr 2022 11:37) (125/64 - 185/80)  BP(mean): 96 (11 Apr 2022 11:37) (84 - 115)  RR: 16 (11 Apr 2022 11:37) (16 - 18)  SpO2: 100% (11 Apr 2022 11:37) (99% - 100%)  Height (cm): 167.6 (04-08 @ 09:35)  Weight (kg): 63.5 (04-08 @ 09:35)  BMI (kg/m2): 22.6 (04-08 @ 09:35)      Constitutional: wn/wd in NAD.   HEENT: NCAT, MMM, OP clear, EOMI, , no proptosis or lid retraction  Neck: no thyromegaly or palpable thyroid nodules   Respiratory: lungs CTAB.  Cardiovascular: regular rhythm, normal S1 and S2, no audible murmurs, no peripheral edema  GI: soft, NT/ND, no masses/HSM appreciated.  Neurology: no tremors, DTR 2+  Skin: no visible rashes/lesions  Psychiatric: AAO x 3, normal affect/mood.  Ext: radial pulses intact, DP pulses intact, extremities warm, no cyanosis, clubbing or edema.       LABS:                        10.9   5.00  )-----------( 354      ( 11 Apr 2022 05:58 )             34.2     04-11    134<L>  |  100  |  17  ----------------------------<  119<H>  4.3   |  27  |  0.45<L>    Ca    8.7      11 Apr 2022 05:58  Phos  3.9     04-11  Mg     1.8     04-11    TPro  6.2  /  Alb  3.2<L>  /  TBili  0.3  /  DBili  x   /  AST  12  /  ALT  10  /  AlkPhos  32<L>  04-11          Thyroid Stimulating Hormone, Serum: 1.870 (04-09 @ 08:50)  Thyroid Stimulating Hormone, Serum: 1.120 (04-01 @ 07:54)      RADIOLOGY & ADDITIONAL STUDIES:  CAPILLARY BLOOD GLUCOSE      POCT Blood Glucose.: 208 mg/dL (11 Apr 2022 11:34)  POCT Blood Glucose.: 91 mg/dL (11 Apr 2022 09:20)  POCT Blood Glucose.: 111 mg/dL (11 Apr 2022 06:45)  POCT Blood Glucose.: 116 mg/dL (10 Apr 2022 21:30)  POCT Blood Glucose.: 119 mg/dL (10 Apr 2022 17:44)        A/P:72y Female with PMHx of T2DM c/b peripheral neuropathy, HTN, recent admission and workup for Naty Sanchez tear, TIA on 3/30 (MR negative) presents to [facility] as transfer from [facility] ED for episode of AMS, back to baseline in [facility] ED. CTH negative in ED for acute pathology. Pt admitted to stroke service for TIA vs seizure.   A1c:11.%  Wt:63.4kg  Cr:0.45  GFR:101  1.  DM  Please continue lantus       units at night.    Please continue lispro      units before each meal.    Please continue lispro moderate dose sliding scale four times daily with meals and at bedtime    Pt's fingerstick glucose goal is     Will continue to monitor     For discharge, pt can continue    Pt can follow up at discharge with [facility] by calling  to make an appointment.   Will discuss case with     and update primary team HPI: 72y Female with PMHx of T2DM, HTN, recent admission and workup for Naty Sanchez tear, TIA on 3/30 (MR negative) presents to [facility] as transfer from [facility] ED for episode of confusion which began at 0800 this AM. Spoke to patient's daughter Efe on the phone, states she was changing her and getting her ready for the day. Gave her blood pressure medication, went to give her breakfast and pt was found slumped over, was confused, mumbling words. Daughter states her eyes were glossy and pt could not recognize who she was. At that point daughter took her oxygen levels and was 93% RA. She states the episode at least lasted 15-20 mins, she was still altered when EMS came. Denies any obvious jerking/shaking. Pt was not answering some questions appropriately and daughter called 911, pt brought to [facility] ED. On arrival to ED, pt back to baseline, was A&Ox3. Pt was transferred to stroke service for TIA vs seizure. On arrival to [facility], pt is A&Ox3, answering questions appropriately, following all commands. Pt does not remember the episode this AM. States she is very hungry. Denies any pain, visual disturbance, headache, dizziness, difficulty speaking, chest pain, sob, abd pain, n/v, new weakness/numbness of extremities.     Of note, uses walker at home. Pt has had gait difficulties for years per daughter. No new weakness that she noted.        Patient familiar to the endocrine service. Patient with long standing history of Diabetes type 2. Was previously on basal insulin and oral medications. Was recently discharged on insulin therapy Lantus 14, Lispro 14/10/10 .   Daughter has been helping patient at home. Presenting with confusion in setting of unresolved UTI vs hypertensive encephalopathy. MRI head negative for acute changes.   PMH & Surgical Hx: as per HPI     FH: no known family hx    SH:  Smoking-none  Etoh:- none      Current Meds:  amLODIPine   Tablet 10 milliGRAM(s) Oral daily  atorvastatin 40 milliGRAM(s) Oral at bedtime  cefTRIAXone   IVPB 1000 milliGRAM(s) IV Intermittent every 24 hours  dextrose 5%. 1000 milliLiter(s) IV Continuous <Continuous>  dextrose 5%. 1000 milliLiter(s) IV Continuous <Continuous>  dextrose 50% Injectable 25 Gram(s) IV Push once  dextrose 50% Injectable 12.5 Gram(s) IV Push once  dextrose 50% Injectable 25 Gram(s) IV Push once  dextrose Oral Gel 15 Gram(s) Oral once PRN  DULoxetine 20 milliGRAM(s) Oral daily  gabapentin 100 milliGRAM(s) Oral every 8 hours  glucagon  Injectable 1 milliGRAM(s) IntraMuscular once  heparin   Injectable 5000 Unit(s) SubCutaneous every 8 hours  insulin glargine Injectable (LANTUS) 14 Unit(s) SubCutaneous at bedtime  insulin lispro (ADMELOG) corrective regimen sliding scale   SubCutaneous Before meals and at bedtime  insulin lispro Injectable (ADMELOG) 14 Unit(s) SubCutaneous before breakfast  insulin lispro Injectable (ADMELOG) 10 Unit(s) SubCutaneous before lunch  insulin lispro Injectable (ADMELOG) 10 Unit(s) SubCutaneous before dinner  lisinopril 40 milliGRAM(s) Oral every 24 hours  pantoprazole    Tablet 40 milliGRAM(s) Oral before breakfast      Allergies:  No Known Allergies      ROS:  Denies the following except as indicated.    General: weight loss/weight gain, decreased appetite, fatigue  Eyes: Blurry vision, double vision, visual changes  ENT: Throat pain, changes in voice,   CV: palpitations, SOB, CP, cough  GI: NVD, difficulty swallowing, abdominal pain  : polyuria, dysuria  Endo: abnormal menses, temperature intolerance, decreased libido  MSK: weakness, joint pain  Skin: rash, dryness, diaphoresis  Heme: Easy bruising,bleeding  Neuro: HA, dizziness, lightheadedness, numbness tingling  Psych: Anxiety, Depression    Vital Signs Last 24 Hrs  T(C): 36.3 (11 Apr 2022 12:46), Max: 37 (11 Apr 2022 01:00)  T(F): 97.4 (11 Apr 2022 12:46), Max: 98.6 (11 Apr 2022 01:00)  HR: 67 (11 Apr 2022 11:37) (60 - 84)  BP: 149/67 (11 Apr 2022 11:37) (125/64 - 185/80)  BP(mean): 96 (11 Apr 2022 11:37) (84 - 115)  RR: 16 (11 Apr 2022 11:37) (16 - 18)  SpO2: 100% (11 Apr 2022 11:37) (99% - 100%)  Height (cm): 167.6 (04-08 @ 09:35)  Weight (kg): 63.5 (04-08 @ 09:35)  BMI (kg/m2): 22.6 (04-08 @ 09:35)      Constitutional: NAD.   HEENT: NCAT, MMM, OP clear, EOMI, , no proptosis or lid retraction  Neck: no thyromegaly or palpable thyroid nodules   Respiratory: lungs CTAB.  Cardiovascular: regular rhythm, normal S1 and S2, no audible murmurs, no peripheral edema  GI: soft, NT/ND, no masses/HSM appreciated.  Neurology: no tremors, DTR 2+  Skin: no visible rashes/lesions  Psychiatric: AAO x 3, normal affect/mood.  Ext: radial pulses intact, DP pulses intact, extremities warm, no cyanosis, clubbing or edema.       LABS:                        10.9   5.00  )-----------( 354      ( 11 Apr 2022 05:58 )             34.2     04-11    134<L>  |  100  |  17  ----------------------------<  119<H>  4.3   |  27  |  0.45<L>    Ca    8.7      11 Apr 2022 05:58  Phos  3.9     04-11  Mg     1.8     04-11    TPro  6.2  /  Alb  3.2<L>  /  TBili  0.3  /  DBili  x   /  AST  12  /  ALT  10  /  AlkPhos  32<L>  04-11          Thyroid Stimulating Hormone, Serum: 1.870 (04-09 @ 08:50)  Thyroid Stimulating Hormone, Serum: 1.120 (04-01 @ 07:54)      RADIOLOGY & ADDITIONAL STUDIES:  CAPILLARY BLOOD GLUCOSE      POCT Blood Glucose.: 208 mg/dL (11 Apr 2022 11:34)  POCT Blood Glucose.: 91 mg/dL (11 Apr 2022 09:20)  POCT Blood Glucose.: 111 mg/dL (11 Apr 2022 06:45)  POCT Blood Glucose.: 116 mg/dL (10 Apr 2022 21:30)  POCT Blood Glucose.: 119 mg/dL (10 Apr 2022 17:44)        A/P:72y Female with PMHx of T2DM c/b peripheral neuropathy, HTN, recent admission and workup for Naty Sanchez tear, TIA on 3/30 (MR negative) presents to [facility] as transfer from [facility] ED for episode of AMS, back to baseline in [facility] ED. CTH negative in ED for acute pathology. Pt admitted to stroke service for TIA vs seizure.   A1c:11.%  Wt:63.4kg  Cr:0.45  GFR:101  1.  DM type  2  Please continue lantus 12   units at night.    Please continue lispro   10   units before each meal.    Please continue lispro moderate dose sliding scale four times daily with meals and at bedtime    Pt's fingerstick glucose goal is 100-180    Will continue to monitor     For discharge, pt can continue    Pt can follow up at discharge with [facility] by calling  to make an appointment.   Will discuss case with  and update primary team

## 2022-04-11 NOTE — PROGRESS NOTE ADULT - PROBLEM SELECTOR PLAN 3
Pt with hx of DM, A1c 11.6. Endocrine following. TSH results: 1.120  -C/w ISS  - C/w Lantus 12 U, Lispro 10U   -F/u endocrine recs   -C/w Cymbalta 200 Qd for diabetic neuropathy Pt with hx of DM, A1c 11.6. Endocrine following. TSH results: 1.120  -C/w ISS  - C/w Lantus 12 U, Lispro 10U   -F/u endocrine recs   -C/w Cymbalta 200 Qd for diabetic neuropathy  -C/w Gabapentin 100 Q8hrs

## 2022-04-11 NOTE — PROGRESS NOTE ADULT - PROBLEM SELECTOR PLAN 4
- Goal -160  - c/w Amlodipine 10mg, Lisinopril 40mg   - TTE with bubble done prior admission 4/1: grade 1 left ventricular diastolic dysfunction, no PFO, EF >83% hyperdynamic left ventricular systolic function

## 2022-04-11 NOTE — PROGRESS NOTE ADULT - PROBLEM SELECTOR PLAN 1
Metabolic encephalopathy likely due to UTI and hypertensive encephalopathy.  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 EEG 4/9-4/11 with right greater than left slowing, diffuse cerebral dysfunction, no epileptiform activity.  - Continue to hold DAPT i/s/o hx of Naty Sanchez tear in prior admission 3/25/22  - c/w atorvastatin 40mg daily  - q4hr stroke neuro checks and vitals  -UTI treatment as below  -HTN treatment as below

## 2022-04-11 NOTE — DISCHARGE NOTE PROVIDER - NSDCFUADDAPPT_GEN_ALL_CORE_FT
Please follow up at the clinic at [facility] or [facility] for further management of your care.  [facility]:  [phone] OR [phone]    [facility]:  [phone] for more information, [phone] to schedule an appointment

## 2022-04-11 NOTE — PROGRESS NOTE ADULT - ASSESSMENT
72y Female with PMHx of T2DM c/b peripheral neuropathy, HTN, recent admission and workup for Naty Sanchez tear, TIA on 3/30 (MR negative) presents to [facility] as transfer from [facility] ED for episode of altered mental status (staring, generalized weakness), back to baseline in [facility] ED. CTH negative in ED for acute pathology. 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 EEG has been negative for seizure. Patient has been intermittently hypertensive, restarted on home Lisinopril and added Amlodipine 10 mg on 4/10. Patient's UA was mildly positive, but urine culture grew E faecalis, started her on Ceftriaxone 4/11-4/13. Patient's altered mental status likely metabolic encephalopathy due to UTI and hypertensive encephalopathy.     Neuro  #altered mental status - metabolic encephalopathy likely due to UTI and hypertensive encephalopathy  - hold dapt at this time due to hx of Naty Sanchez tear in prior admission 3/25/22  - c/w atorvastatin 40mg daily  - q4hr stroke neuro checks and vitals  - s/p MRI Brain without contrast short stroke protocol, read as severe chronic microvascular ischemic disease, chronic hypertensive encephalopathy and chronic infarct in the right thalamus.   - Stroke education provided  -EEG 4/9-4/11 with right greater than left slowing, diffuse cerebral dysfunction, no epileptiform activity.  -UTI treatment as below  -HTN treatment as below    #leg weakness likely 2/2 diabetic neuropathy  - c/w home Cymbalta 20mg qd for neuropathy    Cards  #HTN  - Goal -160  - c/w home blood pressure medication for now (lisinopril 40mg qd.) due to high BPs, increased amlodipine to 10 mg PO Qd  - TTE with bubble done prior admission 4/1: grade 1 left ventricular diastolic dysfunction, no PFO, EF >83% hyperdynamic left ventricular systolic function    #HLD  - continue lipitor 40mg qhs  - LDL results: 42    Pulm  - call provider if SPO2 < 94%    GI  #Nutrition/Fluids/Electrolytes   - replete K<4 and Mg <2  - Diet: Regular  - IVF: none    #hx of Naty Sanchez tear  - continue home protonix 40mg IV bid    #diabetic gastroparesis  - reglan 5mg IV q8H prn for motility    Renal  - monitor and trend Cr  - monitor I&Os    Infectious Disease  - monitor and trend WBCs  -UA with +WBCs, UCx growing E faecalis, started on Ceftriaxone on 4/11    Endocrine  #DM  - A1C results: 11.6  - ISS  - on home lantus 18u, decreased to 14 units since am fingersticks were low will start with 12u and titrate up  - started lispro 14u prior to breakfast, lispro 10u prior to lunch and dinner  -Endocrine followed previously, will reconsult to optimize inpatient meds.    - TSH results: 1.120    DVT Prophylaxis  - heparin sq for DVT prophylaxis   - SCDs for DVT prophylaxis     Pt discussed during rounds with Dr. Tay GONZALES Goals: Goals reviewed at interdisciplinary rounds with case management, social work, physical therapy, occupational therapy, and speech language pathology.   Please see specific therapy  notes for in depth goals.  Dispo: PT recommending [facility], daughter applying for emergency medicaid for possible [facility] placement     Discussed daily hospital plans and goals with patient

## 2022-04-11 NOTE — PROGRESS NOTE ADULT - NS ATTEND AMEND GEN_ALL_CORE FT
The patient is a 72-year-old female with a history of type 2 diabetes, hypertension, recent secondary to Naty-Sanchez tear, possible TIA versus seizure on 3/30 who was admitted 4/8 with confusion. MRI was negative for acute ischemia. EEG was mildly slow but showed no epileptiform discharges. Etiology of presenting symptoms remains unclear though stroke/TIA is unlikely.  Hypertensive encephalopathy is a possibility given wide fluctuations and persistently high BP; metabolic encephalopathy related to UTI (culture results of E faecalis) is also a possible cause. Will continue to adjust BP medications. For now, continue lisinopril, amlodipine. Will treat UTI with ceftriaxone. Continue aspirin, statin. The patient is a 72-year-old female with a history of type 2 diabetes, hypertension, recent secondary to Naty-Sanchez tear, possible TIA versus seizure on 3/30 who was admitted 4/8 with confusion. MRI was negative for acute ischemia. EEG was mildly slow but showed no epileptiform discharges. Etiology of presenting symptoms remains unclear though stroke/TIA is unlikely.  Hypertensive encephalopathy is a possibility given wide fluctuations and persistently high BP; metabolic encephalopathy related to UTI (culture results of E faecalis) is also a possible cause. Will continue to adjust BP medications. For now, continue lisinopril, amlodipine. Will treat UTI with ceftriaxone. Continue aspirin, statin. Endocrine consult re: elevated A1c of 11%.

## 2022-04-11 NOTE — PROGRESS NOTE ADULT - ASSESSMENT
72y Female with PMHx of T2DM c/b peripheral neuropathy, HTN, recent admission and workup for Naty Sanchez tear, TIA on 3/30 (MR negative) presents to [facility] as transfer from [facility] ED for episode of altered mental status (staring, generalized weakness), back to baseline in [facility] ED. CTH negative in ED for acute pathology. 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 EEG has been negative for seizure. Patient has been intermittently hypertensive, restarted on home Lisinopril and added Amlodipine 10 mg on 4/10. Patient's UA was mildly positive, but urine culture grew E faecalis, started her on Ceftriaxone 4/11-4/13. Patient's altered mental status likely metabolic encephalopathy due to UTI and hypertensive encephalopathy.

## 2022-04-11 NOTE — DISCHARGE NOTE PROVIDER - ATTENDING DISCHARGE PHYSICAL EXAMINATION:
Mental status, normal comprehension, oriented to self and hospital  Cranial nerves in tact EOM, face symmetric, VFs full, facial sensation intact, tongue and uvula midline  Motor: upper extremities 5/5 but loss of bulk in hand intrinsics. Lower extremities 2/5 distally and 4-/5 proximally and loss of bulk in feet and lower legs.  tone not increased  MSK R shoulder tender with limited ROM, and a pain with a/c joint pressure  Sensory: decreased to all modalities to knees  cerebellar normal FNF b/l  Reflexes: 1+ uppers, absent lowers, toes mute  gait: not ambulatory

## 2022-04-11 NOTE — DISCHARGE NOTE PROVIDER - PROVIDER TOKENS
FREE:[LAST:[[facility]],PHONE:[(   )    -],FAX:[(   )    -]],FREE:[LAST:[[facility]],PHONE:[(   )    -],FAX:[(   )    -]]

## 2022-04-11 NOTE — DISCHARGE NOTE PROVIDER - CARE PROVIDER_API CALL
[facility],   Phone: (   )    -  Fax: (   )    -  Follow Up Time:     [facility],   Phone: (   )    -  Fax: (   )    -  Follow Up Time:

## 2022-04-11 NOTE — DISCHARGE NOTE PROVIDER - NSDCMRMEDTOKEN_GEN_ALL_CORE_FT
aspirin 81 mg oral tablet, chewable: 1 tab(s) orally once a day  atorvastatin 80 mg oral tablet: 1 tab(s) orally once a day (at bedtime)  Cymbalta 20 mg oral delayed release capsule: 1 cap(s) orally once a day   insulin lispro 100 units/mL injectable solution: 14 unit(s) injectable before breakfast. 10 unit(s) injectable before lunch and dinner.   Lantus 100 units/mL subcutaneous solution: 18 unit(s) subcutaneous once a day (at bedtime)  lisinopril 40 mg oral tablet: 1 tab(s) orally once a day  pantoprazole 40 mg oral delayed release tablet: 1 tab(s) orally 2 times a day until 4/8. Then take 1 tablet once a day for 6 weeks.    aspirin 81 mg oral tablet, chewable: 1 tab(s) orally once a day  atorvastatin 80 mg oral tablet: 1 tab(s) orally once a day (at bedtime)  Cymbalta 20 mg oral delayed release capsule: 1 cap(s) orally once a day   insulin lispro 100 units/mL injectable solution: 14 unit(s) injectable before breakfast. 10 unit(s) injectable before lunch and dinner.   Lantus 100 units/mL subcutaneous solution: 18 unit(s) subcutaneous once a day (at bedtime)  lisinopril 40 mg oral tablet: 1 tab(s) orally once a day  pantoprazole 40 mg oral delayed release tablet: 1 tab(s) orally 2 times a day until 4/8. Then take 1 tablet once a day for 6 weeks.   sock aid ; commode with removable handles ; hospital bed ; hospital boards: 1 each   amLODIPine 10 mg oral tablet: 1 tab(s) orally once a day  apixaban 5 mg oral tablet: 1 tab(s) orally 2 times a day, final dose 8/28 evening  atorvastatin 80 mg oral tablet: 1 tab(s) orally once a day (at bedtime)  Cymbalta 20 mg oral delayed release capsule: 1 cap(s) orally once a day   insulin lispro 100 units/mL injectable solution: 14 unit(s) injectable before breakfast. 10 unit(s) injectable before lunch and dinner.   Lantus 100 units/mL subcutaneous solution: 18 unit(s) subcutaneous once a day (at bedtime)  lisinopril 40 mg oral tablet: 1 tab(s) orally once a day  pantoprazole 40 mg oral delayed release tablet: 1 tab(s) orally 2 times a day until 4/8. Then take 1 tablet once a day for 6 weeks.   sock aid ; commode with removable handles ; hospital bed ; hospital boards: 1 each   amLODIPine 10 mg oral tablet: 1 tab(s) orally once a day  apixaban 5 mg oral tablet: 1 tab(s) orally 2 times a day, final dose 8/28 evening  atorvastatin 80 mg oral tablet: 1 tab(s) orally once a day (at bedtime)  Cymbalta 20 mg oral delayed release capsule: 1 cap(s) orally once a day   insulin glargine 100 units/mL subcutaneous solution: 4 unit(s) subcutaneous once a day (at bedtime)  insulin lispro 100 units/mL injectable solution: 8 unit(s) injectable 3 times a day (before meals)   lisinopril 40 mg oral tablet: 1 tab(s) orally once a day  pantoprazole 40 mg oral delayed release tablet: 1 tab(s) orally 2 times a day until 4/8. Then take 1 tablet once a day for 6 weeks.   sock aid ; commode with removable handles ; hospital bed ; hospital boards: 1 each   amLODIPine 10 mg oral tablet: 1 tab(s) orally once a day  apixaban 5 mg oral tablet: 1 tab(s) orally 2 times a day, final dose 8/28 evening  atorvastatin 20 mg oral tablet: 1 tab(s) orally once a day  DULoxetine 30 mg oral delayed release capsule: 3 cap(s) orally every 24 hours  insulin glargine 100 units/mL subcutaneous solution: 4 unit(s) subcutaneous once a day (at bedtime)  insulin lispro 100 units/mL injectable solution: 8 unit(s) injectable 3 times a day (before meals)   Insulin Pen Needles, 4mm: 1 application subcutaneously 4 times a day. ** Use with insulin pen **   lisinopril 40 mg oral tablet: 1 tab(s) orally once a day  pantoprazole 40 mg oral delayed release tablet: 1 tab(s) orally 2 times a day until 4/8. Then take 1 tablet once a day for 6 weeks.   sock aid ; commode with removable handles ; hospital bed ; hospital boards: 1 each

## 2022-04-11 NOTE — PROGRESS NOTE ADULT - SUBJECTIVE AND OBJECTIVE BOX
Transfer note Stroke unit to [facility]  72y Female with PMHx of T2DM c/b peripheral neuropathy, HTN, recent admission and workup for Naty Sanchez tear, TIA on 3/30 (MR negative) presents to [facility] as transfer from [facility] ED for episode of altered mental status (staring, generalized weakness), back to baseline in [facility] ED. CTH negative in ED for acute pathology. 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 EEG has been negative for seizure. Patient has been intermittently hypertensive, restarted on home Lisinopril and added Amlodipine 10 mg on 4/10. Patient's UA was mildly positive, but urine culture grew E faecalis, started her on Ceftriaxone 4/11-4/13. Patient's altered mental status likely metabolic encephalopathy due to UTI and hypertensive encephalopathy.    OVERNIGHT EVENTS:    SUBJECTIVE / INTERVAL HPI: Patient seen and examined at bedside.     VITAL SIGNS:  Vital Signs Last 24 Hrs  T(C): 36.7 (11 Apr 2022 22:35), Max: 37 (11 Apr 2022 01:00)  T(F): 98 (11 Apr 2022 22:35), Max: 98.6 (11 Apr 2022 01:00)  HR: 72 (11 Apr 2022 22:35) (60 - 76)  BP: 143/70 (11 Apr 2022 22:35) (121/64 - 185/80)  BP(mean): 94 (11 Apr 2022 22:35) (87 - 115)  RR: 18 (11 Apr 2022 22:35) (16 - 18)  SpO2: 96% (11 Apr 2022 22:35) (96% - 100%)    PHYSICAL EXAM:    General: WDWN  HEENT: NC/AT; PERRL, anicteric sclera; MMM  Neck: supple  Cardiovascular: +S1/S2, RRR  Respiratory: CTA B/L; no W/R/R  Gastrointestinal: soft, NT/ND; +BSx4  Extremities: WWP; no edema, clubbing or cyanosis  Vascular: 2+ radial, DP/PT pulses B/L  Neurological: AAOx3; no focal deficits    MEDICATIONS:  MEDICATIONS  (STANDING):  amLODIPine   Tablet 10 milliGRAM(s) Oral daily  atorvastatin 40 milliGRAM(s) Oral at bedtime  cefTRIAXone   IVPB 1000 milliGRAM(s) IV Intermittent every 24 hours  dextrose 5%. 1000 milliLiter(s) (50 mL/Hr) IV Continuous <Continuous>  dextrose 5%. 1000 milliLiter(s) (100 mL/Hr) IV Continuous <Continuous>  dextrose 50% Injectable 25 Gram(s) IV Push once  dextrose 50% Injectable 12.5 Gram(s) IV Push once  dextrose 50% Injectable 25 Gram(s) IV Push once  DULoxetine 20 milliGRAM(s) Oral daily  gabapentin 100 milliGRAM(s) Oral every 8 hours  glucagon  Injectable 1 milliGRAM(s) IntraMuscular once  heparin   Injectable 5000 Unit(s) SubCutaneous every 8 hours  insulin glargine Injectable (LANTUS) 12 Unit(s) SubCutaneous at bedtime  insulin lispro (ADMELOG) corrective regimen sliding scale   SubCutaneous Before meals and at bedtime  insulin lispro Injectable (ADMELOG) 10 Unit(s) SubCutaneous before lunch  insulin lispro Injectable (ADMELOG) 10 Unit(s) SubCutaneous before dinner  insulin lispro Injectable (ADMELOG) 10 Unit(s) SubCutaneous before breakfast  lisinopril 40 milliGRAM(s) Oral every 24 hours  pantoprazole    Tablet 40 milliGRAM(s) Oral before breakfast    MEDICATIONS  (PRN):  dextrose Oral Gel 15 Gram(s) Oral once PRN Blood Glucose LESS THAN 70 milliGRAM(s)/deciliter      ALLERGIES:  Allergies    No Known Allergies    Intolerances        LABS:                        10.9   5.00  )-----------( 354      ( 11 Apr 2022 05:58 )             34.2     04-11    134<L>  |  100  |  17  ----------------------------<  119<H>  4.3   |  27  |  0.45<L>    Ca    8.7      11 Apr 2022 05:58  Phos  3.9     04-11  Mg     1.8     04-11    TPro  6.2  /  Alb  3.2<L>  /  TBili  0.3  /  DBili  x   /  AST  12  /  ALT  10  /  AlkPhos  32<L>  04-11        CAPILLARY BLOOD GLUCOSE      POCT Blood Glucose.: 119 mg/dL (11 Apr 2022 21:39)      RADIOLOGY & ADDITIONAL TESTS: Reviewed. Transfer note Stroke unit to [facility]  72y Female with PMHx of T2DM c/b peripheral neuropathy, HTN, recent admission and workup for Naty Sanchez tear, TIA on 3/30 (MR negative) presents to [facility] as transfer from [facility] ED for episode of altered mental status (staring, generalized weakness), back to baseline in [facility] ED. CTH negative in ED for acute pathology. 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 EEG has been negative for seizure. Patient has been intermittently hypertensive, restarted on home Lisinopril and added Amlodipine 10 mg on 4/10. Patient's UA was mildly positive, but urine culture grew E faecalis, started her on Ceftriaxone 4/11-4/13. Patient's altered mental status likely metabolic encephalopathy due to UTI and hypertensive encephalopathy.    SUBJECTIVE / INTERVAL HPI: Patient seen and examined at bedside. Pt in NAD, resting comfortably in bed. Pt only complaint at this time is tingling and pain in b/l LE. Otherwise denies any nausea, vomiting, fevers, chills, abdominal pain, lightheadedness, dizziness, SOB, CP.     VITAL SIGNS:  Vital Signs Last 24 Hrs  T(C): 36.7 (11 Apr 2022 22:35), Max: 37 (11 Apr 2022 01:00)  T(F): 98 (11 Apr 2022 22:35), Max: 98.6 (11 Apr 2022 01:00)  HR: 72 (11 Apr 2022 22:35) (60 - 76)  BP: 143/70 (11 Apr 2022 22:35) (121/64 - 185/80)  BP(mean): 94 (11 Apr 2022 22:35) (87 - 115)  RR: 18 (11 Apr 2022 22:35) (16 - 18)  SpO2: 96% (11 Apr 2022 22:35) (96% - 100%)    PHYSICAL EXAM:    General: NAD, resting comfortably in bed.   HEENT: NC/AT; PERRL, anicteric sclera; MMM. Poor dentition.   Neck: supple  Cardiovascular: +S1/S2, RRR  Respiratory: CTA B/L; no W/R/R  Gastrointestinal: soft, NT/ND; +BSx4  Extremities: WWP; no edema, clubbing or cyanosis  Vascular: 2+ radial, DP/PT pulses B/L  Neurological: AAOx3; Upper extremities 5/5 strength b/l. LE 3/5 strength b/l. Sensation intact.     MEDICATIONS:  MEDICATIONS  (STANDING):  amLODIPine   Tablet 10 milliGRAM(s) Oral daily  atorvastatin 40 milliGRAM(s) Oral at bedtime  cefTRIAXone   IVPB 1000 milliGRAM(s) IV Intermittent every 24 hours  dextrose 5%. 1000 milliLiter(s) (50 mL/Hr) IV Continuous <Continuous>  dextrose 5%. 1000 milliLiter(s) (100 mL/Hr) IV Continuous <Continuous>  dextrose 50% Injectable 25 Gram(s) IV Push once  dextrose 50% Injectable 12.5 Gram(s) IV Push once  dextrose 50% Injectable 25 Gram(s) IV Push once  DULoxetine 20 milliGRAM(s) Oral daily  gabapentin 100 milliGRAM(s) Oral every 8 hours  glucagon  Injectable 1 milliGRAM(s) IntraMuscular once  heparin   Injectable 5000 Unit(s) SubCutaneous every 8 hours  insulin glargine Injectable (LANTUS) 12 Unit(s) SubCutaneous at bedtime  insulin lispro (ADMELOG) corrective regimen sliding scale   SubCutaneous Before meals and at bedtime  insulin lispro Injectable (ADMELOG) 10 Unit(s) SubCutaneous before lunch  insulin lispro Injectable (ADMELOG) 10 Unit(s) SubCutaneous before dinner  insulin lispro Injectable (ADMELOG) 10 Unit(s) SubCutaneous before breakfast  lisinopril 40 milliGRAM(s) Oral every 24 hours  pantoprazole    Tablet 40 milliGRAM(s) Oral before breakfast    MEDICATIONS  (PRN):  dextrose Oral Gel 15 Gram(s) Oral once PRN Blood Glucose LESS THAN 70 milliGRAM(s)/deciliter      ALLERGIES:  Allergies    No Known Allergies    Intolerances        LABS:                        10.9   5.00  )-----------( 354      ( 11 Apr 2022 05:58 )             34.2     04-11    134<L>  |  100  |  17  ----------------------------<  119<H>  4.3   |  27  |  0.45<L>    Ca    8.7      11 Apr 2022 05:58  Phos  3.9     04-11  Mg     1.8     04-11    TPro  6.2  /  Alb  3.2<L>  /  TBili  0.3  /  DBili  x   /  AST  12  /  ALT  10  /  AlkPhos  32<L>  04-11        CAPILLARY BLOOD GLUCOSE      POCT Blood Glucose.: 119 mg/dL (11 Apr 2022 21:39)      RADIOLOGY & ADDITIONAL TESTS: Reviewed.

## 2022-04-11 NOTE — PROGRESS NOTE ADULT - SUBJECTIVE AND OBJECTIVE BOX
Patient was seen and examined today. She was lying in bed comfortably but complains of bilateral feet pain.     ROS: negative for chest pain, SOB, difficulty breathing, abd pain, N/V/D.   Labs, vitals and charts reviewed.     Physical exam:  General: no distress.   Chest; clear breathing sounds BL, non wheezing or rhonchi.   Heart: + s1/s2, no murmur or gallops.   Abd: soft, non tender, non distended.   Extremity: no edema.   Neuro: AAOX3

## 2022-04-11 NOTE — DISCHARGE NOTE PROVIDER - NSDCCPTREATMENT_GEN_ALL_CORE_FT
PRINCIPAL PROCEDURE  Procedure: MRI, spine, lumbar, without contrast  Findings and Treatment: Date of exam: 3/28/2022  Findings:  - Grade 1 anterolisthesis of L3 on L4.  - Degenerative disc disease at L3/4 and L4/5 with superimposed small left foraminal disc herniation at L3/4.      SECONDARY PROCEDURE  Procedure: MRI head  Findings and Treatment: Date of exam: 3/30/2022  Findings:  - No acute infarction.  - Partially calcified lesion in the parietal region extending into the posterior sagittal sinus as well as the overlying calvarium as demonstrated on prior CTA head and neck.  - Patchy areas of T2/FLAIR hyperintensity within the periventricular subcortical white matter the basis of chronic microvascular ischemic changes. There are chronic lacunar infarctions within the bilateral basal ganglia and thalami.    Procedure: MRI of thoracic spine  Findings and Treatment: Date of exam: 3/30/2022  Findings:  - Evidence of cord compression or cord signal abnormality.  - Normal thoracic kyphosis. The vertebral body heights are maintained.  - Diffuse disc desiccation with multilevel disc space narrowing and anterior osteophytes about the thoracic spine.  - Edematous endplate degenerative changes at T10-T11 with anterior osteophytes.  - 3.3 cm lipoma within the left paraspinal dorsal dorsal paraspinal muscles at the T3-T5 levels.  - Vertebrae: Normal alignment. No acute fractures. There is normal marrow signal. There are mild posterior annular bulges at T2-3 and T4-5 through T10-11. These contact the ventral cord and cause mild to moderate spinal canal narrowing, worst at T10-11.  - Spinal cord: Normal configuration and signal. No evidence of cord   compression.  - Soft tissues: Unremarkable  - Mild-to-moderate degenerative disc changes. No evidence of cord   compression.    Procedure: MRA, head, without contrast  Findings and Treatment: Date of exam: 4/2/2022  Findings:  - No acute ischemia.   - Extensive small vessel ischemic disease.   - Old right thalamic lacunar infarct.    Procedure: MRA, head, without contrast  Findings and Treatment: Date of exam: 4/8/2022  Findings:  - Severe chronic microvascular ischemic disease.   - Chronic infarct in the right thalamus.   - Chronic hypertensive encephalopathy.    Procedure: Transthoracic echocardiography (TTE)  Findings and Treatment: Date of exam: 4/1/2022  Findings:   1. Normal left ventricular cavity size.   2. Mild symmetric left ventricular hypertrophy.   3. Hyperdynamic left ventricular systolic function, ejection fraction 83%.   4. Grade I left ventricular diastolic dysfunction with normal filling   pressure.   5. Normal right ventricular cavity size and systolic function.   6. Mild-to-moderate aortic stenosis.   7. Trace aortic regurgitation.   8. No evidence of pulmonary hypertension, pulmonary artery systolic pressure is 18 mmHg.   9. No pericardial effusion.  10. No evidence of an intracardiac shunt via agitated saline injection (negative "bubble" study).

## 2022-04-11 NOTE — PROGRESS NOTE ADULT - ASSESSMENT
72 y F with PMHx of T2DM c/b peripheral neuropathy, HTN, recent admission and workup for Naty Sanchez tear, TIA on 3/30 (MR negative) presents to [facility] as transfer from [facility] ED for episode of altered mental status. CTH negative in ED for acute pathology. 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 EEG has been negative for seizure.    # AMS: most likely hypertensive encephalopathy  #leg weakness likely 2/2 diabetic neuropathy  - c/w home Cymbalta 20mg qd for neuropathy    # UTI:   -UA with +WBCs, UCx growing E faecalis, started on Ceftriaxone on 4/11    #HTN  - Goal -160  - c/w home blood pressure medication for now (lisinopril 40mg qd.) due to high BPs, increased amlodipine to 10 mg PO Qd  - TTE with bubble done prior admission 4/1: grade 1 left ventricular diastolic dysfunction, no PFO, EF >83% hyperdynamic left ventricular systolic function    #HLD  - continue lipitor 40mg qhs    # Hx of Naty Sanchez tear  - continue home protonix 40mg IV bid    #diabetic gastroparesis  - reglan 5mg IV q8H prn for motility      #DM  - A1C results: 11.6  - ISS  - on home lantus 18u, decreased to 14 units since am fingersticks were low will start with 12u and titrate up  - started lispro 14u prior to breakfast, lispro 10u prior to lunch and dinner  -Endocrine followed previously, will reconsult to optimize inpatient meds.    DVT Prophylaxis  - heparin sq for DVT prophylaxis   - SCDs for DVT prophylaxis

## 2022-04-11 NOTE — PROGRESS NOTE ADULT - SUBJECTIVE AND OBJECTIVE BOX
Neurology Stroke Progress Note    Transfer note Stroke unit to [facility]  72y Female with PMHx of T2DM c/b peripheral neuropathy, HTN, recent admission and workup for Naty Sanchez tear, TIA on 3/30 (MR negative) presents to [facility] as transfer from [facility] ED for episode of altered mental status (staring, generalized weakness), back to baseline in [facility] ED. CTH negative in ED for acute pathology. 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 EEG has been negative for seizure. Patient has been intermittently hypertensive, restarted on home Lisinopril and added Amlodipine 10 mg on 4/10. Patient's UA was mildly positive, but urine culture grew E faecalis, started her on Ceftriaxone 4/11-4/13. Patient's altered mental status likely metabolic encephalopathy due to UTI and hypertensive encephalopathy.         MEDICATIONS  (STANDING):  amLODIPine   Tablet 10 milliGRAM(s) Oral daily  atorvastatin 40 milliGRAM(s) Oral at bedtime  cefTRIAXone   IVPB 1000 milliGRAM(s) IV Intermittent every 24 hours  dextrose 5%. 1000 milliLiter(s) (50 mL/Hr) IV Continuous <Continuous>  dextrose 5%. 1000 milliLiter(s) (100 mL/Hr) IV Continuous <Continuous>  dextrose 50% Injectable 25 Gram(s) IV Push once  dextrose 50% Injectable 12.5 Gram(s) IV Push once  dextrose 50% Injectable 25 Gram(s) IV Push once  DULoxetine 20 milliGRAM(s) Oral daily  gabapentin 100 milliGRAM(s) Oral every 8 hours  glucagon  Injectable 1 milliGRAM(s) IntraMuscular once  heparin   Injectable 5000 Unit(s) SubCutaneous every 8 hours  insulin glargine Injectable (LANTUS) 14 Unit(s) SubCutaneous at bedtime  insulin lispro (ADMELOG) corrective regimen sliding scale   SubCutaneous Before meals and at bedtime  insulin lispro Injectable (ADMELOG) 14 Unit(s) SubCutaneous before breakfast  insulin lispro Injectable (ADMELOG) 10 Unit(s) SubCutaneous before lunch  insulin lispro Injectable (ADMELOG) 10 Unit(s) SubCutaneous before dinner  lisinopril 40 milliGRAM(s) Oral every 24 hours  pantoprazole    Tablet 40 milliGRAM(s) Oral before breakfast    MEDICATIONS  (PRN):  dextrose Oral Gel 15 Gram(s) Oral once PRN Blood Glucose LESS THAN 70 milliGRAM(s)/deciliter      Allergies    No Known Allergies    Intolerances        Vital Signs Last 24 Hrs  T(C): 36.3 (11 Apr 2022 12:46), Max: 37 (11 Apr 2022 01:00)  T(F): 97.4 (11 Apr 2022 12:46), Max: 98.6 (11 Apr 2022 01:00)  HR: 67 (11 Apr 2022 11:37) (60 - 84)  BP: 149/67 (11 Apr 2022 11:37) (125/64 - 185/80)  BP(mean): 96 (11 Apr 2022 11:37) (84 - 115)  RR: 16 (11 Apr 2022 11:37) (16 - 18)  SpO2: 100% (11 Apr 2022 11:37) (99% - 100%)    Physical exam:  General: No acute distress, awake and alert  Eyes: Anicteric sclerae, moist conjunctivae, see below for CNs  Neck: trachea midline,  Cardiovascular: Regular rate and rhythm, no murmurs  Pulmonary: Anterior breath sounds clear bilaterally No use of accessory muscles  GI: Abdomen soft, non-distended, non-tender  Extremities: no edema    Neurologic:  -Mental status: Awake, alert, oriented to person, place, and time. Speech is fluent with intact naming, repetition, and comprehension, no dysarthria. Follows commands. Can be somewhat inattentive  -Cranial nerves:   II: Visual fields are full to confrontation.  III, IV, VI: Extraocular movements are intact without nystagmus. Pupils equally round and reactive to light  V:  Facial sensation V1-V3 equal and intact   VII: Face is symmetric   Motor: Decreased bulk and normal tone. No pronator drift. Strength bilateral upper extremity 5/5, bilateral lower extremities 4+/5  Sensation: Intact to light touch bilaterally. No neglect or extinction on double simultaneous testing.  Coordination: No dysmetria on finger-to-nose       LABS:                        10.9   5.00  )-----------( 354      ( 11 Apr 2022 05:58 )             34.2     04-11    134<L>  |  100  |  17  ----------------------------<  119<H>  4.3   |  27  |  0.45<L>    Ca    8.7      11 Apr 2022 05:58  Phos  3.9     04-11  Mg     1.8     04-11    TPro  6.2  /  Alb  3.2<L>  /  TBili  0.3  /  DBili  x   /  AST  12  /  ALT  10  /  AlkPhos  32<L>  04-11          RADIOLOGY & ADDITIONAL TESTS:     Neurology Stroke Progress Note    Transfer note Stroke unit to [facility]  72y Female with PMHx of T2DM c/b peripheral neuropathy, HTN, recent admission and workup for Naty Sanchez tear, TIA on 3/30 (MR negative) presents to [facility] as transfer from [facility] ED for episode of altered mental status (staring, generalized weakness), back to baseline in [facility] ED. CTH negative in ED for acute pathology. 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 EEG has been negative for seizure. Patient has been intermittently hypertensive, restarted on home Lisinopril and added Amlodipine 10 mg on 4/10. Patient's UA was mildly positive, but urine culture grew E faecalis, started her on Ceftriaxone 4/11-4/13. Patient's altered mental status likely metabolic encephalopathy due to UTI and hypertensive encephalopathy.     This morning patient has no complaints, says she is ready to go home.      MEDICATIONS  (STANDING):  amLODIPine   Tablet 10 milliGRAM(s) Oral daily  atorvastatin 40 milliGRAM(s) Oral at bedtime  cefTRIAXone   IVPB 1000 milliGRAM(s) IV Intermittent every 24 hours  dextrose 5%. 1000 milliLiter(s) (50 mL/Hr) IV Continuous <Continuous>  dextrose 5%. 1000 milliLiter(s) (100 mL/Hr) IV Continuous <Continuous>  dextrose 50% Injectable 25 Gram(s) IV Push once  dextrose 50% Injectable 12.5 Gram(s) IV Push once  dextrose 50% Injectable 25 Gram(s) IV Push once  DULoxetine 20 milliGRAM(s) Oral daily  gabapentin 100 milliGRAM(s) Oral every 8 hours  glucagon  Injectable 1 milliGRAM(s) IntraMuscular once  heparin   Injectable 5000 Unit(s) SubCutaneous every 8 hours  insulin glargine Injectable (LANTUS) 14 Unit(s) SubCutaneous at bedtime  insulin lispro (ADMELOG) corrective regimen sliding scale   SubCutaneous Before meals and at bedtime  insulin lispro Injectable (ADMELOG) 14 Unit(s) SubCutaneous before breakfast  insulin lispro Injectable (ADMELOG) 10 Unit(s) SubCutaneous before lunch  insulin lispro Injectable (ADMELOG) 10 Unit(s) SubCutaneous before dinner  lisinopril 40 milliGRAM(s) Oral every 24 hours  pantoprazole    Tablet 40 milliGRAM(s) Oral before breakfast    MEDICATIONS  (PRN):  dextrose Oral Gel 15 Gram(s) Oral once PRN Blood Glucose LESS THAN 70 milliGRAM(s)/deciliter      Allergies    No Known Allergies    Intolerances        Vital Signs Last 24 Hrs  T(C): 36.3 (11 Apr 2022 12:46), Max: 37 (11 Apr 2022 01:00)  T(F): 97.4 (11 Apr 2022 12:46), Max: 98.6 (11 Apr 2022 01:00)  HR: 67 (11 Apr 2022 11:37) (60 - 84)  BP: 149/67 (11 Apr 2022 11:37) (125/64 - 185/80)  BP(mean): 96 (11 Apr 2022 11:37) (84 - 115)  RR: 16 (11 Apr 2022 11:37) (16 - 18)  SpO2: 100% (11 Apr 2022 11:37) (99% - 100%)    Physical exam:  General: No acute distress, awake and alert  Eyes: Anicteric sclerae, moist conjunctivae, see below for CNs  Neck: trachea midline,  Cardiovascular: Regular rate and rhythm, no murmurs  Pulmonary: Anterior breath sounds clear bilaterally No use of accessory muscles  GI: Abdomen soft, non-distended, non-tender  Extremities: no edema    Neurologic:  -Mental status: Awake, alert, oriented to person, place, and time. Speech is fluent with intact naming, repetition, and comprehension, no dysarthria. Follows commands. Can be somewhat inattentive  -Cranial nerves:   II: Visual fields are full to confrontation.  III, IV, VI: Extraocular movements are intact without nystagmus. Pupils equally round and reactive to light  V:  Facial sensation V1-V3 equal and intact   VII: Face is symmetric   Motor: Decreased bulk and normal tone. No pronator drift. Strength bilateral upper extremity 5/5, bilateral lower extremities 4+/5  Sensation: Intact to light touch bilaterally. No neglect or extinction on double simultaneous testing.  Coordination: No dysmetria on finger-to-nose       LABS:                        10.9   5.00  )-----------( 354      ( 11 Apr 2022 05:58 )             34.2     04-11    134<L>  |  100  |  17  ----------------------------<  119<H>  4.3   |  27  |  0.45<L>    Ca    8.7      11 Apr 2022 05:58  Phos  3.9     04-11  Mg     1.8     04-11    TPro  6.2  /  Alb  3.2<L>  /  TBili  0.3  /  DBili  x   /  AST  12  /  ALT  10  /  AlkPhos  32<L>  04-11          RADIOLOGY & ADDITIONAL TESTS:    EEGDaily Summary:    1)	Right greater than left temporal focal slowing, indicating focal cerebral dysfunction.  2)	Mild diffuse background slowing, indicating a mild degree of diffuse or multifocal cerebral dysfunction.  3)	No epileptiform activity and no significant clinical events occurred.

## 2022-04-11 NOTE — CONSULT NOTE ADULT - ATTENDING COMMENTS
Pt seen on rounds this afternoon.  Is known to us from her previous admission.  72-yo woman with insulin-requiring type 2 DM on basal/bolus regimen.  Also with hypertension with what seems to be marked lability.  Now admitted after an episode of altered MS at home.  Of note is that what was described by the pt's daughter is exactly what happened on one occasion in the hospital, when she was found slumped over and unresponsive after eating lunch.  No etiology was found, (and hypoglycemia was ruled out).  Unfortunately, she did not have any other episodes during the time that she was on EEG monitoring.  Fingersticks were mostly in target range yesterday on her home regimen, though her AM FS was somewhat "tight" at 111/91 without any additional sliding scale coverage at bedtime.  Her mental status appears to be at "baseline"  Will decrease the Lantus to 12 units as a precaution, and decrease the breakfast lispro to 10 units

## 2022-04-12 DIAGNOSIS — R29.898 OTHER SYMPTOMS AND SIGNS INVOLVING THE MUSCULOSKELETAL SYSTEM: ICD-10-CM

## 2022-04-12 DIAGNOSIS — R63.8 OTHER SYMPTOMS AND SIGNS CONCERNING FOOD AND FLUID INTAKE: ICD-10-CM

## 2022-04-12 DIAGNOSIS — N39.0 URINARY TRACT INFECTION, SITE NOT SPECIFIED: ICD-10-CM

## 2022-04-12 LAB
ANION GAP SERPL CALC-SCNC: 7 MMOL/L — SIGNIFICANT CHANGE UP (ref 5–17)
BUN SERPL-MCNC: 19 MG/DL — SIGNIFICANT CHANGE UP (ref 7–23)
CALCIUM SERPL-MCNC: 8.4 MG/DL — SIGNIFICANT CHANGE UP (ref 8.4–10.5)
CHLORIDE SERPL-SCNC: 103 MMOL/L — SIGNIFICANT CHANGE UP (ref 96–108)
CO2 SERPL-SCNC: 27 MMOL/L — SIGNIFICANT CHANGE UP (ref 22–31)
CREAT SERPL-MCNC: 0.54 MG/DL — SIGNIFICANT CHANGE UP (ref 0.5–1.3)
EGFR: 97 ML/MIN/1.73M2 — SIGNIFICANT CHANGE UP
GLUCOSE BLDC GLUCOMTR-MCNC: 124 MG/DL — HIGH (ref 70–99)
GLUCOSE BLDC GLUCOMTR-MCNC: 205 MG/DL — HIGH (ref 70–99)
GLUCOSE BLDC GLUCOMTR-MCNC: 256 MG/DL — HIGH (ref 70–99)
GLUCOSE BLDC GLUCOMTR-MCNC: 74 MG/DL — SIGNIFICANT CHANGE UP (ref 70–99)
GLUCOSE SERPL-MCNC: 102 MG/DL — HIGH (ref 70–99)
HCT VFR BLD CALC: 33.4 % — LOW (ref 34.5–45)
HGB BLD-MCNC: 10.4 G/DL — LOW (ref 11.5–15.5)
MAGNESIUM SERPL-MCNC: 1.8 MG/DL — SIGNIFICANT CHANGE UP (ref 1.6–2.6)
MCHC RBC-ENTMCNC: 25.9 PG — LOW (ref 27–34)
MCHC RBC-ENTMCNC: 31.1 GM/DL — LOW (ref 32–36)
MCV RBC AUTO: 83.3 FL — SIGNIFICANT CHANGE UP (ref 80–100)
NRBC # BLD: 0 /100 WBCS — SIGNIFICANT CHANGE UP (ref 0–0)
PHOSPHATE SERPL-MCNC: 4 MG/DL — SIGNIFICANT CHANGE UP (ref 2.5–4.5)
PLATELET # BLD AUTO: 344 K/UL — SIGNIFICANT CHANGE UP (ref 150–400)
POTASSIUM SERPL-MCNC: 4.4 MMOL/L — SIGNIFICANT CHANGE UP (ref 3.5–5.3)
POTASSIUM SERPL-SCNC: 4.4 MMOL/L — SIGNIFICANT CHANGE UP (ref 3.5–5.3)
RBC # BLD: 4.01 M/UL — SIGNIFICANT CHANGE UP (ref 3.8–5.2)
RBC # FLD: 13.5 % — SIGNIFICANT CHANGE UP (ref 10.3–14.5)
SODIUM SERPL-SCNC: 137 MMOL/L — SIGNIFICANT CHANGE UP (ref 135–145)
WBC # BLD: 4.53 K/UL — SIGNIFICANT CHANGE UP (ref 3.8–10.5)
WBC # FLD AUTO: 4.53 K/UL — SIGNIFICANT CHANGE UP (ref 3.8–10.5)

## 2022-04-12 PROCEDURE — 99231 SBSQ HOSP IP/OBS SF/LOW 25: CPT | Mod: GC

## 2022-04-12 PROCEDURE — 99233 SBSQ HOSP IP/OBS HIGH 50: CPT

## 2022-04-12 PROCEDURE — 99232 SBSQ HOSP IP/OBS MODERATE 35: CPT

## 2022-04-12 RX ORDER — INSULIN GLARGINE 100 [IU]/ML
10 INJECTION, SOLUTION SUBCUTANEOUS AT BEDTIME
Refills: 0 | Status: DISCONTINUED | OUTPATIENT
Start: 2022-04-12 | End: 2022-04-15

## 2022-04-12 RX ORDER — INSULIN LISPRO 100/ML
VIAL (ML) SUBCUTANEOUS
Refills: 0 | Status: DISCONTINUED | OUTPATIENT
Start: 2022-04-12 | End: 2022-04-17

## 2022-04-12 RX ORDER — INSULIN LISPRO 100/ML
9 VIAL (ML) SUBCUTANEOUS
Refills: 0 | Status: DISCONTINUED | OUTPATIENT
Start: 2022-04-12 | End: 2022-04-14

## 2022-04-12 RX ORDER — AMPICILLIN TRIHYDRATE 250 MG
1 CAPSULE ORAL EVERY 6 HOURS
Refills: 0 | Status: DISCONTINUED | OUTPATIENT
Start: 2022-04-12 | End: 2022-04-18

## 2022-04-12 RX ORDER — INSULIN LISPRO 100/ML
VIAL (ML) SUBCUTANEOUS
Refills: 0 | Status: DISCONTINUED | OUTPATIENT
Start: 2022-04-12 | End: 2022-04-12

## 2022-04-12 RX ADMIN — Medication 10 UNIT(S): at 12:38

## 2022-04-12 RX ADMIN — CEFTRIAXONE 100 MILLIGRAM(S): 500 INJECTION, POWDER, FOR SOLUTION INTRAMUSCULAR; INTRAVENOUS at 11:22

## 2022-04-12 RX ADMIN — INSULIN GLARGINE 10 UNIT(S): 100 INJECTION, SOLUTION SUBCUTANEOUS at 21:59

## 2022-04-12 RX ADMIN — GABAPENTIN 100 MILLIGRAM(S): 400 CAPSULE ORAL at 14:20

## 2022-04-12 RX ADMIN — Medication 4: at 17:40

## 2022-04-12 RX ADMIN — Medication 108 GRAM(S): at 18:27

## 2022-04-12 RX ADMIN — Medication 3: at 12:37

## 2022-04-12 RX ADMIN — Medication 9 UNIT(S): at 17:14

## 2022-04-12 RX ADMIN — DULOXETINE HYDROCHLORIDE 20 MILLIGRAM(S): 30 CAPSULE, DELAYED RELEASE ORAL at 11:22

## 2022-04-12 RX ADMIN — HEPARIN SODIUM 5000 UNIT(S): 5000 INJECTION INTRAVENOUS; SUBCUTANEOUS at 21:50

## 2022-04-12 RX ADMIN — GABAPENTIN 100 MILLIGRAM(S): 400 CAPSULE ORAL at 21:50

## 2022-04-12 RX ADMIN — GABAPENTIN 100 MILLIGRAM(S): 400 CAPSULE ORAL at 06:39

## 2022-04-12 RX ADMIN — ATORVASTATIN CALCIUM 40 MILLIGRAM(S): 80 TABLET, FILM COATED ORAL at 21:50

## 2022-04-12 RX ADMIN — LISINOPRIL 40 MILLIGRAM(S): 2.5 TABLET ORAL at 06:40

## 2022-04-12 RX ADMIN — HEPARIN SODIUM 5000 UNIT(S): 5000 INJECTION INTRAVENOUS; SUBCUTANEOUS at 14:19

## 2022-04-12 RX ADMIN — AMLODIPINE BESYLATE 10 MILLIGRAM(S): 2.5 TABLET ORAL at 06:39

## 2022-04-12 RX ADMIN — HEPARIN SODIUM 5000 UNIT(S): 5000 INJECTION INTRAVENOUS; SUBCUTANEOUS at 06:40

## 2022-04-12 RX ADMIN — PANTOPRAZOLE SODIUM 40 MILLIGRAM(S): 20 TABLET, DELAYED RELEASE ORAL at 06:39

## 2022-04-12 NOTE — PROGRESS NOTE ADULT - ASSESSMENT
72y Female with PMHx of T2DM c/b peripheral neuropathy, HTN, recent admission and workup for Naty Sanchez tear, TIA on 3/30 (MR negative) presents to [facility] as transfer from [facility] ED for episode of altered mental status (staring, generalized weakness), back to baseline in [facility] ED. CTH negative in ED for acute pathology. 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 EEG has been negative for seizure. Patient has been intermittently hypertensive, restarted on home Lisinopril and added Amlodipine 10 mg on 4/10. Patient's UA was mildly positive, but urine culture grew E faecalis, started her on Ceftriaxone 4/11-4/13. Patient's altered mental status likely metabolic encephalopathy due to UTI and hypertensive encephalopathy.

## 2022-04-12 NOTE — PROGRESS NOTE ADULT - PROBLEM SELECTOR PLAN 1
Metabolic encephalopathy likely due to UTI and hypertensive encephalopathy.  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   EEG 4/9-4/11 with right greater than left slowing, diffuse cerebral dysfunction, no epileptiform activity.  - Continue to hold DAPT i/s/o hx of Naty Sanchez tear in prior admission 3/25/22  - c/w atorvastatin 40mg daily  - q4hr stroke neuro checks and vitals  -UTI treatment as below  -HTN treatment as below

## 2022-04-12 NOTE — PROGRESS NOTE ADULT - ASSESSMENT
72 y F with PMHx of T2DM c/b peripheral neuropathy, HTN, recent admission and workup for Naty Sanchez tear, TIA on 3/30 (MR negative) presents to [facility] as transfer from [facility] ED for episode of altered mental status. CTH negative in ED for acute pathology. 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 EEG has been negative for seizure. currently  awaiting discharge planning.     # AMS: most likely hypertensive encephalopathy  #leg weakness likely 2/2 diabetic neuropathy  - c/w home Cymbalta 20mg qd for neuropathy    # UTI:   -UA with +WBCs, UCx growing E faecalis, started on Ceftriaxone on 4/11    #HTN  - Goal -160  - c/w home blood pressure medication for now (lisinopril 40mg qd.) due to high BPs, increased amlodipine to 10 mg PO Qd  - TTE with bubble done prior admission 4/1: grade 1 left ventricular diastolic dysfunction, no PFO, EF >83% hyperdynamic left ventricular systolic function    #HLD  - continue lipitor 40mg qhs    # Hx of Naty Sanchez tear  - continue home protonix 40mg IV bid    #diabetic gastroparesis  - reglan 5mg IV q8H prn for motility      #DM  - A1C results: 11.6  - ISS  - on home lantus 18u, decreased to 14 units since am fingersticks were low will start with 12u and titrate up  - started lispro 14u prior to breakfast, lispro 10u prior to lunch and dinner  -Endocrine followed previously, will reconsult to optimize inpatient meds.    DVT Prophylaxis  - heparin sq for DVT prophylaxis   - SCDs for DVT prophylaxis

## 2022-04-12 NOTE — PROGRESS NOTE ADULT - ATTENDING COMMENTS
The patient is a 72-year-old female with a history of type 2 diabetes, hypertension, recent secondary to Naty-Sanchez tear, possible TIA versus seizure on 3/30 who was admitted 4/8 with confusion. MRI was negative for acute ischemia. EEG was mildly slow but showed no epileptiform discharges. Etiology of presenting symptoms remains unclear though stroke/TIA is unlikely.  Hypertensive encephalopathy is a possibility given wide fluctuations and persistently high BP; metabolic encephalopathy related to UTI (culture results of E faecalis) is also a possible cause. BP much better controlled w current regimen. For now, continue lisinopril, amlodipine. Will treat UTI with ceftriaxone. Continue aspirin, statin. Will need aggressive risk factor reduction with PCP/endocrinology

## 2022-04-12 NOTE — PROGRESS NOTE ADULT - PROBLEM SELECTOR PLAN 2
Symmetrical LE weakness, worse distally compared to proximally   Symmetrical diminished sensation, longstanding diabetic peripheral neuropathy   Denies issues with incontinence, or back pain, no sensory line  MRI L/Spine:  - showing at the L3/4 leveI. There are degenerative   changes involving the facets bilaterally as well as ligamentum flavum   hypertrophy. This combination results in moderate central spinal canal   stenosis.   - At the L4/5 level, there is disc bulge abutting the L4 nerve roots   bilaterally. There are degenerative changes involving the facets   bilaterally (right greater than left) as well as ligamentum flavum   hypertrophy. This combination results in mild central spinal canal   stenosis.     DDx: Progression of Symmetrical LE weakness, worse distally compared to proximally   Symmetrical diminished sensation, longstanding diabetic peripheral neuropathy   Denies issues with incontinence, or back pain, no sensory line  MRI L/Spine:  - showing at the L3/4 leveI. There are degenerative   changes involving the facets bilaterally as well as ligamentum flavum   hypertrophy. This combination results in moderate central spinal canal   stenosis.   - At the L4/5 level, there is disc bulge abutting the L4 nerve roots   bilaterally. There are degenerative changes involving the facets   bilaterally (right greater than left) as well as ligamentum flavum   hypertrophy. This combination results in mild central spinal canal   stenosis.     DDx: Progression of diabetic neuropathy vs central process from cord compression although sensory loss doesn't coorelate, difficult to delineate where patient is experiencing her sensory loss on my exam

## 2022-04-12 NOTE — PROGRESS NOTE ADULT - PROBLEM SELECTOR PLAN 4
Pt with hx of DM, A1c 11.6. Endocrine following. TSH results: 1.120  -C/w ISS  - C/w Lantus 12 U, Lispro 10U   - F/u endocrine recs   -C/w Cymbalta 200 Qd for diabetic neuropathy  -C/w Gabapentin 100 Q8hrs

## 2022-04-12 NOTE — PROGRESS NOTE ADULT - SUBJECTIVE AND OBJECTIVE BOX
INTERVAL HPI/OVERNIGHT EVENTS:  O/N:  This morning: Patient was seen and examined at bedside.      VITAL SIGNS:  T(F): 98.3 (04-12-22 @ 10:40)  HR: 71 (04-12-22 @ 10:40)  BP: 144/71 (04-12-22 @ 10:40)  RR: 20 (04-12-22 @ 10:40)  SpO2: 96% (04-12-22 @ 10:40)  Wt(kg): --    PHYSICAL EXAM:    Constitutional: NAD  HEENT: PERRL, EOMI, sclera non-icteric, neck supple, trachea midline, no masses, no JVD, MMM, good dentition  Respiratory: CTA b/l, good air entry b/l, no wheezing, no rhonchi, no rales, without accessory muscle use and no intercostal retractions  Cardiovascular: RRR, normal S1S2, no M/R/G  Gastrointestinal: abdomen soft, NTND, no masses palpable, BS normal  Extremities: Warm, well perfused, pulses equal bilateral upper and lower extremities, no edema, no clubbing  Neurological: AAOx3, CN Grossly intact  Skin: Normal temperature, warm, dry    MEDICATIONS  (STANDING):  amLODIPine   Tablet 10 milliGRAM(s) Oral daily  atorvastatin 40 milliGRAM(s) Oral at bedtime  cefTRIAXone   IVPB 1000 milliGRAM(s) IV Intermittent every 24 hours  dextrose 5%. 1000 milliLiter(s) (50 mL/Hr) IV Continuous <Continuous>  dextrose 5%. 1000 milliLiter(s) (100 mL/Hr) IV Continuous <Continuous>  dextrose 50% Injectable 25 Gram(s) IV Push once  dextrose 50% Injectable 12.5 Gram(s) IV Push once  dextrose 50% Injectable 25 Gram(s) IV Push once  DULoxetine 20 milliGRAM(s) Oral daily  gabapentin 100 milliGRAM(s) Oral every 8 hours  glucagon  Injectable 1 milliGRAM(s) IntraMuscular once  heparin   Injectable 5000 Unit(s) SubCutaneous every 8 hours  insulin glargine Injectable (LANTUS) 12 Unit(s) SubCutaneous at bedtime  insulin lispro (ADMELOG) corrective regimen sliding scale   SubCutaneous Before meals and at bedtime  insulin lispro Injectable (ADMELOG) 10 Unit(s) SubCutaneous before lunch  insulin lispro Injectable (ADMELOG) 10 Unit(s) SubCutaneous before dinner  insulin lispro Injectable (ADMELOG) 10 Unit(s) SubCutaneous before breakfast  lisinopril 40 milliGRAM(s) Oral every 24 hours  pantoprazole    Tablet 40 milliGRAM(s) Oral before breakfast    MEDICATIONS  (PRN):  dextrose Oral Gel 15 Gram(s) Oral once PRN Blood Glucose LESS THAN 70 milliGRAM(s)/deciliter      Allergies    No Known Allergies    Intolerances        LABS:                        10.4   4.53  )-----------( 344      ( 12 Apr 2022 07:22 )             33.4     04-12    137  |  103  |  19  ----------------------------<  102<H>  4.4   |  27  |  0.54    Ca    8.4      12 Apr 2022 07:22  Phos  4.0     04-12  Mg     1.8     04-12    TPro  6.2  /  Alb  3.2<L>  /  TBili  0.3  /  DBili  x   /  AST  12  /  ALT  10  /  AlkPhos  32<L>  04-11                RADIOLOGY & ADDITIONAL TESTS:  Reviewed INTERVAL HPI/OVERNIGHT EVENTS:  O/N:  This morning: Patient was seen and examined at bedside.      VITAL SIGNS:  T(F): 98.3 (04-12-22 @ 10:40)  HR: 71 (04-12-22 @ 10:40)  BP: 144/71 (04-12-22 @ 10:40)  RR: 20 (04-12-22 @ 10:40)  SpO2: 96% (04-12-22 @ 10:40)  Wt(kg): --    PHYSICAL EXAM:    Constitutional: NAD, awake, communicative   HEENT: PERRL, EOMI, sclera non-icteric, no JVD,  Respiratory: CTA b/l, non distressed   Cardiovascular: RRR, normal S1S2, no M/R/G  Gastrointestinal: abdomen soft, NTND  Extremities: Warm, well perfused,  no edema  Neurological: AAO to person and place, patient with weakness in BL LEs at ankle 2-3/5, at knee 3-4/5, at hip 4-5/5 (symmetrical), BL UEs 4-5/5 in all motions, intact dermatomes but reports that sensation is diminished in LEs   Psych: Somewhat flat affect   Skin: Normal temperature, warm, dry    MEDICATIONS  (STANDING):  amLODIPine   Tablet 10 milliGRAM(s) Oral daily  atorvastatin 40 milliGRAM(s) Oral at bedtime  cefTRIAXone   IVPB 1000 milliGRAM(s) IV Intermittent every 24 hours  dextrose 5%. 1000 milliLiter(s) (50 mL/Hr) IV Continuous <Continuous>  dextrose 5%. 1000 milliLiter(s) (100 mL/Hr) IV Continuous <Continuous>  dextrose 50% Injectable 25 Gram(s) IV Push once  dextrose 50% Injectable 12.5 Gram(s) IV Push once  dextrose 50% Injectable 25 Gram(s) IV Push once  DULoxetine 20 milliGRAM(s) Oral daily  gabapentin 100 milliGRAM(s) Oral every 8 hours  glucagon  Injectable 1 milliGRAM(s) IntraMuscular once  heparin   Injectable 5000 Unit(s) SubCutaneous every 8 hours  insulin glargine Injectable (LANTUS) 12 Unit(s) SubCutaneous at bedtime  insulin lispro (ADMELOG) corrective regimen sliding scale   SubCutaneous Before meals and at bedtime  insulin lispro Injectable (ADMELOG) 10 Unit(s) SubCutaneous before lunch  insulin lispro Injectable (ADMELOG) 10 Unit(s) SubCutaneous before dinner  insulin lispro Injectable (ADMELOG) 10 Unit(s) SubCutaneous before breakfast  lisinopril 40 milliGRAM(s) Oral every 24 hours  pantoprazole    Tablet 40 milliGRAM(s) Oral before breakfast    MEDICATIONS  (PRN):  dextrose Oral Gel 15 Gram(s) Oral once PRN Blood Glucose LESS THAN 70 milliGRAM(s)/deciliter      Allergies    No Known Allergies    Intolerances        LABS:                        10.4   4.53  )-----------( 344      ( 12 Apr 2022 07:22 )             33.4     04-12    137  |  103  |  19  ----------------------------<  102<H>  4.4   |  27  |  0.54    Ca    8.4      12 Apr 2022 07:22  Phos  4.0     04-12  Mg     1.8     04-12    TPro  6.2  /  Alb  3.2<L>  /  TBili  0.3  /  DBili  x   /  AST  12  /  ALT  10  /  AlkPhos  32<L>  04-11       INTERVAL HPI/OVERNIGHT EVENTS:  O/N: CAIT   This morning: Patient was seen and examined at bedside.     VITAL SIGNS:  T(F): 98.3 (04-12-22 @ 10:40)  HR: 71 (04-12-22 @ 10:40)  BP: 144/71 (04-12-22 @ 10:40)  RR: 20 (04-12-22 @ 10:40)  SpO2: 96% (04-12-22 @ 10:40)  Wt(kg): --    PHYSICAL EXAM:    Constitutional: NAD, awake, communicative   HEENT: PERRL, EOMI, sclera non-icteric, no JVD,  Respiratory: CTA b/l, non distressed   Cardiovascular: RRR, normal S1S2, no M/R/G  Gastrointestinal: abdomen soft, NTND  Extremities: Warm, well perfused,  no edema  Neurological: AAO to person and place, patient with weakness in BL LEs at ankle 2-3/5, at knee 3-4/5, at hip 4-5/5 (symmetrical), BL UEs 4-5/5 in all motions, intact dermatomes but reports that sensation is diminished in LEs   Psych: Somewhat flat affect   Skin: Normal temperature, warm, dry    MEDICATIONS  (STANDING):  amLODIPine   Tablet 10 milliGRAM(s) Oral daily  atorvastatin 40 milliGRAM(s) Oral at bedtime  cefTRIAXone   IVPB 1000 milliGRAM(s) IV Intermittent every 24 hours  dextrose 5%. 1000 milliLiter(s) (50 mL/Hr) IV Continuous <Continuous>  dextrose 5%. 1000 milliLiter(s) (100 mL/Hr) IV Continuous <Continuous>  dextrose 50% Injectable 25 Gram(s) IV Push once  dextrose 50% Injectable 12.5 Gram(s) IV Push once  dextrose 50% Injectable 25 Gram(s) IV Push once  DULoxetine 20 milliGRAM(s) Oral daily  gabapentin 100 milliGRAM(s) Oral every 8 hours  glucagon  Injectable 1 milliGRAM(s) IntraMuscular once  heparin   Injectable 5000 Unit(s) SubCutaneous every 8 hours  insulin glargine Injectable (LANTUS) 12 Unit(s) SubCutaneous at bedtime  insulin lispro (ADMELOG) corrective regimen sliding scale   SubCutaneous Before meals and at bedtime  insulin lispro Injectable (ADMELOG) 10 Unit(s) SubCutaneous before lunch  insulin lispro Injectable (ADMELOG) 10 Unit(s) SubCutaneous before dinner  insulin lispro Injectable (ADMELOG) 10 Unit(s) SubCutaneous before breakfast  lisinopril 40 milliGRAM(s) Oral every 24 hours  pantoprazole    Tablet 40 milliGRAM(s) Oral before breakfast    MEDICATIONS  (PRN):  dextrose Oral Gel 15 Gram(s) Oral once PRN Blood Glucose LESS THAN 70 milliGRAM(s)/deciliter      Allergies    No Known Allergies    Intolerances        LABS:                        10.4   4.53  )-----------( 344      ( 12 Apr 2022 07:22 )             33.4     04-12    137  |  103  |  19  ----------------------------<  102<H>  4.4   |  27  |  0.54    Ca    8.4      12 Apr 2022 07:22  Phos  4.0     04-12  Mg     1.8     04-12    TPro  6.2  /  Alb  3.2<L>  /  TBili  0.3  /  DBili  x   /  AST  12  /  ALT  10  /  AlkPhos  32<L>  04-11

## 2022-04-12 NOTE — PROGRESS NOTE ADULT - ATTENDING COMMENTS
Pt seen on rounds this afternoon.  Says that she has been eating well, but cannot recall what she ate for lunch today.  Fingerstick dropped to 74 this morning after the decrease the 12 units Lantus--almost certainly seeing the reversal of "glucose toxicity" with better control during the past two hospitalizations  The spike to 256 pre-lunch was mostly due to her not having received pre-breakfast lispro  Will decrease the Lantus to 10 units, the premeal to 9 units lispro

## 2022-04-12 NOTE — PROGRESS NOTE ADULT - SUBJECTIVE AND OBJECTIVE BOX
INTERVAL HPI/OVERNIGHT EVENTS:    Patient is a 72y old  Female who presents with a chief complaint of episode of AMS (11 Apr 2022 23:24)      Pt reports the following symptoms:    CONSTITUTIONAL:  Negative fever or chills, feels well, good appetite  EYES:  Negative  blurry vision or double vision  CARDIOVASCULAR:  Negative for chest pain or palpitations  RESPIRATORY:  Negative for cough, wheezing, or SOB   GASTROINTESTINAL:  Negative for nausea, vomiting, diarrhea, constipation, or abdominal pain  GENITOURINARY:  Negative frequency, urgency or dysuria  NEUROLOGIC:  No headache, confusion, dizziness, lightheadedness    MEDICATIONS  (STANDING):  amLODIPine   Tablet 10 milliGRAM(s) Oral daily  atorvastatin 40 milliGRAM(s) Oral at bedtime  cefTRIAXone   IVPB 1000 milliGRAM(s) IV Intermittent every 24 hours  dextrose 5%. 1000 milliLiter(s) (50 mL/Hr) IV Continuous <Continuous>  dextrose 5%. 1000 milliLiter(s) (100 mL/Hr) IV Continuous <Continuous>  dextrose 50% Injectable 25 Gram(s) IV Push once  dextrose 50% Injectable 12.5 Gram(s) IV Push once  dextrose 50% Injectable 25 Gram(s) IV Push once  DULoxetine 20 milliGRAM(s) Oral daily  gabapentin 100 milliGRAM(s) Oral every 8 hours  glucagon  Injectable 1 milliGRAM(s) IntraMuscular once  heparin   Injectable 5000 Unit(s) SubCutaneous every 8 hours  insulin glargine Injectable (LANTUS) 12 Unit(s) SubCutaneous at bedtime  insulin lispro (ADMELOG) corrective regimen sliding scale   SubCutaneous Before meals and at bedtime  insulin lispro Injectable (ADMELOG) 10 Unit(s) SubCutaneous before lunch  insulin lispro Injectable (ADMELOG) 10 Unit(s) SubCutaneous before dinner  insulin lispro Injectable (ADMELOG) 10 Unit(s) SubCutaneous before breakfast  lisinopril 40 milliGRAM(s) Oral every 24 hours  pantoprazole    Tablet 40 milliGRAM(s) Oral before breakfast    MEDICATIONS  (PRN):  dextrose Oral Gel 15 Gram(s) Oral once PRN Blood Glucose LESS THAN 70 milliGRAM(s)/deciliter      PHYSICAL EXAM  Vital Signs Last 24 Hrs  T(C): 36.8 (12 Apr 2022 10:40), Max: 36.9 (11 Apr 2022 21:30)  T(F): 98.3 (12 Apr 2022 10:40), Max: 98.4 (11 Apr 2022 21:30)  HR: 71 (12 Apr 2022 10:40) (66 - 76)  BP: 144/71 (12 Apr 2022 10:40) (121/64 - 147/74)  BP(mean): 97 (12 Apr 2022 06:03) (87 - 100)  RR: 20 (12 Apr 2022 10:40) (16 - 20)  SpO2: 96% (12 Apr 2022 10:40) (96% - 100%)    Constitutional: NAD.   HEENT: NCAT, MMM, OP clear, EOMI, , no proptosis or lid retraction  Neck: no thyromegaly or palpable thyroid nodules   Respiratory: lungs CTAB.  Cardiovascular: regular rhythm, normal S1 and S2, no audible murmurs, no peripheral edema  GI: soft, NT/ND, no masses/HSM appreciated.  Neurology: no tremors, DTR 2+  Skin: no visible rashes/lesions  Psychiatric: AAO x 3, normal affect/mood.    LABS:                        10.4   4.53  )-----------( 344      ( 12 Apr 2022 07:22 )             33.4     04-12    137  |  103  |  19  ----------------------------<  102<H>  4.4   |  27  |  0.54    Ca    8.4      12 Apr 2022 07:22  Phos  4.0     04-12  Mg     1.8     04-12    TPro  6.2  /  Alb  3.2<L>  /  TBili  0.3  /  DBili  x   /  AST  12  /  ALT  10  /  AlkPhos  32<L>  04-11        Thyroid Stimulating Hormone, Serum: 1.870 uIU/mL (04-09 @ 08:50)  Thyroid Stimulating Hormone, Serum: 1.120 uIU/mL (04-01 @ 07:54)      HbA1C:         Insulin Sliding Scale requirements X 24 Hours:      RADIOLOGY & ADDITIONAL TESTS:      A/P:72y Female with PMHx of T2DM c/b peripheral neuropathy, HTN, recent admission and workup for Naty Sanchez tear, TIA on 3/30 (MR negative) presents to [facility] as transfer from [facility] ED for episode of AMS, back to baseline in [facility] ED. CTH negative in ED for acute pathology. Pt admitted to stroke service for TIA vs seizure.   A1c:11.%  Wt:63.4kg  Cr:0.45  GFR:101  1.  DM type  2  Please continue lantus    units at night.    Please continue lispro     units before each meal.    Please continue lispro moderate dose sliding scale four times daily with meals and at bedtime    Pt's fingerstick glucose goal is 100-180    Will continue to monitor     For discharge, pt can continue    Pt can follow up at discharge with [facility] by calling  to make an appointment.   Will discuss case with  and update primary team   INTERVAL HPI/OVERNIGHT EVENTS:    Patient is a 72y old  Female who presents with a chief complaint of episode of AMS (11 Apr 2022 23:24)  Patient reports she is eating well but blood sugars appear very from yesterday to this morning. Patient's AM lispro held and lunch finger was elevated. Continues to complain of b/l leg weakness and foot pain.     Pt reports the following symptoms:    CONSTITUTIONAL:  Negative fever or chills, feels well, good appetite  EYES:  Negative  blurry vision or double vision  CARDIOVASCULAR:  Negative for chest pain or palpitations  RESPIRATORY:  Negative for cough, wheezing, or SOB   GASTROINTESTINAL:  Negative for nausea, vomiting, diarrhea, constipation, or abdominal pain  GENITOURINARY:  Negative frequency, urgency or dysuria  NEUROLOGIC:  No headache, confusion, dizziness, lightheadedness    MEDICATIONS  (STANDING):  amLODIPine   Tablet 10 milliGRAM(s) Oral daily  atorvastatin 40 milliGRAM(s) Oral at bedtime  cefTRIAXone   IVPB 1000 milliGRAM(s) IV Intermittent every 24 hours  dextrose 5%. 1000 milliLiter(s) (50 mL/Hr) IV Continuous <Continuous>  dextrose 5%. 1000 milliLiter(s) (100 mL/Hr) IV Continuous <Continuous>  dextrose 50% Injectable 25 Gram(s) IV Push once  dextrose 50% Injectable 12.5 Gram(s) IV Push once  dextrose 50% Injectable 25 Gram(s) IV Push once  DULoxetine 20 milliGRAM(s) Oral daily  gabapentin 100 milliGRAM(s) Oral every 8 hours  glucagon  Injectable 1 milliGRAM(s) IntraMuscular once  heparin   Injectable 5000 Unit(s) SubCutaneous every 8 hours  insulin glargine Injectable (LANTUS) 12 Unit(s) SubCutaneous at bedtime  insulin lispro (ADMELOG) corrective regimen sliding scale   SubCutaneous Before meals and at bedtime  insulin lispro Injectable (ADMELOG) 10 Unit(s) SubCutaneous before lunch  insulin lispro Injectable (ADMELOG) 10 Unit(s) SubCutaneous before dinner  insulin lispro Injectable (ADMELOG) 10 Unit(s) SubCutaneous before breakfast  lisinopril 40 milliGRAM(s) Oral every 24 hours  pantoprazole    Tablet 40 milliGRAM(s) Oral before breakfast    MEDICATIONS  (PRN):  dextrose Oral Gel 15 Gram(s) Oral once PRN Blood Glucose LESS THAN 70 milliGRAM(s)/deciliter      PHYSICAL EXAM  Vital Signs Last 24 Hrs  T(C): 36.8 (12 Apr 2022 10:40), Max: 36.9 (11 Apr 2022 21:30)  T(F): 98.3 (12 Apr 2022 10:40), Max: 98.4 (11 Apr 2022 21:30)  HR: 71 (12 Apr 2022 10:40) (66 - 76)  BP: 144/71 (12 Apr 2022 10:40) (121/64 - 147/74)  BP(mean): 97 (12 Apr 2022 06:03) (87 - 100)  RR: 20 (12 Apr 2022 10:40) (16 - 20)  SpO2: 96% (12 Apr 2022 10:40) (96% - 100%)    Constitutional: NAD.   HEENT: NCAT, MMM, OP clear, EOMI, , no proptosis or lid retraction  Neck: no thyromegaly or palpable thyroid nodules   Respiratory: lungs CTAB.  Cardiovascular: regular rhythm, normal S1 and S2, no audible murmurs, no peripheral edema  GI: soft, NT/ND, no masses/HSM appreciated.  Neurology: lower extremity weakness, b/l foot drop   Skin: no visible rashes/lesions  Psychiatric: AAO x 3, flat    LABS:                        10.4   4.53  )-----------( 344      ( 12 Apr 2022 07:22 )             33.4     04-12    137  |  103  |  19  ----------------------------<  102<H>  4.4   |  27  |  0.54    Ca    8.4      12 Apr 2022 07:22  Phos  4.0     04-12  Mg     1.8     04-12    TPro  6.2  /  Alb  3.2<L>  /  TBili  0.3  /  DBili  x   /  AST  12  /  ALT  10  /  AlkPhos  32<L>  04-11        Thyroid Stimulating Hormone, Serum: 1.870 uIU/mL (04-09 @ 08:50)  Thyroid Stimulating Hormone, Serum: 1.120 uIU/mL (04-01 @ 07:54)      HbA1C:         Insulin Sliding Scale requirements X 24 Hours:      RADIOLOGY & ADDITIONAL TESTS:      A/P:72y Female with PMHx of T2DM c/b peripheral neuropathy, HTN, recent admission and workup for Naty Sanchez tear, TIA on 3/30 (MR negative) presents to [facility] as transfer from [facility] ED for episode of AMS, back to baseline in [facility] ED. CTH negative in ED for acute pathology. Pt admitted to stroke service for TIA vs seizure.   A1c:11.%  Wt:63.4kg  Cr:0.45  GFR:101  1.  DM type  2  Please continue lantus  10  units at night.    Please continue lispro  9 units before each meal.    Please continue lispro moderate dose sliding scale four times daily with meals and at bedtime    Pt's fingerstick glucose goal is 100-180    Will continue to monitor     For discharge, pt can continue TBD    Pt can follow up at discharge with [facility] by calling  to make an appointment.   Will discuss case with  and update primary team

## 2022-04-13 LAB
ALBUMIN SERPL ELPH-MCNC: 2.9 G/DL — LOW (ref 3.3–5)
ALP SERPL-CCNC: 32 U/L — LOW (ref 40–120)
ALT FLD-CCNC: 8 U/L — LOW (ref 10–45)
ANION GAP SERPL CALC-SCNC: 6 MMOL/L — SIGNIFICANT CHANGE UP (ref 5–17)
AST SERPL-CCNC: 14 U/L — SIGNIFICANT CHANGE UP (ref 10–40)
BILIRUB SERPL-MCNC: 0.2 MG/DL — SIGNIFICANT CHANGE UP (ref 0.2–1.2)
BUN SERPL-MCNC: 22 MG/DL — SIGNIFICANT CHANGE UP (ref 7–23)
CALCIUM SERPL-MCNC: 8.3 MG/DL — LOW (ref 8.4–10.5)
CHLORIDE SERPL-SCNC: 100 MMOL/L — SIGNIFICANT CHANGE UP (ref 96–108)
CO2 SERPL-SCNC: 28 MMOL/L — SIGNIFICANT CHANGE UP (ref 22–31)
CREAT SERPL-MCNC: 0.7 MG/DL — SIGNIFICANT CHANGE UP (ref 0.5–1.3)
EGFR: 91 ML/MIN/1.73M2 — SIGNIFICANT CHANGE UP
GLUCOSE BLDC GLUCOMTR-MCNC: 144 MG/DL — HIGH (ref 70–99)
GLUCOSE BLDC GLUCOMTR-MCNC: 158 MG/DL — HIGH (ref 70–99)
GLUCOSE BLDC GLUCOMTR-MCNC: 175 MG/DL — HIGH (ref 70–99)
GLUCOSE BLDC GLUCOMTR-MCNC: 187 MG/DL — HIGH (ref 70–99)
GLUCOSE SERPL-MCNC: 185 MG/DL — HIGH (ref 70–99)
HCT VFR BLD CALC: 33 % — LOW (ref 34.5–45)
HGB BLD-MCNC: 10.2 G/DL — LOW (ref 11.5–15.5)
MAGNESIUM SERPL-MCNC: 1.7 MG/DL — SIGNIFICANT CHANGE UP (ref 1.6–2.6)
MCHC RBC-ENTMCNC: 26.1 PG — LOW (ref 27–34)
MCHC RBC-ENTMCNC: 30.9 GM/DL — LOW (ref 32–36)
MCV RBC AUTO: 84.4 FL — SIGNIFICANT CHANGE UP (ref 80–100)
NRBC # BLD: 0 /100 WBCS — SIGNIFICANT CHANGE UP (ref 0–0)
PHOSPHATE SERPL-MCNC: 3.8 MG/DL — SIGNIFICANT CHANGE UP (ref 2.5–4.5)
PLATELET # BLD AUTO: 338 K/UL — SIGNIFICANT CHANGE UP (ref 150–400)
POTASSIUM SERPL-MCNC: 4.6 MMOL/L — SIGNIFICANT CHANGE UP (ref 3.5–5.3)
POTASSIUM SERPL-SCNC: 4.6 MMOL/L — SIGNIFICANT CHANGE UP (ref 3.5–5.3)
PROT SERPL-MCNC: 5.9 G/DL — LOW (ref 6–8.3)
RBC # BLD: 3.91 M/UL — SIGNIFICANT CHANGE UP (ref 3.8–5.2)
RBC # FLD: 13.8 % — SIGNIFICANT CHANGE UP (ref 10.3–14.5)
SODIUM SERPL-SCNC: 134 MMOL/L — LOW (ref 135–145)
WBC # BLD: 5.41 K/UL — SIGNIFICANT CHANGE UP (ref 3.8–10.5)
WBC # FLD AUTO: 5.41 K/UL — SIGNIFICANT CHANGE UP (ref 3.8–10.5)

## 2022-04-13 PROCEDURE — 99233 SBSQ HOSP IP/OBS HIGH 50: CPT

## 2022-04-13 PROCEDURE — 99232 SBSQ HOSP IP/OBS MODERATE 35: CPT | Mod: GC

## 2022-04-13 RX ORDER — MAGNESIUM SULFATE 500 MG/ML
2 VIAL (ML) INJECTION ONCE
Refills: 0 | Status: COMPLETED | OUTPATIENT
Start: 2022-04-13 | End: 2022-04-13

## 2022-04-13 RX ADMIN — HEPARIN SODIUM 5000 UNIT(S): 5000 INJECTION INTRAVENOUS; SUBCUTANEOUS at 05:24

## 2022-04-13 RX ADMIN — Medication 2: at 23:05

## 2022-04-13 RX ADMIN — LISINOPRIL 40 MILLIGRAM(S): 2.5 TABLET ORAL at 06:04

## 2022-04-13 RX ADMIN — ATORVASTATIN CALCIUM 40 MILLIGRAM(S): 80 TABLET, FILM COATED ORAL at 23:03

## 2022-04-13 RX ADMIN — Medication 108 GRAM(S): at 23:43

## 2022-04-13 RX ADMIN — Medication 2: at 17:26

## 2022-04-13 RX ADMIN — GABAPENTIN 100 MILLIGRAM(S): 400 CAPSULE ORAL at 13:35

## 2022-04-13 RX ADMIN — DULOXETINE HYDROCHLORIDE 20 MILLIGRAM(S): 30 CAPSULE, DELAYED RELEASE ORAL at 11:51

## 2022-04-13 RX ADMIN — PANTOPRAZOLE SODIUM 40 MILLIGRAM(S): 20 TABLET, DELAYED RELEASE ORAL at 06:04

## 2022-04-13 RX ADMIN — Medication 2: at 08:34

## 2022-04-13 RX ADMIN — Medication 108 GRAM(S): at 11:50

## 2022-04-13 RX ADMIN — GABAPENTIN 100 MILLIGRAM(S): 400 CAPSULE ORAL at 23:03

## 2022-04-13 RX ADMIN — Medication 108 GRAM(S): at 17:26

## 2022-04-13 RX ADMIN — HEPARIN SODIUM 5000 UNIT(S): 5000 INJECTION INTRAVENOUS; SUBCUTANEOUS at 13:35

## 2022-04-13 RX ADMIN — Medication 9 UNIT(S): at 08:34

## 2022-04-13 RX ADMIN — Medication 108 GRAM(S): at 00:27

## 2022-04-13 RX ADMIN — INSULIN GLARGINE 10 UNIT(S): 100 INJECTION, SOLUTION SUBCUTANEOUS at 23:03

## 2022-04-13 RX ADMIN — Medication 9 UNIT(S): at 17:26

## 2022-04-13 RX ADMIN — Medication 108 GRAM(S): at 05:23

## 2022-04-13 RX ADMIN — GABAPENTIN 100 MILLIGRAM(S): 400 CAPSULE ORAL at 05:23

## 2022-04-13 RX ADMIN — AMLODIPINE BESYLATE 10 MILLIGRAM(S): 2.5 TABLET ORAL at 09:22

## 2022-04-13 RX ADMIN — Medication 25 GRAM(S): at 09:23

## 2022-04-13 RX ADMIN — Medication 9 UNIT(S): at 13:06

## 2022-04-13 RX ADMIN — HEPARIN SODIUM 5000 UNIT(S): 5000 INJECTION INTRAVENOUS; SUBCUTANEOUS at 23:02

## 2022-04-13 NOTE — PROGRESS NOTE ADULT - SUBJECTIVE AND OBJECTIVE BOX
INTERVAL HPI/OVERNIGHT EVENTS:  O/N: CAIT    This morning: Patient was seen and examined at bedside.  NAC     VITAL SIGNS:  T(F): 98 (04-13-22 @ 09:17)  HR: 66 (04-13-22 @ 09:17)  BP: 145/74 (04-13-22 @ 09:17)  RR: 20 (04-13-22 @ 09:17)  SpO2: 99% (04-13-22 @ 09:17)    PHYSICAL EXAM:    Constitutional: NAD, awake, communicative   HEENT: PERRL, EOMI, sclera non-icteric, no JVD,  Respiratory: CTA b/l, non distressed   Cardiovascular: RRR, normal S1S2, no M/R/G  Gastrointestinal: abdomen soft, NTND  Extremities: Warm, well perfused,  no edema  Neurological: AAO to person and place, patient with weakness in BL LEs at ankle 2-3/5, at knee 3-4/5, at hip 4-5/5 (symmetrical), BL UEs 4-5/5 in all motions, intact dermatomes but reports that sensation is diminished in LEs   Psych: Somewhat flat affect   Skin: Normal temperature, warm, dry    MEDICATIONS  (STANDING):  amLODIPine   Tablet 10 milliGRAM(s) Oral daily  ampicillin  IVPB 1 Gram(s) IV Intermittent every 6 hours  atorvastatin 40 milliGRAM(s) Oral at bedtime  dextrose 5%. 1000 milliLiter(s) (50 mL/Hr) IV Continuous <Continuous>  dextrose 5%. 1000 milliLiter(s) (100 mL/Hr) IV Continuous <Continuous>  dextrose 50% Injectable 25 Gram(s) IV Push once  dextrose 50% Injectable 12.5 Gram(s) IV Push once  dextrose 50% Injectable 25 Gram(s) IV Push once  DULoxetine 20 milliGRAM(s) Oral daily  gabapentin 100 milliGRAM(s) Oral every 8 hours  glucagon  Injectable 1 milliGRAM(s) IntraMuscular once  heparin   Injectable 5000 Unit(s) SubCutaneous every 8 hours  insulin glargine Injectable (LANTUS) 10 Unit(s) SubCutaneous at bedtime  insulin lispro (ADMELOG) corrective regimen sliding scale   SubCutaneous Before meals and at bedtime  insulin lispro Injectable (ADMELOG) 9 Unit(s) SubCutaneous three times a day before meals  lisinopril 40 milliGRAM(s) Oral every 24 hours  pantoprazole    Tablet 40 milliGRAM(s) Oral before breakfast    MEDICATIONS  (PRN):  dextrose Oral Gel 15 Gram(s) Oral once PRN Blood Glucose LESS THAN 70 milliGRAM(s)/deciliter    Allergies    No Known Allergies    Intolerances      LABS:                        10.2   5.41  )-----------( 338      ( 13 Apr 2022 07:10 )             33.0     04-13    134<L>  |  100  |  22  ----------------------------<  185<H>  4.6   |  28  |  0.70    Ca    8.3<L>      13 Apr 2022 07:10  Phos  3.8     04-13  Mg     1.7     04-13    TPro  5.9<L>  /  Alb  2.9<L>  /  TBili  0.2  /  DBili  x   /  AST  14  /  ALT  8<L>  /  AlkPhos  32<L>  04-13    RADIOLOGY & ADDITIONAL TESTS:  Reviewed

## 2022-04-13 NOTE — PROGRESS NOTE ADULT - ASSESSMENT
72y Female with PMHx of T2DM c/b peripheral neuropathy, HTN, recent admission and workup for Naty Sanchez tear, TIA on 3/30 (MR negative) presents to [facility] as transfer from [facility] ED for episode of altered mental status (staring, generalized weakness), back to baseline in [facility] ED. CTH negative in ED for acute pathology. 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 EEG has been negative for seizure. Patient has been intermittently hypertensive, restarted on home Lisinopril and added Amlodipine 10 mg on 4/10. Patient's UA was mildly positive, but urine culture grew E faecalis, started her on Ceftriaxone 4/11-4/13. Patient's altered mental status likely metabolic encephalopathy due to UTI and hypertensive encephalopathy.

## 2022-04-13 NOTE — PROGRESS NOTE ADULT - ATTENDING COMMENTS
The patient is a 72-year-old female with a history of type 2 diabetes, hypertension, recent secondary to Naty-Sanchez tear, possible TIA versus seizure on 3/30 who was admitted 4/8 with confusion. MRI was negative for acute ischemia. EEG was mildly slow but showed no epileptiform discharges. Etiology of presenting symptoms remains unclear though stroke/TIA is unlikely.  Hypertensive encephalopathy is a possibility given wide fluctuations and persistently high BP; metabolic encephalopathy related to UTI (culture results of E faecalis) is also a possible cause. BP much better controlled w current regimen. For now, continue lisinopril, amlodipine. Will treat UTI (day 3/3 today). Continue aspirin, statin. Will need aggressive risk factor reduction with PCP/endocrinology.    Of note, patient's LE weakness discussed with gen neuro who are familiar with the patient from her last admission. No immediately plans for further testing but will transfer patient to Gen Neuro tomorrow.

## 2022-04-13 NOTE — PROGRESS NOTE ADULT - SUBJECTIVE AND OBJECTIVE BOX
INTERVAL HPI/OVERNIGHT EVENTS:    Patient is a 72y old  Female who presents with a chief complaint of episode of AMS (13 Apr 2022 12:21)      Pt reports the following symptoms:    CONSTITUTIONAL:  Negative fever or chills, feels well, good appetite  EYES:  Negative  blurry vision or double vision  CARDIOVASCULAR:  Negative for chest pain or palpitations  RESPIRATORY:  Negative for cough, wheezing, or SOB   GASTROINTESTINAL:  Negative for nausea, vomiting, diarrhea, constipation, or abdominal pain  GENITOURINARY:  Negative frequency, urgency or dysuria  NEUROLOGIC:  No headache, confusion, dizziness, lightheadedness    MEDICATIONS  (STANDING):  amLODIPine   Tablet 10 milliGRAM(s) Oral daily  ampicillin  IVPB 1 Gram(s) IV Intermittent every 6 hours  atorvastatin 40 milliGRAM(s) Oral at bedtime  dextrose 5%. 1000 milliLiter(s) (50 mL/Hr) IV Continuous <Continuous>  dextrose 5%. 1000 milliLiter(s) (100 mL/Hr) IV Continuous <Continuous>  dextrose 50% Injectable 25 Gram(s) IV Push once  dextrose 50% Injectable 12.5 Gram(s) IV Push once  dextrose 50% Injectable 25 Gram(s) IV Push once  DULoxetine 20 milliGRAM(s) Oral daily  gabapentin 100 milliGRAM(s) Oral every 8 hours  glucagon  Injectable 1 milliGRAM(s) IntraMuscular once  heparin   Injectable 5000 Unit(s) SubCutaneous every 8 hours  insulin glargine Injectable (LANTUS) 10 Unit(s) SubCutaneous at bedtime  insulin lispro (ADMELOG) corrective regimen sliding scale   SubCutaneous Before meals and at bedtime  insulin lispro Injectable (ADMELOG) 9 Unit(s) SubCutaneous three times a day before meals  lisinopril 40 milliGRAM(s) Oral every 24 hours  pantoprazole    Tablet 40 milliGRAM(s) Oral before breakfast    MEDICATIONS  (PRN):  dextrose Oral Gel 15 Gram(s) Oral once PRN Blood Glucose LESS THAN 70 milliGRAM(s)/deciliter      PHYSICAL EXAM  Vital Signs Last 24 Hrs  T(C): 36.7 (13 Apr 2022 09:17), Max: 36.8 (12 Apr 2022 15:33)  T(F): 98 (13 Apr 2022 09:17), Max: 98.3 (13 Apr 2022 05:15)  HR: 66 (13 Apr 2022 09:17) (66 - 78)  BP: 145/74 (13 Apr 2022 09:17) (116/65 - 145/74)  BP(mean): 82 (12 Apr 2022 20:33) (82 - 82)  RR: 20 (13 Apr 2022 09:17) (17 - 20)  SpO2: 99% (13 Apr 2022 09:17) (96% - 99%)    Constitutional: wn/wd in NAD.   HEENT: NCAT, MMM, OP clear, EOMI, , no proptosis or lid retraction  Neck: no thyromegaly or palpable thyroid nodules   Respiratory: lungs CTAB.  Cardiovascular: regular rhythm, normal S1 and S2, no audible murmurs, no peripheral edema  GI: soft, NT/ND, no masses/HSM appreciated.  Neurology: no tremors, DTR 2+  Skin: no visible rashes/lesions  Psychiatric: AAO x 3, normal affect/mood.    LABS:                        10.2   5.41  )-----------( 338      ( 13 Apr 2022 07:10 )             33.0     04-13    134<L>  |  100  |  22  ----------------------------<  185<H>  4.6   |  28  |  0.70    Ca    8.3<L>      13 Apr 2022 07:10  Phos  3.8     04-13  Mg     1.7     04-13    TPro  5.9<L>  /  Alb  2.9<L>  /  TBili  0.2  /  DBili  x   /  AST  14  /  ALT  8<L>  /  AlkPhos  32<L>  04-13        Thyroid Stimulating Hormone, Serum: 1.870 uIU/mL (04-09 @ 08:50)  Thyroid Stimulating Hormone, Serum: 1.120 uIU/mL (04-01 @ 07:54)      HbA1C:         Insulin Sliding Scale requirements X 24 Hours:      RADIOLOGY & ADDITIONAL TESTS:      A/P:72y Female with PMHx of T2DM c/b peripheral neuropathy, HTN, recent admission and workup for Naty Sanchez tear, TIA on 3/30 (MR negative) presents to [facility] as transfer from [facility] ED for episode of AMS, back to baseline in [facility] ED. CTH negative in ED for acute pathology. Pt admitted to stroke service for TIA vs seizure.   A1c:11.%  Wt:63.4kg  Cr:0.45  GFR:101  1.  DM type  2  Patient appears to be recovering from prior glucose toxic state.   Please continue lantus    units at night.    Please continue lispro   units before each meal.    Please continue lispro moderate dose sliding scale four times daily with meals and at bedtime    Pt's fingerstick glucose goal is 100-180    Will continue to monitor     For discharge, pt can continue TBD    Pt can follow up at discharge with [facility] by calling  to make an appointment.   Will discuss case with  and update primary team     INTERVAL HPI/OVERNIGHT EVENTS:    Patient is a 72y old  Female who presents with a chief complaint of episode of AMS (13 Apr 2022 12:21)  Patient reports she is eating well. Appears to have good diet recall. Neurology reconsulted for lower extremity weakness.     Pt reports the following symptoms:    CONSTITUTIONAL:  Negative fever or chills, feels well, good appetite  EYES:  Negative  blurry vision or double vision  CARDIOVASCULAR:  Negative for chest pain or palpitations  RESPIRATORY:  Negative for cough, wheezing, or SOB   GASTROINTESTINAL:  Negative for nausea, vomiting, diarrhea, constipation, or abdominal pain  GENITOURINARY:  Negative frequency, urgency or dysuria  NEUROLOGIC:  No headache, confusion, dizziness, lightheadedness    MEDICATIONS  (STANDING):  amLODIPine   Tablet 10 milliGRAM(s) Oral daily  ampicillin  IVPB 1 Gram(s) IV Intermittent every 6 hours  atorvastatin 40 milliGRAM(s) Oral at bedtime  dextrose 5%. 1000 milliLiter(s) (50 mL/Hr) IV Continuous <Continuous>  dextrose 5%. 1000 milliLiter(s) (100 mL/Hr) IV Continuous <Continuous>  dextrose 50% Injectable 25 Gram(s) IV Push once  dextrose 50% Injectable 12.5 Gram(s) IV Push once  dextrose 50% Injectable 25 Gram(s) IV Push once  DULoxetine 20 milliGRAM(s) Oral daily  gabapentin 100 milliGRAM(s) Oral every 8 hours  glucagon  Injectable 1 milliGRAM(s) IntraMuscular once  heparin   Injectable 5000 Unit(s) SubCutaneous every 8 hours  insulin glargine Injectable (LANTUS) 10 Unit(s) SubCutaneous at bedtime  insulin lispro (ADMELOG) corrective regimen sliding scale   SubCutaneous Before meals and at bedtime  insulin lispro Injectable (ADMELOG) 9 Unit(s) SubCutaneous three times a day before meals  lisinopril 40 milliGRAM(s) Oral every 24 hours  pantoprazole    Tablet 40 milliGRAM(s) Oral before breakfast    MEDICATIONS  (PRN):  dextrose Oral Gel 15 Gram(s) Oral once PRN Blood Glucose LESS THAN 70 milliGRAM(s)/deciliter      PHYSICAL EXAM  Vital Signs Last 24 Hrs  T(C): 36.7 (13 Apr 2022 09:17), Max: 36.8 (12 Apr 2022 15:33)  T(F): 98 (13 Apr 2022 09:17), Max: 98.3 (13 Apr 2022 05:15)  HR: 66 (13 Apr 2022 09:17) (66 - 78)  BP: 145/74 (13 Apr 2022 09:17) (116/65 - 145/74)  BP(mean): 82 (12 Apr 2022 20:33) (82 - 82)  RR: 20 (13 Apr 2022 09:17) (17 - 20)  SpO2: 99% (13 Apr 2022 09:17) (96% - 99%)    Constitutional: NAD.   HEENT: NCAT, MMM, OP clear, EOMI, , no proptosis or lid retraction  Neck: no thyromegaly or palpable thyroid nodules   Respiratory: lungs CTAB.  Cardiovascular: regular rhythm, normal S1 and S2, no audible murmurs, no peripheral edema  GI: soft, NT/ND, no masses/HSM appreciated.  Neurology: no tremors, slowed speech, lower extremity weakness b/l  Skin: no visible rashes/lesions  Psychiatric: AAO x 3, normal affect/mood.    LABS:                        10.2   5.41  )-----------( 338      ( 13 Apr 2022 07:10 )             33.0     04-13    134<L>  |  100  |  22  ----------------------------<  185<H>  4.6   |  28  |  0.70    Ca    8.3<L>      13 Apr 2022 07:10  Phos  3.8     04-13  Mg     1.7     04-13    TPro  5.9<L>  /  Alb  2.9<L>  /  TBili  0.2  /  DBili  x   /  AST  14  /  ALT  8<L>  /  AlkPhos  32<L>  04-13        Thyroid Stimulating Hormone, Serum: 1.870 uIU/mL (04-09 @ 08:50)  Thyroid Stimulating Hormone, Serum: 1.120 uIU/mL (04-01 @ 07:54)      HbA1C:         Insulin Sliding Scale requirements X 24 Hours:      RADIOLOGY & ADDITIONAL TESTS:      A/P:72y Female with PMHx of T2DM c/b peripheral neuropathy, HTN, recent admission and workup for Naty Sanchez tear, TIA on 3/30 (MR negative) presents to [facility] as transfer from [facility] ED for episode of AMS, back to baseline in [facility] ED. CTH negative in ED for acute pathology. Pt admitted to stroke service for TIA vs seizure.   A1c:11.%  Wt:63.4kg  Cr:0.45  GFR:101  1.  DM type  2  Patient appears to be recovering from prior glucose toxic state.   Please continue lantus    units at night.    Please continue lispro   units before each meal.    Please continue lispro moderate dose sliding scale four times daily with meals and at bedtime    Pt's fingerstick glucose goal is 100-180    Will continue to monitor     For discharge, pt can continue TBD    Pt can follow up at discharge with [facility] by calling  to make an appointment.   Will discuss case with  and update primary team     INTERVAL HPI/OVERNIGHT EVENTS:    Patient is a 72y old  Female who presents with a chief complaint of episode of AMS (13 Apr 2022 12:21)  Patient reports she is eating well. Appears to have good diet recall. Neurology reconsulted for lower extremity weakness.     Pt reports the following symptoms:    CONSTITUTIONAL:  Negative fever or chills, feels well, good appetite  EYES:  Negative  blurry vision or double vision  CARDIOVASCULAR:  Negative for chest pain or palpitations  RESPIRATORY:  Negative for cough, wheezing, or SOB   GASTROINTESTINAL:  Negative for nausea, vomiting, diarrhea, constipation, or abdominal pain  GENITOURINARY:  Negative frequency, urgency or dysuria  NEUROLOGIC:  No headache, confusion, dizziness, lightheadedness    MEDICATIONS  (STANDING):  amLODIPine   Tablet 10 milliGRAM(s) Oral daily  ampicillin  IVPB 1 Gram(s) IV Intermittent every 6 hours  atorvastatin 40 milliGRAM(s) Oral at bedtime  dextrose 5%. 1000 milliLiter(s) (50 mL/Hr) IV Continuous <Continuous>  dextrose 5%. 1000 milliLiter(s) (100 mL/Hr) IV Continuous <Continuous>  dextrose 50% Injectable 25 Gram(s) IV Push once  dextrose 50% Injectable 12.5 Gram(s) IV Push once  dextrose 50% Injectable 25 Gram(s) IV Push once  DULoxetine 20 milliGRAM(s) Oral daily  gabapentin 100 milliGRAM(s) Oral every 8 hours  glucagon  Injectable 1 milliGRAM(s) IntraMuscular once  heparin   Injectable 5000 Unit(s) SubCutaneous every 8 hours  insulin glargine Injectable (LANTUS) 10 Unit(s) SubCutaneous at bedtime  insulin lispro (ADMELOG) corrective regimen sliding scale   SubCutaneous Before meals and at bedtime  insulin lispro Injectable (ADMELOG) 9 Unit(s) SubCutaneous three times a day before meals  lisinopril 40 milliGRAM(s) Oral every 24 hours  pantoprazole    Tablet 40 milliGRAM(s) Oral before breakfast    MEDICATIONS  (PRN):  dextrose Oral Gel 15 Gram(s) Oral once PRN Blood Glucose LESS THAN 70 milliGRAM(s)/deciliter      PHYSICAL EXAM  Vital Signs Last 24 Hrs  T(C): 36.7 (13 Apr 2022 09:17), Max: 36.8 (12 Apr 2022 15:33)  T(F): 98 (13 Apr 2022 09:17), Max: 98.3 (13 Apr 2022 05:15)  HR: 66 (13 Apr 2022 09:17) (66 - 78)  BP: 145/74 (13 Apr 2022 09:17) (116/65 - 145/74)  BP(mean): 82 (12 Apr 2022 20:33) (82 - 82)  RR: 20 (13 Apr 2022 09:17) (17 - 20)  SpO2: 99% (13 Apr 2022 09:17) (96% - 99%)    Constitutional: NAD.   HEENT: NCAT, MMM, OP clear, EOMI, , no proptosis or lid retraction  Neck: no thyromegaly or palpable thyroid nodules   Respiratory: lungs CTAB.  Cardiovascular: regular rhythm, normal S1 and S2, no audible murmurs, no peripheral edema  GI: soft, NT/ND, no masses/HSM appreciated.  Neurology: no tremors, slowed speech, lower extremity weakness b/l, b/l foot drop   Skin: no visible rashes/lesions  Psychiatric: AAO x 3, normal affect/mood.    LABS:                        10.2   5.41  )-----------( 338      ( 13 Apr 2022 07:10 )             33.0     04-13    134<L>  |  100  |  22  ----------------------------<  185<H>  4.6   |  28  |  0.70    Ca    8.3<L>      13 Apr 2022 07:10  Phos  3.8     04-13  Mg     1.7     04-13    TPro  5.9<L>  /  Alb  2.9<L>  /  TBili  0.2  /  DBili  x   /  AST  14  /  ALT  8<L>  /  AlkPhos  32<L>  04-13        Thyroid Stimulating Hormone, Serum: 1.870 uIU/mL (04-09 @ 08:50)  Thyroid Stimulating Hormone, Serum: 1.120 uIU/mL (04-01 @ 07:54)      HbA1C:         Insulin Sliding Scale requirements X 24 Hours:      RADIOLOGY & ADDITIONAL TESTS:      A/P:72y Female with PMHx of T2DM c/b peripheral neuropathy, HTN, recent admission and workup for Naty Sanchez tear, TIA on 3/30 (MR negative) presents to [facility] as transfer from [facility] ED for episode of AMS, back to baseline in [facility] ED. CTH negative in ED for acute pathology. Pt admitted to stroke service for TIA vs seizure.   A1c:11.%  Wt:63.4kg  Cr:0.45  GFR:101  1.  DM type  2  get c-peptide level   Patient appears to be recovering from prior glucose toxic state.   Please continue lantus  10  units at night.    Please continue lispro  9  units before each meal.    Please continue lispro moderate dose sliding scale four times daily with meals and at bedtime    Pt's fingerstick glucose goal is 100-180    Will continue to monitor     For discharge, pt can continue TBD    Pt can follow up at discharge with [facility] by calling  to make an appointment.   Will discuss case with  and update primary team

## 2022-04-13 NOTE — PROGRESS NOTE ADULT - PROBLEM SELECTOR PLAN 4
Pt with hx of DM, A1c 11.6. Endocrine following. TSH results: 1.120  -C/w ISS  - C/w Lantus 10 U, Lispro 9U   - F/u endocrine recs   -C/w Cymbalta 200 Qd for diabetic neuropathy  -C/w Gabapentin 100 Q8hrs

## 2022-04-13 NOTE — PROGRESS NOTE ADULT - ATTENDING COMMENTS
Pt seen on rounds this afternoon.  Ability to recall previous meals is helpful and reflects an improvement in cognitive status.  Speech is still somewhat slow and low volume.  Her bilateral foot drop appears complete and unchanged from what we noted yesterday.  Fingerstick are mostly in target range--the spike to 205 before supper last night was due to her not having received her standing insulin before lunch  Glucoses lety somewhat overnight to 158 this morning, but will continue her current Lantus dose for another day--her doses have already decreased significantly during her two admissions  Needs to have ankle braces for her foot drop to prevent contractures

## 2022-04-13 NOTE — PROGRESS NOTE ADULT - PROBLEM SELECTOR PLAN 2
Symmetrical LE weakness, worse distally compared to proximally   Symmetrical diminished sensation, longstanding diabetic peripheral neuropathy   Denies issues with incontinence, or back pain, no sensory line  MRI L/Spine:  - showing at the L3/4 leveI. There are degenerative   changes involving the facets bilaterally as well as ligamentum flavum   hypertrophy. This combination results in moderate central spinal canal   stenosis.   - At the L4/5 level, there is disc bulge abutting the L4 nerve roots   bilaterally. There are degenerative changes involving the facets   bilaterally (right greater than left) as well as ligamentum flavum   hypertrophy. This combination results in mild central spinal canal   stenosis.   DDx: Progression of diabetic neuropathy vs central process from cord compression although sensory loss doesn't coorelate, difficult to delineate where patient is experiencing her sensory loss on my exam    - cw cymbalta and gabapentin   - Consulting gen neuro again  - Patient may benefit from braces for her BL LE

## 2022-04-13 NOTE — PROGRESS NOTE ADULT - PROBLEM SELECTOR PLAN 8
F: None  E: Replete PRN   N: DASH  DVT: Heparin   Dispo: [facility]

## 2022-04-14 LAB
ANION GAP SERPL CALC-SCNC: 7 MMOL/L — SIGNIFICANT CHANGE UP (ref 5–17)
BUN SERPL-MCNC: 29 MG/DL — HIGH (ref 7–23)
CALCIUM SERPL-MCNC: 8.2 MG/DL — LOW (ref 8.4–10.5)
CHLORIDE SERPL-SCNC: 101 MMOL/L — SIGNIFICANT CHANGE UP (ref 96–108)
CO2 SERPL-SCNC: 26 MMOL/L — SIGNIFICANT CHANGE UP (ref 22–31)
CREAT SERPL-MCNC: 0.65 MG/DL — SIGNIFICANT CHANGE UP (ref 0.5–1.3)
EGFR: 92 ML/MIN/1.73M2 — SIGNIFICANT CHANGE UP
GLUCOSE BLDC GLUCOMTR-MCNC: 138 MG/DL — HIGH (ref 70–99)
GLUCOSE BLDC GLUCOMTR-MCNC: 141 MG/DL — HIGH (ref 70–99)
GLUCOSE BLDC GLUCOMTR-MCNC: 145 MG/DL — HIGH (ref 70–99)
GLUCOSE BLDC GLUCOMTR-MCNC: 202 MG/DL — HIGH (ref 70–99)
GLUCOSE SERPL-MCNC: 161 MG/DL — HIGH (ref 70–99)
HCT VFR BLD CALC: 31.8 % — LOW (ref 34.5–45)
HGB BLD-MCNC: 9.8 G/DL — LOW (ref 11.5–15.5)
MAGNESIUM SERPL-MCNC: 1.9 MG/DL — SIGNIFICANT CHANGE UP (ref 1.6–2.6)
MCHC RBC-ENTMCNC: 26.2 PG — LOW (ref 27–34)
MCHC RBC-ENTMCNC: 30.8 GM/DL — LOW (ref 32–36)
MCV RBC AUTO: 85 FL — SIGNIFICANT CHANGE UP (ref 80–100)
NRBC # BLD: 0 /100 WBCS — SIGNIFICANT CHANGE UP (ref 0–0)
PHOSPHATE SERPL-MCNC: 3.7 MG/DL — SIGNIFICANT CHANGE UP (ref 2.5–4.5)
PLATELET # BLD AUTO: 307 K/UL — SIGNIFICANT CHANGE UP (ref 150–400)
POTASSIUM SERPL-MCNC: 4.5 MMOL/L — SIGNIFICANT CHANGE UP (ref 3.5–5.3)
POTASSIUM SERPL-SCNC: 4.5 MMOL/L — SIGNIFICANT CHANGE UP (ref 3.5–5.3)
RBC # BLD: 3.74 M/UL — LOW (ref 3.8–5.2)
RBC # FLD: 13.8 % — SIGNIFICANT CHANGE UP (ref 10.3–14.5)
SODIUM SERPL-SCNC: 134 MMOL/L — LOW (ref 135–145)
WBC # BLD: 5.51 K/UL — SIGNIFICANT CHANGE UP (ref 3.8–10.5)
WBC # FLD AUTO: 5.51 K/UL — SIGNIFICANT CHANGE UP (ref 3.8–10.5)

## 2022-04-14 PROCEDURE — 99231 SBSQ HOSP IP/OBS SF/LOW 25: CPT | Mod: GC

## 2022-04-14 PROCEDURE — 99232 SBSQ HOSP IP/OBS MODERATE 35: CPT

## 2022-04-14 RX ORDER — INSULIN LISPRO 100/ML
10 VIAL (ML) SUBCUTANEOUS
Refills: 0 | Status: DISCONTINUED | OUTPATIENT
Start: 2022-04-14 | End: 2022-04-18

## 2022-04-14 RX ADMIN — Medication 4: at 13:02

## 2022-04-14 RX ADMIN — HEPARIN SODIUM 5000 UNIT(S): 5000 INJECTION INTRAVENOUS; SUBCUTANEOUS at 22:20

## 2022-04-14 RX ADMIN — GABAPENTIN 100 MILLIGRAM(S): 400 CAPSULE ORAL at 06:42

## 2022-04-14 RX ADMIN — Medication 108 GRAM(S): at 17:30

## 2022-04-14 RX ADMIN — HEPARIN SODIUM 5000 UNIT(S): 5000 INJECTION INTRAVENOUS; SUBCUTANEOUS at 14:31

## 2022-04-14 RX ADMIN — Medication 9 UNIT(S): at 17:31

## 2022-04-14 RX ADMIN — Medication 108 GRAM(S): at 11:29

## 2022-04-14 RX ADMIN — LISINOPRIL 40 MILLIGRAM(S): 2.5 TABLET ORAL at 06:41

## 2022-04-14 RX ADMIN — Medication 108 GRAM(S): at 06:42

## 2022-04-14 RX ADMIN — INSULIN GLARGINE 10 UNIT(S): 100 INJECTION, SOLUTION SUBCUTANEOUS at 22:20

## 2022-04-14 RX ADMIN — Medication 9 UNIT(S): at 08:48

## 2022-04-14 RX ADMIN — ATORVASTATIN CALCIUM 40 MILLIGRAM(S): 80 TABLET, FILM COATED ORAL at 22:20

## 2022-04-14 RX ADMIN — AMLODIPINE BESYLATE 10 MILLIGRAM(S): 2.5 TABLET ORAL at 06:42

## 2022-04-14 RX ADMIN — Medication 108 GRAM(S): at 23:25

## 2022-04-14 RX ADMIN — HEPARIN SODIUM 5000 UNIT(S): 5000 INJECTION INTRAVENOUS; SUBCUTANEOUS at 06:43

## 2022-04-14 RX ADMIN — Medication 9 UNIT(S): at 13:02

## 2022-04-14 RX ADMIN — PANTOPRAZOLE SODIUM 40 MILLIGRAM(S): 20 TABLET, DELAYED RELEASE ORAL at 06:42

## 2022-04-14 RX ADMIN — DULOXETINE HYDROCHLORIDE 20 MILLIGRAM(S): 30 CAPSULE, DELAYED RELEASE ORAL at 11:29

## 2022-04-14 NOTE — PROGRESS NOTE ADULT - PROBLEM SELECTOR PLAN 1
Metabolic encephalopathy likely due to UTI and hypertensive encephalopathy.  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   EEG 4/9-4/11 with right greater than left slowing, diffuse cerebral dysfunction, no epileptiform activity.  - Continue to hold DAPT i/s/o hx of Naty Sanchez tear in prior admission 3/25/22  - c/w atorvastatin 40mg daily  - UTI treatment as below  - HTN treatment as below  - continue PT/OT

## 2022-04-14 NOTE — PROGRESS NOTE ADULT - ATTENDING COMMENTS
Pt seen on rounds this afternoon.  Appeared well, but not interested in interaction,, answers questions sparsely  Fingersticks elevated post-prandially--AM FS of 138 is fine  Will increase the premeal lispro to 10 units

## 2022-04-14 NOTE — PROGRESS NOTE ADULT - ATTENDING COMMENTS
symptom that led to admission was AMS, she had been re-started on gabapentin which on last admission was thought to be causing these episodes.  will stop that and try cymbalta 20mg for neuropathic pain.  if continues to have AMS after off gabapentin would consider catatonia in setting of possible early neurodegenerative disease    also continues to have severe lower extremity weakness that can only be explained by polyneuropathy based on workup last hospitalization, which is unfortunately not treatable

## 2022-04-14 NOTE — DIETITIAN INITIAL EVALUATION ADULT - OTHER INFO
72y Female with PMHx of T2DM c/b peripheral neuropathy, HTN, recent admission and workup for Naty Sanchez tear, TIA on 3/30 (MR negative) presents to [facility] as transfer from [facility] ED for episode of AMS     Pt seen in room, sitting up in bed. Pt reports good appetite PTA, no chewing/swallowing issues noted. Currently on consistent carbohydrate/DASH diet, eating 100% lunch today. Denies N/V/D/C. No pain noted. Current wt 140lbs, no significant wt changes noted. No edema noted. Please see full nutrition recs below. Will continue to follow per RD protocol.

## 2022-04-14 NOTE — PROGRESS NOTE ADULT - PROBLEM SELECTOR PLAN 2
Symmetrical LE weakness, worse distally compared to proximally   Symmetrical diminished sensation, longstanding diabetic peripheral neuropathy   Denies issues with incontinence, or back pain, no sensory line  MRI L/Spine:  - showing at the L3/4 leveI. There are degenerative   changes involving the facets bilaterally as well as ligamentum flavum   hypertrophy. This combination results in moderate central spinal canal   stenosis.   - At the L4/5 level, there is disc bulge abutting the L4 nerve roots   bilaterally. There are degenerative changes involving the facets   bilaterally (right greater than left) as well as ligamentum flavum   hypertrophy. This combination results in mild central spinal canal   stenosis.   DDx: Progression of diabetic neuropathy vs central process from cord compression although sensory loss doesn't coorelate, difficult to delineate where patient is experiencing her sensory loss on my exam    - starting cymbalta  - Per gen neuro: progression of diabetic neuropathy   - Consulted physiatry for braces for her BL LE

## 2022-04-14 NOTE — DIETITIAN INITIAL EVALUATION ADULT - PERTINENT MEDS FT
MEDICATIONS  (STANDING):  amLODIPine   Tablet 10 milliGRAM(s) Oral daily  ampicillin  IVPB 1 Gram(s) IV Intermittent every 6 hours  atorvastatin 40 milliGRAM(s) Oral at bedtime  dextrose 5%. 1000 milliLiter(s) (50 mL/Hr) IV Continuous <Continuous>  dextrose 5%. 1000 milliLiter(s) (100 mL/Hr) IV Continuous <Continuous>  dextrose 50% Injectable 25 Gram(s) IV Push once  dextrose 50% Injectable 12.5 Gram(s) IV Push once  dextrose 50% Injectable 25 Gram(s) IV Push once  DULoxetine 20 milliGRAM(s) Oral daily  glucagon  Injectable 1 milliGRAM(s) IntraMuscular once  heparin   Injectable 5000 Unit(s) SubCutaneous every 8 hours  insulin glargine Injectable (LANTUS) 10 Unit(s) SubCutaneous at bedtime  insulin lispro (ADMELOG) corrective regimen sliding scale   SubCutaneous Before meals and at bedtime  insulin lispro Injectable (ADMELOG) 9 Unit(s) SubCutaneous three times a day before meals  lisinopril 40 milliGRAM(s) Oral every 24 hours  pantoprazole    Tablet 40 milliGRAM(s) Oral before breakfast    MEDICATIONS  (PRN):  dextrose Oral Gel 15 Gram(s) Oral once PRN Blood Glucose LESS THAN 70 milliGRAM(s)/deciliter

## 2022-04-14 NOTE — PROGRESS NOTE ADULT - SUBJECTIVE AND OBJECTIVE BOX
INTERVAL HPI/OVERNIGHT EVENTS:  O/N:  This morning: Patient was seen and examined at bedside.      VITAL SIGNS:  T(F): 98.6 (04-14-22 @ 06:37)  HR: 71 (04-14-22 @ 06:37)  BP: 144/76 (04-14-22 @ 06:37)  RR: 19 (04-14-22 @ 06:37)  SpO2: 98% (04-14-22 @ 06:37)  Wt(kg): --    PHYSICAL EXAM:    Constitutional: NAD  HEENT: PERRL, EOMI, sclera non-icteric, neck supple, trachea midline, no masses, no JVD, MMM, good dentition  Respiratory: CTA b/l, good air entry b/l, no wheezing, no rhonchi, no rales, without accessory muscle use and no intercostal retractions  Cardiovascular: RRR, normal S1S2, no M/R/G  Gastrointestinal: abdomen soft, NTND, no masses palpable, BS normal  Extremities: Warm, well perfused, pulses equal bilateral upper and lower extremities, no edema, no clubbing  Neurological: AAOx3, CN Grossly intact  Skin: Normal temperature, warm, dry    MEDICATIONS  (STANDING):  amLODIPine   Tablet 10 milliGRAM(s) Oral daily  ampicillin  IVPB 1 Gram(s) IV Intermittent every 6 hours  atorvastatin 40 milliGRAM(s) Oral at bedtime  dextrose 5%. 1000 milliLiter(s) (50 mL/Hr) IV Continuous <Continuous>  dextrose 5%. 1000 milliLiter(s) (100 mL/Hr) IV Continuous <Continuous>  dextrose 50% Injectable 25 Gram(s) IV Push once  dextrose 50% Injectable 12.5 Gram(s) IV Push once  dextrose 50% Injectable 25 Gram(s) IV Push once  DULoxetine 20 milliGRAM(s) Oral daily  gabapentin 100 milliGRAM(s) Oral every 8 hours  glucagon  Injectable 1 milliGRAM(s) IntraMuscular once  heparin   Injectable 5000 Unit(s) SubCutaneous every 8 hours  insulin glargine Injectable (LANTUS) 10 Unit(s) SubCutaneous at bedtime  insulin lispro (ADMELOG) corrective regimen sliding scale   SubCutaneous Before meals and at bedtime  insulin lispro Injectable (ADMELOG) 9 Unit(s) SubCutaneous three times a day before meals  lisinopril 40 milliGRAM(s) Oral every 24 hours  pantoprazole    Tablet 40 milliGRAM(s) Oral before breakfast    MEDICATIONS  (PRN):  dextrose Oral Gel 15 Gram(s) Oral once PRN Blood Glucose LESS THAN 70 milliGRAM(s)/deciliter      Allergies    No Known Allergies    Intolerances        LABS:                        9.8    5.51  )-----------( 307      ( 14 Apr 2022 06:51 )             31.8     04-14    134<L>  |  101  |  29<H>  ----------------------------<  161<H>  4.5   |  26  |  0.65    Ca    8.2<L>      14 Apr 2022 06:51  Phos  3.7     04-14  Mg     1.9     04-14    TPro  5.9<L>  /  Alb  2.9<L>  /  TBili  0.2  /  DBili  x   /  AST  14  /  ALT  8<L>  /  AlkPhos  32<L>  04-13                RADIOLOGY & ADDITIONAL TESTS:  Reviewed INTERVAL HPI/OVERNIGHT EVENTS:  O/N: CAIT   This morning: Patient was seen and examined at bedside.      VITAL SIGNS:  T(F): 98.6 (04-14-22 @ 06:37)  HR: 71 (04-14-22 @ 06:37)  BP: 144/76 (04-14-22 @ 06:37)  RR: 19 (04-14-22 @ 06:37)  SpO2: 98% (04-14-22 @ 06:37)  Wt(kg): --    PHYSICAL EXAM:    Constitutional: NAD, awake, communicative   HEENT: PERRL, EOMI, sclera non-icteric, no JVD,  Respiratory: CTA b/l, non distressed   Cardiovascular: RRR, normal S1S2, no M/R/G  Gastrointestinal: abdomen soft, NTND  Extremities: Warm, well perfused,  no edema  Neurological: AAO to person and place, patient with weakness in BL LEs at ankle 1-2/5, at knee 3-4/5, at hip 4-5/5 (symmetrical), BL UEs 4-5/5 in all motions, intact dermatomes but reports that sensation is diminished in LEs   Psych: Somewhat flat affect   Skin: Normal temperature, warm, dry    MEDICATIONS  (STANDING):  amLODIPine   Tablet 10 milliGRAM(s) Oral daily  ampicillin  IVPB 1 Gram(s) IV Intermittent every 6 hours  atorvastatin 40 milliGRAM(s) Oral at bedtime  dextrose 5%. 1000 milliLiter(s) (50 mL/Hr) IV Continuous <Continuous>  dextrose 5%. 1000 milliLiter(s) (100 mL/Hr) IV Continuous <Continuous>  dextrose 50% Injectable 25 Gram(s) IV Push once  dextrose 50% Injectable 12.5 Gram(s) IV Push once  dextrose 50% Injectable 25 Gram(s) IV Push once  DULoxetine 20 milliGRAM(s) Oral daily  gabapentin 100 milliGRAM(s) Oral every 8 hours  glucagon  Injectable 1 milliGRAM(s) IntraMuscular once  heparin   Injectable 5000 Unit(s) SubCutaneous every 8 hours  insulin glargine Injectable (LANTUS) 10 Unit(s) SubCutaneous at bedtime  insulin lispro (ADMELOG) corrective regimen sliding scale   SubCutaneous Before meals and at bedtime  insulin lispro Injectable (ADMELOG) 9 Unit(s) SubCutaneous three times a day before meals  lisinopril 40 milliGRAM(s) Oral every 24 hours  pantoprazole    Tablet 40 milliGRAM(s) Oral before breakfast    MEDICATIONS  (PRN):  dextrose Oral Gel 15 Gram(s) Oral once PRN Blood Glucose LESS THAN 70 milliGRAM(s)/deciliter      Allergies    No Known Allergies    Intolerances        LABS:                        9.8    5.51  )-----------( 307      ( 14 Apr 2022 06:51 )             31.8     04-14    134<L>  |  101  |  29<H>  ----------------------------<  161<H>  4.5   |  26  |  0.65    Ca    8.2<L>      14 Apr 2022 06:51  Phos  3.7     04-14  Mg     1.9     04-14    TPro  5.9<L>  /  Alb  2.9<L>  /  TBili  0.2  /  DBili  x   /  AST  14  /  ALT  8<L>  /  AlkPhos  32<L>  04-13      RADIOLOGY & ADDITIONAL TESTS:  Reviewed

## 2022-04-14 NOTE — DIETITIAN INITIAL EVALUATION ADULT - PERTINENT LABORATORY DATA
04-14    134<L>  |  101  |  29<H>  ----------------------------<  161<H>  4.5   |  26  |  0.65    Ca    8.2<L>      14 Apr 2022 06:51  Phos  3.7     04-14  Mg     1.9     04-14    TPro  5.9<L>  /  Alb  2.9<L>  /  TBili  0.2  /  DBili  x   /  AST  14  /  ALT  8<L>  /  AlkPhos  32<L>  04-13  POCT Blood Glucose.: 202 mg/dL (04-14-22 @ 12:20)  A1C with Estimated Average Glucose Result: 11.0 % (04-09-22 @ 08:50)  A1C with Estimated Average Glucose Result: 11.6 % (03-27-22 @ 13:04)

## 2022-04-14 NOTE — PROGRESS NOTE ADULT - ASSESSMENT
72y Female with PMHx of T2DM c/b peripheral neuropathy, HTN, recent admission and workup for Naty Sanchez tear, TIA on 3/30 (MR negative) presents to [facility] as transfer from [facility] ED for episode of altered mental status (staring, generalized weakness), back to baseline in [facility] ED. CTH negative in ED for acute pathology. 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 EEG has been negative for seizure. Patient has been intermittently hypertensive, restarted on home Lisinopril and added Amlodipine 10 mg on 4/10. Patient's UA was mildly positive, but urine culture grew E faecalis, started her on Ceftriaxone 4/11-4/13. Patient's altered mental status likely metabolic encephalopathy due to UTI and hypertensive encephalopathy.

## 2022-04-14 NOTE — PROGRESS NOTE ADULT - PROBLEM SELECTOR PLAN 4
Pt with hx of DM, A1c 11.6. Endocrine following. TSH results: 1.120  - C/w ISS  - C/w Lantus 10 U, Lispro 9U   - F/u endocrine recs   -C/w Cymbalta 200 Qd for diabetic neuropathy

## 2022-04-14 NOTE — DIETITIAN INITIAL EVALUATION ADULT - ADD RECOMMEND
1. Continue DASH/consistent carb diet  2. Trend wts   3. Honor food preferences  4. Reinforce education prn

## 2022-04-14 NOTE — PROGRESS NOTE ADULT - SUBJECTIVE AND OBJECTIVE BOX
INTERVAL HPI/OVERNIGHT EVENTS:    Patient is a 72y old  Female who presents with a chief complaint of MED EVAL  Patient eating well. Continues to have weakness, unable to move legs well. Neurology evaluating patient.   Plan to have braces placed on feet.    (14 Apr 2022 16:37)      Pt reports the following symptoms:    CONSTITUTIONAL:  Negative fever or chills, feels well, good appetite  EYES:  Negative  blurry vision or double vision  CARDIOVASCULAR:  Negative for chest pain or palpitations  RESPIRATORY:  Negative for cough, wheezing, or SOB   GASTROINTESTINAL:  Negative for nausea, vomiting, diarrhea, constipation, or abdominal pain  GENITOURINARY:  Negative frequency, urgency or dysuria  NEUROLOGIC:  No headache, confusion, dizziness, lightheadedness    MEDICATIONS  (STANDING):  amLODIPine   Tablet 10 milliGRAM(s) Oral daily  ampicillin  IVPB 1 Gram(s) IV Intermittent every 6 hours  atorvastatin 40 milliGRAM(s) Oral at bedtime  dextrose 5%. 1000 milliLiter(s) (50 mL/Hr) IV Continuous <Continuous>  dextrose 5%. 1000 milliLiter(s) (100 mL/Hr) IV Continuous <Continuous>  dextrose 50% Injectable 25 Gram(s) IV Push once  dextrose 50% Injectable 12.5 Gram(s) IV Push once  dextrose 50% Injectable 25 Gram(s) IV Push once  DULoxetine 20 milliGRAM(s) Oral daily  glucagon  Injectable 1 milliGRAM(s) IntraMuscular once  heparin   Injectable 5000 Unit(s) SubCutaneous every 8 hours  insulin glargine Injectable (LANTUS) 10 Unit(s) SubCutaneous at bedtime  insulin lispro (ADMELOG) corrective regimen sliding scale   SubCutaneous Before meals and at bedtime  insulin lispro Injectable (ADMELOG) 10 Unit(s) SubCutaneous three times a day before meals  lisinopril 40 milliGRAM(s) Oral every 24 hours  pantoprazole    Tablet 40 milliGRAM(s) Oral before breakfast    MEDICATIONS  (PRN):  dextrose Oral Gel 15 Gram(s) Oral once PRN Blood Glucose LESS THAN 70 milliGRAM(s)/deciliter      PHYSICAL EXAM  Vital Signs Last 24 Hrs  T(C): 36.7 (14 Apr 2022 12:00), Max: 37 (13 Apr 2022 20:42)  T(F): 98.1 (14 Apr 2022 12:00), Max: 98.6 (13 Apr 2022 20:42)  HR: 70 (14 Apr 2022 12:00) (70 - 79)  BP: 121/66 (14 Apr 2022 12:00) (108/65 - 144/76)  BP(mean): --  RR: 18 (14 Apr 2022 12:00) (18 - 20)  SpO2: 98% (14 Apr 2022 12:00) (98% - 99%)    Constitutional: NAD.   HEENT: NCAT, MMM, OP clear, EOMI, , no proptosis or lid retraction  Neck: no thyromegaly or palpable thyroid nodules   Respiratory: lungs CTAB.  Cardiovascular: regular rhythm, normal S1 and S2, no audible murmurs, no peripheral edema  GI: soft, NT/ND, no masses/HSM appreciated.  Neurology: no tremors, slowed speech, lower extremity weakness b/l, b/l foot drop   Skin: no visible rashes/lesions  Psychiatric: AAO x 3, normal affect/mood.    LABS:                        9.8    5.51  )-----------( 307      ( 14 Apr 2022 06:51 )             31.8     04-14    134<L>  |  101  |  29<H>  ----------------------------<  161<H>  4.5   |  26  |  0.65    Ca    8.2<L>      14 Apr 2022 06:51  Phos  3.7     04-14  Mg     1.9     04-14    TPro  5.9<L>  /  Alb  2.9<L>  /  TBili  0.2  /  DBili  x   /  AST  14  /  ALT  8<L>  /  AlkPhos  32<L>  04-13        Thyroid Stimulating Hormone, Serum: 1.870 uIU/mL (04-09 @ 08:50)  Thyroid Stimulating Hormone, Serum: 1.120 uIU/mL (04-01 @ 07:54)      HbA1C:         Insulin Sliding Scale requirements X 24 Hours:      RADIOLOGY & ADDITIONAL TESTS:      A/P:72y Female with PMHx of T2DM c/b peripheral neuropathy, HTN, recent admission and workup for Naty Sanchez tear, TIA on 3/30 (MR negative) presents to [facility] as transfer from [facility] ED for episode of AMS, back to baseline in [facility] ED. CTH negative in ED for acute pathology. On medicine to assess for AMS.   A1c:11.%  Wt:63.4kg  Cr:0.45  GFR:101  1.  DM type  2  f/u c-peptide level   Patient appears to be recovering from prior glucose toxic state.   Please continue lantus  10  units at night.    Please continue lispro  10  units before each meal.    Please continue lispro moderate dose sliding scale four times daily with meals and at bedtime    Pt's fingerstick glucose goal is 100-180    Will continue to monitor     For discharge, pt can continue TBD    Pt can follow up at discharge with [facility] by calling  to make an appointment.   Will discuss case with  and update primary team

## 2022-04-14 NOTE — DIETITIAN INITIAL EVALUATION ADULT - WEIGHT FOR BMI (LBS)
I do not have anything here or in [place] before Jan 8   
Patient needs to be seen to get refill on script. Can you schedule before the January 8th?   
140

## 2022-04-15 PROBLEM — Z00.00 ENCOUNTER FOR PREVENTIVE HEALTH EXAMINATION: Status: ACTIVE | Noted: 2022-04-15

## 2022-04-15 LAB
ANION GAP SERPL CALC-SCNC: 7 MMOL/L — SIGNIFICANT CHANGE UP (ref 5–17)
BASOPHILS # BLD AUTO: 0.01 K/UL — SIGNIFICANT CHANGE UP (ref 0–0.2)
BASOPHILS NFR BLD AUTO: 0.2 % — SIGNIFICANT CHANGE UP (ref 0–2)
BLD GP AB SCN SERPL QL: NEGATIVE — SIGNIFICANT CHANGE UP
BUN SERPL-MCNC: 22 MG/DL — SIGNIFICANT CHANGE UP (ref 7–23)
C PEPTIDE SERPL-MCNC: 0.3 NG/ML — LOW (ref 1.1–4.4)
CALCIUM SERPL-MCNC: 8.6 MG/DL — SIGNIFICANT CHANGE UP (ref 8.4–10.5)
CHLORIDE SERPL-SCNC: 101 MMOL/L — SIGNIFICANT CHANGE UP (ref 96–108)
CO2 SERPL-SCNC: 30 MMOL/L — SIGNIFICANT CHANGE UP (ref 22–31)
CREAT SERPL-MCNC: 0.63 MG/DL — SIGNIFICANT CHANGE UP (ref 0.5–1.3)
EGFR: 93 ML/MIN/1.73M2 — SIGNIFICANT CHANGE UP
EOSINOPHIL # BLD AUTO: 0.26 K/UL — SIGNIFICANT CHANGE UP (ref 0–0.5)
EOSINOPHIL NFR BLD AUTO: 5 % — SIGNIFICANT CHANGE UP (ref 0–6)
GLUCOSE BLDC GLUCOMTR-MCNC: 157 MG/DL — HIGH (ref 70–99)
GLUCOSE BLDC GLUCOMTR-MCNC: 95 MG/DL — SIGNIFICANT CHANGE UP (ref 70–99)
GLUCOSE BLDC GLUCOMTR-MCNC: 96 MG/DL — SIGNIFICANT CHANGE UP (ref 70–99)
GLUCOSE BLDC GLUCOMTR-MCNC: 98 MG/DL — SIGNIFICANT CHANGE UP (ref 70–99)
GLUCOSE SERPL-MCNC: 120 MG/DL — HIGH (ref 70–99)
HCT VFR BLD CALC: 34.2 % — LOW (ref 34.5–45)
HGB BLD-MCNC: 10.6 G/DL — LOW (ref 11.5–15.5)
IMM GRANULOCYTES NFR BLD AUTO: 0.4 % — SIGNIFICANT CHANGE UP (ref 0–1.5)
LYMPHOCYTES # BLD AUTO: 2 K/UL — SIGNIFICANT CHANGE UP (ref 1–3.3)
LYMPHOCYTES # BLD AUTO: 38.6 % — SIGNIFICANT CHANGE UP (ref 13–44)
MAGNESIUM SERPL-MCNC: 1.7 MG/DL — SIGNIFICANT CHANGE UP (ref 1.6–2.6)
MCHC RBC-ENTMCNC: 27 PG — SIGNIFICANT CHANGE UP (ref 27–34)
MCHC RBC-ENTMCNC: 31 GM/DL — LOW (ref 32–36)
MCV RBC AUTO: 87 FL — SIGNIFICANT CHANGE UP (ref 80–100)
MONOCYTES # BLD AUTO: 0.4 K/UL — SIGNIFICANT CHANGE UP (ref 0–0.9)
MONOCYTES NFR BLD AUTO: 7.7 % — SIGNIFICANT CHANGE UP (ref 2–14)
NEUTROPHILS # BLD AUTO: 2.49 K/UL — SIGNIFICANT CHANGE UP (ref 1.8–7.4)
NEUTROPHILS NFR BLD AUTO: 48.1 % — SIGNIFICANT CHANGE UP (ref 43–77)
NRBC # BLD: 0 /100 WBCS — SIGNIFICANT CHANGE UP (ref 0–0)
PHOSPHATE SERPL-MCNC: 3.9 MG/DL — SIGNIFICANT CHANGE UP (ref 2.5–4.5)
PLATELET # BLD AUTO: 301 K/UL — SIGNIFICANT CHANGE UP (ref 150–400)
POTASSIUM SERPL-MCNC: 4.6 MMOL/L — SIGNIFICANT CHANGE UP (ref 3.5–5.3)
POTASSIUM SERPL-SCNC: 4.6 MMOL/L — SIGNIFICANT CHANGE UP (ref 3.5–5.3)
RBC # BLD: 3.93 M/UL — SIGNIFICANT CHANGE UP (ref 3.8–5.2)
RBC # FLD: 13.8 % — SIGNIFICANT CHANGE UP (ref 10.3–14.5)
RH IG SCN BLD-IMP: POSITIVE — SIGNIFICANT CHANGE UP
SARS-COV-2 RNA SPEC QL NAA+PROBE: SIGNIFICANT CHANGE UP
SODIUM SERPL-SCNC: 138 MMOL/L — SIGNIFICANT CHANGE UP (ref 135–145)
WBC # BLD: 5.18 K/UL — SIGNIFICANT CHANGE UP (ref 3.8–10.5)
WBC # FLD AUTO: 5.18 K/UL — SIGNIFICANT CHANGE UP (ref 3.8–10.5)

## 2022-04-15 PROCEDURE — 99231 SBSQ HOSP IP/OBS SF/LOW 25: CPT | Mod: GC

## 2022-04-15 PROCEDURE — 99232 SBSQ HOSP IP/OBS MODERATE 35: CPT

## 2022-04-15 RX ORDER — ASPIRIN/CALCIUM CARB/MAGNESIUM 324 MG
81 TABLET ORAL DAILY
Refills: 0 | Status: DISCONTINUED | OUTPATIENT
Start: 2022-04-15 | End: 2022-06-16

## 2022-04-15 RX ORDER — INSULIN GLARGINE 100 [IU]/ML
8 INJECTION, SOLUTION SUBCUTANEOUS AT BEDTIME
Refills: 0 | Status: DISCONTINUED | OUTPATIENT
Start: 2022-04-15 | End: 2022-04-19

## 2022-04-15 RX ADMIN — ATORVASTATIN CALCIUM 40 MILLIGRAM(S): 80 TABLET, FILM COATED ORAL at 21:25

## 2022-04-15 RX ADMIN — Medication 2: at 12:32

## 2022-04-15 RX ADMIN — Medication 81 MILLIGRAM(S): at 13:04

## 2022-04-15 RX ADMIN — Medication 10 UNIT(S): at 17:46

## 2022-04-15 RX ADMIN — Medication 108 GRAM(S): at 05:17

## 2022-04-15 RX ADMIN — HEPARIN SODIUM 5000 UNIT(S): 5000 INJECTION INTRAVENOUS; SUBCUTANEOUS at 13:20

## 2022-04-15 RX ADMIN — Medication 10 UNIT(S): at 12:32

## 2022-04-15 RX ADMIN — HEPARIN SODIUM 5000 UNIT(S): 5000 INJECTION INTRAVENOUS; SUBCUTANEOUS at 05:17

## 2022-04-15 RX ADMIN — INSULIN GLARGINE 8 UNIT(S): 100 INJECTION, SOLUTION SUBCUTANEOUS at 21:25

## 2022-04-15 RX ADMIN — Medication 108 GRAM(S): at 12:08

## 2022-04-15 RX ADMIN — HEPARIN SODIUM 5000 UNIT(S): 5000 INJECTION INTRAVENOUS; SUBCUTANEOUS at 21:25

## 2022-04-15 RX ADMIN — Medication 108 GRAM(S): at 23:45

## 2022-04-15 RX ADMIN — PANTOPRAZOLE SODIUM 40 MILLIGRAM(S): 20 TABLET, DELAYED RELEASE ORAL at 06:23

## 2022-04-15 RX ADMIN — Medication 10 UNIT(S): at 09:09

## 2022-04-15 RX ADMIN — Medication 108 GRAM(S): at 17:55

## 2022-04-15 RX ADMIN — DULOXETINE HYDROCHLORIDE 20 MILLIGRAM(S): 30 CAPSULE, DELAYED RELEASE ORAL at 12:08

## 2022-04-15 RX ADMIN — LISINOPRIL 40 MILLIGRAM(S): 2.5 TABLET ORAL at 06:23

## 2022-04-15 RX ADMIN — AMLODIPINE BESYLATE 10 MILLIGRAM(S): 2.5 TABLET ORAL at 05:17

## 2022-04-15 NOTE — PROGRESS NOTE ADULT - SUBJECTIVE AND OBJECTIVE BOX
INTERVAL HPI/OVERNIGHT EVENTS:    Patient is a 72y old  Female who presents with a chief complaint of episode of AMS (15 Apr 2022 12:12)  Patient with no acute changes. Continues to complain about her feets. She is eating well. Appears to be requiring less insulin.     Pt reports the following symptoms:    CONSTITUTIONAL:  Negative fever or chills, feels well, good appetite  EYES:  Negative  blurry vision or double vision  CARDIOVASCULAR:  Negative for chest pain or palpitations  RESPIRATORY:  Negative for cough, wheezing, or SOB   GASTROINTESTINAL:  Negative for nausea, vomiting, diarrhea, constipation, or abdominal pain  GENITOURINARY:  Negative frequency, urgency or dysuria  NEUROLOGIC:  No headache, confusion, dizziness, lightheadedness    MEDICATIONS  (STANDING):  amLODIPine   Tablet 10 milliGRAM(s) Oral daily  ampicillin  IVPB 1 Gram(s) IV Intermittent every 6 hours  aspirin  chewable 81 milliGRAM(s) Oral daily  atorvastatin 40 milliGRAM(s) Oral at bedtime  dextrose 5%. 1000 milliLiter(s) (50 mL/Hr) IV Continuous <Continuous>  dextrose 5%. 1000 milliLiter(s) (100 mL/Hr) IV Continuous <Continuous>  dextrose 50% Injectable 25 Gram(s) IV Push once  dextrose 50% Injectable 12.5 Gram(s) IV Push once  dextrose 50% Injectable 25 Gram(s) IV Push once  DULoxetine 20 milliGRAM(s) Oral daily  glucagon  Injectable 1 milliGRAM(s) IntraMuscular once  heparin   Injectable 5000 Unit(s) SubCutaneous every 8 hours  insulin glargine Injectable (LANTUS) 8 Unit(s) SubCutaneous at bedtime  insulin lispro (ADMELOG) corrective regimen sliding scale   SubCutaneous Before meals and at bedtime  insulin lispro Injectable (ADMELOG) 10 Unit(s) SubCutaneous three times a day before meals  lisinopril 40 milliGRAM(s) Oral every 24 hours  pantoprazole    Tablet 40 milliGRAM(s) Oral before breakfast    MEDICATIONS  (PRN):  dextrose Oral Gel 15 Gram(s) Oral once PRN Blood Glucose LESS THAN 70 milliGRAM(s)/deciliter      PHYSICAL EXAM  Vital Signs Last 24 Hrs  T(C): 36.8 (15 Apr 2022 13:17), Max: 36.9 (15 Apr 2022 06:15)  T(F): 98.3 (15 Apr 2022 13:17), Max: 98.5 (15 Apr 2022 06:15)  HR: 68 (15 Apr 2022 13:17) (68 - 83)  BP: 112/63 (15 Apr 2022 13:17) (112/63 - 146/67)  BP(mean): --  RR: 18 (15 Apr 2022 13:17) (18 - 18)  SpO2: 99% (15 Apr 2022 13:17) (96% - 99%)    Constitutional: NAD.   HEENT: NCAT, MMM, OP clear, EOMI, , no proptosis or lid retraction  Neck: no thyromegaly or palpable thyroid nodules   Respiratory: lungs CTAB.  Cardiovascular: regular rhythm, normal S1 and S2, no audible murmurs, no peripheral edema  GI: soft, NT/ND, no masses/HSM appreciated.  Neurology: no tremors, slowed speech, lower extremity weakness b/l, b/l foot drop   Skin: no visible rashes/lesions  Psychiatric: AAO x 3, normal affect/mood.    LABS:                        10.6   5.18  )-----------( 301      ( 15 Apr 2022 07:27 )             34.2     04-15    138  |  101  |  22  ----------------------------<  120<H>  4.6   |  30  |  0.63    Ca    8.6      15 Apr 2022 07:27  Phos  3.9     04-15  Mg     1.7     04-15          Thyroid Stimulating Hormone, Serum: 1.870 uIU/mL (04-09 @ 08:50)  Thyroid Stimulating Hormone, Serum: 1.120 uIU/mL (04-01 @ 07:54)      HbA1C:         Insulin Sliding Scale requirements X 24 Hours:      RADIOLOGY & ADDITIONAL TESTS:      A/P:72y Female with PMHx of T2DM c/b peripheral neuropathy, HTN, recent admission and workup for Naty Sanchez tear, TIA on 3/30 (MR negative) presents to [facility] as transfer from [facility] ED for episode of AMS, back to baseline in [facility] ED. CTH negative in ED for acute pathology. On medicine to assess for AMS.   A1c:11.%  Wt:63.4kg  Cr:0.45  GFR:101  1.  DM type  2  c-peptide level : 0.3 low (likely insulinopenic)   Patient appears to be recovering from prior glucose toxic state.   Please continue lantus  8  units at night.    Please continue lispro  10  units before each meal.    Please continue lispro moderate dose sliding scale four times daily with meals and at bedtime    Pt's fingerstick glucose goal is 100-180    Will continue to monitor     For discharge, pt can continue TBD    Pt can follow up at discharge with [facility] by calling  to make an appointment.   Will discuss case with  and update primary team

## 2022-04-15 NOTE — PROGRESS NOTE ADULT - ASSESSMENT
72y Female with PMHx of T2DM c/b peripheral neuropathy, HTN, recent admission and workup for Naty Sanchez tear, TIA on 3/30 (MR negative) presents to [facility] as transfer from [facility] ED for episode of altered mental status (staring, generalized weakness), back to baseline in [facility] ED. CTH negative in ED for acute pathology. 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 EEG has been negative for seizure. Patient has been intermittently hypertensive, restarted on home Lisinopril and added Amlodipine 10 mg on 4/10. Patient's UA was mildly positive, but urine culture grew E faecalis, started her on Ceftriaxone 4/11-4/13. Patient's altered mental status likely metabolic encephalopathy due to UTI and hypertensive encephalopathy.

## 2022-04-15 NOTE — PROGRESS NOTE ADULT - PROBLEM SELECTOR PLAN 2
Symmetrical LE weakness, worse distally compared to proximally   Symmetrical diminished sensation, longstanding diabetic peripheral neuropathy   Denies issues with incontinence, or back pain, no sensory line  MRI L/Spine:  - showing at the L3/4 leveI. There are degenerative   changes involving the facets bilaterally as well as ligamentum flavum   hypertrophy. This combination results in moderate central spinal canal   stenosis.   - At the L4/5 level, there is disc bulge abutting the L4 nerve roots   bilaterally. There are degenerative changes involving the facets   bilaterally (right greater than left) as well as ligamentum flavum   hypertrophy. This combination results in mild central spinal canal   stenosis.   DDx: Progression of diabetic neuropathy vs central process from cord compression although sensory loss doesn't coorelate, difficult to delineate where patient is experiencing her sensory loss on my exam    - starting cymbalta  - Per gen neuro: progression of diabetic neuropathy   - Consulted physiatry for braces for her BL LE Symmetrical LE weakness, worse distally compared to proximally   Symmetrical diminished sensation, longstanding diabetic peripheral neuropathy   Denies issues with incontinence, or back pain, no sensory line  MRI L/Spine:  - showing at the L3/4 leveI. There are degenerative   changes involving the facets bilaterally as well as ligamentum flavum   hypertrophy. This combination results in moderate central spinal canal   stenosis.   - At the L4/5 level, there is disc bulge abutting the L4 nerve roots   bilaterally. There are degenerative changes involving the facets   bilaterally (right greater than left) as well as ligamentum flavum   hypertrophy. This combination results in mild central spinal canal   stenosis.   -likely progression of diabetic neuropathy     - cymbalta started at 20mg daily   - Consulted physiatry for braces for her BL LE

## 2022-04-15 NOTE — PROGRESS NOTE ADULT - ATTENDING COMMENTS
Exam stable - drowsy but arousable, follows commands, oriented to person place and relative time  Severe distal LE weakness, sensory loss and hyporeflexia consistent with polyneuropathy, likely diabetic given lack of alternate etiology  atrophy of tibialis anterior and achilles heel shortening / contracture indicate chronicity   continue cymbalta, obtain AFOs, continue PT, d/c home when stable

## 2022-04-15 NOTE — PROGRESS NOTE ADULT - SUBJECTIVE AND OBJECTIVE BOX
INTERVAL HPI/OVERNIGHT EVENTS:  O/N:  This morning: Patient was seen and examined at bedside.      VITAL SIGNS:  T(F): 98.5 (04-15-22 @ 06:15)  HR: 68 (04-15-22 @ 06:15)  BP: 140/62 (04-15-22 @ 06:15)  RR: 18 (04-15-22 @ 06:15)  SpO2: 98% (04-15-22 @ 06:15)  Wt(kg): --    PHYSICAL EXAM:    Constitutional: NAD  HEENT: PERRL, EOMI, sclera non-icteric, neck supple, trachea midline, no masses, no JVD, MMM, good dentition  Respiratory: CTA b/l, good air entry b/l, no wheezing, no rhonchi, no rales, without accessory muscle use and no intercostal retractions  Cardiovascular: RRR, normal S1S2, no M/R/G  Gastrointestinal: abdomen soft, NTND, no masses palpable, BS normal  Extremities: Warm, well perfused, pulses equal bilateral upper and lower extremities, no edema, no clubbing  Neurological: AAOx3, CN Grossly intact  Skin: Normal temperature, warm, dry    MEDICATIONS  (STANDING):  amLODIPine   Tablet 10 milliGRAM(s) Oral daily  ampicillin  IVPB 1 Gram(s) IV Intermittent every 6 hours  atorvastatin 40 milliGRAM(s) Oral at bedtime  dextrose 5%. 1000 milliLiter(s) (50 mL/Hr) IV Continuous <Continuous>  dextrose 5%. 1000 milliLiter(s) (100 mL/Hr) IV Continuous <Continuous>  dextrose 50% Injectable 25 Gram(s) IV Push once  dextrose 50% Injectable 12.5 Gram(s) IV Push once  dextrose 50% Injectable 25 Gram(s) IV Push once  DULoxetine 20 milliGRAM(s) Oral daily  glucagon  Injectable 1 milliGRAM(s) IntraMuscular once  heparin   Injectable 5000 Unit(s) SubCutaneous every 8 hours  insulin glargine Injectable (LANTUS) 10 Unit(s) SubCutaneous at bedtime  insulin lispro (ADMELOG) corrective regimen sliding scale   SubCutaneous Before meals and at bedtime  insulin lispro Injectable (ADMELOG) 10 Unit(s) SubCutaneous three times a day before meals  lisinopril 40 milliGRAM(s) Oral every 24 hours  pantoprazole    Tablet 40 milliGRAM(s) Oral before breakfast    MEDICATIONS  (PRN):  dextrose Oral Gel 15 Gram(s) Oral once PRN Blood Glucose LESS THAN 70 milliGRAM(s)/deciliter    Allergies    No Known Allergies    Intolerances        LABS:                        10.6   5.18  )-----------( 301      ( 15 Apr 2022 07:27 )             34.2     04-15    138  |  101  |  22  ----------------------------<  120<H>  4.6   |  30  |  0.63    Ca    8.6      15 Apr 2022 07:27  Phos  3.9     04-15  Mg     1.7     04-15                  RADIOLOGY & ADDITIONAL TESTS:  Reviewed INTERVAL HPI/OVERNIGHT EVENTS: no significant overnight events    VITAL SIGNS:  T(F): 98.5 (04-15-22 @ 06:15)  HR: 68 (04-15-22 @ 06:15)  BP: 140/62 (04-15-22 @ 06:15)  RR: 18 (04-15-22 @ 06:15)  SpO2: 98% (04-15-22 @ 06:15)  Wt(kg): --    PHYSICAL EXAM:    Constitutional: NAD  HEENT: PERRL, EOMI, sclera non-icteric, neck supple, trachea midline, no masses, no JVD, MMM, good dentition  Respiratory: CTA b/l, good air entry b/l, no wheezing, no rhonchi, no rales, without accessory muscle use and no intercostal retractions  Cardiovascular: RRR, normal S1S2, no M/R/G  Gastrointestinal: abdomen soft, NTND, no masses palpable, BS normal  Extremities: Warm, well perfused, pulses equal bilateral upper and lower extremities, no edema, no clubbing  Neurological: AAOx3, CN Grossly intact, UE strength 5/5 b/l, LE hip flexion 4/5, knee ext 5, knee flex 4, ankle dorsiflexion 2, EHL/EDB 0  Skin: Normal temperature, warm, dry    MEDICATIONS  (STANDING):  amLODIPine   Tablet 10 milliGRAM(s) Oral daily  ampicillin  IVPB 1 Gram(s) IV Intermittent every 6 hours  atorvastatin 40 milliGRAM(s) Oral at bedtime  dextrose 5%. 1000 milliLiter(s) (50 mL/Hr) IV Continuous <Continuous>  dextrose 5%. 1000 milliLiter(s) (100 mL/Hr) IV Continuous <Continuous>  dextrose 50% Injectable 25 Gram(s) IV Push once  dextrose 50% Injectable 12.5 Gram(s) IV Push once  dextrose 50% Injectable 25 Gram(s) IV Push once  DULoxetine 20 milliGRAM(s) Oral daily  glucagon  Injectable 1 milliGRAM(s) IntraMuscular once  heparin   Injectable 5000 Unit(s) SubCutaneous every 8 hours  insulin glargine Injectable (LANTUS) 10 Unit(s) SubCutaneous at bedtime  insulin lispro (ADMELOG) corrective regimen sliding scale   SubCutaneous Before meals and at bedtime  insulin lispro Injectable (ADMELOG) 10 Unit(s) SubCutaneous three times a day before meals  lisinopril 40 milliGRAM(s) Oral every 24 hours  pantoprazole    Tablet 40 milliGRAM(s) Oral before breakfast    MEDICATIONS  (PRN):  dextrose Oral Gel 15 Gram(s) Oral once PRN Blood Glucose LESS THAN 70 milliGRAM(s)/deciliter    Allergies    No Known Allergies    Intolerances        LABS:                        10.6   5.18  )-----------( 301      ( 15 Apr 2022 07:27 )             34.2     04-15    138  |  101  |  22  ----------------------------<  120<H>  4.6   |  30  |  0.63    Ca    8.6      15 Apr 2022 07:27  Phos  3.9     04-15  Mg     1.7     04-15      RADIOLOGY & ADDITIONAL TESTS:  Reviewed

## 2022-04-15 NOTE — PROGRESS NOTE ADULT - PROBLEM SELECTOR PLAN 1
Metabolic encephalopathy likely due to UTI and hypertensive encephalopathy.  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   EEG 4/9-4/11 with right greater than left slowing, diffuse cerebral dysfunction, no epileptiform activity.  - Will put back on ASA   - c/w atorvastatin 40mg daily  - UTI treatment as below  - HTN treatment as below  - continue PT/OT

## 2022-04-15 NOTE — PROGRESS NOTE ADULT - PROBLEM SELECTOR PLAN 3
UA with +WBCs, UCx growing E faecalis.   Will treat as complicated  - Ampicillin, currently on day 4 of abx

## 2022-04-15 NOTE — PROGRESS NOTE ADULT - PROBLEM SELECTOR PLAN 4
Pt with hx of DM, A1c 11.6. Endocrine following. TSH results: 1.120  - C/w ISS  - C/w Lantus 10 U, Lispro 10U   - F/u endocrine recs   -C/w Cymbalta 200 Qd for diabetic neuropathy Pt with hx of DM, A1c 11.6. Endocrine following. TSH results: 1.120  - C/w ISS  - C/w Lantus 8U, Lispro 10U   - Low C peptide patient will need insulin and NOT oral agents on dc  - F/u endocrine recs   -C/w Cymbalta 200 Qd for diabetic neuropathy Pt with hx of DM, A1c 11.6. Endocrine following. TSH results: 1.120  - C/w ISS  - C/w Lantus 8U, Lispro 10U   - Low C peptide patient will need insulin and NOT oral agents on dc  - F/u endocrine recs   -C/w Cymbalta 20 Qd for diabetic neuropathy

## 2022-04-15 NOTE — PROGRESS NOTE ADULT - ATTENDING COMMENTS
Pt seen on rounds this afternoon.  Remains somewhat passive and "flat," minimally communicative  PO intake seems to be fine  Basal insulin requirements continue to decrease--AM fingerstick down to 96 this morning with 10 units Lantus  Post-prandial glucoses under adequate control on 10 units lispro  To try decreasing the Lantus further to 8 units  Despite the relatively low doses of insulin, she is unlikely to be controllable with oral agents given her C-peptide of 0.3 ng/ml--borderline for a diagnosis of type 1 diabetes

## 2022-04-16 LAB
GLUCOSE BLDC GLUCOMTR-MCNC: 133 MG/DL — HIGH (ref 70–99)
GLUCOSE BLDC GLUCOMTR-MCNC: 153 MG/DL — HIGH (ref 70–99)
GLUCOSE BLDC GLUCOMTR-MCNC: 157 MG/DL — HIGH (ref 70–99)
GLUCOSE BLDC GLUCOMTR-MCNC: 99 MG/DL — SIGNIFICANT CHANGE UP (ref 70–99)

## 2022-04-16 PROCEDURE — 99231 SBSQ HOSP IP/OBS SF/LOW 25: CPT

## 2022-04-16 RX ORDER — DULOXETINE HYDROCHLORIDE 30 MG/1
30 CAPSULE, DELAYED RELEASE ORAL EVERY 24 HOURS
Refills: 0 | Status: DISCONTINUED | OUTPATIENT
Start: 2022-04-16 | End: 2022-04-18

## 2022-04-16 RX ADMIN — Medication 10 UNIT(S): at 17:50

## 2022-04-16 RX ADMIN — Medication 108 GRAM(S): at 17:48

## 2022-04-16 RX ADMIN — HEPARIN SODIUM 5000 UNIT(S): 5000 INJECTION INTRAVENOUS; SUBCUTANEOUS at 13:27

## 2022-04-16 RX ADMIN — Medication 81 MILLIGRAM(S): at 12:44

## 2022-04-16 RX ADMIN — Medication 2: at 22:39

## 2022-04-16 RX ADMIN — ATORVASTATIN CALCIUM 40 MILLIGRAM(S): 80 TABLET, FILM COATED ORAL at 22:40

## 2022-04-16 RX ADMIN — HEPARIN SODIUM 5000 UNIT(S): 5000 INJECTION INTRAVENOUS; SUBCUTANEOUS at 05:32

## 2022-04-16 RX ADMIN — DULOXETINE HYDROCHLORIDE 30 MILLIGRAM(S): 30 CAPSULE, DELAYED RELEASE ORAL at 13:27

## 2022-04-16 RX ADMIN — Medication 108 GRAM(S): at 05:32

## 2022-04-16 RX ADMIN — HEPARIN SODIUM 5000 UNIT(S): 5000 INJECTION INTRAVENOUS; SUBCUTANEOUS at 22:40

## 2022-04-16 RX ADMIN — Medication 10 UNIT(S): at 09:12

## 2022-04-16 RX ADMIN — INSULIN GLARGINE 8 UNIT(S): 100 INJECTION, SOLUTION SUBCUTANEOUS at 22:40

## 2022-04-16 RX ADMIN — Medication 108 GRAM(S): at 12:44

## 2022-04-16 RX ADMIN — Medication 2: at 12:18

## 2022-04-16 RX ADMIN — AMLODIPINE BESYLATE 10 MILLIGRAM(S): 2.5 TABLET ORAL at 05:33

## 2022-04-16 RX ADMIN — LISINOPRIL 40 MILLIGRAM(S): 2.5 TABLET ORAL at 06:34

## 2022-04-16 RX ADMIN — PANTOPRAZOLE SODIUM 40 MILLIGRAM(S): 20 TABLET, DELAYED RELEASE ORAL at 06:34

## 2022-04-16 RX ADMIN — Medication 10 UNIT(S): at 12:18

## 2022-04-16 NOTE — PROGRESS NOTE ADULT - PROBLEM SELECTOR PLAN 4
Pt with hx of DM, A1c 11.6. Endocrine following. TSH results: 1.120  - C/w ISS  - C/w Lantus 8U, Lispro 10U   - Low C peptide patient will need insulin and NOT oral agents on dc  - F/u endocrine recs   -C/w Cymbalta 20 Qd for diabetic neuropathy Pt with hx of DM, A1c 11.6. Endocrine following. TSH results: 1.120  - C/w ISS  - C/w Lantus 8U, Lispro 10U   - Low C peptide patient will need insulin and NOT oral agents on dc  - F/u endocrine recs   -C/w Cymbalta 30 Qd for diabetic neuropathy

## 2022-04-16 NOTE — PROGRESS NOTE ADULT - PROBLEM SELECTOR PLAN 2
Symmetrical LE weakness, worse distally compared to proximally   Symmetrical diminished sensation, longstanding diabetic peripheral neuropathy   Denies issues with incontinence, or back pain, no sensory line  MRI L/Spine:  - showing at the L3/4 leveI. There are degenerative   changes involving the facets bilaterally as well as ligamentum flavum   hypertrophy. This combination results in moderate central spinal canal   stenosis.   - At the L4/5 level, there is disc bulge abutting the L4 nerve roots   bilaterally. There are degenerative changes involving the facets   bilaterally (right greater than left) as well as ligamentum flavum   hypertrophy. This combination results in mild central spinal canal   stenosis.   -likely progression of diabetic neuropathy     - cymbalta started at 20mg daily   - Consulted physiatry for braces for her BL LE Symmetrical LE weakness, worse distally compared to proximally   Symmetrical diminished sensation, longstanding diabetic peripheral neuropathy   Denies issues with incontinence, or back pain, no sensory line  MRI L/Spine:  - showing at the L3/4 leveI. There are degenerative   changes involving the facets bilaterally as well as ligamentum flavum   hypertrophy. This combination results in moderate central spinal canal   stenosis.   - At the L4/5 level, there is disc bulge abutting the L4 nerve roots   bilaterally. There are degenerative changes involving the facets   bilaterally (right greater than left) as well as ligamentum flavum   hypertrophy. This combination results in mild central spinal canal   stenosis.   -likely progression of diabetic neuropathy     - cymbalta started at 20mg daily, increase to 30mg daily   - Consulted physiatry for braces for her BL LE

## 2022-04-16 NOTE — PROGRESS NOTE ADULT - SUBJECTIVE AND OBJECTIVE BOX
INTERVAL HPI/OVERNIGHT EVENTS: no significant overnight events. The patient was seen and examined at the bedside. She was complaining of pain in her feet.     VITAL SIGNS:  T(F): 98.5 (04-15-22 @ 06:15)  HR: 68 (04-15-22 @ 06:15)  BP: 140/62 (04-15-22 @ 06:15)  RR: 18 (04-15-22 @ 06:15)  SpO2: 98% (04-15-22 @ 06:15)  Wt(kg): --    PHYSICAL EXAM:    Constitutional: NAD  HEENT: PERRL, EOMI, sclera non-icteric, neck supple, trachea midline, no masses, no JVD, MMM, good dentition  Respiratory: CTA b/l, good air entry b/l, no wheezing, no rhonchi, no rales, without accessory muscle use and no intercostal retractions  Cardiovascular: RRR, normal S1S2, no M/R/G  Gastrointestinal: abdomen soft, NTND, no masses palpable, BS normal  Extremities: Warm, well perfused, pulses equal bilateral upper and lower extremities, no edema, no clubbing  Neurological: AAOx3, CN Grossly intact, UE strength 5/5 b/l, LE hip flexion 4/5, knee ext 5, knee flex 4, ankle dorsiflexion 2, EHL/EDB 0  Skin: Normal temperature, warm, dry    MEDICATIONS  (STANDING):  amLODIPine   Tablet 10 milliGRAM(s) Oral daily  ampicillin  IVPB 1 Gram(s) IV Intermittent every 6 hours  atorvastatin 40 milliGRAM(s) Oral at bedtime  dextrose 5%. 1000 milliLiter(s) (50 mL/Hr) IV Continuous <Continuous>  dextrose 5%. 1000 milliLiter(s) (100 mL/Hr) IV Continuous <Continuous>  dextrose 50% Injectable 25 Gram(s) IV Push once  dextrose 50% Injectable 12.5 Gram(s) IV Push once  dextrose 50% Injectable 25 Gram(s) IV Push once  DULoxetine 20 milliGRAM(s) Oral daily  glucagon  Injectable 1 milliGRAM(s) IntraMuscular once  heparin   Injectable 5000 Unit(s) SubCutaneous every 8 hours  insulin glargine Injectable (LANTUS) 10 Unit(s) SubCutaneous at bedtime  insulin lispro (ADMELOG) corrective regimen sliding scale   SubCutaneous Before meals and at bedtime  insulin lispro Injectable (ADMELOG) 10 Unit(s) SubCutaneous three times a day before meals  lisinopril 40 milliGRAM(s) Oral every 24 hours  pantoprazole    Tablet 40 milliGRAM(s) Oral before breakfast    MEDICATIONS  (PRN):  dextrose Oral Gel 15 Gram(s) Oral once PRN Blood Glucose LESS THAN 70 milliGRAM(s)/deciliter    Allergies    No Known Allergies    Intolerances        LABS:                        10.6   5.18  )-----------( 301      ( 15 Apr 2022 07:27 )             34.2     04-15    138  |  101  |  22  ----------------------------<  120<H>  4.6   |  30  |  0.63    Ca    8.6      15 Apr 2022 07:27  Phos  3.9     04-15  Mg     1.7     04-15      RADIOLOGY & ADDITIONAL TESTS:  Reviewed

## 2022-04-16 NOTE — PROGRESS NOTE ADULT - ATTENDING COMMENTS
still complaining of neuropathic pain in feet - increase cymbalta to 30mg daily  continue other current meds   dispo planning

## 2022-04-16 NOTE — PROGRESS NOTE ADULT - ASSESSMENT
72y Female with PMHx of T2DM c/b peripheral neuropathy, HTN, recent admission and workup for Naty Sanchez tear, TIA on 3/30 (MR negative) presents to [facility] as transfer from [facility] ED for episode of altered mental status (staring, generalized weakness), back to baseline in [facility] ED. CTH negative in ED for acute pathology. 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 EEG has been negative for seizure. Patient has been intermittently hypertensive, restarted on home Lisinopril and added Amlodipine 10 mg on 4/10. Patient's UA was mildly positive, but urine culture grew E faecalis, started her on Ceftriaxone 4/11-4/13. Patient's altered mental status likely metabolic encephalopathy due to UTI and hypertensive encephalopathy.   The patient pain in her feet is most likely related to diabetic neuropathy   The patient is pending for displacement   No further intervention from neurology point of view

## 2022-04-17 LAB
ANION GAP SERPL CALC-SCNC: 8 MMOL/L — SIGNIFICANT CHANGE UP (ref 5–17)
BASOPHILS # BLD AUTO: 0.02 K/UL — SIGNIFICANT CHANGE UP (ref 0–0.2)
BASOPHILS NFR BLD AUTO: 0.4 % — SIGNIFICANT CHANGE UP (ref 0–2)
BUN SERPL-MCNC: 20 MG/DL — SIGNIFICANT CHANGE UP (ref 7–23)
CALCIUM SERPL-MCNC: 8.5 MG/DL — SIGNIFICANT CHANGE UP (ref 8.4–10.5)
CHLORIDE SERPL-SCNC: 103 MMOL/L — SIGNIFICANT CHANGE UP (ref 96–108)
CO2 SERPL-SCNC: 26 MMOL/L — SIGNIFICANT CHANGE UP (ref 22–31)
CREAT SERPL-MCNC: 0.52 MG/DL — SIGNIFICANT CHANGE UP (ref 0.5–1.3)
EGFR: 97 ML/MIN/1.73M2 — SIGNIFICANT CHANGE UP
EOSINOPHIL # BLD AUTO: 0.2 K/UL — SIGNIFICANT CHANGE UP (ref 0–0.5)
EOSINOPHIL NFR BLD AUTO: 4.4 % — SIGNIFICANT CHANGE UP (ref 0–6)
GLUCOSE BLDC GLUCOMTR-MCNC: 123 MG/DL — HIGH (ref 70–99)
GLUCOSE BLDC GLUCOMTR-MCNC: 245 MG/DL — HIGH (ref 70–99)
GLUCOSE BLDC GLUCOMTR-MCNC: 80 MG/DL — SIGNIFICANT CHANGE UP (ref 70–99)
GLUCOSE BLDC GLUCOMTR-MCNC: 91 MG/DL — SIGNIFICANT CHANGE UP (ref 70–99)
GLUCOSE SERPL-MCNC: 80 MG/DL — SIGNIFICANT CHANGE UP (ref 70–99)
HCT VFR BLD CALC: 32.1 % — LOW (ref 34.5–45)
HGB BLD-MCNC: 10 G/DL — LOW (ref 11.5–15.5)
IMM GRANULOCYTES NFR BLD AUTO: 0.2 % — SIGNIFICANT CHANGE UP (ref 0–1.5)
LYMPHOCYTES # BLD AUTO: 2.05 K/UL — SIGNIFICANT CHANGE UP (ref 1–3.3)
LYMPHOCYTES # BLD AUTO: 44.9 % — HIGH (ref 13–44)
MAGNESIUM SERPL-MCNC: 1.7 MG/DL — SIGNIFICANT CHANGE UP (ref 1.6–2.6)
MCHC RBC-ENTMCNC: 26.6 PG — LOW (ref 27–34)
MCHC RBC-ENTMCNC: 31.2 GM/DL — LOW (ref 32–36)
MCV RBC AUTO: 85.4 FL — SIGNIFICANT CHANGE UP (ref 80–100)
MONOCYTES # BLD AUTO: 0.28 K/UL — SIGNIFICANT CHANGE UP (ref 0–0.9)
MONOCYTES NFR BLD AUTO: 6.1 % — SIGNIFICANT CHANGE UP (ref 2–14)
NEUTROPHILS # BLD AUTO: 2.01 K/UL — SIGNIFICANT CHANGE UP (ref 1.8–7.4)
NEUTROPHILS NFR BLD AUTO: 44 % — SIGNIFICANT CHANGE UP (ref 43–77)
NRBC # BLD: 0 /100 WBCS — SIGNIFICANT CHANGE UP (ref 0–0)
PHOSPHATE SERPL-MCNC: 4.7 MG/DL — HIGH (ref 2.5–4.5)
PLATELET # BLD AUTO: 255 K/UL — SIGNIFICANT CHANGE UP (ref 150–400)
POTASSIUM SERPL-MCNC: 4.4 MMOL/L — SIGNIFICANT CHANGE UP (ref 3.5–5.3)
POTASSIUM SERPL-SCNC: 4.4 MMOL/L — SIGNIFICANT CHANGE UP (ref 3.5–5.3)
RBC # BLD: 3.76 M/UL — LOW (ref 3.8–5.2)
RBC # FLD: 13.9 % — SIGNIFICANT CHANGE UP (ref 10.3–14.5)
SODIUM SERPL-SCNC: 137 MMOL/L — SIGNIFICANT CHANGE UP (ref 135–145)
WBC # BLD: 4.57 K/UL — SIGNIFICANT CHANGE UP (ref 3.8–10.5)
WBC # FLD AUTO: 4.57 K/UL — SIGNIFICANT CHANGE UP (ref 3.8–10.5)

## 2022-04-17 PROCEDURE — 99231 SBSQ HOSP IP/OBS SF/LOW 25: CPT

## 2022-04-17 PROCEDURE — 99231 SBSQ HOSP IP/OBS SF/LOW 25: CPT | Mod: GC

## 2022-04-17 RX ORDER — MAGNESIUM SULFATE 500 MG/ML
2 VIAL (ML) INJECTION ONCE
Refills: 0 | Status: COMPLETED | OUTPATIENT
Start: 2022-04-17 | End: 2022-04-17

## 2022-04-17 RX ORDER — INSULIN LISPRO 100/ML
VIAL (ML) SUBCUTANEOUS
Refills: 0 | Status: DISCONTINUED | OUTPATIENT
Start: 2022-04-17 | End: 2022-04-19

## 2022-04-17 RX ADMIN — ATORVASTATIN CALCIUM 40 MILLIGRAM(S): 80 TABLET, FILM COATED ORAL at 22:49

## 2022-04-17 RX ADMIN — Medication 4: at 12:52

## 2022-04-17 RX ADMIN — HEPARIN SODIUM 5000 UNIT(S): 5000 INJECTION INTRAVENOUS; SUBCUTANEOUS at 22:49

## 2022-04-17 RX ADMIN — Medication 108 GRAM(S): at 00:32

## 2022-04-17 RX ADMIN — PANTOPRAZOLE SODIUM 40 MILLIGRAM(S): 20 TABLET, DELAYED RELEASE ORAL at 06:36

## 2022-04-17 RX ADMIN — LISINOPRIL 40 MILLIGRAM(S): 2.5 TABLET ORAL at 06:37

## 2022-04-17 RX ADMIN — Medication 10 UNIT(S): at 12:53

## 2022-04-17 RX ADMIN — Medication 108 GRAM(S): at 06:37

## 2022-04-17 RX ADMIN — HEPARIN SODIUM 5000 UNIT(S): 5000 INJECTION INTRAVENOUS; SUBCUTANEOUS at 13:51

## 2022-04-17 RX ADMIN — DULOXETINE HYDROCHLORIDE 30 MILLIGRAM(S): 30 CAPSULE, DELAYED RELEASE ORAL at 11:49

## 2022-04-17 RX ADMIN — Medication 108 GRAM(S): at 18:46

## 2022-04-17 RX ADMIN — AMLODIPINE BESYLATE 10 MILLIGRAM(S): 2.5 TABLET ORAL at 06:36

## 2022-04-17 RX ADMIN — Medication 108 GRAM(S): at 11:49

## 2022-04-17 RX ADMIN — Medication 10 UNIT(S): at 17:42

## 2022-04-17 RX ADMIN — Medication 81 MILLIGRAM(S): at 11:50

## 2022-04-17 RX ADMIN — Medication 25 GRAM(S): at 07:36

## 2022-04-17 RX ADMIN — HEPARIN SODIUM 5000 UNIT(S): 5000 INJECTION INTRAVENOUS; SUBCUTANEOUS at 06:36

## 2022-04-17 RX ADMIN — INSULIN GLARGINE 8 UNIT(S): 100 INJECTION, SOLUTION SUBCUTANEOUS at 22:49

## 2022-04-17 NOTE — PROGRESS NOTE ADULT - ASSESSMENT
72y Female with PMHx of T2DM c/b peripheral neuropathy, HTN, recent admission and workup for Naty Sanchez tear, TIA on 3/30 (MR negative) presents to [facility] as transfer from [facility] ED for episode of altered mental status (staring, generalized weakness), back to baseline in [facility] ED. CTH negative in ED for acute pathology. 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 EEG has been negative for seizure. Patient has been intermittently hypertensive, restarted on home Lisinopril and added Amlodipine 10 mg on 4/10. Patient's UA was mildly positive, but urine culture grew E faecalis, started her on Ceftriaxone 4/11-4/13. Patient's altered mental status likely metabolic encephalopathy due to UTI and hypertensive encephalopathy.  72y Female with PMHx of T2DM c/b peripheral neuropathy, HTN, recent admission and workup for Naty Sanchez tear, TIA on 3/30 (MR negative) presents to [facility] as transfer from [facility] ED for episode of altered mental status (staring, generalized weakness), back to baseline in [facility] ED, no acute pathology on CTH or MRI, found to have metabolic encephalopathy 2/2 UTI vs hypertension, also found to have new LE weakness of unclear origin, possible diabetic neuropathy

## 2022-04-17 NOTE — PROGRESS NOTE ADULT - PROBLEM SELECTOR PLAN 4
Pt with hx of DM, A1c 11.6. Endocrine following. TSH results: 1.120  - C/w ISS  - C/w Lantus 8U, Lispro 10U   - Low C peptide patient will need insulin and NOT oral agents on dc  - F/u endocrine recs   -C/w Cymbalta 30 Qd for diabetic neuropathy

## 2022-04-17 NOTE — PROGRESS NOTE ADULT - PROBLEM SELECTOR PLAN 1
RESOLVED. Metabolic encephalopathy likely due to UTI and hypertensive encephalopathy.  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   EEG 4/9-4/11 with right greater than left slowing, diffuse cerebral dysfunction, no epileptiform activity.  - C/w ASA   - c/w atorvastatin 40mg daily (dec from home 80)  - UTI treatment as below  - HTN treatment as below  - continue PT/OT

## 2022-04-17 NOTE — PROGRESS NOTE ADULT - SUBJECTIVE AND OBJECTIVE BOX
INTERVAL HPI/OVERNIGHT EVENTS:    Patient is a 72y old  Female who presents with a chief complaint of episode of AMS (17 Apr 2022 07:53)      Pt reports the following symptoms:    CONSTITUTIONAL:  Negative fever or chills, feels well, good appetite  EYES:  Negative  blurry vision or double vision  CARDIOVASCULAR:  Negative for chest pain or palpitations  RESPIRATORY:  Negative for cough, wheezing, or SOB   GASTROINTESTINAL:  Negative for nausea, vomiting, diarrhea, constipation, or abdominal pain  GENITOURINARY:  Negative frequency, urgency or dysuria  NEUROLOGIC:  No headache, confusion, dizziness, lightheadedness    MEDICATIONS  (STANDING):  amLODIPine   Tablet 10 milliGRAM(s) Oral daily  ampicillin  IVPB 1 Gram(s) IV Intermittent every 6 hours  aspirin  chewable 81 milliGRAM(s) Oral daily  atorvastatin 40 milliGRAM(s) Oral at bedtime  dextrose 5%. 1000 milliLiter(s) (50 mL/Hr) IV Continuous <Continuous>  dextrose 5%. 1000 milliLiter(s) (100 mL/Hr) IV Continuous <Continuous>  dextrose 50% Injectable 25 Gram(s) IV Push once  dextrose 50% Injectable 12.5 Gram(s) IV Push once  dextrose 50% Injectable 25 Gram(s) IV Push once  DULoxetine 30 milliGRAM(s) Oral every 24 hours  glucagon  Injectable 1 milliGRAM(s) IntraMuscular once  heparin   Injectable 5000 Unit(s) SubCutaneous every 8 hours  insulin glargine Injectable (LANTUS) 8 Unit(s) SubCutaneous at bedtime  insulin lispro (ADMELOG) corrective regimen sliding scale   SubCutaneous Before meals and at bedtime  insulin lispro Injectable (ADMELOG) 10 Unit(s) SubCutaneous three times a day before meals  lisinopril 40 milliGRAM(s) Oral every 24 hours  pantoprazole    Tablet 40 milliGRAM(s) Oral before breakfast    MEDICATIONS  (PRN):  dextrose Oral Gel 15 Gram(s) Oral once PRN Blood Glucose LESS THAN 70 milliGRAM(s)/deciliter      PHYSICAL EXAM  Vital Signs Last 24 Hrs  T(C): 36.6 (17 Apr 2022 06:14), Max: 36.7 (16 Apr 2022 12:22)  T(F): 97.8 (17 Apr 2022 06:14), Max: 98.1 (16 Apr 2022 12:22)  HR: 63 (17 Apr 2022 06:14) (62 - 78)  BP: 157/72 (17 Apr 2022 06:14) (130/66 - 157/72)  BP(mean): --  RR: 16 (17 Apr 2022 06:14) (16 - 18)  SpO2: 100% (17 Apr 2022 06:14) (95% - 100%)    Constitutional: wn/wd in NAD.   HEENT: NCAT, MMM, OP clear, EOMI, , no proptosis or lid retraction  Neck: no thyromegaly or palpable thyroid nodules   Respiratory: lungs CTAB.  Cardiovascular: regular rhythm, normal S1 and S2, no audible murmurs, no peripheral edema  GI: soft, NT/ND, no masses/HSM appreciated.  Neurology: no tremors, DTR 2+  Skin: no visible rashes/lesions  Psychiatric: AAO x 3, normal affect/mood.    LABS:                        10.0   4.57  )-----------( 255      ( 17 Apr 2022 05:55 )             32.1     04-17    137  |  103  |  20  ----------------------------<  80  4.4   |  26  |  0.52    Ca    8.5      17 Apr 2022 05:55  Phos  4.7     04-17  Mg     1.7     04-17          Thyroid Stimulating Hormone, Serum: 1.870 uIU/mL (04-09 @ 08:50)  Thyroid Stimulating Hormone, Serum: 1.120 uIU/mL (04-01 @ 07:54)      HbA1C:         Insulin Sliding Scale requirements X 24 Hours:      RADIOLOGY & ADDITIONAL TESTS:      A/P:72y Female with PMHx of T2DM c/b peripheral neuropathy, HTN, recent admission and workup for Naty Sanchez tear, TIA on 3/30 (MR negative) presents to [facility] as transfer from [facility] ED for episode of AMS, back to baseline in [facility] ED. CTH negative in ED for acute pathology. On medicine to assess for AMS.   A1c:11.%  Wt:63.4kg  Cr:0.45  GFR:101  1.  DM type  2  c-peptide level : 0.3 low (likely insulinopenic)   Patient appears to be recovering from prior glucose toxic state.   Please continue lantus  8  units at night.    Please continue lispro  10  units before each meal.    Please continue lispro moderate dose sliding scale only before meals, stop bedtime     Pt's fingerstick glucose goal is 100-180    Will continue to monitor     For discharge, pt can continue TBD    Pt can follow up at discharge with [facility] by calling  to make an appointment.   Will discuss case with Dr. Herrera and update primary team   INTERVAL HPI/OVERNIGHT EVENTS:    Patient is a 72y old  Female who presents with a chief complaint of episode of AMS (17 Apr 2022 07:53)  Patient blood sugar 80 this morning and pre-meal insulin held. Patient continues to eat well. No acute complaints. Feet  noted to not be in braces.     Pt reports the following symptoms:    CONSTITUTIONAL:  Negative fever or chills, feels well, good appetite  EYES:  Negative  blurry vision or double vision  CARDIOVASCULAR:  Negative for chest pain or palpitations  RESPIRATORY:  Negative for cough, wheezing, or SOB   GASTROINTESTINAL:  Negative for nausea, vomiting, diarrhea, constipation, or abdominal pain  GENITOURINARY:  Negative frequency, urgency or dysuria  NEUROLOGIC:  No headache, confusion, dizziness, lightheadedness    MEDICATIONS  (STANDING):  amLODIPine   Tablet 10 milliGRAM(s) Oral daily  ampicillin  IVPB 1 Gram(s) IV Intermittent every 6 hours  aspirin  chewable 81 milliGRAM(s) Oral daily  atorvastatin 40 milliGRAM(s) Oral at bedtime  dextrose 5%. 1000 milliLiter(s) (50 mL/Hr) IV Continuous <Continuous>  dextrose 5%. 1000 milliLiter(s) (100 mL/Hr) IV Continuous <Continuous>  dextrose 50% Injectable 25 Gram(s) IV Push once  dextrose 50% Injectable 12.5 Gram(s) IV Push once  dextrose 50% Injectable 25 Gram(s) IV Push once  DULoxetine 30 milliGRAM(s) Oral every 24 hours  glucagon  Injectable 1 milliGRAM(s) IntraMuscular once  heparin   Injectable 5000 Unit(s) SubCutaneous every 8 hours  insulin glargine Injectable (LANTUS) 8 Unit(s) SubCutaneous at bedtime  insulin lispro (ADMELOG) corrective regimen sliding scale   SubCutaneous Before meals and at bedtime  insulin lispro Injectable (ADMELOG) 10 Unit(s) SubCutaneous three times a day before meals  lisinopril 40 milliGRAM(s) Oral every 24 hours  pantoprazole    Tablet 40 milliGRAM(s) Oral before breakfast    MEDICATIONS  (PRN):  dextrose Oral Gel 15 Gram(s) Oral once PRN Blood Glucose LESS THAN 70 milliGRAM(s)/deciliter      PHYSICAL EXAM  Vital Signs Last 24 Hrs  T(C): 36.6 (17 Apr 2022 06:14), Max: 36.7 (16 Apr 2022 12:22)  T(F): 97.8 (17 Apr 2022 06:14), Max: 98.1 (16 Apr 2022 12:22)  HR: 63 (17 Apr 2022 06:14) (62 - 78)  BP: 157/72 (17 Apr 2022 06:14) (130/66 - 157/72)  BP(mean): --  RR: 16 (17 Apr 2022 06:14) (16 - 18)  SpO2: 100% (17 Apr 2022 06:14) (95% - 100%)    Constitutional: NAD.   HEENT: NCAT, MMM, OP clear, EOMI, , no proptosis or lid retraction  Neck: no thyromegaly or palpable thyroid nodules   Respiratory: lungs CTAB.  Cardiovascular: regular rhythm, normal S1 and S2, no audible murmurs, no peripheral edema  GI: soft, NT/ND, no masses/HSM appreciated.  Neurology: no tremors, slowed speech, lower extremity weakness b/l, b/l foot drop   Skin: no visible rashes/lesions  Psychiatric: AAO x 3, normal affect/mood.    LABS:                        10.0   4.57  )-----------( 255      ( 17 Apr 2022 05:55 )             32.1     04-17    137  |  103  |  20  ----------------------------<  80  4.4   |  26  |  0.52    Ca    8.5      17 Apr 2022 05:55  Phos  4.7     04-17  Mg     1.7     04-17          Thyroid Stimulating Hormone, Serum: 1.870 uIU/mL (04-09 @ 08:50)  Thyroid Stimulating Hormone, Serum: 1.120 uIU/mL (04-01 @ 07:54)      HbA1C:         Insulin Sliding Scale requirements X 24 Hours:      RADIOLOGY & ADDITIONAL TESTS:      A/P:72y Female with PMHx of T2DM c/b peripheral neuropathy, HTN, recent admission and workup for Naty Sanchez tear, TIA on 3/30 (MR negative) presents to [facility] as transfer from [facility] ED for episode of AMS, back to baseline in [facility] ED. CTH negative in ED for acute pathology. On medicine to assess for AMS.   A1c:11.%  Wt:63.4kg  Cr:0.45  GFR:101  1.  DM type  2  c-peptide level : 0.3 low (likely insulinopenic)   Patient appears to be recovering from prior glucose toxic state.   Please continue lantus  8  units at night.    Please continue lispro  10  units before each meal.    Please continue lispro moderate dose sliding scale only before meals, stop bedtime     Pt's fingerstick glucose goal is 100-180    Will continue to monitor     For discharge, pt can continue TBD    Pt can follow up at discharge with [facility] by calling  to make an appointment.   Will discuss case with Dr. Herrera and update primary team

## 2022-04-17 NOTE — PROGRESS NOTE ADULT - SUBJECTIVE AND OBJECTIVE BOX
OVERNIGHT EVENTS: No acute events overnight.     SUBJECTIVE / INTERVAL HPI: Patient seen and examined at bedside. Pt denies any complaints other than vague mild epigastric discomfort, but denies pain, nausea/vomiting, or BMs overnight.     VITAL SIGNS:  Vital Signs Last 24 Hrs  T(C): 36.6 (17 Apr 2022 06:14), Max: 36.7 (16 Apr 2022 12:22)  T(F): 97.8 (17 Apr 2022 06:14), Max: 98.1 (16 Apr 2022 12:22)  HR: 63 (17 Apr 2022 06:14) (62 - 78)  BP: 157/72 (17 Apr 2022 06:14) (130/66 - 157/72)  BP(mean): --  RR: 16 (17 Apr 2022 06:14) (16 - 18)  SpO2: 100% (17 Apr 2022 06:14) (95% - 100%)    PHYSICAL EXAM:    Constitutional: NAD  HEENT: PERRL, EOMI, sclera non-icteric, neck supple, trachea midline, no masses, no JVD, MMM, good dentition  Respiratory: CTA b/l, good air entry b/l, no wheezing, no rhonchi, no rales, without accessory muscle use and no intercostal retractions  Cardiovascular: RRR, normal S1S2, no M/R/G  Gastrointestinal: abdomen soft, NTND, no masses palpable, BS normal  Extremities: Warm, well perfused, pulses equal bilateral upper and lower extremities, no edema, no clubbing  Neurological: AAOx3, CN Grossly intact, UE strength 5/5 b/l, LE hip flexion 4/5, knee ext 5, knee flex 4, ankle dorsiflexion 2, EHL/EDB 0  Skin: Normal temperature, warm, dry    MEDICATIONS:  MEDICATIONS  (STANDING):  amLODIPine   Tablet 10 milliGRAM(s) Oral daily  ampicillin  IVPB 1 Gram(s) IV Intermittent every 6 hours  aspirin  chewable 81 milliGRAM(s) Oral daily  atorvastatin 40 milliGRAM(s) Oral at bedtime  dextrose 5%. 1000 milliLiter(s) (50 mL/Hr) IV Continuous <Continuous>  dextrose 5%. 1000 milliLiter(s) (100 mL/Hr) IV Continuous <Continuous>  dextrose 50% Injectable 25 Gram(s) IV Push once  dextrose 50% Injectable 12.5 Gram(s) IV Push once  dextrose 50% Injectable 25 Gram(s) IV Push once  DULoxetine 30 milliGRAM(s) Oral every 24 hours  glucagon  Injectable 1 milliGRAM(s) IntraMuscular once  heparin   Injectable 5000 Unit(s) SubCutaneous every 8 hours  insulin glargine Injectable (LANTUS) 8 Unit(s) SubCutaneous at bedtime  insulin lispro (ADMELOG) corrective regimen sliding scale   SubCutaneous Before meals and at bedtime  insulin lispro Injectable (ADMELOG) 10 Unit(s) SubCutaneous three times a day before meals  lisinopril 40 milliGRAM(s) Oral every 24 hours  pantoprazole    Tablet 40 milliGRAM(s) Oral before breakfast    MEDICATIONS  (PRN):  dextrose Oral Gel 15 Gram(s) Oral once PRN Blood Glucose LESS THAN 70 milliGRAM(s)/deciliter      ALLERGIES:  Allergies    No Known Allergies    Intolerances        LABS:                        10.0   4.57  )-----------( 255      ( 17 Apr 2022 05:55 )             32.1     04-17    137  |  103  |  20  ----------------------------<  80  4.4   |  26  |  0.52    Ca    8.5      17 Apr 2022 05:55  Phos  4.7     04-17  Mg     1.7     04-17          CAPILLARY BLOOD GLUCOSE      POCT Blood Glucose.: 80 mg/dL (17 Apr 2022 08:40)      RADIOLOGY & ADDITIONAL TESTS: Reviewed.

## 2022-04-17 NOTE — PROGRESS NOTE ADULT - PROBLEM SELECTOR PLAN 3
UA with +WBCs, UCx growing E faecalis.   Will treat as complicated  - Ampicillin, s/p day 5 as of 4/16 UA with +WBCs, UCx growing E faecalis.   Will treat as complicated  - Ampicillin, s/p day 6 as of 4/16

## 2022-04-17 NOTE — PROGRESS NOTE ADULT - ATTENDING COMMENTS
Pt seen at bedside with fellow.  Pt sitting in bed, ate full breakfast.  Glucose today am 80, pre meal insulin held.  Since she ate full meal, I anticipate pre lunch glucose will be high.  Recommend removing correction sliding scale at bedtime (have sliding scale TID with meals only) since sugars have been controlled and she received correction last night, which is the likely cause of low normal glucose this morning.  Another option is to have higher threshold for bedtime correction (eg correct over 200, instead of 150 for bedtime).

## 2022-04-17 NOTE — PROGRESS NOTE ADULT - PROBLEM SELECTOR PLAN 5
- Goal -160  - c/w Amlodipine 10mg (new med), home Lisinopril 40mg   - TTE with bubble done prior admission 4/1: grade 1 left ventricular diastolic dysfunction, no PFO, EF >83% hyperdynamic left ventricular systolic function

## 2022-04-17 NOTE — PROGRESS NOTE ADULT - PROBLEM SELECTOR PLAN 2
Symmetrical LE weakness, worse distally compared to proximally   Symmetrical diminished sensation, longstanding diabetic peripheral neuropathy   Denies issues with incontinence, or back pain, no sensory line  MRI L/Spine:  - showing at the L3/4 leveI. There are degenerative   changes involving the facets bilaterally as well as ligamentum flavum   hypertrophy. This combination results in moderate central spinal canal   stenosis.   - At the L4/5 level, there is disc bulge abutting the L4 nerve roots   bilaterally. There are degenerative changes involving the facets   bilaterally (right greater than left) as well as ligamentum flavum   hypertrophy. This combination results in mild central spinal canal   stenosis.   -likely progression of diabetic neuropathy, however symptoms more severe than to be expected    - C/w cymbalta started at 20mg daily, now 30mg daily   - Consulted physiatry for braces for her BL LE

## 2022-04-17 NOTE — PROGRESS NOTE ADULT - ATTENDING COMMENTS
exam unchanged  still with LE pain, however also some stomach discomfort, will leave cymbalta at current dose of 30mg daily  continue other care, dispo planning

## 2022-04-18 LAB
ANION GAP SERPL CALC-SCNC: 8 MMOL/L — SIGNIFICANT CHANGE UP (ref 5–17)
BASOPHILS # BLD AUTO: 0.03 K/UL — SIGNIFICANT CHANGE UP (ref 0–0.2)
BASOPHILS NFR BLD AUTO: 0.6 % — SIGNIFICANT CHANGE UP (ref 0–2)
BUN SERPL-MCNC: 21 MG/DL — SIGNIFICANT CHANGE UP (ref 7–23)
CALCIUM SERPL-MCNC: 8.6 MG/DL — SIGNIFICANT CHANGE UP (ref 8.4–10.5)
CHLORIDE SERPL-SCNC: 100 MMOL/L — SIGNIFICANT CHANGE UP (ref 96–108)
CO2 SERPL-SCNC: 26 MMOL/L — SIGNIFICANT CHANGE UP (ref 22–31)
CREAT SERPL-MCNC: 0.54 MG/DL — SIGNIFICANT CHANGE UP (ref 0.5–1.3)
EGFR: 97 ML/MIN/1.73M2 — SIGNIFICANT CHANGE UP
EOSINOPHIL # BLD AUTO: 0.2 K/UL — SIGNIFICANT CHANGE UP (ref 0–0.5)
EOSINOPHIL NFR BLD AUTO: 4.2 % — SIGNIFICANT CHANGE UP (ref 0–6)
GLUCOSE BLDC GLUCOMTR-MCNC: 101 MG/DL — HIGH (ref 70–99)
GLUCOSE BLDC GLUCOMTR-MCNC: 123 MG/DL — HIGH (ref 70–99)
GLUCOSE BLDC GLUCOMTR-MCNC: 171 MG/DL — HIGH (ref 70–99)
GLUCOSE BLDC GLUCOMTR-MCNC: 75 MG/DL — SIGNIFICANT CHANGE UP (ref 70–99)
GLUCOSE BLDC GLUCOMTR-MCNC: 92 MG/DL — SIGNIFICANT CHANGE UP (ref 70–99)
GLUCOSE SERPL-MCNC: 172 MG/DL — HIGH (ref 70–99)
HCT VFR BLD CALC: 33.4 % — LOW (ref 34.5–45)
HGB BLD-MCNC: 10.5 G/DL — LOW (ref 11.5–15.5)
IMM GRANULOCYTES NFR BLD AUTO: 0.2 % — SIGNIFICANT CHANGE UP (ref 0–1.5)
LYMPHOCYTES # BLD AUTO: 2.06 K/UL — SIGNIFICANT CHANGE UP (ref 1–3.3)
LYMPHOCYTES # BLD AUTO: 42.8 % — SIGNIFICANT CHANGE UP (ref 13–44)
MAGNESIUM SERPL-MCNC: 1.8 MG/DL — SIGNIFICANT CHANGE UP (ref 1.6–2.6)
MCHC RBC-ENTMCNC: 27.1 PG — SIGNIFICANT CHANGE UP (ref 27–34)
MCHC RBC-ENTMCNC: 31.4 GM/DL — LOW (ref 32–36)
MCV RBC AUTO: 86.1 FL — SIGNIFICANT CHANGE UP (ref 80–100)
MONOCYTES # BLD AUTO: 0.35 K/UL — SIGNIFICANT CHANGE UP (ref 0–0.9)
MONOCYTES NFR BLD AUTO: 7.3 % — SIGNIFICANT CHANGE UP (ref 2–14)
NEUTROPHILS # BLD AUTO: 2.16 K/UL — SIGNIFICANT CHANGE UP (ref 1.8–7.4)
NEUTROPHILS NFR BLD AUTO: 44.9 % — SIGNIFICANT CHANGE UP (ref 43–77)
NRBC # BLD: 0 /100 WBCS — SIGNIFICANT CHANGE UP (ref 0–0)
PHOSPHATE SERPL-MCNC: 3.5 MG/DL — SIGNIFICANT CHANGE UP (ref 2.5–4.5)
PLATELET # BLD AUTO: 275 K/UL — SIGNIFICANT CHANGE UP (ref 150–400)
POTASSIUM SERPL-MCNC: 4.4 MMOL/L — SIGNIFICANT CHANGE UP (ref 3.5–5.3)
POTASSIUM SERPL-SCNC: 4.4 MMOL/L — SIGNIFICANT CHANGE UP (ref 3.5–5.3)
RBC # BLD: 3.88 M/UL — SIGNIFICANT CHANGE UP (ref 3.8–5.2)
RBC # FLD: 13.8 % — SIGNIFICANT CHANGE UP (ref 10.3–14.5)
SODIUM SERPL-SCNC: 134 MMOL/L — LOW (ref 135–145)
WBC # BLD: 4.81 K/UL — SIGNIFICANT CHANGE UP (ref 3.8–10.5)
WBC # FLD AUTO: 4.81 K/UL — SIGNIFICANT CHANGE UP (ref 3.8–10.5)

## 2022-04-18 PROCEDURE — 99231 SBSQ HOSP IP/OBS SF/LOW 25: CPT

## 2022-04-18 RX ORDER — DULOXETINE HYDROCHLORIDE 30 MG/1
30 CAPSULE, DELAYED RELEASE ORAL ONCE
Refills: 0 | Status: COMPLETED | OUTPATIENT
Start: 2022-04-18 | End: 2022-04-18

## 2022-04-18 RX ORDER — DULOXETINE HYDROCHLORIDE 30 MG/1
60 CAPSULE, DELAYED RELEASE ORAL DAILY
Refills: 0 | Status: DISCONTINUED | OUTPATIENT
Start: 2022-04-19 | End: 2022-05-11

## 2022-04-18 RX ORDER — INSULIN LISPRO 100/ML
8 VIAL (ML) SUBCUTANEOUS
Refills: 0 | Status: DISCONTINUED | OUTPATIENT
Start: 2022-04-18 | End: 2022-06-27

## 2022-04-18 RX ORDER — LIDOCAINE 4 G/100G
1 CREAM TOPICAL ONCE
Refills: 0 | Status: COMPLETED | OUTPATIENT
Start: 2022-04-18 | End: 2022-04-18

## 2022-04-18 RX ORDER — ENOXAPARIN SODIUM 100 MG/ML
40 INJECTION SUBCUTANEOUS EVERY 24 HOURS
Refills: 0 | Status: DISCONTINUED | OUTPATIENT
Start: 2022-04-18 | End: 2022-05-21

## 2022-04-18 RX ADMIN — LIDOCAINE 1 PATCH: 4 CREAM TOPICAL at 21:41

## 2022-04-18 RX ADMIN — PANTOPRAZOLE SODIUM 40 MILLIGRAM(S): 20 TABLET, DELAYED RELEASE ORAL at 06:34

## 2022-04-18 RX ADMIN — Medication 10 UNIT(S): at 17:23

## 2022-04-18 RX ADMIN — LISINOPRIL 40 MILLIGRAM(S): 2.5 TABLET ORAL at 06:34

## 2022-04-18 RX ADMIN — Medication 81 MILLIGRAM(S): at 11:12

## 2022-04-18 RX ADMIN — Medication 2: at 08:52

## 2022-04-18 RX ADMIN — Medication 108 GRAM(S): at 00:34

## 2022-04-18 RX ADMIN — HEPARIN SODIUM 5000 UNIT(S): 5000 INJECTION INTRAVENOUS; SUBCUTANEOUS at 06:33

## 2022-04-18 RX ADMIN — DULOXETINE HYDROCHLORIDE 30 MILLIGRAM(S): 30 CAPSULE, DELAYED RELEASE ORAL at 11:11

## 2022-04-18 RX ADMIN — Medication 10 UNIT(S): at 12:55

## 2022-04-18 RX ADMIN — INSULIN GLARGINE 8 UNIT(S): 100 INJECTION, SOLUTION SUBCUTANEOUS at 21:42

## 2022-04-18 RX ADMIN — LIDOCAINE 1 PATCH: 4 CREAM TOPICAL at 17:40

## 2022-04-18 RX ADMIN — LIDOCAINE 1 PATCH: 4 CREAM TOPICAL at 08:53

## 2022-04-18 RX ADMIN — ENOXAPARIN SODIUM 40 MILLIGRAM(S): 100 INJECTION SUBCUTANEOUS at 21:12

## 2022-04-18 RX ADMIN — DULOXETINE HYDROCHLORIDE 30 MILLIGRAM(S): 30 CAPSULE, DELAYED RELEASE ORAL at 15:38

## 2022-04-18 RX ADMIN — ATORVASTATIN CALCIUM 40 MILLIGRAM(S): 80 TABLET, FILM COATED ORAL at 21:12

## 2022-04-18 RX ADMIN — Medication 108 GRAM(S): at 06:34

## 2022-04-18 RX ADMIN — Medication 10 UNIT(S): at 08:52

## 2022-04-18 RX ADMIN — AMLODIPINE BESYLATE 10 MILLIGRAM(S): 2.5 TABLET ORAL at 06:34

## 2022-04-18 NOTE — PROGRESS NOTE ADULT - PROBLEM SELECTOR PLAN 4
Pt with hx of DM, A1c 11.6. Endocrine following. TSH results: 1.120  - C/w mISS, only before meals   - C/w Lantus 8U, Lispro 10U   - Low C peptide patient will need insulin and NOT oral agents on dc  - F/u endocrine recs   - increased Cymbalta 30 Qd for diabetic neuropathy Pt with hx of DM, A1c 11.6. Endocrine following. TSH results: 1.120  - C/w mISS, only before meals   - C/w Lantus 8U, Lispro 10U   - Low C peptide patient will need insulin and NOT oral agents on dc  - F/u endocrine recs   - increased Cymbalta to 60 Qd for diabetic neuropathy

## 2022-04-18 NOTE — PROGRESS NOTE ADULT - PROBLEM SELECTOR PLAN 3
UA with +WBCs, UCx growing E faecalis.   Will treat as complicated  completed 7 day course of CTX and then ampicillin

## 2022-04-18 NOTE — PROGRESS NOTE ADULT - ATTENDING COMMENTS
This patient presenting here with altered mental status has Type II Diabetes mellitus with symmetric peripheral neuropathy raising the question of spinal disease as etiology. Her Hgb A1C was 11.0% on admission. Glucose levels were 123_91 yesterday and today 171-92.I agree with Insulin dosing of 8 units Lantus Insulin  and 8 units pre-meal Lispro Insulin

## 2022-04-18 NOTE — PROGRESS NOTE ADULT - PROBLEM SELECTOR PLAN 1
RESOLVED.   Metabolic encephalopathy likely due to UTI and hypertensive encephalopathy.  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   EEG 4/9-4/11 with right greater than left slowing, diffuse cerebral dysfunction, no epileptiform activity.  - C/w ASA   - c/w atorvastatin 40mg daily   - s/p treatment for UTI  - HTN treatment as below  - continue PT/OT

## 2022-04-18 NOTE — PROGRESS NOTE ADULT - ATTENDING COMMENTS
still has neuropathic pain in legs - increase cymbalta to 60mg daily  continue other meds  stop labs  dispo planning

## 2022-04-18 NOTE — PROGRESS NOTE ADULT - SUBJECTIVE AND OBJECTIVE BOX
INTERVAL HPI/OVERNIGHT EVENTS:    Patient is a 72y old  Female who presents with a chief complaint of episode of AMS (18 Apr 2022 07:27)  Patient is eating well, continues to complain of foot stiffness and leg weakness.     Pt reports the following symptoms:    CONSTITUTIONAL:  Negative fever or chills, feels well, good appetite  EYES:  Negative  blurry vision or double vision  CARDIOVASCULAR:  Negative for chest pain or palpitations  RESPIRATORY:  Negative for cough, wheezing, or SOB   GASTROINTESTINAL:  Negative for nausea, vomiting, diarrhea, constipation, or abdominal pain  GENITOURINARY:  Negative frequency, urgency or dysuria  NEUROLOGIC:  No headache, confusion, dizziness, lightheadedness    MEDICATIONS  (STANDING):  amLODIPine   Tablet 10 milliGRAM(s) Oral daily  aspirin  chewable 81 milliGRAM(s) Oral daily  atorvastatin 40 milliGRAM(s) Oral at bedtime  dextrose 5%. 1000 milliLiter(s) (50 mL/Hr) IV Continuous <Continuous>  dextrose 5%. 1000 milliLiter(s) (100 mL/Hr) IV Continuous <Continuous>  dextrose 50% Injectable 25 Gram(s) IV Push once  dextrose 50% Injectable 12.5 Gram(s) IV Push once  dextrose 50% Injectable 25 Gram(s) IV Push once  enoxaparin Injectable 40 milliGRAM(s) SubCutaneous every 24 hours  glucagon  Injectable 1 milliGRAM(s) IntraMuscular once  insulin glargine Injectable (LANTUS) 8 Unit(s) SubCutaneous at bedtime  insulin lispro (ADMELOG) corrective regimen sliding scale   SubCutaneous Before meals and at bedtime  insulin lispro Injectable (ADMELOG) 8 Unit(s) SubCutaneous three times a day before meals  lisinopril 40 milliGRAM(s) Oral every 24 hours  pantoprazole    Tablet 40 milliGRAM(s) Oral before breakfast    MEDICATIONS  (PRN):  dextrose Oral Gel 15 Gram(s) Oral once PRN Blood Glucose LESS THAN 70 milliGRAM(s)/deciliter      PHYSICAL EXAM  Vital Signs Last 24 Hrs  T(C): 36.7 (18 Apr 2022 11:18), Max: 36.7 (18 Apr 2022 11:18)  T(F): 98 (18 Apr 2022 11:18), Max: 98 (18 Apr 2022 11:18)  HR: 63 (18 Apr 2022 11:18) (63 - 72)  BP: 129/65 (18 Apr 2022 11:18) (128/71 - 129/65)  BP(mean): --  RR: 20 (18 Apr 2022 11:18) (16 - 20)  SpO2: 99% (18 Apr 2022 11:18) (96% - 99%)    Constitutional: NAD.   HEENT: NCAT, MMM, OP clear, EOMI, , no proptosis or lid retraction  Neck: no thyromegaly or palpable thyroid nodules   Respiratory: lungs CTAB.  Cardiovascular: regular rhythm, normal S1 and S2, no audible murmurs, no peripheral edema  GI: soft, NT/ND, no masses/HSM appreciated.  Neurology: no tremors, slowed speech, lower extremity weakness b/l, b/l foot drop   Skin: no visible rashes/lesions  Psychiatric: AAO x 3, normal affect/mood.    LABS:                        10.5   4.81  )-----------( 275      ( 18 Apr 2022 06:56 )             33.4     04-18    134<L>  |  100  |  21  ----------------------------<  172<H>  4.4   |  26  |  0.54    Ca    8.6      18 Apr 2022 06:56  Phos  3.5     04-18  Mg     1.8     04-18          Thyroid Stimulating Hormone, Serum: 1.870 uIU/mL (04-09 @ 08:50)  Thyroid Stimulating Hormone, Serum: 1.120 uIU/mL (04-01 @ 07:54)      HbA1C:         Insulin Sliding Scale requirements X 24 Hours:      RADIOLOGY & ADDITIONAL TESTS:      A/P:72y Female with PMHx of T2DM c/b peripheral neuropathy, HTN, recent admission and workup for Naty Sanchez tear, TIA on 3/30 (MR negative) presents to [facility] as transfer from [facility] ED for episode of AMS, back to baseline in [facility] ED. CTH negative in ED for acute pathology. On medicine to assess for AMS.   A1c:11.%  Wt:63.4kg  Cr:0.45  GFR:101  1.  DM type  2  c-peptide level : 0.3 low (likely insulinopenic)   Patient appears to be recovering from prior glucose toxic state.   Please continue lantus  8  units at night.    Please continue lispro  8  units before each meal.    Please continue lispro moderate dose sliding scale only before meals    Pt's fingerstick glucose goal is 100-180    Will continue to monitor     For discharge, pt can continue TBD    Pt can follow up at discharge with [facility] by calling  to make an appointment.   Will discuss case with Dr. Christopher and update primary team

## 2022-04-18 NOTE — PROGRESS NOTE ADULT - PROBLEM SELECTOR PLAN 2
Symmetrical LE weakness, worse distally compared to proximally   Symmetrical diminished sensation, longstanding diabetic peripheral neuropathy   Denies issues with incontinence, or back pain, no sensory line  MRI L/Spine:  - showing at the L3/4 leveI. There are degenerative   changes involving the facets bilaterally as well as ligamentum flavum   hypertrophy. This combination results in moderate central spinal canal   stenosis.   - At the L4/5 level, there is disc bulge abutting the L4 nerve roots   bilaterally. There are degenerative changes involving the facets   bilaterally (right greater than left) as well as ligamentum flavum   hypertrophy. This combination results in mild central spinal canal   stenosis.    - Likely progression of diabetic neuropathy, however symptoms more severe than to be expected.  - C/w cymbalta 30mg daily   - Consulted physiatry for braces for her BL LE Symmetrical LE weakness, worse distally compared to proximally   Symmetrical diminished sensation, longstanding diabetic peripheral neuropathy   Denies issues with incontinence, or back pain, no sensory line  MRI L/Spine:  - showing at the L3/4 leveI. There are degenerative   changes involving the facets bilaterally as well as ligamentum flavum   hypertrophy. This combination results in moderate central spinal canal   stenosis.   - At the L4/5 level, there is disc bulge abutting the L4 nerve roots   bilaterally. There are degenerative changes involving the facets   bilaterally (right greater than left) as well as ligamentum flavum   hypertrophy. This combination results in mild central spinal canal   stenosis.    - Likely progression of diabetic neuropathy, however symptoms more severe than to be expected.  - increase cymbalta to 60mg daily   - Consulted physiatry for braces for her BL LE

## 2022-04-18 NOTE — PROGRESS NOTE ADULT - PROBLEM SELECTOR PLAN 5
Goal -160  - c/w Amlodipine 10mg (new med), home Lisinopril 40mg   - TTE with bubble done prior admission 4/1: grade 1 left ventricular diastolic dysfunction, no PFO, EF >83% hyperdynamic left ventricular systolic function

## 2022-04-18 NOTE — PROGRESS NOTE ADULT - ASSESSMENT
72y Female with PMHx of T2DM c/b peripheral neuropathy, HTN, recent admission and workup for Naty Sanchez tear, TIA on 3/30 (MR negative) presents to [facility] as transfer from [facility] ED for episode of altered mental status (staring, generalized weakness), back to baseline in [facility] ED, no acute pathology on CTH or MRI, found to have metabolic encephalopathy 2/2 UTI vs hypertension, also found to have new LE weakness of unclear origin, possible diabetic neuropathy

## 2022-04-18 NOTE — PROGRESS NOTE ADULT - SUBJECTIVE AND OBJECTIVE BOX
INTERVAL HPI/OVERNIGHT EVENTS:  O/N:  This morning: Patient was seen and examined at bedside.      VITAL SIGNS:  T(F): 97.5 (04-18-22 @ 06:28)  HR: 70 (04-18-22 @ 06:28)  BP: 128/71 (04-18-22 @ 06:28)  RR: 20 (04-18-22 @ 06:28)  SpO2: 96% (04-18-22 @ 06:28)  Wt(kg): --    PHYSICAL EXAM:    Constitutional: NAD  HEENT: PERRL, EOMI, sclera non-icteric, neck supple, trachea midline, no masses, no JVD, MMM, good dentition  Respiratory: CTA b/l, good air entry b/l, no wheezing, no rhonchi, no rales, without accessory muscle use and no intercostal retractions  Cardiovascular: RRR, normal S1S2, no M/R/G  Gastrointestinal: abdomen soft, NTND, no masses palpable, BS normal  Extremities: Warm, well perfused, pulses equal bilateral upper and lower extremities, no edema, no clubbing  Neurological: AAOx3, CN Grossly intact  Skin: Normal temperature, warm, dry    MEDICATIONS  (STANDING):  amLODIPine   Tablet 10 milliGRAM(s) Oral daily  ampicillin  IVPB 1 Gram(s) IV Intermittent every 6 hours  aspirin  chewable 81 milliGRAM(s) Oral daily  atorvastatin 40 milliGRAM(s) Oral at bedtime  dextrose 5%. 1000 milliLiter(s) (50 mL/Hr) IV Continuous <Continuous>  dextrose 5%. 1000 milliLiter(s) (100 mL/Hr) IV Continuous <Continuous>  dextrose 50% Injectable 25 Gram(s) IV Push once  dextrose 50% Injectable 12.5 Gram(s) IV Push once  dextrose 50% Injectable 25 Gram(s) IV Push once  DULoxetine 30 milliGRAM(s) Oral every 24 hours  glucagon  Injectable 1 milliGRAM(s) IntraMuscular once  heparin   Injectable 5000 Unit(s) SubCutaneous every 8 hours  insulin glargine Injectable (LANTUS) 8 Unit(s) SubCutaneous at bedtime  insulin lispro (ADMELOG) corrective regimen sliding scale   SubCutaneous Before meals and at bedtime  insulin lispro Injectable (ADMELOG) 10 Unit(s) SubCutaneous three times a day before meals  lisinopril 40 milliGRAM(s) Oral every 24 hours  pantoprazole    Tablet 40 milliGRAM(s) Oral before breakfast    MEDICATIONS  (PRN):  dextrose Oral Gel 15 Gram(s) Oral once PRN Blood Glucose LESS THAN 70 milliGRAM(s)/deciliter      Allergies    No Known Allergies    Intolerances        LABS:                        10.5   4.81  )-----------( 275      ( 18 Apr 2022 06:56 )             33.4     04-18    134<L>  |  100  |  21  ----------------------------<  172<H>  4.4   |  26  |  0.54    Ca    8.6      18 Apr 2022 06:56  Phos  3.5     04-18  Mg     1.8     04-18                  RADIOLOGY & ADDITIONAL TESTS:  Reviewed INTERVAL HPI/OVERNIGHT EVENTS:  O/N: CAIT    This morning: Patient was seen and examined at bedside.  Left sided pain on her flank/beneath her breast     VITAL SIGNS:  T(F): 97.5 (04-18-22 @ 06:28)  HR: 70 (04-18-22 @ 06:28)  BP: 128/71 (04-18-22 @ 06:28)  RR: 20 (04-18-22 @ 06:28)  SpO2: 96% (04-18-22 @ 06:28)    PHYSICAL EXAM:    Constitutional: NAD  HEENT: PERRL, EOMI, sclera non-icteric, neck supple, trachea midline, no masses, no JVD, MMM, good dentition  Respiratory: CTA b/l, good air entry b/l, no wheezing, no rhonchi, no rales, without accessory muscle use and no intercostal retractions  Cardiovascular: RRR, normal S1S2, no M/R/G  Gastrointestinal: abdomen soft, NTND, no masses palpable, BS normal  Extremities: Warm, well perfused, pulses equal bilateral upper and lower extremities, no edema, no clubbing  Neurological: AAOx3, CN Grossly intact  Skin: Normal temperature, warm, dry    MEDICATIONS  (STANDING):  amLODIPine   Tablet 10 milliGRAM(s) Oral daily  ampicillin  IVPB 1 Gram(s) IV Intermittent every 6 hours  aspirin  chewable 81 milliGRAM(s) Oral daily  atorvastatin 40 milliGRAM(s) Oral at bedtime  dextrose 5%. 1000 milliLiter(s) (50 mL/Hr) IV Continuous <Continuous>  dextrose 5%. 1000 milliLiter(s) (100 mL/Hr) IV Continuous <Continuous>  dextrose 50% Injectable 25 Gram(s) IV Push once  dextrose 50% Injectable 12.5 Gram(s) IV Push once  dextrose 50% Injectable 25 Gram(s) IV Push once  DULoxetine 30 milliGRAM(s) Oral every 24 hours  glucagon  Injectable 1 milliGRAM(s) IntraMuscular once  heparin   Injectable 5000 Unit(s) SubCutaneous every 8 hours  insulin glargine Injectable (LANTUS) 8 Unit(s) SubCutaneous at bedtime  insulin lispro (ADMELOG) corrective regimen sliding scale   SubCutaneous Before meals and at bedtime  insulin lispro Injectable (ADMELOG) 10 Unit(s) SubCutaneous three times a day before meals  lisinopril 40 milliGRAM(s) Oral every 24 hours  pantoprazole    Tablet 40 milliGRAM(s) Oral before breakfast    MEDICATIONS  (PRN):  dextrose Oral Gel 15 Gram(s) Oral once PRN Blood Glucose LESS THAN 70 milliGRAM(s)/deciliter      Allergies    No Known Allergies    Intolerances        LABS:                        10.5   4.81  )-----------( 275      ( 18 Apr 2022 06:56 )             33.4     04-18    134<L>  |  100  |  21  ----------------------------<  172<H>  4.4   |  26  |  0.54    Ca    8.6      18 Apr 2022 06:56  Phos  3.5     04-18  Mg     1.8     04-18                  RADIOLOGY & ADDITIONAL TESTS:  Reviewed INTERVAL HPI/OVERNIGHT EVENTS:  O/N: CAIT    This morning: Patient was seen and examined at bedside.  Left sided pain on her flank/beneath her breast     VITAL SIGNS:  T(F): 97.5 (04-18-22 @ 06:28)  HR: 70 (04-18-22 @ 06:28)  BP: 128/71 (04-18-22 @ 06:28)  RR: 20 (04-18-22 @ 06:28)  SpO2: 96% (04-18-22 @ 06:28)    PHYSICAL EXAM:    Constitutional: NAD  HEENT: PERRL, EOMI, sclera non-icteric, no JVD, MMM,   Respiratory: CTA b/l, no wheezing, no rhonchi, no rales, without accessory muscle use and no intercostal retractions  Cardiovascular: RRR, normal S1S2, soft 3/6 systolic murmur heard throught, more so at LSB   Gastrointestinal: abdomen soft, NTND, no masses palpable, BS normal  Extremities: Warm, well perfused, pulses equal bilateral upper and lower extremities, no edema, no clubbing  Neurological: AAOx3, CN Grossly intact, UE strength 5/5 b/l, LE hip flexion 4/5, knee ext 3-4/5, knee flex 4, ankle dorsiflexion/plantarflexion 1/5 (right worse than left), diminshed sensation below b/l knees   Skin: Normal temperature, warm, dry    MEDICATIONS  (STANDING):  amLODIPine   Tablet 10 milliGRAM(s) Oral daily  ampicillin  IVPB 1 Gram(s) IV Intermittent every 6 hours  aspirin  chewable 81 milliGRAM(s) Oral daily  atorvastatin 40 milliGRAM(s) Oral at bedtime  dextrose 5%. 1000 milliLiter(s) (50 mL/Hr) IV Continuous <Continuous>  dextrose 5%. 1000 milliLiter(s) (100 mL/Hr) IV Continuous <Continuous>  dextrose 50% Injectable 25 Gram(s) IV Push once  dextrose 50% Injectable 12.5 Gram(s) IV Push once  dextrose 50% Injectable 25 Gram(s) IV Push once  DULoxetine 30 milliGRAM(s) Oral every 24 hours  glucagon  Injectable 1 milliGRAM(s) IntraMuscular once  heparin   Injectable 5000 Unit(s) SubCutaneous every 8 hours  insulin glargine Injectable (LANTUS) 8 Unit(s) SubCutaneous at bedtime  insulin lispro (ADMELOG) corrective regimen sliding scale   SubCutaneous Before meals and at bedtime  insulin lispro Injectable (ADMELOG) 10 Unit(s) SubCutaneous three times a day before meals  lisinopril 40 milliGRAM(s) Oral every 24 hours  pantoprazole    Tablet 40 milliGRAM(s) Oral before breakfast    MEDICATIONS  (PRN):  dextrose Oral Gel 15 Gram(s) Oral once PRN Blood Glucose LESS THAN 70 milliGRAM(s)/deciliter    Allergies    No Known Allergies    Intolerances    LABS:                        10.5   4.81  )-----------( 275      ( 18 Apr 2022 06:56 )             33.4     04-18    134<L>  |  100  |  21  ----------------------------<  172<H>  4.4   |  26  |  0.54    Ca    8.6      18 Apr 2022 06:56  Phos  3.5     04-18  Mg     1.8     04-18      RADIOLOGY & ADDITIONAL TESTS:  Reviewed

## 2022-04-18 NOTE — PROGRESS NOTE ADULT - PROBLEM SELECTOR PLAN 8
F: None  E: Replete PRN   N: DASH/TLC   DVT: Switching to Lovenox  Dispo: [facility], PT rec [facility]- however patient is uninsured and cannot go to [facility] attempting to get to one person assist

## 2022-04-19 LAB
GLUCOSE BLDC GLUCOMTR-MCNC: 104 MG/DL — HIGH (ref 70–99)
GLUCOSE BLDC GLUCOMTR-MCNC: 154 MG/DL — HIGH (ref 70–99)
GLUCOSE BLDC GLUCOMTR-MCNC: 240 MG/DL — HIGH (ref 70–99)
GLUCOSE BLDC GLUCOMTR-MCNC: 69 MG/DL — LOW (ref 70–99)

## 2022-04-19 PROCEDURE — 99232 SBSQ HOSP IP/OBS MODERATE 35: CPT

## 2022-04-19 RX ORDER — INSULIN GLARGINE 100 [IU]/ML
4 INJECTION, SOLUTION SUBCUTANEOUS AT BEDTIME
Refills: 0 | Status: DISCONTINUED | OUTPATIENT
Start: 2022-04-19 | End: 2022-06-27

## 2022-04-19 RX ORDER — INSULIN LISPRO 100/ML
VIAL (ML) SUBCUTANEOUS
Refills: 0 | Status: DISCONTINUED | OUTPATIENT
Start: 2022-04-19 | End: 2022-06-08

## 2022-04-19 RX ADMIN — Medication 2: at 13:19

## 2022-04-19 RX ADMIN — Medication 81 MILLIGRAM(S): at 11:10

## 2022-04-19 RX ADMIN — ENOXAPARIN SODIUM 40 MILLIGRAM(S): 100 INJECTION SUBCUTANEOUS at 21:18

## 2022-04-19 RX ADMIN — PANTOPRAZOLE SODIUM 40 MILLIGRAM(S): 20 TABLET, DELAYED RELEASE ORAL at 06:29

## 2022-04-19 RX ADMIN — INSULIN GLARGINE 4 UNIT(S): 100 INJECTION, SOLUTION SUBCUTANEOUS at 21:44

## 2022-04-19 RX ADMIN — Medication 8 UNIT(S): at 13:19

## 2022-04-19 RX ADMIN — DULOXETINE HYDROCHLORIDE 60 MILLIGRAM(S): 30 CAPSULE, DELAYED RELEASE ORAL at 11:10

## 2022-04-19 RX ADMIN — AMLODIPINE BESYLATE 10 MILLIGRAM(S): 2.5 TABLET ORAL at 05:45

## 2022-04-19 RX ADMIN — Medication 8 UNIT(S): at 17:22

## 2022-04-19 RX ADMIN — Medication 8 UNIT(S): at 09:14

## 2022-04-19 RX ADMIN — LISINOPRIL 40 MILLIGRAM(S): 2.5 TABLET ORAL at 06:29

## 2022-04-19 RX ADMIN — ATORVASTATIN CALCIUM 40 MILLIGRAM(S): 80 TABLET, FILM COATED ORAL at 21:18

## 2022-04-19 NOTE — PROGRESS NOTE ADULT - SUBJECTIVE AND OBJECTIVE BOX
INTERVAL HPI/OVERNIGHT EVENTS:  O/N:  This morning: Patient was seen and examined at bedside.      VITAL SIGNS:  T(F): 98 (04-19-22 @ 06:07)  HR: 67 (04-19-22 @ 06:07)  BP: 130/73 (04-19-22 @ 06:07)  RR: 18 (04-19-22 @ 06:07)  SpO2: 98% (04-19-22 @ 06:07)  Wt(kg): --    PHYSICAL EXAM:    Constitutional: NAD  HEENT: PERRL, EOMI, sclera non-icteric, neck supple, trachea midline, no masses, no JVD, MMM, good dentition  Respiratory: CTA b/l, good air entry b/l, no wheezing, no rhonchi, no rales, without accessory muscle use and no intercostal retractions  Cardiovascular: RRR, normal S1S2, no M/R/G  Gastrointestinal: abdomen soft, NTND, no masses palpable, BS normal  Extremities: Warm, well perfused, pulses equal bilateral upper and lower extremities, no edema, no clubbing  Neurological: AAOx3, CN Grossly intact  Skin: Normal temperature, warm, dry    MEDICATIONS  (STANDING):  amLODIPine   Tablet 10 milliGRAM(s) Oral daily  aspirin  chewable 81 milliGRAM(s) Oral daily  atorvastatin 40 milliGRAM(s) Oral at bedtime  dextrose 5%. 1000 milliLiter(s) (50 mL/Hr) IV Continuous <Continuous>  dextrose 5%. 1000 milliLiter(s) (100 mL/Hr) IV Continuous <Continuous>  dextrose 50% Injectable 25 Gram(s) IV Push once  dextrose 50% Injectable 12.5 Gram(s) IV Push once  dextrose 50% Injectable 25 Gram(s) IV Push once  DULoxetine 60 milliGRAM(s) Oral daily  enoxaparin Injectable 40 milliGRAM(s) SubCutaneous every 24 hours  glucagon  Injectable 1 milliGRAM(s) IntraMuscular once  insulin glargine Injectable (LANTUS) 8 Unit(s) SubCutaneous at bedtime  insulin lispro (ADMELOG) corrective regimen sliding scale   SubCutaneous three times a day before meals  insulin lispro Injectable (ADMELOG) 8 Unit(s) SubCutaneous three times a day before meals  lisinopril 40 milliGRAM(s) Oral every 24 hours  pantoprazole    Tablet 40 milliGRAM(s) Oral before breakfast    MEDICATIONS  (PRN):  dextrose Oral Gel 15 Gram(s) Oral once PRN Blood Glucose LESS THAN 70 milliGRAM(s)/deciliter    Allergies    No Known Allergies    Intolerances      LABS:                        10.5   4.81  )-----------( 275      ( 18 Apr 2022 06:56 )             33.4     04-18    134<L>  |  100  |  21  ----------------------------<  172<H>  4.4   |  26  |  0.54    Ca    8.6      18 Apr 2022 06:56  Phos  3.5     04-18  Mg     1.8     04-18    RADIOLOGY & ADDITIONAL TESTS:  Reviewed Hospital Course:  72y Female with PMHx of T2DM c/b peripheral neuropathy, HTN, recent admission and workup for Naty Sanchez tear, TIA on 3/30 (MR negative) presents to [facility] as transfer from [facility] ED for episode of altered mental status (staring, generalized weakness), back to baseline in [facility] ED, no acute pathology on CTH or MRI, found to have metabolic encephalopathy 2/2 UTI vs hypertension, also found to have new LE weakness (foot drop) of unclear origin, possible diabetic neuropathy. Patient working with PT daily with goal of attempting to get patient strong enough to be 1 to 1 and go home as she cannot go to [facility] because of an insurance issue.     INTERVAL HPI/OVERNIGHT EVENTS:  O/N:  This morning: Patient was seen and examined at bedside.      VITAL SIGNS:  T(F): 98 (04-19-22 @ 06:07)  HR: 67 (04-19-22 @ 06:07)  BP: 130/73 (04-19-22 @ 06:07)  RR: 18 (04-19-22 @ 06:07)  SpO2: 98% (04-19-22 @ 06:07)  Wt(kg): --    PHYSICAL EXAM:    Constitutional: NAD  HEENT: PERRL, EOMI, sclera non-icteric, neck supple, trachea midline, no masses, no JVD, MMM, good dentition  Respiratory: CTA b/l, good air entry b/l, no wheezing, no rhonchi, no rales, without accessory muscle use and no intercostal retractions  Cardiovascular: RRR, normal S1S2, no M/R/G  Gastrointestinal: abdomen soft, NTND, no masses palpable, BS normal  Extremities: Warm, well perfused, pulses equal bilateral upper and lower extremities, no edema, no clubbing  Neurological: AAOx3, CN Grossly intact  Skin: Normal temperature, warm, dry    MEDICATIONS  (STANDING):  amLODIPine   Tablet 10 milliGRAM(s) Oral daily  aspirin  chewable 81 milliGRAM(s) Oral daily  atorvastatin 40 milliGRAM(s) Oral at bedtime  dextrose 5%. 1000 milliLiter(s) (50 mL/Hr) IV Continuous <Continuous>  dextrose 5%. 1000 milliLiter(s) (100 mL/Hr) IV Continuous <Continuous>  dextrose 50% Injectable 25 Gram(s) IV Push once  dextrose 50% Injectable 12.5 Gram(s) IV Push once  dextrose 50% Injectable 25 Gram(s) IV Push once  DULoxetine 60 milliGRAM(s) Oral daily  enoxaparin Injectable 40 milliGRAM(s) SubCutaneous every 24 hours  glucagon  Injectable 1 milliGRAM(s) IntraMuscular once  insulin glargine Injectable (LANTUS) 8 Unit(s) SubCutaneous at bedtime  insulin lispro (ADMELOG) corrective regimen sliding scale   SubCutaneous three times a day before meals  insulin lispro Injectable (ADMELOG) 8 Unit(s) SubCutaneous three times a day before meals  lisinopril 40 milliGRAM(s) Oral every 24 hours  pantoprazole    Tablet 40 milliGRAM(s) Oral before breakfast    MEDICATIONS  (PRN):  dextrose Oral Gel 15 Gram(s) Oral once PRN Blood Glucose LESS THAN 70 milliGRAM(s)/deciliter    Allergies    No Known Allergies    Intolerances      LABS:                        10.5   4.81  )-----------( 275      ( 18 Apr 2022 06:56 )             33.4     04-18    134<L>  |  100  |  21  ----------------------------<  172<H>  4.4   |  26  |  0.54    Ca    8.6      18 Apr 2022 06:56  Phos  3.5     04-18  Mg     1.8     04-18    RADIOLOGY & ADDITIONAL TESTS:  Reviewed

## 2022-04-19 NOTE — PROGRESS NOTE ADULT - PROBLEM SELECTOR PLAN 8
F: None  E: Replete PRN   N: DASH/TLC   DVT: Switching to Lovenox  Dispo: [facility], PT rec [facility]- however patient is uninsured and cannot go to [facility] attempting to get to one person assist

## 2022-04-19 NOTE — PROGRESS NOTE ADULT - PROBLEM SELECTOR PLAN 1
RESOLVED.   Metabolic encephalopathy likely due to UTI and hypertensive encephalopathy.  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   EEG 4/9-4/11 with right greater than left slowing, diffuse cerebral dysfunction, no epileptiform activity.  - C/w ASA   - c/w atorvastatin 40mg daily   - s/p treatment for UTI  - HTN treatment as below  - continue PT/OT  - No more labs

## 2022-04-19 NOTE — PROGRESS NOTE ADULT - ASSESSMENT
72y Female with PMHx of T2DM c/b peripheral neuropathy, HTN, recent admission and workup for Naty Sanchez tear, TIA on 3/30 (MR negative) presents to [facility] as transfer from [facility] ED for episode of altered mental status (staring, generalized weakness), back to baseline in [facility] ED, no acute pathology on CTH or MRI, found to have metabolic encephalopathy 2/2 UTI vs hypertension, also found to have new LE weakness of unclear origin, possible diabetic neuropathy

## 2022-04-19 NOTE — PROGRESS NOTE ADULT - PROBLEM SELECTOR PLAN 2
Symmetrical LE weakness, worse distally compared to proximally   Symmetrical diminished sensation, longstanding diabetic peripheral neuropathy   Denies issues with incontinence, or back pain, no sensory line  MRI L/Spine:  - showing at the L3/4 leveI. There are degenerative   changes involving the facets bilaterally as well as ligamentum flavum   hypertrophy. This combination results in moderate central spinal canal   stenosis.   - At the L4/5 level, there is disc bulge abutting the L4 nerve roots   bilaterally. There are degenerative changes involving the facets   bilaterally (right greater than left) as well as ligamentum flavum   hypertrophy. This combination results in mild central spinal canal   stenosis.    - Likely progression of diabetic neuropathy, however symptoms more severe than to be expected.  - increase cymbalta to 60mg daily   - Consulted physiatry for braces for her BL LE

## 2022-04-19 NOTE — PROGRESS NOTE ADULT - ATTENDING COMMENTS
Glucose last night was 123-101 and today . I agree with decreasing Lantus to 4 units with 8 units pre-meal Insulin.

## 2022-04-19 NOTE — PROGRESS NOTE ADULT - ATTENDING COMMENTS
I was physically present for the key portions of the evaluation and managemnent (E/M) service provided.  I agree with the above history, physical, and plan which I have reviewed and edited where appropriate, with the exceptions as per my note.    pt seen and examined.    chart and imaging reviewed.    pt without new neurological complaints.     ams resolved.    stable LE weakness.    Dispo planning.

## 2022-04-19 NOTE — PROGRESS NOTE ADULT - SUBJECTIVE AND OBJECTIVE BOX
INTERVAL HPI/OVERNIGHT EVENTS:    Patient is a 72y old  Female who presents with a chief complaint of episode of AMS (19 Apr 2022 10:20)      Pt reports the following symptoms:    CONSTITUTIONAL:  Negative fever or chills, feels well, good appetite  EYES:  Negative  blurry vision or double vision  CARDIOVASCULAR:  Negative for chest pain or palpitations  RESPIRATORY:  Negative for cough, wheezing, or SOB   GASTROINTESTINAL:  Negative for nausea, vomiting, diarrhea, constipation, or abdominal pain  GENITOURINARY:  Negative frequency, urgency or dysuria  NEUROLOGIC:  No headache, confusion, dizziness, lightheadedness    MEDICATIONS  (STANDING):  amLODIPine   Tablet 10 milliGRAM(s) Oral daily  aspirin  chewable 81 milliGRAM(s) Oral daily  atorvastatin 40 milliGRAM(s) Oral at bedtime  dextrose 5%. 1000 milliLiter(s) (50 mL/Hr) IV Continuous <Continuous>  dextrose 5%. 1000 milliLiter(s) (100 mL/Hr) IV Continuous <Continuous>  dextrose 50% Injectable 25 Gram(s) IV Push once  dextrose 50% Injectable 12.5 Gram(s) IV Push once  dextrose 50% Injectable 25 Gram(s) IV Push once  DULoxetine 60 milliGRAM(s) Oral daily  enoxaparin Injectable 40 milliGRAM(s) SubCutaneous every 24 hours  glucagon  Injectable 1 milliGRAM(s) IntraMuscular once  insulin glargine Injectable (LANTUS) 8 Unit(s) SubCutaneous at bedtime  insulin lispro (ADMELOG) corrective regimen sliding scale   SubCutaneous three times a day before meals  insulin lispro Injectable (ADMELOG) 8 Unit(s) SubCutaneous three times a day before meals  lisinopril 40 milliGRAM(s) Oral every 24 hours  pantoprazole    Tablet 40 milliGRAM(s) Oral before breakfast    MEDICATIONS  (PRN):  dextrose Oral Gel 15 Gram(s) Oral once PRN Blood Glucose LESS THAN 70 milliGRAM(s)/deciliter      PHYSICAL EXAM  Vital Signs Last 24 Hrs  T(C): 36.7 (19 Apr 2022 06:07), Max: 36.7 (19 Apr 2022 06:07)  T(F): 98 (19 Apr 2022 06:07), Max: 98 (19 Apr 2022 06:07)  HR: 67 (19 Apr 2022 06:07) (67 - 70)  BP: 130/73 (19 Apr 2022 06:07) (130/73 - 145/73)  BP(mean): --  RR: 18 (19 Apr 2022 06:07) (18 - 18)  SpO2: 98% (19 Apr 2022 06:07) (98% - 98%)    Constitutional: wn/wd in NAD.   HEENT: NCAT, MMM, OP clear, EOMI, , no proptosis or lid retraction  Neck: no thyromegaly or palpable thyroid nodules   Respiratory: lungs CTAB.  Cardiovascular: regular rhythm, normal S1 and S2, no audible murmurs, no peripheral edema  GI: soft, NT/ND, no masses/HSM appreciated.  Neurology: no tremors, DTR 2+  Skin: no visible rashes/lesions  Psychiatric: AAO x 3, normal affect/mood.    LABS:                        10.5   4.81  )-----------( 275      ( 18 Apr 2022 06:56 )             33.4     04-18    134<L>  |  100  |  21  ----------------------------<  172<H>  4.4   |  26  |  0.54    Ca    8.6      18 Apr 2022 06:56  Phos  3.5     04-18  Mg     1.8     04-18          Thyroid Stimulating Hormone, Serum: 1.870 uIU/mL (04-09 @ 08:50)  Thyroid Stimulating Hormone, Serum: 1.120 uIU/mL (04-01 @ 07:54)      HbA1C:         Insulin Sliding Scale requirements X 24 Hours:      RADIOLOGY & ADDITIONAL TESTS:      A/P:72y Female with PMHx of T2DM c/b peripheral neuropathy, HTN, recent admission and workup for Naty Sanchez tear, TIA on 3/30 (MR negative) presents to [facility] as transfer from [facility] ED for episode of AMS, back to baseline in [facility] ED. CTH negative in ED for acute pathology. On medicine to assess for AMS.   A1c:11.%  Wt:63.4kg  Cr:0.45  GFR:101  1.  DM type  2  c-peptide level : 0.3 low (likely insulinopenic)   Patient appears to be recovering from prior glucose toxic state.   Please continue lantus    units at night.    Please continue lispro  8  units before each meal.    Please continue lispro moderate dose sliding scale only before meals    Pt's fingerstick glucose goal is 100-180    Will continue to monitor     For discharge, pt can continue TBD    Pt can follow up at discharge with [facility] by calling  to make an appointment.   Will discuss case with Dr. Christopher and update primary team     INTERVAL HPI/OVERNIGHT EVENTS:    Patient is a 72y old  Female who presents with a chief complaint of episode of AMS (2022 10:20)  Patient has braces placed on b/l feet. She continues to eat well. Blood sugars are still low normal despite reduction in insulin regimen.   FSG & Insulin received:    Yesterday:  pre-dinner fs  nutritional lispro 10  units + 0  units lispro SS  bedtime fs  lantus 8  units +   0 units lispro SS    Today:  pre-breakfast fs  nutritional lispro 4  units+0   units lispro SS  pre-lunch fs  nutritional lispro 4  units+ 2  units lispro SS    Pt reports the following symptoms:    CONSTITUTIONAL:  Negative fever or chills, feels well, good appetite  EYES:  Negative  blurry vision or double vision  CARDIOVASCULAR:  Negative for chest pain or palpitations  RESPIRATORY:  Negative for cough, wheezing, or SOB   GASTROINTESTINAL:  Negative for nausea, vomiting, diarrhea, constipation, or abdominal pain  GENITOURINARY:  Negative frequency, urgency or dysuria  NEUROLOGIC:  No headache, confusion, dizziness, lightheadedness    MEDICATIONS  (STANDING):  amLODIPine   Tablet 10 milliGRAM(s) Oral daily  aspirin  chewable 81 milliGRAM(s) Oral daily  atorvastatin 40 milliGRAM(s) Oral at bedtime  dextrose 5%. 1000 milliLiter(s) (50 mL/Hr) IV Continuous <Continuous>  dextrose 5%. 1000 milliLiter(s) (100 mL/Hr) IV Continuous <Continuous>  dextrose 50% Injectable 25 Gram(s) IV Push once  dextrose 50% Injectable 12.5 Gram(s) IV Push once  dextrose 50% Injectable 25 Gram(s) IV Push once  DULoxetine 60 milliGRAM(s) Oral daily  enoxaparin Injectable 40 milliGRAM(s) SubCutaneous every 24 hours  glucagon  Injectable 1 milliGRAM(s) IntraMuscular once  insulin glargine Injectable (LANTUS) 8 Unit(s) SubCutaneous at bedtime  insulin lispro (ADMELOG) corrective regimen sliding scale   SubCutaneous three times a day before meals  insulin lispro Injectable (ADMELOG) 8 Unit(s) SubCutaneous three times a day before meals  lisinopril 40 milliGRAM(s) Oral every 24 hours  pantoprazole    Tablet 40 milliGRAM(s) Oral before breakfast    MEDICATIONS  (PRN):  dextrose Oral Gel 15 Gram(s) Oral once PRN Blood Glucose LESS THAN 70 milliGRAM(s)/deciliter      PHYSICAL EXAM  Vital Signs Last 24 Hrs  T(C): 36.7 (2022 06:07), Max: 36.7 (2022 06:07)  T(F): 98 (2022 06:07), Max: 98 (2022 06:07)  HR: 67 (2022 06:07) (67 - 70)  BP: 130/73 (2022 06:07) (130/73 - 145/73)  BP(mean): --  RR: 18 (2022 06:07) (18 - 18)  SpO2: 98% (2022 06:07) (98% - 98%)    Constitutional: NAD.   HEENT: NCAT, MMM, OP clear, EOMI, , no proptosis or lid retraction  Neck: no thyromegaly or palpable thyroid nodules   Respiratory: lungs CTAB.  Cardiovascular: regular rhythm, normal S1 and S2, no audible murmurs, no peripheral edema  GI: soft, NT/ND, no masses/HSM appreciated.  Neurology: no tremors, slowed speech, lower extremity weakness b/l, b/l foot drop   Skin: no visible rashes/lesions  Psychiatric: AAO x 3, normal affect/mood.    LABS:                        10.5   4.81  )-----------( 275      ( 2022 06:56 )             33.4     04-18    134<L>  |  100  |  21  ----------------------------<  172<H>  4.4   |  26  |  0.54    Ca    8.6      2022 06:56  Phos  3.5     04-18  Mg     1.8     04-18          Thyroid Stimulating Hormone, Serum: 1.870 uIU/mL ( @ 08:50)  Thyroid Stimulating Hormone, Serum: 1.120 uIU/mL ( @ 07:54)      HbA1C:         Insulin Sliding Scale requirements X 24 Hours:      RADIOLOGY & ADDITIONAL TESTS:      A/P:72y Female with PMHx of T2DM c/b peripheral neuropathy, HTN, recent admission and workup for Naty Sanchez tear, TIA on 3/30 (MR negative) presents to [facility] as transfer from [facility] ED for episode of AMS, back to baseline in [facility] ED. CTH negative in ED for acute pathology. On medicine to assess for AMS.   A1c:11.%  Wt:63.4kg  Cr:0.45  GFR:101  1.  DM type  2  c-peptide level : 0.3 low (likely insulinopenic)   Patient appears to be recovering from prior glucose toxic state.   Please continue lantus    units at night.    Please continue lispro  8  units before each meal.    Please continue lispro moderate dose sliding scale only before meals    Pt's fingerstick glucose goal is 100-180    Will continue to monitor     For discharge, pt can continue TBD    Pt can follow up at discharge with [facility] by calling  to make an appointment.   Will discuss case with Dr. Christopher and update primary team     INTERVAL HPI/OVERNIGHT EVENTS:    Patient is a 72y old  Female who presents with a chief complaint of episode of AMS (2022 10:20)  Patient has braces placed on b/l feet. She continues to eat well. Blood sugars are still low normal despite reduction in insulin regimen.   FSG & Insulin received:    Yesterday:  pre-dinner fs  nutritional lispro 10  units + 0  units lispro SS  bedtime fs  lantus 8  units +   0 units lispro SS    Today:  pre-breakfast fs  nutritional lispro 4  units+0   units lispro SS  pre-lunch fs  nutritional lispro 4  units+ 2  units lispro SS    Pt reports the following symptoms:    CONSTITUTIONAL:  Negative fever or chills, feels well, good appetite  EYES:  Negative  blurry vision or double vision  CARDIOVASCULAR:  Negative for chest pain or palpitations  RESPIRATORY:  Negative for cough, wheezing, or SOB   GASTROINTESTINAL:  Negative for nausea, vomiting, diarrhea, constipation, or abdominal pain  GENITOURINARY:  Negative frequency, urgency or dysuria  NEUROLOGIC:  No headache, confusion, dizziness, lightheadedness    MEDICATIONS  (STANDING):  amLODIPine   Tablet 10 milliGRAM(s) Oral daily  aspirin  chewable 81 milliGRAM(s) Oral daily  atorvastatin 40 milliGRAM(s) Oral at bedtime  dextrose 5%. 1000 milliLiter(s) (50 mL/Hr) IV Continuous <Continuous>  dextrose 5%. 1000 milliLiter(s) (100 mL/Hr) IV Continuous <Continuous>  dextrose 50% Injectable 25 Gram(s) IV Push once  dextrose 50% Injectable 12.5 Gram(s) IV Push once  dextrose 50% Injectable 25 Gram(s) IV Push once  DULoxetine 60 milliGRAM(s) Oral daily  enoxaparin Injectable 40 milliGRAM(s) SubCutaneous every 24 hours  glucagon  Injectable 1 milliGRAM(s) IntraMuscular once  insulin glargine Injectable (LANTUS) 8 Unit(s) SubCutaneous at bedtime  insulin lispro (ADMELOG) corrective regimen sliding scale   SubCutaneous three times a day before meals  insulin lispro Injectable (ADMELOG) 8 Unit(s) SubCutaneous three times a day before meals  lisinopril 40 milliGRAM(s) Oral every 24 hours  pantoprazole    Tablet 40 milliGRAM(s) Oral before breakfast    MEDICATIONS  (PRN):  dextrose Oral Gel 15 Gram(s) Oral once PRN Blood Glucose LESS THAN 70 milliGRAM(s)/deciliter      PHYSICAL EXAM  Vital Signs Last 24 Hrs  T(C): 36.7 (2022 06:07), Max: 36.7 (2022 06:07)  T(F): 98 (2022 06:07), Max: 98 (2022 06:07)  HR: 67 (2022 06:07) (67 - 70)  BP: 130/73 (2022 06:07) (130/73 - 145/73)  BP(mean): --  RR: 18 (2022 06:07) (18 - 18)  SpO2: 98% (2022 06:07) (98% - 98%)    Constitutional: NAD.   HEENT: NCAT, MMM, OP clear, EOMI, , no proptosis or lid retraction  Neck: no thyromegaly or palpable thyroid nodules   Respiratory: lungs CTAB.  Cardiovascular: regular rhythm, normal S1 and S2, no audible murmurs, no peripheral edema  GI: soft, NT/ND, no masses/HSM appreciated.  Neurology: no tremors, slowed speech, lower extremity weakness b/l, b/l foot drop   Skin: no visible rashes/lesions  Psychiatric: AAO x 3, normal affect/mood.    LABS:                        10.5   4.81  )-----------( 275      ( 2022 06:56 )             33.4     04-18    134<L>  |  100  |  21  ----------------------------<  172<H>  4.4   |  26  |  0.54    Ca    8.6      2022 06:56  Phos  3.5     04-18  Mg     1.8     04-18          Thyroid Stimulating Hormone, Serum: 1.870 uIU/mL ( @ 08:50)  Thyroid Stimulating Hormone, Serum: 1.120 uIU/mL ( @ 07:54)      HbA1C:         Insulin Sliding Scale requirements X 24 Hours:      RADIOLOGY & ADDITIONAL TESTS:      A/P:72y Female with PMHx of T2DM c/b peripheral neuropathy, HTN, recent admission and workup for Naty Sanchez tear, TIA on 3/30 (MR negative) presents to [facility] as transfer from [facility] ED for episode of AMS, back to baseline in [facility] ED. CTH negative in ED for acute pathology. On medicine to assess for AMS.   A1c:11.%  Wt:63.4kg  Cr:0.45  GFR:101  1.  DM type  2  c-peptide level : 0.3 low (likely insulinopenic)   Patient appears to be recovering from prior glucose toxic state.   Please continue lantus  4  units at night.    Please continue lispro  8  units before each meal.    Please continue lispro moderate dose sliding scale only before meals    Pt's fingerstick glucose goal is 100-180    Will continue to monitor     For discharge, pt can continue TBD    Pt can follow up at discharge with [facility] by calling  to make an appointment.   Will discuss case with Dr. Christopher and update primary team

## 2022-04-19 NOTE — PROGRESS NOTE ADULT - PROBLEM SELECTOR PLAN 4
Pt with hx of DM, A1c 11.6. Endocrine following. TSH results: 1.120  - C/w mISS, only before meals   - C/w Lantus 8U, Lispro 8U   - Low C peptide patient will need insulin and NOT oral agents on dc  - F/u endocrine recs   - increased Cymbalta to 60 Qd for diabetic neuropathy

## 2022-04-20 LAB
GLUCOSE BLDC GLUCOMTR-MCNC: 109 MG/DL — HIGH (ref 70–99)
GLUCOSE BLDC GLUCOMTR-MCNC: 167 MG/DL — HIGH (ref 70–99)
GLUCOSE BLDC GLUCOMTR-MCNC: 176 MG/DL — HIGH (ref 70–99)
GLUCOSE BLDC GLUCOMTR-MCNC: 84 MG/DL — SIGNIFICANT CHANGE UP (ref 70–99)

## 2022-04-20 PROCEDURE — 99231 SBSQ HOSP IP/OBS SF/LOW 25: CPT

## 2022-04-20 RX ADMIN — Medication 8 UNIT(S): at 17:02

## 2022-04-20 RX ADMIN — Medication 2: at 09:23

## 2022-04-20 RX ADMIN — Medication 81 MILLIGRAM(S): at 12:38

## 2022-04-20 RX ADMIN — LISINOPRIL 40 MILLIGRAM(S): 2.5 TABLET ORAL at 06:23

## 2022-04-20 RX ADMIN — Medication 2: at 12:44

## 2022-04-20 RX ADMIN — PANTOPRAZOLE SODIUM 40 MILLIGRAM(S): 20 TABLET, DELAYED RELEASE ORAL at 06:23

## 2022-04-20 RX ADMIN — ATORVASTATIN CALCIUM 40 MILLIGRAM(S): 80 TABLET, FILM COATED ORAL at 21:01

## 2022-04-20 RX ADMIN — INSULIN GLARGINE 4 UNIT(S): 100 INJECTION, SOLUTION SUBCUTANEOUS at 21:53

## 2022-04-20 RX ADMIN — DULOXETINE HYDROCHLORIDE 60 MILLIGRAM(S): 30 CAPSULE, DELAYED RELEASE ORAL at 12:38

## 2022-04-20 RX ADMIN — Medication 8 UNIT(S): at 12:43

## 2022-04-20 RX ADMIN — AMLODIPINE BESYLATE 10 MILLIGRAM(S): 2.5 TABLET ORAL at 06:01

## 2022-04-20 RX ADMIN — Medication 8 UNIT(S): at 09:22

## 2022-04-20 RX ADMIN — ENOXAPARIN SODIUM 40 MILLIGRAM(S): 100 INJECTION SUBCUTANEOUS at 21:01

## 2022-04-20 NOTE — PROGRESS NOTE ADULT - PROBLEM SELECTOR PLAN 7
F: None  E: Replete PRN   N: DASH/TLC   DVT: Lovenox 40 qd  Dispo: RMF, PT rec [facility]EL[facility]- however patient is uninsured and cannot go to [facility] attempting to get to one person assist

## 2022-04-20 NOTE — PROGRESS NOTE ADULT - PROBLEM SELECTOR PLAN 3
Pt with hx of DM, A1c 11.6. Endocrine following. TSH results: 1.120  - C/w mISS, before meals   - C/w Lantus 4U, Lispro 2U   - Low C peptide patient will need insulin and NOT oral agents on dc  - F/u endocrine recs   - Cymbalta 60 Qd for diabetic neuropathy

## 2022-04-20 NOTE — PROGRESS NOTE ADULT - ATTENDING COMMENTS
I was physically present for the key portions of the evaluation and managemnent (E/M) service provided.  I agree with the above history, physical, and plan which I have reviewed and edited where appropriate, with the exceptions as per my note.    pt seen and examined.    neuro exam stable.    pt has no new neuro complaints.    dispo planning.

## 2022-04-20 NOTE — PROGRESS NOTE ADULT - PROBLEM SELECTOR PLAN 2
RESOLVED  UA with +WBCs, UCx growing E faecalis.   Will treat as complicated  completed 7 day course of CTX and then ampicillin

## 2022-04-20 NOTE — PROGRESS NOTE ADULT - ASSESSMENT
72y Female with PMHx of T2DM c/b peripheral neuropathy, HTN, recent admission and workup for Naty Sanchez tear, TIA on 3/30 (MR negative) presents to [facility] as transfer from [facility] ED for episode of altered mental status (staring, generalized weakness), back to baseline in [facility] ED, no acute pathology on CTH or MRI, found to have metabolic encephalopathy (resolved) 2/2 UTI (completed 7day abx) vs hypertension, also found to have new LE weakness of unclear origin, possible diabetic neuropathy. Due to immigration and insurance status, not able to [facility] as recommended by PT. Pending improvement to one-person assist in order to discharge home with out-of-pocket PT.

## 2022-04-20 NOTE — PROGRESS NOTE ADULT - SUBJECTIVE AND OBJECTIVE BOX
OVERNIGHT EVENTS: NAEON    SUBJECTIVE / INTERVAL HPI: Patient seen and examined at bedside. Feels "shaky". Denied dysuria. Experienced foot pain worse at night,     VITAL SIGNS:  Vital Signs Last 24 Hrs  T(C): 36.6 (20 Apr 2022 11:46), Max: 36.9 (20 Apr 2022 05:03)  T(F): 97.8 (20 Apr 2022 11:46), Max: 98.5 (20 Apr 2022 05:03)  HR: 67 (20 Apr 2022 11:46) (67 - 79)  BP: 129/69 (20 Apr 2022 11:46) (124/57 - 133/69)  BP(mean): --  RR: 18 (20 Apr 2022 11:46) (16 - 18)  SpO2: 100% (20 Apr 2022 11:46) (96% - 100%)    PHYSICAL EXAM:    General: Elderly female in NAD  HEENT: NC/AT; PERRL, anicteric sclera; MMM  Neck: supple  Cardiovascular: +S1/S2, RRR, no murmurs, rubs, gallops  Respiratory: CTA B/L; no W/R/R  Gastrointestinal: soft, NT/ND; +BSx4  Extremities: WWP; no edema, clubbing or cyanosis  Vascular: 2+ radial, DP/PT pulses B/L  Neurological: AAOx3; 4/5 UE 1/5 LE bilaterally. Sensation equal and intact of UE and LE    MEDICATIONS:  MEDICATIONS  (STANDING):  amLODIPine   Tablet 10 milliGRAM(s) Oral daily  aspirin  chewable 81 milliGRAM(s) Oral daily  atorvastatin 40 milliGRAM(s) Oral at bedtime  dextrose 5%. 1000 milliLiter(s) (50 mL/Hr) IV Continuous <Continuous>  dextrose 5%. 1000 milliLiter(s) (100 mL/Hr) IV Continuous <Continuous>  dextrose 50% Injectable 25 Gram(s) IV Push once  dextrose 50% Injectable 12.5 Gram(s) IV Push once  dextrose 50% Injectable 25 Gram(s) IV Push once  DULoxetine 60 milliGRAM(s) Oral daily  enoxaparin Injectable 40 milliGRAM(s) SubCutaneous every 24 hours  glucagon  Injectable 1 milliGRAM(s) IntraMuscular once  insulin glargine Injectable (LANTUS) 4 Unit(s) SubCutaneous at bedtime  insulin lispro (ADMELOG) corrective regimen sliding scale   SubCutaneous three times a day before meals  insulin lispro Injectable (ADMELOG) 8 Unit(s) SubCutaneous three times a day before meals  lisinopril 40 milliGRAM(s) Oral every 24 hours  pantoprazole    Tablet 40 milliGRAM(s) Oral before breakfast    MEDICATIONS  (PRN):  dextrose Oral Gel 15 Gram(s) Oral once PRN Blood Glucose LESS THAN 70 milliGRAM(s)/deciliter      ALLERGIES:  Allergies    No Known Allergies    Intolerances        LABS:              CAPILLARY BLOOD GLUCOSE      POCT Blood Glucose.: 176 mg/dL (20 Apr 2022 12:23)      RADIOLOGY & ADDITIONAL TESTS: Reviewed.    PLAN:

## 2022-04-21 DIAGNOSIS — M79.605 PAIN IN LEFT LEG: ICD-10-CM

## 2022-04-21 LAB
ANION GAP SERPL CALC-SCNC: 8 MMOL/L — SIGNIFICANT CHANGE UP (ref 5–17)
BASOPHILS # BLD AUTO: 0.02 K/UL — SIGNIFICANT CHANGE UP (ref 0–0.2)
BASOPHILS NFR BLD AUTO: 0.4 % — SIGNIFICANT CHANGE UP (ref 0–2)
BLD GP AB SCN SERPL QL: NEGATIVE — SIGNIFICANT CHANGE UP
BUN SERPL-MCNC: 25 MG/DL — HIGH (ref 7–23)
CALCIUM SERPL-MCNC: 8.4 MG/DL — SIGNIFICANT CHANGE UP (ref 8.4–10.5)
CHLORIDE SERPL-SCNC: 101 MMOL/L — SIGNIFICANT CHANGE UP (ref 96–108)
CO2 SERPL-SCNC: 25 MMOL/L — SIGNIFICANT CHANGE UP (ref 22–31)
CREAT SERPL-MCNC: 0.59 MG/DL — SIGNIFICANT CHANGE UP (ref 0.5–1.3)
EGFR: 95 ML/MIN/1.73M2 — SIGNIFICANT CHANGE UP
EOSINOPHIL # BLD AUTO: 0.22 K/UL — SIGNIFICANT CHANGE UP (ref 0–0.5)
EOSINOPHIL NFR BLD AUTO: 4.6 % — SIGNIFICANT CHANGE UP (ref 0–6)
GLUCOSE BLDC GLUCOMTR-MCNC: 161 MG/DL — HIGH (ref 70–99)
GLUCOSE BLDC GLUCOMTR-MCNC: 185 MG/DL — HIGH (ref 70–99)
GLUCOSE BLDC GLUCOMTR-MCNC: 73 MG/DL — SIGNIFICANT CHANGE UP (ref 70–99)
GLUCOSE BLDC GLUCOMTR-MCNC: 86 MG/DL — SIGNIFICANT CHANGE UP (ref 70–99)
GLUCOSE BLDC GLUCOMTR-MCNC: 87 MG/DL — SIGNIFICANT CHANGE UP (ref 70–99)
GLUCOSE SERPL-MCNC: 163 MG/DL — HIGH (ref 70–99)
HCT VFR BLD CALC: 33.4 % — LOW (ref 34.5–45)
HGB BLD-MCNC: 10.2 G/DL — LOW (ref 11.5–15.5)
IMM GRANULOCYTES NFR BLD AUTO: 0.2 % — SIGNIFICANT CHANGE UP (ref 0–1.5)
LYMPHOCYTES # BLD AUTO: 2.05 K/UL — SIGNIFICANT CHANGE UP (ref 1–3.3)
LYMPHOCYTES # BLD AUTO: 42.4 % — SIGNIFICANT CHANGE UP (ref 13–44)
MAGNESIUM SERPL-MCNC: 1.7 MG/DL — SIGNIFICANT CHANGE UP (ref 1.6–2.6)
MCHC RBC-ENTMCNC: 26 PG — LOW (ref 27–34)
MCHC RBC-ENTMCNC: 30.5 GM/DL — LOW (ref 32–36)
MCV RBC AUTO: 85.2 FL — SIGNIFICANT CHANGE UP (ref 80–100)
MONOCYTES # BLD AUTO: 0.32 K/UL — SIGNIFICANT CHANGE UP (ref 0–0.9)
MONOCYTES NFR BLD AUTO: 6.6 % — SIGNIFICANT CHANGE UP (ref 2–14)
NEUTROPHILS # BLD AUTO: 2.21 K/UL — SIGNIFICANT CHANGE UP (ref 1.8–7.4)
NEUTROPHILS NFR BLD AUTO: 45.8 % — SIGNIFICANT CHANGE UP (ref 43–77)
NRBC # BLD: 0 /100 WBCS — SIGNIFICANT CHANGE UP (ref 0–0)
PHOSPHATE SERPL-MCNC: 3.8 MG/DL — SIGNIFICANT CHANGE UP (ref 2.5–4.5)
PLATELET # BLD AUTO: 262 K/UL — SIGNIFICANT CHANGE UP (ref 150–400)
POTASSIUM SERPL-MCNC: 4.6 MMOL/L — SIGNIFICANT CHANGE UP (ref 3.5–5.3)
POTASSIUM SERPL-SCNC: 4.6 MMOL/L — SIGNIFICANT CHANGE UP (ref 3.5–5.3)
RBC # BLD: 3.92 M/UL — SIGNIFICANT CHANGE UP (ref 3.8–5.2)
RBC # FLD: 13.9 % — SIGNIFICANT CHANGE UP (ref 10.3–14.5)
RH IG SCN BLD-IMP: POSITIVE — SIGNIFICANT CHANGE UP
SARS-COV-2 RNA SPEC QL NAA+PROBE: SIGNIFICANT CHANGE UP
SODIUM SERPL-SCNC: 134 MMOL/L — LOW (ref 135–145)
WBC # BLD: 4.83 K/UL — SIGNIFICANT CHANGE UP (ref 3.8–10.5)
WBC # FLD AUTO: 4.83 K/UL — SIGNIFICANT CHANGE UP (ref 3.8–10.5)

## 2022-04-21 PROCEDURE — 99231 SBSQ HOSP IP/OBS SF/LOW 25: CPT

## 2022-04-21 RX ORDER — MAGNESIUM SULFATE 500 MG/ML
2 VIAL (ML) INJECTION ONCE
Refills: 0 | Status: COMPLETED | OUTPATIENT
Start: 2022-04-21 | End: 2022-04-21

## 2022-04-21 RX ORDER — ACETAMINOPHEN 500 MG
650 TABLET ORAL ONCE
Refills: 0 | Status: COMPLETED | OUTPATIENT
Start: 2022-04-21 | End: 2022-04-21

## 2022-04-21 RX ORDER — GABAPENTIN 400 MG/1
100 CAPSULE ORAL
Refills: 0 | Status: DISCONTINUED | OUTPATIENT
Start: 2022-04-21 | End: 2022-05-09

## 2022-04-21 RX ADMIN — GABAPENTIN 100 MILLIGRAM(S): 400 CAPSULE ORAL at 13:06

## 2022-04-21 RX ADMIN — Medication 8 UNIT(S): at 09:29

## 2022-04-21 RX ADMIN — Medication 2: at 13:06

## 2022-04-21 RX ADMIN — INSULIN GLARGINE 4 UNIT(S): 100 INJECTION, SOLUTION SUBCUTANEOUS at 21:24

## 2022-04-21 RX ADMIN — ENOXAPARIN SODIUM 40 MILLIGRAM(S): 100 INJECTION SUBCUTANEOUS at 21:24

## 2022-04-21 RX ADMIN — Medication 8 UNIT(S): at 18:09

## 2022-04-21 RX ADMIN — DULOXETINE HYDROCHLORIDE 60 MILLIGRAM(S): 30 CAPSULE, DELAYED RELEASE ORAL at 11:42

## 2022-04-21 RX ADMIN — PANTOPRAZOLE SODIUM 40 MILLIGRAM(S): 20 TABLET, DELAYED RELEASE ORAL at 06:17

## 2022-04-21 RX ADMIN — ATORVASTATIN CALCIUM 40 MILLIGRAM(S): 80 TABLET, FILM COATED ORAL at 21:24

## 2022-04-21 RX ADMIN — GABAPENTIN 100 MILLIGRAM(S): 400 CAPSULE ORAL at 19:38

## 2022-04-21 RX ADMIN — Medication 650 MILLIGRAM(S): at 10:25

## 2022-04-21 RX ADMIN — AMLODIPINE BESYLATE 10 MILLIGRAM(S): 2.5 TABLET ORAL at 06:17

## 2022-04-21 RX ADMIN — Medication 2: at 09:29

## 2022-04-21 RX ADMIN — Medication 650 MILLIGRAM(S): at 09:55

## 2022-04-21 RX ADMIN — LISINOPRIL 40 MILLIGRAM(S): 2.5 TABLET ORAL at 06:18

## 2022-04-21 RX ADMIN — Medication 25 GRAM(S): at 13:05

## 2022-04-21 RX ADMIN — Medication 8 UNIT(S): at 13:06

## 2022-04-21 RX ADMIN — Medication 81 MILLIGRAM(S): at 11:42

## 2022-04-21 NOTE — PROGRESS NOTE ADULT - PROBLEM SELECTOR PLAN 2
Needle like pain shins downward, interfering with sleep.   - Continue Cymbalta 60 Qd for diabetic neuropathy  - Start gabapentin 100 BID Needle like pain shins downward, interfering with sleep.   - Continue Cymbalta 60 Qd for diabetic neuropathy  - Start gabapentin 100 BID  - Replete magnesium as needed

## 2022-04-21 NOTE — PROGRESS NOTE ADULT - ATTENDING COMMENTS
I was physically present for the key portions of the evaluation and managemnent (E/M) service provided.  I agree with the above history, physical, and plan which I have reviewed and edited where appropriate, with the exceptions as per my note.    pt reports stability    neuro exam unchanged    dispo planning

## 2022-04-21 NOTE — PROGRESS NOTE ADULT - ATTENDING COMMENTS
The patient's glucose control has been good with glucose levels  yesterday and today 161-185. I agree with continuing 4 units Lantis and 8 units pre-meal Insulin.

## 2022-04-21 NOTE — PROGRESS NOTE ADULT - PROBLEM SELECTOR PLAN 3
RESOLVED  UA with +WBCs, UCx growing E faecalis. Will treat as complicated  -completed 7 day course of CTX and then ampicillin

## 2022-04-21 NOTE — PROGRESS NOTE ADULT - ASSESSMENT
72y Female with PMHx of T2DM c/b peripheral neuropathy, HTN, recent admission and workup for Naty Sanchez tear, TIA on 3/30 (MR negative) presents to [facility] as transfer from [facility] ED for episode of altered mental status (staring, generalized weakness), back to baseline in [facility] ED, no acute pathology on CTH or MRI, found to have metabolic encephalopathy (resolved) 2/2 UTI (completed 7day abx) vs hypertension, also found to have new LE weakness of unclear origin, possible diabetic neuropathy. Due to immigration and insurance status, not able to [facility] as recommended by PT. Pending improvement to one-person assist in order to discharge home with out-of-pocket PT.

## 2022-04-21 NOTE — PROGRESS NOTE ADULT - SUBJECTIVE AND OBJECTIVE BOX
OVERNIGHT EVENTS:    SUBJECTIVE / INTERVAL HPI: Patient seen and examined at bedside.     VITAL SIGNS:  Vital Signs Last 24 Hrs  T(C): 36.9 (21 Apr 2022 05:18), Max: 36.9 (21 Apr 2022 05:18)  T(F): 98.4 (21 Apr 2022 05:18), Max: 98.4 (21 Apr 2022 05:18)  HR: 72 (21 Apr 2022 05:18) (67 - 72)  BP: 134/81 (21 Apr 2022 05:18) (129/69 - 134/81)  BP(mean): --  RR: 18 (21 Apr 2022 05:18) (18 - 18)  SpO2: 98% (21 Apr 2022 05:18) (98% - 100%)    PHYSICAL EXAM:    General: No acute distress  HEENT: NC/AT; PERRL, anicteric sclera; MMM  Neck: supple  Cardiovascular: +S1/S2, RRR, no murmurs, rubs, gallops  Respiratory: CTA B/L; no W/R/R  Gastrointestinal: soft, NT/ND; +BSx4  Extremities: WWP; no edema, clubbing or cyanosis  Vascular: 2+ radial, DP/PT pulses B/L  Neurological: AAOx3; no focal deficits    MEDICATIONS:  MEDICATIONS  (STANDING):  amLODIPine   Tablet 10 milliGRAM(s) Oral daily  aspirin  chewable 81 milliGRAM(s) Oral daily  atorvastatin 40 milliGRAM(s) Oral at bedtime  dextrose 5%. 1000 milliLiter(s) (100 mL/Hr) IV Continuous <Continuous>  dextrose 5%. 1000 milliLiter(s) (50 mL/Hr) IV Continuous <Continuous>  dextrose 50% Injectable 25 Gram(s) IV Push once  dextrose 50% Injectable 12.5 Gram(s) IV Push once  dextrose 50% Injectable 25 Gram(s) IV Push once  DULoxetine 60 milliGRAM(s) Oral daily  enoxaparin Injectable 40 milliGRAM(s) SubCutaneous every 24 hours  glucagon  Injectable 1 milliGRAM(s) IntraMuscular once  insulin glargine Injectable (LANTUS) 4 Unit(s) SubCutaneous at bedtime  insulin lispro (ADMELOG) corrective regimen sliding scale   SubCutaneous three times a day before meals  insulin lispro Injectable (ADMELOG) 8 Unit(s) SubCutaneous three times a day before meals  lisinopril 40 milliGRAM(s) Oral every 24 hours  pantoprazole    Tablet 40 milliGRAM(s) Oral before breakfast    MEDICATIONS  (PRN):  dextrose Oral Gel 15 Gram(s) Oral once PRN Blood Glucose LESS THAN 70 milliGRAM(s)/deciliter      ALLERGIES:  Allergies    No Known Allergies    Intolerances        LABS:              CAPILLARY BLOOD GLUCOSE      POCT Blood Glucose.: 109 mg/dL (20 Apr 2022 21:36)      RADIOLOGY & ADDITIONAL TESTS: Reviewed.    PLAN:  OVERNIGHT EVENTS: NAEON    SUBJECTIVE / INTERVAL HPI: Patient seen and examined at bedside. Needle like pain up to bilateral shins interfering with her sleep.     VITAL SIGNS:  Vital Signs Last 24 Hrs  T(C): 36.9 (21 Apr 2022 05:18), Max: 36.9 (21 Apr 2022 05:18)  T(F): 98.4 (21 Apr 2022 05:18), Max: 98.4 (21 Apr 2022 05:18)  HR: 72 (21 Apr 2022 05:18) (67 - 72)  BP: 134/81 (21 Apr 2022 05:18) (129/69 - 134/81)  BP(mean): --  RR: 18 (21 Apr 2022 05:18) (18 - 18)  SpO2: 98% (21 Apr 2022 05:18) (98% - 100%)    PHYSICAL EXAM:    General: Elderly female in NAD  HEENT: NC/AT; PERRL, anicteric sclera; MMM  Neck: supple  Cardiovascular: +S1/S2, RRR, no murmurs, rubs, gallops  Respiratory: CTA B/L; no W/R/R  Gastrointestinal: soft, NT/ND; +BSx4  Extremities: WWP; no edema, clubbing or cyanosis  Vascular: 2+ radial, DP/PT pulses B/L  Neurological: AAOx3; 4/5 UE 2/5 LE bilaterally. Sensation equal and intact of UE and LE    MEDICATIONS:  MEDICATIONS  (STANDING):  amLODIPine   Tablet 10 milliGRAM(s) Oral daily  aspirin  chewable 81 milliGRAM(s) Oral daily  atorvastatin 40 milliGRAM(s) Oral at bedtime  dextrose 5%. 1000 milliLiter(s) (100 mL/Hr) IV Continuous <Continuous>  dextrose 5%. 1000 milliLiter(s) (50 mL/Hr) IV Continuous <Continuous>  dextrose 50% Injectable 25 Gram(s) IV Push once  dextrose 50% Injectable 12.5 Gram(s) IV Push once  dextrose 50% Injectable 25 Gram(s) IV Push once  DULoxetine 60 milliGRAM(s) Oral daily  enoxaparin Injectable 40 milliGRAM(s) SubCutaneous every 24 hours  glucagon  Injectable 1 milliGRAM(s) IntraMuscular once  insulin glargine Injectable (LANTUS) 4 Unit(s) SubCutaneous at bedtime  insulin lispro (ADMELOG) corrective regimen sliding scale   SubCutaneous three times a day before meals  insulin lispro Injectable (ADMELOG) 8 Unit(s) SubCutaneous three times a day before meals  lisinopril 40 milliGRAM(s) Oral every 24 hours  pantoprazole    Tablet 40 milliGRAM(s) Oral before breakfast    MEDICATIONS  (PRN):  dextrose Oral Gel 15 Gram(s) Oral once PRN Blood Glucose LESS THAN 70 milliGRAM(s)/deciliter      ALLERGIES:  Allergies    No Known Allergies    Intolerances        LABS:              CAPILLARY BLOOD GLUCOSE      POCT Blood Glucose.: 109 mg/dL (20 Apr 2022 21:36)      RADIOLOGY & ADDITIONAL TESTS: Reviewed.    PLAN:

## 2022-04-21 NOTE — PROGRESS NOTE ADULT - PROBLEM SELECTOR PLAN 8
F: None  E: Replete PRN   N: DASH/TLC   DVT: Lovenox 40 qd  Dispo: RMF, PT rec [facility]- however patient is uninsured and cannot go to [facility] attempting to get to one person assist

## 2022-04-21 NOTE — PROGRESS NOTE ADULT - SUBJECTIVE AND OBJECTIVE BOX
INTERVAL HPI/OVERNIGHT EVENTS:    Patient is a 72y old  Female who presents with a chief complaint of episode of AMS (21 Apr 2022 06:18)      Pt reports the following symptoms:    CONSTITUTIONAL:  Negative fever or chills, feels well, good appetite  EYES:  Negative  blurry vision or double vision  CARDIOVASCULAR:  Negative for chest pain or palpitations  RESPIRATORY:  Negative for cough, wheezing, or SOB   GASTROINTESTINAL:  Negative for nausea, vomiting, diarrhea, constipation, or abdominal pain  GENITOURINARY:  Negative frequency, urgency or dysuria  NEUROLOGIC:  No headache, confusion, dizziness, lightheadedness    MEDICATIONS  (STANDING):  amLODIPine   Tablet 10 milliGRAM(s) Oral daily  aspirin  chewable 81 milliGRAM(s) Oral daily  atorvastatin 40 milliGRAM(s) Oral at bedtime  dextrose 5%. 1000 milliLiter(s) (50 mL/Hr) IV Continuous <Continuous>  dextrose 5%. 1000 milliLiter(s) (100 mL/Hr) IV Continuous <Continuous>  dextrose 50% Injectable 25 Gram(s) IV Push once  dextrose 50% Injectable 12.5 Gram(s) IV Push once  dextrose 50% Injectable 25 Gram(s) IV Push once  DULoxetine 60 milliGRAM(s) Oral daily  enoxaparin Injectable 40 milliGRAM(s) SubCutaneous every 24 hours  glucagon  Injectable 1 milliGRAM(s) IntraMuscular once  insulin glargine Injectable (LANTUS) 4 Unit(s) SubCutaneous at bedtime  insulin lispro (ADMELOG) corrective regimen sliding scale   SubCutaneous three times a day before meals  insulin lispro Injectable (ADMELOG) 8 Unit(s) SubCutaneous three times a day before meals  lisinopril 40 milliGRAM(s) Oral every 24 hours  pantoprazole    Tablet 40 milliGRAM(s) Oral before breakfast    MEDICATIONS  (PRN):  dextrose Oral Gel 15 Gram(s) Oral once PRN Blood Glucose LESS THAN 70 milliGRAM(s)/deciliter      PHYSICAL EXAM  Vital Signs Last 24 Hrs  T(C): 36.7 (21 Apr 2022 11:46), Max: 36.9 (21 Apr 2022 05:18)  T(F): 98.1 (21 Apr 2022 11:46), Max: 98.4 (21 Apr 2022 05:18)  HR: 70 (21 Apr 2022 11:46) (70 - 72)  BP: 112/70 (21 Apr 2022 11:46) (112/70 - 134/81)  BP(mean): --  RR: 18 (21 Apr 2022 11:46) (18 - 18)  SpO2: 98% (21 Apr 2022 11:46) (98% - 100%)    Constitutional: NAD.   HEENT: NCAT, MMM, OP clear, EOMI, , no proptosis or lid retraction  Neck: no thyromegaly or palpable thyroid nodules   Respiratory: lungs CTAB.  Cardiovascular: regular rhythm, normal S1 and S2, no audible murmurs, no peripheral edema  GI: soft, NT/ND, no masses/HSM appreciated.  Neurology: no tremors, slowed speech, lower extremity weakness b/l, b/l foot drop now in braces   Skin: no visible rashes/lesions  Psychiatric: AAO x 3, flat affect .    LABS:                        10.2   4.83  )-----------( 262      ( 21 Apr 2022 06:25 )             33.4     04-21    134<L>  |  101  |  25<H>  ----------------------------<  163<H>  4.6   |  25  |  0.59    Ca    8.4      21 Apr 2022 06:25  Phos  3.8     04-21  Mg     1.7     04-21          Thyroid Stimulating Hormone, Serum: 1.870 uIU/mL (04-09 @ 08:50)  Thyroid Stimulating Hormone, Serum: 1.120 uIU/mL (04-01 @ 07:54)      HbA1C:         Insulin Sliding Scale requirements X 24 Hours:      RADIOLOGY & ADDITIONAL TESTS:      A/P:72y Female with PMHx of T2DM c/b peripheral neuropathy, HTN, recent admission and workup for Naty Sanchez tear, TIA on 3/30 (MR negative) presents to [facility] as transfer from [facility] ED for episode of AMS, back to baseline in [facility] ED. CTH negative in ED for acute pathology. On medicine to assess for AMS.   A1c:11.%  Wt:63.4kg  Cr:0.45  GFR:101  1.  DM type  2  c-peptide level : 0.3 low (likely insulinopenic)   Patient appears to be recovering from prior glucose toxic state.   Please continue lantus  4  units at night.    Please continue lispro  8  units before each meal.    Please continue lispro moderate dose sliding scale only before meals  b/l lower extremity weakness severe, although patient does have diabetic neuropathy, this is unlikely a result from diabetes. Spinal etiology?    Pt's fingerstick glucose goal is 100-180    Will continue to monitor     For discharge, pt can continue insulin therapy, will need assistance     Pt can follow up at discharge with [facility] by calling  to make an appointment.   Will discuss case with Dr. Christopher and update primary team INTERVAL HPI/OVERNIGHT EVENTS:    Patient is a 72y old  Female who presents with a chief complaint of episode of AMS (21 Apr 2022 06:18)  Patient continues to eat well. Has foot braces on which she does reports causes some discomfort. Given tylenol.     Pt reports the following symptoms:    CONSTITUTIONAL:  Negative fever or chills, feels well, good appetite  EYES:  Negative  blurry vision or double vision  CARDIOVASCULAR:  Negative for chest pain or palpitations  RESPIRATORY:  Negative for cough, wheezing, or SOB   GASTROINTESTINAL:  Negative for nausea, vomiting, diarrhea, constipation, or abdominal pain  GENITOURINARY:  Negative frequency, urgency or dysuria  NEUROLOGIC:  No headache, confusion, dizziness, lightheadedness    MEDICATIONS  (STANDING):  amLODIPine   Tablet 10 milliGRAM(s) Oral daily  aspirin  chewable 81 milliGRAM(s) Oral daily  atorvastatin 40 milliGRAM(s) Oral at bedtime  dextrose 5%. 1000 milliLiter(s) (50 mL/Hr) IV Continuous <Continuous>  dextrose 5%. 1000 milliLiter(s) (100 mL/Hr) IV Continuous <Continuous>  dextrose 50% Injectable 25 Gram(s) IV Push once  dextrose 50% Injectable 12.5 Gram(s) IV Push once  dextrose 50% Injectable 25 Gram(s) IV Push once  DULoxetine 60 milliGRAM(s) Oral daily  enoxaparin Injectable 40 milliGRAM(s) SubCutaneous every 24 hours  glucagon  Injectable 1 milliGRAM(s) IntraMuscular once  insulin glargine Injectable (LANTUS) 4 Unit(s) SubCutaneous at bedtime  insulin lispro (ADMELOG) corrective regimen sliding scale   SubCutaneous three times a day before meals  insulin lispro Injectable (ADMELOG) 8 Unit(s) SubCutaneous three times a day before meals  lisinopril 40 milliGRAM(s) Oral every 24 hours  pantoprazole    Tablet 40 milliGRAM(s) Oral before breakfast    MEDICATIONS  (PRN):  dextrose Oral Gel 15 Gram(s) Oral once PRN Blood Glucose LESS THAN 70 milliGRAM(s)/deciliter      PHYSICAL EXAM  Vital Signs Last 24 Hrs  T(C): 36.7 (21 Apr 2022 11:46), Max: 36.9 (21 Apr 2022 05:18)  T(F): 98.1 (21 Apr 2022 11:46), Max: 98.4 (21 Apr 2022 05:18)  HR: 70 (21 Apr 2022 11:46) (70 - 72)  BP: 112/70 (21 Apr 2022 11:46) (112/70 - 134/81)  BP(mean): --  RR: 18 (21 Apr 2022 11:46) (18 - 18)  SpO2: 98% (21 Apr 2022 11:46) (98% - 100%)    Constitutional: NAD.   HEENT: NCAT, MMM, OP clear, EOMI, , no proptosis or lid retraction  Neck: no thyromegaly or palpable thyroid nodules   Respiratory: lungs CTAB.  Cardiovascular: regular rhythm, normal S1 and S2, no audible murmurs, no peripheral edema  GI: soft, NT/ND, no masses/HSM appreciated.  Neurology: no tremors, slowed speech, lower extremity weakness b/l, b/l foot drop now in braces   Skin: no visible rashes/lesions  Psychiatric: AAO x 3, flat affect .    LABS:                        10.2   4.83  )-----------( 262      ( 21 Apr 2022 06:25 )             33.4     04-21    134<L>  |  101  |  25<H>  ----------------------------<  163<H>  4.6   |  25  |  0.59    Ca    8.4      21 Apr 2022 06:25  Phos  3.8     04-21  Mg     1.7     04-21          Thyroid Stimulating Hormone, Serum: 1.870 uIU/mL (04-09 @ 08:50)  Thyroid Stimulating Hormone, Serum: 1.120 uIU/mL (04-01 @ 07:54)      HbA1C:         Insulin Sliding Scale requirements X 24 Hours:      RADIOLOGY & ADDITIONAL TESTS:      A/P:72y Female with PMHx of T2DM c/b peripheral neuropathy, HTN, recent admission and workup for Naty Sanchez tear, TIA on 3/30 (MR negative) presents to [facility] as transfer from [facility] ED for episode of AMS, back to baseline in [facility] ED. CTH negative in ED for acute pathology. On medicine to assess for AMS.   A1c:11.%  Wt:63.4kg  Cr:0.45  GFR:101  1.  DM type  2  c-peptide level : 0.3 low (likely insulinopenic)   Patient appears to be recovering from prior glucose toxic state.   Please continue lantus  4  units at night.    Please continue lispro  8  units before each meal.    Please continue lispro moderate dose sliding scale only before meals  b/l lower extremity weakness severe, although patient does have diabetic neuropathy, this is unlikely a result from diabetes. Spinal etiology?    Pt's fingerstick glucose goal is 100-180    Will continue to monitor     For discharge, pt can continue insulin therapy, will need assistance     Pt can follow up at discharge with [facility] by calling  to make an appointment.   Will discuss case with Dr. Christopher and update primary team

## 2022-04-22 LAB
GLUCOSE BLDC GLUCOMTR-MCNC: 146 MG/DL — HIGH (ref 70–99)
GLUCOSE BLDC GLUCOMTR-MCNC: 172 MG/DL — HIGH (ref 70–99)
GLUCOSE BLDC GLUCOMTR-MCNC: 180 MG/DL — HIGH (ref 70–99)
GLUCOSE BLDC GLUCOMTR-MCNC: 187 MG/DL — HIGH (ref 70–99)

## 2022-04-22 PROCEDURE — 99231 SBSQ HOSP IP/OBS SF/LOW 25: CPT

## 2022-04-22 RX ADMIN — Medication 2: at 17:15

## 2022-04-22 RX ADMIN — Medication 8 UNIT(S): at 17:14

## 2022-04-22 RX ADMIN — INSULIN GLARGINE 4 UNIT(S): 100 INJECTION, SOLUTION SUBCUTANEOUS at 21:08

## 2022-04-22 RX ADMIN — Medication 81 MILLIGRAM(S): at 11:32

## 2022-04-22 RX ADMIN — AMLODIPINE BESYLATE 10 MILLIGRAM(S): 2.5 TABLET ORAL at 06:37

## 2022-04-22 RX ADMIN — Medication 8 UNIT(S): at 09:03

## 2022-04-22 RX ADMIN — GABAPENTIN 100 MILLIGRAM(S): 400 CAPSULE ORAL at 12:51

## 2022-04-22 RX ADMIN — LISINOPRIL 40 MILLIGRAM(S): 2.5 TABLET ORAL at 06:37

## 2022-04-22 RX ADMIN — ENOXAPARIN SODIUM 40 MILLIGRAM(S): 100 INJECTION SUBCUTANEOUS at 21:07

## 2022-04-22 RX ADMIN — GABAPENTIN 100 MILLIGRAM(S): 400 CAPSULE ORAL at 19:10

## 2022-04-22 RX ADMIN — ATORVASTATIN CALCIUM 40 MILLIGRAM(S): 80 TABLET, FILM COATED ORAL at 21:07

## 2022-04-22 RX ADMIN — PANTOPRAZOLE SODIUM 40 MILLIGRAM(S): 20 TABLET, DELAYED RELEASE ORAL at 06:37

## 2022-04-22 RX ADMIN — Medication 8 UNIT(S): at 12:47

## 2022-04-22 RX ADMIN — DULOXETINE HYDROCHLORIDE 60 MILLIGRAM(S): 30 CAPSULE, DELAYED RELEASE ORAL at 11:32

## 2022-04-22 RX ADMIN — Medication 2: at 12:47

## 2022-04-22 NOTE — PROGRESS NOTE ADULT - ASSESSMENT
72y Female with PMHx of T2DM c/b peripheral neuropathy, HTN, recent admission and workup for Naty Sanchez tear, TIA on 3/30 (MR negative) presents to [facility] as transfer from [facility] ED for episode of altered mental status (staring, generalized weakness), back to baseline in [facility] ED, no acute pathology on CTH or MRI, found to have metabolic encephalopathy (resolved) 2/2 UTI (completed 7day abx) vs hypertension, also found to have new LE weakness of unclear origin, possible diabetic neuropathy. Due to immigration and insurance status, not able to [facility] as recommended by PT. Pending improvement to one-person assist in order to discharge home with out-of-pocket PT.

## 2022-04-22 NOTE — PROGRESS NOTE ADULT - PROBLEM SELECTOR PLAN 8
F: None  E: Replete PRN   N: DASH/TLC   DVT: Lovenox 40 qd  Dispo: RMF, PT rec [facility]- however patient is uninsured and cannot go to [facility] attempting to get to one person assist

## 2022-04-22 NOTE — PROGRESS NOTE ADULT - PROBLEM SELECTOR PLAN 2
Needle like pain shins downward, interfering with sleep.   - Continue Cymbalta 60 Qd for diabetic neuropathy  - Start gabapentin 100 BID  - Replete magnesium as needed

## 2022-04-22 NOTE — PROGRESS NOTE ADULT - SUBJECTIVE AND OBJECTIVE BOX
OVERNIGHT EVENTS:    SUBJECTIVE / INTERVAL HPI: Patient seen and examined at bedside.     VITAL SIGNS:  Vital Signs Last 24 Hrs  T(C): 36.9 (22 Apr 2022 05:11), Max: 36.9 (21 Apr 2022 20:20)  T(F): 98.4 (22 Apr 2022 05:11), Max: 98.5 (21 Apr 2022 20:20)  HR: 85 (22 Apr 2022 05:11) (70 - 85)  BP: 148/71 (22 Apr 2022 05:11) (112/70 - 148/71)  BP(mean): --  RR: 18 (22 Apr 2022 05:11) (16 - 18)  SpO2: 98% (22 Apr 2022 05:11) (98% - 98%)    PHYSICAL EXAM:    General: No acute distress  HEENT: NC/AT; PERRL, anicteric sclera; MMM  Neck: supple  Cardiovascular: +S1/S2, RRR, no murmurs, rubs, gallops  Respiratory: CTA B/L; no W/R/R  Gastrointestinal: soft, NT/ND; +BSx4  Extremities: WWP; no edema, clubbing or cyanosis  Vascular: 2+ radial, DP/PT pulses B/L  Neurological: AAOx3; no focal deficits    MEDICATIONS:  MEDICATIONS  (STANDING):  amLODIPine   Tablet 10 milliGRAM(s) Oral daily  aspirin  chewable 81 milliGRAM(s) Oral daily  atorvastatin 40 milliGRAM(s) Oral at bedtime  dextrose 5%. 1000 milliLiter(s) (50 mL/Hr) IV Continuous <Continuous>  dextrose 5%. 1000 milliLiter(s) (100 mL/Hr) IV Continuous <Continuous>  dextrose 50% Injectable 25 Gram(s) IV Push once  dextrose 50% Injectable 12.5 Gram(s) IV Push once  dextrose 50% Injectable 25 Gram(s) IV Push once  DULoxetine 60 milliGRAM(s) Oral daily  enoxaparin Injectable 40 milliGRAM(s) SubCutaneous every 24 hours  gabapentin 100 milliGRAM(s) Oral <User Schedule>  glucagon  Injectable 1 milliGRAM(s) IntraMuscular once  insulin glargine Injectable (LANTUS) 4 Unit(s) SubCutaneous at bedtime  insulin lispro (ADMELOG) corrective regimen sliding scale   SubCutaneous three times a day before meals  insulin lispro Injectable (ADMELOG) 8 Unit(s) SubCutaneous three times a day before meals  lisinopril 40 milliGRAM(s) Oral every 24 hours  pantoprazole    Tablet 40 milliGRAM(s) Oral before breakfast    MEDICATIONS  (PRN):  dextrose Oral Gel 15 Gram(s) Oral once PRN Blood Glucose LESS THAN 70 milliGRAM(s)/deciliter      ALLERGIES:  Allergies    No Known Allergies    Intolerances        LABS:                        10.2   4.83  )-----------( 262      ( 21 Apr 2022 06:25 )             33.4     04-21    134<L>  |  101  |  25<H>  ----------------------------<  163<H>  4.6   |  25  |  0.59    Ca    8.4      21 Apr 2022 06:25  Phos  3.8     04-21  Mg     1.7     04-21          CAPILLARY BLOOD GLUCOSE      POCT Blood Glucose.: 86 mg/dL (21 Apr 2022 21:21)      RADIOLOGY & ADDITIONAL TESTS: Reviewed.    PLAN:  OVERNIGHT EVENTS: NAEON    SUBJECTIVE / INTERVAL HPI: Patient seen and examined at bedside. Still with leg pain, legs feels stiff.     VITAL SIGNS:  Vital Signs Last 24 Hrs  T(C): 36.9 (22 Apr 2022 05:11), Max: 36.9 (21 Apr 2022 20:20)  T(F): 98.4 (22 Apr 2022 05:11), Max: 98.5 (21 Apr 2022 20:20)  HR: 85 (22 Apr 2022 05:11) (70 - 85)  BP: 148/71 (22 Apr 2022 05:11) (112/70 - 148/71)  BP(mean): --  RR: 18 (22 Apr 2022 05:11) (16 - 18)  SpO2: 98% (22 Apr 2022 05:11) (98% - 98%)    PHYSICAL EXAM:    General: Elderly female in NAD  HEENT: NC/AT; PERRL, anicteric sclera; MMM  Neck: supple  Cardiovascular: +S1/S2, RRR, no murmurs, rubs, gallops  Respiratory: CTA B/L; no W/R/R  Gastrointestinal: soft, NT/ND; +BSx4  Extremities: WWP; no edema, clubbing or cyanosis  Vascular: 2+ radial, DP/PT pulses B/L  Neurological: AAOx3; 4/5 UE 1/5 LE bilaterally. Sensation equal and intact of UE and LE    MEDICATIONS:  MEDICATIONS  (STANDING):  amLODIPine   Tablet 10 milliGRAM(s) Oral daily  aspirin  chewable 81 milliGRAM(s) Oral daily  atorvastatin 40 milliGRAM(s) Oral at bedtime  dextrose 5%. 1000 milliLiter(s) (50 mL/Hr) IV Continuous <Continuous>  dextrose 5%. 1000 milliLiter(s) (100 mL/Hr) IV Continuous <Continuous>  dextrose 50% Injectable 25 Gram(s) IV Push once  dextrose 50% Injectable 12.5 Gram(s) IV Push once  dextrose 50% Injectable 25 Gram(s) IV Push once  DULoxetine 60 milliGRAM(s) Oral daily  enoxaparin Injectable 40 milliGRAM(s) SubCutaneous every 24 hours  gabapentin 100 milliGRAM(s) Oral <User Schedule>  glucagon  Injectable 1 milliGRAM(s) IntraMuscular once  insulin glargine Injectable (LANTUS) 4 Unit(s) SubCutaneous at bedtime  insulin lispro (ADMELOG) corrective regimen sliding scale   SubCutaneous three times a day before meals  insulin lispro Injectable (ADMELOG) 8 Unit(s) SubCutaneous three times a day before meals  lisinopril 40 milliGRAM(s) Oral every 24 hours  pantoprazole    Tablet 40 milliGRAM(s) Oral before breakfast    MEDICATIONS  (PRN):  dextrose Oral Gel 15 Gram(s) Oral once PRN Blood Glucose LESS THAN 70 milliGRAM(s)/deciliter      ALLERGIES:  Allergies    No Known Allergies    Intolerances        LABS:                        10.2   4.83  )-----------( 262      ( 21 Apr 2022 06:25 )             33.4     04-21    134<L>  |  101  |  25<H>  ----------------------------<  163<H>  4.6   |  25  |  0.59    Ca    8.4      21 Apr 2022 06:25  Phos  3.8     04-21  Mg     1.7     04-21          CAPILLARY BLOOD GLUCOSE      POCT Blood Glucose.: 86 mg/dL (21 Apr 2022 21:21)      RADIOLOGY & ADDITIONAL TESTS: Reviewed.    PLAN:

## 2022-04-22 NOTE — PROGRESS NOTE ADULT - ATTENDING COMMENTS
I was physically present for the key portions of the evaluation and managemnent (E/M) service provided.  I agree with the above history, physical, and plan which I have reviewed and edited where appropriate, with the exceptions as per my note.    pt seen examined.    no new neuro sx    neuro exam unchanged    dispo planning

## 2022-04-23 LAB
ALBUMIN SERPL ELPH-MCNC: 2.9 G/DL — LOW (ref 3.3–5)
ALP SERPL-CCNC: 33 U/L — LOW (ref 40–120)
ALT FLD-CCNC: 15 U/L — SIGNIFICANT CHANGE UP (ref 10–45)
ANION GAP SERPL CALC-SCNC: 9 MMOL/L — SIGNIFICANT CHANGE UP (ref 5–17)
AST SERPL-CCNC: 20 U/L — SIGNIFICANT CHANGE UP (ref 10–40)
BASOPHILS # BLD AUTO: 0.02 K/UL — SIGNIFICANT CHANGE UP (ref 0–0.2)
BASOPHILS NFR BLD AUTO: 0.4 % — SIGNIFICANT CHANGE UP (ref 0–2)
BILIRUB SERPL-MCNC: 0.2 MG/DL — SIGNIFICANT CHANGE UP (ref 0.2–1.2)
BUN SERPL-MCNC: 27 MG/DL — HIGH (ref 7–23)
CALCIUM SERPL-MCNC: 8.6 MG/DL — SIGNIFICANT CHANGE UP (ref 8.4–10.5)
CHLORIDE SERPL-SCNC: 98 MMOL/L — SIGNIFICANT CHANGE UP (ref 96–108)
CO2 SERPL-SCNC: 26 MMOL/L — SIGNIFICANT CHANGE UP (ref 22–31)
CREAT SERPL-MCNC: 0.53 MG/DL — SIGNIFICANT CHANGE UP (ref 0.5–1.3)
EGFR: 97 ML/MIN/1.73M2 — SIGNIFICANT CHANGE UP
EOSINOPHIL # BLD AUTO: 0.25 K/UL — SIGNIFICANT CHANGE UP (ref 0–0.5)
EOSINOPHIL NFR BLD AUTO: 5.4 % — SIGNIFICANT CHANGE UP (ref 0–6)
GLUCOSE BLDC GLUCOMTR-MCNC: 140 MG/DL — HIGH (ref 70–99)
GLUCOSE BLDC GLUCOMTR-MCNC: 144 MG/DL — HIGH (ref 70–99)
GLUCOSE BLDC GLUCOMTR-MCNC: 187 MG/DL — HIGH (ref 70–99)
GLUCOSE BLDC GLUCOMTR-MCNC: 200 MG/DL — HIGH (ref 70–99)
GLUCOSE SERPL-MCNC: 169 MG/DL — HIGH (ref 70–99)
HCT VFR BLD CALC: 33.6 % — LOW (ref 34.5–45)
HGB BLD-MCNC: 10.4 G/DL — LOW (ref 11.5–15.5)
IMM GRANULOCYTES NFR BLD AUTO: 0.4 % — SIGNIFICANT CHANGE UP (ref 0–1.5)
LYMPHOCYTES # BLD AUTO: 1.85 K/UL — SIGNIFICANT CHANGE UP (ref 1–3.3)
LYMPHOCYTES # BLD AUTO: 39.6 % — SIGNIFICANT CHANGE UP (ref 13–44)
MAGNESIUM SERPL-MCNC: 1.7 MG/DL — SIGNIFICANT CHANGE UP (ref 1.6–2.6)
MCHC RBC-ENTMCNC: 26.3 PG — LOW (ref 27–34)
MCHC RBC-ENTMCNC: 31 GM/DL — LOW (ref 32–36)
MCV RBC AUTO: 84.8 FL — SIGNIFICANT CHANGE UP (ref 80–100)
MONOCYTES # BLD AUTO: 0.37 K/UL — SIGNIFICANT CHANGE UP (ref 0–0.9)
MONOCYTES NFR BLD AUTO: 7.9 % — SIGNIFICANT CHANGE UP (ref 2–14)
NEUTROPHILS # BLD AUTO: 2.16 K/UL — SIGNIFICANT CHANGE UP (ref 1.8–7.4)
NEUTROPHILS NFR BLD AUTO: 46.3 % — SIGNIFICANT CHANGE UP (ref 43–77)
NRBC # BLD: 0 /100 WBCS — SIGNIFICANT CHANGE UP (ref 0–0)
PHOSPHATE SERPL-MCNC: 4.1 MG/DL — SIGNIFICANT CHANGE UP (ref 2.5–4.5)
PLATELET # BLD AUTO: 242 K/UL — SIGNIFICANT CHANGE UP (ref 150–400)
POTASSIUM SERPL-MCNC: 4.9 MMOL/L — SIGNIFICANT CHANGE UP (ref 3.5–5.3)
POTASSIUM SERPL-SCNC: 4.9 MMOL/L — SIGNIFICANT CHANGE UP (ref 3.5–5.3)
PROT SERPL-MCNC: 6.2 G/DL — SIGNIFICANT CHANGE UP (ref 6–8.3)
RBC # BLD: 3.96 M/UL — SIGNIFICANT CHANGE UP (ref 3.8–5.2)
RBC # FLD: 13.9 % — SIGNIFICANT CHANGE UP (ref 10.3–14.5)
SODIUM SERPL-SCNC: 133 MMOL/L — LOW (ref 135–145)
WBC # BLD: 4.67 K/UL — SIGNIFICANT CHANGE UP (ref 3.8–10.5)
WBC # FLD AUTO: 4.67 K/UL — SIGNIFICANT CHANGE UP (ref 3.8–10.5)

## 2022-04-23 PROCEDURE — 99231 SBSQ HOSP IP/OBS SF/LOW 25: CPT

## 2022-04-23 RX ADMIN — Medication 8 UNIT(S): at 09:06

## 2022-04-23 RX ADMIN — INSULIN GLARGINE 4 UNIT(S): 100 INJECTION, SOLUTION SUBCUTANEOUS at 22:01

## 2022-04-23 RX ADMIN — Medication 8 UNIT(S): at 17:15

## 2022-04-23 RX ADMIN — PANTOPRAZOLE SODIUM 40 MILLIGRAM(S): 20 TABLET, DELAYED RELEASE ORAL at 06:33

## 2022-04-23 RX ADMIN — ATORVASTATIN CALCIUM 40 MILLIGRAM(S): 80 TABLET, FILM COATED ORAL at 22:01

## 2022-04-23 RX ADMIN — GABAPENTIN 100 MILLIGRAM(S): 400 CAPSULE ORAL at 20:00

## 2022-04-23 RX ADMIN — Medication 8 UNIT(S): at 13:01

## 2022-04-23 RX ADMIN — AMLODIPINE BESYLATE 10 MILLIGRAM(S): 2.5 TABLET ORAL at 06:34

## 2022-04-23 RX ADMIN — DULOXETINE HYDROCHLORIDE 60 MILLIGRAM(S): 30 CAPSULE, DELAYED RELEASE ORAL at 12:00

## 2022-04-23 RX ADMIN — ENOXAPARIN SODIUM 40 MILLIGRAM(S): 100 INJECTION SUBCUTANEOUS at 22:01

## 2022-04-23 RX ADMIN — LISINOPRIL 40 MILLIGRAM(S): 2.5 TABLET ORAL at 06:34

## 2022-04-23 RX ADMIN — Medication 81 MILLIGRAM(S): at 12:00

## 2022-04-23 RX ADMIN — GABAPENTIN 100 MILLIGRAM(S): 400 CAPSULE ORAL at 13:00

## 2022-04-23 RX ADMIN — Medication 2: at 13:01

## 2022-04-23 NOTE — PROGRESS NOTE ADULT - SUBJECTIVE AND OBJECTIVE BOX
Nahum being followed for Diabetes control. Glucose was 180-172 last night and today 140-187. Will continue current Insulin regimen.

## 2022-04-23 NOTE — PROGRESS NOTE ADULT - SUBJECTIVE AND OBJECTIVE BOX
Neurology Progress Note    HPI:  72y Female with PMHx of T2DM, HTN, recent admission and workup for Naty Sanchez tear, TIA on 3/30 (MR negative) presents to [facility] as transfer from [facility] ED for episode of confusion which began at 0800 this AM. Spoke to patient's daughter Efe on the phone, states she was changing her and getting her ready for the day. Gave her blood pressure medication, went to give her breakfast and pt was found slumped over, was confused, mumbling words. Daughter states her eyes were glossy and pt could not recognize who she was. At that point daughter took her oxygen levels and was 93% RA. She states the episode at least lasted 15-20 mins, she was still altered when EMS came. Denies any obvious jerking/shaking. Pt was not answering some questions appropriately and daughter called 911, pt brought to [facility] ED. On arrival to ED, pt back to baseline, was A&Ox3. Pt was transferred to stroke service for TIA vs seizure. On arrival to [facility], pt is A&Ox3, answering questions appropriately, following all commands. Pt does not remember the episode this AM. States she is very hungry. Denies any pain, visual disturbance, headache, dizziness, difficulty speaking, chest pain, sob, abd pain, n/v, new weakness/numbness of extremities.       Interval History:    Patient seen and examined at bedside. There were no overnight events. Patient states that she has ongoing leg stiffness/pain, denied any other complaints.      PAST MEDICAL & SURGICAL HISTORY:  HTN (hypertension)  DM (diabetes mellitus)  TIA (transient ischemic attack)  No significant past surgical history      Medications:  amLODIPine   Tablet 10 milliGRAM(s) Oral daily  aspirin  chewable 81 milliGRAM(s) Oral daily  atorvastatin 40 milliGRAM(s) Oral at bedtime  dextrose 5%. 1000 milliLiter(s) IV Continuous <Continuous>  dextrose 5%. 1000 milliLiter(s) IV Continuous <Continuous>  dextrose 50% Injectable 25 Gram(s) IV Push once  dextrose 50% Injectable 12.5 Gram(s) IV Push once  dextrose 50% Injectable 25 Gram(s) IV Push once  dextrose Oral Gel 15 Gram(s) Oral once PRN  DULoxetine 60 milliGRAM(s) Oral daily  enoxaparin Injectable 40 milliGRAM(s) SubCutaneous every 24 hours  gabapentin 100 milliGRAM(s) Oral <User Schedule>  glucagon  Injectable 1 milliGRAM(s) IntraMuscular once  insulin glargine Injectable (LANTUS) 4 Unit(s) SubCutaneous at bedtime  insulin lispro (ADMELOG) corrective regimen sliding scale   SubCutaneous three times a day before meals  insulin lispro Injectable (ADMELOG) 8 Unit(s) SubCutaneous three times a day before meals  lisinopril 40 milliGRAM(s) Oral every 24 hours  pantoprazole    Tablet 40 milliGRAM(s) Oral before breakfast      Vital Signs Last 24 Hrs  T(C): 36.8 (23 Apr 2022 20:59), Max: 36.8 (23 Apr 2022 20:59)  T(F): 98.3 (23 Apr 2022 20:59), Max: 98.3 (23 Apr 2022 20:59)  HR: 76 (23 Apr 2022 20:59) (66 - 76)  BP: 132/65 (23 Apr 2022 20:59) (132/65 - 182/73)  RR: 16 (23 Apr 2022 20:59) (15 - 16)  SpO2: 96% (23 Apr 2022 20:59) (96% - 98%)      Neurological Exam:   Mental status: Awake, alert and oriented x3. No dysarthria, no aphasia.  Cranial nerves: Pupils equally round and reactive to light, visual fields full, no nystagmus, extraocular muscles intact, V1 through V3 intact bilaterally and symmetric, face symmetric, hearing intact to finger rub, palate elevation symmetric, tongue was midline.  Motor:  MRC grading 4/5 B/L UE, 1/5 B/L LE.  strength 5/5. Normal tone and bulk. No abnormal movements.    Sensation: Intact to light touch, proprioception, and pinprick.   Coordination: No dysmetria on finger-to-nose and heel-to-shin.  No dysdiadokinesia.  Reflexes: 2+ in bilateral UE/LE, downgoing toes bilaterally. (-) Riley.  Gait: Narrow and steady. No ataxia.  Romberg negative      Labs:  CBC Full  -  ( 23 Apr 2022 07:33 )  WBC Count : 4.67 K/uL  RBC Count : 3.96 M/uL  Hemoglobin : 10.4 g/dL  Hematocrit : 33.6 %  Platelet Count - Automated : 242 K/uL  Mean Cell Volume : 84.8 fl  Mean Cell Hemoglobin : 26.3 pg  Mean Cell Hemoglobin Concentration : 31.0 gm/dL  Auto Neutrophil # : 2.16 K/uL  Auto Lymphocyte # : 1.85 K/uL  Auto Monocyte # : 0.37 K/uL  Auto Eosinophil # : 0.25 K/uL  Auto Basophil # : 0.02 K/uL  Auto Neutrophil % : 46.3 %  Auto Lymphocyte % : 39.6 %  Auto Monocyte % : 7.9 %  Auto Eosinophil % : 5.4 %  Auto Basophil % : 0.4 %    04-23    133<L>  |  98  |  27<H>  ----------------------------<  169<H>  4.9   |  26  |  0.53    Ca    8.6      23 Apr 2022 07:33  Phos  4.1     04-23  Mg     1.7     04-23    TPro  6.2  /  Alb  2.9<L>  /  TBili  0.2  /  DBili  x   /  AST  20  /  ALT  15  /  AlkPhos  33<L>  04-23    LIVER FUNCTIONS - ( 23 Apr 2022 07:33 )  Alb: 2.9 g/dL / Pro: 6.2 g/dL / ALK PHOS: 33 U/L / ALT: 15 U/L / AST: 20 U/L / GGT: x

## 2022-04-23 NOTE — PROGRESS NOTE ADULT - ASSESSMENT
This is a 73 yo Female with PMHx of T2DM c/b peripheral neuropathy, HTN, recent admission and workup for Naty Sanchez tear, TIA on 3/30 (MR negative) presents to [facility] as transfer from [facility] ED for episode of altered mental status (staring, generalized weakness), back to baseline in [facility] ED, no acute pathology on CTH or MRI, found to have metabolic encephalopathy (resolved) 2/2 UTI (completed 7day abx) vs hypertension, also found to have new LE weakness of unclear origin, possible diabetic neuropathy. Due to immigration and insurance status, not able to F[facility]IC[facility] as recommended by PT. Pending improvement to one-person assist in order to discharge home with out-of-pocket PT.     Plan:  - MRI L-Spine reviewed, showing at the L3/4 leveI. There are degenerative changes involving the facets bilaterally as well as ligamentum flavum hypertrophy. This combination results in moderate central spinal canal stenosis. At the L4/5 level, there is disc bulge abutting the L4 nerve roots bilaterally. There are degenerative changes involving the facets bilaterally (right greater than left) as well as ligamentum flavum hypertrophy. This combination results in mild central spinal canal stenosis  - LE symptoms likely due to progression of diabetic neuropathy, however symptoms more severe than to be expected  - Continue Cymbalta 60 mg daily   - Continue Gabapentin 100 mg twice per day  - Consulted Physiatry for braces for her B/L LE  - Mg > 2, K > 4 replete prn  - Likely d/c planning pending placement

## 2022-04-23 NOTE — PROGRESS NOTE ADULT - ATTENDING COMMENTS
I was physically present for the key portions of the evaluation and managemnent (E/M) service provided.  I agree with the above history, physical, and plan which I have reviewed and edited where appropriate, with the exceptions as per my note.    Sx and neuro exam stable. awaiting dispo planning.

## 2022-04-24 LAB
GLUCOSE BLDC GLUCOMTR-MCNC: 115 MG/DL — HIGH (ref 70–99)
GLUCOSE BLDC GLUCOMTR-MCNC: 134 MG/DL — HIGH (ref 70–99)
GLUCOSE BLDC GLUCOMTR-MCNC: 175 MG/DL — HIGH (ref 70–99)
GLUCOSE BLDC GLUCOMTR-MCNC: 186 MG/DL — HIGH (ref 70–99)
GLUCOSE BLDC GLUCOMTR-MCNC: 72 MG/DL — SIGNIFICANT CHANGE UP (ref 70–99)

## 2022-04-24 PROCEDURE — 99231 SBSQ HOSP IP/OBS SF/LOW 25: CPT

## 2022-04-24 RX ADMIN — Medication 2: at 12:10

## 2022-04-24 RX ADMIN — DULOXETINE HYDROCHLORIDE 60 MILLIGRAM(S): 30 CAPSULE, DELAYED RELEASE ORAL at 12:10

## 2022-04-24 RX ADMIN — AMLODIPINE BESYLATE 10 MILLIGRAM(S): 2.5 TABLET ORAL at 06:20

## 2022-04-24 RX ADMIN — ENOXAPARIN SODIUM 40 MILLIGRAM(S): 100 INJECTION SUBCUTANEOUS at 22:00

## 2022-04-24 RX ADMIN — PANTOPRAZOLE SODIUM 40 MILLIGRAM(S): 20 TABLET, DELAYED RELEASE ORAL at 06:20

## 2022-04-24 RX ADMIN — GABAPENTIN 100 MILLIGRAM(S): 400 CAPSULE ORAL at 12:10

## 2022-04-24 RX ADMIN — INSULIN GLARGINE 4 UNIT(S): 100 INJECTION, SOLUTION SUBCUTANEOUS at 22:01

## 2022-04-24 RX ADMIN — ATORVASTATIN CALCIUM 40 MILLIGRAM(S): 80 TABLET, FILM COATED ORAL at 22:01

## 2022-04-24 RX ADMIN — Medication 81 MILLIGRAM(S): at 12:10

## 2022-04-24 RX ADMIN — LISINOPRIL 40 MILLIGRAM(S): 2.5 TABLET ORAL at 06:21

## 2022-04-24 RX ADMIN — GABAPENTIN 100 MILLIGRAM(S): 400 CAPSULE ORAL at 19:08

## 2022-04-24 RX ADMIN — Medication 8 UNIT(S): at 09:02

## 2022-04-24 RX ADMIN — Medication 8 UNIT(S): at 12:10

## 2022-04-24 NOTE — PROGRESS NOTE ADULT - SUBJECTIVE AND OBJECTIVE BOX
OVERNIGHT EVENTS: NAEON    SUBJECTIVE / INTERVAL HPI: Patient seen and examined at bedside. Still with leg pain and leg weakness. Pain is needle like sensation.     VITAL SIGNS:  Vital Signs Last 24 Hrs  T(C): 37.1 (24 Apr 2022 05:57), Max: 37.1 (24 Apr 2022 05:57)  T(F): 98.7 (24 Apr 2022 05:57), Max: 98.7 (24 Apr 2022 05:57)  HR: 68 (24 Apr 2022 05:57) (68 - 76)  BP: 153/71 (24 Apr 2022 05:57) (132/65 - 153/71)  BP(mean): --  RR: 16 (24 Apr 2022 05:57) (15 - 16)  SpO2: 98% (24 Apr 2022 05:57) (96% - 98%)    PHYSICAL EXAM:    General: Elderly female NAD  HEENT: NC/AT; PERRL, anicteric sclera; MMM  Neck: supple  Cardiovascular: +S1/S2, RRR, no murmurs, rubs, gallops  Respiratory: CTA B/L; no W/R/R  Gastrointestinal: soft, NT/ND; +BSx4  Extremities: WWP; no edema, clubbing or cyanosis  Vascular: 2+ radial, DP/PT pulses B/L  Neurological: AAOx3; 4/5 B/L UE, 1/5 B/L LE.  strength 5/5. Reduced sensation bilateral feet    MEDICATIONS:  MEDICATIONS  (STANDING):  amLODIPine   Tablet 10 milliGRAM(s) Oral daily  aspirin  chewable 81 milliGRAM(s) Oral daily  atorvastatin 40 milliGRAM(s) Oral at bedtime  dextrose 5%. 1000 milliLiter(s) (50 mL/Hr) IV Continuous <Continuous>  dextrose 5%. 1000 milliLiter(s) (100 mL/Hr) IV Continuous <Continuous>  dextrose 50% Injectable 25 Gram(s) IV Push once  dextrose 50% Injectable 12.5 Gram(s) IV Push once  dextrose 50% Injectable 25 Gram(s) IV Push once  DULoxetine 60 milliGRAM(s) Oral daily  enoxaparin Injectable 40 milliGRAM(s) SubCutaneous every 24 hours  gabapentin 100 milliGRAM(s) Oral <User Schedule>  glucagon  Injectable 1 milliGRAM(s) IntraMuscular once  insulin glargine Injectable (LANTUS) 4 Unit(s) SubCutaneous at bedtime  insulin lispro (ADMELOG) corrective regimen sliding scale   SubCutaneous three times a day before meals  insulin lispro Injectable (ADMELOG) 8 Unit(s) SubCutaneous three times a day before meals  lisinopril 40 milliGRAM(s) Oral every 24 hours  pantoprazole    Tablet 40 milliGRAM(s) Oral before breakfast    MEDICATIONS  (PRN):  dextrose Oral Gel 15 Gram(s) Oral once PRN Blood Glucose LESS THAN 70 milliGRAM(s)/deciliter      ALLERGIES:  Allergies    No Known Allergies    Intolerances        LABS:                        10.4   4.67  )-----------( 242      ( 23 Apr 2022 07:33 )             33.6     04-23    133<L>  |  98  |  27<H>  ----------------------------<  169<H>  4.9   |  26  |  0.53    Ca    8.6      23 Apr 2022 07:33  Phos  4.1     04-23  Mg     1.7     04-23    TPro  6.2  /  Alb  2.9<L>  /  TBili  0.2  /  DBili  x   /  AST  20  /  ALT  15  /  AlkPhos  33<L>  04-23        CAPILLARY BLOOD GLUCOSE      POCT Blood Glucose.: 134 mg/dL (24 Apr 2022 08:44)      RADIOLOGY & ADDITIONAL TESTS: Reviewed.    PLAN:

## 2022-04-24 NOTE — PROGRESS NOTE ADULT - PROBLEM SELECTOR PLAN 8
F: None  E: Replete PRN   N: DASH/TLC   DVT: Lovenox 40 qd  Dispo: [facility], PT rec FELICIA- however patient is uninsured and cannot go to [facility] attempting to get to one person assist

## 2022-04-24 NOTE — PROGRESS NOTE ADULT - NS ATTEST RISK PROBLEM GEN_ALL_CORE FT
AMS no longer and diabetes is controlled.
Fluctuating glucoses in pt admitted with possible CVA
Insulin therapy needs
underlying conditions
Sub-optimally controlled DM in pt post CVA/TIA
better control of Blood pressure and Diabetes mellitus
Glucoses coming under control
Need to control DM in pt with recent TIA
better Diabetes control

## 2022-04-24 NOTE — PROGRESS NOTE ADULT - ATTENDING COMMENTS
I was physically present for the key portions of the evaluation and managemnent (E/M) service provided.  I agree with the above history, physical, and plan which I have reviewed and edited where appropriate, with the exceptions as per my note.    sx and neuro exam stable. awaiting dispo planning

## 2022-04-24 NOTE — PROGRESS NOTE ADULT - ASSESSMENT
72y Female with PMHx of T2DM c/b peripheral neuropathy, HTN, recent admission and workup for Naty Sanchez tear, TIA on 3/30 (MR negative) presents to [facility] as transfer from [facility] ED for episode of altered mental status (staring, generalized weakness), back to baseline in [facility] ED, no acute pathology on CTH or MRI, found to have metabolic encephalopathy (resolved) 2/2 UTI (completed 7day abx) vs hypertension, also found to have new LE weakness of unclear origin, possible diabetic neuropathy. Due to immigration and insurance status, not able to [facility] as recommended by PT. Pending improvement to one-person assist in order to discharge home with out-of-pocket PT.

## 2022-04-24 NOTE — PROGRESS NOTE ADULT - NS ATTEST RISK GEN_ALL_CORE
Risk Statement (NON-critical care)

## 2022-04-25 LAB
ALBUMIN SERPL ELPH-MCNC: 3.1 G/DL — LOW (ref 3.3–5)
ALP SERPL-CCNC: 42 U/L — SIGNIFICANT CHANGE UP (ref 40–120)
ALT FLD-CCNC: 15 U/L — SIGNIFICANT CHANGE UP (ref 10–45)
ANION GAP SERPL CALC-SCNC: 9 MMOL/L — SIGNIFICANT CHANGE UP (ref 5–17)
AST SERPL-CCNC: 18 U/L — SIGNIFICANT CHANGE UP (ref 10–40)
BASOPHILS # BLD AUTO: 0.02 K/UL — SIGNIFICANT CHANGE UP (ref 0–0.2)
BASOPHILS NFR BLD AUTO: 0.4 % — SIGNIFICANT CHANGE UP (ref 0–2)
BILIRUB SERPL-MCNC: <0.2 MG/DL — SIGNIFICANT CHANGE UP (ref 0.2–1.2)
BLD GP AB SCN SERPL QL: NEGATIVE — SIGNIFICANT CHANGE UP
BUN SERPL-MCNC: 27 MG/DL — HIGH (ref 7–23)
CALCIUM SERPL-MCNC: 8.7 MG/DL — SIGNIFICANT CHANGE UP (ref 8.4–10.5)
CHLORIDE SERPL-SCNC: 99 MMOL/L — SIGNIFICANT CHANGE UP (ref 96–108)
CO2 SERPL-SCNC: 26 MMOL/L — SIGNIFICANT CHANGE UP (ref 22–31)
CREAT SERPL-MCNC: 0.59 MG/DL — SIGNIFICANT CHANGE UP (ref 0.5–1.3)
EGFR: 95 ML/MIN/1.73M2 — SIGNIFICANT CHANGE UP
EOSINOPHIL # BLD AUTO: 0.2 K/UL — SIGNIFICANT CHANGE UP (ref 0–0.5)
EOSINOPHIL NFR BLD AUTO: 4.4 % — SIGNIFICANT CHANGE UP (ref 0–6)
GLUCOSE BLDC GLUCOMTR-MCNC: 150 MG/DL — HIGH (ref 70–99)
GLUCOSE BLDC GLUCOMTR-MCNC: 163 MG/DL — HIGH (ref 70–99)
GLUCOSE BLDC GLUCOMTR-MCNC: 174 MG/DL — HIGH (ref 70–99)
GLUCOSE BLDC GLUCOMTR-MCNC: 182 MG/DL — HIGH (ref 70–99)
GLUCOSE SERPL-MCNC: 160 MG/DL — HIGH (ref 70–99)
HCT VFR BLD CALC: 31.9 % — LOW (ref 34.5–45)
HGB BLD-MCNC: 10.4 G/DL — LOW (ref 11.5–15.5)
IMM GRANULOCYTES NFR BLD AUTO: 0.2 % — SIGNIFICANT CHANGE UP (ref 0–1.5)
LYMPHOCYTES # BLD AUTO: 1.78 K/UL — SIGNIFICANT CHANGE UP (ref 1–3.3)
LYMPHOCYTES # BLD AUTO: 39.3 % — SIGNIFICANT CHANGE UP (ref 13–44)
MAGNESIUM SERPL-MCNC: 1.7 MG/DL — SIGNIFICANT CHANGE UP (ref 1.6–2.6)
MCHC RBC-ENTMCNC: 27.4 PG — SIGNIFICANT CHANGE UP (ref 27–34)
MCHC RBC-ENTMCNC: 32.6 GM/DL — SIGNIFICANT CHANGE UP (ref 32–36)
MCV RBC AUTO: 84.2 FL — SIGNIFICANT CHANGE UP (ref 80–100)
MONOCYTES # BLD AUTO: 0.33 K/UL — SIGNIFICANT CHANGE UP (ref 0–0.9)
MONOCYTES NFR BLD AUTO: 7.3 % — SIGNIFICANT CHANGE UP (ref 2–14)
NEUTROPHILS # BLD AUTO: 2.19 K/UL — SIGNIFICANT CHANGE UP (ref 1.8–7.4)
NEUTROPHILS NFR BLD AUTO: 48.4 % — SIGNIFICANT CHANGE UP (ref 43–77)
NRBC # BLD: 0 /100 WBCS — SIGNIFICANT CHANGE UP (ref 0–0)
PHOSPHATE SERPL-MCNC: 4.4 MG/DL — SIGNIFICANT CHANGE UP (ref 2.5–4.5)
PLATELET # BLD AUTO: 282 K/UL — SIGNIFICANT CHANGE UP (ref 150–400)
POTASSIUM SERPL-MCNC: 4.7 MMOL/L — SIGNIFICANT CHANGE UP (ref 3.5–5.3)
POTASSIUM SERPL-SCNC: 4.7 MMOL/L — SIGNIFICANT CHANGE UP (ref 3.5–5.3)
PROT SERPL-MCNC: 6.2 G/DL — SIGNIFICANT CHANGE UP (ref 6–8.3)
RBC # BLD: 3.79 M/UL — LOW (ref 3.8–5.2)
RBC # FLD: 14 % — SIGNIFICANT CHANGE UP (ref 10.3–14.5)
RH IG SCN BLD-IMP: POSITIVE — SIGNIFICANT CHANGE UP
SODIUM SERPL-SCNC: 134 MMOL/L — LOW (ref 135–145)
WBC # BLD: 4.53 K/UL — SIGNIFICANT CHANGE UP (ref 3.8–10.5)
WBC # FLD AUTO: 4.53 K/UL — SIGNIFICANT CHANGE UP (ref 3.8–10.5)

## 2022-04-25 PROCEDURE — 99231 SBSQ HOSP IP/OBS SF/LOW 25: CPT

## 2022-04-25 RX ADMIN — Medication 81 MILLIGRAM(S): at 12:14

## 2022-04-25 RX ADMIN — GABAPENTIN 100 MILLIGRAM(S): 400 CAPSULE ORAL at 12:15

## 2022-04-25 RX ADMIN — Medication 8 UNIT(S): at 17:04

## 2022-04-25 RX ADMIN — ENOXAPARIN SODIUM 40 MILLIGRAM(S): 100 INJECTION SUBCUTANEOUS at 21:33

## 2022-04-25 RX ADMIN — Medication 8 UNIT(S): at 08:36

## 2022-04-25 RX ADMIN — LISINOPRIL 40 MILLIGRAM(S): 2.5 TABLET ORAL at 06:58

## 2022-04-25 RX ADMIN — DULOXETINE HYDROCHLORIDE 60 MILLIGRAM(S): 30 CAPSULE, DELAYED RELEASE ORAL at 12:15

## 2022-04-25 RX ADMIN — PANTOPRAZOLE SODIUM 40 MILLIGRAM(S): 20 TABLET, DELAYED RELEASE ORAL at 06:57

## 2022-04-25 RX ADMIN — AMLODIPINE BESYLATE 10 MILLIGRAM(S): 2.5 TABLET ORAL at 05:53

## 2022-04-25 RX ADMIN — Medication 2: at 12:27

## 2022-04-25 RX ADMIN — INSULIN GLARGINE 4 UNIT(S): 100 INJECTION, SOLUTION SUBCUTANEOUS at 21:38

## 2022-04-25 RX ADMIN — ATORVASTATIN CALCIUM 40 MILLIGRAM(S): 80 TABLET, FILM COATED ORAL at 21:33

## 2022-04-25 RX ADMIN — GABAPENTIN 100 MILLIGRAM(S): 400 CAPSULE ORAL at 19:04

## 2022-04-25 RX ADMIN — Medication 2: at 17:04

## 2022-04-25 NOTE — PROGRESS NOTE ADULT - PROBLEM SELECTOR PLAN 3
RESOLVED  UA with +WBCs, UCx growing E faecalis. Will treat as complicated  -completed 7 day course of CTX and then ampicillin Pt with hx of DM, A1c 11.6. Endocrine following. TSH results: 1.120  - C/w mISS, before meals   - C/w Lantus 4U, Lispro 2U   - Low C peptide patient will need insulin and NOT oral agents on dc  - F/u endocrine recs   - Cymbalta 60 Qd for diabetic neuropathy Pt with hx of DM, A1c 11.6. Endocrine following. TSH results: 1.120  - C/w mISS, before meals   - C/w Lantus 4U, Lispro 2U   - Low C peptide patient will need insulin and NOT oral agents on dc  - F/u endocrine recs

## 2022-04-25 NOTE — PROGRESS NOTE ADULT - PROBLEM SELECTOR PLAN 7
Hx of Naty Sanchez tear in prior admission   - continue home protonix 40mg daily  - Restarted APT RESOLVED  UA with +WBCs, UCx growing E faecalis. Will treat as complicated  -completed 7 day course of CTX and then ampicillin

## 2022-04-25 NOTE — PROGRESS NOTE ADULT - ATTENDING COMMENTS
exam stable  pain perhaps somewhat better with low dose gabapentin - will continue 100mg BID as well as cymbalta 60mg daily  awaiting d/c home when she progresses to one person assist - will have PT work with her while wearing braces

## 2022-04-25 NOTE — PROGRESS NOTE ADULT - ASSESSMENT
72y Female with PMHx of T2DM c/b peripheral neuropathy, HTN, recent admission and workup for Naty Sanchez tear, TIA on 3/30 (MR negative) presents to [facility] as transfer from [facility] ED for episode of altered mental status (staring, generalized weakness), back to baseline in [facility] ED, no acute pathology on CTH or MRI, found to have metabolic encephalopathy (resolved) 2/2 UTI (completed 7day abx) vs hypertension, also found to have new LE weakness of unclear origin, possible diabetic neuropathy. Due to immigration and insurance status, not able to [facility] as recommended by PT. Pending improvement to one-person assist in order to discharge home with out-of-pocket PT.

## 2022-04-25 NOTE — PROGRESS NOTE ADULT - PROBLEM SELECTOR PLAN 8
F: None  E: Replete PRN   N: DASH/TLC   DVT: Lovenox 40 qd  Dispo: [facility]F, PT rec [facility]- however patient is uninsured and cannot go to [facility] attempting to get to one person assist

## 2022-04-25 NOTE — PROGRESS NOTE ADULT - PROBLEM SELECTOR PLAN 1
Symmetrical LE weakness, worse distally compared to proximally   Symmetrical diminished sensation, longstanding diabetic peripheral neuropathy   Denies issues with incontinence, or back pain, no sensory line  MRI L/Spine:  - showing at the L3/4 leveI. There are degenerative   changes involving the facets bilaterally as well as ligamentum flavum   hypertrophy. This combination results in moderate central spinal canal   stenosis.   - At the L4/5 level, there is disc bulge abutting the L4 nerve roots   bilaterally. There are degenerative changes involving the facets   bilaterally (right greater than left) as well as ligamentum flavum   hypertrophy. This combination results in mild central spinal canal   stenosis.    - Likely progression of diabetic neuropathy, however symptoms more severe than to be expected.  - increase cymbalta to 60mg daily   - Consulted physiatry for braces for her BL LE Symmetrical LE weakness, worse distally compared to proximally   Symmetrical diminished sensation, longstanding diabetic peripheral neuropathy   Denies issues with incontinence, or back pain, no sensory line  MRI L/Spine:  - showing at the L3/4 leveI. There are degenerative   changes involving the facets bilaterally as well as ligamentum flavum   hypertrophy. This combination results in moderate central spinal canal   stenosis.   - At the L4/5 level, there is disc bulge abutting the L4 nerve roots   bilaterally. There are degenerative changes involving the facets   bilaterally (right greater than left) as well as ligamentum flavum   hypertrophy. This combination results in mild central spinal canal   stenosis.    - Likely progression of diabetic neuropathy, however symptoms more severe than to be expected.  - cymbalta 60mg daily   - Gabapentin 100 BID  - Consulted physiatry for braces for her BL LE

## 2022-04-25 NOTE — PROGRESS NOTE ADULT - PROBLEM SELECTOR PLAN 2
Needle like pain shins downward, interfering with sleep.   - Continue Cymbalta 60 Qd for diabetic neuropathy  - Start gabapentin 100 BID  - Replete magnesium as needed Needle like pain shins downward, interfering with sleep.   - Cymbalta 60 Qd for diabetic neuropathy  - Gabapentin 100 BID  - Replete magnesium as needed

## 2022-04-25 NOTE — PROGRESS NOTE ADULT - PROBLEM SELECTOR PLAN 5
Goal -160  - c/w Amlodipine 10mg (new med), home Lisinopril 40mg   - TTE with bubble done prior admission 4/1: grade 1 left ventricular diastolic dysfunction, no PFO, EF >83% hyperdynamic left ventricular systolic function -C/w Lipitor 40mg

## 2022-04-25 NOTE — PROGRESS NOTE ADULT - PROBLEM SELECTOR PLAN 6
-C/w Lipitor 40mg Hx of Naty Sanchez tear in prior admission   - continue home protonix 40mg daily  - Restarted APT

## 2022-04-25 NOTE — PROGRESS NOTE ADULT - SUBJECTIVE AND OBJECTIVE BOX
OVERNIGHT EVENTS:    SUBJECTIVE / INTERVAL HPI: Patient seen and examined at bedside.     VITAL SIGNS:  Vital Signs Last 24 Hrs  T(C): 36.6 (25 Apr 2022 12:33), Max: 36.6 (24 Apr 2022 21:38)  T(F): 97.8 (25 Apr 2022 12:33), Max: 97.9 (24 Apr 2022 21:38)  HR: 66 (25 Apr 2022 12:33) (66 - 75)  BP: 115/72 (25 Apr 2022 12:33) (115/72 - 165/74)  BP(mean): --  RR: 16 (25 Apr 2022 12:33) (16 - 16)  SpO2: 99% (25 Apr 2022 12:33) (96% - 99%)    PHYSICAL EXAM:    General: No acute distress  HEENT: NC/AT; PERRL, anicteric sclera; MMM  Neck: supple  Cardiovascular: +S1/S2, RRR, no murmurs, rubs, gallops  Respiratory: CTA B/L; no W/R/R  Gastrointestinal: soft, NT/ND; +BSx4  Extremities: WWP; no edema, clubbing or cyanosis  Vascular: 2+ radial, DP/PT pulses B/L  Neurological: AAOx3; no focal deficits    MEDICATIONS:  MEDICATIONS  (STANDING):  amLODIPine   Tablet 10 milliGRAM(s) Oral daily  aspirin  chewable 81 milliGRAM(s) Oral daily  atorvastatin 40 milliGRAM(s) Oral at bedtime  dextrose 5%. 1000 milliLiter(s) (100 mL/Hr) IV Continuous <Continuous>  dextrose 5%. 1000 milliLiter(s) (50 mL/Hr) IV Continuous <Continuous>  dextrose 50% Injectable 25 Gram(s) IV Push once  dextrose 50% Injectable 12.5 Gram(s) IV Push once  dextrose 50% Injectable 25 Gram(s) IV Push once  DULoxetine 60 milliGRAM(s) Oral daily  enoxaparin Injectable 40 milliGRAM(s) SubCutaneous every 24 hours  gabapentin 100 milliGRAM(s) Oral <User Schedule>  glucagon  Injectable 1 milliGRAM(s) IntraMuscular once  insulin glargine Injectable (LANTUS) 4 Unit(s) SubCutaneous at bedtime  insulin lispro (ADMELOG) corrective regimen sliding scale   SubCutaneous three times a day before meals  insulin lispro Injectable (ADMELOG) 8 Unit(s) SubCutaneous three times a day before meals  lisinopril 40 milliGRAM(s) Oral every 24 hours  pantoprazole    Tablet 40 milliGRAM(s) Oral before breakfast    MEDICATIONS  (PRN):  dextrose Oral Gel 15 Gram(s) Oral once PRN Blood Glucose LESS THAN 70 milliGRAM(s)/deciliter      ALLERGIES:  Allergies    No Known Allergies    Intolerances        LABS:                        10.4   4.53  )-----------( 282      ( 25 Apr 2022 06:55 )             31.9     04-25    134<L>  |  99  |  27<H>  ----------------------------<  160<H>  4.7   |  26  |  0.59    Ca    8.7      25 Apr 2022 06:55  Phos  4.4     04-25  Mg     1.7     04-25    TPro  6.2  /  Alb  3.1<L>  /  TBili  <0.2  /  DBili  x   /  AST  18  /  ALT  15  /  AlkPhos  42  04-25        CAPILLARY BLOOD GLUCOSE      POCT Blood Glucose.: 150 mg/dL (25 Apr 2022 08:31)      RADIOLOGY & ADDITIONAL TESTS: Reviewed.    PLAN:  OVERNIGHT EVENTS: NAEON    SUBJECTIVE / INTERVAL HPI: Patient seen and examined at bedside. Still with leg weakness and needle-like pain. Sensation is intact.     VITAL SIGNS:  Vital Signs Last 24 Hrs  T(C): 36.6 (25 Apr 2022 12:33), Max: 36.6 (24 Apr 2022 21:38)  T(F): 97.8 (25 Apr 2022 12:33), Max: 97.9 (24 Apr 2022 21:38)  HR: 66 (25 Apr 2022 12:33) (66 - 75)  BP: 115/72 (25 Apr 2022 12:33) (115/72 - 165/74)  BP(mean): --  RR: 16 (25 Apr 2022 12:33) (16 - 16)  SpO2: 99% (25 Apr 2022 12:33) (96% - 99%)    PHYSICAL EXAM:    General: Elderly female NAD  HEENT: NC/AT; PERRL, anicteric sclera; MMM  Neck: supple  Cardiovascular: +S1/S2, RRR, no murmurs, rubs, gallops  Respiratory: CTA B/L; no W/R/R  Gastrointestinal: soft, NT/ND; +BSx4  Extremities: WWP; no edema, clubbing or cyanosis  Vascular: 2+ radial, DP/PT pulses B/L  Neurological: AAOx3; 4/5 B/L UE, 1/5 B/L LE.  strength 5/5. Reduced sensation bilateral feet    MEDICATIONS:  MEDICATIONS  (STANDING):  amLODIPine   Tablet 10 milliGRAM(s) Oral daily  aspirin  chewable 81 milliGRAM(s) Oral daily  atorvastatin 40 milliGRAM(s) Oral at bedtime  dextrose 5%. 1000 milliLiter(s) (100 mL/Hr) IV Continuous <Continuous>  dextrose 5%. 1000 milliLiter(s) (50 mL/Hr) IV Continuous <Continuous>  dextrose 50% Injectable 25 Gram(s) IV Push once  dextrose 50% Injectable 12.5 Gram(s) IV Push once  dextrose 50% Injectable 25 Gram(s) IV Push once  DULoxetine 60 milliGRAM(s) Oral daily  enoxaparin Injectable 40 milliGRAM(s) SubCutaneous every 24 hours  gabapentin 100 milliGRAM(s) Oral <User Schedule>  glucagon  Injectable 1 milliGRAM(s) IntraMuscular once  insulin glargine Injectable (LANTUS) 4 Unit(s) SubCutaneous at bedtime  insulin lispro (ADMELOG) corrective regimen sliding scale   SubCutaneous three times a day before meals  insulin lispro Injectable (ADMELOG) 8 Unit(s) SubCutaneous three times a day before meals  lisinopril 40 milliGRAM(s) Oral every 24 hours  pantoprazole    Tablet 40 milliGRAM(s) Oral before breakfast    MEDICATIONS  (PRN):  dextrose Oral Gel 15 Gram(s) Oral once PRN Blood Glucose LESS THAN 70 milliGRAM(s)/deciliter      ALLERGIES:  Allergies    No Known Allergies    Intolerances        LABS:                        10.4   4.53  )-----------( 282      ( 25 Apr 2022 06:55 )             31.9     04-25    134<L>  |  99  |  27<H>  ----------------------------<  160<H>  4.7   |  26  |  0.59    Ca    8.7      25 Apr 2022 06:55  Phos  4.4     04-25  Mg     1.7     04-25    TPro  6.2  /  Alb  3.1<L>  /  TBili  <0.2  /  DBili  x   /  AST  18  /  ALT  15  /  AlkPhos  42  04-25        CAPILLARY BLOOD GLUCOSE      POCT Blood Glucose.: 150 mg/dL (25 Apr 2022 08:31)      RADIOLOGY & ADDITIONAL TESTS: Reviewed.    PLAN:  OVERNIGHT EVENTS: NAEON    SUBJECTIVE / INTERVAL HPI: Patient seen and examined at bedside. Still with leg weakness and needle-like pain. Sensation is intact.     VITAL SIGNS:  Vital Signs Last 24 Hrs  T(C): 36.6 (25 Apr 2022 12:33), Max: 36.6 (24 Apr 2022 21:38)  T(F): 97.8 (25 Apr 2022 12:33), Max: 97.9 (24 Apr 2022 21:38)  HR: 66 (25 Apr 2022 12:33) (66 - 75)  BP: 115/72 (25 Apr 2022 12:33) (115/72 - 165/74)  BP(mean): --  RR: 16 (25 Apr 2022 12:33) (16 - 16)  SpO2: 99% (25 Apr 2022 12:33) (96% - 99%)    PHYSICAL EXAM:    General: Elderly female NAD  HEENT: NC/AT; PERRL, anicteric sclera; MMM  Neck: supple  Cardiovascular: +S1/S2, RRR, no murmurs, rubs, gallops  Respiratory: CTA B/L; no W/R/R  Gastrointestinal: soft, NT/ND; +BSx4  Extremities: WWP; no edema, clubbing or cyanosis  Vascular: 2+ radial, DP/PT pulses B/L  Neurological: AAOx3; b/l distal LE 1-2/5, UE 5/5. Reduced sensation bilateral feet    MEDICATIONS:  MEDICATIONS  (STANDING):  amLODIPine   Tablet 10 milliGRAM(s) Oral daily  aspirin  chewable 81 milliGRAM(s) Oral daily  atorvastatin 40 milliGRAM(s) Oral at bedtime  dextrose 5%. 1000 milliLiter(s) (100 mL/Hr) IV Continuous <Continuous>  dextrose 5%. 1000 milliLiter(s) (50 mL/Hr) IV Continuous <Continuous>  dextrose 50% Injectable 25 Gram(s) IV Push once  dextrose 50% Injectable 12.5 Gram(s) IV Push once  dextrose 50% Injectable 25 Gram(s) IV Push once  DULoxetine 60 milliGRAM(s) Oral daily  enoxaparin Injectable 40 milliGRAM(s) SubCutaneous every 24 hours  gabapentin 100 milliGRAM(s) Oral <User Schedule>  glucagon  Injectable 1 milliGRAM(s) IntraMuscular once  insulin glargine Injectable (LANTUS) 4 Unit(s) SubCutaneous at bedtime  insulin lispro (ADMELOG) corrective regimen sliding scale   SubCutaneous three times a day before meals  insulin lispro Injectable (ADMELOG) 8 Unit(s) SubCutaneous three times a day before meals  lisinopril 40 milliGRAM(s) Oral every 24 hours  pantoprazole    Tablet 40 milliGRAM(s) Oral before breakfast    MEDICATIONS  (PRN):  dextrose Oral Gel 15 Gram(s) Oral once PRN Blood Glucose LESS THAN 70 milliGRAM(s)/deciliter      ALLERGIES:  Allergies    No Known Allergies    Intolerances        LABS:                        10.4   4.53  )-----------( 282      ( 25 Apr 2022 06:55 )             31.9     04-25    134<L>  |  99  |  27<H>  ----------------------------<  160<H>  4.7   |  26  |  0.59    Ca    8.7      25 Apr 2022 06:55  Phos  4.4     04-25  Mg     1.7     04-25    TPro  6.2  /  Alb  3.1<L>  /  TBili  <0.2  /  DBili  x   /  AST  18  /  ALT  15  /  AlkPhos  42  04-25        CAPILLARY BLOOD GLUCOSE      POCT Blood Glucose.: 150 mg/dL (25 Apr 2022 08:31)      RADIOLOGY & ADDITIONAL TESTS: Reviewed.    PLAN:

## 2022-04-25 NOTE — PROGRESS NOTE ADULT - PROBLEM SELECTOR PLAN 4
Pt with hx of DM, A1c 11.6. Endocrine following. TSH results: 1.120  - C/w mISS, before meals   - C/w Lantus 4U, Lispro 2U   - Low C peptide patient will need insulin and NOT oral agents on dc  - F/u endocrine recs   - Cymbalta 60 Qd for diabetic neuropathy Goal -160  - c/w Amlodipine 10mg (new med), home Lisinopril 40mg   - TTE with bubble done prior admission 4/1: grade 1 left ventricular diastolic dysfunction, no PFO, EF >83% hyperdynamic left ventricular systolic function

## 2022-04-26 LAB
BLD GP AB SCN SERPL QL: NEGATIVE — SIGNIFICANT CHANGE UP
GLUCOSE BLDC GLUCOMTR-MCNC: 101 MG/DL — HIGH (ref 70–99)
GLUCOSE BLDC GLUCOMTR-MCNC: 125 MG/DL — HIGH (ref 70–99)
GLUCOSE BLDC GLUCOMTR-MCNC: 146 MG/DL — HIGH (ref 70–99)
GLUCOSE BLDC GLUCOMTR-MCNC: 170 MG/DL — HIGH (ref 70–99)
RH IG SCN BLD-IMP: POSITIVE — SIGNIFICANT CHANGE UP

## 2022-04-26 PROCEDURE — 99231 SBSQ HOSP IP/OBS SF/LOW 25: CPT

## 2022-04-26 RX ADMIN — INSULIN GLARGINE 4 UNIT(S): 100 INJECTION, SOLUTION SUBCUTANEOUS at 21:33

## 2022-04-26 RX ADMIN — GABAPENTIN 100 MILLIGRAM(S): 400 CAPSULE ORAL at 19:02

## 2022-04-26 RX ADMIN — Medication 8 UNIT(S): at 08:28

## 2022-04-26 RX ADMIN — ATORVASTATIN CALCIUM 40 MILLIGRAM(S): 80 TABLET, FILM COATED ORAL at 21:33

## 2022-04-26 RX ADMIN — LISINOPRIL 40 MILLIGRAM(S): 2.5 TABLET ORAL at 06:12

## 2022-04-26 RX ADMIN — DULOXETINE HYDROCHLORIDE 60 MILLIGRAM(S): 30 CAPSULE, DELAYED RELEASE ORAL at 12:00

## 2022-04-26 RX ADMIN — PANTOPRAZOLE SODIUM 40 MILLIGRAM(S): 20 TABLET, DELAYED RELEASE ORAL at 06:12

## 2022-04-26 RX ADMIN — ENOXAPARIN SODIUM 40 MILLIGRAM(S): 100 INJECTION SUBCUTANEOUS at 21:33

## 2022-04-26 RX ADMIN — Medication 8 UNIT(S): at 18:16

## 2022-04-26 RX ADMIN — Medication 8 UNIT(S): at 12:00

## 2022-04-26 RX ADMIN — Medication 81 MILLIGRAM(S): at 12:00

## 2022-04-26 RX ADMIN — GABAPENTIN 100 MILLIGRAM(S): 400 CAPSULE ORAL at 12:00

## 2022-04-26 RX ADMIN — Medication 2: at 08:28

## 2022-04-26 NOTE — PROGRESS NOTE ADULT - SUBJECTIVE AND OBJECTIVE BOX
OVERNIGHT EVENTS: NAEON    SUBJECTIVE / INTERVAL HPI: Patient seen and examined at bedside. Still with leg pain.     VITAL SIGNS:  Vital Signs Last 24 Hrs  T(C): 36.7 (26 Apr 2022 12:13), Max: 36.7 (26 Apr 2022 05:11)  T(F): 98 (26 Apr 2022 12:13), Max: 98 (26 Apr 2022 05:11)  HR: 78 (26 Apr 2022 12:13) (72 - 78)  BP: 129/65 (26 Apr 2022 12:13) (125/62 - 134/67)  BP(mean): --  RR: 16 (26 Apr 2022 12:13) (16 - 18)  SpO2: 98% (26 Apr 2022 12:13) (97% - 99%)    PHYSICAL EXAM:    General: Elderly female NAD  HEENT: NC/AT; PERRL, anicteric sclera; MMM  Neck: supple  Cardiovascular: +S1/S2, RRR, no murmurs, rubs, gallops  Respiratory: CTA B/L; no W/R/R  Gastrointestinal: soft, NT/ND; +BSx4  Extremities: WWP; no edema, clubbing or cyanosis  Vascular: 2+ radial, DP/PT pulses B/L  Neurological: AAOx3; 4/5 B/L UE, 1/5 B/L LE.  strength 5/5. Reduced sensation bilateral feet      MEDICATIONS:  MEDICATIONS  (STANDING):  amLODIPine   Tablet 10 milliGRAM(s) Oral daily  aspirin  chewable 81 milliGRAM(s) Oral daily  atorvastatin 40 milliGRAM(s) Oral at bedtime  dextrose 5%. 1000 milliLiter(s) (50 mL/Hr) IV Continuous <Continuous>  dextrose 5%. 1000 milliLiter(s) (100 mL/Hr) IV Continuous <Continuous>  dextrose 50% Injectable 25 Gram(s) IV Push once  dextrose 50% Injectable 12.5 Gram(s) IV Push once  dextrose 50% Injectable 25 Gram(s) IV Push once  DULoxetine 60 milliGRAM(s) Oral daily  enoxaparin Injectable 40 milliGRAM(s) SubCutaneous every 24 hours  gabapentin 100 milliGRAM(s) Oral <User Schedule>  glucagon  Injectable 1 milliGRAM(s) IntraMuscular once  insulin glargine Injectable (LANTUS) 4 Unit(s) SubCutaneous at bedtime  insulin lispro (ADMELOG) corrective regimen sliding scale   SubCutaneous three times a day before meals  insulin lispro Injectable (ADMELOG) 8 Unit(s) SubCutaneous three times a day before meals  lisinopril 40 milliGRAM(s) Oral every 24 hours  pantoprazole    Tablet 40 milliGRAM(s) Oral before breakfast    MEDICATIONS  (PRN):  dextrose Oral Gel 15 Gram(s) Oral once PRN Blood Glucose LESS THAN 70 milliGRAM(s)/deciliter      ALLERGIES:  Allergies    No Known Allergies    Intolerances        LABS:                        10.4   4.53  )-----------( 282      ( 25 Apr 2022 06:55 )             31.9     04-25    134<L>  |  99  |  27<H>  ----------------------------<  160<H>  4.7   |  26  |  0.59    Ca    8.7      25 Apr 2022 06:55  Phos  4.4     04-25  Mg     1.7     04-25    TPro  6.2  /  Alb  3.1<L>  /  TBili  <0.2  /  DBili  x   /  AST  18  /  ALT  15  /  AlkPhos  42  04-25        CAPILLARY BLOOD GLUCOSE      POCT Blood Glucose.: 146 mg/dL (26 Apr 2022 11:50)      RADIOLOGY & ADDITIONAL TESTS: Reviewed.    PLAN:

## 2022-04-26 NOTE — PROGRESS NOTE ADULT - ASSESSMENT
72y Female with PMHx of T2DM c/b peripheral neuropathy, HTN, recent admission and workup for Naty Sanchez tear, TIA on 3/30 (MR negative) presents to [facility] as transfer from [facility] ED for episode of altered mental status (staring, generalized weakness), back to baseline in [facility] ED, no acute pathology on CTH or MRI, found to have metabolic encephalopathy (resolved) 2/2 UTI (completed 7day abx) vs hypertension, also found to have new LE weakness of unclear origin, possible diabetic neuropathy. Due to immigration and insurance status, not able to [facility] as recommended by PT. Pending improvement to one-person assist in order to discharge home with out-of-pocket PT.

## 2022-04-26 NOTE — PROGRESS NOTE ADULT - PROBLEM SELECTOR PLAN 2
Needle like pain shins downward, interfering with sleep.   - Cymbalta 60 Qd for diabetic neuropathy  - Gabapentin 100 BID  - Replete magnesium as needed

## 2022-04-26 NOTE — PROGRESS NOTE ADULT - PROBLEM SELECTOR PLAN 3
Pt with hx of DM, A1c 11.6. Endocrine following. TSH results: 1.120.   - Low C peptide patient will need insulin and NOT oral agents on dc  - C/w mISS, before meals   - C/w Lantus 4U, Lispro 8U   - F/u endocrine recs

## 2022-04-26 NOTE — PROGRESS NOTE ADULT - ASSESSMENT
72y Female with PMHx of T2DM c/b peripheral neuropathy, HTN, recent admission and workup for Naty Sanchez tear, TIA on 3/30 (MR negative) presents to [facility] as transfer from [facility] ED for episode of AMS, back to baseline in [facility] ED. CTH negative in ED for acute pathology. On medicine to assess for AMS.   A1c:11.%  Wt:63.4kg  Cr:0.45  GFR:101

## 2022-04-26 NOTE — PROGRESS NOTE ADULT - PROBLEM SELECTOR PLAN 8
F: None  E: Replete PRN   N: DASH/TLC   DVT: Lovenox 40 qd  Dispo: RMF, PT rec FELIC[facility]- however patient is uninsured and cannot go to [facility] attempting to get to one person assist

## 2022-04-26 NOTE — PROGRESS NOTE ADULT - PROBLEM SELECTOR PLAN 1
Symmetrical LE weakness, worse distally compared to proximally   Symmetrical diminished sensation, longstanding diabetic peripheral neuropathy   Denies issues with incontinence, or back pain, no sensory line  MRI L/Spine:  - showing at the L3/4 leveI. There are degenerative   changes involving the facets bilaterally as well as ligamentum flavum   hypertrophy. This combination results in moderate central spinal canal   stenosis.   - At the L4/5 level, there is disc bulge abutting the L4 nerve roots   bilaterally. There are degenerative changes involving the facets   bilaterally (right greater than left) as well as ligamentum flavum   hypertrophy. This combination results in mild central spinal canal   stenosis.    - Likely progression of diabetic neuropathy, however symptoms more severe than to be expected.  - cymbalta 60mg daily   - Gabapentin 100 BID  - Custom AFO boots being made, should be ready Wednesday

## 2022-04-26 NOTE — PROGRESS NOTE ADULT - SUBJECTIVE AND OBJECTIVE BOX
INTERVAL HPI/OVERNIGHT EVENTS:    Patient is a 72y old  Female who presents with a chief complaint of episode of AMS (21 Apr 2022 06:18)  Patient continues to eat well. Has foot braces on which she does reports causes some discomfort.   FSG and insulin administration reviewed. BG at goal for patient    Pt reports the following symptoms:    CONSTITUTIONAL:  Negative fever or chills, feels well, good appetite  EYES:  Negative  blurry vision or double vision  CARDIOVASCULAR:  Negative for chest pain or palpitations  RESPIRATORY:  Negative for cough, wheezing, or SOB   GASTROINTESTINAL:  Negative for nausea, vomiting, diarrhea, constipation, or abdominal pain  GENITOURINARY:  Negative frequency, urgency or dysuria  NEUROLOGIC:  No headache, confusion, dizziness, lightheadedness    MEDICATIONS  (STANDING):  amLODIPine   Tablet 10 milliGRAM(s) Oral daily  aspirin  chewable 81 milliGRAM(s) Oral daily  atorvastatin 40 milliGRAM(s) Oral at bedtime  dextrose 5%. 1000 milliLiter(s) (50 mL/Hr) IV Continuous <Continuous>  dextrose 5%. 1000 milliLiter(s) (100 mL/Hr) IV Continuous <Continuous>  dextrose 50% Injectable 25 Gram(s) IV Push once  dextrose 50% Injectable 12.5 Gram(s) IV Push once  dextrose 50% Injectable 25 Gram(s) IV Push once  DULoxetine 60 milliGRAM(s) Oral daily  enoxaparin Injectable 40 milliGRAM(s) SubCutaneous every 24 hours  gabapentin 100 milliGRAM(s) Oral <User Schedule>  glucagon  Injectable 1 milliGRAM(s) IntraMuscular once  insulin glargine Injectable (LANTUS) 4 Unit(s) SubCutaneous at bedtime  insulin lispro (ADMELOG) corrective regimen sliding scale   SubCutaneous three times a day before meals  insulin lispro Injectable (ADMELOG) 8 Unit(s) SubCutaneous three times a day before meals  lisinopril 40 milliGRAM(s) Oral every 24 hours  pantoprazole    Tablet 40 milliGRAM(s) Oral before breakfast    MEDICATIONS  (PRN):  dextrose Oral Gel 15 Gram(s) Oral once PRN Blood Glucose LESS THAN 70 milliGRAM(s)/deciliter      PHYSICAL EXAM  Vital Signs Last 24 Hrs  T(C): 36.7 (26 Apr 2022 12:13), Max: 36.7 (26 Apr 2022 05:11)  T(F): 98 (26 Apr 2022 12:13), Max: 98 (26 Apr 2022 05:11)  HR: 78 (26 Apr 2022 12:13) (72 - 78)  BP: 129/65 (26 Apr 2022 12:13) (125/62 - 134/67)  BP(mean): --  RR: 16 (26 Apr 2022 12:13) (16 - 18)  SpO2: 98% (26 Apr 2022 12:13) (97% - 99%)    Constitutional: NAD.   HEENT: NCAT, MMM, OP clear, EOMI, , no proptosis or lid retraction  Neck: no thyromegaly or palpable thyroid nodules   Respiratory: lungs CTAB.  Cardiovascular: regular rhythm, normal S1 and S2, no audible murmurs, no peripheral edema  GI: soft, NT/ND, no masses/HSM appreciated.  Neurology: no tremors, slowed speech, lower extremity weakness b/l, b/l foot drop now in braces   Skin: no visible rashes/lesions  Psychiatric: AAO x 3, flat affect    LABS:                        10.4   4.53  )-----------( 282      ( 25 Apr 2022 06:55 )             31.9     04-25    134<L>  |  99  |  27<H>  ----------------------------<  160<H>  4.7   |  26  |  0.59    Ca    8.7      25 Apr 2022 06:55  Phos  4.4     04-25  Mg     1.7     04-25    TPro  6.2  /  Alb  3.1<L>  /  TBili  <0.2  /  DBili  x   /  AST  18  /  ALT  15  /  AlkPhos  42  04-25        Thyroid Stimulating Hormone, Serum: 1.870 uIU/mL (04-09 @ 08:50)  Thyroid Stimulating Hormone, Serum: 1.120 uIU/mL (04-01 @ 07:54)      HbA1C:   CAPILLARY BLOOD GLUCOSE      POCT Blood Glucose.: 101 mg/dL (26 Apr 2022 17:04)  POCT Blood Glucose.: 146 mg/dL (26 Apr 2022 11:50)  POCT Blood Glucose.: 170 mg/dL (26 Apr 2022 08:16)  POCT Blood Glucose.: 163 mg/dL (25 Apr 2022 21:37)

## 2022-04-26 NOTE — PROGRESS NOTE ADULT - ATTENDING COMMENTS
clinical status unchanged  discussed with PT at bedside - still requiring 2 person assist has trouble with bringing legs over to edge of the bed indicating proximal weakness in addition to foot drop  continue PT, cymbalta, gabapentin, d/c home when functional status improved

## 2022-04-26 NOTE — PROGRESS NOTE ADULT - PROBLEM SELECTOR PLAN 1
DM type  2  c-peptide level : 0.3 low (likely insulinopenic)   Patient appears to be recovering from prior glucose toxic state.   Please continue lantus  4  units at night.    Please continue lispro  8  units before each meal.    Please continue lispro moderate dose sliding scale only before meals  b/l lower extremity weakness severe, although patient does have diabetic neuropathy, this is unlikely a result from diabetes.     Pt's fingerstick glucose goal is 100-180    Will continue to monitor     For discharge, pt can continue insulin therapy, will need assistance     Pt can follow up at discharge with [facility] by calling  to make an appointment.   Will discuss case with Dr. Christopher and update primary team

## 2022-04-27 LAB
ALBUMIN SERPL ELPH-MCNC: 3 G/DL — LOW (ref 3.3–5)
ALP SERPL-CCNC: 33 U/L — LOW (ref 40–120)
ALT FLD-CCNC: 11 U/L — SIGNIFICANT CHANGE UP (ref 10–45)
ANION GAP SERPL CALC-SCNC: 9 MMOL/L — SIGNIFICANT CHANGE UP (ref 5–17)
AST SERPL-CCNC: 15 U/L — SIGNIFICANT CHANGE UP (ref 10–40)
BASOPHILS # BLD AUTO: 0.01 K/UL — SIGNIFICANT CHANGE UP (ref 0–0.2)
BASOPHILS NFR BLD AUTO: 0.3 % — SIGNIFICANT CHANGE UP (ref 0–2)
BILIRUB SERPL-MCNC: 0.2 MG/DL — SIGNIFICANT CHANGE UP (ref 0.2–1.2)
BUN SERPL-MCNC: 25 MG/DL — HIGH (ref 7–23)
CALCIUM SERPL-MCNC: 8.7 MG/DL — SIGNIFICANT CHANGE UP (ref 8.4–10.5)
CHLORIDE SERPL-SCNC: 101 MMOL/L — SIGNIFICANT CHANGE UP (ref 96–108)
CO2 SERPL-SCNC: 24 MMOL/L — SIGNIFICANT CHANGE UP (ref 22–31)
CREAT SERPL-MCNC: 0.6 MG/DL — SIGNIFICANT CHANGE UP (ref 0.5–1.3)
EGFR: 94 ML/MIN/1.73M2 — SIGNIFICANT CHANGE UP
EOSINOPHIL # BLD AUTO: 0.17 K/UL — SIGNIFICANT CHANGE UP (ref 0–0.5)
EOSINOPHIL NFR BLD AUTO: 4.4 % — SIGNIFICANT CHANGE UP (ref 0–6)
GLUCOSE BLDC GLUCOMTR-MCNC: 103 MG/DL — HIGH (ref 70–99)
GLUCOSE BLDC GLUCOMTR-MCNC: 155 MG/DL — HIGH (ref 70–99)
GLUCOSE BLDC GLUCOMTR-MCNC: 93 MG/DL — SIGNIFICANT CHANGE UP (ref 70–99)
GLUCOSE SERPL-MCNC: 188 MG/DL — HIGH (ref 70–99)
HCT VFR BLD CALC: 33.7 % — LOW (ref 34.5–45)
HGB BLD-MCNC: 10.4 G/DL — LOW (ref 11.5–15.5)
IMM GRANULOCYTES NFR BLD AUTO: 0.3 % — SIGNIFICANT CHANGE UP (ref 0–1.5)
LYMPHOCYTES # BLD AUTO: 1.66 K/UL — SIGNIFICANT CHANGE UP (ref 1–3.3)
LYMPHOCYTES # BLD AUTO: 43.1 % — SIGNIFICANT CHANGE UP (ref 13–44)
MAGNESIUM SERPL-MCNC: 1.6 MG/DL — SIGNIFICANT CHANGE UP (ref 1.6–2.6)
MCHC RBC-ENTMCNC: 26.4 PG — LOW (ref 27–34)
MCHC RBC-ENTMCNC: 30.9 GM/DL — LOW (ref 32–36)
MCV RBC AUTO: 85.5 FL — SIGNIFICANT CHANGE UP (ref 80–100)
MONOCYTES # BLD AUTO: 0.34 K/UL — SIGNIFICANT CHANGE UP (ref 0–0.9)
MONOCYTES NFR BLD AUTO: 8.8 % — SIGNIFICANT CHANGE UP (ref 2–14)
NEUTROPHILS # BLD AUTO: 1.66 K/UL — LOW (ref 1.8–7.4)
NEUTROPHILS NFR BLD AUTO: 43.1 % — SIGNIFICANT CHANGE UP (ref 43–77)
NRBC # BLD: 0 /100 WBCS — SIGNIFICANT CHANGE UP (ref 0–0)
PHOSPHATE SERPL-MCNC: 3.7 MG/DL — SIGNIFICANT CHANGE UP (ref 2.5–4.5)
PLATELET # BLD AUTO: 247 K/UL — SIGNIFICANT CHANGE UP (ref 150–400)
POTASSIUM SERPL-MCNC: 4.6 MMOL/L — SIGNIFICANT CHANGE UP (ref 3.5–5.3)
POTASSIUM SERPL-SCNC: 4.6 MMOL/L — SIGNIFICANT CHANGE UP (ref 3.5–5.3)
PROT SERPL-MCNC: 6.1 G/DL — SIGNIFICANT CHANGE UP (ref 6–8.3)
RBC # BLD: 3.94 M/UL — SIGNIFICANT CHANGE UP (ref 3.8–5.2)
RBC # FLD: 13.6 % — SIGNIFICANT CHANGE UP (ref 10.3–14.5)
SODIUM SERPL-SCNC: 134 MMOL/L — LOW (ref 135–145)
WBC # BLD: 3.85 K/UL — SIGNIFICANT CHANGE UP (ref 3.8–10.5)
WBC # FLD AUTO: 3.85 K/UL — SIGNIFICANT CHANGE UP (ref 3.8–10.5)

## 2022-04-27 PROCEDURE — 99231 SBSQ HOSP IP/OBS SF/LOW 25: CPT

## 2022-04-27 RX ORDER — SODIUM,POTASSIUM PHOSPHATES 278-250MG
1 POWDER IN PACKET (EA) ORAL ONCE
Refills: 0 | Status: COMPLETED | OUTPATIENT
Start: 2022-04-27 | End: 2022-04-27

## 2022-04-27 RX ORDER — MAGNESIUM SULFATE 500 MG/ML
2 VIAL (ML) INJECTION ONCE
Refills: 0 | Status: COMPLETED | OUTPATIENT
Start: 2022-04-27 | End: 2022-04-27

## 2022-04-27 RX ADMIN — Medication 81 MILLIGRAM(S): at 11:22

## 2022-04-27 RX ADMIN — INSULIN GLARGINE 4 UNIT(S): 100 INJECTION, SOLUTION SUBCUTANEOUS at 21:44

## 2022-04-27 RX ADMIN — GABAPENTIN 100 MILLIGRAM(S): 400 CAPSULE ORAL at 12:58

## 2022-04-27 RX ADMIN — Medication 8 UNIT(S): at 12:00

## 2022-04-27 RX ADMIN — AMLODIPINE BESYLATE 10 MILLIGRAM(S): 2.5 TABLET ORAL at 06:04

## 2022-04-27 RX ADMIN — Medication 8 UNIT(S): at 17:32

## 2022-04-27 RX ADMIN — ENOXAPARIN SODIUM 40 MILLIGRAM(S): 100 INJECTION SUBCUTANEOUS at 21:44

## 2022-04-27 RX ADMIN — LISINOPRIL 40 MILLIGRAM(S): 2.5 TABLET ORAL at 06:04

## 2022-04-27 RX ADMIN — Medication 1 PACKET(S): at 14:23

## 2022-04-27 RX ADMIN — Medication 25 GRAM(S): at 14:23

## 2022-04-27 RX ADMIN — ATORVASTATIN CALCIUM 40 MILLIGRAM(S): 80 TABLET, FILM COATED ORAL at 21:44

## 2022-04-27 RX ADMIN — DULOXETINE HYDROCHLORIDE 60 MILLIGRAM(S): 30 CAPSULE, DELAYED RELEASE ORAL at 11:22

## 2022-04-27 RX ADMIN — Medication 8 UNIT(S): at 09:09

## 2022-04-27 RX ADMIN — Medication 2: at 09:08

## 2022-04-27 RX ADMIN — PANTOPRAZOLE SODIUM 40 MILLIGRAM(S): 20 TABLET, DELAYED RELEASE ORAL at 06:04

## 2022-04-27 RX ADMIN — Medication 2: at 12:01

## 2022-04-27 RX ADMIN — GABAPENTIN 100 MILLIGRAM(S): 400 CAPSULE ORAL at 19:55

## 2022-04-27 NOTE — PROGRESS NOTE ADULT - PROBLEM SELECTOR PLAN 8
F: None  E: Replete PRN   N: DASH/TLC   DVT: Lovenox 40 qd  Dispo: RMF, PT rec [facility]EL[facility]- however patient is uninsured and cannot go to [facility] attempting to get to one person assist

## 2022-04-27 NOTE — PROGRESS NOTE ADULT - ASSESSMENT
72y Female with PMHx of T2DM c/b peripheral neuropathy, HTN, recent admission and workup for Naty Sanchez tear, TIA on 3/30 (MR negative) presents to [facility] as transfer from [facility] ED for episode of altered mental status (staring, generalized weakness), back to baseline in [facility] ED, no acute pathology on CTH or MRI, found to have metabolic encephalopathy (resolved) 2/2 UTI (completed 7day abx) vs hypertension, also found to have new LE weakness of unclear origin, possible diabetic neuropathy. Due to immigration and insurance status, not able to [facility] as recommended by PT. Pending improvement to one-person assist in order to discharge home with out-of-pocket PT.

## 2022-04-27 NOTE — PROGRESS NOTE ADULT - ATTENDING COMMENTS
neuro exam unchanged   received AFOs, will work with PT to see if that improves gait enough to d/c home with home PT  continue current meds

## 2022-04-27 NOTE — PROGRESS NOTE ADULT - SUBJECTIVE AND OBJECTIVE BOX
OVERNIGHT EVENTS: NAEON  SUBJECTIVE / INTERVAL HPI: Patient seen and examined at bedside. Leg weakness and numbness present.     VITAL SIGNS:  Vital Signs Last 24 Hrs  T(C): 36.3 (27 Apr 2022 12:10), Max: 36.9 (26 Apr 2022 20:37)  T(F): 97.3 (27 Apr 2022 12:10), Max: 98.4 (26 Apr 2022 20:37)  HR: 74 (27 Apr 2022 12:10) (71 - 83)  BP: 146/73 (27 Apr 2022 12:10) (118/64 - 169/78)  BP(mean): --  RR: 18 (27 Apr 2022 12:10) (17 - 18)  SpO2: 98% (27 Apr 2022 12:10) (96% - 98%)    PHYSICAL EXAM:    General: Elderly female NAD  HEENT: NC/AT; PERRL, anicteric sclera; MMM  Neck: supple  Cardiovascular: +S1/S2, RRR, no murmurs, rubs, gallops  Respiratory: CTA B/L; no W/R/R  Gastrointestinal: soft, NT/ND; +BSx4  Extremities: WWP; no edema, clubbing or cyanosis  Vascular: 2+ radial, DP/PT pulses B/L  Neurological: AAOx3; 4/5 B/L UE, 1/5 B/L LE.  strength 5/5. Reduced sensation bilateral feet but equal bilaterally    MEDICATIONS:  MEDICATIONS  (STANDING):  amLODIPine   Tablet 10 milliGRAM(s) Oral daily  aspirin  chewable 81 milliGRAM(s) Oral daily  atorvastatin 40 milliGRAM(s) Oral at bedtime  dextrose 5%. 1000 milliLiter(s) (50 mL/Hr) IV Continuous <Continuous>  dextrose 5%. 1000 milliLiter(s) (100 mL/Hr) IV Continuous <Continuous>  dextrose 50% Injectable 25 Gram(s) IV Push once  dextrose 50% Injectable 12.5 Gram(s) IV Push once  dextrose 50% Injectable 25 Gram(s) IV Push once  DULoxetine 60 milliGRAM(s) Oral daily  enoxaparin Injectable 40 milliGRAM(s) SubCutaneous every 24 hours  gabapentin 100 milliGRAM(s) Oral <User Schedule>  glucagon  Injectable 1 milliGRAM(s) IntraMuscular once  insulin glargine Injectable (LANTUS) 4 Unit(s) SubCutaneous at bedtime  insulin lispro (ADMELOG) corrective regimen sliding scale   SubCutaneous three times a day before meals  insulin lispro Injectable (ADMELOG) 8 Unit(s) SubCutaneous three times a day before meals  lisinopril 40 milliGRAM(s) Oral every 24 hours  pantoprazole    Tablet 40 milliGRAM(s) Oral before breakfast    MEDICATIONS  (PRN):  dextrose Oral Gel 15 Gram(s) Oral once PRN Blood Glucose LESS THAN 70 milliGRAM(s)/deciliter      ALLERGIES:  Allergies    No Known Allergies    Intolerances        LABS:                        10.4   3.85  )-----------( 247      ( 27 Apr 2022 08:01 )             33.7     04-27    134<L>  |  101  |  25<H>  ----------------------------<  188<H>  4.6   |  24  |  0.60    Ca    8.7      27 Apr 2022 08:01  Phos  3.7     04-27  Mg     1.6     04-27    TPro  6.1  /  Alb  3.0<L>  /  TBili  0.2  /  DBili  x   /  AST  15  /  ALT  11  /  AlkPhos  33<L>  04-27        CAPILLARY BLOOD GLUCOSE      POCT Blood Glucose.: 155 mg/dL (27 Apr 2022 11:45)      RADIOLOGY & ADDITIONAL TESTS: Reviewed.    PLAN:

## 2022-04-28 LAB
GLUCOSE BLDC GLUCOMTR-MCNC: 104 MG/DL — HIGH (ref 70–99)
GLUCOSE BLDC GLUCOMTR-MCNC: 150 MG/DL — HIGH (ref 70–99)
GLUCOSE BLDC GLUCOMTR-MCNC: 188 MG/DL — HIGH (ref 70–99)
GLUCOSE BLDC GLUCOMTR-MCNC: 98 MG/DL — SIGNIFICANT CHANGE UP (ref 70–99)

## 2022-04-28 PROCEDURE — 99231 SBSQ HOSP IP/OBS SF/LOW 25: CPT

## 2022-04-28 RX ADMIN — GABAPENTIN 100 MILLIGRAM(S): 400 CAPSULE ORAL at 19:07

## 2022-04-28 RX ADMIN — Medication 2: at 12:14

## 2022-04-28 RX ADMIN — DULOXETINE HYDROCHLORIDE 60 MILLIGRAM(S): 30 CAPSULE, DELAYED RELEASE ORAL at 12:02

## 2022-04-28 RX ADMIN — PANTOPRAZOLE SODIUM 40 MILLIGRAM(S): 20 TABLET, DELAYED RELEASE ORAL at 06:31

## 2022-04-28 RX ADMIN — ENOXAPARIN SODIUM 40 MILLIGRAM(S): 100 INJECTION SUBCUTANEOUS at 22:03

## 2022-04-28 RX ADMIN — Medication 8 UNIT(S): at 17:15

## 2022-04-28 RX ADMIN — ATORVASTATIN CALCIUM 40 MILLIGRAM(S): 80 TABLET, FILM COATED ORAL at 22:03

## 2022-04-28 RX ADMIN — GABAPENTIN 100 MILLIGRAM(S): 400 CAPSULE ORAL at 12:03

## 2022-04-28 RX ADMIN — LISINOPRIL 40 MILLIGRAM(S): 2.5 TABLET ORAL at 06:31

## 2022-04-28 RX ADMIN — Medication 81 MILLIGRAM(S): at 12:03

## 2022-04-28 RX ADMIN — Medication 8 UNIT(S): at 08:48

## 2022-04-28 RX ADMIN — INSULIN GLARGINE 4 UNIT(S): 100 INJECTION, SOLUTION SUBCUTANEOUS at 22:03

## 2022-04-28 RX ADMIN — AMLODIPINE BESYLATE 10 MILLIGRAM(S): 2.5 TABLET ORAL at 05:46

## 2022-04-28 RX ADMIN — Medication 8 UNIT(S): at 12:14

## 2022-04-28 NOTE — PROGRESS NOTE ADULT - PROBLEM SELECTOR PLAN 8
F: None  E: Replete PRN   N: DASH/TLC   DVT: Lovenox 40 qd  Dispo: RMF, PT rec FELICIA- however patient is uninsured and cannot go to [facility] attempting to get to one person assist

## 2022-04-28 NOTE — PROGRESS NOTE ADULT - PROBLEM SELECTOR PLAN 2
Needle like pain shins downward, interfering with sleep.   - Cymbalta 60 Qd for diabetic neuropathy  - Gabapentin 100 BID--unable to increase as lethargy earlier in admission due to this med which was dc'd  - Replete magnesium as needed Needle like pain shins downward, interfering with sleep.   - Cymbalta 60 Qd for diabetic neuropathy  - Gabapentin 100 BID--unable to increase as lethargy earlier in admission due to this med which was dc'd

## 2022-04-28 NOTE — PROGRESS NOTE ADULT - SUBJECTIVE AND OBJECTIVE BOX
OVERNIGHT EVENTS: NAEON    SUBJECTIVE / INTERVAL HPI: Patient seen and examined at bedside. No acute complaints Still with leg pain    VITAL SIGNS:  Vital Signs Last 24 Hrs  T(C): 36.7 (28 Apr 2022 12:04), Max: 36.7 (27 Apr 2022 21:05)  T(F): 98.1 (28 Apr 2022 12:04), Max: 98.1 (27 Apr 2022 21:05)  HR: 75 (28 Apr 2022 12:04) (70 - 77)  BP: 145/78 (28 Apr 2022 12:04) (127/71 - 170/90)  BP(mean): --  RR: 18 (28 Apr 2022 12:04) (18 - 18)  SpO2: 98% (28 Apr 2022 12:04) (98% - 98%)    PHYSICAL EXAM:    General: Elderly female NAD  HEENT: NC/AT; PERRL, anicteric sclera; MMM  Neck: supple  Cardiovascular: +S1/S2, RRR, no murmurs, rubs, gallops  Respiratory: CTA B/L; no W/R/R  Gastrointestinal: soft, NT/ND; +BSx4  Extremities: WWP; no edema, clubbing or cyanosis  Vascular: 2+ radial, DP/PT pulses B/L  Neurological: AAOx3; 4/5 B/L UE, 1/5 B/L LE.  strength 5/5. Reduced sensation bilateral feet but equal bilaterally stable from yesterday    MEDICATIONS:  MEDICATIONS  (STANDING):  amLODIPine   Tablet 10 milliGRAM(s) Oral daily  aspirin  chewable 81 milliGRAM(s) Oral daily  atorvastatin 40 milliGRAM(s) Oral at bedtime  dextrose 5%. 1000 milliLiter(s) (50 mL/Hr) IV Continuous <Continuous>  dextrose 5%. 1000 milliLiter(s) (100 mL/Hr) IV Continuous <Continuous>  dextrose 50% Injectable 25 Gram(s) IV Push once  dextrose 50% Injectable 12.5 Gram(s) IV Push once  dextrose 50% Injectable 25 Gram(s) IV Push once  DULoxetine 60 milliGRAM(s) Oral daily  enoxaparin Injectable 40 milliGRAM(s) SubCutaneous every 24 hours  gabapentin 100 milliGRAM(s) Oral <User Schedule>  glucagon  Injectable 1 milliGRAM(s) IntraMuscular once  insulin glargine Injectable (LANTUS) 4 Unit(s) SubCutaneous at bedtime  insulin lispro (ADMELOG) corrective regimen sliding scale   SubCutaneous three times a day before meals  insulin lispro Injectable (ADMELOG) 8 Unit(s) SubCutaneous three times a day before meals  lisinopril 40 milliGRAM(s) Oral every 24 hours  pantoprazole    Tablet 40 milliGRAM(s) Oral before breakfast    MEDICATIONS  (PRN):  dextrose Oral Gel 15 Gram(s) Oral once PRN Blood Glucose LESS THAN 70 milliGRAM(s)/deciliter      ALLERGIES:  Allergies    No Known Allergies    Intolerances        LABS:                        10.4   3.85  )-----------( 247      ( 27 Apr 2022 08:01 )             33.7     04-27    134<L>  |  101  |  25<H>  ----------------------------<  188<H>  4.6   |  24  |  0.60    Ca    8.7      27 Apr 2022 08:01  Phos  3.7     04-27  Mg     1.6     04-27    TPro  6.1  /  Alb  3.0<L>  /  TBili  0.2  /  DBili  x   /  AST  15  /  ALT  11  /  AlkPhos  33<L>  04-27        CAPILLARY BLOOD GLUCOSE      POCT Blood Glucose.: 188 mg/dL (28 Apr 2022 12:07)      RADIOLOGY & ADDITIONAL TESTS: Reviewed.    PLAN:

## 2022-04-28 NOTE — PROGRESS NOTE ADULT - ATTENDING COMMENTS
neurologically stable  awaiting improvement in functional status to d/c home with home PT  f/u PT re-eval   continue current meds

## 2022-04-28 NOTE — PROGRESS NOTE ADULT - ASSESSMENT
72y Female with PMHx of T2DM c/b peripheral neuropathy, HTN, recent admission and workup for Naty Sanchez tear, TIA on 3/30 (MR negative) presents to [facility] as transfer from [facility] ED for episode of altered mental status (staring, generalized weakness), back to baseline in [facility] ED, no acute pathology on CTH or MRI, found to have metabolic encephalopathy (resolved) 2/2 UTI (completed 7day abx) vs hypertension, also found to have new LE weakness of unclear origin, possible diabetic neuropathy. Due to immigration and insurance status, not able to FELICIA as recommended by PT. Pending improvement to one-person assist in order to discharge home with out-of-pocket PT.

## 2022-04-29 LAB
GLUCOSE BLDC GLUCOMTR-MCNC: 115 MG/DL — HIGH (ref 70–99)
GLUCOSE BLDC GLUCOMTR-MCNC: 129 MG/DL — HIGH (ref 70–99)
GLUCOSE BLDC GLUCOMTR-MCNC: 179 MG/DL — HIGH (ref 70–99)
GLUCOSE BLDC GLUCOMTR-MCNC: 99 MG/DL — SIGNIFICANT CHANGE UP (ref 70–99)

## 2022-04-29 PROCEDURE — 99232 SBSQ HOSP IP/OBS MODERATE 35: CPT | Mod: GC

## 2022-04-29 RX ADMIN — ENOXAPARIN SODIUM 40 MILLIGRAM(S): 100 INJECTION SUBCUTANEOUS at 21:46

## 2022-04-29 RX ADMIN — DULOXETINE HYDROCHLORIDE 60 MILLIGRAM(S): 30 CAPSULE, DELAYED RELEASE ORAL at 11:55

## 2022-04-29 RX ADMIN — GABAPENTIN 100 MILLIGRAM(S): 400 CAPSULE ORAL at 19:00

## 2022-04-29 RX ADMIN — Medication 8 UNIT(S): at 12:02

## 2022-04-29 RX ADMIN — Medication 8 UNIT(S): at 08:30

## 2022-04-29 RX ADMIN — Medication 8 UNIT(S): at 17:26

## 2022-04-29 RX ADMIN — ATORVASTATIN CALCIUM 40 MILLIGRAM(S): 80 TABLET, FILM COATED ORAL at 21:46

## 2022-04-29 RX ADMIN — GABAPENTIN 100 MILLIGRAM(S): 400 CAPSULE ORAL at 11:55

## 2022-04-29 RX ADMIN — Medication 2: at 12:02

## 2022-04-29 RX ADMIN — Medication 81 MILLIGRAM(S): at 11:55

## 2022-04-29 RX ADMIN — INSULIN GLARGINE 4 UNIT(S): 100 INJECTION, SOLUTION SUBCUTANEOUS at 21:45

## 2022-04-29 NOTE — PROGRESS NOTE ADULT - SUBJECTIVE AND OBJECTIVE BOX
OVERNIGHT EVENTS: NAEON    SUBJECTIVE / INTERVAL HPI: Patient seen and examined at bedside. Frustrated with her lack of progress. Still with leg pain and weakness.     VITAL SIGNS:  Vital Signs Last 24 Hrs  T(C): 36.8 (29 Apr 2022 11:58), Max: 36.8 (28 Apr 2022 21:00)  T(F): 98.3 (29 Apr 2022 11:58), Max: 98.3 (29 Apr 2022 11:58)  HR: 71 (29 Apr 2022 11:58) (66 - 71)  BP: 127/75 (29 Apr 2022 11:58) (127/75 - 174/73)  BP(mean): --  RR: 18 (29 Apr 2022 11:58) (18 - 18)  SpO2: 98% (29 Apr 2022 11:58) (97% - 99%)    PHYSICAL EXAM:    General: Elderly female NAD  HEENT: NC/AT; PERRL, anicteric sclera; MMM  Neck: supple  Cardiovascular: +S1/S2, RRR, no murmurs, rubs, gallops  Respiratory: CTA B/L; no W/R/R  Gastrointestinal: soft, NT/ND; +BSx4  Extremities: WWP; no edema, clubbing or cyanosis  Vascular: 2+ radial, DP/PT pulses B/L  Neurological: AAOx3; 4/5 B/L UE, 1/5 B/L LE.  strength 5/5. Reduced sensation bilateral feet but equal bilaterally    MEDICATIONS:  MEDICATIONS  (STANDING):  amLODIPine   Tablet 10 milliGRAM(s) Oral daily  aspirin  chewable 81 milliGRAM(s) Oral daily  atorvastatin 40 milliGRAM(s) Oral at bedtime  dextrose 5%. 1000 milliLiter(s) (50 mL/Hr) IV Continuous <Continuous>  dextrose 5%. 1000 milliLiter(s) (100 mL/Hr) IV Continuous <Continuous>  dextrose 50% Injectable 25 Gram(s) IV Push once  dextrose 50% Injectable 12.5 Gram(s) IV Push once  dextrose 50% Injectable 25 Gram(s) IV Push once  DULoxetine 60 milliGRAM(s) Oral daily  enoxaparin Injectable 40 milliGRAM(s) SubCutaneous every 24 hours  gabapentin 100 milliGRAM(s) Oral <User Schedule>  glucagon  Injectable 1 milliGRAM(s) IntraMuscular once  insulin glargine Injectable (LANTUS) 4 Unit(s) SubCutaneous at bedtime  insulin lispro (ADMELOG) corrective regimen sliding scale   SubCutaneous three times a day before meals  insulin lispro Injectable (ADMELOG) 8 Unit(s) SubCutaneous three times a day before meals  lisinopril 40 milliGRAM(s) Oral every 24 hours  pantoprazole    Tablet 40 milliGRAM(s) Oral before breakfast    MEDICATIONS  (PRN):  dextrose Oral Gel 15 Gram(s) Oral once PRN Blood Glucose LESS THAN 70 milliGRAM(s)/deciliter      ALLERGIES:  Allergies    No Known Allergies    Intolerances        LABS:              CAPILLARY BLOOD GLUCOSE      POCT Blood Glucose.: 179 mg/dL (29 Apr 2022 11:58)      RADIOLOGY & ADDITIONAL TESTS: Reviewed.    PLAN:

## 2022-04-29 NOTE — PROGRESS NOTE ADULT - ASSESSMENT
72y Female with PMHx of T2DM c/b peripheral neuropathy, HTN, recent admission and workup for Naty Sanchez tear, TIA on 3/30 (MR negative) presents to [facility] as transfer from [facility] ED for episode of altered mental status (staring, generalized weakness), back to baseline in [facility] ED, no acute pathology on CTH or MRI, found to have metabolic encephalopathy (resolved) 2/2 UTI (completed 7day abx) vs hypertension, also found to have new LE weakness of unclear origin, possible diabetic neuropathy. Due to immigration and insurance status, not able to FELICIA as recommended by PT. Pending improvement to one-person assist in order to discharge home with out-of-pocket PT.

## 2022-04-29 NOTE — PROGRESS NOTE ADULT - PROBLEM SELECTOR PLAN 2
Needle like pain shins downward, interfering with sleep.   - Cymbalta 60 Qd for diabetic neuropathy  - Gabapentin 100 BID--unable to increase as lethargy earlier in admission due to this med which was dc'd

## 2022-04-29 NOTE — PROGRESS NOTE ADULT - ATTENDING COMMENTS
she reports pain with using AFOs - cushions applied to improve comfort  continue cymbalta, gabapentin, PT  d/c home pending improvement in gait / transfers

## 2022-04-29 NOTE — PROGRESS NOTE ADULT - PROBLEM SELECTOR PLAN 1
Symmetrical LE weakness, worse distally compared to proximally   Symmetrical diminished sensation, longstanding diabetic peripheral neuropathy   Denies issues with incontinence, or back pain, no sensory line  MRI L/Spine:  - showing at the L3/4 leveI. There are degenerative   changes involving the facets bilaterally as well as ligamentum flavum   hypertrophy. This combination results in moderate central spinal canal   stenosis.   - At the L4/5 level, there is disc bulge abutting the L4 nerve roots   bilaterally. There are degenerative changes involving the facets   bilaterally (right greater than left) as well as ligamentum flavum   hypertrophy. This combination results in mild central spinal canal   stenosis.    - Likely progression of diabetic neuropathy, however symptoms more severe than to be expected.  - cymbalta 60mg daily   - Gabapentin 100 BID  - Custom AFO boots   -Started incentive spirometry

## 2022-04-29 NOTE — PROGRESS NOTE ADULT - PROBLEM SELECTOR PLAN 8
F: None  E: Replete PRN   N: DASH/TLC   DVT: Lovenox 40 qd  Dispo: RMF, PT rec FELICIA- however patient is uninsured and cannot go to [facility] attempting to get to one person assist

## 2022-04-30 LAB
ALBUMIN SERPL ELPH-MCNC: 3.2 G/DL — LOW (ref 3.3–5)
ALP SERPL-CCNC: 40 U/L — SIGNIFICANT CHANGE UP (ref 40–120)
ALT FLD-CCNC: 12 U/L — SIGNIFICANT CHANGE UP (ref 10–45)
ANION GAP SERPL CALC-SCNC: 10 MMOL/L — SIGNIFICANT CHANGE UP (ref 5–17)
AST SERPL-CCNC: 16 U/L — SIGNIFICANT CHANGE UP (ref 10–40)
BASOPHILS # BLD AUTO: 0.02 K/UL — SIGNIFICANT CHANGE UP (ref 0–0.2)
BASOPHILS NFR BLD AUTO: 0.5 % — SIGNIFICANT CHANGE UP (ref 0–2)
BILIRUB SERPL-MCNC: 0.2 MG/DL — SIGNIFICANT CHANGE UP (ref 0.2–1.2)
BUN SERPL-MCNC: 20 MG/DL — SIGNIFICANT CHANGE UP (ref 7–23)
CALCIUM SERPL-MCNC: 8.9 MG/DL — SIGNIFICANT CHANGE UP (ref 8.4–10.5)
CHLORIDE SERPL-SCNC: 100 MMOL/L — SIGNIFICANT CHANGE UP (ref 96–108)
CK SERPL-CCNC: 44 U/L — SIGNIFICANT CHANGE UP (ref 25–170)
CO2 SERPL-SCNC: 26 MMOL/L — SIGNIFICANT CHANGE UP (ref 22–31)
CREAT SERPL-MCNC: 0.47 MG/DL — LOW (ref 0.5–1.3)
EGFR: 100 ML/MIN/1.73M2 — SIGNIFICANT CHANGE UP
EOSINOPHIL # BLD AUTO: 0.17 K/UL — SIGNIFICANT CHANGE UP (ref 0–0.5)
EOSINOPHIL NFR BLD AUTO: 4 % — SIGNIFICANT CHANGE UP (ref 0–6)
GLUCOSE BLDC GLUCOMTR-MCNC: 113 MG/DL — HIGH (ref 70–99)
GLUCOSE BLDC GLUCOMTR-MCNC: 141 MG/DL — HIGH (ref 70–99)
GLUCOSE BLDC GLUCOMTR-MCNC: 171 MG/DL — HIGH (ref 70–99)
GLUCOSE BLDC GLUCOMTR-MCNC: 197 MG/DL — HIGH (ref 70–99)
GLUCOSE SERPL-MCNC: 120 MG/DL — HIGH (ref 70–99)
HCT VFR BLD CALC: 35.5 % — SIGNIFICANT CHANGE UP (ref 34.5–45)
HGB BLD-MCNC: 10.9 G/DL — LOW (ref 11.5–15.5)
IMM GRANULOCYTES NFR BLD AUTO: 0.2 % — SIGNIFICANT CHANGE UP (ref 0–1.5)
LYMPHOCYTES # BLD AUTO: 1.8 K/UL — SIGNIFICANT CHANGE UP (ref 1–3.3)
LYMPHOCYTES # BLD AUTO: 42.7 % — SIGNIFICANT CHANGE UP (ref 13–44)
MAGNESIUM SERPL-MCNC: 1.8 MG/DL — SIGNIFICANT CHANGE UP (ref 1.6–2.6)
MCHC RBC-ENTMCNC: 26.5 PG — LOW (ref 27–34)
MCHC RBC-ENTMCNC: 30.7 GM/DL — LOW (ref 32–36)
MCV RBC AUTO: 86.4 FL — SIGNIFICANT CHANGE UP (ref 80–100)
MONOCYTES # BLD AUTO: 0.32 K/UL — SIGNIFICANT CHANGE UP (ref 0–0.9)
MONOCYTES NFR BLD AUTO: 7.6 % — SIGNIFICANT CHANGE UP (ref 2–14)
NEUTROPHILS # BLD AUTO: 1.9 K/UL — SIGNIFICANT CHANGE UP (ref 1.8–7.4)
NEUTROPHILS NFR BLD AUTO: 45 % — SIGNIFICANT CHANGE UP (ref 43–77)
NRBC # BLD: 0 /100 WBCS — SIGNIFICANT CHANGE UP (ref 0–0)
PHOSPHATE SERPL-MCNC: 4.3 MG/DL — SIGNIFICANT CHANGE UP (ref 2.5–4.5)
PLATELET # BLD AUTO: 268 K/UL — SIGNIFICANT CHANGE UP (ref 150–400)
POTASSIUM SERPL-MCNC: 4.7 MMOL/L — SIGNIFICANT CHANGE UP (ref 3.5–5.3)
POTASSIUM SERPL-SCNC: 4.7 MMOL/L — SIGNIFICANT CHANGE UP (ref 3.5–5.3)
PROT SERPL-MCNC: 6.6 G/DL — SIGNIFICANT CHANGE UP (ref 6–8.3)
RBC # BLD: 4.11 M/UL — SIGNIFICANT CHANGE UP (ref 3.8–5.2)
RBC # FLD: 13.9 % — SIGNIFICANT CHANGE UP (ref 10.3–14.5)
SODIUM SERPL-SCNC: 136 MMOL/L — SIGNIFICANT CHANGE UP (ref 135–145)
WBC # BLD: 4.22 K/UL — SIGNIFICANT CHANGE UP (ref 3.8–10.5)
WBC # FLD AUTO: 4.22 K/UL — SIGNIFICANT CHANGE UP (ref 3.8–10.5)

## 2022-04-30 PROCEDURE — 99231 SBSQ HOSP IP/OBS SF/LOW 25: CPT

## 2022-04-30 RX ADMIN — Medication 8 UNIT(S): at 13:30

## 2022-04-30 RX ADMIN — INSULIN GLARGINE 4 UNIT(S): 100 INJECTION, SOLUTION SUBCUTANEOUS at 22:16

## 2022-04-30 RX ADMIN — Medication 2: at 17:53

## 2022-04-30 RX ADMIN — DULOXETINE HYDROCHLORIDE 60 MILLIGRAM(S): 30 CAPSULE, DELAYED RELEASE ORAL at 11:29

## 2022-04-30 RX ADMIN — GABAPENTIN 100 MILLIGRAM(S): 400 CAPSULE ORAL at 20:12

## 2022-04-30 RX ADMIN — Medication 8 UNIT(S): at 17:52

## 2022-04-30 RX ADMIN — GABAPENTIN 100 MILLIGRAM(S): 400 CAPSULE ORAL at 13:58

## 2022-04-30 RX ADMIN — Medication 81 MILLIGRAM(S): at 11:29

## 2022-04-30 RX ADMIN — Medication 8 UNIT(S): at 09:06

## 2022-04-30 RX ADMIN — ENOXAPARIN SODIUM 40 MILLIGRAM(S): 100 INJECTION SUBCUTANEOUS at 21:42

## 2022-04-30 RX ADMIN — Medication 2: at 13:30

## 2022-04-30 RX ADMIN — ATORVASTATIN CALCIUM 40 MILLIGRAM(S): 80 TABLET, FILM COATED ORAL at 21:42

## 2022-04-30 NOTE — PROGRESS NOTE ADULT - PROBLEM SELECTOR PLAN 8
F: None  E: Replete PRN   N: DASH/TLC   DVT: Lovenox 40 qd  Dispo: RMF, PT rec FELICIA- however patient is uninsured and cannot go to [facility] attempting to get to one person assist

## 2022-04-30 NOTE — PROGRESS NOTE ADULT - SUBJECTIVE AND OBJECTIVE BOX
OVERNIGHT EVENTS: NAEON    SUBJECTIVE / INTERVAL HPI: Patient seen and examined at bedside. Pain of legs stable from yesterday. No fever, chills, cough, dysuria.    VITAL SIGNS:  Vital Signs Last 24 Hrs  T(C): 36.7 (30 Apr 2022 06:40), Max: 36.8 (29 Apr 2022 11:58)  T(F): 98.1 (30 Apr 2022 06:40), Max: 98.3 (29 Apr 2022 11:58)  HR: 65 (30 Apr 2022 06:40) (65 - 80)  BP: 176/71 (30 Apr 2022 06:40) (127/75 - 176/71)  BP(mean): --  RR: 18 (30 Apr 2022 06:40) (18 - 18)  SpO2: 98% (30 Apr 2022 06:40) (98% - 98%)    PHYSICAL EXAM:    General: Elderly female NAD  HEENT: NC/AT; PERRL, anicteric sclera; MMM  Neck: supple  Cardiovascular: +S1/S2, RRR, no murmurs, rubs, gallops  Respiratory: CTA B/L; no W/R/R  Gastrointestinal: soft, NT/ND; +BSx4  Extremities: WWP; no edema, clubbing or cyanosis  Vascular: 2+ radial, DP/PT pulses B/L  Neurological: AAOx3; 4/5 B/L UE, 1/5 B/L LE.  strength 5/5. Reduced sensation bilateral feet but equal bilaterally. Can move legs laterally but not against gravity    MEDICATIONS:  MEDICATIONS  (STANDING):  amLODIPine   Tablet 10 milliGRAM(s) Oral daily  aspirin  chewable 81 milliGRAM(s) Oral daily  atorvastatin 40 milliGRAM(s) Oral at bedtime  dextrose 5%. 1000 milliLiter(s) (50 mL/Hr) IV Continuous <Continuous>  dextrose 5%. 1000 milliLiter(s) (100 mL/Hr) IV Continuous <Continuous>  dextrose 50% Injectable 25 Gram(s) IV Push once  dextrose 50% Injectable 12.5 Gram(s) IV Push once  dextrose 50% Injectable 25 Gram(s) IV Push once  DULoxetine 60 milliGRAM(s) Oral daily  enoxaparin Injectable 40 milliGRAM(s) SubCutaneous every 24 hours  gabapentin 100 milliGRAM(s) Oral <User Schedule>  glucagon  Injectable 1 milliGRAM(s) IntraMuscular once  insulin glargine Injectable (LANTUS) 4 Unit(s) SubCutaneous at bedtime  insulin lispro (ADMELOG) corrective regimen sliding scale   SubCutaneous three times a day before meals  insulin lispro Injectable (ADMELOG) 8 Unit(s) SubCutaneous three times a day before meals  lisinopril 40 milliGRAM(s) Oral every 24 hours  pantoprazole    Tablet 40 milliGRAM(s) Oral before breakfast    MEDICATIONS  (PRN):  dextrose Oral Gel 15 Gram(s) Oral once PRN Blood Glucose LESS THAN 70 milliGRAM(s)/deciliter      ALLERGIES:  Allergies    No Known Allergies    Intolerances        LABS:                        10.9   4.22  )-----------( 268      ( 30 Apr 2022 08:43 )             35.5     04-30    136  |  100  |  20  ----------------------------<  120<H>  4.7   |  26  |  0.47<L>    Ca    8.9      30 Apr 2022 08:43  Phos  4.3     04-30  Mg     1.8     04-30    TPro  6.6  /  Alb  3.2<L>  /  TBili  0.2  /  DBili  x   /  AST  16  /  ALT  12  /  AlkPhos  40  04-30        CAPILLARY BLOOD GLUCOSE      POCT Blood Glucose.: 113 mg/dL (30 Apr 2022 08:42)      RADIOLOGY & ADDITIONAL TESTS: Reviewed.    PLAN:

## 2022-04-30 NOTE — PROGRESS NOTE ADULT - ATTENDING COMMENTS
no new complaints  CK and ESR normal   continue to work with PT with plan to d/c home pending improvement in gait / transfers

## 2022-04-30 NOTE — PROGRESS NOTE ADULT - PROBLEM SELECTOR PLAN 1
Symmetrical LE weakness, worse distally compared to proximally   Symmetrical diminished sensation, longstanding diabetic peripheral neuropathy   Denies issues with incontinence, or back pain, no sensory line  MRI L/Spine:  - showing at the L3/4 leveI. There are degenerative   changes involving the facets bilaterally as well as ligamentum flavum   hypertrophy. This combination results in moderate central spinal canal   stenosis.   - At the L4/5 level, there is disc bulge abutting the L4 nerve roots   bilaterally. There are degenerative changes involving the facets   bilaterally (right greater than left) as well as ligamentum flavum   hypertrophy. This combination results in mild central spinal canal   stenosis.    - Likely progression of diabetic neuropathy, however symptoms more severe than to be expected.  - cymbalta 60mg daily   - Gabapentin 100 BID  - Custom AFO boots   -incentive spirometry

## 2022-04-30 NOTE — PROGRESS NOTE ADULT - ASSESSMENT
72y Female with PMHx of T2DM c/b peripheral neuropathy, HTN, recent admission and workup for Naty Sanchez tear, TIA on 3/30 (MR negative) presents to [facility] as transfer from [facility] ED for episode of altered mental status (staring, generalized weakness), back to baseline in [facility] ED, no acute pathology on CTH or MRI, found to have metabolic encephalopathy (resolved) 2/2 UTI (completed 7day abx) vs hypertension, also found to have new LE weakness of unclear origin, possible diabetic neuropathy. Due to immigration and insurance status, not able to FELICIA as recommended by PT. Pending improvement to one-person assist in order to discharge home with out-of-pocket PT.

## 2022-05-01 LAB
ANION GAP SERPL CALC-SCNC: 7 MMOL/L — SIGNIFICANT CHANGE UP (ref 5–17)
BASOPHILS # BLD AUTO: 0.01 K/UL — SIGNIFICANT CHANGE UP (ref 0–0.2)
BASOPHILS NFR BLD AUTO: 0.3 % — SIGNIFICANT CHANGE UP (ref 0–2)
BLD GP AB SCN SERPL QL: NEGATIVE — SIGNIFICANT CHANGE UP
BUN SERPL-MCNC: 20 MG/DL — SIGNIFICANT CHANGE UP (ref 7–23)
CALCIUM SERPL-MCNC: 8.7 MG/DL — SIGNIFICANT CHANGE UP (ref 8.4–10.5)
CHLORIDE SERPL-SCNC: 101 MMOL/L — SIGNIFICANT CHANGE UP (ref 96–108)
CO2 SERPL-SCNC: 27 MMOL/L — SIGNIFICANT CHANGE UP (ref 22–31)
CREAT SERPL-MCNC: 0.54 MG/DL — SIGNIFICANT CHANGE UP (ref 0.5–1.3)
EGFR: 97 ML/MIN/1.73M2 — SIGNIFICANT CHANGE UP
EOSINOPHIL # BLD AUTO: 0.18 K/UL — SIGNIFICANT CHANGE UP (ref 0–0.5)
EOSINOPHIL NFR BLD AUTO: 4.5 % — SIGNIFICANT CHANGE UP (ref 0–6)
GLUCOSE BLDC GLUCOMTR-MCNC: 151 MG/DL — HIGH (ref 70–99)
GLUCOSE BLDC GLUCOMTR-MCNC: 180 MG/DL — HIGH (ref 70–99)
GLUCOSE BLDC GLUCOMTR-MCNC: 199 MG/DL — HIGH (ref 70–99)
GLUCOSE BLDC GLUCOMTR-MCNC: 99 MG/DL — SIGNIFICANT CHANGE UP (ref 70–99)
GLUCOSE SERPL-MCNC: 170 MG/DL — HIGH (ref 70–99)
HCT VFR BLD CALC: 33.8 % — LOW (ref 34.5–45)
HGB BLD-MCNC: 10.5 G/DL — LOW (ref 11.5–15.5)
IMM GRANULOCYTES NFR BLD AUTO: 0.3 % — SIGNIFICANT CHANGE UP (ref 0–1.5)
LYMPHOCYTES # BLD AUTO: 1.56 K/UL — SIGNIFICANT CHANGE UP (ref 1–3.3)
LYMPHOCYTES # BLD AUTO: 39.1 % — SIGNIFICANT CHANGE UP (ref 13–44)
MAGNESIUM SERPL-MCNC: 1.7 MG/DL — SIGNIFICANT CHANGE UP (ref 1.6–2.6)
MCHC RBC-ENTMCNC: 26.1 PG — LOW (ref 27–34)
MCHC RBC-ENTMCNC: 31.1 GM/DL — LOW (ref 32–36)
MCV RBC AUTO: 83.9 FL — SIGNIFICANT CHANGE UP (ref 80–100)
MONOCYTES # BLD AUTO: 0.34 K/UL — SIGNIFICANT CHANGE UP (ref 0–0.9)
MONOCYTES NFR BLD AUTO: 8.5 % — SIGNIFICANT CHANGE UP (ref 2–14)
NEUTROPHILS # BLD AUTO: 1.89 K/UL — SIGNIFICANT CHANGE UP (ref 1.8–7.4)
NEUTROPHILS NFR BLD AUTO: 47.3 % — SIGNIFICANT CHANGE UP (ref 43–77)
NRBC # BLD: 0 /100 WBCS — SIGNIFICANT CHANGE UP (ref 0–0)
PHOSPHATE SERPL-MCNC: 4.5 MG/DL — SIGNIFICANT CHANGE UP (ref 2.5–4.5)
PLATELET # BLD AUTO: 262 K/UL — SIGNIFICANT CHANGE UP (ref 150–400)
POTASSIUM SERPL-MCNC: 4.6 MMOL/L — SIGNIFICANT CHANGE UP (ref 3.5–5.3)
POTASSIUM SERPL-SCNC: 4.6 MMOL/L — SIGNIFICANT CHANGE UP (ref 3.5–5.3)
RBC # BLD: 4.03 M/UL — SIGNIFICANT CHANGE UP (ref 3.8–5.2)
RBC # FLD: 13.7 % — SIGNIFICANT CHANGE UP (ref 10.3–14.5)
RH IG SCN BLD-IMP: POSITIVE — SIGNIFICANT CHANGE UP
SODIUM SERPL-SCNC: 135 MMOL/L — SIGNIFICANT CHANGE UP (ref 135–145)
WBC # BLD: 3.99 K/UL — SIGNIFICANT CHANGE UP (ref 3.8–10.5)
WBC # FLD AUTO: 3.99 K/UL — SIGNIFICANT CHANGE UP (ref 3.8–10.5)

## 2022-05-01 PROCEDURE — 95910 NRV CNDJ TEST 7-8 STUDIES: CPT | Mod: 26

## 2022-05-01 PROCEDURE — 95886 MUSC TEST DONE W/N TEST COMP: CPT | Mod: 26

## 2022-05-01 PROCEDURE — 99231 SBSQ HOSP IP/OBS SF/LOW 25: CPT

## 2022-05-01 PROCEDURE — 95885 MUSC TST DONE W/NERV TST LIM: CPT | Mod: 26,59

## 2022-05-01 RX ADMIN — LISINOPRIL 40 MILLIGRAM(S): 2.5 TABLET ORAL at 06:13

## 2022-05-01 RX ADMIN — Medication 2: at 12:53

## 2022-05-01 RX ADMIN — Medication 8 UNIT(S): at 08:47

## 2022-05-01 RX ADMIN — GABAPENTIN 100 MILLIGRAM(S): 400 CAPSULE ORAL at 22:13

## 2022-05-01 RX ADMIN — ATORVASTATIN CALCIUM 40 MILLIGRAM(S): 80 TABLET, FILM COATED ORAL at 22:13

## 2022-05-01 RX ADMIN — INSULIN GLARGINE 4 UNIT(S): 100 INJECTION, SOLUTION SUBCUTANEOUS at 22:14

## 2022-05-01 RX ADMIN — Medication 2: at 08:47

## 2022-05-01 RX ADMIN — GABAPENTIN 100 MILLIGRAM(S): 400 CAPSULE ORAL at 12:54

## 2022-05-01 RX ADMIN — PANTOPRAZOLE SODIUM 40 MILLIGRAM(S): 20 TABLET, DELAYED RELEASE ORAL at 06:13

## 2022-05-01 RX ADMIN — ENOXAPARIN SODIUM 40 MILLIGRAM(S): 100 INJECTION SUBCUTANEOUS at 22:13

## 2022-05-01 RX ADMIN — Medication 81 MILLIGRAM(S): at 12:12

## 2022-05-01 RX ADMIN — Medication 8 UNIT(S): at 17:26

## 2022-05-01 RX ADMIN — Medication 2: at 17:27

## 2022-05-01 RX ADMIN — DULOXETINE HYDROCHLORIDE 60 MILLIGRAM(S): 30 CAPSULE, DELAYED RELEASE ORAL at 12:12

## 2022-05-01 RX ADMIN — Medication 8 UNIT(S): at 12:52

## 2022-05-01 RX ADMIN — AMLODIPINE BESYLATE 10 MILLIGRAM(S): 2.5 TABLET ORAL at 06:13

## 2022-05-01 NOTE — PROGRESS NOTE ADULT - ASSESSMENT
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

## 2022-05-01 NOTE — PROGRESS NOTE ADULT - SUBJECTIVE AND OBJECTIVE BOX
Interval history: no new complaints. EMG performed today - most consistent with severe axonal polyneuropathy in LE, mild in UE     Vital Signs Last 24 Hrs  T(C): 36.7 (01 May 2022 12:15), Max: 36.7 (30 Apr 2022 20:47)  T(F): 98 (01 May 2022 12:15), Max: 98 (30 Apr 2022 20:47)  HR: 78 (01 May 2022 12:15) (76 - 82)  BP: 132/68 (01 May 2022 12:15) (132/63 - 146/70)  BP(mean): --  RR: 18 (01 May 2022 12:15) (16 - 18)  SpO2: 100% (01 May 2022 12:15) (99% - 100%)    Labs:  05-01    135  |  101  |  20  ----------------------------<  170<H>  4.6   |  27  |  0.54    Ca    8.7      01 May 2022 08:38  Phos  4.5     05-01  Mg     1.7     05-01    TPro  6.6  /  Alb  3.2<L>  /  TBili  0.2  /  DBili  x   /  AST  16  /  ALT  12  /  AlkPhos  40  04-30                        10.5   3.99  )-----------( 262      ( 01 May 2022 18:55 )             33.8     MEDICATIONS  (STANDING):  amLODIPine   Tablet 10 milliGRAM(s) Oral daily  aspirin  chewable 81 milliGRAM(s) Oral daily  atorvastatin 40 milliGRAM(s) Oral at bedtime  dextrose 5%. 1000 milliLiter(s) (100 mL/Hr) IV Continuous <Continuous>  dextrose 5%. 1000 milliLiter(s) (50 mL/Hr) IV Continuous <Continuous>  dextrose 50% Injectable 25 Gram(s) IV Push once  dextrose 50% Injectable 12.5 Gram(s) IV Push once  dextrose 50% Injectable 25 Gram(s) IV Push once  DULoxetine 60 milliGRAM(s) Oral daily  enoxaparin Injectable 40 milliGRAM(s) SubCutaneous every 24 hours  gabapentin 100 milliGRAM(s) Oral <User Schedule>  glucagon  Injectable 1 milliGRAM(s) IntraMuscular once  insulin glargine Injectable (LANTUS) 4 Unit(s) SubCutaneous at bedtime  insulin lispro (ADMELOG) corrective regimen sliding scale   SubCutaneous three times a day before meals  insulin lispro Injectable (ADMELOG) 8 Unit(s) SubCutaneous three times a day before meals  lisinopril 40 milliGRAM(s) Oral every 24 hours  pantoprazole    Tablet 40 milliGRAM(s) Oral before breakfast    MEDICATIONS  (PRN):  dextrose Oral Gel 15 Gram(s) Oral once PRN Blood Glucose LESS THAN 70 milliGRAM(s)/deciliter

## 2022-05-02 LAB
GLUCOSE BLDC GLUCOMTR-MCNC: 108 MG/DL — HIGH (ref 70–99)
GLUCOSE BLDC GLUCOMTR-MCNC: 138 MG/DL — HIGH (ref 70–99)
GLUCOSE BLDC GLUCOMTR-MCNC: 189 MG/DL — HIGH (ref 70–99)
GLUCOSE BLDC GLUCOMTR-MCNC: 99 MG/DL — SIGNIFICANT CHANGE UP (ref 70–99)

## 2022-05-02 PROCEDURE — 99231 SBSQ HOSP IP/OBS SF/LOW 25: CPT

## 2022-05-02 RX ADMIN — LISINOPRIL 40 MILLIGRAM(S): 2.5 TABLET ORAL at 06:56

## 2022-05-02 RX ADMIN — Medication 8 UNIT(S): at 08:47

## 2022-05-02 RX ADMIN — AMLODIPINE BESYLATE 10 MILLIGRAM(S): 2.5 TABLET ORAL at 06:55

## 2022-05-02 RX ADMIN — Medication 8 UNIT(S): at 12:30

## 2022-05-02 RX ADMIN — Medication 2: at 08:48

## 2022-05-02 RX ADMIN — ATORVASTATIN CALCIUM 40 MILLIGRAM(S): 80 TABLET, FILM COATED ORAL at 21:29

## 2022-05-02 RX ADMIN — PANTOPRAZOLE SODIUM 40 MILLIGRAM(S): 20 TABLET, DELAYED RELEASE ORAL at 06:55

## 2022-05-02 RX ADMIN — Medication 81 MILLIGRAM(S): at 11:31

## 2022-05-02 RX ADMIN — GABAPENTIN 100 MILLIGRAM(S): 400 CAPSULE ORAL at 12:25

## 2022-05-02 RX ADMIN — DULOXETINE HYDROCHLORIDE 60 MILLIGRAM(S): 30 CAPSULE, DELAYED RELEASE ORAL at 11:31

## 2022-05-02 RX ADMIN — INSULIN GLARGINE 4 UNIT(S): 100 INJECTION, SOLUTION SUBCUTANEOUS at 21:29

## 2022-05-02 RX ADMIN — GABAPENTIN 100 MILLIGRAM(S): 400 CAPSULE ORAL at 19:28

## 2022-05-02 RX ADMIN — ENOXAPARIN SODIUM 40 MILLIGRAM(S): 100 INJECTION SUBCUTANEOUS at 21:29

## 2022-05-02 RX ADMIN — Medication 8 UNIT(S): at 17:47

## 2022-05-02 NOTE — PROGRESS NOTE ADULT - PROBLEM SELECTOR PLAN 8
F: None  E: Replete PRN   N: DASH/TLC   DVT: Lovenox 40 qd  Dispo: RMF, PT rec FELICIA- however patient is uninsured and cannot go to [facility] attempting to get to one person assist

## 2022-05-02 NOTE — PROGRESS NOTE ADULT - ASSESSMENT
72y Female with PMHx of T2DM c/b peripheral neuropathy, HTN, recent admission and workup for Naty Sanchez tear, TIA on 3/30 (MR negative) presents to [facility] as transfer from [facility] ED for episode of altered mental status (staring, generalized weakness), back to baseline in [facility] ED, no acute pathology on CTH or MRI, found to have metabolic encephalopathy (resolved) 2/2 UTI (completed 7day abx) vs hypertension, also found to have new LE weakness of unclear origin, possible diabetic neuropathy. Due to immigration and insurance status, not able to FELICIA as recommended by PT. Pending improvement to one-person assist in order to discharge home with out-of-pocket PT.

## 2022-05-02 NOTE — PROGRESS NOTE ADULT - SUBJECTIVE AND OBJECTIVE BOX
OVERNIGHT EVENTS: NAEON    SUBJECTIVE / INTERVAL HPI: Patient seen and examined at bedside. Denied fever, chest pain, cough, dysuria. Last BM yesterday.     VITAL SIGNS:  Vital Signs Last 24 Hrs  T(C): 36.3 (02 May 2022 05:04), Max: 36.8 (01 May 2022 20:40)  T(F): 97.4 (02 May 2022 05:04), Max: 98.2 (01 May 2022 20:40)  HR: 71 (02 May 2022 05:04) (67 - 78)  BP: 128/66 (02 May 2022 05:04) (116/64 - 137/72)  BP(mean): --  RR: 18 (02 May 2022 05:04) (16 - 20)  SpO2: 99% (02 May 2022 05:04) (98% - 100%)    PHYSICAL EXAM:  General: Elderly female NAD  HEENT: NC/AT; PERRL, anicteric sclera; MMM  Neck: supple  Cardiovascular: +S1/S2, RRR, no murmurs, rubs, gallops  Respiratory: CTA B/L; no W/R/R  Gastrointestinal: soft, NT/ND; +BSx4  Extremities: WWP; no edema, clubbing or cyanosis  Vascular: 2+ radial, DP/PT pulses B/L  Neurological: AAOx3; 4/5 B/L UE, 1/5 B/L LE.  strength 5/5. Reduced sensation bilateral feet but equal bilaterally. 4/5 knee extension when thigh is lifted for her    MEDICATIONS:  MEDICATIONS  (STANDING):  amLODIPine   Tablet 10 milliGRAM(s) Oral daily  aspirin  chewable 81 milliGRAM(s) Oral daily  atorvastatin 40 milliGRAM(s) Oral at bedtime  dextrose 5%. 1000 milliLiter(s) (50 mL/Hr) IV Continuous <Continuous>  dextrose 5%. 1000 milliLiter(s) (100 mL/Hr) IV Continuous <Continuous>  dextrose 50% Injectable 25 Gram(s) IV Push once  dextrose 50% Injectable 12.5 Gram(s) IV Push once  dextrose 50% Injectable 25 Gram(s) IV Push once  DULoxetine 60 milliGRAM(s) Oral daily  enoxaparin Injectable 40 milliGRAM(s) SubCutaneous every 24 hours  gabapentin 100 milliGRAM(s) Oral <User Schedule>  glucagon  Injectable 1 milliGRAM(s) IntraMuscular once  insulin glargine Injectable (LANTUS) 4 Unit(s) SubCutaneous at bedtime  insulin lispro (ADMELOG) corrective regimen sliding scale   SubCutaneous three times a day before meals  insulin lispro Injectable (ADMELOG) 8 Unit(s) SubCutaneous three times a day before meals  lisinopril 40 milliGRAM(s) Oral every 24 hours  pantoprazole    Tablet 40 milliGRAM(s) Oral before breakfast    MEDICATIONS  (PRN):  dextrose Oral Gel 15 Gram(s) Oral once PRN Blood Glucose LESS THAN 70 milliGRAM(s)/deciliter      ALLERGIES:  Allergies    No Known Allergies    Intolerances        LABS:                        10.5   3.99  )-----------( 262      ( 01 May 2022 18:55 )             33.8     05-01    135  |  101  |  20  ----------------------------<  170<H>  4.6   |  27  |  0.54    Ca    8.7      01 May 2022 08:38  Phos  4.5     05-01  Mg     1.7     05-01          CAPILLARY BLOOD GLUCOSE      POCT Blood Glucose.: 189 mg/dL (02 May 2022 08:33)      RADIOLOGY & ADDITIONAL TESTS: Reviewed.    PLAN:

## 2022-05-03 DIAGNOSIS — M54.2 CERVICALGIA: ICD-10-CM

## 2022-05-03 LAB
GLUCOSE BLDC GLUCOMTR-MCNC: 112 MG/DL — HIGH (ref 70–99)
GLUCOSE BLDC GLUCOMTR-MCNC: 127 MG/DL — HIGH (ref 70–99)
GLUCOSE BLDC GLUCOMTR-MCNC: 163 MG/DL — HIGH (ref 70–99)
GLUCOSE BLDC GLUCOMTR-MCNC: 184 MG/DL — HIGH (ref 70–99)
SARS-COV-2 RNA SPEC QL NAA+PROBE: SIGNIFICANT CHANGE UP

## 2022-05-03 PROCEDURE — 99231 SBSQ HOSP IP/OBS SF/LOW 25: CPT

## 2022-05-03 RX ORDER — IBUPROFEN 200 MG
400 TABLET ORAL ONCE
Refills: 0 | Status: COMPLETED | OUTPATIENT
Start: 2022-05-03 | End: 2022-05-03

## 2022-05-03 RX ADMIN — Medication 2: at 08:36

## 2022-05-03 RX ADMIN — ENOXAPARIN SODIUM 40 MILLIGRAM(S): 100 INJECTION SUBCUTANEOUS at 21:24

## 2022-05-03 RX ADMIN — DULOXETINE HYDROCHLORIDE 60 MILLIGRAM(S): 30 CAPSULE, DELAYED RELEASE ORAL at 11:41

## 2022-05-03 RX ADMIN — Medication 81 MILLIGRAM(S): at 11:41

## 2022-05-03 RX ADMIN — Medication 400 MILLIGRAM(S): at 12:19

## 2022-05-03 RX ADMIN — ATORVASTATIN CALCIUM 40 MILLIGRAM(S): 80 TABLET, FILM COATED ORAL at 21:23

## 2022-05-03 RX ADMIN — LISINOPRIL 40 MILLIGRAM(S): 2.5 TABLET ORAL at 06:27

## 2022-05-03 RX ADMIN — Medication 400 MILLIGRAM(S): at 13:20

## 2022-05-03 RX ADMIN — GABAPENTIN 100 MILLIGRAM(S): 400 CAPSULE ORAL at 21:23

## 2022-05-03 RX ADMIN — GABAPENTIN 100 MILLIGRAM(S): 400 CAPSULE ORAL at 12:19

## 2022-05-03 RX ADMIN — Medication 2: at 13:03

## 2022-05-03 RX ADMIN — Medication 8 UNIT(S): at 16:58

## 2022-05-03 RX ADMIN — INSULIN GLARGINE 4 UNIT(S): 100 INJECTION, SOLUTION SUBCUTANEOUS at 21:24

## 2022-05-03 RX ADMIN — PANTOPRAZOLE SODIUM 40 MILLIGRAM(S): 20 TABLET, DELAYED RELEASE ORAL at 06:27

## 2022-05-03 RX ADMIN — Medication 8 UNIT(S): at 13:03

## 2022-05-03 RX ADMIN — Medication 8 UNIT(S): at 08:36

## 2022-05-03 RX ADMIN — AMLODIPINE BESYLATE 10 MILLIGRAM(S): 2.5 TABLET ORAL at 05:47

## 2022-05-03 NOTE — PROGRESS NOTE ADULT - ASSESSMENT
72y Female with PMHx of T2DM c/b peripheral neuropathy, HTN, recent admission and workup for Naty Sanchez tear, TIA on 3/30 (MR negative) presents to [facility] as transfer from [facility] ED for episode of altered mental status (staring, generalized weakness), back to baseline in [facility] ED, no acute pathology on CTH or MRI, found to have metabolic encephalopathy (resolved) 2/2 UTI (completed 7day abx) vs hypertension, also found to have new LE weakness of unclear origin, possible diabetic neuropathy. Due to immigration and insurance status, not able to FELICIA as recommended by PT. Pending improvement to one-person assist in order to discharge home with out-of-pocket PT.

## 2022-05-03 NOTE — PROGRESS NOTE ADULT - PROBLEM SELECTOR PLAN 2
Neck and shoulder pain on 5/3. Tender to palpation, does not radiate  - Hot packs daily  - Ibuprofen as needed

## 2022-05-03 NOTE — PROGRESS NOTE ADULT - ATTENDING COMMENTS
No complaints this morning, eager to home.  On exam she is lying in bed with AFOs in place bilaterally.  Sensation absent to vibration at toes bilaterally.  Decreased to temperature to knees bilaterally.  Continue working with PT to optimize mobility in anticipation of discharge.

## 2022-05-03 NOTE — PROGRESS NOTE ADULT - SUBJECTIVE AND OBJECTIVE BOX
OVERNIGHT EVENTS: NAEON    SUBJECTIVE / INTERVAL HPI: Patient seen and examined at bedside. Endorsed neck pain. No fever, dysuria. Leg pain is the same as yesterday.    VITAL SIGNS:  Vital Signs Last 24 Hrs  T(C): 36.7 (03 May 2022 10:27), Max: 36.7 (02 May 2022 22:48)  T(F): 98 (03 May 2022 10:27), Max: 98 (02 May 2022 22:48)  HR: 72 (03 May 2022 10:27) (70 - 79)  BP: 134/68 (03 May 2022 10:27) (134/68 - 166/74)  BP(mean): --  RR: 14 (03 May 2022 10:27) (14 - 18)  SpO2: 98% (03 May 2022 10:27) (97% - 99%)    PHYSICAL EXAM:    General: Elderly female NAD  HEENT: NC/AT; PERRL, anicteric sclera; MMM  Neck: supple  Cardiovascular: +S1/S2, RRR, no murmurs, rubs, gallops  Respiratory: CTA B/L; no W/R/R  Gastrointestinal: soft, NT/ND; +BSx4  Extremities: WWP; no edema, clubbing or cyanosis  Vascular: 2+ radial, DP/PT pulses B/L  Neurological: AAOx3; 4/5 B/L UE, 1/5 B/L LE.  strength 5/5. Reduced sensation bilateral feet but equal bilaterally. Can move legs laterally but not against gravity    MEDICATIONS:  MEDICATIONS  (STANDING):  amLODIPine   Tablet 10 milliGRAM(s) Oral daily  aspirin  chewable 81 milliGRAM(s) Oral daily  atorvastatin 40 milliGRAM(s) Oral at bedtime  dextrose 5%. 1000 milliLiter(s) (100 mL/Hr) IV Continuous <Continuous>  dextrose 5%. 1000 milliLiter(s) (50 mL/Hr) IV Continuous <Continuous>  dextrose 50% Injectable 25 Gram(s) IV Push once  dextrose 50% Injectable 12.5 Gram(s) IV Push once  dextrose 50% Injectable 25 Gram(s) IV Push once  DULoxetine 60 milliGRAM(s) Oral daily  enoxaparin Injectable 40 milliGRAM(s) SubCutaneous every 24 hours  gabapentin 100 milliGRAM(s) Oral <User Schedule>  glucagon  Injectable 1 milliGRAM(s) IntraMuscular once  insulin glargine Injectable (LANTUS) 4 Unit(s) SubCutaneous at bedtime  insulin lispro (ADMELOG) corrective regimen sliding scale   SubCutaneous three times a day before meals  insulin lispro Injectable (ADMELOG) 8 Unit(s) SubCutaneous three times a day before meals  lisinopril 40 milliGRAM(s) Oral every 24 hours  pantoprazole    Tablet 40 milliGRAM(s) Oral before breakfast    MEDICATIONS  (PRN):  dextrose Oral Gel 15 Gram(s) Oral once PRN Blood Glucose LESS THAN 70 milliGRAM(s)/deciliter      ALLERGIES:  Allergies    No Known Allergies    Intolerances        LABS:                        10.5   3.99  )-----------( 262      ( 01 May 2022 18:55 )             33.8               CAPILLARY BLOOD GLUCOSE      POCT Blood Glucose.: 184 mg/dL (03 May 2022 12:26)      RADIOLOGY & ADDITIONAL TESTS: Reviewed.    PLAN:

## 2022-05-04 LAB
ANION GAP SERPL CALC-SCNC: 8 MMOL/L — SIGNIFICANT CHANGE UP (ref 5–17)
BASOPHILS # BLD AUTO: 0.01 K/UL — SIGNIFICANT CHANGE UP (ref 0–0.2)
BASOPHILS NFR BLD AUTO: 0.2 % — SIGNIFICANT CHANGE UP (ref 0–2)
BLD GP AB SCN SERPL QL: NEGATIVE — SIGNIFICANT CHANGE UP
BUN SERPL-MCNC: 22 MG/DL — SIGNIFICANT CHANGE UP (ref 7–23)
CALCIUM SERPL-MCNC: 8.5 MG/DL — SIGNIFICANT CHANGE UP (ref 8.4–10.5)
CHLORIDE SERPL-SCNC: 100 MMOL/L — SIGNIFICANT CHANGE UP (ref 96–108)
CO2 SERPL-SCNC: 28 MMOL/L — SIGNIFICANT CHANGE UP (ref 22–31)
CREAT SERPL-MCNC: 0.54 MG/DL — SIGNIFICANT CHANGE UP (ref 0.5–1.3)
EGFR: 97 ML/MIN/1.73M2 — SIGNIFICANT CHANGE UP
EOSINOPHIL # BLD AUTO: 0.18 K/UL — SIGNIFICANT CHANGE UP (ref 0–0.5)
EOSINOPHIL NFR BLD AUTO: 3.6 % — SIGNIFICANT CHANGE UP (ref 0–6)
GLUCOSE BLDC GLUCOMTR-MCNC: 115 MG/DL — HIGH (ref 70–99)
GLUCOSE BLDC GLUCOMTR-MCNC: 130 MG/DL — HIGH (ref 70–99)
GLUCOSE BLDC GLUCOMTR-MCNC: 160 MG/DL — HIGH (ref 70–99)
GLUCOSE BLDC GLUCOMTR-MCNC: 164 MG/DL — HIGH (ref 70–99)
GLUCOSE SERPL-MCNC: 138 MG/DL — HIGH (ref 70–99)
HCT VFR BLD CALC: 33 % — LOW (ref 34.5–45)
HGB BLD-MCNC: 10.3 G/DL — LOW (ref 11.5–15.5)
IMM GRANULOCYTES NFR BLD AUTO: 0.4 % — SIGNIFICANT CHANGE UP (ref 0–1.5)
LYMPHOCYTES # BLD AUTO: 1.95 K/UL — SIGNIFICANT CHANGE UP (ref 1–3.3)
LYMPHOCYTES # BLD AUTO: 39.2 % — SIGNIFICANT CHANGE UP (ref 13–44)
MAGNESIUM SERPL-MCNC: 1.7 MG/DL — SIGNIFICANT CHANGE UP (ref 1.6–2.6)
MCHC RBC-ENTMCNC: 26.3 PG — LOW (ref 27–34)
MCHC RBC-ENTMCNC: 31.2 GM/DL — LOW (ref 32–36)
MCV RBC AUTO: 84.4 FL — SIGNIFICANT CHANGE UP (ref 80–100)
MONOCYTES # BLD AUTO: 0.42 K/UL — SIGNIFICANT CHANGE UP (ref 0–0.9)
MONOCYTES NFR BLD AUTO: 8.4 % — SIGNIFICANT CHANGE UP (ref 2–14)
NEUTROPHILS # BLD AUTO: 2.4 K/UL — SIGNIFICANT CHANGE UP (ref 1.8–7.4)
NEUTROPHILS NFR BLD AUTO: 48.2 % — SIGNIFICANT CHANGE UP (ref 43–77)
NRBC # BLD: 0 /100 WBCS — SIGNIFICANT CHANGE UP (ref 0–0)
PHOSPHATE SERPL-MCNC: 3.8 MG/DL — SIGNIFICANT CHANGE UP (ref 2.5–4.5)
PLATELET # BLD AUTO: 265 K/UL — SIGNIFICANT CHANGE UP (ref 150–400)
POTASSIUM SERPL-MCNC: 4.3 MMOL/L — SIGNIFICANT CHANGE UP (ref 3.5–5.3)
POTASSIUM SERPL-SCNC: 4.3 MMOL/L — SIGNIFICANT CHANGE UP (ref 3.5–5.3)
RBC # BLD: 3.91 M/UL — SIGNIFICANT CHANGE UP (ref 3.8–5.2)
RBC # FLD: 13.5 % — SIGNIFICANT CHANGE UP (ref 10.3–14.5)
RH IG SCN BLD-IMP: POSITIVE — SIGNIFICANT CHANGE UP
SODIUM SERPL-SCNC: 136 MMOL/L — SIGNIFICANT CHANGE UP (ref 135–145)
WBC # BLD: 4.98 K/UL — SIGNIFICANT CHANGE UP (ref 3.8–10.5)
WBC # FLD AUTO: 4.98 K/UL — SIGNIFICANT CHANGE UP (ref 3.8–10.5)

## 2022-05-04 PROCEDURE — 99232 SBSQ HOSP IP/OBS MODERATE 35: CPT | Mod: GC

## 2022-05-04 RX ORDER — MAGNESIUM SULFATE 500 MG/ML
2 VIAL (ML) INJECTION ONCE
Refills: 0 | Status: COMPLETED | OUTPATIENT
Start: 2022-05-04 | End: 2022-05-04

## 2022-05-04 RX ORDER — IBUPROFEN 200 MG
400 TABLET ORAL EVERY 8 HOURS
Refills: 0 | Status: COMPLETED | OUTPATIENT
Start: 2022-05-04 | End: 2022-05-06

## 2022-05-04 RX ADMIN — Medication 400 MILLIGRAM(S): at 10:43

## 2022-05-04 RX ADMIN — LISINOPRIL 40 MILLIGRAM(S): 2.5 TABLET ORAL at 06:57

## 2022-05-04 RX ADMIN — Medication 8 UNIT(S): at 08:47

## 2022-05-04 RX ADMIN — ATORVASTATIN CALCIUM 40 MILLIGRAM(S): 80 TABLET, FILM COATED ORAL at 21:06

## 2022-05-04 RX ADMIN — INSULIN GLARGINE 4 UNIT(S): 100 INJECTION, SOLUTION SUBCUTANEOUS at 21:49

## 2022-05-04 RX ADMIN — GABAPENTIN 100 MILLIGRAM(S): 400 CAPSULE ORAL at 12:11

## 2022-05-04 RX ADMIN — Medication 2: at 12:10

## 2022-05-04 RX ADMIN — GABAPENTIN 100 MILLIGRAM(S): 400 CAPSULE ORAL at 19:26

## 2022-05-04 RX ADMIN — PANTOPRAZOLE SODIUM 40 MILLIGRAM(S): 20 TABLET, DELAYED RELEASE ORAL at 06:57

## 2022-05-04 RX ADMIN — Medication 81 MILLIGRAM(S): at 12:11

## 2022-05-04 RX ADMIN — AMLODIPINE BESYLATE 10 MILLIGRAM(S): 2.5 TABLET ORAL at 05:55

## 2022-05-04 RX ADMIN — Medication 25 GRAM(S): at 10:18

## 2022-05-04 RX ADMIN — Medication 8 UNIT(S): at 12:10

## 2022-05-04 RX ADMIN — Medication 400 MILLIGRAM(S): at 17:26

## 2022-05-04 RX ADMIN — DULOXETINE HYDROCHLORIDE 60 MILLIGRAM(S): 30 CAPSULE, DELAYED RELEASE ORAL at 12:11

## 2022-05-04 RX ADMIN — Medication 400 MILLIGRAM(S): at 09:27

## 2022-05-04 RX ADMIN — Medication 400 MILLIGRAM(S): at 18:30

## 2022-05-04 RX ADMIN — ENOXAPARIN SODIUM 40 MILLIGRAM(S): 100 INJECTION SUBCUTANEOUS at 21:06

## 2022-05-04 RX ADMIN — Medication 8 UNIT(S): at 17:33

## 2022-05-04 NOTE — PROGRESS NOTE ADULT - SUBJECTIVE AND OBJECTIVE BOX
CC: Patient is a 72y old  Female who presents with a chief complaint of episode of AMS (03 May 2022 13:54)      INTERVAL EVENTS: CAIT    SUBJECTIVE / INTERVAL HPI: Patient seen and examined at bedside.     ROS: negative unless otherwise stated above.    VITAL SIGNS:  Vital Signs Last 24 Hrs  T(C): 36.8 (04 May 2022 10:56), Max: 36.8 (04 May 2022 10:56)  T(F): 98.3 (04 May 2022 10:56), Max: 98.3 (04 May 2022 10:56)  HR: 74 (04 May 2022 10:56) (70 - 83)  BP: 141/74 (04 May 2022 10:56) (138/69 - 152/72)  BP(mean): --  RR: 20 (04 May 2022 10:56) (18 - 20)  SpO2: 96% (04 May 2022 10:56) (96% - 98%)        PHYSICAL EXAM:    General: NAD  HEENT: MMM  Neck: supple  Cardiovascular: +S1/S2; RRR  Respiratory: CTA B/L; no W/R/R  Gastrointestinal: soft, NT/ND  Extremities: WWP; no edema, clubbing or cyanosis  Vascular: 2+ radial, DP/PT pulses B/L  Neurological: AAOx3; no focal deficits    MEDICATIONS:  MEDICATIONS  (STANDING):  amLODIPine   Tablet 10 milliGRAM(s) Oral daily  aspirin  chewable 81 milliGRAM(s) Oral daily  atorvastatin 40 milliGRAM(s) Oral at bedtime  dextrose 5%. 1000 milliLiter(s) (100 mL/Hr) IV Continuous <Continuous>  dextrose 5%. 1000 milliLiter(s) (50 mL/Hr) IV Continuous <Continuous>  dextrose 50% Injectable 25 Gram(s) IV Push once  dextrose 50% Injectable 12.5 Gram(s) IV Push once  dextrose 50% Injectable 25 Gram(s) IV Push once  DULoxetine 60 milliGRAM(s) Oral daily  enoxaparin Injectable 40 milliGRAM(s) SubCutaneous every 24 hours  gabapentin 100 milliGRAM(s) Oral <User Schedule>  glucagon  Injectable 1 milliGRAM(s) IntraMuscular once  ibuprofen  Tablet. 400 milliGRAM(s) Oral every 8 hours  insulin glargine Injectable (LANTUS) 4 Unit(s) SubCutaneous at bedtime  insulin lispro (ADMELOG) corrective regimen sliding scale   SubCutaneous three times a day before meals  insulin lispro Injectable (ADMELOG) 8 Unit(s) SubCutaneous three times a day before meals  lisinopril 40 milliGRAM(s) Oral every 24 hours  pantoprazole    Tablet 40 milliGRAM(s) Oral before breakfast    MEDICATIONS  (PRN):  dextrose Oral Gel 15 Gram(s) Oral once PRN Blood Glucose LESS THAN 70 milliGRAM(s)/deciliter      ALLERGIES:  Allergies    No Known Allergies    Intolerances        LABS:                        10.3   4.98  )-----------( 265      ( 04 May 2022 09:16 )             33.0     05-04    136  |  100  |  22  ----------------------------<  138<H>  4.3   |  28  |  0.54    Ca    8.5      04 May 2022 09:15  Phos  3.8     05-04  Mg     1.7     05-04          CAPILLARY BLOOD GLUCOSE      POCT Blood Glucose.: 130 mg/dL (04 May 2022 08:33)      RADIOLOGY & ADDITIONAL TESTS: Reviewed.

## 2022-05-04 NOTE — PROGRESS NOTE ADULT - ASSESSMENT
72y Female with PMHx of T2DM c/b peripheral neuropathy, HTN, recent admission and workup for Naty Sanchez tear, TIA on 3/30 (MR negative) presents to [facility] as transfer from [facility] ED for episode of altered mental status (staring, generalized weakness), back to baseline in [facility] ED, no acute pathology on CTH or MRI, found to have metabolic encephalopathy (resolved) 2/2 UTI (completed 7day abx) vs hypertension, also found to have new LE weakness of unclear origin, possible diabetic neuropathy. Due to immigration and insurance status, not able to FELICIA as recommended by PT. Pending improvement to one-person assist in order to discharge home with out-of-pocket PT.

## 2022-05-04 NOTE — PROGRESS NOTE ADULT - PROBLEM SELECTOR PLAN 2
Neck and shoulder pain on 5/3. Tender to palpation, does not radiate  - Hot packs daily  - Ibuprofen standing started 400mg q8

## 2022-05-05 LAB
GLUCOSE BLDC GLUCOMTR-MCNC: 113 MG/DL — HIGH (ref 70–99)
GLUCOSE BLDC GLUCOMTR-MCNC: 133 MG/DL — HIGH (ref 70–99)
GLUCOSE BLDC GLUCOMTR-MCNC: 189 MG/DL — HIGH (ref 70–99)
GLUCOSE BLDC GLUCOMTR-MCNC: 244 MG/DL — HIGH (ref 70–99)

## 2022-05-05 PROCEDURE — 99231 SBSQ HOSP IP/OBS SF/LOW 25: CPT

## 2022-05-05 RX ADMIN — Medication 8 UNIT(S): at 12:20

## 2022-05-05 RX ADMIN — Medication 400 MILLIGRAM(S): at 17:34

## 2022-05-05 RX ADMIN — PANTOPRAZOLE SODIUM 40 MILLIGRAM(S): 20 TABLET, DELAYED RELEASE ORAL at 06:55

## 2022-05-05 RX ADMIN — Medication 8 UNIT(S): at 08:56

## 2022-05-05 RX ADMIN — Medication 4: at 12:20

## 2022-05-05 RX ADMIN — ENOXAPARIN SODIUM 40 MILLIGRAM(S): 100 INJECTION SUBCUTANEOUS at 22:08

## 2022-05-05 RX ADMIN — Medication 400 MILLIGRAM(S): at 08:56

## 2022-05-05 RX ADMIN — GABAPENTIN 100 MILLIGRAM(S): 400 CAPSULE ORAL at 20:02

## 2022-05-05 RX ADMIN — Medication 400 MILLIGRAM(S): at 09:30

## 2022-05-05 RX ADMIN — GABAPENTIN 100 MILLIGRAM(S): 400 CAPSULE ORAL at 13:04

## 2022-05-05 RX ADMIN — Medication 81 MILLIGRAM(S): at 08:55

## 2022-05-05 RX ADMIN — INSULIN GLARGINE 4 UNIT(S): 100 INJECTION, SOLUTION SUBCUTANEOUS at 22:08

## 2022-05-05 RX ADMIN — DULOXETINE HYDROCHLORIDE 60 MILLIGRAM(S): 30 CAPSULE, DELAYED RELEASE ORAL at 08:56

## 2022-05-05 RX ADMIN — Medication 400 MILLIGRAM(S): at 18:00

## 2022-05-05 RX ADMIN — Medication 8 UNIT(S): at 17:34

## 2022-05-05 RX ADMIN — AMLODIPINE BESYLATE 10 MILLIGRAM(S): 2.5 TABLET ORAL at 05:54

## 2022-05-05 RX ADMIN — LISINOPRIL 40 MILLIGRAM(S): 2.5 TABLET ORAL at 07:32

## 2022-05-05 RX ADMIN — ATORVASTATIN CALCIUM 40 MILLIGRAM(S): 80 TABLET, FILM COATED ORAL at 22:08

## 2022-05-05 NOTE — PROGRESS NOTE ADULT - ASSESSMENT
72y Female with PMHx of T2DM c/b peripheral neuropathy, HTN, recent admission and workup for Naty Sanchez tear, TIA on 3/30 (MR negative) presents to [facility] as transfer from [facility] ED for episode of altered mental status (staring, generalized weakness), back to baseline in [facility] ED, no acute pathology on CTH or MRI, found to have metabolic encephalopathy (resolved) 2/2 UTI (completed 7day abx) vs hypertension, also found to have new LE weakness of unclear origin, possible diabetic neuropathy. Due to immigration and insurance status, not able to FELICIA as recommended by PT. Pending improvement to one-person assist in order to discharge home with out-of-pocket PT.

## 2022-05-05 NOTE — PROGRESS NOTE ADULT - SUBJECTIVE AND OBJECTIVE BOX
CC: Patient is a 72y old  Female who presents with a chief complaint of episode of AMS (04 May 2022 11:11)      INTERVAL EVENTS: CAIT    SUBJECTIVE / INTERVAL HPI: Patient seen and examined at bedside.     ROS: negative unless otherwise stated above.    VITAL SIGNS:  Vital Signs Last 24 Hrs  T(C): 36.6 (05 May 2022 11:40), Max: 36.9 (05 May 2022 05:50)  T(F): 97.8 (05 May 2022 11:40), Max: 98.5 (05 May 2022 05:50)  HR: 79 (05 May 2022 11:40) (68 - 79)  BP: 151/74 (05 May 2022 11:40) (119/64 - 155/76)  BP(mean): 82 (04 May 2022 21:00) (82 - 82)  RR: 18 (05 May 2022 11:40) (16 - 18)  SpO2: 98% (05 May 2022 11:40) (98% - 99%)        PHYSICAL EXAM:    General: NAD  HEENT: MMM  Neck: supple  Cardiovascular: +S1/S2; RRR  Respiratory: CTA B/L; no W/R/R  Gastrointestinal: soft, NT/ND  Extremities: WWP; no edema, clubbing or cyanosis  Vascular: 2+ radial, DP/PT pulses B/L  Neurological: AAOx3; no focal deficits    MEDICATIONS:  MEDICATIONS  (STANDING):  amLODIPine   Tablet 10 milliGRAM(s) Oral daily  aspirin  chewable 81 milliGRAM(s) Oral daily  atorvastatin 40 milliGRAM(s) Oral at bedtime  dextrose 5%. 1000 milliLiter(s) (50 mL/Hr) IV Continuous <Continuous>  dextrose 5%. 1000 milliLiter(s) (100 mL/Hr) IV Continuous <Continuous>  dextrose 50% Injectable 25 Gram(s) IV Push once  dextrose 50% Injectable 12.5 Gram(s) IV Push once  dextrose 50% Injectable 25 Gram(s) IV Push once  DULoxetine 60 milliGRAM(s) Oral daily  enoxaparin Injectable 40 milliGRAM(s) SubCutaneous every 24 hours  gabapentin 100 milliGRAM(s) Oral <User Schedule>  glucagon  Injectable 1 milliGRAM(s) IntraMuscular once  ibuprofen  Tablet. 400 milliGRAM(s) Oral every 8 hours  insulin glargine Injectable (LANTUS) 4 Unit(s) SubCutaneous at bedtime  insulin lispro (ADMELOG) corrective regimen sliding scale   SubCutaneous three times a day before meals  insulin lispro Injectable (ADMELOG) 8 Unit(s) SubCutaneous three times a day before meals  lisinopril 40 milliGRAM(s) Oral every 24 hours  pantoprazole    Tablet 40 milliGRAM(s) Oral before breakfast    MEDICATIONS  (PRN):  dextrose Oral Gel 15 Gram(s) Oral once PRN Blood Glucose LESS THAN 70 milliGRAM(s)/deciliter      ALLERGIES:  Allergies    No Known Allergies    Intolerances        LABS:                        10.3   4.98  )-----------( 265      ( 04 May 2022 09:16 )             33.0     05-04    136  |  100  |  22  ----------------------------<  138<H>  4.3   |  28  |  0.54    Ca    8.5      04 May 2022 09:15  Phos  3.8     05-04  Mg     1.7     05-04          CAPILLARY BLOOD GLUCOSE      POCT Blood Glucose.: 244 mg/dL (05 May 2022 12:09)      RADIOLOGY & ADDITIONAL TESTS: Reviewed.

## 2022-05-05 NOTE — PROGRESS NOTE ADULT - PROBLEM SELECTOR PLAN 1
Symmetrical LE weakness, worse distally compared to proximally   Symmetrical diminished sensation, longstanding diabetic peripheral neuropathy   Denies issues with incontinence, or back pain, no sensory line  MRI L/Spine:  - showing at the L3/4 leveI. There are degenerative   changes involving the facets bilaterally as well as ligamentum flavum   hypertrophy. This combination results in moderate central spinal canal   stenosis.   - At the L4/5 level, there is disc bulge abutting the L4 nerve roots   bilaterally. There are degenerative changes involving the facets   bilaterally (right greater than left) as well as ligamentum flavum   hypertrophy. This combination results in mild central spinal canal   stenosis.    - Likely progression of diabetic neuropathy, however symptoms more severe than to be expected.  - cymbalta 60mg daily   - Gabapentin 100 BID  - Custom AFO boots   -incentive spirometry Symmetrical LE weakness, worse distally compared to proximally   Symmetrical diminished sensation, longstanding diabetic peripheral neuropathy   Denies issues with incontinence, or back pain, no sensory line  MRI L/Spine:  - showing at the L3/4 leveI. There are degenerative   changes involving the facets bilaterally as well as ligamentum flavum   hypertrophy. This combination results in moderate central spinal canal   stenosis.   - At the L4/5 level, there is disc bulge abutting the L4 nerve roots   bilaterally. There are degenerative changes involving the facets   bilaterally (right greater than left) as well as ligamentum flavum   hypertrophy. This combination results in mild central spinal canal   stenosis.    - Likely progression of diabetic neuropathy, however symptoms more severe than to be expected.  - Neurology team saying that since it is diabetic in origin it is unlikely that the patient will make significant improvement in the short term.  - c/w cymbalta 60mg daily   - c/w Gabapentin 100 BID  - c/w Custom AFO boots   - c/w incentive spirometry

## 2022-05-05 NOTE — PROGRESS NOTE ADULT - ATTENDING COMMENTS
Leg weakness persists, has been working with PT daily but otherwise bed bound.  As we suspect that diabetic neuropathy is the most likely etiology of her symptoms, and this is not reversible and unlikely to have dramatic improvement, need to consider alternative plans other than home with 1-person assist and PT, such as a wheelchair or other living situation.  Will discuss options with care coordination.  Check for pressure ulcers.

## 2022-05-06 LAB
GLUCOSE BLDC GLUCOMTR-MCNC: 138 MG/DL — HIGH (ref 70–99)
GLUCOSE BLDC GLUCOMTR-MCNC: 143 MG/DL — HIGH (ref 70–99)
GLUCOSE BLDC GLUCOMTR-MCNC: 167 MG/DL — HIGH (ref 70–99)
GLUCOSE BLDC GLUCOMTR-MCNC: 249 MG/DL — HIGH (ref 70–99)

## 2022-05-06 PROCEDURE — 99231 SBSQ HOSP IP/OBS SF/LOW 25: CPT

## 2022-05-06 RX ADMIN — Medication 2: at 12:28

## 2022-05-06 RX ADMIN — LISINOPRIL 40 MILLIGRAM(S): 2.5 TABLET ORAL at 07:13

## 2022-05-06 RX ADMIN — PANTOPRAZOLE SODIUM 40 MILLIGRAM(S): 20 TABLET, DELAYED RELEASE ORAL at 07:13

## 2022-05-06 RX ADMIN — DULOXETINE HYDROCHLORIDE 60 MILLIGRAM(S): 30 CAPSULE, DELAYED RELEASE ORAL at 10:55

## 2022-05-06 RX ADMIN — INSULIN GLARGINE 4 UNIT(S): 100 INJECTION, SOLUTION SUBCUTANEOUS at 21:25

## 2022-05-06 RX ADMIN — AMLODIPINE BESYLATE 10 MILLIGRAM(S): 2.5 TABLET ORAL at 07:13

## 2022-05-06 RX ADMIN — GABAPENTIN 100 MILLIGRAM(S): 400 CAPSULE ORAL at 14:55

## 2022-05-06 RX ADMIN — GABAPENTIN 100 MILLIGRAM(S): 400 CAPSULE ORAL at 21:24

## 2022-05-06 RX ADMIN — Medication 8 UNIT(S): at 08:44

## 2022-05-06 RX ADMIN — ENOXAPARIN SODIUM 40 MILLIGRAM(S): 100 INJECTION SUBCUTANEOUS at 21:25

## 2022-05-06 RX ADMIN — Medication 8 UNIT(S): at 17:59

## 2022-05-06 RX ADMIN — Medication 81 MILLIGRAM(S): at 10:55

## 2022-05-06 RX ADMIN — Medication 8 UNIT(S): at 12:28

## 2022-05-06 RX ADMIN — ATORVASTATIN CALCIUM 40 MILLIGRAM(S): 80 TABLET, FILM COATED ORAL at 21:25

## 2022-05-06 NOTE — PROGRESS NOTE ADULT - PROBLEM SELECTOR PLAN 1
Symmetrical LE weakness, worse distally compared to proximally   Symmetrical diminished sensation, longstanding diabetic peripheral neuropathy   Denies issues with incontinence, or back pain, no sensory line  MRI L/Spine:  - showing at the L3/4 leveI. There are degenerative   changes involving the facets bilaterally as well as ligamentum flavum   hypertrophy. This combination results in moderate central spinal canal   stenosis.   - At the L4/5 level, there is disc bulge abutting the L4 nerve roots   bilaterally. There are degenerative changes involving the facets   bilaterally (right greater than left) as well as ligamentum flavum   hypertrophy. This combination results in mild central spinal canal   stenosis.    - Likely progression of diabetic neuropathy, however symptoms more severe than to be expected.  - Neurology team saying that since it is diabetic in origin it is unlikely that the patient will make significant improvement in the short term.  - c/w cymbalta 60mg daily   - c/w Gabapentin 100 BID  - c/w Custom AFO boots   - c/w incentive spirometry

## 2022-05-06 NOTE — PROGRESS NOTE ADULT - ASSESSMENT
72y Female with PMHx of T2DM c/b peripheral neuropathy, HTN, recent admission and workup for Naty Sanchez tear, TIA on 3/30 (MR negative) presents to [facility] as transfer from [facility] ED for episode of altered mental status (staring, generalized weakness), back to baseline in [facility] ED, no acute pathology on CTH or MRI, found to have metabolic encephalopathy (resolved) 2/2 UTI (completed 7day abx) vs hypertension, also found to have new LE weakness of unclear origin, possible diabetic neuropathy. Due to immigration and insurance status, not able to FELICIA as recommended by PT. Pending improvement to one-person assist in order to discharge home with out-of-pocket PT.

## 2022-05-06 NOTE — PROGRESS NOTE ADULT - PROBLEM SELECTOR PLAN 2
Neck and shoulder pain on 5/3. Tender to palpation, does not radiate  - Hot packs daily  - off ibuprofen now

## 2022-05-06 NOTE — PROGRESS NOTE ADULT - SUBJECTIVE AND OBJECTIVE BOX
CC: Patient is a 72y old  Female who presents with a chief complaint of episode of AMS (05 May 2022 13:19)      INTERVAL EVENTS: CAIT    SUBJECTIVE / INTERVAL HPI: Patient seen and examined at bedside. Neck feels better today.    ROS: negative unless otherwise stated above.    VITAL SIGNS:  Vital Signs Last 24 Hrs  T(C): 36.6 (06 May 2022 11:47), Max: 37.1 (06 May 2022 06:17)  T(F): 97.8 (06 May 2022 11:47), Max: 98.8 (06 May 2022 06:17)  HR: 75 (06 May 2022 11:47) (75 - 87)  BP: 148/72 (06 May 2022 11:47) (128/74 - 156/79)  BP(mean): --  RR: 16 (06 May 2022 11:47) (16 - 16)  SpO2: 98% (06 May 2022 11:47) (97% - 98%)        PHYSICAL EXAM:    General: NAD  HEENT: MMM  Neck: supple  Cardiovascular: +S1/S2; RRR  Respiratory: CTA B/L; no W/R/R  Gastrointestinal: soft, NT/ND  Extremities: WWP; no edema, clubbing or cyanosis  Vascular: 2+ radial, DP/PT pulses B/L  Neurological: AAOx3; strength unchanged from prior exam    MEDICATIONS:  MEDICATIONS  (STANDING):  amLODIPine   Tablet 10 milliGRAM(s) Oral daily  aspirin  chewable 81 milliGRAM(s) Oral daily  atorvastatin 40 milliGRAM(s) Oral at bedtime  dextrose 5%. 1000 milliLiter(s) (50 mL/Hr) IV Continuous <Continuous>  dextrose 5%. 1000 milliLiter(s) (100 mL/Hr) IV Continuous <Continuous>  dextrose 50% Injectable 25 Gram(s) IV Push once  dextrose 50% Injectable 12.5 Gram(s) IV Push once  dextrose 50% Injectable 25 Gram(s) IV Push once  DULoxetine 60 milliGRAM(s) Oral daily  enoxaparin Injectable 40 milliGRAM(s) SubCutaneous every 24 hours  gabapentin 100 milliGRAM(s) Oral <User Schedule>  glucagon  Injectable 1 milliGRAM(s) IntraMuscular once  insulin glargine Injectable (LANTUS) 4 Unit(s) SubCutaneous at bedtime  insulin lispro (ADMELOG) corrective regimen sliding scale   SubCutaneous three times a day before meals  insulin lispro Injectable (ADMELOG) 8 Unit(s) SubCutaneous three times a day before meals  lisinopril 40 milliGRAM(s) Oral every 24 hours  pantoprazole    Tablet 40 milliGRAM(s) Oral before breakfast    MEDICATIONS  (PRN):  dextrose Oral Gel 15 Gram(s) Oral once PRN Blood Glucose LESS THAN 70 milliGRAM(s)/deciliter      ALLERGIES:  Allergies    No Known Allergies    Intolerances        LABS:              CAPILLARY BLOOD GLUCOSE      POCT Blood Glucose.: 167 mg/dL (06 May 2022 12:24)      RADIOLOGY & ADDITIONAL TESTS: Reviewed.

## 2022-05-06 NOTE — PROGRESS NOTE ADULT - ATTENDING COMMENTS
Exam unchanged.  She has been working with PT for several days in an attempt to see if she can go home with AFOs and 1-person assist, but still requires 2-person assist, and thus cannot be safely discharge home (where she lives with only her daughter).  While I hope that she will improve in the future, Other potential options would be in-home care (would need to paid for out-of-pocket due to insurance situation) or a long-term care facility.  Will consult palliative care for assistance with placement.  Discussed with Dr. Robbins and patient's daughter Charles.

## 2022-05-07 LAB
GLUCOSE BLDC GLUCOMTR-MCNC: 168 MG/DL — HIGH (ref 70–99)
GLUCOSE BLDC GLUCOMTR-MCNC: 209 MG/DL — HIGH (ref 70–99)
GLUCOSE BLDC GLUCOMTR-MCNC: 76 MG/DL — SIGNIFICANT CHANGE UP (ref 70–99)
GLUCOSE BLDC GLUCOMTR-MCNC: 86 MG/DL — SIGNIFICANT CHANGE UP (ref 70–99)

## 2022-05-07 PROCEDURE — 99231 SBSQ HOSP IP/OBS SF/LOW 25: CPT

## 2022-05-07 RX ADMIN — Medication 4: at 12:49

## 2022-05-07 RX ADMIN — ENOXAPARIN SODIUM 40 MILLIGRAM(S): 100 INJECTION SUBCUTANEOUS at 21:18

## 2022-05-07 RX ADMIN — PANTOPRAZOLE SODIUM 40 MILLIGRAM(S): 20 TABLET, DELAYED RELEASE ORAL at 06:32

## 2022-05-07 RX ADMIN — Medication 8 UNIT(S): at 12:49

## 2022-05-07 RX ADMIN — Medication 8 UNIT(S): at 09:20

## 2022-05-07 RX ADMIN — Medication 81 MILLIGRAM(S): at 11:36

## 2022-05-07 RX ADMIN — LISINOPRIL 40 MILLIGRAM(S): 2.5 TABLET ORAL at 06:32

## 2022-05-07 RX ADMIN — GABAPENTIN 100 MILLIGRAM(S): 400 CAPSULE ORAL at 12:51

## 2022-05-07 RX ADMIN — ATORVASTATIN CALCIUM 40 MILLIGRAM(S): 80 TABLET, FILM COATED ORAL at 21:18

## 2022-05-07 RX ADMIN — AMLODIPINE BESYLATE 10 MILLIGRAM(S): 2.5 TABLET ORAL at 06:32

## 2022-05-07 RX ADMIN — Medication 2: at 09:20

## 2022-05-07 RX ADMIN — INSULIN GLARGINE 4 UNIT(S): 100 INJECTION, SOLUTION SUBCUTANEOUS at 21:18

## 2022-05-07 RX ADMIN — DULOXETINE HYDROCHLORIDE 60 MILLIGRAM(S): 30 CAPSULE, DELAYED RELEASE ORAL at 11:36

## 2022-05-07 RX ADMIN — GABAPENTIN 100 MILLIGRAM(S): 400 CAPSULE ORAL at 20:08

## 2022-05-07 NOTE — PROGRESS NOTE ADULT - SUBJECTIVE AND OBJECTIVE BOX
INTERVAL HPI/OVERNIGHT EVENTS:  Patient seen and examined. No acute overnight event.    MEDICATIONS  (STANDING):  amLODIPine   Tablet 10 milliGRAM(s) Oral daily  aspirin  chewable 81 milliGRAM(s) Oral daily  atorvastatin 40 milliGRAM(s) Oral at bedtime  dextrose 5%. 1000 milliLiter(s) (100 mL/Hr) IV Continuous <Continuous>  dextrose 5%. 1000 milliLiter(s) (50 mL/Hr) IV Continuous <Continuous>  dextrose 50% Injectable 25 Gram(s) IV Push once  dextrose 50% Injectable 12.5 Gram(s) IV Push once  dextrose 50% Injectable 25 Gram(s) IV Push once  DULoxetine 60 milliGRAM(s) Oral daily  enoxaparin Injectable 40 milliGRAM(s) SubCutaneous every 24 hours  gabapentin 100 milliGRAM(s) Oral <User Schedule>  glucagon  Injectable 1 milliGRAM(s) IntraMuscular once  insulin glargine Injectable (LANTUS) 4 Unit(s) SubCutaneous at bedtime  insulin lispro (ADMELOG) corrective regimen sliding scale   SubCutaneous three times a day before meals  insulin lispro Injectable (ADMELOG) 8 Unit(s) SubCutaneous three times a day before meals  lisinopril 40 milliGRAM(s) Oral every 24 hours  pantoprazole    Tablet 40 milliGRAM(s) Oral before breakfast    MEDICATIONS  (PRN):  dextrose Oral Gel 15 Gram(s) Oral once PRN Blood Glucose LESS THAN 70 milliGRAM(s)/deciliter      Allergies    No Known Allergies    Intolerances      Vital Signs Last 24 Hrs  T(C): 36.7 (07 May 2022 11:39), Max: 36.8 (06 May 2022 20:30)  T(F): 98.1 (07 May 2022 11:39), Max: 98.3 (06 May 2022 20:30)  HR: 74 (07 May 2022 11:39) (71 - 79)  BP: 131/71 (07 May 2022 11:39) (131/71 - 150/76)  BP(mean): --  RR: 16 (07 May 2022 11:39) (16 - 16)  SpO2: 99% (07 May 2022 11:39) (96% - 99%)    Physical exam:  General: NAD  HEENT: MMM  Neck: supple  Cardiovascular: +S1/S2; RRR  Respiratory: CTA B/L; no W/R/R  Gastrointestinal: soft, NT/ND  Extremities: WWP; no edema, clubbing or cyanosis  Vascular: 2+ radial, DP/PT pulses B/L  Neurological: AAOx3; B/L hip flexion: 4/5, knee flexion: 3/5, ankle:1/5, Ue: 5/5. relfexes: 1+ at patellar, 0 at ankle.      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   Bilateral lower lumbosacral plexopathies cannot be ruled out based on   these findings.    There were some low amplitude motor units in one muscle tested (the   vastus lateralis) which can be seen in a myopathy; however the lack of   myopathic findings in any other muscles, lack of upper extremity   weakness, and normal CK level argue against a generalized myopathy.

## 2022-05-07 NOTE — PROGRESS NOTE ADULT - ATTENDING COMMENTS
Exam unchanged.  Eager to leave the hospital.  Will  hopefully work with PT again this weekend.  Next week will resume planning for discharge home with daughter/in-home care vs facility.

## 2022-05-07 NOTE — PROGRESS NOTE ADULT - ASSESSMENT
72y Female with PMHx of T2DM c/b peripheral neuropathy, HTN, recent admission and workup for Naty Sanchez tear, TIA on 3/30 (MR negative) presents to [facility] as transfer from [facility] ED for episode of altered mental status (staring, generalized weakness), back to baseline in [facility] ED, no acute pathology on CTH or MRI, found to have metabolic encephalopathy (resolved) 2/2 UTI (completed 7day abx) vs hypertension, also found to have new LE weakness of unclear origin, possible diabetic neuropathy. Due to immigration and insurance status, not able to FEL[facility] as recommended by PT. Pending improvement to one-person assist in order to discharge home with out-of-pocket PT. Social work has expedited the case for further resolution as patient seems unlikely to be one person assist soon    Problem/Plan - 1:  ·  Problem: Leg weakness.   ·  Plan: Symmetrical LE weakness, worse distally compared to proximally   Symmetrical diminished sensation, longstanding diabetic peripheral neuropathy   Denies issues with incontinence, or back pain, no sensory line  MRI L/Spine:  - showing at the L3/4 leveI. There are degenerative   changes involving the facets bilaterally as well as ligamentum flavum   hypertrophy. This combination results in moderate central spinal canal   stenosis.   - At the L4/5 level, there is disc bulge abutting the L4 nerve roots   bilaterally. There are degenerative changes involving the facets   bilaterally (right greater than left) as well as ligamentum flavum   hypertrophy. This combination results in mild central spinal canal   stenosis.    - Likely progression of diabetic neuropathy, however symptoms more severe than to be expected. EMG : consistent with severe distal predominant polyneuropathy.  - since it is diabetic in origin it is unlikely that the patient will make significant improvement in the short term.  - c/w cymbalta 60mg daily   - c/w Gabapentin 100 BID  - c/w Custom AFO boots   - c/w incentive spirometry.    Problem/Plan - 2:  ·  Problem: Neck pain.   ·  Plan: Neck and shoulder pain on 5/3. Tender to palpation, does not radiate  - Hot packs daily  - off ibuprofen now.    Problem/Plan - 3:  ·  Problem: Bilateral leg pain.   ·  Plan: Needle like pain shins downward, interfering with sleep.   - Cymbalta 60 Qd for diabetic neuropathy  - Gabapentin 100 BID--unable to increase as lethargy earlier in admission due to this med which was dc'd.    Problem/Plan - 4:  ·  Problem: Type II diabetes mellitus.   ·  Plan: Pt with hx of DM, A1c 11.6. Endocrine following. TSH results: 1.120.   - Low C peptide patient will need insulin and NOT oral agents on dc  - C/w mISS, before meals   - C/w Lantus 4U, Lispro 8U   - F/u endocrine recs.    Problem/Plan - 5:  ·  Problem: Hypertension.   ·  Plan: Goal -160  - c/w Amlodipine 10mg (new med), home Lisinopril 40mg   - TTE with bubble done prior admission 4/1: grade 1 left ventricular diastolic dysfunction, no PFO, EF >83% hyperdynamic left ventricular systolic function.    Problem/Plan - 6:  ·  Problem: Hyperlipidemia.   ·  Plan: -C/w Lipitor 40mg.    Problem/Plan - 7:  ·  Problem: Naty-Sanchez tear.   ·  Plan: Hx of Naty Sanchez tear in prior admission   - continue home protonix 40mg daily  - Restarted APT.    Problem/Plan - 8:  ·  Problem: UTI (urinary tract infection).   ·  Plan: RESOLVED  UA with +WBCs, UCx growing E faecalis. Will treat as complicated  -completed 7 day course of CTX and then ampicillin.    Problem/Plan - 9:  ·  Problem: Nutrition, metabolism, and development symptoms.   ·  Plan: F: None  E: Replete PRN   N: DASH/TLC   DVT: Lovenox 40 qd  Dispo: [facility], PT rec [facility]EL[facility]- however patient is uninsured and cannot go to [facility] attempting to get to one person assist.

## 2022-05-08 LAB
ANION GAP SERPL CALC-SCNC: 6 MMOL/L — SIGNIFICANT CHANGE UP (ref 5–17)
BUN SERPL-MCNC: 22 MG/DL — SIGNIFICANT CHANGE UP (ref 7–23)
CALCIUM SERPL-MCNC: 8.7 MG/DL — SIGNIFICANT CHANGE UP (ref 8.4–10.5)
CHLORIDE SERPL-SCNC: 101 MMOL/L — SIGNIFICANT CHANGE UP (ref 96–108)
CO2 SERPL-SCNC: 27 MMOL/L — SIGNIFICANT CHANGE UP (ref 22–31)
CREAT SERPL-MCNC: 0.49 MG/DL — LOW (ref 0.5–1.3)
EGFR: 99 ML/MIN/1.73M2 — SIGNIFICANT CHANGE UP
GLUCOSE BLDC GLUCOMTR-MCNC: 147 MG/DL — HIGH (ref 70–99)
GLUCOSE BLDC GLUCOMTR-MCNC: 200 MG/DL — HIGH (ref 70–99)
GLUCOSE BLDC GLUCOMTR-MCNC: 221 MG/DL — HIGH (ref 70–99)
GLUCOSE BLDC GLUCOMTR-MCNC: 93 MG/DL — SIGNIFICANT CHANGE UP (ref 70–99)
GLUCOSE SERPL-MCNC: 159 MG/DL — HIGH (ref 70–99)
HCT VFR BLD CALC: 37.1 % — SIGNIFICANT CHANGE UP (ref 34.5–45)
HGB BLD-MCNC: 11.5 G/DL — SIGNIFICANT CHANGE UP (ref 11.5–15.5)
MAGNESIUM SERPL-MCNC: 1.7 MG/DL — SIGNIFICANT CHANGE UP (ref 1.6–2.6)
MCHC RBC-ENTMCNC: 27.1 PG — SIGNIFICANT CHANGE UP (ref 27–34)
MCHC RBC-ENTMCNC: 31 GM/DL — LOW (ref 32–36)
MCV RBC AUTO: 87.3 FL — SIGNIFICANT CHANGE UP (ref 80–100)
NRBC # BLD: 0 /100 WBCS — SIGNIFICANT CHANGE UP (ref 0–0)
PHOSPHATE SERPL-MCNC: 4 MG/DL — SIGNIFICANT CHANGE UP (ref 2.5–4.5)
PLATELET # BLD AUTO: 308 K/UL — SIGNIFICANT CHANGE UP (ref 150–400)
POTASSIUM SERPL-MCNC: 4.4 MMOL/L — SIGNIFICANT CHANGE UP (ref 3.5–5.3)
POTASSIUM SERPL-SCNC: 4.4 MMOL/L — SIGNIFICANT CHANGE UP (ref 3.5–5.3)
RBC # BLD: 4.25 M/UL — SIGNIFICANT CHANGE UP (ref 3.8–5.2)
RBC # FLD: 13.5 % — SIGNIFICANT CHANGE UP (ref 10.3–14.5)
SODIUM SERPL-SCNC: 134 MMOL/L — LOW (ref 135–145)
WBC # BLD: 3.87 K/UL — SIGNIFICANT CHANGE UP (ref 3.8–10.5)
WBC # FLD AUTO: 3.87 K/UL — SIGNIFICANT CHANGE UP (ref 3.8–10.5)

## 2022-05-08 PROCEDURE — 99231 SBSQ HOSP IP/OBS SF/LOW 25: CPT

## 2022-05-08 RX ORDER — ACETAMINOPHEN 500 MG
650 TABLET ORAL ONCE
Refills: 0 | Status: COMPLETED | OUTPATIENT
Start: 2022-05-08 | End: 2022-05-08

## 2022-05-08 RX ADMIN — Medication 8 UNIT(S): at 17:58

## 2022-05-08 RX ADMIN — ATORVASTATIN CALCIUM 40 MILLIGRAM(S): 80 TABLET, FILM COATED ORAL at 22:10

## 2022-05-08 RX ADMIN — DULOXETINE HYDROCHLORIDE 60 MILLIGRAM(S): 30 CAPSULE, DELAYED RELEASE ORAL at 09:50

## 2022-05-08 RX ADMIN — Medication 650 MILLIGRAM(S): at 07:16

## 2022-05-08 RX ADMIN — GABAPENTIN 100 MILLIGRAM(S): 400 CAPSULE ORAL at 12:48

## 2022-05-08 RX ADMIN — AMLODIPINE BESYLATE 10 MILLIGRAM(S): 2.5 TABLET ORAL at 06:16

## 2022-05-08 RX ADMIN — ENOXAPARIN SODIUM 40 MILLIGRAM(S): 100 INJECTION SUBCUTANEOUS at 22:10

## 2022-05-08 RX ADMIN — Medication 650 MILLIGRAM(S): at 06:16

## 2022-05-08 RX ADMIN — INSULIN GLARGINE 4 UNIT(S): 100 INJECTION, SOLUTION SUBCUTANEOUS at 22:13

## 2022-05-08 RX ADMIN — Medication 8 UNIT(S): at 09:09

## 2022-05-08 RX ADMIN — LISINOPRIL 40 MILLIGRAM(S): 2.5 TABLET ORAL at 06:16

## 2022-05-08 RX ADMIN — GABAPENTIN 100 MILLIGRAM(S): 400 CAPSULE ORAL at 19:20

## 2022-05-08 RX ADMIN — Medication 4: at 13:51

## 2022-05-08 RX ADMIN — Medication 8 UNIT(S): at 13:50

## 2022-05-08 RX ADMIN — Medication 81 MILLIGRAM(S): at 09:51

## 2022-05-08 RX ADMIN — PANTOPRAZOLE SODIUM 40 MILLIGRAM(S): 20 TABLET, DELAYED RELEASE ORAL at 06:16

## 2022-05-08 NOTE — PROGRESS NOTE ADULT - SUBJECTIVE AND OBJECTIVE BOX
INTERVAL HPI/OVERNIGHT EVENTS:  Patient seen and examined. No acute overnight event.    MEDICATIONS  (STANDING):  amLODIPine   Tablet 10 milliGRAM(s) Oral daily  aspirin  chewable 81 milliGRAM(s) Oral daily  atorvastatin 40 milliGRAM(s) Oral at bedtime  dextrose 5%. 1000 milliLiter(s) (50 mL/Hr) IV Continuous <Continuous>  dextrose 5%. 1000 milliLiter(s) (100 mL/Hr) IV Continuous <Continuous>  dextrose 50% Injectable 25 Gram(s) IV Push once  dextrose 50% Injectable 12.5 Gram(s) IV Push once  dextrose 50% Injectable 25 Gram(s) IV Push once  DULoxetine 60 milliGRAM(s) Oral daily  enoxaparin Injectable 40 milliGRAM(s) SubCutaneous every 24 hours  gabapentin 100 milliGRAM(s) Oral <User Schedule>  glucagon  Injectable 1 milliGRAM(s) IntraMuscular once  insulin glargine Injectable (LANTUS) 4 Unit(s) SubCutaneous at bedtime  insulin lispro (ADMELOG) corrective regimen sliding scale   SubCutaneous three times a day before meals  insulin lispro Injectable (ADMELOG) 8 Unit(s) SubCutaneous three times a day before meals  lisinopril 40 milliGRAM(s) Oral every 24 hours  pantoprazole    Tablet 40 milliGRAM(s) Oral before breakfast    MEDICATIONS  (PRN):  dextrose Oral Gel 15 Gram(s) Oral once PRN Blood Glucose LESS THAN 70 milliGRAM(s)/deciliter        Allergies    No Known Allergies    Intolerances    Vital Signs Last 24 Hrs  T(C): 36.3 (08 May 2022 12:52), Max: 36.7 (07 May 2022 21:54)  T(F): 97.4 (08 May 2022 12:52), Max: 98 (07 May 2022 21:54)  HR: 70 (08 May 2022 12:52) (70 - 79)  BP: 108/69 (08 May 2022 12:52) (108/69 - 183/84)  BP(mean): --  RR: 18 (08 May 2022 12:52) (18 - 18)  SpO2: 98% (08 May 2022 12:52) (96% - 100%)  Physical exam:  General: NAD  HEENT: MMM  Neck: supple  Cardiovascular: +S1/S2; RRR  Respiratory: CTA B/L; no W/R/R  Gastrointestinal: soft, NT/ND  Extremities: WWP; no edema, clubbing or cyanosis  Vascular: 2+ radial, DP/PT pulses B/L  Neurological: AAOx3; B/L hip flexion: 4/5, knee flexion: 3/5, ankle:1/5, Ue: 5/5. relfexes: 1+ at patellar, 0 at ankle.      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   Bilateral lower lumbosacral plexopathies cannot be ruled out based on   these findings.    There were some low amplitude motor units in one muscle tested (the   vastus lateralis) which can be seen in a myopathy; however the lack of   myopathic findings in any other muscles, lack of upper extremity   weakness, and normal CK level argue against a generalized myopathy.

## 2022-05-08 NOTE — PROGRESS NOTE ADULT - ASSESSMENT
72y Female with PMHx of T2DM c/b peripheral neuropathy, HTN, recent admission and workup for Naty Sanchez tear, TIA on 3/30 (MR negative) presents to [facility] as transfer from [facility] ED for episode of altered mental status (staring, generalized weakness), back to baseline in [facility] ED, no acute pathology on CTH or MRI, found to have metabolic encephalopathy (resolved) 2/2 UTI (completed 7day abx) vs hypertension, also found to have new LE weakness of unclear origin, possible diabetic neuropathy. Due to immigration and insurance status, not able to [facility] as recommended by PT. Pending improvement to one-person assist in order to discharge home with out-of-pocket PT. Social work has expedited the case for further resolution as patient seems unlikely to be one person assist soon    Problem/Plan - 1:  ·  Problem: Leg weakness.   ·  Plan: Symmetrical LE weakness, worse distally compared to proximally   - Likely progression of diabetic neuropathy, however symptoms more severe than to be expected. EMG : consistent with severe distal predominant polyneuropathy.  - since it is diabetic in origin it is unlikely that the patient will make significant improvement in the short term.  - c/w cymbalta 60mg daily   - c/w Gabapentin 100 BID  - c/w Custom AFO boots   - c/w incentive spirometry.    Problem/Plan - 2:  ·  Problem: Neck pain.   ·  Plan: Neck and shoulder pain on 5/3. Tender to palpation, does not radiate  - Hot packs daily  - off ibuprofen now.    Problem/Plan - 3:  ·  Problem: Bilateral leg pain.   ·  Plan: Needle like pain shins downward, interfering with sleep.   - c/w cymbalta 60mg daily   - c/w Gabapentin 100 BID    Problem/Plan - 4:  ·  Problem: Type II diabetes mellitus.   ·  Plan: Pt with hx of DM, A1c 11.6. Endocrine following. TSH results: 1.120.   - Low C peptide patient will need insulin and NOT oral agents on dc  - C/w mISS, before meals   - C/w Lantus 4U, Lispro 8U   - F/u endocrine recs.    Problem/Plan - 5:  ·  Problem: Hypertension.   ·  Plan: Goal -160  - c/w Amlodipine 10mg (new med), home Lisinopril 40mg   - TTE with bubble done prior admission 4/1: grade 1 left ventricular diastolic dysfunction, no PFO, EF >83% hyperdynamic left ventricular systolic function.    Problem/Plan - 6:  ·  Problem: Hyperlipidemia.   ·  Plan: -C/w Lipitor 40mg.    Problem/Plan - 7:  ·  Problem: Naty-Sanchez tear.   ·  Plan: Hx of Naty Sanchez tear in prior admission   - continue home protonix 40mg daily  - Restarted APT.    Problem/Plan - 8:  ·  Problem: UTI (urinary tract infection).   ·  Plan: RESOLVED  UA with +WBCs, UCx growing E faecalis. Will treat as complicated  -completed 7 day course of CTX and then ampicillin.    Problem/Plan - 9:  ·  Problem: Nutrition, metabolism, and development symptoms.   ·  Plan: F: None  E: Replete PRN   N: DASH/TLC   DVT: Lovenox 40 qd  Dispo: [facility], PT rec [facility]- however patient is uninsured and cannot go to [facility] attempting to get to one person assist.

## 2022-05-09 LAB
GLUCOSE BLDC GLUCOMTR-MCNC: 147 MG/DL — HIGH (ref 70–99)
GLUCOSE BLDC GLUCOMTR-MCNC: 153 MG/DL — HIGH (ref 70–99)
GLUCOSE BLDC GLUCOMTR-MCNC: 174 MG/DL — HIGH (ref 70–99)
GLUCOSE BLDC GLUCOMTR-MCNC: 175 MG/DL — HIGH (ref 70–99)
GLUCOSE BLDC GLUCOMTR-MCNC: 235 MG/DL — HIGH (ref 70–99)

## 2022-05-09 PROCEDURE — 99223 1ST HOSP IP/OBS HIGH 75: CPT

## 2022-05-09 PROCEDURE — 99358 PROLONG SERVICE W/O CONTACT: CPT

## 2022-05-09 PROCEDURE — 99232 SBSQ HOSP IP/OBS MODERATE 35: CPT

## 2022-05-09 RX ORDER — LIDOCAINE 4 G/100G
1 CREAM TOPICAL THREE TIMES A DAY
Refills: 0 | Status: DISCONTINUED | OUTPATIENT
Start: 2022-05-09 | End: 2022-06-27

## 2022-05-09 RX ORDER — GABAPENTIN 400 MG/1
100 CAPSULE ORAL EVERY 8 HOURS
Refills: 0 | Status: DISCONTINUED | OUTPATIENT
Start: 2022-05-09 | End: 2022-05-11

## 2022-05-09 RX ADMIN — GABAPENTIN 100 MILLIGRAM(S): 400 CAPSULE ORAL at 21:52

## 2022-05-09 RX ADMIN — Medication 2: at 06:29

## 2022-05-09 RX ADMIN — INSULIN GLARGINE 4 UNIT(S): 100 INJECTION, SOLUTION SUBCUTANEOUS at 21:52

## 2022-05-09 RX ADMIN — GABAPENTIN 100 MILLIGRAM(S): 400 CAPSULE ORAL at 11:25

## 2022-05-09 RX ADMIN — Medication 81 MILLIGRAM(S): at 11:25

## 2022-05-09 RX ADMIN — LIDOCAINE 1 APPLICATION(S): 4 CREAM TOPICAL at 21:53

## 2022-05-09 RX ADMIN — LISINOPRIL 40 MILLIGRAM(S): 2.5 TABLET ORAL at 06:29

## 2022-05-09 RX ADMIN — Medication 8 UNIT(S): at 08:54

## 2022-05-09 RX ADMIN — Medication 8 UNIT(S): at 13:00

## 2022-05-09 RX ADMIN — ENOXAPARIN SODIUM 40 MILLIGRAM(S): 100 INJECTION SUBCUTANEOUS at 21:52

## 2022-05-09 RX ADMIN — Medication 2: at 17:34

## 2022-05-09 RX ADMIN — ATORVASTATIN CALCIUM 40 MILLIGRAM(S): 80 TABLET, FILM COATED ORAL at 21:52

## 2022-05-09 RX ADMIN — AMLODIPINE BESYLATE 10 MILLIGRAM(S): 2.5 TABLET ORAL at 06:28

## 2022-05-09 RX ADMIN — DULOXETINE HYDROCHLORIDE 60 MILLIGRAM(S): 30 CAPSULE, DELAYED RELEASE ORAL at 11:25

## 2022-05-09 RX ADMIN — PANTOPRAZOLE SODIUM 40 MILLIGRAM(S): 20 TABLET, DELAYED RELEASE ORAL at 06:28

## 2022-05-09 RX ADMIN — Medication 8 UNIT(S): at 17:33

## 2022-05-09 NOTE — CONSULT NOTE ADULT - SUBJECTIVE AND OBJECTIVE BOX
[facility] Geriatrics and Palliative Care  Liam Mcgee, Palliative Care Attending  Contact Info: Call [phone] (HEAL Line) or message on Microsoft Teams (Liam Mcgee)    HPI:  **STROKE HPI***    HPI: 72y Female with PMHx of T2DM, HTN, recent admission and workup for Naty Sanchez tear, TIA on 3/30 (MR negative) presents to [facility] as transfer from [facility] ED for episode of confusion which began at 0800 this AM. Spoke to patient's daughter Efe on the phone, states she was changing her and getting her ready for the day. Gave her blood pressure medication, went to give her breakfast and pt was found slumped over, was confused, mumbling words. Daughter states her eyes were glossy and pt could not recognize who she was. At that point daughter took her oxygen levels and was 93% RA. She states the episode at least lasted 15-20 mins, she was still altered when EMS came. Denies any obvious jerking/shaking. Pt was not answering some questions appropriately and daughter called 911, pt brought to [facility] ED. On arrival to ED, pt back to baseline, was A&Ox3. Pt was transferred to stroke service for TIA vs seizure. On arrival to [facility], pt is A&Ox3, answering questions appropriately, following all commands. Pt does not remember the episode this AM. States she is very hungry. Denies any pain, visual disturbance, headache, dizziness, difficulty speaking, chest pain, sob, abd pain, n/v, new weakness/numbness of extremities.     Of note, uses walker at home. Pt has had gait difficulties for years per daughter. No new weakness that she noted.   (08 Apr 2022 17:37)    PERTINENT PM/SXH:   HTN (hypertension)    DM (diabetes mellitus)    TIA (transient ischemic attack)      No significant past surgical history      FAMILY HISTORY:  No pertinent family history in first degree relatives    No history of  in first degree relatives according to chart  Unable to obtain due to patient's encephalopathy    ITEMS NOT CHECKED ARE NOT PRESENT  SOCIAL HISTORY:   Significant other/partner:  []  Children:  []   Substance hx:  []   Tobacco hx:  []   Alcohol hx: []   Home Opioid hx:  [] I-Stop Reference No:  - no active Rx's / see chart note  Living Situation: []Home  []Long term care  []Rehab []Other  Jewish/Spiritual practice: ; Role of organized Sabianist [ ] important [ ] some [ ] unable to assess  Coping: [ ] well [ ] with difficulty [ ] poor coping [ ] unable to assess  Support system: [ ] strong [ ] adequate [ ] inadequate    ADVANCE DIRECTIVES:    []MOLST  []Living Will  DECISION MAKER(s):  [] Health Care Proxy(s)  [] Surrogate(s)  [] Guardian           Name(s)/Phone Number(s):     BASELINE (I)ADLs (prior to admission):  [place]: []Total  [] Moderate []Dependent    ALLERGIES:  No Known Allergies    MEDICATIONS  (STANDING):  amLODIPine   Tablet 10 milliGRAM(s) Oral daily  aspirin  chewable 81 milliGRAM(s) Oral daily  atorvastatin 40 milliGRAM(s) Oral at bedtime  dextrose 5%. 1000 milliLiter(s) (50 mL/Hr) IV Continuous <Continuous>  dextrose 5%. 1000 milliLiter(s) (100 mL/Hr) IV Continuous <Continuous>  dextrose 50% Injectable 25 Gram(s) IV Push once  dextrose 50% Injectable 12.5 Gram(s) IV Push once  dextrose 50% Injectable 25 Gram(s) IV Push once  DULoxetine 60 milliGRAM(s) Oral daily  enoxaparin Injectable 40 milliGRAM(s) SubCutaneous every 24 hours  gabapentin 100 milliGRAM(s) Oral <User Schedule>  glucagon  Injectable 1 milliGRAM(s) IntraMuscular once  insulin glargine Injectable (LANTUS) 4 Unit(s) SubCutaneous at bedtime  insulin lispro (ADMELOG) corrective regimen sliding scale   SubCutaneous three times a day before meals  insulin lispro Injectable (ADMELOG) 8 Unit(s) SubCutaneous three times a day before meals  lisinopril 40 milliGRAM(s) Oral every 24 hours  pantoprazole    Tablet 40 milliGRAM(s) Oral before breakfast    MEDICATIONS  (PRN):  dextrose Oral Gel 15 Gram(s) Oral once PRN Blood Glucose LESS THAN 70 milliGRAM(s)/deciliter    Analgesic Use (Scheduled and PRNs) for past 24 hours:    DULoxetine   60 milliGRAM(s) Oral (05-09-22 @ 11:25)    gabapentin   100 milliGRAM(s) Oral (05-09-22 @ 11:25)   100 milliGRAM(s) Oral (05-08-22 @ 19:20)      PRESENT SYMPTOMS/REVIEW OF SYSTEMS: []Unable to obtain due to poor mentation/encephalopathy  Source if other than patient:  []Family   []Team     Pain: [ ] yes [ ] no  QOL Impact -   Location -                    Aggravating Factors -  Quality -  Radiation -  Timing -  Severity (0-10 scale) -   Minimal Acceptable Level (0-10 scale) -    CPOT Score:  (Pain Assessment Scale for Critically Ill)    PAIN AD Score:  (Nonverbal Pain Assessment Scale)    Dyspnea:                           []Mild  []Moderate []Severe  Anxiety:                             []Mild []Moderate []Severe  Fatigue:                             []Mild []Moderate []Severe  Nausea:                             []Mild []Moderate []Severe  Loss of Appetite:              []Mild []Moderate []Severe  Constipation:                    []Mild []Moderate []Severe    Other Symptoms:  [x]All Other Review Of Systems Negative     Palliative Performance Status Version 2:  %  (Functional Assessment Tool)    PHYSICAL EXAM:  GENERAL:  []Alert  []Oriented x   []Lethargic  []Cachexia  []Unarousable  []Verbal  []Non-Verbal  Behavioral:   []Anxiety  []Delirium []Agitation []Cooperative  HEENT:  []Normal   []Dry mouth   []ET Tube/Trach  []Oral lesions  PULMONARY:   []Clear []Tachypnea  []Audible excessive secretions   []Rhonchi        []Right []Left []Bilateral  []Crackles        []Right []Left []Bilateral  []Wheezing     []Right []Left []Bilateral  CARDIOVASCULAR:    []Regular []Irregular []Tachy  []Juan []Murmur []Other  GASTROINTESTINAL:  []Soft  []Distended   []+BS  []Non tender []Tender  []PEG []OGT/ NGT  Last BM:  GENITOURINARY:  []Normal [] Incontinent   []Oliguria/Anuria   []Guadarrama  MUSCULOSKELETAL:   []Normal   []Weakness  []Bed/Wheelchair bound []Edema  NEUROLOGIC:   []No focal deficits  []Cognitive impairment  []Dysphagia []Dysarthria []Paresis []Encephalopathic   SKIN:   []Normal   []Pressure ulcer(s)  []Rash    CRITICAL CARE:  [ ]Shock Present  [ ]Septic [ ]Cardiogenic [ ]Neurologic [ ]Hypovolemic  [ ]Vasopressors [ ]Inotropes   [ ]Respiratory failure present [ ]Mechanical Ventilation [ ]Non-invasive ventilatory support [ ]High-Flow  [ ]Acute  [ ]Chronic [ ]Hypoxic  [ ]Hypercarbic  [ ]Other organ failure    Vital Signs Last 24 Hrs  T(C): 36.7 (09 May 2022 12:07), Max: 36.7 (08 May 2022 20:53)  T(F): 98 (09 May 2022 12:07), Max: 98 (08 May 2022 20:53)  HR: 75 (09 May 2022 12:07) (69 - 77)  BP: 147/73 (09 May 2022 12:07) (134/69 - 164/80)  BP(mean): --  RR: 18 (09 May 2022 12:07) (18 - 18)  SpO2: 97% (09 May 2022 12:07) (97% - 99%)    LABS:                        11.5   3.87  )-----------( 308      ( 08 May 2022 07:58 )             37.1   05-08    134<L>  |  101  |  22  ----------------------------<  159<H>  4.4   |  27  |  0.49<L>    Ca    8.7      08 May 2022 07:58  Phos  4.0     05-08  Mg     1.7     05-08      RADIOLOGY & ADDITIONAL STUDIES:      PROTEIN CALORIE MALNUTRITION PRESENT: [ ]mild [ ]moderate [ ]severe [ ]underweight [ ]morbid obesity  []PPSV2 < or = to 30% []significant weight loss  []poor nutritional intake []catabolic state []anasarca     Artificial Nutrition []     REFERRALS:  [x]Social Work  []Case management []PT/OT []Chaplaincy  []Hospice  []Patient/Family Support []Massage Therapy    DISCUSSION OF CASE: Family - to obtain additional history and to provide emotional support; ( ) -     Care Coordination/Goals of Care Document:                                          Progress Notes    PROGRESS NOTE  Date & Time of Note   2022-05-06 04:54    Notes    Notes: Chart reviewed. Per PT, patient's session would benefit it patient had  one size large shoes and thinner socks; voicemail left for patient's daughter  requesting items be brought in.  also spoke to patient's son,  Jean-Claude, who is requesting an FMLA form be completed so he can take time off to  potentially assist in patient's care. Forms given to medical team to complete.   to remain available.       Electronically signed by:  Judith Mello  Electronically signed on:  2022-05-06  16:58           PALLIATIVE MEDICINE COORDINATION OF CARE DOCUMENTATION: [x] Inpatient Consult  Non-Face-to-Face prolonged service provided that relates to (face-to-face) care that has or will occur and ongoing patient management, including one or more of the following: - Reviewed documentation from other physicians and other health care professional services - Reviewed medical records and diagnostic / radiology study results - Coordination with patient's support system  ************************************************************************  MEDICATION REVIEW:  - See Medication List Above    ISTOP REFERENCE:   - no active Rx's / see ISTOP Chart Note  - PRN usage: NO PRN'S  ------------------------------------------------------------------------  COORDINATION OF CARE:  - Palliative Care consulted for: GOC / Symptom Management  - Patient (to be) assessed:  - Patient previously seen by Palliative Care service: NO    ADVANCE CARE PLANNING  - Code status:  - MOLST reviewed in chart: NONE; None found on Alpha  - HCP/Surrogate:  - GOC documents: NONE found on [place]  - HCP/Living will/Other Advanced Directives in Alpha: NONE found on Alpha  ------------------------------------------------------------------------  CARE PROVIDER DOCUMENTATION:  - SW/CM notes: Remains medically active    PLAN OF CARE  - Known Admissions in Past Year:  - Current Admit Date:  - LOS:  - LACE score:   - Current Dispo Plan: TO BE DETERMINED    04-08-22 (31d)  ------------------------------------------------------------------------  - Time Spent/Chart reviewed: 31 Minutes [including time used to gather, review and transfer data]  - Start:  - End:    Prolonged services rendered, as part of this patient's care provided by Palliative Medicine, include: i. chart review for provider and ancillary service documentation, ii. pertinent diagnostics including laboratory and imaging studies, iii. medication review including PRN use, iv. admission history including previous palliative care encounters and GOC notes, v. advance care planning documents including HCP and MOLST forms in Alpha. Part of Palliative Medicine extended evaluation and management also involves coordination of care with our IDT, the primary and consulting teams, and unit CM/SW and Hospice if eligible. Recommendations based on the information gathered and discussed are outlined in the A/P of Palliative notes. [facility] Geriatrics and Palliative Care  Liam Mcgee, Palliative Care Attending  Contact Info: Call [phone] (HEAL Line) or message on [employer] Teams (Liam Mcgee)    HPI:  **STROKE HPI***    HPI: 72y Female with PMHx of T2DM, HTN, recent admission and workup for Naty Sanchez tear, TIA on 3/30 (MR negative) presents to [facility] as transfer from [facility] ED for episode of confusion which began at 0800 this AM. Spoke to patient's daughter Efe on the phone, states she was changing her and getting her ready for the day. Gave her blood pressure medication, went to give her breakfast and pt was found slumped over, was confused, mumbling words. Daughter states her eyes were glossy and pt could not recognize who she was. At that point daughter took her oxygen levels and was 93% RA. She states the episode at least lasted 15-20 mins, she was still altered when EMS came. Denies any obvious jerking/shaking. Pt was not answering some questions appropriately and daughter called 911, pt brought to [facility] ED. On arrival to ED, pt back to baseline, was A&Ox3. Pt was transferred to stroke service for TIA vs seizure. On arrival to [facility], pt is A&Ox3, answering questions appropriately, following all commands. Pt does not remember the episode this AM. States she is very hungry. Denies any pain, visual disturbance, headache, dizziness, difficulty speaking, chest pain, sob, abd pain, n/v, new weakness/numbness of extremities.     Of note, uses walker at home. Pt has had gait difficulties for years per daughter. No new weakness that she noted.   (08 Apr 2022 17:37)    PERTINENT PM/SXH:   HTN (hypertension)    DM (diabetes mellitus)    TIA (transient ischemic attack)      No significant past surgical history      FAMILY HISTORY:  No pertinent family history in first degree relatives    No history of  in first degree relatives according to chart  Unable to obtain due to patient's encephalopathy    ITEMS NOT CHECKED ARE NOT PRESENT  SOCIAL HISTORY:   Significant other/partner:  []  Children:  []   Substance hx:  []   Tobacco hx:  []   Alcohol hx: []   Home Opioid hx:  [] I-Stop Reference No: [id number]  - no active Rx's / see chart note  Living Situation: []Home  []Long term care  []Rehab []Other  Sikh/Spiritual practice: ; Role of organized Latter-day [ ] important [ ] some [ ] unable to assess  Coping: [ ] well [ ] with difficulty [ ] poor coping [ ] unable to assess  Support system: [ ] strong [ ] adequate [ ] inadequate    ADVANCE DIRECTIVES:    []MOLST  []Living Will  DECISION MAKER(s):  [] Health Care Proxy(s)  [] Surrogate(s)  [] Guardian           Name(s)/Phone Number(s):     BASELINE (I)ADLs (prior to admission):  [place]: []Total  [] Moderate []Dependent    ALLERGIES:  No Known Allergies    MEDICATIONS  (STANDING):  amLODIPine   Tablet 10 milliGRAM(s) Oral daily  aspirin  chewable 81 milliGRAM(s) Oral daily  atorvastatin 40 milliGRAM(s) Oral at bedtime  dextrose 5%. 1000 milliLiter(s) (50 mL/Hr) IV Continuous <Continuous>  dextrose 5%. 1000 milliLiter(s) (100 mL/Hr) IV Continuous <Continuous>  dextrose 50% Injectable 25 Gram(s) IV Push once  dextrose 50% Injectable 12.5 Gram(s) IV Push once  dextrose 50% Injectable 25 Gram(s) IV Push once  DULoxetine 60 milliGRAM(s) Oral daily  enoxaparin Injectable 40 milliGRAM(s) SubCutaneous every 24 hours  gabapentin 100 milliGRAM(s) Oral <User Schedule>  glucagon  Injectable 1 milliGRAM(s) IntraMuscular once  insulin glargine Injectable (LANTUS) 4 Unit(s) SubCutaneous at bedtime  insulin lispro (ADMELOG) corrective regimen sliding scale   SubCutaneous three times a day before meals  insulin lispro Injectable (ADMELOG) 8 Unit(s) SubCutaneous three times a day before meals  lisinopril 40 milliGRAM(s) Oral every 24 hours  pantoprazole    Tablet 40 milliGRAM(s) Oral before breakfast    MEDICATIONS  (PRN):  dextrose Oral Gel 15 Gram(s) Oral once PRN Blood Glucose LESS THAN 70 milliGRAM(s)/deciliter    Analgesic Use (Scheduled and PRNs) for past 24 hours:    DULoxetine   60 milliGRAM(s) Oral (05-09-22 @ 11:25)    gabapentin   100 milliGRAM(s) Oral (05-09-22 @ 11:25)   100 milliGRAM(s) Oral (05-08-22 @ 19:20)      PRESENT SYMPTOMS/REVIEW OF SYSTEMS: []Unable to obtain due to poor mentation/encephalopathy  Source if other than patient:  []Family   []Team     Pain: [ ] yes [ ] no  QOL Impact -   Location -                    Aggravating Factors -  Quality -  Radiation -  Timing -  Severity (0-10 scale) -   Minimal Acceptable Level (0-10 scale) -    CPOT Score:  (Pain Assessment Scale for Critically Ill)    PAIN AD Score:  (Nonverbal Pain Assessment Scale)    Dyspnea:                           []Mild  []Moderate []Severe  Anxiety:                             []Mild []Moderate []Severe  Fatigue:                             []Mild []Moderate []Severe  Nausea:                             []Mild []Moderate []Severe  Loss of Appetite:              []Mild []Moderate []Severe  Constipation:                    []Mild []Moderate []Severe    Other Symptoms:  [x]All Other Review Of Systems Negative     Palliative Performance Status Version 2:  %  (Functional Assessment Tool)    PHYSICAL EXAM:  GENERAL:  []Alert  []Oriented x   []Lethargic  []Cachexia  []Unarousable  []Verbal  []Non-Verbal  Behavioral:   []Anxiety  []Delirium []Agitation []Cooperative  HEENT:  []Normal   []Dry mouth   []ET Tube/Trach  []Oral lesions  PULMONARY:   []Clear []Tachypnea  []Audible excessive secretions   []Rhonchi        []Right []Left []Bilateral  []Crackles        []Right []Left []Bilateral  []Wheezing     []Right []Left []Bilateral  CARDIOVASCULAR:    []Regular []Irregular []Tachy  []Juan []Murmur []Other  GASTROINTESTINAL:  []Soft  []Distended   []+BS  []Non tender []Tender  []PEG []OGT/ NGT  Last BM:  GENITOURINARY:  []Normal [] Incontinent   []Oliguria/Anuria   []Guadarrama  MUSCULOSKELETAL:   []Normal   []Weakness  []Bed/Wheelchair bound []Edema  NEUROLOGIC:   []No focal deficits  []Cognitive impairment  []Dysphagia []Dysarthria []Paresis []Encephalopathic   SKIN:   []Normal   []Pressure ulcer(s)  []Rash    CRITICAL CARE:  [ ]Shock Present  [ ]Septic [ ]Cardiogenic [ ]Neurologic [ ]Hypovolemic  [ ]Vasopressors [ ]Inotropes   [ ]Respiratory failure present [ ]Mechanical Ventilation [ ]Non-invasive ventilatory support [ ]High-Flow  [ ]Acute  [ ]Chronic [ ]Hypoxic  [ ]Hypercarbic  [ ]Other organ failure    Vital Signs Last 24 Hrs  T(C): 36.7 (09 May 2022 12:07), Max: 36.7 (08 May 2022 20:53)  T(F): 98 (09 May 2022 12:07), Max: 98 (08 May 2022 20:53)  HR: 75 (09 May 2022 12:07) (69 - 77)  BP: 147/73 (09 May 2022 12:07) (134/69 - 164/80)  BP(mean): --  RR: 18 (09 May 2022 12:07) (18 - 18)  SpO2: 97% (09 May 2022 12:07) (97% - 99%)    LABS:                        11.5   3.87  )-----------( 308      ( 08 May 2022 07:58 )             37.1   05-08    134<L>  |  101  |  22  ----------------------------<  159<H>  4.4   |  27  |  0.49<L>    Ca    8.7      08 May 2022 07:58  Phos  4.0     05-08  Mg     1.7     05-08      RADIOLOGY & ADDITIONAL STUDIES:      PROTEIN CALORIE MALNUTRITION PRESENT: [ ]mild [ ]moderate [ ]severe [ ]underweight [ ]morbid obesity  []PPSV2 < or = to 30% []significant weight loss  []poor nutritional intake []catabolic state []anasarca     Artificial Nutrition []     REFERRALS:  [x]Social Work  []Case management []PT/OT []Chaplaincy  []Hospice  []Patient/Family Support []Massage Therapy    DISCUSSION OF CASE: Family - to obtain additional history and to provide emotional support; ( ) -     Care Coordination/Goals of Care Document:                                          Progress Notes    PROGRESS NOTE  Date & Time of Note   2022-05-06 04:54    Notes    Notes: Chart reviewed. Per PT, patient's session would benefit it patient had  one size large shoes and thinner socks; voicemail left for patient's daughter  requesting items be brought in.  also spoke to patient's son,  Jean-Claude, who is requesting an FMLA form be completed so he can take time off to  potentially assist in patient's care. Forms given to medical team to complete.   to remain available.       Electronically signed by:  Judith Mello  Electronically signed on:  2022-05-06  16:58           PALLIATIVE MEDICINE COORDINATION OF CARE DOCUMENTATION: [x] Inpatient Consult  Non-Face-to-Face prolonged service provided that relates to (face-to-face) care that has or will occur and ongoing patient management, including one or more of the following: - Reviewed documentation from other physicians and other health care professional services - Reviewed medical records and diagnostic / radiology study results - Coordination with patient's support system  ************************************************************************  MEDICATION REVIEW:  - See Medication List Above    ISTOP REFERENCE: [id number]  - no active Rx's  - PRN usage: NO PRN'S  ------------------------------------------------------------------------  COORDINATION OF CARE:  - Palliative Care consulted for: GOC / Symptom Management  - Patient (to be) assessed:  - Patient previously seen by Palliative Care service: NO    ADVANCE CARE PLANNING  - [facility]:  [facility] None found on [facility]  - HCP/Surrogate:  - GOC documents: NONE found on [place]  - HCP/Living will/Other Advanced Directives in Alpha: NONE found on Alpha  ------------------------------------------------------------------------  CARE PROVIDER DOCUMENTATION:  - SW/CM notes: Remains medically active    PLAN OF CARE  - Known Admissions in Past Year:  - Current Admit Date:  - LOS:  - LACE score:   - Current Dispo Plan: TO BE DETERMINED    04-08-22 (31d)  ------------------------------------------------------------------------  - Time Spent/Chart reviewed: 31 Minutes [including time used to gather, review and transfer data]  - Start:  - End:    Prolonged services rendered, as part of this patient's care provided by Palliative Medicine, include: i. chart review for provider and ancillary service documentation, ii. pertinent diagnostics including laboratory and imaging studies, iii. medication review including PRN use, iv. admission history including previous palliative care encounters and GOC notes, v. advance care planning documents including HCP and MOLST forms in Alpha. Part of Palliative Medicine extended evaluation and management also involves coordination of care with our IDT, the primary and consulting teams, and unit CM/SW and Hospice if eligible. Recommendations based on the information gathered and discussed are outlined in the A/P of Palliative notes. [facility] Geriatrics and Palliative Care  Liam Mcgee, Palliative Care Attending  Contact Info: Call [phone] (HEAL Line) or message on Microsoft Teams (Liam Mcgee)    HPI: 72y Female with PMHx of T2DM, HTN, recent admission and workup for Naty Sanchez tear, TIA on 3/30 (MR negative) presents to [facility] as transfer from [facility] ED for episode of confusion which began at 0800 this AM. Spoke to patient's daughter Efe on the phone, states she was changing her and getting her ready for the day. Gave her blood pressure medication, went to give her breakfast and pt was found slumped over, was confused, mumbling words. Daughter states her eyes were glossy and pt could not recognize who she was. At that point daughter took her oxygen levels and was 93% RA. She states the episode at least lasted 15-20 mins, she was still altered when EMS came. Denies any obvious jerking/shaking. Pt was not answering some questions appropriately and daughter called 911, pt brought to [facility] ED. On arrival to ED, pt back to baseline, was A&Ox3. Pt was transferred to stroke service for TIA vs seizure. On arrival to [facility], pt is A&Ox3, answering questions appropriately, following all commands. Pt does not remember the episode this AM. States she is very hungry. Denies any pain, visual disturbance, headache, dizziness, difficulty speaking, chest pain, sob, abd pain, n/v, new weakness/numbness of extremities. Of note, uses walker at home. Pt has had gait difficulties for years per daughter. No new weakness that she noted.    Patient seen and examined at bedside. Appears overtly comfortable. 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 Comprehensive symptom assessment and GOC exploration as noted below. Further collateral obtained from primary team, chart, and family.    PERTINENT PM/SXH:   HTN (hypertension)  DM (diabetes mellitus)  TIA (transient ischemic attack)  No significant past surgical history    FAMILY HISTORY:  No pertinent family history of CVA in first degree relatives    ITEMS NOT CHECKED ARE NOT PRESENT  SOCIAL HISTORY:   Significant other/partner:  []  Children:  [x]   Substance hx:  []   Tobacco hx:  []   Alcohol hx: []   Home Opioid hx:  [] I-Stop Reference No: [id number]  - no active Rx's / see chart note  Living Situation: [x]Home  []Long term care  []Rehab []Other  Sikh/Spiritual practice: ; Role of organized Yarsanism [ ] important [x] some [ ] unable to assess  Coping: [x] well [ ] with difficulty [ ] poor coping [ ] unable to assess  Support system: [ ] strong [x] adequate [ ] inadequate    ADVANCE DIRECTIVES:    []MOLST  []Living Will  DECISION MAKER(s):  [] Health Care Proxy(s)  [x] Surrogate(s)  [] Guardian           Name(s)/Phone Number(s): Charles Doss, [phone]    BASELINE (I)ADLs (prior to admission):  [place]: []Total  [x] Moderate []Dependent    ALLERGIES:  No Known Allergies    MEDICATIONS  (STANDING):  amLODIPine   Tablet 10 milliGRAM(s) Oral daily  aspirin  chewable 81 milliGRAM(s) Oral daily  atorvastatin 40 milliGRAM(s) Oral at bedtime  DULoxetine 60 milliGRAM(s) Oral daily  enoxaparin Injectable 40 milliGRAM(s) SubCutaneous every 24 hours  gabapentin 100 milliGRAM(s) Oral <User Schedule>  glucagon  Injectable 1 milliGRAM(s) IntraMuscular once  insulin glargine Injectable (LANTUS) 4 Unit(s) SubCutaneous at bedtime  insulin lispro (ADMELOG) corrective regimen sliding scale   SubCutaneous three times a day before meals  insulin lispro Injectable (ADMELOG) 8 Unit(s) SubCutaneous three times a day before meals  lisinopril 40 milliGRAM(s) Oral every 24 hours  pantoprazole    Tablet 40 milliGRAM(s) Oral before breakfast    Analgesic Use (Scheduled and PRNs) for past 24 hours:  DULoxetine   60 milliGRAM(s) Oral (05-09-22 @ 11:25)  gabapentin   100 milliGRAM(s) Oral (05-09-22 @ 11:25)   100 milliGRAM(s) Oral (05-08-22 @ 19:20)    PRESENT SYMPTOMS/REVIEW OF SYSTEMS: []Unable to obtain due to poor mentation/encephalopathy  Source if other than patient:  []Family   []Team     Pain: [x] yes [ ] no  QOL Impact - debilitating  Location - bilateral feet                   Aggravating Factors - pressure, movement  Quality - pins/needles  Radiation - throughout foot  Timing - constant  Severity (0-10 scale) - 7.5  Minimal Acceptable Level (0-10 scale) - 4    Dyspnea:                           []Mild  []Moderate []Severe  Anxiety:                             []Mild []Moderate []Severe  Fatigue:                             []Mild []Moderate []Severe  Nausea:                             []Mild []Moderate []Severe  Loss of Appetite:              []Mild []Moderate []Severe  Constipation:                    []Mild []Moderate []Severe    Other Symptoms:  [x]All Other Review Of Systems Negative     Palliative Performance Status Version 2:  40%  (Functional Assessment Tool)    PHYSICAL EXAM:  GENERAL:  [x]Alert  [x]Oriented x3   []Lethargic  []Cachexia  []Unarousable  [x]Verbal  []Non-Verbal  Behavioral:   []Anxiety  []Delirium []Agitation [x]Cooperative  HEENT:  [x]Normal   []Dry mouth   []ET Tube/Trach  []Oral lesions  PULMONARY:   [x]Clear []Tachypnea  []Audible excessive secretions   []Rhonchi        []Right []Left []Bilateral  []Crackles        []Right []Left []Bilateral  []Wheezing     []Right []Left []Bilateral  CARDIOVASCULAR:    [x]Regular []Irregular []Tachy  []Juan []Murmur []Other  GASTROINTESTINAL:  [x]Soft  []Distended   [x]+BS  [x]Non tender []Tender  []PEG []OGT/ NGT  Last BM:  GENITOURINARY:  [x]Normal [] Incontinent   []Oliguria/Anuria   [][place]  MUSCULOSKELETAL:   []Normal   [x]Weakness  []Bed/Wheelchair bound []Edema  NEUROLOGIC:   [x]No focal deficits  []Cognitive impairment  []Dysphagia []Dysarthria []Paresis []Encephalopathic   SKIN:   [x]Normal   []Pressure ulcer(s)  []Rash    Vital Signs Last 24 Hrs  T(C): 36.7 (09 May 2022 12:07), Max: 36.7 (08 May 2022 20:53)  T(F): 98 (09 May 2022 12:07), Max: 98 (08 May 2022 20:53)  HR: 75 (09 May 2022 12:07) (69 - 77)  BP: 147/73 (09 May 2022 12:07) (134/69 - 164/80)  BP(mean): --  RR: 18 (09 May 2022 12:07) (18 - 18)  SpO2: 97% (09 May 2022 12:07) (97% - 99%)    LABS:                      11.5   3.87  )-----------( 308      ( 08 May 2022 07:58 )             37.1   05-08    134<L>  |  101  |  22  ----------------------------<  159<H>  4.4   |  27  |  0.49<L>    Ca    8.7      08 May 2022 07:58  Phos  4.0     05-08  Mg     1.7     05-08    RADIOLOGY & ADDITIONAL STUDIES:  < from: MR Head No Cont (04.08.22 @ 22:55) >  There are patchy and confluent T2/FLAIR hyperintense changes in the   periventricular and subcortical white matter and also in the ventral   ryanne, nonspecific finding,but most representing sequelae of severe   chronic microvascular ischemic disease. There is a chronic lacunar   infarct in the right thalamus.  There are scattered foci of susceptibility blooming artifact in the   subcortical structures including in the basal ganglia and thalami   bilaterally, findings consistent with chronic microhemorrhages in a   pattern consistent with chronic hypertensive encephalopathy.  Cortical sulcal prominence related to underlying brain parenchymal volume   loss.  No acute infarction, intracranial hemorrhage or mass.  There is no evidence of hydrocephalus. There are no extra-axial fluid   collections. The skull base flow voids appear patent.  The visualized intraorbital contents are normal. There are scattered mild   mucosal inflammatory changes of the paranasal sinuses. The mastoid air   cells are clear. The visualized soft tissues and osseous structures   appear unremarkable.    IMPRESSION: Severe chronic microvascular ischemic disease. 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  Chronic hypertensive encephalopathy.    REFERRALS:  [x]Social Work  [x]Case management [x]PT/OT []Chaplaincy  []Hospice  []Patient/Family Support []Massage Therapy    DISCUSSION OF CASE: Family - to obtain additional history and to provide emotional support; Medicine - medication titration    Care Coordination/Goals of Care Document:   PALLIATIVE MEDICINE COORDINATION OF CARE DOCUMENTATION: [x] Inpatient Consult  Non-Face-to-Face prolonged service provided that relates to (face-to-face) care that has or will occur and ongoing patient management, including one or more of the following: - Reviewed documentation from other physicians and other health care professional services - Reviewed medical records and diagnostic / radiology study results - Coordination with patient's support system  ************************************************************************  MEDICATION REVIEW:  - See Medication List Above    ISTOP REFERENCE: [id number]  - no active Rx's  - PRN usage: NO PRN'S  ------------------------------------------------------------------------  COORDINATION OF CARE:  - Palliative Care consulted for: GOC / Symptom Management  - Patient (to be) assessed: 5/9/22  - Patient previously seen by Palliative Care service: NO    ADVANCE CARE PLANNING  - Code status: Full Code  - MOLST reviewed in chart: NONE; None found on Alpha  - HCP/Surrogate: Charles Doss, [phone]  - [facility] documents: NONE found on [place]  - HCP/Living will/Other Advanced Directives in Alpha: NONE found on Alpha  ------------------------------------------------------------------------  CARE PROVIDER DOCUMENTATION:  - SW/CM notes: cannot go to [facility], plan for home w/ [facility]    PLAN OF CARE  - Known Admissions in Past Year: 0  - Current Admit Date: 4/8/22  - LOS: 31  - LACE score: 16  - Current Dispo Plan: HOME  ------------------------------------------------------------------------  - Time Spent/Chart reviewed: 31 Minutes [including time used to gather, review and transfer data]  - Start: 3:00PM  - End: 3:31PM    Prolonged services rendered, as part of this patient's care provided by Palliative Medicine, include: i. chart review for provider and ancillary service documentation, ii. pertinent diagnostics including laboratory and imaging studies, iii. medication review including PRN use, iv. admission history including previous palliative care encounters and GOC notes, v. advance care planning documents including HCP and MOLST forms in Alpha. Part of Palliative Medicine extended evaluation and management also involves coordination of care with our IDT, the primary and consulting teams, and unit CM/SW and Hospice if eligible. Recommendations based on the information gathered and discussed are outlined in the A/P of Palliative notes.

## 2022-05-09 NOTE — CONSULT NOTE ADULT - ASSESSMENT
FULL NOTE TO FOLLOW    ·	there are no additional meaningful resources available to patient other than toi care via [facility] (patient has no hospice-qualifying diagnosis) FULL NOTE TO FOLLOW    ·	there are no additional meaningful resources that come to mind other than toi care via [facility] (patient has no hospice-qualifying diagnosis and even still would need insurance)  ·	recommend uptitrating Gabapentin (previous period of lethargy in April coincided with UTI), can conservatively increase to 100mg PO TID and escalate if tolerating  ·	recommend adding a topical agent for the feet for whatever even some benefit it could add, check with Pharmacy if there is topical Capsaicin or Lidocaine ointment  ·	will complete HCP form designating daughter as alternate agent, patient states her daughter knows her end of life wishes regarding CPR/Intubation 71yo F with PMH of T2DM, Peripheral Neuropathy, and HTN p/w AMS in the setting of UTI +/- uncontrolled HTN. Palliative consulted for complex medical decision making in the setting of prolonged hospitalization.    ·	there are no additional meaningful resources that come to mind other than [facility] care via [facility] (patient has no hospice-qualifying diagnosis and even still would need insurance)  ·	recommend uptitrating Gabapentin (previous period of lethargy in April coincided with UTI), can conservatively increase to 100mg PO TID and escalate if tolerating  ·	recommend adding a topical agent for the feet for whatever even some benefit it could add, check with Pharmacy if there is topical Capsaicin or Lidocaine ointment  ·	will complete HCP form designating daughter as alternate agent, patient states her daughter knows her end of life wishes regarding CPR/Intubation

## 2022-05-09 NOTE — CONSULT NOTE ADULT - CONSULT REASON
Pain/Symptom Management and Complex Medical Decision Making/GOC in the setting of Neuropathy
DM management
Encephalopathy

## 2022-05-09 NOTE — PROGRESS NOTE ADULT - PROBLEM SELECTOR PROBLEM 5
Madhuri Hagan called from [place]  They asked for the dx code and drugs tried and failed  Info given  Madhuri Hagan states she will fax us with the outcome  Hypertension

## 2022-05-09 NOTE — PROGRESS NOTE ADULT - ASSESSMENT
72y Female with PMHx of T2DM c/b peripheral neuropathy, HTN, recent admission and workup for Naty Sanchez tear, TIA on 3/30 (MR negative) presents to [facility] as transfer from [facility] ED for episode of altered mental status (staring, generalized weakness), back to baseline in [facility] ED, no acute pathology on CTH or MRI, found to have metabolic encephalopathy (resolved) 2/2 UTI (completed 7day abx) vs hypertension, also found to have new LE weakness of unclear origin, possible diabetic neuropathy. Due to immigration and insurance status, not able to FELICIA as recommended by PT. Pending improvement to one-person assist in order to discharge home with out-of-pocket PT.

## 2022-05-09 NOTE — CONSULT NOTE ADULT - PROBLEM SELECTOR RECOMMENDATION 4
c/w cymbalta
.  Complex medical decision making / symptom management in the setting of peripheral neuropathy.    Emotional support provided, questions answered.  Active Psychosocial Referrals:  [x]Social Work/Case management []PT/OT []Chaplaincy []Hospice  []Patient/Family Support []Holistic RN [x]Massage Therapy []Ethics  Coping: [x] well [] with difficulty [] poor coping [] unable to assess  Support system: [] strong [] adequate [x] inadequate    For new or uncontrolled symptoms, please call Palliative Care at [phone]. The service is available 24/7 (including nights & weekends) to provide symptom management recommendations over the phone as appropriate

## 2022-05-09 NOTE — CONSULT NOTE ADULT - PROBLEM SELECTOR RECOMMENDATION 2
Patient with difficult to control diabetes, seen by endocrinology service on previous admissions. DM complicated by severe peripheral neuropathy and gastroparesis.   - FSG at goal   - Current regimen: Lantus 12 U QHS, Lispro 10/8/8  - MISS with meals and bedtime   - recalculate needs as needed
.  PPSV = 40%, requires assistance for most ADLs  -PT Eval recommending FELICIA but not possible  -c/w supportive care, OOB, encourage movement

## 2022-05-09 NOTE — CONSULT NOTE ADULT - PROBLEM SELECTOR RECOMMENDATION 9
Patient presenting with brief episode of encephalopathy at home with rapid resolution and no post ictal state. No prodromal symptoms. Head CT negative. Initial w/u of stroke vs tia. Additional differential includes seizure versus metabolic causes. Patient has historically had difficult to control diabetes with wide swings in FSG.   - vEEG in place, f/u read   - UA w/ pyuria but no evidence of infection  - PT/OT evaluation
.  Causing significant functional debility, multi-modal pain treatment  -see recommendations above re: Gabapentin and Topical Agent  -c/w Cymbalta 60mg PO daily

## 2022-05-09 NOTE — CONSULT NOTE ADULT - PROBLEM SELECTOR RECOMMENDATION 3
can consider reglan if evidence of nausea/vomiting  protonix 40 mg qd
.  Patient is FULL CODE  -broached advanced directives, patient states her daughter knows what she wants  -will return to complete HCP designating daughter as alternate agent  -there are no additional toi resources that are available other than through [facility]

## 2022-05-09 NOTE — CONSULT NOTE ADULT - TIME BILLING
Insulin adjustment
Exploration of GOC including advanced directives such as HCP designation and code status  Monitoring and Education regarding complex pain and related treatment regimen  Discharge Facilitation with exploration for additional resources

## 2022-05-09 NOTE — PROGRESS NOTE ADULT - SUBJECTIVE AND OBJECTIVE BOX
CC: Patient is a 72y old  Female who presents with a chief complaint of episode of AMS (08 May 2022 21:32)      INTERVAL EVENTS: CAIT    SUBJECTIVE / INTERVAL HPI: Patient seen and examined at bedside. Pt doing well. Weakness is unchanged     ROS: negative unless otherwise stated above.    VITAL SIGNS:  Vital Signs Last 24 Hrs  T(C): 36.7 (09 May 2022 12:07), Max: 36.7 (08 May 2022 20:53)  T(F): 98 (09 May 2022 12:07), Max: 98 (08 May 2022 20:53)  HR: 75 (09 May 2022 12:07) (69 - 77)  BP: 147/73 (09 May 2022 12:07) (108/69 - 164/80)  BP(mean): --  RR: 18 (09 May 2022 12:07) (18 - 18)  SpO2: 97% (09 May 2022 12:07) (97% - 99%)        PHYSICAL EXAM:    General: NAD  HEENT: MMM  Neck: supple  Cardiovascular: +S1/S2; RRR  Respiratory: CTA B/L; no W/R/R  Gastrointestinal: soft, NT/ND  Extremities: WWP; no edema, clubbing or cyanosis  Vascular: 2+ radial, DP/PT pulses B/L  Neurological: AAOx3; AAOx3; B/L hip flexion: 4/5, knee flexion: 3/5, ankle:1/5, Ue: 5/5. relfexes: 1+ at patellar, 0 at ankle.    MEDICATIONS:  MEDICATIONS  (STANDING):  amLODIPine   Tablet 10 milliGRAM(s) Oral daily  aspirin  chewable 81 milliGRAM(s) Oral daily  atorvastatin 40 milliGRAM(s) Oral at bedtime  dextrose 5%. 1000 milliLiter(s) (50 mL/Hr) IV Continuous <Continuous>  dextrose 5%. 1000 milliLiter(s) (100 mL/Hr) IV Continuous <Continuous>  dextrose 50% Injectable 25 Gram(s) IV Push once  dextrose 50% Injectable 12.5 Gram(s) IV Push once  dextrose 50% Injectable 25 Gram(s) IV Push once  DULoxetine 60 milliGRAM(s) Oral daily  enoxaparin Injectable 40 milliGRAM(s) SubCutaneous every 24 hours  gabapentin 100 milliGRAM(s) Oral <User Schedule>  glucagon  Injectable 1 milliGRAM(s) IntraMuscular once  insulin glargine Injectable (LANTUS) 4 Unit(s) SubCutaneous at bedtime  insulin lispro (ADMELOG) corrective regimen sliding scale   SubCutaneous three times a day before meals  insulin lispro Injectable (ADMELOG) 8 Unit(s) SubCutaneous three times a day before meals  lisinopril 40 milliGRAM(s) Oral every 24 hours  pantoprazole    Tablet 40 milliGRAM(s) Oral before breakfast    MEDICATIONS  (PRN):  dextrose Oral Gel 15 Gram(s) Oral once PRN Blood Glucose LESS THAN 70 milliGRAM(s)/deciliter      ALLERGIES:  Allergies    No Known Allergies    Intolerances        LABS:                        11.5   3.87  )-----------( 308      ( 08 May 2022 07:58 )             37.1     05-08    134<L>  |  101  |  22  ----------------------------<  159<H>  4.4   |  27  |  0.49<L>    Ca    8.7      08 May 2022 07:58  Phos  4.0     05-08  Mg     1.7     05-08          CAPILLARY BLOOD GLUCOSE      POCT Blood Glucose.: 175 mg/dL (09 May 2022 06:26)      RADIOLOGY & ADDITIONAL TESTS: Reviewed.

## 2022-05-10 LAB
GLUCOSE BLDC GLUCOMTR-MCNC: 102 MG/DL — HIGH (ref 70–99)
GLUCOSE BLDC GLUCOMTR-MCNC: 110 MG/DL — HIGH (ref 70–99)
GLUCOSE BLDC GLUCOMTR-MCNC: 157 MG/DL — HIGH (ref 70–99)
GLUCOSE BLDC GLUCOMTR-MCNC: 97 MG/DL — SIGNIFICANT CHANGE UP (ref 70–99)

## 2022-05-10 PROCEDURE — 99232 SBSQ HOSP IP/OBS MODERATE 35: CPT

## 2022-05-10 RX ADMIN — Medication 2: at 12:41

## 2022-05-10 RX ADMIN — LISINOPRIL 40 MILLIGRAM(S): 2.5 TABLET ORAL at 06:28

## 2022-05-10 RX ADMIN — Medication 81 MILLIGRAM(S): at 11:19

## 2022-05-10 RX ADMIN — AMLODIPINE BESYLATE 10 MILLIGRAM(S): 2.5 TABLET ORAL at 06:28

## 2022-05-10 RX ADMIN — Medication 8 UNIT(S): at 17:05

## 2022-05-10 RX ADMIN — LIDOCAINE 1 APPLICATION(S): 4 CREAM TOPICAL at 21:38

## 2022-05-10 RX ADMIN — Medication 8 UNIT(S): at 12:41

## 2022-05-10 RX ADMIN — LIDOCAINE 1 APPLICATION(S): 4 CREAM TOPICAL at 13:04

## 2022-05-10 RX ADMIN — ATORVASTATIN CALCIUM 40 MILLIGRAM(S): 80 TABLET, FILM COATED ORAL at 21:38

## 2022-05-10 RX ADMIN — Medication 8 UNIT(S): at 08:58

## 2022-05-10 RX ADMIN — PANTOPRAZOLE SODIUM 40 MILLIGRAM(S): 20 TABLET, DELAYED RELEASE ORAL at 06:27

## 2022-05-10 RX ADMIN — GABAPENTIN 100 MILLIGRAM(S): 400 CAPSULE ORAL at 06:28

## 2022-05-10 RX ADMIN — ENOXAPARIN SODIUM 40 MILLIGRAM(S): 100 INJECTION SUBCUTANEOUS at 21:39

## 2022-05-10 RX ADMIN — GABAPENTIN 100 MILLIGRAM(S): 400 CAPSULE ORAL at 21:38

## 2022-05-10 RX ADMIN — INSULIN GLARGINE 4 UNIT(S): 100 INJECTION, SOLUTION SUBCUTANEOUS at 21:39

## 2022-05-10 RX ADMIN — LIDOCAINE 1 APPLICATION(S): 4 CREAM TOPICAL at 06:27

## 2022-05-10 RX ADMIN — GABAPENTIN 100 MILLIGRAM(S): 400 CAPSULE ORAL at 13:04

## 2022-05-10 RX ADMIN — DULOXETINE HYDROCHLORIDE 60 MILLIGRAM(S): 30 CAPSULE, DELAYED RELEASE ORAL at 11:19

## 2022-05-10 NOTE — PROGRESS NOTE ADULT - ASSESSMENT
72y Female with PMHx of T2DM c/b peripheral neuropathy, HTN, recent admission and workup for Naty Sanchez tear, TIA on 3/30 (MR negative) presents to [facility] as transfer from [facility] ED for episode of altered mental status (staring, generalized weakness), back to baseline in [facility] ED, no acute pathology on CTH or MRI, found to have metabolic encephalopathy (resolved) 2/2 UTI (completed 7day abx) vs hypertension, also found to have new LE weakness of unclear origin, possible diabetic neuropathy. Due to immigration and insurance status, not able to FELICIA as recommended by PT. Pending improvement to one-person assist in order to discharge home with out-of-pocket PT vs home with family assist

## 2022-05-10 NOTE — PROGRESS NOTE ADULT - ATTENDING COMMENTS
continues to report pain in feet. today cognitively more clear than yesterday.    will need to be very cautious with gabapentin as this caused altered mental status in the past  will continue to titrate cymbalta to 90mg for neuropathic pain    medically stable, but cannot discharge due to insurance status

## 2022-05-10 NOTE — PROGRESS NOTE ADULT - SUBJECTIVE AND OBJECTIVE BOX
CC: Patient is a 72y old  Female who presents with a chief complaint of episode of AMS (09 May 2022 14:02)      INTERVAL EVENTS: CAIT    SUBJECTIVE / INTERVAL HPI: Patient seen and examined at bedside.     ROS: negative unless otherwise stated above.    VITAL SIGNS:  Vital Signs Last 24 Hrs  T(C): 37.1 (10 May 2022 05:45), Max: 37.1 (10 May 2022 05:45)  T(F): 98.7 (10 May 2022 05:45), Max: 98.7 (10 May 2022 05:45)  HR: 88 (10 May 2022 05:45) (75 - 88)  BP: 150/76 (10 May 2022 05:45) (127/64 - 150/76)  BP(mean): --  RR: 16 (10 May 2022 05:45) (16 - 18)  SpO2: 100% (10 May 2022 05:45) (97% - 100%)      05-09-22 @ 07:01  -  05-10-22 @ 07:00  --------------------------------------------------------  IN: 0 mL / OUT: 1100 mL / NET: -1100 mL        Physical exam:    General: NAD  HEENT: MMM  Neck: supple  Cardiovascular: +S1/S2; RRR  Respiratory: CTA B/L; no W/R/R  Gastrointestinal: soft, NT/ND  Extremities: WWP; no edema, clubbing or cyanosis  Vascular: 2+ radial, DP/PT pulses B/L  Neurological: AAOx3; B/L hip flexion: 4/5, knee flexion: 3/5, ankle:1/5, Ue: 5/5. relfexes: 1+ at patellar, 0 at ankle.    MEDICATIONS:  MEDICATIONS  (STANDING):  amLODIPine   Tablet 10 milliGRAM(s) Oral daily  aspirin  chewable 81 milliGRAM(s) Oral daily  atorvastatin 40 milliGRAM(s) Oral at bedtime  dextrose 5%. 1000 milliLiter(s) (50 mL/Hr) IV Continuous <Continuous>  dextrose 5%. 1000 milliLiter(s) (100 mL/Hr) IV Continuous <Continuous>  dextrose 50% Injectable 25 Gram(s) IV Push once  dextrose 50% Injectable 12.5 Gram(s) IV Push once  dextrose 50% Injectable 25 Gram(s) IV Push once  DULoxetine 60 milliGRAM(s) Oral daily  enoxaparin Injectable 40 milliGRAM(s) SubCutaneous every 24 hours  gabapentin 100 milliGRAM(s) Oral every 8 hours  glucagon  Injectable 1 milliGRAM(s) IntraMuscular once  insulin glargine Injectable (LANTUS) 4 Unit(s) SubCutaneous at bedtime  insulin lispro (ADMELOG) corrective regimen sliding scale   SubCutaneous three times a day before meals  insulin lispro Injectable (ADMELOG) 8 Unit(s) SubCutaneous three times a day before meals  lidocaine 2% Gel 1 Application(s) Topical three times a day  lisinopril 40 milliGRAM(s) Oral every 24 hours  pantoprazole    Tablet 40 milliGRAM(s) Oral before breakfast    MEDICATIONS  (PRN):  dextrose Oral Gel 15 Gram(s) Oral once PRN Blood Glucose LESS THAN 70 milliGRAM(s)/deciliter      ALLERGIES:  Allergies    No Known Allergies    Intolerances        LABS:              CAPILLARY BLOOD GLUCOSE      POCT Blood Glucose.: 102 mg/dL (10 May 2022 08:35)      RADIOLOGY & ADDITIONAL TESTS: Reviewed.

## 2022-05-11 LAB
GLUCOSE BLDC GLUCOMTR-MCNC: 136 MG/DL — HIGH (ref 70–99)
GLUCOSE BLDC GLUCOMTR-MCNC: 136 MG/DL — HIGH (ref 70–99)
GLUCOSE BLDC GLUCOMTR-MCNC: 153 MG/DL — HIGH (ref 70–99)
GLUCOSE BLDC GLUCOMTR-MCNC: 154 MG/DL — HIGH (ref 70–99)

## 2022-05-11 PROCEDURE — 99497 ADVNCD CARE PLAN 30 MIN: CPT | Mod: 25

## 2022-05-11 PROCEDURE — 99232 SBSQ HOSP IP/OBS MODERATE 35: CPT

## 2022-05-11 RX ORDER — GABAPENTIN 400 MG/1
100 CAPSULE ORAL EVERY 12 HOURS
Refills: 0 | Status: DISCONTINUED | OUTPATIENT
Start: 2022-05-11 | End: 2022-05-18

## 2022-05-11 RX ORDER — DULOXETINE HYDROCHLORIDE 30 MG/1
30 CAPSULE, DELAYED RELEASE ORAL ONCE
Refills: 0 | Status: COMPLETED | OUTPATIENT
Start: 2022-05-11 | End: 2022-05-11

## 2022-05-11 RX ORDER — DULOXETINE HYDROCHLORIDE 30 MG/1
90 CAPSULE, DELAYED RELEASE ORAL EVERY 24 HOURS
Refills: 0 | Status: DISCONTINUED | OUTPATIENT
Start: 2022-05-12 | End: 2022-06-27

## 2022-05-11 RX ADMIN — LIDOCAINE 1 APPLICATION(S): 4 CREAM TOPICAL at 16:44

## 2022-05-11 RX ADMIN — GABAPENTIN 100 MILLIGRAM(S): 400 CAPSULE ORAL at 06:24

## 2022-05-11 RX ADMIN — DULOXETINE HYDROCHLORIDE 30 MILLIGRAM(S): 30 CAPSULE, DELAYED RELEASE ORAL at 12:17

## 2022-05-11 RX ADMIN — PANTOPRAZOLE SODIUM 40 MILLIGRAM(S): 20 TABLET, DELAYED RELEASE ORAL at 06:24

## 2022-05-11 RX ADMIN — AMLODIPINE BESYLATE 10 MILLIGRAM(S): 2.5 TABLET ORAL at 06:24

## 2022-05-11 RX ADMIN — LIDOCAINE 1 APPLICATION(S): 4 CREAM TOPICAL at 06:25

## 2022-05-11 RX ADMIN — Medication 8 UNIT(S): at 09:31

## 2022-05-11 RX ADMIN — GABAPENTIN 100 MILLIGRAM(S): 400 CAPSULE ORAL at 17:45

## 2022-05-11 RX ADMIN — Medication 81 MILLIGRAM(S): at 11:20

## 2022-05-11 RX ADMIN — DULOXETINE HYDROCHLORIDE 60 MILLIGRAM(S): 30 CAPSULE, DELAYED RELEASE ORAL at 11:19

## 2022-05-11 RX ADMIN — INSULIN GLARGINE 4 UNIT(S): 100 INJECTION, SOLUTION SUBCUTANEOUS at 23:19

## 2022-05-11 RX ADMIN — Medication 8 UNIT(S): at 17:44

## 2022-05-11 RX ADMIN — Medication 2: at 09:31

## 2022-05-11 RX ADMIN — ENOXAPARIN SODIUM 40 MILLIGRAM(S): 100 INJECTION SUBCUTANEOUS at 23:18

## 2022-05-11 RX ADMIN — ATORVASTATIN CALCIUM 40 MILLIGRAM(S): 80 TABLET, FILM COATED ORAL at 23:19

## 2022-05-11 RX ADMIN — LISINOPRIL 40 MILLIGRAM(S): 2.5 TABLET ORAL at 06:24

## 2022-05-11 RX ADMIN — Medication 8 UNIT(S): at 13:14

## 2022-05-11 NOTE — PROGRESS NOTE ADULT - ATTENDING COMMENTS
no clinical change today, increased cymbalta delayed, will increase today to 90mg daily for neuropathic pain  no other change in plan

## 2022-05-11 NOTE — PROGRESS NOTE ADULT - PROBLEM SELECTOR PLAN 3
.  Patient is FULL CODE  -broached advanced directives, patient states her daughter knows what she wants  -will return to complete HCP designating daughter as alternate agent  -there are no additional [facility] resources that are available other than through [facility]    .  In addition to the E/M visit, an advance care planning meeting was performed. Start time: 12:00PM; End time: 12:16PM; Total time: 16min. A face to face meeting to discuss advance care planning was held today regarding: MORGAN RAVI. Primary decision maker: Patient is able to participate in decision making; Alternate/surrogate: Charles Ravi. Discussed advance directives including, but not limited to, healthcare proxy and code status. Decision regarding code status: FULL CODE; Documentation completed today: HCP [facility] note

## 2022-05-11 NOTE — PROGRESS NOTE ADULT - SUBJECTIVE AND OBJECTIVE BOX
CC: Patient is a 72y old  Female who presents with a chief complaint of episode of AMS (10 May 2022 10:24)      INTERVAL EVENTS: CAIT    SUBJECTIVE / INTERVAL HPI: Patient seen and examined at bedside.     ROS: negative unless otherwise stated above.    VITAL SIGNS:  Vital Signs Last 24 Hrs  T(C): 36.8 (11 May 2022 05:30), Max: 36.9 (10 May 2022 20:36)  T(F): 98.3 (11 May 2022 05:30), Max: 98.5 (10 May 2022 20:36)  HR: 71 (11 May 2022 05:30) (71 - 78)  BP: 150/72 (11 May 2022 05:30) (129/66 - 150/72)  BP(mean): --  RR: 18 (11 May 2022 05:30) (16 - 18)  SpO2: 98% (11 May 2022 05:30) (96% - 98%)        PHYSICAL EXAM:    General: NAD  HEENT: MMM  Neck: supple  Cardiovascular: +S1/S2; RRR  Respiratory: CTA B/L; no W/R/R  Gastrointestinal: soft, NT/ND  Extremities: WWP; no edema, clubbing or cyanosis  Vascular: 2+ radial, DP/PT pulses B/L  Neurological: AAOx3; B/L hip flexion: 4/5, knee flexion: 3/5, ankle:1/5, Ue: 5/5. relfexes: 1+ at patellar, 0 at ankle.    MEDICATIONS:  MEDICATIONS  (STANDING):  amLODIPine   Tablet 10 milliGRAM(s) Oral daily  aspirin  chewable 81 milliGRAM(s) Oral daily  atorvastatin 40 milliGRAM(s) Oral at bedtime  dextrose 5%. 1000 milliLiter(s) (50 mL/Hr) IV Continuous <Continuous>  dextrose 5%. 1000 milliLiter(s) (100 mL/Hr) IV Continuous <Continuous>  dextrose 50% Injectable 25 Gram(s) IV Push once  dextrose 50% Injectable 12.5 Gram(s) IV Push once  dextrose 50% Injectable 25 Gram(s) IV Push once  DULoxetine 60 milliGRAM(s) Oral daily  enoxaparin Injectable 40 milliGRAM(s) SubCutaneous every 24 hours  gabapentin 100 milliGRAM(s) Oral every 8 hours  glucagon  Injectable 1 milliGRAM(s) IntraMuscular once  insulin glargine Injectable (LANTUS) 4 Unit(s) SubCutaneous at bedtime  insulin lispro (ADMELOG) corrective regimen sliding scale   SubCutaneous three times a day before meals  insulin lispro Injectable (ADMELOG) 8 Unit(s) SubCutaneous three times a day before meals  lidocaine 2% Gel 1 Application(s) Topical three times a day  lisinopril 40 milliGRAM(s) Oral every 24 hours  pantoprazole    Tablet 40 milliGRAM(s) Oral before breakfast    MEDICATIONS  (PRN):  dextrose Oral Gel 15 Gram(s) Oral once PRN Blood Glucose LESS THAN 70 milliGRAM(s)/deciliter      ALLERGIES:  Allergies    No Known Allergies    Intolerances        LABS:              CAPILLARY BLOOD GLUCOSE      POCT Blood Glucose.: 153 mg/dL (11 May 2022 09:19)      RADIOLOGY & ADDITIONAL TESTS: Reviewed.

## 2022-05-11 NOTE — PROGRESS NOTE ADULT - SUBJECTIVE AND OBJECTIVE BOX
[facility] Geriatrics and Palliative Care  Liam Mcgee, Palliative Care Attending  Contact Info: Call [phone] (HEAL Line) or message on Microsoft Teams (Liam Mcgee)    SUBJECTIVE AND OBJECTIVE:  INTERVAL HPI/OVERNIGHT EVENTS:    ALLERGIES:  No Known Allergies    MEDICATIONS  (STANDING):  amLODIPine   Tablet 10 milliGRAM(s) Oral daily  aspirin  chewable 81 milliGRAM(s) Oral daily  atorvastatin 40 milliGRAM(s) Oral at bedtime  dextrose 5%. 1000 milliLiter(s) (50 mL/Hr) IV Continuous <Continuous>  dextrose 5%. 1000 milliLiter(s) (100 mL/Hr) IV Continuous <Continuous>  dextrose 50% Injectable 25 Gram(s) IV Push once  dextrose 50% Injectable 12.5 Gram(s) IV Push once  dextrose 50% Injectable 25 Gram(s) IV Push once  enoxaparin Injectable 40 milliGRAM(s) SubCutaneous every 24 hours  gabapentin 100 milliGRAM(s) Oral every 12 hours  glucagon  Injectable 1 milliGRAM(s) IntraMuscular once  insulin glargine Injectable (LANTUS) 4 Unit(s) SubCutaneous at bedtime  insulin lispro (ADMELOG) corrective regimen sliding scale   SubCutaneous three times a day before meals  insulin lispro Injectable (ADMELOG) 8 Unit(s) SubCutaneous three times a day before meals  lidocaine 2% Gel 1 Application(s) Topical three times a day  lisinopril 40 milliGRAM(s) Oral every 24 hours  pantoprazole    Tablet 40 milliGRAM(s) Oral before breakfast    MEDICATIONS  (PRN):  dextrose Oral Gel 15 Gram(s) Oral once PRN Blood Glucose LESS THAN 70 milliGRAM(s)/deciliter      Analgesic Use (Scheduled and PRNs) for past 24 hours:    DULoxetine   30 milliGRAM(s) Oral (05-11-22 @ 12:17)    DULoxetine   60 milliGRAM(s) Oral (05-11-22 @ 11:19)    gabapentin   100 milliGRAM(s) Oral (05-11-22 @ 06:24)   100 milliGRAM(s) Oral (05-10-22 @ 21:38)      ITEMS UNCHECKED ARE NOT PRESENT  PRESENT SYMPTOMS/REVIEW OF SYSTEMS: []Unable to obtain due to poor mentation   Source if other than patient:  []Family   []Team         Vital Signs Last 24 Hrs  T(C): 36.7 (11 May 2022 12:49), Max: 36.9 (10 May 2022 20:36)  T(F): 98 (11 May 2022 12:49), Max: 98.5 (10 May 2022 20:36)  HR: 73 (11 May 2022 12:49) (71 - 78)  BP: 118/69 (11 May 2022 12:49) (118/69 - 150/72)  BP(mean): --  RR: 17 (11 May 2022 12:49) (16 - 18)  SpO2: 96% (11 May 2022 12:49) (96% - 98%)    LABS:           RADIOLOGY & ADDITIONAL STUDIES: None new    DISCUSSION OF CASE: Family - to provide updates and emotional support    Care Coordination Document:                                        Progress Notes    PROGRESS NOTE  Date & Time of Note   2022-05-10 03:51    Notes    Notes: Chart reviewed. Medical team completed FMLA form on behalf of patient's  son Jean-Claude Ingram (P: [phone]). Patient's son Jean-Claude (who lives in [place])  should be able to assist in patient's care if FMLA is approved which would  increase patient's home support to two children. PT/OT will continue to work  with patient and hope for improvement with mobility.  to remain  available.       Electronically signed by:  Judith Mello  Electronically signed on:  2022-05-10  15:57       [facility] Geriatrics and Palliative Care  Liam cMgee, Palliative Care Attending  Contact Info: Call [phone] (HEAL Line) or message on Microsoft Teams (Liam Mcgee)    SUBJECTIVE AND OBJECTIVE:  INTERVAL HPI/OVERNIGHT EVENTS: No significant interval events. Cymbalta increased to 90mg daily. Patient is tolerating increase in Gabapentin dose but no significant change noted in pain level. Required no additional PRNs in past 24hrs.     ALLERGIES:  No Known Allergies    MEDICATIONS  (STANDING):  amLODIPine   Tablet 10 milliGRAM(s) Oral daily  aspirin  chewable 81 milliGRAM(s) Oral daily  atorvastatin 40 milliGRAM(s) Oral at bedtime  dextrose 5%. 1000 milliLiter(s) (50 mL/Hr) IV Continuous <Continuous>  dextrose 5%. 1000 milliLiter(s) (100 mL/Hr) IV Continuous <Continuous>  dextrose 50% Injectable 25 Gram(s) IV Push once  dextrose 50% Injectable 12.5 Gram(s) IV Push once  dextrose 50% Injectable 25 Gram(s) IV Push once  enoxaparin Injectable 40 milliGRAM(s) SubCutaneous every 24 hours  gabapentin 100 milliGRAM(s) Oral every 12 hours  glucagon  Injectable 1 milliGRAM(s) IntraMuscular once  insulin glargine Injectable (LANTUS) 4 Unit(s) SubCutaneous at bedtime  insulin lispro (ADMELOG) corrective regimen sliding scale   SubCutaneous three times a day before meals  insulin lispro Injectable (ADMELOG) 8 Unit(s) SubCutaneous three times a day before meals  lidocaine 2% Gel 1 Application(s) Topical three times a day  lisinopril 40 milliGRAM(s) Oral every 24 hours  pantoprazole    Tablet 40 milliGRAM(s) Oral before breakfast    Analgesic Use (Scheduled and PRNs) for past 24 hours:  DULoxetine   30 milliGRAM(s) Oral (05-11-22 @ 12:17)   60 milliGRAM(s) Oral (05-11-22 @ 11:19)  gabapentin   100 milliGRAM(s) Oral (05-11-22 @ 06:24)   100 milliGRAM(s) Oral (05-10-22 @ 21:38)    ITEMS UNCHECKED ARE NOT PRESENT  PRESENT SYMPTOMS/REVIEW OF SYSTEMS: []Unable to obtain due to poor mentation   Source if other than patient:  []Family   []Team     Pain: [x] yes [ ] no  QOL Impact - debilitating  Location - bilateral feet                   Aggravating Factors - pressure, movement  Quality - pins/needles  Radiation - throughout foot  Timing - constant  Severity (0-10 scale) - 7  Minimal Acceptable Level (0-10 scale) - 4    Dyspnea:                           []Mild  []Moderate []Severe  Anxiety:                             []Mild []Moderate []Severe  Fatigue:                             []Mild []Moderate []Severe  Nausea:                             []Mild []Moderate []Severe  Loss of Appetite:              []Mild []Moderate []Severe  Constipation:                    []Mild []Moderate []Severe    Other Symptoms:  [x]All Other Review Of Systems Negative     Palliative Performance Status Version 2:  40%  (Functional Assessment Tool)    PHYSICAL EXAM:  GENERAL:  [x]Alert  [x]Oriented x3   []Lethargic  []Cachexia  []Unarousable  [x]Verbal  []Non-Verbal  Behavioral:   []Anxiety  []Delirium []Agitation [x]Cooperative  HEENT:  [x]Normal   []Dry mouth   []ET Tube/Trach  []Oral lesions  PULMONARY:   [x]Clear []Tachypnea  []Audible excessive secretions   []Rhonchi        []Right []Left []Bilateral  []Crackles        []Right []Left []Bilateral  []Wheezing     []Right []Left []Bilateral  CARDIOVASCULAR:    [x]Regular []Irregular []Tachy  []Juan []Murmur []Other  GASTROINTESTINAL:  [x]Soft  []Distended   [x]+BS  [x]Non tender []Tender  []PEG []OGT/ NGT  Last BM:  GENITOURINARY:  [x]Normal [] Incontinent   []Oliguria/Anuria   [][place]  MUSCULOSKELETAL:   []Normal   [x]Weakness  []Bed/Wheelchair bound []Edema  NEUROLOGIC:   [x]No focal deficits  []Cognitive impairment  []Dysphagia []Dysarthria []Paresis []Encephalopathic   SKIN:   [x]Normal   []Pressure ulcer(s)  []Rash    Vital Signs Last 24 Hrs  T(C): 36.7 (11 May 2022 12:49), Max: 36.9 (10 May 2022 20:36)  T(F): 98 (11 May 2022 12:49), Max: 98.5 (10 May 2022 20:36)  HR: 73 (11 May 2022 12:49) (71 - 78)  BP: 118/69 (11 May 2022 12:49) (118/69 - 150/72)  BP(mean): --  RR: 17 (11 May 2022 12:49) (16 - 18)  SpO2: 96% (11 May 2022 12:49) (96% - 98%)    LABS: None new    RADIOLOGY & ADDITIONAL STUDIES: None new

## 2022-05-11 NOTE — PROGRESS NOTE ADULT - PROBLEM SELECTOR PLAN 1
Symmetrical LE weakness, worse distally compared to proximally   Symmetrical diminished sensation, longstanding diabetic peripheral neuropathy   Denies issues with incontinence, or back pain, no sensory line  MRI L/Spine:  - showing at the L3/4 leveI. There are degenerative   changes involving the facets bilaterally as well as ligamentum flavum   hypertrophy. This combination results in moderate central spinal canal   stenosis.   - At the L4/5 level, there is disc bulge abutting the L4 nerve roots   bilaterally. There are degenerative changes involving the facets   bilaterally (right greater than left) as well as ligamentum flavum   hypertrophy. This combination results in mild central spinal canal   stenosis.    - Likely progression of diabetic neuropathy, however symptoms more severe than to be expected.  - Neurology team saying that since it is diabetic in origin it is unlikely that the patient will make significant improvement in the short term.  - incr cymbalta 90mg daily   - c/w Gabapentin 100 BID  - c/w Custom AFO boots   - c/w incentive spirometry

## 2022-05-11 NOTE — PROGRESS NOTE ADULT - CONVERSATION DETAILS
Discussed role and completion of Healthcare Proxy form. Patient designating her daughter as her alternate. Reviewed the situations in which the HCP form would come to be in effect. Copy placed in the chart and original returned to patient. Primary Agent: Charles Doss, [phone]

## 2022-05-11 NOTE — PROGRESS NOTE ADULT - ASSESSMENT
72y Female with PMHx of T2DM c/b peripheral neuropathy, HTN, recent admission and workup for Naty Sanchez tear, TIA on 3/30 (MR negative) presents to [facility] as transfer from [facility] ED for episode of altered mental status (staring, generalized weakness), back to baseline in [facility] ED, no acute pathology on CTH or MRI, found to have metabolic encephalopathy (resolved) 2/2 UTI (completed 7day abx) vs hypertension, also found to have new LE weakness of unclear origin, possible diabetic neuropathy. Due to immigration and insurance status, not able to FELICIA as recommended by PT. Pending improvement to one-person assist in order to discharge home with out-of-pocket PT vs home with family assist

## 2022-05-11 NOTE — PROGRESS NOTE ADULT - PROBLEM SELECTOR PLAN 2
.  PPSV = 40%, requires assistance for most ADLs  -PT Eval recommending FELICIA but not possible  -c/w supportive care, OOB, encourage movement

## 2022-05-11 NOTE — PROGRESS NOTE ADULT - PROBLEM SELECTOR PLAN 4
.  Complex medical decision making / symptom management in the setting of peripheral neuropathy.    Emotional support provided, questions answered.  Active Psychosocial Referrals:  [x]Social Work/Case management []PT/OT []Chaplaincy []Hospice  []Patient/Family Support []Holistic RN [x]Massage Therapy []Ethics  Coping: [x] well [] with difficulty [] poor coping [] unable to assess  Support system: [] strong [] adequate [x] inadequate    For new or uncontrolled symptoms, please call Palliative Care at [phone]. The service is available 24/7 (including nights & weekends) to provide symptom management recommendations over the phone as appropriate

## 2022-05-11 NOTE — PROGRESS NOTE ADULT - ASSESSMENT
·	tolerating medication adjustment, baseline mental status  ·	asked Palliative Massage Therapist to visit for further supportive care 73yo F with PMH of T2DM, Peripheral Neuropathy, and HTN p/w AMS in the setting of UTI +/- uncontrolled HTN. Palliative consulted for complex medical decision making in the setting of prolonged hospitalization.    ·	tolerating medication adjustment, baseline mental status  ·	asked Palliative Massage Therapist to visit for further supportive care

## 2022-05-11 NOTE — PROGRESS NOTE ADULT - PROBLEM SELECTOR PLAN 3
Needle like pain shins downward, interfering with sleep.   - Cymbalta 90 Qd for diabetic neuropathy  - Gabapentin 100 BID--unable to increase as lethargy earlier in admission due to this med which was dc'd

## 2022-05-11 NOTE — PROGRESS NOTE ADULT - PROBLEM SELECTOR PLAN 1
.  -c/w Gabapentin 100mg PO TID, overtly tolerating at this time  -c/w Cymbalta 90mg PO daily  -c/w topical Lidocaine (only topical option available in pharmacy)

## 2022-05-12 LAB
GLUCOSE BLDC GLUCOMTR-MCNC: 115 MG/DL — HIGH (ref 70–99)
GLUCOSE BLDC GLUCOMTR-MCNC: 181 MG/DL — HIGH (ref 70–99)
GLUCOSE BLDC GLUCOMTR-MCNC: 208 MG/DL — HIGH (ref 70–99)

## 2022-05-12 PROCEDURE — 99231 SBSQ HOSP IP/OBS SF/LOW 25: CPT

## 2022-05-12 RX ORDER — IBUPROFEN 200 MG
400 TABLET ORAL ONCE
Refills: 0 | Status: COMPLETED | OUTPATIENT
Start: 2022-05-12 | End: 2022-05-12

## 2022-05-12 RX ADMIN — Medication 8 UNIT(S): at 17:32

## 2022-05-12 RX ADMIN — LIDOCAINE 1 APPLICATION(S): 4 CREAM TOPICAL at 22:30

## 2022-05-12 RX ADMIN — Medication 81 MILLIGRAM(S): at 12:19

## 2022-05-12 RX ADMIN — DULOXETINE HYDROCHLORIDE 90 MILLIGRAM(S): 30 CAPSULE, DELAYED RELEASE ORAL at 10:33

## 2022-05-12 RX ADMIN — PANTOPRAZOLE SODIUM 40 MILLIGRAM(S): 20 TABLET, DELAYED RELEASE ORAL at 06:06

## 2022-05-12 RX ADMIN — LISINOPRIL 40 MILLIGRAM(S): 2.5 TABLET ORAL at 06:06

## 2022-05-12 RX ADMIN — ENOXAPARIN SODIUM 40 MILLIGRAM(S): 100 INJECTION SUBCUTANEOUS at 22:25

## 2022-05-12 RX ADMIN — Medication 400 MILLIGRAM(S): at 17:42

## 2022-05-12 RX ADMIN — LIDOCAINE 1 APPLICATION(S): 4 CREAM TOPICAL at 10:33

## 2022-05-12 RX ADMIN — AMLODIPINE BESYLATE 10 MILLIGRAM(S): 2.5 TABLET ORAL at 06:06

## 2022-05-12 RX ADMIN — ATORVASTATIN CALCIUM 40 MILLIGRAM(S): 80 TABLET, FILM COATED ORAL at 22:25

## 2022-05-12 RX ADMIN — LIDOCAINE 1 APPLICATION(S): 4 CREAM TOPICAL at 02:11

## 2022-05-12 RX ADMIN — Medication 2: at 08:44

## 2022-05-12 RX ADMIN — Medication 8 UNIT(S): at 12:49

## 2022-05-12 RX ADMIN — Medication 8 UNIT(S): at 08:44

## 2022-05-12 RX ADMIN — GABAPENTIN 100 MILLIGRAM(S): 400 CAPSULE ORAL at 18:05

## 2022-05-12 RX ADMIN — GABAPENTIN 100 MILLIGRAM(S): 400 CAPSULE ORAL at 06:06

## 2022-05-12 RX ADMIN — Medication 400 MILLIGRAM(S): at 16:50

## 2022-05-12 RX ADMIN — Medication 4: at 12:49

## 2022-05-12 RX ADMIN — LIDOCAINE 1 APPLICATION(S): 4 CREAM TOPICAL at 14:35

## 2022-05-12 NOTE — PROGRESS NOTE ADULT - ATTENDING COMMENTS
she is clinically stable with evidence of severe diabetic neuropathy and mild cognitive impairment. pain continues.  cymbalta increase to 90mg yesterday, no clear benefit yet. avoiding increased gabapentin as it previously caused altered mental status.    cannot be safely discharged to lack of home resources to ensure safe ambulation and transfers

## 2022-05-12 NOTE — PROGRESS NOTE ADULT - ASSESSMENT
72y Female with PMHx of T2DM c/b peripheral neuropathy, HTN, recent admission and workup for Naty Sanchez tear, TIA on 3/30 (MR negative) presents to [facility] as transfer from [facility] ED for episode of altered mental status (staring, generalized weakness), back to baseline in [facility] ED, no acute pathology on CTH or MRI, found to have metabolic encephalopathy (resolved) 2/2 UTI (completed 7day abx) vs hypertension, also found to have new LE weakness of unclear origin, possible diabetic neuropathy. Due to immigration and insurance status, not able to FELICIA as recommended by PT. Pending improvement to one-person assist in order to discharge home with out-of-pocket PT vs home with family assist 72y Female with PMHx of T2DM c/b peripheral neuropathy, HTN, recent admission and workup for Naty Sanchez tear, TIA on 3/30 (MR negative) presents to [facility] as transfer from [facility] ED for episode of altered mental status (staring, generalized weakness), back to baseline in [facility] ED, no acute pathology on CTH or MRI, found to have metabolic encephalopathy (resolved) 2/2 UTI (completed 7day abx) vs hypertension, also found to have new LE weakness due to diabetic neuropathy. Due to immigration and insurance status, not able to FELICIA as recommended by PT. Pending improvement to one-person assist in order to discharge home with out-of-pocket PT vs home with family assist

## 2022-05-12 NOTE — PROGRESS NOTE ADULT - SUBJECTIVE AND OBJECTIVE BOX
CC: Patient is a 72y old  Female who presents with a chief complaint of episode of AMS (11 May 2022 17:21)      INTERVAL EVENTS: CAIT    SUBJECTIVE / INTERVAL HPI: Patient seen and examined at bedside.     ROS: negative unless otherwise stated above.    VITAL SIGNS:  Vital Signs Last 24 Hrs  T(C): 36.6 (12 May 2022 05:25), Max: 36.7 (11 May 2022 12:49)  T(F): 97.9 (12 May 2022 05:25), Max: 98 (11 May 2022 12:49)  HR: 67 (12 May 2022 05:25) (67 - 73)  BP: 149/73 (12 May 2022 05:25) (118/69 - 149/73)  BP(mean): --  RR: 17 (12 May 2022 05:25) (17 - 18)  SpO2: 98% (12 May 2022 05:25) (96% - 98%)      05-11-22 @ 07:01  -  05-12-22 @ 07:00  --------------------------------------------------------  IN: 0 mL / OUT: 1000 mL / NET: -1000 mL        PHYSICAL EXAM:    General: NAD  HEENT: MMM  Neck: supple  Cardiovascular: +S1/S2; RRR  Respiratory: CTA B/L; no W/R/R  Gastrointestinal: soft, NT/ND  Extremities: WWP; no edema, clubbing or cyanosis  Vascular: 2+ radial, DP/PT pulses B/L  Neurological: AAOx3; no focal deficits    MEDICATIONS:  MEDICATIONS  (STANDING):  amLODIPine   Tablet 10 milliGRAM(s) Oral daily  aspirin  chewable 81 milliGRAM(s) Oral daily  atorvastatin 40 milliGRAM(s) Oral at bedtime  dextrose 5%. 1000 milliLiter(s) (50 mL/Hr) IV Continuous <Continuous>  dextrose 5%. 1000 milliLiter(s) (100 mL/Hr) IV Continuous <Continuous>  dextrose 50% Injectable 25 Gram(s) IV Push once  dextrose 50% Injectable 12.5 Gram(s) IV Push once  dextrose 50% Injectable 25 Gram(s) IV Push once  DULoxetine 90 milliGRAM(s) Oral every 24 hours  enoxaparin Injectable 40 milliGRAM(s) SubCutaneous every 24 hours  gabapentin 100 milliGRAM(s) Oral every 12 hours  glucagon  Injectable 1 milliGRAM(s) IntraMuscular once  insulin glargine Injectable (LANTUS) 4 Unit(s) SubCutaneous at bedtime  insulin lispro (ADMELOG) corrective regimen sliding scale   SubCutaneous three times a day before meals  insulin lispro Injectable (ADMELOG) 8 Unit(s) SubCutaneous three times a day before meals  lidocaine 2% Gel 1 Application(s) Topical three times a day  lisinopril 40 milliGRAM(s) Oral every 24 hours  pantoprazole    Tablet 40 milliGRAM(s) Oral before breakfast    MEDICATIONS  (PRN):  dextrose Oral Gel 15 Gram(s) Oral once PRN Blood Glucose LESS THAN 70 milliGRAM(s)/deciliter      ALLERGIES:  Allergies    No Known Allergies    Intolerances        LABS:              CAPILLARY BLOOD GLUCOSE      POCT Blood Glucose.: 181 mg/dL (12 May 2022 08:22)      RADIOLOGY & ADDITIONAL TESTS: Reviewed.

## 2022-05-13 DIAGNOSIS — Z71.89 OTHER SPECIFIED COUNSELING: ICD-10-CM

## 2022-05-13 DIAGNOSIS — R53.81 OTHER MALAISE: ICD-10-CM

## 2022-05-13 DIAGNOSIS — Z51.5 ENCOUNTER FOR PALLIATIVE CARE: ICD-10-CM

## 2022-05-13 DIAGNOSIS — G62.9 POLYNEUROPATHY, UNSPECIFIED: ICD-10-CM

## 2022-05-13 LAB
GLUCOSE BLDC GLUCOMTR-MCNC: 118 MG/DL — HIGH (ref 70–99)
GLUCOSE BLDC GLUCOMTR-MCNC: 139 MG/DL — HIGH (ref 70–99)
GLUCOSE BLDC GLUCOMTR-MCNC: 142 MG/DL — HIGH (ref 70–99)
GLUCOSE BLDC GLUCOMTR-MCNC: 168 MG/DL — HIGH (ref 70–99)
GLUCOSE BLDC GLUCOMTR-MCNC: 183 MG/DL — HIGH (ref 70–99)

## 2022-05-13 PROCEDURE — 99231 SBSQ HOSP IP/OBS SF/LOW 25: CPT

## 2022-05-13 RX ADMIN — Medication 81 MILLIGRAM(S): at 11:53

## 2022-05-13 RX ADMIN — GABAPENTIN 100 MILLIGRAM(S): 400 CAPSULE ORAL at 17:08

## 2022-05-13 RX ADMIN — LIDOCAINE 1 APPLICATION(S): 4 CREAM TOPICAL at 06:14

## 2022-05-13 RX ADMIN — AMLODIPINE BESYLATE 10 MILLIGRAM(S): 2.5 TABLET ORAL at 06:14

## 2022-05-13 RX ADMIN — Medication 8 UNIT(S): at 12:23

## 2022-05-13 RX ADMIN — Medication 8 UNIT(S): at 09:04

## 2022-05-13 RX ADMIN — ATORVASTATIN CALCIUM 40 MILLIGRAM(S): 80 TABLET, FILM COATED ORAL at 22:20

## 2022-05-13 RX ADMIN — ENOXAPARIN SODIUM 40 MILLIGRAM(S): 100 INJECTION SUBCUTANEOUS at 22:20

## 2022-05-13 RX ADMIN — LIDOCAINE 1 APPLICATION(S): 4 CREAM TOPICAL at 14:45

## 2022-05-13 RX ADMIN — LISINOPRIL 40 MILLIGRAM(S): 2.5 TABLET ORAL at 06:14

## 2022-05-13 RX ADMIN — INSULIN GLARGINE 4 UNIT(S): 100 INJECTION, SOLUTION SUBCUTANEOUS at 00:10

## 2022-05-13 RX ADMIN — DULOXETINE HYDROCHLORIDE 90 MILLIGRAM(S): 30 CAPSULE, DELAYED RELEASE ORAL at 11:53

## 2022-05-13 RX ADMIN — LIDOCAINE 1 APPLICATION(S): 4 CREAM TOPICAL at 22:21

## 2022-05-13 RX ADMIN — PANTOPRAZOLE SODIUM 40 MILLIGRAM(S): 20 TABLET, DELAYED RELEASE ORAL at 06:14

## 2022-05-13 RX ADMIN — INSULIN GLARGINE 4 UNIT(S): 100 INJECTION, SOLUTION SUBCUTANEOUS at 22:22

## 2022-05-13 RX ADMIN — Medication 8 UNIT(S): at 17:53

## 2022-05-13 RX ADMIN — GABAPENTIN 100 MILLIGRAM(S): 400 CAPSULE ORAL at 06:14

## 2022-05-13 RX ADMIN — Medication 2: at 12:24

## 2022-05-13 NOTE — PROGRESS NOTE ADULT - ASSESSMENT
72y Female with PMHx of T2DM c/b peripheral neuropathy, HTN, recent admission and workup for Naty Sanchez tear, TIA on 3/30 (MR negative) presents to [facility] as transfer from [facility] ED for episode of altered mental status (staring, generalized weakness), back to baseline in [facility] ED, no acute pathology on CTH or MRI, found to have metabolic encephalopathy (resolved) 2/2 UTI (completed 7day abx) vs hypertension, also found to have new LE weakness due to diabetic neuropathy. Due to immigration and insurance status, not able to FELICIA as recommended by PT. Pending improvement to one-person assist in order to discharge home with out-of-pocket PT vs home with family assist

## 2022-05-13 NOTE — PROGRESS NOTE ADULT - ATTENDING COMMENTS
continues to be clinically stable with severe diabetic neuropathy and mild cognitive impairment.  continues to have neuropathic pain but cymbalta recently increased to 90mg so will not make rapid med changes at this time. continue gabapentin 100mg TID, not increasing as this previously caused confusion.    unable to discharged due to safety ambulating at home without 2 person assist.

## 2022-05-13 NOTE — PROGRESS NOTE ADULT - SUBJECTIVE AND OBJECTIVE BOX
CC: Patient is a 72y old  Female who presents with a chief complaint of episode of AMS (13 May 2022 07:41)      INTERVAL EVENTS: CAIT    SUBJECTIVE / INTERVAL HPI: Patient seen and examined at bedside.     ROS: negative unless otherwise stated above.    VITAL SIGNS:  Vital Signs Last 24 Hrs  T(C): 36.2 (13 May 2022 06:01), Max: 36.9 (12 May 2022 11:48)  T(F): 97.2 (13 May 2022 06:01), Max: 98.4 (12 May 2022 11:48)  HR: 68 (13 May 2022 06:01) (68 - 76)  BP: 132/72 (13 May 2022 06:01) (126/69 - 132/72)  BP(mean): --  RR: 18 (13 May 2022 06:01) (18 - 18)  SpO2: 98% (13 May 2022 06:01) (98% - 99%)      05-12-22 @ 07:01  -  05-13-22 @ 07:00  --------------------------------------------------------  IN: 340 mL / OUT: 600 mL / NET: -260 mL        PHYSICAL EXAM:    General: NAD  HEENT: MMM  Neck: supple  Cardiovascular: +S1/S2; RRR  Respiratory: CTA B/L; no W/R/R  Gastrointestinal: soft, NT/ND  Extremities: WWP; no edema, clubbing or cyanosis  Vascular: 2+ radial, DP/PT pulses B/L  Neurological: AAOx3; B/L hip flexion: 4/5, knee flexion: 3/5, ankle:1/5, Ue: 5/5. relfexes: 1+ at patellar, 0 at ankle.    MEDICATIONS:  MEDICATIONS  (STANDING):  amLODIPine   Tablet 10 milliGRAM(s) Oral daily  aspirin  chewable 81 milliGRAM(s) Oral daily  atorvastatin 40 milliGRAM(s) Oral at bedtime  dextrose 5%. 1000 milliLiter(s) (50 mL/Hr) IV Continuous <Continuous>  dextrose 5%. 1000 milliLiter(s) (100 mL/Hr) IV Continuous <Continuous>  dextrose 50% Injectable 25 Gram(s) IV Push once  dextrose 50% Injectable 12.5 Gram(s) IV Push once  dextrose 50% Injectable 25 Gram(s) IV Push once  DULoxetine 90 milliGRAM(s) Oral every 24 hours  enoxaparin Injectable 40 milliGRAM(s) SubCutaneous every 24 hours  gabapentin 100 milliGRAM(s) Oral every 12 hours  glucagon  Injectable 1 milliGRAM(s) IntraMuscular once  insulin glargine Injectable (LANTUS) 4 Unit(s) SubCutaneous at bedtime  insulin lispro (ADMELOG) corrective regimen sliding scale   SubCutaneous three times a day before meals  insulin lispro Injectable (ADMELOG) 8 Unit(s) SubCutaneous three times a day before meals  lidocaine 2% Gel 1 Application(s) Topical three times a day  lisinopril 40 milliGRAM(s) Oral every 24 hours  pantoprazole    Tablet 40 milliGRAM(s) Oral before breakfast    MEDICATIONS  (PRN):  dextrose Oral Gel 15 Gram(s) Oral once PRN Blood Glucose LESS THAN 70 milliGRAM(s)/deciliter      ALLERGIES:  Allergies    No Known Allergies    Intolerances        LABS:              CAPILLARY BLOOD GLUCOSE      POCT Blood Glucose.: 118 mg/dL (13 May 2022 08:52)      RADIOLOGY & ADDITIONAL TESTS: Reviewed.

## 2022-05-14 LAB
GLUCOSE BLDC GLUCOMTR-MCNC: 146 MG/DL — HIGH (ref 70–99)
GLUCOSE BLDC GLUCOMTR-MCNC: 149 MG/DL — HIGH (ref 70–99)
GLUCOSE BLDC GLUCOMTR-MCNC: 159 MG/DL — HIGH (ref 70–99)
GLUCOSE BLDC GLUCOMTR-MCNC: 164 MG/DL — HIGH (ref 70–99)
GLUCOSE BLDC GLUCOMTR-MCNC: 167 MG/DL — HIGH (ref 70–99)

## 2022-05-14 PROCEDURE — 99231 SBSQ HOSP IP/OBS SF/LOW 25: CPT

## 2022-05-14 RX ADMIN — Medication 8 UNIT(S): at 12:45

## 2022-05-14 RX ADMIN — PANTOPRAZOLE SODIUM 40 MILLIGRAM(S): 20 TABLET, DELAYED RELEASE ORAL at 06:24

## 2022-05-14 RX ADMIN — Medication 8 UNIT(S): at 09:36

## 2022-05-14 RX ADMIN — LIDOCAINE 1 APPLICATION(S): 4 CREAM TOPICAL at 22:30

## 2022-05-14 RX ADMIN — AMLODIPINE BESYLATE 10 MILLIGRAM(S): 2.5 TABLET ORAL at 06:24

## 2022-05-14 RX ADMIN — LIDOCAINE 1 APPLICATION(S): 4 CREAM TOPICAL at 16:25

## 2022-05-14 RX ADMIN — Medication 8 UNIT(S): at 17:20

## 2022-05-14 RX ADMIN — GABAPENTIN 100 MILLIGRAM(S): 400 CAPSULE ORAL at 06:24

## 2022-05-14 RX ADMIN — LIDOCAINE 1 APPLICATION(S): 4 CREAM TOPICAL at 06:24

## 2022-05-14 RX ADMIN — Medication 81 MILLIGRAM(S): at 12:46

## 2022-05-14 RX ADMIN — INSULIN GLARGINE 4 UNIT(S): 100 INJECTION, SOLUTION SUBCUTANEOUS at 22:56

## 2022-05-14 RX ADMIN — GABAPENTIN 100 MILLIGRAM(S): 400 CAPSULE ORAL at 17:19

## 2022-05-14 RX ADMIN — Medication 2: at 12:47

## 2022-05-14 RX ADMIN — ATORVASTATIN CALCIUM 40 MILLIGRAM(S): 80 TABLET, FILM COATED ORAL at 22:41

## 2022-05-14 RX ADMIN — LISINOPRIL 40 MILLIGRAM(S): 2.5 TABLET ORAL at 06:24

## 2022-05-14 RX ADMIN — DULOXETINE HYDROCHLORIDE 90 MILLIGRAM(S): 30 CAPSULE, DELAYED RELEASE ORAL at 09:38

## 2022-05-14 RX ADMIN — Medication 2: at 09:36

## 2022-05-14 RX ADMIN — ENOXAPARIN SODIUM 40 MILLIGRAM(S): 100 INJECTION SUBCUTANEOUS at 22:42

## 2022-05-14 NOTE — PROGRESS NOTE ADULT - ATTENDING COMMENTS
stable memory loss likely due to microvascular disease and severe diabetic neuropathy    continue cymbalta 90mg daily, gabapentin 100mg TID  no vitals or blood draws overnight  does not have adequate care at home for safe discharge at this time and cannot transfer to [facility]

## 2022-05-14 NOTE — PROGRESS NOTE ADULT - ASSESSMENT
72y Female with PMHx of T2DM c/b peripheral neuropathy, HTN, recent admission and workup for Naty Sanchez tear, TIA on 3/30 (MR negative) presents to [facility] as transfer from [facility] ED for episode of altered mental status (staring, generalized weakness), back to baseline in [facility] ED, no acute pathology on CTH or MRI, found to have metabolic encephalopathy (resolved) 2/2 UTI (completed 7day abx) vs hypertension, also found to have new LE weakness due to diabetic neuropathy. Due to immigration and insurance status, not able to FELICIA as recommended by PT. Pending improvement to one-person assist in order to discharge home with out-of-pocket PT vs home with family assist:     Problem/Plan - 1:  ·  Problem: Leg weakness.   ·  Plan: Symmetrical LE weakness, worse distally compared to proximally   Symmetrical diminished sensation, longstanding diabetic peripheral neuropathy   Denies issues with incontinence, or back pain, no sensory line  MRI L/Spine:  - showing at the L3/4 leveI. There are degenerative   changes involving the facets bilaterally as well as ligamentum flavum   hypertrophy. This combination results in moderate central spinal canal   stenosis.   - At the L4/5 level, there is disc bulge abutting the L4 nerve roots   bilaterally. There are degenerative changes involving the facets   bilaterally (right greater than left) as well as ligamentum flavum   hypertrophy. This combination results in mild central spinal canal   stenosis.  - Likely progression of diabetic neuropathy, however symptoms more severe than to be expected.  - Neurology team saying that since it is diabetic in origin it is unlikely that the patient will make significant improvement in the short term.  - incr cymbalta 90mg daily   - c/w Gabapentin 100 BID  - c/w Custom AFO boots   - c/w incentive spirometry.     Problem/Plan - 2:  ·  Problem: Neck pain.   ·  Plan: Neck and shoulder pain on 5/3. Tender to palpation, does not radiate  - Hot packs daily  - off ibuprofen now.     Problem/Plan - 3:  ·  Problem: Bilateral leg pain.   ·  Plan: Needle like pain shins downward, interfering with sleep.   - Cymbalta 90 Qd for diabetic neuropathy  - Gabapentin 100 BID--unable to increase as lethargy earlier in admission due to this med which was dc'd.     Problem/Plan - 4:  ·  Problem: Type II diabetes mellitus.   ·  Plan: Pt with hx of DM, A1c 11.6. Endocrine following. TSH results: 1.120.   - Low C peptide patient will need insulin and NOT oral agents on dc  - C/w mISS, before meals   - C/w Lantus 4U, Lispro 8U   - F/u endocrine recs.     Problem/Plan - 5:  ·  Problem: Hypertension.   ·  Plan: Goal -160  - c/w Amlodipine 10mg (new med), home Lisinopril 40mg   - TTE with bubble done prior admission 4/1: grade 1 left ventricular diastolic dysfunction, no PFO, EF >83% hyperdynamic left ventricular systolic function.     Problem/Plan - 6:  ·  Problem: Hyperlipidemia.   ·  Plan: -C/w Lipitor 40mg.     Problem/Plan - 7:  ·  Problem: Naty-Sanchez tear.   ·  Plan: Hx of Naty Sanchez tear in prior admission   - continue home protonix 40mg daily  - Restarted APT.     Problem/Plan - 8:  ·  Problem: UTI (urinary tract infection).   ·  Plan: RESOLVED  UA with +WBCs, UCx growing E faecalis. Will treat as complicated  -completed 7 day course of CTX and then ampicillin.     Problem/Plan - 9:  ·  Problem: Nutrition, metabolism, and development symptoms.   ·  Plan: F: None  E: Replete PRN   N: DASH/TLC   DVT: Lovenox 40 qd  Dispo: RMF, PT rec [facility]- however patient is uninsured and cannot go to [facility] attempting to get to one person assist vs pending paperwork of his son for possible FMLA.

## 2022-05-14 NOTE — PROGRESS NOTE ADULT - SUBJECTIVE AND OBJECTIVE BOX
Neurology Progress Note    Interval History:    Patient was seen and examined, no acute event over night.     Medications:  amLODIPine   Tablet 10 milliGRAM(s) Oral daily  aspirin  chewable 81 milliGRAM(s) Oral daily  atorvastatin 40 milliGRAM(s) Oral at bedtime  dextrose 5%. 1000 milliLiter(s) IV Continuous <Continuous>  dextrose 5%. 1000 milliLiter(s) IV Continuous <Continuous>  dextrose 50% Injectable 25 Gram(s) IV Push once  dextrose 50% Injectable 12.5 Gram(s) IV Push once  dextrose 50% Injectable 25 Gram(s) IV Push once  dextrose Oral Gel 15 Gram(s) Oral once PRN  DULoxetine 90 milliGRAM(s) Oral every 24 hours  enoxaparin Injectable 40 milliGRAM(s) SubCutaneous every 24 hours  gabapentin 100 milliGRAM(s) Oral every 12 hours  glucagon  Injectable 1 milliGRAM(s) IntraMuscular once  insulin glargine Injectable (LANTUS) 4 Unit(s) SubCutaneous at bedtime  insulin lispro (ADMELOG) corrective regimen sliding scale   SubCutaneous three times a day before meals  insulin lispro Injectable (ADMELOG) 8 Unit(s) SubCutaneous three times a day before meals  lidocaine 2% Gel 1 Application(s) Topical three times a day  lisinopril 40 milliGRAM(s) Oral every 24 hours  pantoprazole    Tablet 40 milliGRAM(s) Oral before breakfast      Vital Signs Last 24 Hrs  T(C): 36.7 (14 May 2022 05:06), Max: 36.7 (13 May 2022 11:59)  T(F): 98 (14 May 2022 05:06), Max: 98 (13 May 2022 11:59)  HR: 82 (14 May 2022 05:06) (79 - 82)  BP: 155/82 (14 May 2022 05:06) (109/66 - 155/82)  BP(mean): --  RR: 19 (14 May 2022 05:06) (18 - 19)  SpO2: 98% (14 May 2022 05:06) (96% - 98%)    Neurological Examination:  General:  Appearance is consistent with chronologic age.   Cognitive/Language:  Awake, alert, and oriented to person, place, time and date.  Recent and remote memory intact.  Fund of knowledge is appropriate.  Cranial Nerves  - Eyes: Visual acuity intact, Visual fields full.  EOMI w/o nystagmus, skew or reported double vision.  PERRL.  No ptosis/weakness of eyelid closure.    - Face:  Facial sensation normal V1 - 3, no facial asymmetry.    - Ears/Nose/Throat:  Hearing grossly intact b/l to finger rub.  Palate elevates midline.  Tongue and uvula midline.   Motor examination:  (MRC grade R/L/L hip flexion: 4/5, knee flexion: 3/5, ankle:1/5, UE: 5/5 b/l. No tenderness, twitching, tremors or involuntary movements.  Sensory examination:  Intact to light touch and pinprick, pain, temperature and proprioception and vibration in all extremities.  Reflexes:  2+ b/l biceps, triceps, 1+ patella and achilles.   Cerebellum:   FTN intact.  No dysmetria.    Gait unable, two person assist.     Labs:      RADIOLOGY & ADDITIONAL TESTS:  < from: MR Head No Cont (04.08.22 @ 22:55) >  IMPRESSION:    Severe chronic microvascular ischemic disease. 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    Chronic hypertensive encephalopathy.    --- End of Report ---    < end of copied text >

## 2022-05-15 LAB
GLUCOSE BLDC GLUCOMTR-MCNC: 140 MG/DL — HIGH (ref 70–99)
GLUCOSE BLDC GLUCOMTR-MCNC: 145 MG/DL — HIGH (ref 70–99)
GLUCOSE BLDC GLUCOMTR-MCNC: 149 MG/DL — HIGH (ref 70–99)
GLUCOSE BLDC GLUCOMTR-MCNC: 94 MG/DL — SIGNIFICANT CHANGE UP (ref 70–99)

## 2022-05-15 PROCEDURE — 99231 SBSQ HOSP IP/OBS SF/LOW 25: CPT

## 2022-05-15 RX ADMIN — GABAPENTIN 100 MILLIGRAM(S): 400 CAPSULE ORAL at 06:48

## 2022-05-15 RX ADMIN — ENOXAPARIN SODIUM 40 MILLIGRAM(S): 100 INJECTION SUBCUTANEOUS at 21:52

## 2022-05-15 RX ADMIN — LIDOCAINE 1 APPLICATION(S): 4 CREAM TOPICAL at 16:51

## 2022-05-15 RX ADMIN — Medication 81 MILLIGRAM(S): at 11:55

## 2022-05-15 RX ADMIN — DULOXETINE HYDROCHLORIDE 90 MILLIGRAM(S): 30 CAPSULE, DELAYED RELEASE ORAL at 11:55

## 2022-05-15 RX ADMIN — AMLODIPINE BESYLATE 10 MILLIGRAM(S): 2.5 TABLET ORAL at 06:48

## 2022-05-15 RX ADMIN — Medication 8 UNIT(S): at 13:05

## 2022-05-15 RX ADMIN — LISINOPRIL 40 MILLIGRAM(S): 2.5 TABLET ORAL at 06:48

## 2022-05-15 RX ADMIN — Medication 8 UNIT(S): at 18:22

## 2022-05-15 RX ADMIN — INSULIN GLARGINE 4 UNIT(S): 100 INJECTION, SOLUTION SUBCUTANEOUS at 21:51

## 2022-05-15 RX ADMIN — Medication 8 UNIT(S): at 09:09

## 2022-05-15 RX ADMIN — GABAPENTIN 100 MILLIGRAM(S): 400 CAPSULE ORAL at 18:22

## 2022-05-15 RX ADMIN — LIDOCAINE 1 APPLICATION(S): 4 CREAM TOPICAL at 22:17

## 2022-05-15 RX ADMIN — ATORVASTATIN CALCIUM 40 MILLIGRAM(S): 80 TABLET, FILM COATED ORAL at 21:51

## 2022-05-15 RX ADMIN — PANTOPRAZOLE SODIUM 40 MILLIGRAM(S): 20 TABLET, DELAYED RELEASE ORAL at 06:48

## 2022-05-15 RX ADMIN — LIDOCAINE 1 APPLICATION(S): 4 CREAM TOPICAL at 06:51

## 2022-05-15 NOTE — PROGRESS NOTE ADULT - ATTENDING COMMENTS
clinically stable vascular related cognitive impairment and severe diabetic neuropathy    continue cymbalta 90mg Daily, gabapentin 100mg TID.  higher doses of gabapentin previously caused episodes of encephalopathy  discharge delayed due to inability to have adequate assistance at home clinically stable vascular related cognitive impairment and severe diabetic neuropathy    continue cymbalta 90mg Daily, gabapentin 100mg BID.  higher doses of gabapentin previously caused episodes of encephalopathy  discharge delayed due to inability to have adequate assistance at home

## 2022-05-15 NOTE — PROGRESS NOTE ADULT - SUBJECTIVE AND OBJECTIVE BOX
OVERNIGHT EVENTS:    SUBJECTIVE:  Patient seen and examined at bedside.    Vital Signs Last 12 Hrs  T(F): 97.5 (05-15-22 @ 05:56), Max: 97.5 (05-15-22 @ 05:56)  HR: 71 (05-15-22 @ 05:56) (71 - 71)  BP: 155/78 (05-15-22 @ 05:56) (155/78 - 155/78)  BP(mean): --  RR: 18 (05-15-22 @ 05:56) (18 - 18)  SpO2: 100% (05-15-22 @ 05:56) (100% - 100%)  I&O's Summary    14 May 2022 07:01  -  15 May 2022 07:00  --------------------------------------------------------  IN: 0 mL / OUT: 700 mL / NET: -700 mL        PHYSICAL EXAM:  Constitutional: NAD, comfortable in bed.  HEENT: NC/AT, PERRLA, EOMI, no conjunctival pallor or scleral icterus, MMM  Neck: Supple, no JVD  Respiratory: CTA B/L. No w/r/r.   Cardiovascular: RRR, normal S1 and S2, no m/r/g.   Gastrointestinal: +BS, soft NTND, no guarding or rebound tenderness, no palpable masses   Extremities: wwp; no cyanosis, clubbing or edema.   Vascular: Pulses equal and strong throughout.   Neurological: AAOx3, no CN deficits, strength and sensation intact throughout.   Skin: No gross skin abnormalities or rashes        LABS:                  RADIOLOGY & ADDITIONAL TESTS:    MEDICATIONS  (STANDING):  amLODIPine   Tablet 10 milliGRAM(s) Oral daily  aspirin  chewable 81 milliGRAM(s) Oral daily  atorvastatin 40 milliGRAM(s) Oral at bedtime  dextrose 5%. 1000 milliLiter(s) (50 mL/Hr) IV Continuous <Continuous>  dextrose 5%. 1000 milliLiter(s) (100 mL/Hr) IV Continuous <Continuous>  dextrose 50% Injectable 25 Gram(s) IV Push once  dextrose 50% Injectable 12.5 Gram(s) IV Push once  dextrose 50% Injectable 25 Gram(s) IV Push once  DULoxetine 90 milliGRAM(s) Oral every 24 hours  enoxaparin Injectable 40 milliGRAM(s) SubCutaneous every 24 hours  gabapentin 100 milliGRAM(s) Oral every 12 hours  glucagon  Injectable 1 milliGRAM(s) IntraMuscular once  insulin glargine Injectable (LANTUS) 4 Unit(s) SubCutaneous at bedtime  insulin lispro (ADMELOG) corrective regimen sliding scale   SubCutaneous three times a day before meals  insulin lispro Injectable (ADMELOG) 8 Unit(s) SubCutaneous three times a day before meals  lidocaine 2% Gel 1 Application(s) Topical three times a day  lisinopril 40 milliGRAM(s) Oral every 24 hours  pantoprazole    Tablet 40 milliGRAM(s) Oral before breakfast    MEDICATIONS  (PRN):  dextrose Oral Gel 15 Gram(s) Oral once PRN Blood Glucose LESS THAN 70 milliGRAM(s)/deciliter   OVERNIGHT EVENTS: NAEO    SUBJECTIVE:  Patient seen and examined at bedside. Still complaining of leg weakness but no changes to sensation, weakness in other extremities. otherwise no acute complaints    Vital Signs Last 12 Hrs  T(F): 97.5 (05-15-22 @ 05:56), Max: 97.5 (05-15-22 @ 05:56)  HR: 71 (05-15-22 @ 05:56) (71 - 71)  BP: 155/78 (05-15-22 @ 05:56) (155/78 - 155/78)  BP(mean): --  RR: 18 (05-15-22 @ 05:56) (18 - 18)  SpO2: 100% (05-15-22 @ 05:56) (100% - 100%)  I&O's Summary    14 May 2022 07:01  -  15 May 2022 07:00  --------------------------------------------------------  IN: 0 mL / OUT: 700 mL / NET: -700 mL        PHYSICAL EXAM:  Constitutional: NAD, comfortable in bed. elderly.  HEENT: NC/AT, PERRLA, EOMI, no conjunctival pallor or scleral icterus, MMM  Neck: Supple, no JVD  Respiratory: CTA B/L. No w/r/r.   Cardiovascular: RRR, normal S1 and S2, no m/r/g.   Gastrointestinal: +BS, soft NTND, no guarding or rebound tenderness, no palpable masses   Extremities: wwp; no cyanosis, clubbing or edema.   Vascular: Pulses equal and strong throughout.   Neurological: AAOx3, no CN deficits, 3/5 strength b/l in LE (very weak against resistance), LE sensation intact bilaterally. UE strength and sensation intact throughout bilaterally.  Skin: No gross skin abnormalities or rashes    LABS:          RADIOLOGY & ADDITIONAL TESTS:    MEDICATIONS  (STANDING):  amLODIPine   Tablet 10 milliGRAM(s) Oral daily  aspirin  chewable 81 milliGRAM(s) Oral daily  atorvastatin 40 milliGRAM(s) Oral at bedtime  dextrose 5%. 1000 milliLiter(s) (50 mL/Hr) IV Continuous <Continuous>  dextrose 5%. 1000 milliLiter(s) (100 mL/Hr) IV Continuous <Continuous>  dextrose 50% Injectable 25 Gram(s) IV Push once  dextrose 50% Injectable 12.5 Gram(s) IV Push once  dextrose 50% Injectable 25 Gram(s) IV Push once  DULoxetine 90 milliGRAM(s) Oral every 24 hours  enoxaparin Injectable 40 milliGRAM(s) SubCutaneous every 24 hours  gabapentin 100 milliGRAM(s) Oral every 12 hours  glucagon  Injectable 1 milliGRAM(s) IntraMuscular once  insulin glargine Injectable (LANTUS) 4 Unit(s) SubCutaneous at bedtime  insulin lispro (ADMELOG) corrective regimen sliding scale   SubCutaneous three times a day before meals  insulin lispro Injectable (ADMELOG) 8 Unit(s) SubCutaneous three times a day before meals  lidocaine 2% Gel 1 Application(s) Topical three times a day  lisinopril 40 milliGRAM(s) Oral every 24 hours  pantoprazole    Tablet 40 milliGRAM(s) Oral before breakfast    MEDICATIONS  (PRN):  dextrose Oral Gel 15 Gram(s) Oral once PRN Blood Glucose LESS THAN 70 milliGRAM(s)/deciliter

## 2022-05-15 NOTE — PROGRESS NOTE ADULT - ASSESSMENT
72y Female with PMHx of T2DM c/b peripheral neuropathy, HTN, recent admission and workup for Naty Sanchez tear, TIA on 3/30 (MR negative) presents to [facility] as transfer from [facility] ED for episode of altered mental status (staring, generalized weakness), back to baseline in [facility] ED, no acute pathology on CTH or MRI, found to have metabolic encephalopathy (resolved) 2/2 UTI (completed 7day abx) vs hypertension, also found to have new LE weakness due to diabetic neuropathy. Due to immigration and insurance status, not able to FELICIA as recommended by PT. Pending improvement to one-person assist in order to discharge home with out-of-pocket PT vs home with family assist:     Problem/Plan - 1:  ·  Problem: Leg weakness.   ·  Plan: Symmetrical LE weakness, worse distally compared to proximally   Symmetrical diminished sensation, longstanding diabetic peripheral neuropathy   Denies issues with incontinence, or back pain, no sensory line  MRI L/Spine:  - showing at the L3/4 leveI. There are degenerative   changes involving the facets bilaterally as well as ligamentum flavum   hypertrophy. This combination results in moderate central spinal canal   stenosis.   - At the L4/5 level, there is disc bulge abutting the L4 nerve roots   bilaterally. There are degenerative changes involving the facets   bilaterally (right greater than left) as well as ligamentum flavum   hypertrophy. This combination results in mild central spinal canal   stenosis.  - Likely progression of diabetic neuropathy, however symptoms more severe than to be expected.  - Neurology team saying that since it is diabetic in origin it is unlikely that the patient will make significant improvement in the short term.  - incr cymbalta 90mg daily   - c/w Gabapentin 100 BID  - c/w Custom AFO boots   - c/w incentive spirometry.     Problem/Plan - 2:  ·  Problem: Neck pain.   ·  Plan: Neck and shoulder pain on 5/3. Tender to palpation, does not radiate  - Hot packs daily  - off ibuprofen now.     Problem/Plan - 3:  ·  Problem: Bilateral leg pain.   ·  Plan: Needle like pain shins downward, interfering with sleep.   - Cymbalta 90 Qd for diabetic neuropathy  - Gabapentin 100 BID--unable to increase as lethargy earlier in admission due to this med which was dc'd.     Problem/Plan - 4:  ·  Problem: Type II diabetes mellitus.   ·  Plan: Pt with hx of DM, A1c 11.6. Endocrine following. TSH results: 1.120.   - Low C peptide patient will need insulin and NOT oral agents on dc  - C/w mISS, before meals   - C/w Lantus 4U, Lispro 8U   - F/u endocrine recs.     Problem/Plan - 5:  ·  Problem: Hypertension.   ·  Plan: Goal -160  - c/w Amlodipine 10mg (new med), home Lisinopril 40mg   - TTE with bubble done prior admission 4/1: grade 1 left ventricular diastolic dysfunction, no PFO, EF >83% hyperdynamic left ventricular systolic function.     Problem/Plan - 6:  ·  Problem: Hyperlipidemia.   ·  Plan: -C/w Lipitor 40mg.     Problem/Plan - 7:  ·  Problem: Naty-Sanchez tear.   ·  Plan: Hx of Naty Sanchez tear in prior admission   - continue home protonix 40mg daily  - Restarted APT.     Problem/Plan - 8:  ·  Problem: UTI (urinary tract infection).   ·  Plan: RESOLVED  UA with +WBCs, UCx growing E faecalis. Will treat as complicated  -completed 7 day course of CTX and then ampicillin.     Problem/Plan - 9:  ·  Problem: Nutrition, metabolism, and development symptoms.   ·  Plan: F: None  E: Replete PRN   N: DASH/TLC   DVT: Lovenox 40 qd  Dispo: RMF, PT rec [facility]- however patient is uninsured and cannot go to [facility] attempting to get to one person assist vs pending paperwork of his son for possible FMLA.

## 2022-05-16 LAB
GLUCOSE BLDC GLUCOMTR-MCNC: 131 MG/DL — HIGH (ref 70–99)
GLUCOSE BLDC GLUCOMTR-MCNC: 156 MG/DL — HIGH (ref 70–99)
GLUCOSE BLDC GLUCOMTR-MCNC: 164 MG/DL — HIGH (ref 70–99)
GLUCOSE BLDC GLUCOMTR-MCNC: 222 MG/DL — HIGH (ref 70–99)
GLUCOSE BLDC GLUCOMTR-MCNC: 81 MG/DL — SIGNIFICANT CHANGE UP (ref 70–99)

## 2022-05-16 PROCEDURE — 99231 SBSQ HOSP IP/OBS SF/LOW 25: CPT

## 2022-05-16 RX ADMIN — DULOXETINE HYDROCHLORIDE 90 MILLIGRAM(S): 30 CAPSULE, DELAYED RELEASE ORAL at 10:21

## 2022-05-16 RX ADMIN — PANTOPRAZOLE SODIUM 40 MILLIGRAM(S): 20 TABLET, DELAYED RELEASE ORAL at 06:45

## 2022-05-16 RX ADMIN — GABAPENTIN 100 MILLIGRAM(S): 400 CAPSULE ORAL at 18:13

## 2022-05-16 RX ADMIN — ENOXAPARIN SODIUM 40 MILLIGRAM(S): 100 INJECTION SUBCUTANEOUS at 21:45

## 2022-05-16 RX ADMIN — Medication 81 MILLIGRAM(S): at 12:04

## 2022-05-16 RX ADMIN — Medication 8 UNIT(S): at 17:39

## 2022-05-16 RX ADMIN — LISINOPRIL 40 MILLIGRAM(S): 2.5 TABLET ORAL at 06:44

## 2022-05-16 RX ADMIN — ATORVASTATIN CALCIUM 40 MILLIGRAM(S): 80 TABLET, FILM COATED ORAL at 21:46

## 2022-05-16 RX ADMIN — AMLODIPINE BESYLATE 10 MILLIGRAM(S): 2.5 TABLET ORAL at 06:45

## 2022-05-16 RX ADMIN — LIDOCAINE 1 APPLICATION(S): 4 CREAM TOPICAL at 06:44

## 2022-05-16 RX ADMIN — GABAPENTIN 100 MILLIGRAM(S): 400 CAPSULE ORAL at 06:44

## 2022-05-16 RX ADMIN — INSULIN GLARGINE 4 UNIT(S): 100 INJECTION, SOLUTION SUBCUTANEOUS at 21:46

## 2022-05-16 RX ADMIN — LIDOCAINE 1 APPLICATION(S): 4 CREAM TOPICAL at 21:46

## 2022-05-16 RX ADMIN — Medication 2: at 12:55

## 2022-05-16 RX ADMIN — Medication 8 UNIT(S): at 12:56

## 2022-05-16 RX ADMIN — Medication 8 UNIT(S): at 09:21

## 2022-05-16 RX ADMIN — Medication 4: at 17:39

## 2022-05-16 RX ADMIN — LIDOCAINE 1 APPLICATION(S): 4 CREAM TOPICAL at 16:18

## 2022-05-16 NOTE — PROGRESS NOTE ADULT - ATTENDING COMMENTS
I was physically present for the key portions of the evaluation and managemnent (E/M) service provided.  I agree with the above history, physical, and plan which I have reviewed and edited where appropriate, with the exceptions as per my note.    neuro exam stable today.    dispo planning

## 2022-05-16 NOTE — PROGRESS NOTE ADULT - ASSESSMENT
72y Female with PMHx of T2DM c/b peripheral neuropathy, HTN, recent admission and workup for Naty Sanchez tear, TIA on 3/30 (MR negative) presents to [facility] as transfer from [facility] ED for episode of altered mental status (staring, generalized weakness), back to baseline in [facility] ED, no acute pathology on CTH or MRI, found to have metabolic encephalopathy (resolved) 2/2 UTI (completed 7day abx) vs hypertension, also found to have new LE weakness due to diabetic neuropathy. Due to immigration and insurance status, not able to FELICIA as recommended by PT. Pending improvement to one-person assist in order to discharge home with out-of-pocket PT vs home with family assist:     Problem/Plan - 1:  ·  Problem: Leg weakness.   ·  Plan: Symmetrical LE weakness, worse distally compared to proximally   Symmetrical diminished sensation, longstanding diabetic peripheral neuropathy   Denies issues with incontinence, or back pain, no sensory line  MRI L/Spine:  - showing at the L3/4 leveI. There are degenerative   changes involving the facets bilaterally as well as ligamentum flavum   hypertrophy. This combination results in moderate central spinal canal   stenosis.   - At the L4/5 level, there is disc bulge abutting the L4 nerve roots   bilaterally. There are degenerative changes involving the facets   bilaterally (right greater than left) as well as ligamentum flavum   hypertrophy. This combination results in mild central spinal canal   stenosis.  - Likely progression of diabetic neuropathy, however symptoms more severe than to be expected.  - Neurology team saying that since it is diabetic in origin it is unlikely that the patient will make significant improvement in the short term.  - incr cymbalta 90mg daily   - c/w Gabapentin 100 BID  - c/w Custom AFO boots   - c/w incentive spirometry.     Problem/Plan - 2:  ·  Problem: Neck pain.   ·  Plan: Neck and shoulder pain on 5/3. Tender to palpation, does not radiate  - Hot packs daily  - off ibuprofen now.     Problem/Plan - 3:  ·  Problem: Bilateral leg pain.   ·  Plan: Needle like pain shins downward, interfering with sleep.   - Cymbalta 90 Qd for diabetic neuropathy  - Gabapentin 100 BID--unable to increase as lethargy earlier in admission due to this med which was dc'd.     Problem/Plan - 4:  ·  Problem: Type II diabetes mellitus.   ·  Plan: Pt with hx of DM, A1c 11.6. Endocrine following. TSH results: 1.120.   - Low C peptide patient will need insulin and NOT oral agents on dc  - C/w mISS, before meals   - C/w Lantus 4U, Lispro 8U   - F/u endocrine recs.     Problem/Plan - 5:  ·  Problem: Hypertension.   ·  Plan: Goal -160  - c/w Amlodipine 10mg (new med), home Lisinopril 40mg   - TTE with bubble done prior admission 4/1: grade 1 left ventricular diastolic dysfunction, no PFO, EF >83% hyperdynamic left ventricular systolic function.     Problem/Plan - 6:  ·  Problem: Hyperlipidemia.   ·  Plan: -C/w Lipitor 40mg.     Problem/Plan - 7:  ·  Problem: Naty-Sanchez tear.   ·  Plan: Hx of Naty Sanchez tear in prior admission   - continue home protonix 40mg daily  - Restarted APT.     Problem/Plan - 8:  ·  Problem: UTI (urinary tract infection).   ·  Plan: RESOLVED  UA with +WBCs, UCx growing E faecalis. Will treat as complicated  -completed 7 day course of CTX and then ampicillin.     Problem/Plan - 9:  ·  Problem: Nutrition, metabolism, and development symptoms.   ·  Plan: F: None  E: Replete PRN   N: DASH/TLC   DVT: Lovenox 40 qd  Dispo: RMF, PT rec [facility]- however patient is uninsured and cannot go to [facility] attempting to get to one person assist vs pending paperwork of his son for possible FMLA.

## 2022-05-16 NOTE — PROGRESS NOTE ADULT - SUBJECTIVE AND OBJECTIVE BOX
CC: Patient is a 72y old  Female who presents with a chief complaint of episode of AMS (16 May 2022 12:27)      INTERVAL EVENTS: CAIT    SUBJECTIVE / INTERVAL HPI: Patient seen and examined at bedside.     ROS: negative unless otherwise stated above.    VITAL SIGNS:  Vital Signs Last 24 Hrs  T(C): 36.4 (16 May 2022 12:01), Max: 36.7 (15 May 2022 20:35)  T(F): 97.6 (16 May 2022 12:01), Max: 98 (15 May 2022 20:35)  HR: 65 (16 May 2022 12:01) (65 - 83)  BP: 124/62 (16 May 2022 12:01) (124/62 - 149/79)  BP(mean): --  RR: 18 (16 May 2022 12:01) (16 - 18)  SpO2: 96% (16 May 2022 12:01) (95% - 99%)      05-15-22 @ 07:01  -  05-16-22 @ 07:00  --------------------------------------------------------  IN: 0 mL / OUT: 1000 mL / NET: -1000 mL        PHYSICAL EXAM:    Constitutional: NAD, comfortable in bed. elderly.  HEENT: NC/AT, PERRLA, EOMI, no conjunctival pallor or scleral icterus, MMM  Neck: Supple, no JVD  Respiratory: CTA B/L. No w/r/r.   Cardiovascular: RRR, normal S1 and S2, no m/r/g.   Gastrointestinal: +BS, soft NTND, no guarding or rebound tenderness, no palpable masses   Extremities: wwp; no cyanosis, clubbing or edema.   Vascular: Pulses equal and strong throughout.   Neurological: AAOx3, no CN deficits, 3/5 strength b/l in LE (very weak against resistance), LE sensation intact bilaterally. UE strength and sensation intact throughout bilaterally.  Skin: No gross skin abnormalities or rashes    MEDICATIONS:  MEDICATIONS  (STANDING):  amLODIPine   Tablet 10 milliGRAM(s) Oral daily  aspirin  chewable 81 milliGRAM(s) Oral daily  atorvastatin 40 milliGRAM(s) Oral at bedtime  dextrose 5%. 1000 milliLiter(s) (50 mL/Hr) IV Continuous <Continuous>  dextrose 5%. 1000 milliLiter(s) (100 mL/Hr) IV Continuous <Continuous>  dextrose 50% Injectable 25 Gram(s) IV Push once  dextrose 50% Injectable 12.5 Gram(s) IV Push once  dextrose 50% Injectable 25 Gram(s) IV Push once  DULoxetine 90 milliGRAM(s) Oral every 24 hours  enoxaparin Injectable 40 milliGRAM(s) SubCutaneous every 24 hours  gabapentin 100 milliGRAM(s) Oral every 12 hours  glucagon  Injectable 1 milliGRAM(s) IntraMuscular once  insulin glargine Injectable (LANTUS) 4 Unit(s) SubCutaneous at bedtime  insulin lispro (ADMELOG) corrective regimen sliding scale   SubCutaneous three times a day before meals  insulin lispro Injectable (ADMELOG) 8 Unit(s) SubCutaneous three times a day before meals  lidocaine 2% Gel 1 Application(s) Topical three times a day  lisinopril 40 milliGRAM(s) Oral every 24 hours  pantoprazole    Tablet 40 milliGRAM(s) Oral before breakfast    MEDICATIONS  (PRN):  dextrose Oral Gel 15 Gram(s) Oral once PRN Blood Glucose LESS THAN 70 milliGRAM(s)/deciliter      ALLERGIES:  Allergies    No Known Allergies    Intolerances        LABS:              CAPILLARY BLOOD GLUCOSE      POCT Blood Glucose.: 164 mg/dL (16 May 2022 12:54)      RADIOLOGY & ADDITIONAL TESTS: Reviewed.

## 2022-05-17 LAB
ANION GAP SERPL CALC-SCNC: 8 MMOL/L — SIGNIFICANT CHANGE UP (ref 5–17)
BUN SERPL-MCNC: 28 MG/DL — HIGH (ref 7–23)
CALCIUM SERPL-MCNC: 8.8 MG/DL — SIGNIFICANT CHANGE UP (ref 8.4–10.5)
CHLORIDE SERPL-SCNC: 101 MMOL/L — SIGNIFICANT CHANGE UP (ref 96–108)
CO2 SERPL-SCNC: 27 MMOL/L — SIGNIFICANT CHANGE UP (ref 22–31)
CREAT SERPL-MCNC: 0.47 MG/DL — LOW (ref 0.5–1.3)
EGFR: 100 ML/MIN/1.73M2 — SIGNIFICANT CHANGE UP
GLUCOSE BLDC GLUCOMTR-MCNC: 111 MG/DL — HIGH (ref 70–99)
GLUCOSE BLDC GLUCOMTR-MCNC: 127 MG/DL — HIGH (ref 70–99)
GLUCOSE BLDC GLUCOMTR-MCNC: 154 MG/DL — HIGH (ref 70–99)
GLUCOSE BLDC GLUCOMTR-MCNC: 96 MG/DL — SIGNIFICANT CHANGE UP (ref 70–99)
GLUCOSE SERPL-MCNC: 164 MG/DL — HIGH (ref 70–99)
HCT VFR BLD CALC: 32.1 % — LOW (ref 34.5–45)
HGB BLD-MCNC: 10.2 G/DL — LOW (ref 11.5–15.5)
MAGNESIUM SERPL-MCNC: 1.7 MG/DL — SIGNIFICANT CHANGE UP (ref 1.6–2.6)
MCHC RBC-ENTMCNC: 26.4 PG — LOW (ref 27–34)
MCHC RBC-ENTMCNC: 31.8 GM/DL — LOW (ref 32–36)
MCV RBC AUTO: 83.2 FL — SIGNIFICANT CHANGE UP (ref 80–100)
NRBC # BLD: 0 /100 WBCS — SIGNIFICANT CHANGE UP (ref 0–0)
PHOSPHATE SERPL-MCNC: 4 MG/DL — SIGNIFICANT CHANGE UP (ref 2.5–4.5)
PLATELET # BLD AUTO: 285 K/UL — SIGNIFICANT CHANGE UP (ref 150–400)
POTASSIUM SERPL-MCNC: 4.4 MMOL/L — SIGNIFICANT CHANGE UP (ref 3.5–5.3)
POTASSIUM SERPL-SCNC: 4.4 MMOL/L — SIGNIFICANT CHANGE UP (ref 3.5–5.3)
RBC # BLD: 3.86 M/UL — SIGNIFICANT CHANGE UP (ref 3.8–5.2)
RBC # FLD: 13.5 % — SIGNIFICANT CHANGE UP (ref 10.3–14.5)
SODIUM SERPL-SCNC: 136 MMOL/L — SIGNIFICANT CHANGE UP (ref 135–145)
WBC # BLD: 4.4 K/UL — SIGNIFICANT CHANGE UP (ref 3.8–10.5)
WBC # FLD AUTO: 4.4 K/UL — SIGNIFICANT CHANGE UP (ref 3.8–10.5)

## 2022-05-17 PROCEDURE — 99231 SBSQ HOSP IP/OBS SF/LOW 25: CPT

## 2022-05-17 RX ORDER — MAGNESIUM SULFATE 500 MG/ML
2 VIAL (ML) INJECTION ONCE
Refills: 0 | Status: COMPLETED | OUTPATIENT
Start: 2022-05-17 | End: 2022-05-17

## 2022-05-17 RX ADMIN — ENOXAPARIN SODIUM 40 MILLIGRAM(S): 100 INJECTION SUBCUTANEOUS at 22:08

## 2022-05-17 RX ADMIN — PANTOPRAZOLE SODIUM 40 MILLIGRAM(S): 20 TABLET, DELAYED RELEASE ORAL at 06:43

## 2022-05-17 RX ADMIN — AMLODIPINE BESYLATE 10 MILLIGRAM(S): 2.5 TABLET ORAL at 06:43

## 2022-05-17 RX ADMIN — DULOXETINE HYDROCHLORIDE 90 MILLIGRAM(S): 30 CAPSULE, DELAYED RELEASE ORAL at 11:10

## 2022-05-17 RX ADMIN — Medication 8 UNIT(S): at 09:03

## 2022-05-17 RX ADMIN — Medication 25 GRAM(S): at 11:08

## 2022-05-17 RX ADMIN — Medication 81 MILLIGRAM(S): at 11:09

## 2022-05-17 RX ADMIN — Medication 8 UNIT(S): at 18:03

## 2022-05-17 RX ADMIN — INSULIN GLARGINE 4 UNIT(S): 100 INJECTION, SOLUTION SUBCUTANEOUS at 22:05

## 2022-05-17 RX ADMIN — Medication 8 UNIT(S): at 13:29

## 2022-05-17 RX ADMIN — GABAPENTIN 100 MILLIGRAM(S): 400 CAPSULE ORAL at 06:44

## 2022-05-17 RX ADMIN — ATORVASTATIN CALCIUM 40 MILLIGRAM(S): 80 TABLET, FILM COATED ORAL at 22:07

## 2022-05-17 RX ADMIN — LIDOCAINE 1 APPLICATION(S): 4 CREAM TOPICAL at 22:08

## 2022-05-17 RX ADMIN — LISINOPRIL 40 MILLIGRAM(S): 2.5 TABLET ORAL at 06:44

## 2022-05-17 RX ADMIN — GABAPENTIN 100 MILLIGRAM(S): 400 CAPSULE ORAL at 18:03

## 2022-05-17 RX ADMIN — Medication 1 TABLET(S): at 11:09

## 2022-05-17 RX ADMIN — Medication 2: at 09:03

## 2022-05-17 NOTE — PROGRESS NOTE ADULT - ATTENDING COMMENTS
I was physically present for the key portions of the evaluation and managemnent (E/M) service provided.  I agree with the above history, physical, and plan which I have reviewed and edited where appropriate, with the exceptions as per my note.    neuro exam stable.    dispo planning.

## 2022-05-17 NOTE — PROGRESS NOTE ADULT - SUBJECTIVE AND OBJECTIVE BOX
CC: Patient is a 72y old  Female who presents with a chief complaint of episode of AMS (16 May 2022 12:27)    HOSPITAL COURSE:  72y Female with PMHx of T2DM c/b peripheral neuropathy, HTN, recent admission and workup for Naty Sanchez tear, TIA on 3/30 (MR negative) presents to [facility] as transfer from [facility] ED for episode of altered mental status (staring, generalized weakness), back to baseline in [facility] ED, no acute pathology on CTH or MRI, found to have metabolic encephalopathy (resolved) 2/2 UTI (completed 7day abx) vs hypertension, also found to have new LE weakness of unclear origin, possible diabetic neuropathy. Due to immigration and insurance status, not able to FELICIA as recommended by PT. Pending improvement to one-person assist in order to discharge home with out-of-pocket PT.     INTERVAL EVENTS: CAIT    SUBJECTIVE / INTERVAL HPI: Patient seen and examined at bedside. Pt had some fatigue with PT today but was able to work harder so maybe that is why.    ROS: negative unless otherwise stated above.    VITAL SIGNS:  Vital Signs Last 24 Hrs  T(C): 36.8 (17 May 2022 05:26), Max: 36.8 (17 May 2022 05:26)  T(F): 98.2 (17 May 2022 05:26), Max: 98.2 (17 May 2022 05:26)  HR: 74 (17 May 2022 05:26) (74 - 81)  BP: 149/68 (17 May 2022 05:26) (111/63 - 149/68)  BP(mean): --  RR: 17 (17 May 2022 05:26) (17 - 18)  SpO2: 99% (17 May 2022 05:26) (98% - 99%)      05-16-22 @ 07:01  -  05-17-22 @ 07:00  --------------------------------------------------------  IN: 0 mL / OUT: 700 mL / NET: -700 mL        PHYSICAL EXAM:    Constitutional: NAD, comfortable in bed. elderly.  HEENT: NC/AT, PERRLA, EOMI, no conjunctival pallor or scleral icterus, MMM  Neck: Supple, no JVD  Respiratory: CTA B/L. No w/r/r.   Cardiovascular: RRR, normal S1 and S2, no m/r/g.   Gastrointestinal: +BS, soft NTND, no guarding or rebound tenderness, no palpable masses   Extremities: wwp; no cyanosis, clubbing or edema.   Vascular: Pulses equal and strong throughout.   Neurological: AAOx3, no CN deficits, 3/5 strength b/l in LE (very weak against resistance), LE sensation intact bilaterally. UE strength and sensation intact throughout bilaterally.  Skin: No gross skin abnormalities or rashes    MEDICATIONS:  MEDICATIONS  (STANDING):  amLODIPine   Tablet 10 milliGRAM(s) Oral daily  aspirin  chewable 81 milliGRAM(s) Oral daily  atorvastatin 40 milliGRAM(s) Oral at bedtime  dextrose 5%. 1000 milliLiter(s) (50 mL/Hr) IV Continuous <Continuous>  dextrose 5%. 1000 milliLiter(s) (100 mL/Hr) IV Continuous <Continuous>  dextrose 50% Injectable 25 Gram(s) IV Push once  dextrose 50% Injectable 12.5 Gram(s) IV Push once  dextrose 50% Injectable 25 Gram(s) IV Push once  DULoxetine 90 milliGRAM(s) Oral every 24 hours  enoxaparin Injectable 40 milliGRAM(s) SubCutaneous every 24 hours  gabapentin 100 milliGRAM(s) Oral every 12 hours  glucagon  Injectable 1 milliGRAM(s) IntraMuscular once  insulin glargine Injectable (LANTUS) 4 Unit(s) SubCutaneous at bedtime  insulin lispro (ADMELOG) corrective regimen sliding scale   SubCutaneous three times a day before meals  insulin lispro Injectable (ADMELOG) 8 Unit(s) SubCutaneous three times a day before meals  lidocaine 2% Gel 1 Application(s) Topical three times a day  lisinopril 40 milliGRAM(s) Oral every 24 hours  multivitamin 1 Tablet(s) Oral daily  pantoprazole    Tablet 40 milliGRAM(s) Oral before breakfast    MEDICATIONS  (PRN):  dextrose Oral Gel 15 Gram(s) Oral once PRN Blood Glucose LESS THAN 70 milliGRAM(s)/deciliter      ALLERGIES:  Allergies    No Known Allergies    Intolerances        LABS:                        10.2   4.40  )-----------( 285      ( 17 May 2022 08:04 )             32.1     05-17    136  |  101  |  28<H>  ----------------------------<  164<H>  4.4   |  27  |  0.47<L>    Ca    8.8      17 May 2022 08:04  Phos  4.0     05-17  Mg     1.7     05-17          CAPILLARY BLOOD GLUCOSE      POCT Blood Glucose.: 154 mg/dL (17 May 2022 08:15)      RADIOLOGY & ADDITIONAL TESTS: Reviewed. Opt out

## 2022-05-17 NOTE — PROGRESS NOTE ADULT - ASSESSMENT
72y Female with PMHx of T2DM c/b peripheral neuropathy, HTN, recent admission and workup for Anty Sanchez tear, TIA on 3/30 (MR negative) presents to [facility] as transfer from [facility] ED for episode of altered mental status (staring, generalized weakness), back to baseline in [facility] ED, no acute pathology on CTH or MRI, found to have metabolic encephalopathy (resolved) 2/2 UTI (completed 7day abx) vs hypertension, also found to have new LE weakness due to diabetic neuropathy. Due to immigration and insurance status, not able to F[facility] as recommended by PT. Pending improvement to one-person assist in order to discharge home with out-of-pocket PT vs home with family assist:     Problem/Plan - 1:  ·  Problem: Leg weakness.   ·  Plan: Symmetrical LE weakness, worse distally compared to proximally   Symmetrical diminished sensation, longstanding diabetic peripheral neuropathy   Denies issues with incontinence, or back pain, no sensory line  MRI L/Spine:  - showing at the L3/4 leveI. There are degenerative   changes involving the facets bilaterally as well as ligamentum flavum   hypertrophy. This combination results in moderate central spinal canal   stenosis.   - At the L4/5 level, there is disc bulge abutting the L4 nerve roots   bilaterally. There are degenerative changes involving the facets   bilaterally (right greater than left) as well as ligamentum flavum   hypertrophy. This combination results in mild central spinal canal   stenosis.  - Likely progression of diabetic neuropathy, however symptoms more severe than to be expected.  - Neurology team saying that since it is diabetic in origin it is unlikely that the patient will make significant improvement in the short term.  - incr cymbalta 90mg daily   - c/w Gabapentin 100 BID  - c/w Custom AFO boots   - c/w incentive spirometry.     Problem/Plan - 2:  ·  Problem: Neck pain.   ·  Plan: Neck and shoulder pain on 5/3. Tender to palpation, does not radiate  - Hot packs daily  - off ibuprofen now.     Problem/Plan - 3:  ·  Problem: Bilateral leg pain.   ·  Plan: Needle like pain shins downward, interfering with sleep.   - Cymbalta 90 Qd for diabetic neuropathy  - Gabapentin 100 BID--unable to increase as lethargy earlier in admission due to this med which was dc'd.     Problem/Plan - 4:  ·  Problem: Type II diabetes mellitus.   ·  Plan: Pt with hx of DM, A1c 11.6. Endocrine following. TSH results: 1.120.   - Low C peptide patient will need insulin and NOT oral agents on dc  - C/w mISS, before meals   - C/w Lantus 4U, Lispro 8U   - F/u endocrine recs.     Problem/Plan - 5:  ·  Problem: Hypertension.   ·  Plan: Goal -160  - c/w Amlodipine 10mg (new med), home Lisinopril 40mg   - TTE with bubble done prior admission 4/1: grade 1 left ventricular diastolic dysfunction, no PFO, EF >83% hyperdynamic left ventricular systolic function.     Problem/Plan - 6:  ·  Problem: Hyperlipidemia.   ·  Plan: -C/w Lipitor 40mg.     Problem/Plan - 7:  ·  Problem: Naty-Sanchez tear.   ·  Plan: Hx of Naty Sanchez tear in prior admission   - continue home protonix 40mg daily  - Restarted APT.     Problem/Plan - 8:  ·  Problem: UTI (urinary tract infection).   ·  Plan: RESOLVED  UA with +WBCs, UCx growing E faecalis. Will treat as complicated  -completed 7 day course of CTX and then ampicillin.     Problem/Plan - 9:  ·  Problem: Nutrition, metabolism, and development symptoms.   ·  Plan: F: None  E: Replete PRN   N: DASH/TLC   DVT: Lovenox 40 qd  Dispo: RMF, PT rec [facility]- however patient is uninsured and cannot go to [facility] attempting to get to one person assist vs pending paperwork of his son for possible FMLA.

## 2022-05-18 LAB
ALBUMIN SERPL ELPH-MCNC: 3.2 G/DL — LOW (ref 3.3–5)
ALP SERPL-CCNC: 36 U/L — LOW (ref 40–120)
ALT FLD-CCNC: 12 U/L — SIGNIFICANT CHANGE UP (ref 10–45)
ANION GAP SERPL CALC-SCNC: 8 MMOL/L — SIGNIFICANT CHANGE UP (ref 5–17)
AST SERPL-CCNC: 14 U/L — SIGNIFICANT CHANGE UP (ref 10–40)
BASOPHILS # BLD AUTO: 0.01 K/UL — SIGNIFICANT CHANGE UP (ref 0–0.2)
BASOPHILS NFR BLD AUTO: 0.3 % — SIGNIFICANT CHANGE UP (ref 0–2)
BILIRUB SERPL-MCNC: 0.2 MG/DL — SIGNIFICANT CHANGE UP (ref 0.2–1.2)
BUN SERPL-MCNC: 27 MG/DL — HIGH (ref 7–23)
CALCIUM SERPL-MCNC: 8.7 MG/DL — SIGNIFICANT CHANGE UP (ref 8.4–10.5)
CHLORIDE SERPL-SCNC: 100 MMOL/L — SIGNIFICANT CHANGE UP (ref 96–108)
CO2 SERPL-SCNC: 28 MMOL/L — SIGNIFICANT CHANGE UP (ref 22–31)
CREAT SERPL-MCNC: 0.54 MG/DL — SIGNIFICANT CHANGE UP (ref 0.5–1.3)
EGFR: 97 ML/MIN/1.73M2 — SIGNIFICANT CHANGE UP
EOSINOPHIL # BLD AUTO: 0.17 K/UL — SIGNIFICANT CHANGE UP (ref 0–0.5)
EOSINOPHIL NFR BLD AUTO: 4.4 % — SIGNIFICANT CHANGE UP (ref 0–6)
GLUCOSE BLDC GLUCOMTR-MCNC: 112 MG/DL — HIGH (ref 70–99)
GLUCOSE BLDC GLUCOMTR-MCNC: 131 MG/DL — HIGH (ref 70–99)
GLUCOSE BLDC GLUCOMTR-MCNC: 166 MG/DL — HIGH (ref 70–99)
GLUCOSE BLDC GLUCOMTR-MCNC: 183 MG/DL — HIGH (ref 70–99)
GLUCOSE SERPL-MCNC: 172 MG/DL — HIGH (ref 70–99)
HCT VFR BLD CALC: 34.3 % — LOW (ref 34.5–45)
HGB BLD-MCNC: 10.8 G/DL — LOW (ref 11.5–15.5)
IMM GRANULOCYTES NFR BLD AUTO: 0.3 % — SIGNIFICANT CHANGE UP (ref 0–1.5)
LYMPHOCYTES # BLD AUTO: 1.57 K/UL — SIGNIFICANT CHANGE UP (ref 1–3.3)
LYMPHOCYTES # BLD AUTO: 41 % — SIGNIFICANT CHANGE UP (ref 13–44)
MAGNESIUM SERPL-MCNC: 1.8 MG/DL — SIGNIFICANT CHANGE UP (ref 1.6–2.6)
MCHC RBC-ENTMCNC: 26.3 PG — LOW (ref 27–34)
MCHC RBC-ENTMCNC: 31.5 GM/DL — LOW (ref 32–36)
MCV RBC AUTO: 83.5 FL — SIGNIFICANT CHANGE UP (ref 80–100)
MONOCYTES # BLD AUTO: 0.28 K/UL — SIGNIFICANT CHANGE UP (ref 0–0.9)
MONOCYTES NFR BLD AUTO: 7.3 % — SIGNIFICANT CHANGE UP (ref 2–14)
NEUTROPHILS # BLD AUTO: 1.79 K/UL — LOW (ref 1.8–7.4)
NEUTROPHILS NFR BLD AUTO: 46.7 % — SIGNIFICANT CHANGE UP (ref 43–77)
NRBC # BLD: 0 /100 WBCS — SIGNIFICANT CHANGE UP (ref 0–0)
PHOSPHATE SERPL-MCNC: 3.8 MG/DL — SIGNIFICANT CHANGE UP (ref 2.5–4.5)
PLATELET # BLD AUTO: 308 K/UL — SIGNIFICANT CHANGE UP (ref 150–400)
POTASSIUM SERPL-MCNC: 4.7 MMOL/L — SIGNIFICANT CHANGE UP (ref 3.5–5.3)
POTASSIUM SERPL-SCNC: 4.7 MMOL/L — SIGNIFICANT CHANGE UP (ref 3.5–5.3)
PROT SERPL-MCNC: 6.5 G/DL — SIGNIFICANT CHANGE UP (ref 6–8.3)
RBC # BLD: 4.11 M/UL — SIGNIFICANT CHANGE UP (ref 3.8–5.2)
RBC # FLD: 13.5 % — SIGNIFICANT CHANGE UP (ref 10.3–14.5)
SODIUM SERPL-SCNC: 136 MMOL/L — SIGNIFICANT CHANGE UP (ref 135–145)
WBC # BLD: 3.83 K/UL — SIGNIFICANT CHANGE UP (ref 3.8–10.5)
WBC # FLD AUTO: 3.83 K/UL — SIGNIFICANT CHANGE UP (ref 3.8–10.5)

## 2022-05-18 PROCEDURE — 99232 SBSQ HOSP IP/OBS MODERATE 35: CPT

## 2022-05-18 RX ORDER — ACETAMINOPHEN 500 MG
325 TABLET ORAL EVERY 6 HOURS
Refills: 0 | Status: DISCONTINUED | OUTPATIENT
Start: 2022-05-18 | End: 2022-06-18

## 2022-05-18 RX ADMIN — Medication 8 UNIT(S): at 09:03

## 2022-05-18 RX ADMIN — Medication 1 TABLET(S): at 10:04

## 2022-05-18 RX ADMIN — DULOXETINE HYDROCHLORIDE 90 MILLIGRAM(S): 30 CAPSULE, DELAYED RELEASE ORAL at 10:04

## 2022-05-18 RX ADMIN — Medication 8 UNIT(S): at 12:38

## 2022-05-18 RX ADMIN — LIDOCAINE 1 APPLICATION(S): 4 CREAM TOPICAL at 06:07

## 2022-05-18 RX ADMIN — INSULIN GLARGINE 4 UNIT(S): 100 INJECTION, SOLUTION SUBCUTANEOUS at 21:07

## 2022-05-18 RX ADMIN — LISINOPRIL 40 MILLIGRAM(S): 2.5 TABLET ORAL at 06:07

## 2022-05-18 RX ADMIN — ENOXAPARIN SODIUM 40 MILLIGRAM(S): 100 INJECTION SUBCUTANEOUS at 21:07

## 2022-05-18 RX ADMIN — LIDOCAINE 1 APPLICATION(S): 4 CREAM TOPICAL at 17:11

## 2022-05-18 RX ADMIN — AMLODIPINE BESYLATE 10 MILLIGRAM(S): 2.5 TABLET ORAL at 06:07

## 2022-05-18 RX ADMIN — PANTOPRAZOLE SODIUM 40 MILLIGRAM(S): 20 TABLET, DELAYED RELEASE ORAL at 06:07

## 2022-05-18 RX ADMIN — Medication 81 MILLIGRAM(S): at 10:04

## 2022-05-18 RX ADMIN — Medication 2: at 09:03

## 2022-05-18 RX ADMIN — ATORVASTATIN CALCIUM 40 MILLIGRAM(S): 80 TABLET, FILM COATED ORAL at 21:07

## 2022-05-18 RX ADMIN — Medication 8 UNIT(S): at 17:11

## 2022-05-18 RX ADMIN — GABAPENTIN 100 MILLIGRAM(S): 400 CAPSULE ORAL at 06:07

## 2022-05-18 RX ADMIN — Medication 2: at 12:37

## 2022-05-18 RX ADMIN — LIDOCAINE 1 APPLICATION(S): 4 CREAM TOPICAL at 21:07

## 2022-05-18 NOTE — PROGRESS NOTE ADULT - TIME BILLING
case complexity
Insulin adjustment
case complexity
case complexity
Insulin adjustment
Insulin adjustment
case complexity
case complexity
review of patient information including recent vital signs, labs, imaging, and notes; assessing, examining patient; updating patient/family; discussion and coordination of care with multidisciplinary team.
Insulin adjustment
evaluation, coordination of care
Exploration of GOC including advanced directives such as HCP designation and code status  Monitoring and Education regarding complex pain and related treatment regimen  Discharge Facilitation with exploration for additional resources
evaluation, coordination of care, counseling.
review of patient information including recent vital signs, labs, imaging, and notes; assessing, examining patient; updating patient/family; discussion and coordination of care with multidisciplinary team.
evaluation, coordination of care.
review of patient information including recent vital signs, labs, imaging, and notes; assessing, examining patient; updating patient/family; discussion and coordination of care with multidisciplinary team.

## 2022-05-18 NOTE — PROGRESS NOTE ADULT - SUBJECTIVE AND OBJECTIVE BOX
** INCOMPLETE **    OVERNIGHT EVENTS:     SUBJECTIVE:    VITAL SIGNS:  Vital Signs Last 24 Hrs  T(C): 36.6 (18 May 2022 04:56), Max: 36.7 (17 May 2022 20:38)  T(F): 97.8 (18 May 2022 04:56), Max: 98 (17 May 2022 20:38)  HR: 69 (18 May 2022 04:56) (69 - 93)  BP: 159/74 (18 May 2022 04:56) (118/72 - 159/74)  BP(mean): 88 (17 May 2022 11:05) (88 - 88)  RR: 18 (18 May 2022 04:56) (16 - 18)  SpO2: 99% (18 May 2022 04:56) (96% - 99%)    PHYSICAL EXAM:  General: NAD; speaking in full sentences  HEENT: NC/AT; PERRL; EOMI; MMM  Neck: supple; no JVD  Cardiac: RRR; +S1/S2  Pulm: CTA B/L; no W/R/R  GI: soft, NT/ND, +BS  Extremities: WWP; no edema, clubbing or cyanosis  Vasc: 2+ radial, DP pulses B/L  Neuro: AAOx3; no focal deficits    MEDICATIONS:  MEDICATIONS  (STANDING):  amLODIPine   Tablet 10 milliGRAM(s) Oral daily  aspirin  chewable 81 milliGRAM(s) Oral daily  atorvastatin 40 milliGRAM(s) Oral at bedtime  dextrose 5%. 1000 milliLiter(s) (50 mL/Hr) IV Continuous <Continuous>  dextrose 5%. 1000 milliLiter(s) (100 mL/Hr) IV Continuous <Continuous>  dextrose 50% Injectable 25 Gram(s) IV Push once  dextrose 50% Injectable 12.5 Gram(s) IV Push once  dextrose 50% Injectable 25 Gram(s) IV Push once  DULoxetine 90 milliGRAM(s) Oral every 24 hours  enoxaparin Injectable 40 milliGRAM(s) SubCutaneous every 24 hours  gabapentin 100 milliGRAM(s) Oral every 12 hours  glucagon  Injectable 1 milliGRAM(s) IntraMuscular once  insulin glargine Injectable (LANTUS) 4 Unit(s) SubCutaneous at bedtime  insulin lispro (ADMELOG) corrective regimen sliding scale   SubCutaneous three times a day before meals  insulin lispro Injectable (ADMELOG) 8 Unit(s) SubCutaneous three times a day before meals  lidocaine 2% Gel 1 Application(s) Topical three times a day  lisinopril 40 milliGRAM(s) Oral every 24 hours  multivitamin 1 Tablet(s) Oral daily  pantoprazole    Tablet 40 milliGRAM(s) Oral before breakfast    MEDICATIONS  (PRN):  dextrose Oral Gel 15 Gram(s) Oral once PRN Blood Glucose LESS THAN 70 milliGRAM(s)/deciliter      ALLERGIES:  Allergies    No Known Allergies    Intolerances        LABS:                        10.2   4.40  )-----------( 285      ( 17 May 2022 08:04 )             32.1     05-17    136  |  101  |  28<H>  ----------------------------<  164<H>  4.4   |  27  |  0.47<L>    Ca    8.8      17 May 2022 08:04  Phos  4.0     05-17  Mg     1.7     05-17          RADIOLOGY & ADDITIONAL TESTS: Reviewed. OVERNIGHT EVENTS:   No acute events overnight.     SUBJECTIVE:  Patient seen and examined at bedside, resting comfortably in bed, and does not appear to be in any acute distress. Patient denies new active complaints. Patient denies any recent fevers, chills, nausea, vomiting, headache, acute sob, chest pain, abdominal pain, genitourinary sx, extremity pain or swelling.     VITAL SIGNS:  Vital Signs Last 24 Hrs  T(C): 36.6 (18 May 2022 04:56), Max: 36.7 (17 May 2022 20:38)  T(F): 97.8 (18 May 2022 04:56), Max: 98 (17 May 2022 20:38)  HR: 69 (18 May 2022 04:56) (69 - 93)  BP: 159/74 (18 May 2022 04:56) (118/72 - 159/74)  BP(mean): 88 (17 May 2022 11:05) (88 - 88)  RR: 18 (18 May 2022 04:56) (16 - 18)  SpO2: 99% (18 May 2022 04:56) (96% - 99%)    PHYSICAL EXAM:  General: NAD; elderly female, lying comfortably in bed, speaking in full sentences  HEENT: NC/AT; PERRL; EOMI; MMM  Neck: supple; no JVD  Cardiac: RRR; +S1/S2, +systolic murmur II/IV RUSB  Pulm: CTA B/L; no W/R/R, looks comfortable on room air without increased WOB or accessory muscle use  GI: soft, NT/ND, +BSx4,   Extremities: WWP; no edema, clubbing or cyanosis, 4/5 strength throughout, sensation grossly intact  Vasc: 2+ radial, DP pulses B/L  Neuro: AAOx3; no focal deficits    MEDICATIONS:  MEDICATIONS  (STANDING):  amLODIPine   Tablet 10 milliGRAM(s) Oral daily  aspirin  chewable 81 milliGRAM(s) Oral daily  atorvastatin 40 milliGRAM(s) Oral at bedtime  dextrose 5%. 1000 milliLiter(s) (50 mL/Hr) IV Continuous <Continuous>  dextrose 5%. 1000 milliLiter(s) (100 mL/Hr) IV Continuous <Continuous>  dextrose 50% Injectable 25 Gram(s) IV Push once  dextrose 50% Injectable 12.5 Gram(s) IV Push once  dextrose 50% Injectable 25 Gram(s) IV Push once  DULoxetine 90 milliGRAM(s) Oral every 24 hours  enoxaparin Injectable 40 milliGRAM(s) SubCutaneous every 24 hours  gabapentin 100 milliGRAM(s) Oral every 12 hours  glucagon  Injectable 1 milliGRAM(s) IntraMuscular once  insulin glargine Injectable (LANTUS) 4 Unit(s) SubCutaneous at bedtime  insulin lispro (ADMELOG) corrective regimen sliding scale   SubCutaneous three times a day before meals  insulin lispro Injectable (ADMELOG) 8 Unit(s) SubCutaneous three times a day before meals  lidocaine 2% Gel 1 Application(s) Topical three times a day  lisinopril 40 milliGRAM(s) Oral every 24 hours  multivitamin 1 Tablet(s) Oral daily  pantoprazole    Tablet 40 milliGRAM(s) Oral before breakfast    MEDICATIONS  (PRN):  dextrose Oral Gel 15 Gram(s) Oral once PRN Blood Glucose LESS THAN 70 milliGRAM(s)/deciliter      ALLERGIES:  Allergies    No Known Allergies    Intolerances        LABS:                        10.2   4.40  )-----------( 285      ( 17 May 2022 08:04 )             32.1     05-17    136  |  101  |  28<H>  ----------------------------<  164<H>  4.4   |  27  |  0.47<L>    Ca    8.8      17 May 2022 08:04  Phos  4.0     05-17  Mg     1.7     05-17          RADIOLOGY & ADDITIONAL TESTS: Reviewed.

## 2022-05-18 NOTE — PROGRESS NOTE ADULT - ASSESSMENT
72y Female with PMHx of T2DM c/b peripheral neuropathy, HTN, recent admission and workup for Naty Sanchez tear, TIA on 3/30 (MR negative) presents to [facility] as transfer from [facility] ED for episode of altered mental status (staring, generalized weakness), back to baseline in [facility] ED, no acute pathology on CTH or MRI, found to have metabolic encephalopathy (resolved) 2/2 UTI (completed 7day abx) vs hypertension, also found to have new LE weakness due to diabetic neuropathy. Due to immigration and insurance status, not able to F[facility] as recommended by PT. Pending improvement to one-person assist in order to discharge home with out-of-pocket PT vs home with family assist.     Problem/Plan - 1:  ·  Problem: Leg weakness.   ·  Plan: Symmetrical LE weakness, worse distally compared to proximally   Symmetrical diminished sensation, longstanding diabetic peripheral neuropathy   Denies issues with incontinence, or back pain, no sensory line  MRI L/Spine:  - showing at the L3/4 leveI. There are degenerative   changes involving the facets bilaterally as well as ligamentum flavum   hypertrophy. This combination results in moderate central spinal canal   stenosis.   - At the L4/5 level, there is disc bulge abutting the L4 nerve roots   bilaterally. There are degenerative changes involving the facets   bilaterally (right greater than left) as well as ligamentum flavum   hypertrophy. This combination results in mild central spinal canal   stenosis.  - Likely progression of diabetic neuropathy, however symptoms more severe than to be expected.  - Neurology team saying that since it is diabetic in origin it is unlikely that the patient will make significant improvement in the short term.  - incr cymbalta 90mg daily   - c/w Gabapentin 100 BID  - c/w Custom AFO boots   - c/w incentive spirometry.     Problem/Plan - 2:  ·  Problem: Neck pain.   ·  Plan: Neck and shoulder pain on 5/3. Tender to palpation, does not radiate  - Hot packs daily  - off ibuprofen now.     Problem/Plan - 3:  ·  Problem: Bilateral leg pain.   ·  Plan: Needle like pain shins downward, interfering with sleep.   - Cymbalta 90 Qd for diabetic neuropathy  - Gabapentin 100 BID--unable to increase as lethargy earlier in admission due to this med which was dc'd.     Problem/Plan - 4:  ·  Problem: Type II diabetes mellitus.   ·  Plan: Pt with hx of DM, A1c 11.6. Endocrine following. TSH results: 1.120.   - Low C peptide patient will need insulin and NOT oral agents on dc  - C/w mISS, before meals   - C/w Lantus 4U, Lispro 8U   - F/u endocrine recs.     Problem/Plan - 5:  ·  Problem: Hypertension.   ·  Plan: Goal -160  - c/w Amlodipine 10mg (new med), home Lisinopril 40mg   - TTE with bubble done prior admission 4/1: grade 1 left ventricular diastolic dysfunction, no PFO, EF >83% hyperdynamic left ventricular systolic function.     Problem/Plan - 6:  ·  Problem: Hyperlipidemia.   ·  Plan: -C/w Lipitor 40mg.     Problem/Plan - 7:  ·  Problem: Naty-Sanchez tear.   ·  Plan: Hx of Naty Sanchez tear in prior admission   - continue home protonix 40mg daily  - Restarted APT.     Problem/Plan - 8:  ·  Problem: UTI (urinary tract infection).   ·  Plan: RESOLVED  UA with +WBCs, UCx growing E faecalis. Will treat as complicated  -completed 7 day course of CTX and then ampicillin.     Problem/Plan - 9:  ·  Problem: Nutrition, metabolism, and development symptoms.   ·  Plan: F: None  E: Replete PRN   N: DASH/TLC   DVT: Lovenox 40 qd  Dispo: RMF, PT rec [facility]- however patient is uninsured and cannot go to [facility] attempting to get to one person assist vs pending paperwork of his son and daughter for possible FMLA.    72y Female with PMHx of T2DM c/b peripheral neuropathy, HTN, recent admission and workup for Naty Sanchez tear, TIA on 3/30 (MR negative) presents to [facility] as transfer from [facility] ED for episode of altered mental status (staring, generalized weakness), back to baseline in [facility] ED, no acute pathology on CTH or MRI, found to have metabolic encephalopathy (resolved) 2/2 UTI (completed 7day abx) vs hypertension, also found to have new LE weakness due to diabetic neuropathy. Due to immigration and insurance status, not able to FELICIA as recommended by PT. Pending improvement to one-person assist in order to discharge home with out-of-pocket PT vs home with family assist.     Problem/Plan - 1:  ·  Problem: Leg weakness.   ·  Plan: Symmetrical LE weakness, worse distally compared to proximally   Symmetrical diminished sensation, longstanding diabetic peripheral neuropathy   Denies issues with incontinence, or back pain, no sensory line  MRI L/Spine:  - showing at the L3/4 leveI. There are degenerative   changes involving the facets bilaterally as well as ligamentum flavum   hypertrophy. This combination results in moderate central spinal canal   stenosis.   - At the L4/5 level, there is disc bulge abutting the L4 nerve roots   bilaterally. There are degenerative changes involving the facets   bilaterally (right greater than left) as well as ligamentum flavum   hypertrophy. This combination results in mild central spinal canal   stenosis.  - Likely progression of diabetic neuropathy, however symptoms more severe than to be expected.  - Neurology team saying that since it is diabetic in origin it is unlikely that the patient will make significant improvement in the short term.  - incr cymbalta 90mg daily   - D/c'd Gabapentin 100 BID as making patient more lethargic  - c/w Custom AFO boots   - c/w incentive spirometry.     Problem/Plan - 2:  ·  Problem: Neck pain.   ·  Plan: Neck and shoulder pain on 5/3. Tender to palpation, does not radiate  - Hot packs daily  - off ibuprofen now.     Problem/Plan - 3:  ·  Problem: Bilateral leg pain.   ·  Plan: Needle like pain shins downward, interfering with sleep.   - Cymbalta 90 Qd for diabetic neuropathy  - D/c'd Gabapentin 100 BID as making patient more lethargic  - f/u LE duplex for c/f worsening swelling     Problem/Plan - 4:  ·  Problem: Type II diabetes mellitus.   ·  Plan: Pt with hx of DM, A1c 11.6. Endocrine following. TSH results: 1.120.   - Low C peptide patient will need insulin and NOT oral agents on dc  - C/w mISS, before meals   - C/w Lantus 4U, Lispro 8U   - F/u endocrine recs.     Problem/Plan - 5:  ·  Problem: Hypertension.   ·  Plan: Goal -160  - c/w Amlodipine 10mg (new med), home Lisinopril 40mg   - TTE with bubble done prior admission 4/1: grade 1 left ventricular diastolic dysfunction, no PFO, EF >83% hyperdynamic left ventricular systolic function.     Problem/Plan - 6:  ·  Problem: Hyperlipidemia.   ·  Plan: -C/w Lipitor 40mg.     Problem/Plan - 7:  ·  Problem: Naty-Sanchez tear.   ·  Plan: Hx of Naty Sanchez tear in prior admission   - continue home protonix 40mg daily  - Restarted APT.     Problem/Plan - 8:  ·  Problem: UTI (urinary tract infection).   ·  Plan: RESOLVED  UA with +WBCs, UCx growing E faecalis. Will treat as complicated  -completed 7 day course of CTX and then ampicillin.     Problem/Plan - 9:  ·  Problem: Nutrition, metabolism, and development symptoms.   ·  Plan: F: None  E: Replete PRN   N: DASH/TLC   DVT: Lovenox 40 qd  Dispo: RMF, PT rec [facility]- however patient is uninsured and cannot go to [facility] attempting to get to one person assist vs pending paperwork of his son and daughter for possible FMLA.

## 2022-05-18 NOTE — PROGRESS NOTE ADULT - ATTENDING COMMENTS
I was physically present for the key portions of the evaluation and managemnent (E/M) service provided.  I agree with the above history, physical, and plan which I have reviewed and edited where appropriate, with the exceptions as per my note.    pt seen and examined.    neuro exam stable.    leg pain seems more chronic but given question of increased warmth of both legs, reasonable to get dopplers.    also planned for ekg today to ensure qt not prolonged.    stop gabapentin given sedation - likely is not helping pain anyway.

## 2022-05-19 LAB
ANION GAP SERPL CALC-SCNC: 8 MMOL/L — SIGNIFICANT CHANGE UP (ref 5–17)
BASOPHILS # BLD AUTO: 0.01 K/UL — SIGNIFICANT CHANGE UP (ref 0–0.2)
BASOPHILS NFR BLD AUTO: 0.2 % — SIGNIFICANT CHANGE UP (ref 0–2)
BUN SERPL-MCNC: 30 MG/DL — HIGH (ref 7–23)
CALCIUM SERPL-MCNC: 8.7 MG/DL — SIGNIFICANT CHANGE UP (ref 8.4–10.5)
CHLORIDE SERPL-SCNC: 99 MMOL/L — SIGNIFICANT CHANGE UP (ref 96–108)
CO2 SERPL-SCNC: 28 MMOL/L — SIGNIFICANT CHANGE UP (ref 22–31)
CREAT SERPL-MCNC: 0.53 MG/DL — SIGNIFICANT CHANGE UP (ref 0.5–1.3)
EGFR: 97 ML/MIN/1.73M2 — SIGNIFICANT CHANGE UP
EOSINOPHIL # BLD AUTO: 0.22 K/UL — SIGNIFICANT CHANGE UP (ref 0–0.5)
EOSINOPHIL NFR BLD AUTO: 4.3 % — SIGNIFICANT CHANGE UP (ref 0–6)
GLUCOSE BLDC GLUCOMTR-MCNC: 110 MG/DL — HIGH (ref 70–99)
GLUCOSE BLDC GLUCOMTR-MCNC: 112 MG/DL — HIGH (ref 70–99)
GLUCOSE BLDC GLUCOMTR-MCNC: 125 MG/DL — HIGH (ref 70–99)
GLUCOSE BLDC GLUCOMTR-MCNC: 133 MG/DL — HIGH (ref 70–99)
GLUCOSE BLDC GLUCOMTR-MCNC: 148 MG/DL — HIGH (ref 70–99)
GLUCOSE BLDC GLUCOMTR-MCNC: 72 MG/DL — SIGNIFICANT CHANGE UP (ref 70–99)
GLUCOSE SERPL-MCNC: 137 MG/DL — HIGH (ref 70–99)
HCT VFR BLD CALC: 32.4 % — LOW (ref 34.5–45)
HGB BLD-MCNC: 10.2 G/DL — LOW (ref 11.5–15.5)
IMM GRANULOCYTES NFR BLD AUTO: 0.4 % — SIGNIFICANT CHANGE UP (ref 0–1.5)
LYMPHOCYTES # BLD AUTO: 2.06 K/UL — SIGNIFICANT CHANGE UP (ref 1–3.3)
LYMPHOCYTES # BLD AUTO: 40.4 % — SIGNIFICANT CHANGE UP (ref 13–44)
MAGNESIUM SERPL-MCNC: 1.7 MG/DL — SIGNIFICANT CHANGE UP (ref 1.6–2.6)
MCHC RBC-ENTMCNC: 26.4 PG — LOW (ref 27–34)
MCHC RBC-ENTMCNC: 31.5 GM/DL — LOW (ref 32–36)
MCV RBC AUTO: 83.9 FL — SIGNIFICANT CHANGE UP (ref 80–100)
MONOCYTES # BLD AUTO: 0.39 K/UL — SIGNIFICANT CHANGE UP (ref 0–0.9)
MONOCYTES NFR BLD AUTO: 7.6 % — SIGNIFICANT CHANGE UP (ref 2–14)
NEUTROPHILS # BLD AUTO: 2.4 K/UL — SIGNIFICANT CHANGE UP (ref 1.8–7.4)
NEUTROPHILS NFR BLD AUTO: 47.1 % — SIGNIFICANT CHANGE UP (ref 43–77)
NRBC # BLD: 0 /100 WBCS — SIGNIFICANT CHANGE UP (ref 0–0)
PHOSPHATE SERPL-MCNC: 3.7 MG/DL — SIGNIFICANT CHANGE UP (ref 2.5–4.5)
PLATELET # BLD AUTO: 288 K/UL — SIGNIFICANT CHANGE UP (ref 150–400)
POTASSIUM SERPL-MCNC: 4.4 MMOL/L — SIGNIFICANT CHANGE UP (ref 3.5–5.3)
POTASSIUM SERPL-SCNC: 4.4 MMOL/L — SIGNIFICANT CHANGE UP (ref 3.5–5.3)
RBC # BLD: 3.86 M/UL — SIGNIFICANT CHANGE UP (ref 3.8–5.2)
RBC # FLD: 13.4 % — SIGNIFICANT CHANGE UP (ref 10.3–14.5)
SARS-COV-2 RNA SPEC QL NAA+PROBE: SIGNIFICANT CHANGE UP
SODIUM SERPL-SCNC: 135 MMOL/L — SIGNIFICANT CHANGE UP (ref 135–145)
WBC # BLD: 5.1 K/UL — SIGNIFICANT CHANGE UP (ref 3.8–10.5)
WBC # FLD AUTO: 5.1 K/UL — SIGNIFICANT CHANGE UP (ref 3.8–10.5)

## 2022-05-19 PROCEDURE — 99231 SBSQ HOSP IP/OBS SF/LOW 25: CPT

## 2022-05-19 RX ORDER — MAGNESIUM SULFATE 500 MG/ML
2 VIAL (ML) INJECTION ONCE
Refills: 0 | Status: COMPLETED | OUTPATIENT
Start: 2022-05-19 | End: 2022-05-19

## 2022-05-19 RX ORDER — MAGNESIUM SULFATE 500 MG/ML
1 VIAL (ML) INJECTION ONCE
Refills: 0 | Status: COMPLETED | OUTPATIENT
Start: 2022-05-19 | End: 2022-05-19

## 2022-05-19 RX ADMIN — INSULIN GLARGINE 4 UNIT(S): 100 INJECTION, SOLUTION SUBCUTANEOUS at 22:14

## 2022-05-19 RX ADMIN — ATORVASTATIN CALCIUM 40 MILLIGRAM(S): 80 TABLET, FILM COATED ORAL at 22:53

## 2022-05-19 RX ADMIN — LIDOCAINE 1 APPLICATION(S): 4 CREAM TOPICAL at 14:58

## 2022-05-19 RX ADMIN — Medication 25 GRAM(S): at 13:21

## 2022-05-19 RX ADMIN — Medication 8 UNIT(S): at 12:44

## 2022-05-19 RX ADMIN — AMLODIPINE BESYLATE 10 MILLIGRAM(S): 2.5 TABLET ORAL at 06:08

## 2022-05-19 RX ADMIN — Medication 81 MILLIGRAM(S): at 11:18

## 2022-05-19 RX ADMIN — PANTOPRAZOLE SODIUM 40 MILLIGRAM(S): 20 TABLET, DELAYED RELEASE ORAL at 06:08

## 2022-05-19 RX ADMIN — ENOXAPARIN SODIUM 40 MILLIGRAM(S): 100 INJECTION SUBCUTANEOUS at 22:53

## 2022-05-19 RX ADMIN — LISINOPRIL 40 MILLIGRAM(S): 2.5 TABLET ORAL at 06:08

## 2022-05-19 RX ADMIN — Medication 8 UNIT(S): at 19:02

## 2022-05-19 RX ADMIN — Medication 8 UNIT(S): at 09:44

## 2022-05-19 RX ADMIN — Medication 1 TABLET(S): at 11:16

## 2022-05-19 RX ADMIN — DULOXETINE HYDROCHLORIDE 90 MILLIGRAM(S): 30 CAPSULE, DELAYED RELEASE ORAL at 11:16

## 2022-05-19 RX ADMIN — LIDOCAINE 1 APPLICATION(S): 4 CREAM TOPICAL at 06:08

## 2022-05-19 NOTE — PROGRESS NOTE ADULT - ASSESSMENT
72y Female with PMHx of T2DM c/b peripheral neuropathy, HTN, recent admission and workup for Naty Sanchez tear, TIA on 3/30 (MR negative) presents to [facility] as transfer from [facility] ED for episode of altered mental status (staring, generalized weakness), back to baseline in [facility] ED, no acute pathology on CTH or MRI, found to have metabolic encephalopathy (resolved) 2/2 UTI (completed 7day abx) vs hypertension, also found to have new LE weakness due to diabetic neuropathy. Due to immigration and insurance status, not able to FELICIA as recommended by PT. Pending improvement to one-person assist in order to discharge home with out-of-pocket PT vs home with family assist.     Problem/Plan - 1:  ·  Problem: Cough  ·  Plan: Pt presenting with new onset dry cough overnight without associated fever, chills, acute SOB, hypoxia, or reflux. Likely component of HFpEF. TTE (4/1) showed grade 1 left ventricular diastolic dysfunction, no PFO, EF >83% hyperdynamic left ventricular systolic function.   - Obtain COVID swab  - Supportive care  - If worsens, can consider repeating TTE     Problem/Plan - 2:  ·  Problem: Leg weakness.   ·  Plan: Symmetrical LE weakness, worse distally compared to proximally. Symmetrical diminished sensation, longstanding diabetic peripheral neuropathy. Denies issues with incontinence, or back pain, no sensory line. Likely progression of diabetic neuropathy, however symptoms more severe than to be expected.  MRI L/Spine:  - showing at the L3/4 leveI. There are degenerative   changes involving the facets bilaterally as well as ligamentum flavum   hypertrophy. This combination results in moderate central spinal canal   stenosis.   - At the L4/5 level, there is disc bulge abutting the L4 nerve roots   bilaterally. There are degenerative changes involving the facets   bilaterally (right greater than left) as well as ligamentum flavum   hypertrophy. This combination results in mild central spinal canal   stenosis.  - Neurology team saying that since it is diabetic in origin it is unlikely that the patient will make significant improvement in the short term.  - c/w cymbalta 90mg daily   - c/w Custom AFO boots   - c/w incentive spirometry.     Problem/Plan - 3:  ·  Problem: Neck pain.   ·  Plan: Neck and shoulder pain on 5/3. Tender to palpation, does not radiate  - Hot packs daily  - off ibuprofen now.     Problem/Plan - 4:  ·  Problem: Bilateral leg pain.   ·  Plan: Needle like pain shins downward, interfering with sleep.   - c/w cymbalta 90 Qd for diabetic neuropathy  - f/u LE duplex for c/f worsening swelling     Problem/Plan - 5:  ·  Problem: Type II diabetes mellitus.   ·  Plan: Pt with hx of DM, A1c 11.6. Endocrine following. TSH results: 1.120.   - Low C peptide patient will need insulin and NOT oral agents on dc  - C/w mISS, before meals   - C/w Lantus 4U, Lispro 8U   - F/u endocrine recs.     Problem/Plan - 6:  ·  Problem: Hypertension.   ·  Plan: Goal -160  - c/w Amlodipine 10mg (new med), home Lisinopril 40mg   - TTE with bubble done prior admission 4/1: grade 1 left ventricular diastolic dysfunction, no PFO, EF >83% hyperdynamic left ventricular systolic function.     Problem/Plan - 7:  ·  Problem: Hyperlipidemia.   ·  Plan: -C/w Lipitor 40mg.     Problem/Plan - 8:  ·  Problem: Naty-Sanchez tear.   ·  Plan: Hx of Naty Sanchez tear in prior admission   - continue home protonix 40mg daily  - Restarted APT.     Problem/Plan - 9:  ·  Problem: UTI (urinary tract infection).   ·  Plan: RESOLVED  UA with +WBCs, UCx growing E faecalis. Will treat as complicated  -completed 7 day course of CTX and then ampicillin.     Problem/Plan - 10:  ·  Problem: Nutrition, metabolism, and development symptoms.   ·  Plan: F: None  E: Replete PRN   N: DASH/TLC   DVT: Lovenox 40 qd  Dispo: RMF, PT rec FELICIA- however patient is uninsured and cannot go to [facility] attempting to get to one person assist vs pending paperwork of his son and daughter for possible FMLA.

## 2022-05-19 NOTE — PROGRESS NOTE ADULT - SUBJECTIVE AND OBJECTIVE BOX
** INCOMPLETE **    OVERNIGHT EVENTS:     SUBJECTIVE:    VITAL SIGNS:  Vital Signs Last 24 Hrs  T(C): 36.6 (19 May 2022 05:36), Max: 36.8 (18 May 2022 17:26)  T(F): 97.8 (19 May 2022 05:36), Max: 98.3 (18 May 2022 17:26)  HR: 71 (19 May 2022 05:36) (70 - 83)  BP: 159/69 (19 May 2022 05:36) (127/74 - 159/69)  BP(mean): --  RR: 17 (19 May 2022 05:36) (16 - 18)  SpO2: 97% (19 May 2022 05:36) (95% - 98%)    PHYSICAL EXAM:  General: NAD; speaking in full sentences  HEENT: NC/AT; PERRL; EOMI; MMM  Neck: supple; no JVD  Cardiac: RRR; +S1/S2  Pulm: CTA B/L; no W/R/R  GI: soft, NT/ND, +BS  Extremities: WWP; no edema, clubbing or cyanosis  Vasc: 2+ radial, DP pulses B/L  Neuro: AAOx3; no focal deficits    MEDICATIONS:  MEDICATIONS  (STANDING):  amLODIPine   Tablet 10 milliGRAM(s) Oral daily  aspirin  chewable 81 milliGRAM(s) Oral daily  atorvastatin 40 milliGRAM(s) Oral at bedtime  dextrose 5%. 1000 milliLiter(s) (50 mL/Hr) IV Continuous <Continuous>  dextrose 5%. 1000 milliLiter(s) (100 mL/Hr) IV Continuous <Continuous>  dextrose 50% Injectable 25 Gram(s) IV Push once  dextrose 50% Injectable 12.5 Gram(s) IV Push once  dextrose 50% Injectable 25 Gram(s) IV Push once  DULoxetine 90 milliGRAM(s) Oral every 24 hours  enoxaparin Injectable 40 milliGRAM(s) SubCutaneous every 24 hours  glucagon  Injectable 1 milliGRAM(s) IntraMuscular once  insulin glargine Injectable (LANTUS) 4 Unit(s) SubCutaneous at bedtime  insulin lispro (ADMELOG) corrective regimen sliding scale   SubCutaneous three times a day before meals  insulin lispro Injectable (ADMELOG) 8 Unit(s) SubCutaneous three times a day before meals  lidocaine 2% Gel 1 Application(s) Topical three times a day  lisinopril 40 milliGRAM(s) Oral every 24 hours  multivitamin 1 Tablet(s) Oral daily  pantoprazole    Tablet 40 milliGRAM(s) Oral before breakfast    MEDICATIONS  (PRN):  acetaminophen     Tablet .. 325 milliGRAM(s) Oral every 6 hours PRN Temp greater or equal to 38C (100.4F), Moderate Pain (4 - 6)  dextrose Oral Gel 15 Gram(s) Oral once PRN Blood Glucose LESS THAN 70 milliGRAM(s)/deciliter      ALLERGIES:  Allergies    No Known Allergies    Intolerances        LABS:                        10.8   3.83  )-----------( 308      ( 18 May 2022 07:33 )             34.3     05-18    136  |  100  |  27<H>  ----------------------------<  172<H>  4.7   |  28  |  0.54    Ca    8.7      18 May 2022 07:33  Phos  3.8     05-18  Mg     1.8     05-18    TPro  6.5  /  Alb  3.2<L>  /  TBili  0.2  /  DBili  x   /  AST  14  /  ALT  12  /  AlkPhos  36<L>  05-18        RADIOLOGY & ADDITIONAL TESTS: Reviewed. OVERNIGHT EVENTS:   No acute events overnight.     SUBJECTIVE:  Patient seen and examined at bedside, resting comfortably in bed, and does not appear to be in any acute distress. Patient endorsed chronic lower extremity pain, unchanged and not acutely worsened. Also endorsed cough, however denied sputum production. She denied fevers, chills, acute SOB, headaches, changes in vision, chest pain, abdominal pain, N/V/D/C, genitourinary symptoms.      VITAL SIGNS:  Vital Signs Last 24 Hrs  T(C): 36.6 (19 May 2022 05:36), Max: 36.8 (18 May 2022 17:26)  T(F): 97.8 (19 May 2022 05:36), Max: 98.3 (18 May 2022 17:26)  HR: 71 (19 May 2022 05:36) (70 - 83)  BP: 159/69 (19 May 2022 05:36) (127/74 - 159/69)  BP(mean): --  RR: 17 (19 May 2022 05:36) (16 - 18)  SpO2: 97% (19 May 2022 05:36) (95% - 98%)    PHYSICAL EXAM:  General: NAD; elderly female, lying comfortably in bed, speaking in full sentences  HEENT: NC/AT; PERRL; EOMI; MMM  Neck: supple; no JVD  Cardiac: RRR; +S1/S2, +systolic murmur II/IV RUSB  Pulm: CTA B/L; no W/R/R, looks comfortable on room air without increased WOB or accessory muscle use  GI: soft, NT/ND, +BSx4,   Extremities: WWP; minimal lower extremity edema, clubbing or cyanosis, 4/5 strength throughout, sensation grossly intact  Vasc: 2+ radial, DP pulses B/L  Neuro: AAOx3; no focal deficits    MEDICATIONS:  MEDICATIONS  (STANDING):  amLODIPine   Tablet 10 milliGRAM(s) Oral daily  aspirin  chewable 81 milliGRAM(s) Oral daily  atorvastatin 40 milliGRAM(s) Oral at bedtime  dextrose 5%. 1000 milliLiter(s) (50 mL/Hr) IV Continuous <Continuous>  dextrose 5%. 1000 milliLiter(s) (100 mL/Hr) IV Continuous <Continuous>  dextrose 50% Injectable 25 Gram(s) IV Push once  dextrose 50% Injectable 12.5 Gram(s) IV Push once  dextrose 50% Injectable 25 Gram(s) IV Push once  DULoxetine 90 milliGRAM(s) Oral every 24 hours  enoxaparin Injectable 40 milliGRAM(s) SubCutaneous every 24 hours  glucagon  Injectable 1 milliGRAM(s) IntraMuscular once  insulin glargine Injectable (LANTUS) 4 Unit(s) SubCutaneous at bedtime  insulin lispro (ADMELOG) corrective regimen sliding scale   SubCutaneous three times a day before meals  insulin lispro Injectable (ADMELOG) 8 Unit(s) SubCutaneous three times a day before meals  lidocaine 2% Gel 1 Application(s) Topical three times a day  lisinopril 40 milliGRAM(s) Oral every 24 hours  multivitamin 1 Tablet(s) Oral daily  pantoprazole    Tablet 40 milliGRAM(s) Oral before breakfast    MEDICATIONS  (PRN):  acetaminophen     Tablet .. 325 milliGRAM(s) Oral every 6 hours PRN Temp greater or equal to 38C (100.4F), Moderate Pain (4 - 6)  dextrose Oral Gel 15 Gram(s) Oral once PRN Blood Glucose LESS THAN 70 milliGRAM(s)/deciliter      ALLERGIES:  Allergies    No Known Allergies    Intolerances        LABS:                        10.8   3.83  )-----------( 308      ( 18 May 2022 07:33 )             34.3     05-18    136  |  100  |  27<H>  ----------------------------<  172<H>  4.7   |  28  |  0.54    Ca    8.7      18 May 2022 07:33  Phos  3.8     05-18  Mg     1.8     05-18    TPro  6.5  /  Alb  3.2<L>  /  TBili  0.2  /  DBili  x   /  AST  14  /  ALT  12  /  AlkPhos  36<L>  05-18        RADIOLOGY & ADDITIONAL TESTS: Reviewed.

## 2022-05-20 LAB
GLUCOSE BLDC GLUCOMTR-MCNC: 112 MG/DL — HIGH (ref 70–99)
GLUCOSE BLDC GLUCOMTR-MCNC: 125 MG/DL — HIGH (ref 70–99)
GLUCOSE BLDC GLUCOMTR-MCNC: 161 MG/DL — HIGH (ref 70–99)
GLUCOSE BLDC GLUCOMTR-MCNC: 166 MG/DL — HIGH (ref 70–99)
GLUCOSE BLDC GLUCOMTR-MCNC: 208 MG/DL — HIGH (ref 70–99)
GLUCOSE BLDC GLUCOMTR-MCNC: 215 MG/DL — HIGH (ref 70–99)
GLUCOSE BLDC GLUCOMTR-MCNC: 53 MG/DL — CRITICAL LOW (ref 70–99)
GLUCOSE BLDC GLUCOMTR-MCNC: 59 MG/DL — LOW (ref 70–99)

## 2022-05-20 PROCEDURE — 99231 SBSQ HOSP IP/OBS SF/LOW 25: CPT

## 2022-05-20 RX ORDER — DEXTROSE 10 % IN WATER 10 %
250 INTRAVENOUS SOLUTION INTRAVENOUS ONCE
Refills: 0 | Status: COMPLETED | OUTPATIENT
Start: 2022-05-20 | End: 2022-05-20

## 2022-05-20 RX ADMIN — LIDOCAINE 1 APPLICATION(S): 4 CREAM TOPICAL at 06:53

## 2022-05-20 RX ADMIN — INSULIN GLARGINE 4 UNIT(S): 100 INJECTION, SOLUTION SUBCUTANEOUS at 22:02

## 2022-05-20 RX ADMIN — Medication 8 UNIT(S): at 12:54

## 2022-05-20 RX ADMIN — Medication 1500 MILLILITER(S): at 18:02

## 2022-05-20 RX ADMIN — PANTOPRAZOLE SODIUM 40 MILLIGRAM(S): 20 TABLET, DELAYED RELEASE ORAL at 06:52

## 2022-05-20 RX ADMIN — AMLODIPINE BESYLATE 10 MILLIGRAM(S): 2.5 TABLET ORAL at 06:53

## 2022-05-20 RX ADMIN — Medication 1 TABLET(S): at 11:16

## 2022-05-20 RX ADMIN — ENOXAPARIN SODIUM 40 MILLIGRAM(S): 100 INJECTION SUBCUTANEOUS at 22:52

## 2022-05-20 RX ADMIN — Medication 2: at 09:01

## 2022-05-20 RX ADMIN — ATORVASTATIN CALCIUM 40 MILLIGRAM(S): 80 TABLET, FILM COATED ORAL at 22:52

## 2022-05-20 RX ADMIN — LIDOCAINE 1 APPLICATION(S): 4 CREAM TOPICAL at 00:07

## 2022-05-20 RX ADMIN — Medication 81 MILLIGRAM(S): at 11:16

## 2022-05-20 RX ADMIN — DULOXETINE HYDROCHLORIDE 90 MILLIGRAM(S): 30 CAPSULE, DELAYED RELEASE ORAL at 11:16

## 2022-05-20 RX ADMIN — LIDOCAINE 1 APPLICATION(S): 4 CREAM TOPICAL at 22:52

## 2022-05-20 RX ADMIN — Medication 4: at 12:52

## 2022-05-20 RX ADMIN — LIDOCAINE 1 APPLICATION(S): 4 CREAM TOPICAL at 13:35

## 2022-05-20 RX ADMIN — LISINOPRIL 40 MILLIGRAM(S): 2.5 TABLET ORAL at 07:20

## 2022-05-20 RX ADMIN — Medication 8 UNIT(S): at 09:01

## 2022-05-20 NOTE — PROGRESS NOTE ADULT - ASSESSMENT
72y Female with PMHx of T2DM c/b peripheral neuropathy, HTN, recent admission and workup for Naty Sanchez tear, TIA on 3/30 (MR negative) presents to [facility] as transfer from [facility] ED for episode of altered mental status (staring, generalized weakness), back to baseline in [facility] ED, no acute pathology on CTH or MRI, found to have metabolic encephalopathy (resolved) 2/2 UTI (completed 7day abx) vs hypertension, also found to have new LE weakness due to diabetic neuropathy. Due to immigration and insurance status, not able to FELICIA as recommended by PT. Pending improvement to one-person assist in order to discharge home with out-of-pocket PT vs home with family assist.     Problem/Plan - 1:  ·  Problem: Cough  ·  Plan: Pt presenting with new onset dry cough overnight without associated fever, chills, acute SOB, hypoxia, or reflux. Likely component of HFpEF. TTE (4/1) showed grade 1 left ventricular diastolic dysfunction, no PFO, EF >83% hyperdynamic left ventricular systolic function.   - COVID swab negative  - Supportive care  - If worsens, can consider repeating TTE     Problem/Plan - 2:  ·  Problem: Leg weakness.   ·  Plan: Symmetrical LE weakness, worse distally compared to proximally. Symmetrical diminished sensation, longstanding diabetic peripheral neuropathy. Denies issues with incontinence, or back pain, no sensory line. Likely progression of diabetic neuropathy, however symptoms more severe than to be expected.  MRI L/Spine:  - showing at the L3/4 leveI. There are degenerative   changes involving the facets bilaterally as well as ligamentum flavum   hypertrophy. This combination results in moderate central spinal canal   stenosis.   - At the L4/5 level, there is disc bulge abutting the L4 nerve roots   bilaterally. There are degenerative changes involving the facets   bilaterally (right greater than left) as well as ligamentum flavum   hypertrophy. This combination results in mild central spinal canal   stenosis.  - Neurology team saying that since it is diabetic in origin it is unlikely that the patient will make significant improvement in the short term.  - c/w cymbalta 90mg daily   - c/w Custom AFO boots   - c/w incentive spirometry.     Problem/Plan - 3:  ·  Problem: Neck pain.   ·  Plan: Neck and shoulder pain on 5/3. Tender to palpation, does not radiate  - Hot packs daily  - off ibuprofen now.     Problem/Plan - 4:  ·  Problem: Bilateral leg pain.   ·  Plan: Needle like pain shins downward, interfering with sleep.   - c/w cymbalta 90 Qd for diabetic neuropathy  - f/u LE duplex for c/f worsening swelling     Problem/Plan - 5:  ·  Problem: Type II diabetes mellitus.   ·  Plan: Pt with hx of DM, A1c 11.6. Endocrine following. TSH results: 1.120.   - Low C peptide patient will need insulin and NOT oral agents on dc  - C/w mISS, before meals   - C/w Lantus 4U, Lispro 8U   - F/u endocrine recs     Problem/Plan - 6:  ·  Problem: Hypertension.   ·  Plan: Goal -160  - c/w Amlodipine 10mg (new med), home Lisinopril 40mg   - TTE with bubble done prior admission 4/1: grade 1 left ventricular diastolic dysfunction, no PFO, EF >83% hyperdynamic left ventricular systolic function     Problem/Plan - 7:  ·  Problem: Hyperlipidemia.   ·  Plan: -C/w Lipitor 40mg     Problem/Plan - 8:  ·  Problem: Naty-Sanchez tear.   ·  Plan: Hx of Naty Sanchez tear in prior admission   - continue home protonix 40mg daily  - Restarted APT     Problem/Plan - 9:  ·  Problem: UTI (urinary tract infection).   ·  Plan: RESOLVED  UA with +WBCs, UCx growing E faecalis. Will treat as complicated  -completed 7 day course of CTX and then ampicillin     Problem/Plan - 10:  ·  Problem: Nutrition, metabolism, and development symptoms.   ·  Plan: F: None  E: Replete PRN   N: DASH/TLC   DVT: Lovenox 40 qd  Dispo: RMF, PT rec FELICIA- however patient is uninsured and cannot go to [facility] attempting to get to one person assist vs pending paperwork of his son and daughter for possible FMLA 72y Female with PMHx of T2DM c/b peripheral neuropathy, HTN, recent admission and workup for Naty Sanchez tear, TIA on 3/30 (MR negative) presents to [facility] as transfer from [facility] ED for episode of altered mental status (staring, generalized weakness), back to baseline in [facility] ED, no acute pathology on CTH or MRI, found to have metabolic encephalopathy (resolved) 2/2 UTI (completed 7day abx) vs hypertension, also found to have new LE weakness due to diabetic neuropathy. Due to immigration and insurance status, not able to FELICIA as recommended by PT. Pending improvement to one-person assist in order to discharge home with out-of-pocket PT vs home with family assist.     Problem/Plan - 1:  ·  Problem: Cough  ·  Plan: Pt presenting with new onset dry cough overnight without associated fever, chills, acute SOB, hypoxia, or reflux. Likely component of HFpEF. TTE (4/1) showed grade 1 left ventricular diastolic dysfunction, no PFO, EF >83% hyperdynamic left ventricular systolic function.   - COVID swab negative  - Supportive care  - If worsens, can consider repeating TTE     Problem/Plan - 2:  ·  Problem: Leg weakness.   ·  Plan: Symmetrical LE weakness, worse distally compared to proximally. Symmetrical diminished sensation, longstanding diabetic peripheral neuropathy. Denies issues with incontinence, or back pain, no sensory line. Likely progression of diabetic neuropathy, however symptoms more severe than to be expected.  MRI L/Spine:  - showing at the L3/4 leveI. There are degenerative   changes involving the facets bilaterally as well as ligamentum flavum   hypertrophy. This combination results in moderate central spinal canal   stenosis.   - At the L4/5 level, there is disc bulge abutting the L4 nerve roots   bilaterally. There are degenerative changes involving the facets   bilaterally (right greater than left) as well as ligamentum flavum   hypertrophy. This combination results in mild central spinal canal   stenosis.  - Neurology team saying that since it is diabetic in origin it is unlikely that the patient will make significant improvement in the short term.  - c/w cymbalta 90mg daily   - c/w Custom AFO boots   - c/w incentive spirometry.     Problem/Plan - 3:  ·  Problem: Neck pain.   ·  Plan: Neck and shoulder pain on 5/3. Tender to palpation, does not radiate  - Hot packs daily  - off ibuprofen now.     Problem/Plan - 4:  ·  Problem: Bilateral leg pain.   ·  Plan: Needle like pain shins downward, interfering with sleep.   - c/w cymbalta 90 Qd for diabetic neuropathy  - f/u LE duplex for c/f worsening swelling     Problem/Plan - 5:  ·  Problem: Type II diabetes mellitus.   ·  Plan: Pt with hx of DM, A1c 11.6. Endocrine following. TSH results: 1.120.   - Low C peptide patient will need insulin and NOT oral agents on dc  - C/w mISS, before meals   - C/w Lantus 4U, Lispro 8U   - F/u endocrine recs     Problem/Plan - 6:  ·  Problem: Hypertension.   ·  Plan: Goal -160  - c/w Amlodipine 10mg (new med), home Lisinopril 40mg   - TTE with bubble done prior admission 4/1: grade 1 left ventricular diastolic dysfunction, no PFO, EF >83% hyperdynamic left ventricular systolic function     Problem/Plan - 7:  ·  Problem: Hyperlipidemia.   ·  Plan: -C/w Lipitor 40mg     Problem/Plan - 8:  ·  Problem: Naty-Sanchez tear.   ·  Plan: Hx of Naty Sanchez tear in prior admission   - continue home protonix 40mg daily  - Restarted APT     Problem/Plan - 9:  ·  Problem: UTI (urinary tract infection).   ·  Plan: RESOLVED  UA with +WBCs, UCx growing E faecalis. Will treat as complicated  -completed 7 day course of CTX and then ampicillin     Problem/Plan - 10:  ·  Problem: Nutrition, metabolism, and development symptoms.   ·  Plan: F: None  E: Replete PRN   N: DASH/TLC   DVT: Lovenox 40 qd  Dispo: RMF, PT rec FELICIA- however patient is uninsured and cannot go to [facility] attempting to get to one person assist. Son was able to received FMLA. Pending daughter for possible FMLA

## 2022-05-20 NOTE — PROGRESS NOTE ADULT - SUBJECTIVE AND OBJECTIVE BOX
** INCOMPLETE **    OVERNIGHT EVENTS:     SUBJECTIVE:    VITAL SIGNS:  Vital Signs Last 24 Hrs  T(C): 36.6 (20 May 2022 05:22), Max: 36.8 (19 May 2022 11:10)  T(F): 97.9 (20 May 2022 05:22), Max: 98.3 (19 May 2022 11:10)  HR: 75 (20 May 2022 05:22) (73 - 78)  BP: 165/71 (20 May 2022 05:22) (120/69 - 165/71)  BP(mean): --  RR: 17 (20 May 2022 05:22) (16 - 17)  SpO2: 99% (20 May 2022 05:22) (97% - 99%)    PHYSICAL EXAM:  General: NAD; speaking in full sentences  HEENT: NC/AT; PERRL; EOMI; MMM  Neck: supple; no JVD  Cardiac: RRR; +S1/S2  Pulm: CTA B/L; no W/R/R  GI: soft, NT/ND, +BS  Extremities: WWP; no edema, clubbing or cyanosis  Vasc: 2+ radial, DP pulses B/L  Neuro: AAOx3; no focal deficits    MEDICATIONS:  MEDICATIONS  (STANDING):  amLODIPine   Tablet 10 milliGRAM(s) Oral daily  aspirin  chewable 81 milliGRAM(s) Oral daily  atorvastatin 40 milliGRAM(s) Oral at bedtime  dextrose 5%. 1000 milliLiter(s) (100 mL/Hr) IV Continuous <Continuous>  dextrose 5%. 1000 milliLiter(s) (50 mL/Hr) IV Continuous <Continuous>  dextrose 50% Injectable 25 Gram(s) IV Push once  dextrose 50% Injectable 12.5 Gram(s) IV Push once  dextrose 50% Injectable 25 Gram(s) IV Push once  DULoxetine 90 milliGRAM(s) Oral every 24 hours  enoxaparin Injectable 40 milliGRAM(s) SubCutaneous every 24 hours  glucagon  Injectable 1 milliGRAM(s) IntraMuscular once  insulin glargine Injectable (LANTUS) 4 Unit(s) SubCutaneous at bedtime  insulin lispro (ADMELOG) corrective regimen sliding scale   SubCutaneous three times a day before meals  insulin lispro Injectable (ADMELOG) 8 Unit(s) SubCutaneous three times a day before meals  lidocaine 2% Gel 1 Application(s) Topical three times a day  lisinopril 40 milliGRAM(s) Oral every 24 hours  multivitamin 1 Tablet(s) Oral daily  pantoprazole    Tablet 40 milliGRAM(s) Oral before breakfast    MEDICATIONS  (PRN):  acetaminophen     Tablet .. 325 milliGRAM(s) Oral every 6 hours PRN Temp greater or equal to 38C (100.4F), Moderate Pain (4 - 6)  dextrose Oral Gel 15 Gram(s) Oral once PRN Blood Glucose LESS THAN 70 milliGRAM(s)/deciliter      ALLERGIES:  Allergies    No Known Allergies    Intolerances        LABS:                        10.2   5.10  )-----------( 288      ( 19 May 2022 07:15 )             32.4     05-19    135  |  99  |  30<H>  ----------------------------<  137<H>  4.4   |  28  |  0.53    Ca    8.7      19 May 2022 07:15  Phos  3.7     05-19  Mg     1.7     05-19    TPro  6.5  /  Alb  3.2<L>  /  TBili  0.2  /  DBili  x   /  AST  14  /  ALT  12  /  AlkPhos  36<L>  05-18        RADIOLOGY & ADDITIONAL TESTS: Reviewed. OVERNIGHT EVENTS:   No acute events overnight.     SUBJECTIVE:  Patient seen and examined at bedside, resting comfortably in bed, and does not appear to be in any acute distress. Patient endorsed chronic R knee arthritic pain, unchanged. She denied fevers, chills, acute SOB, headaches, changes in vision, chest pain, abdominal pain, N/V/D/C, genitourinary symptoms.    VITAL SIGNS:  Vital Signs Last 24 Hrs  T(C): 36.6 (20 May 2022 05:22), Max: 36.8 (19 May 2022 11:10)  T(F): 97.9 (20 May 2022 05:22), Max: 98.3 (19 May 2022 11:10)  HR: 75 (20 May 2022 05:22) (73 - 78)  BP: 165/71 (20 May 2022 05:22) (120/69 - 165/71)  BP(mean): --  RR: 17 (20 May 2022 05:22) (16 - 17)  SpO2: 99% (20 May 2022 05:22) (97% - 99%)    PHYSICAL EXAM:  General: NAD; elderly female, lying comfortably in bed, speaking in full sentences  HEENT: NC/AT; PERRL, R eye decreased acuity at baseline; EOMI; MMM  Neck: supple; no JVD  Cardiac: RRR; +S1/S2, +systolic murmur II/IV RUSB  Pulm: CTA B/L; no W/R/R, looks comfortable on room air without increased WOB or accessory muscle use  GI: soft, NT/ND, +BSx4,   Extremities: WWP; minimal lower extremity edema, clubbing or cyanosis, 4/5 strength throughout, sensation grossly intact  Vasc: 2+ radial, DP pulses B/L  Neuro: AAOx3; no focal deficits    MEDICATIONS:  MEDICATIONS  (STANDING):  amLODIPine   Tablet 10 milliGRAM(s) Oral daily  aspirin  chewable 81 milliGRAM(s) Oral daily  atorvastatin 40 milliGRAM(s) Oral at bedtime  dextrose 5%. 1000 milliLiter(s) (100 mL/Hr) IV Continuous <Continuous>  dextrose 5%. 1000 milliLiter(s) (50 mL/Hr) IV Continuous <Continuous>  dextrose 50% Injectable 25 Gram(s) IV Push once  dextrose 50% Injectable 12.5 Gram(s) IV Push once  dextrose 50% Injectable 25 Gram(s) IV Push once  DULoxetine 90 milliGRAM(s) Oral every 24 hours  enoxaparin Injectable 40 milliGRAM(s) SubCutaneous every 24 hours  glucagon  Injectable 1 milliGRAM(s) IntraMuscular once  insulin glargine Injectable (LANTUS) 4 Unit(s) SubCutaneous at bedtime  insulin lispro (ADMELOG) corrective regimen sliding scale   SubCutaneous three times a day before meals  insulin lispro Injectable (ADMELOG) 8 Unit(s) SubCutaneous three times a day before meals  lidocaine 2% Gel 1 Application(s) Topical three times a day  lisinopril 40 milliGRAM(s) Oral every 24 hours  multivitamin 1 Tablet(s) Oral daily  pantoprazole    Tablet 40 milliGRAM(s) Oral before breakfast    MEDICATIONS  (PRN):  acetaminophen     Tablet .. 325 milliGRAM(s) Oral every 6 hours PRN Temp greater or equal to 38C (100.4F), Moderate Pain (4 - 6)  dextrose Oral Gel 15 Gram(s) Oral once PRN Blood Glucose LESS THAN 70 milliGRAM(s)/deciliter      ALLERGIES:  Allergies    No Known Allergies    Intolerances      LABS:                        10.2   5.10  )-----------( 288      ( 19 May 2022 07:15 )             32.4     05-19    135  |  99  |  30<H>  ----------------------------<  137<H>  4.4   |  28  |  0.53    Ca    8.7      19 May 2022 07:15  Phos  3.7     05-19  Mg     1.7     05-19    TPro  6.5  /  Alb  3.2<L>  /  TBili  0.2  /  DBili  x   /  AST  14  /  ALT  12  /  AlkPhos  36<L>  05-18        RADIOLOGY & ADDITIONAL TESTS: Reviewed.

## 2022-05-21 LAB
GLUCOSE BLDC GLUCOMTR-MCNC: 119 MG/DL — HIGH (ref 70–99)
GLUCOSE BLDC GLUCOMTR-MCNC: 176 MG/DL — HIGH (ref 70–99)
GLUCOSE BLDC GLUCOMTR-MCNC: 180 MG/DL — HIGH (ref 70–99)
GLUCOSE BLDC GLUCOMTR-MCNC: 93 MG/DL — SIGNIFICANT CHANGE UP (ref 70–99)

## 2022-05-21 PROCEDURE — 99231 SBSQ HOSP IP/OBS SF/LOW 25: CPT

## 2022-05-21 PROCEDURE — 93970 EXTREMITY STUDY: CPT | Mod: 26

## 2022-05-21 RX ORDER — ENOXAPARIN SODIUM 100 MG/ML
60 INJECTION SUBCUTANEOUS EVERY 12 HOURS
Refills: 0 | Status: DISCONTINUED | OUTPATIENT
Start: 2022-05-21 | End: 2022-05-23

## 2022-05-21 RX ADMIN — Medication 1 TABLET(S): at 11:38

## 2022-05-21 RX ADMIN — Medication 2: at 09:18

## 2022-05-21 RX ADMIN — INSULIN GLARGINE 4 UNIT(S): 100 INJECTION, SOLUTION SUBCUTANEOUS at 22:01

## 2022-05-21 RX ADMIN — Medication 8 UNIT(S): at 09:17

## 2022-05-21 RX ADMIN — Medication 8 UNIT(S): at 12:57

## 2022-05-21 RX ADMIN — Medication 2: at 12:57

## 2022-05-21 RX ADMIN — Medication 81 MILLIGRAM(S): at 11:38

## 2022-05-21 RX ADMIN — LIDOCAINE 1 APPLICATION(S): 4 CREAM TOPICAL at 06:59

## 2022-05-21 RX ADMIN — ENOXAPARIN SODIUM 60 MILLIGRAM(S): 100 INJECTION SUBCUTANEOUS at 14:34

## 2022-05-21 RX ADMIN — Medication 8 UNIT(S): at 17:46

## 2022-05-21 RX ADMIN — LISINOPRIL 40 MILLIGRAM(S): 2.5 TABLET ORAL at 07:10

## 2022-05-21 RX ADMIN — ATORVASTATIN CALCIUM 40 MILLIGRAM(S): 80 TABLET, FILM COATED ORAL at 22:01

## 2022-05-21 RX ADMIN — AMLODIPINE BESYLATE 10 MILLIGRAM(S): 2.5 TABLET ORAL at 06:58

## 2022-05-21 RX ADMIN — DULOXETINE HYDROCHLORIDE 90 MILLIGRAM(S): 30 CAPSULE, DELAYED RELEASE ORAL at 09:19

## 2022-05-21 RX ADMIN — LIDOCAINE 1 APPLICATION(S): 4 CREAM TOPICAL at 14:23

## 2022-05-21 RX ADMIN — PANTOPRAZOLE SODIUM 40 MILLIGRAM(S): 20 TABLET, DELAYED RELEASE ORAL at 07:09

## 2022-05-21 NOTE — PROGRESS NOTE ADULT - ASSESSMENT
72y Female with PMHx of T2DM c/b peripheral neuropathy, HTN, recent admission and workup for Naty Sanchez tear, TIA on 3/30 (MR negative) presents to [facility] as transfer from [facility] ED for episode of altered mental status (staring, generalized weakness), back to baseline in [facility] ED, no acute pathology on CTH or MRI, found to have metabolic encephalopathy (resolved) 2/2 UTI (completed 7day abx) vs hypertension, also found to have new LE weakness due to diabetic neuropathy. Due to immigration and insurance status, not able to [facility] as recommended by PT. Pending improvement to one-person assist in order to discharge home with out-of-pocket PT vs home with family assist.     Problem/Plan - 1:  ·  Problem: Cough  ·  Plan: Pt presenting with new onset dry cough overnight without associated fever, chills, acute SOB, hypoxia, or reflux. Likely component of HFpEF. TTE (4/1) showed grade 1 left ventricular diastolic dysfunction, no PFO, EF >83% hyperdynamic left ventricular systolic function.   - COVID swab negative  - Supportive care  - If worsens, can consider repeating TTE  - RESOLVED     Problem/Plan - 2:  ·  Problem: Leg weakness.   ·  Plan: Symmetrical LE weakness, worse distally compared to proximally. Symmetrical diminished sensation, longstanding diabetic peripheral neuropathy. Denies issues with incontinence, or back pain, no sensory line. Likely progression of diabetic neuropathy, however symptoms more severe than to be expected.  MRI L/Spine:  - showing at the L3/4 leveI. There are degenerative   changes involving the facets bilaterally as well as ligamentum flavum   hypertrophy. This combination results in moderate central spinal canal   stenosis.   - At the L4/5 level, there is disc bulge abutting the L4 nerve roots   bilaterally. There are degenerative changes involving the facets   bilaterally (right greater than left) as well as ligamentum flavum   hypertrophy. This combination results in mild central spinal canal   stenosis.  - Neurology team saying that since it is diabetic in origin it is unlikely that the patient will make significant improvement in the short term.  - c/w cymbalta 90mg daily   - c/w Custom AFO boots   - c/w incentive spirometry.     Problem/Plan - 3:  ·  Problem: Neck pain.   ·  Plan: Neck and shoulder pain on 5/3. Tender to palpation, does not radiate  - Hot packs daily  - off ibuprofen now.     Problem/Plan - 4:  ·  Problem: Bilateral leg pain.   ·  Plan: Needle like pain shins downward, interfering with sleep.   - c/w cymbalta 90 Qd for diabetic neuropathy  - f/u LE duplex - acute DVT of  the left peroneal vein, increased Lovenox to therapeutic dose 1mg/kg BID (60mg BID)     Problem/Plan - 5:  ·  Problem: Type II diabetes mellitus.   ·  Plan: Pt with hx of DM, A1c 11.6. Endocrine following. TSH results: 1.120.   - Low C peptide patient will need insulin and NOT oral agents on dc  - C/w mISS, before meals   - C/w Lantus 4U, Lispro 8U   - F/u endocrine recs     Problem/Plan - 6:  ·  Problem: Hypertension.   ·  Plan: Goal -160  - c/w Amlodipine 10mg (new med), home Lisinopril 40mg   - TTE with bubble done prior admission 4/1: grade 1 left ventricular diastolic dysfunction, no PFO, EF >83% hyperdynamic left ventricular systolic function     Problem/Plan - 7:  ·  Problem: Hyperlipidemia.   ·  Plan: -C/w Lipitor 40mg     Problem/Plan - 8:  ·  Problem: Naty-Sanchez tear.   ·  Plan: Hx of Naty Sanchez tear in prior admission   - continue home protonix 40mg daily  - Restarted APT     Problem/Plan - 9:  ·  Problem: UTI (urinary tract infection).   ·  Plan: RESOLVED  UA with +WBCs, UCx growing E faecalis. Will treat as complicated  -completed 7 day course of CTX and then ampicillin     Problem/Plan - 10:  ·  Problem: Nutrition, metabolism, and development symptoms.   ·  Plan: F: None  E: Replete PRN   N: DASH/TLC   DVT: Lovenox 40 qd  Dispo: [facility], PT rec [facility]- however patient is uninsured and cannot go to [facility] attempting to get to one person assist. Son was able to received FMLA. Pending daughter for possible FMLA

## 2022-05-21 NOTE — PROGRESS NOTE ADULT - SUBJECTIVE AND OBJECTIVE BOX
** INCOMPLETE **    OVERNIGHT EVENTS:     SUBJECTIVE:    VITAL SIGNS:  Vital Signs Last 24 Hrs  T(C): 36.6 (21 May 2022 05:27), Max: 36.7 (20 May 2022 11:26)  T(F): 97.8 (21 May 2022 05:27), Max: 98 (20 May 2022 11:26)  HR: 76 (21 May 2022 05:27) (74 - 78)  BP: 176/80 (21 May 2022 05:27) (136/67 - 176/80)  BP(mean): --  RR: 18 (21 May 2022 05:27) (18 - 20)  SpO2: 96% (21 May 2022 05:27) (96% - 99%)    PHYSICAL EXAM:  General: NAD; speaking in full sentences  HEENT: NC/AT; PERRL; EOMI; MMM  Neck: supple; no JVD  Cardiac: RRR; +S1/S2  Pulm: CTA B/L; no W/R/R  GI: soft, NT/ND, +BS  Extremities: WWP; no edema, clubbing or cyanosis  Vasc: 2+ radial, DP pulses B/L  Neuro: AAOx3; no focal deficits    MEDICATIONS:  MEDICATIONS  (STANDING):  amLODIPine   Tablet 10 milliGRAM(s) Oral daily  aspirin  chewable 81 milliGRAM(s) Oral daily  atorvastatin 40 milliGRAM(s) Oral at bedtime  dextrose 5%. 1000 milliLiter(s) (50 mL/Hr) IV Continuous <Continuous>  dextrose 5%. 1000 milliLiter(s) (100 mL/Hr) IV Continuous <Continuous>  dextrose 50% Injectable 25 Gram(s) IV Push once  dextrose 50% Injectable 12.5 Gram(s) IV Push once  dextrose 50% Injectable 25 Gram(s) IV Push once  DULoxetine 90 milliGRAM(s) Oral every 24 hours  enoxaparin Injectable 40 milliGRAM(s) SubCutaneous every 24 hours  glucagon  Injectable 1 milliGRAM(s) IntraMuscular once  insulin glargine Injectable (LANTUS) 4 Unit(s) SubCutaneous at bedtime  insulin lispro (ADMELOG) corrective regimen sliding scale   SubCutaneous three times a day before meals  insulin lispro Injectable (ADMELOG) 8 Unit(s) SubCutaneous three times a day before meals  lidocaine 2% Gel 1 Application(s) Topical three times a day  lisinopril 40 milliGRAM(s) Oral every 24 hours  multivitamin 1 Tablet(s) Oral daily  pantoprazole    Tablet 40 milliGRAM(s) Oral before breakfast    MEDICATIONS  (PRN):  acetaminophen     Tablet .. 325 milliGRAM(s) Oral every 6 hours PRN Temp greater or equal to 38C (100.4F), Moderate Pain (4 - 6)  dextrose Oral Gel 15 Gram(s) Oral once PRN Blood Glucose LESS THAN 70 milliGRAM(s)/deciliter      ALLERGIES:  Allergies    No Known Allergies    Intolerances        LABS:              RADIOLOGY & ADDITIONAL TESTS: Reviewed. OVERNIGHT EVENTS:   No acute events overnight.     SUBJECTIVE:  Patient seen and examined at bedside, resting comfortably in bed, and does not appear to be in any acute distress. Patient endorsed being hungry this morning. She denied fevers, chills, acute SOB, headaches, changes in vision, chest pain, abdominal pain, N/V/D/C, genitourinary symptoms.    VITAL SIGNS:  Vital Signs Last 24 Hrs  T(C): 36.6 (21 May 2022 05:27), Max: 36.7 (20 May 2022 11:26)  T(F): 97.8 (21 May 2022 05:27), Max: 98 (20 May 2022 11:26)  HR: 76 (21 May 2022 05:27) (74 - 78)  BP: 176/80 (21 May 2022 05:27) (136/67 - 176/80)  BP(mean): --  RR: 18 (21 May 2022 05:27) (18 - 20)  SpO2: 96% (21 May 2022 05:27) (96% - 99%)    PHYSICAL EXAM:  General: NAD; elderly female, lying comfortably in bed, speaking in full sentences  HEENT: NC/AT; PERRL, R eye decreased acuity at baseline; EOMI; MMM  Neck: supple; no JVD  Cardiac: RRR; +S1/S2, +systolic murmur II/IV RUSB  Pulm: CTA B/L; no W/R/R, looks comfortable on room air without increased WOB or accessory muscle use  GI: soft, NT/ND, +BSx4,   Extremities: WWP; minimal lower extremity edema, clubbing or cyanosis, 4/5 strength throughout, sensation grossly intact  Vasc: 2+ radial, DP pulses B/L  Neuro: AAOx3; no focal deficits    MEDICATIONS:  MEDICATIONS  (STANDING):  amLODIPine   Tablet 10 milliGRAM(s) Oral daily  aspirin  chewable 81 milliGRAM(s) Oral daily  atorvastatin 40 milliGRAM(s) Oral at bedtime  dextrose 5%. 1000 milliLiter(s) (50 mL/Hr) IV Continuous <Continuous>  dextrose 5%. 1000 milliLiter(s) (100 mL/Hr) IV Continuous <Continuous>  dextrose 50% Injectable 25 Gram(s) IV Push once  dextrose 50% Injectable 12.5 Gram(s) IV Push once  dextrose 50% Injectable 25 Gram(s) IV Push once  DULoxetine 90 milliGRAM(s) Oral every 24 hours  enoxaparin Injectable 40 milliGRAM(s) SubCutaneous every 24 hours  glucagon  Injectable 1 milliGRAM(s) IntraMuscular once  insulin glargine Injectable (LANTUS) 4 Unit(s) SubCutaneous at bedtime  insulin lispro (ADMELOG) corrective regimen sliding scale   SubCutaneous three times a day before meals  insulin lispro Injectable (ADMELOG) 8 Unit(s) SubCutaneous three times a day before meals  lidocaine 2% Gel 1 Application(s) Topical three times a day  lisinopril 40 milliGRAM(s) Oral every 24 hours  multivitamin 1 Tablet(s) Oral daily  pantoprazole    Tablet 40 milliGRAM(s) Oral before breakfast    MEDICATIONS  (PRN):  acetaminophen     Tablet .. 325 milliGRAM(s) Oral every 6 hours PRN Temp greater or equal to 38C (100.4F), Moderate Pain (4 - 6)  dextrose Oral Gel 15 Gram(s) Oral once PRN Blood Glucose LESS THAN 70 milliGRAM(s)/deciliter      ALLERGIES:  Allergies    No Known Allergies    Intolerances        LABS:              RADIOLOGY & ADDITIONAL TESTS: Reviewed.

## 2022-05-22 LAB
GLUCOSE BLDC GLUCOMTR-MCNC: 139 MG/DL — HIGH (ref 70–99)
GLUCOSE BLDC GLUCOMTR-MCNC: 159 MG/DL — HIGH (ref 70–99)
GLUCOSE BLDC GLUCOMTR-MCNC: 162 MG/DL — HIGH (ref 70–99)
GLUCOSE BLDC GLUCOMTR-MCNC: 189 MG/DL — HIGH (ref 70–99)

## 2022-05-22 PROCEDURE — 99231 SBSQ HOSP IP/OBS SF/LOW 25: CPT

## 2022-05-22 RX ADMIN — ENOXAPARIN SODIUM 60 MILLIGRAM(S): 100 INJECTION SUBCUTANEOUS at 02:00

## 2022-05-22 RX ADMIN — PANTOPRAZOLE SODIUM 40 MILLIGRAM(S): 20 TABLET, DELAYED RELEASE ORAL at 06:29

## 2022-05-22 RX ADMIN — ENOXAPARIN SODIUM 60 MILLIGRAM(S): 100 INJECTION SUBCUTANEOUS at 12:35

## 2022-05-22 RX ADMIN — INSULIN GLARGINE 4 UNIT(S): 100 INJECTION, SOLUTION SUBCUTANEOUS at 21:39

## 2022-05-22 RX ADMIN — DULOXETINE HYDROCHLORIDE 90 MILLIGRAM(S): 30 CAPSULE, DELAYED RELEASE ORAL at 11:25

## 2022-05-22 RX ADMIN — ATORVASTATIN CALCIUM 40 MILLIGRAM(S): 80 TABLET, FILM COATED ORAL at 21:39

## 2022-05-22 RX ADMIN — Medication 2: at 12:35

## 2022-05-22 RX ADMIN — LIDOCAINE 1 APPLICATION(S): 4 CREAM TOPICAL at 21:41

## 2022-05-22 RX ADMIN — Medication 8 UNIT(S): at 08:54

## 2022-05-22 RX ADMIN — Medication 2: at 08:54

## 2022-05-22 RX ADMIN — AMLODIPINE BESYLATE 10 MILLIGRAM(S): 2.5 TABLET ORAL at 06:29

## 2022-05-22 RX ADMIN — Medication 8 UNIT(S): at 12:34

## 2022-05-22 RX ADMIN — Medication 1 TABLET(S): at 11:25

## 2022-05-22 RX ADMIN — Medication 8 UNIT(S): at 17:34

## 2022-05-22 RX ADMIN — ENOXAPARIN SODIUM 60 MILLIGRAM(S): 100 INJECTION SUBCUTANEOUS at 23:27

## 2022-05-22 RX ADMIN — LISINOPRIL 40 MILLIGRAM(S): 2.5 TABLET ORAL at 06:29

## 2022-05-22 RX ADMIN — Medication 2: at 17:34

## 2022-05-22 RX ADMIN — Medication 81 MILLIGRAM(S): at 11:25

## 2022-05-22 RX ADMIN — LIDOCAINE 1 APPLICATION(S): 4 CREAM TOPICAL at 12:35

## 2022-05-22 NOTE — PROGRESS NOTE ADULT - SUBJECTIVE AND OBJECTIVE BOX
Pt seen and examined.    No new complaints. still with chronic leg pain.    neuro exam is stable.    AP: dispo planning.

## 2022-05-23 LAB
GLUCOSE BLDC GLUCOMTR-MCNC: 137 MG/DL — HIGH (ref 70–99)
GLUCOSE BLDC GLUCOMTR-MCNC: 156 MG/DL — HIGH (ref 70–99)
GLUCOSE BLDC GLUCOMTR-MCNC: 173 MG/DL — HIGH (ref 70–99)
GLUCOSE BLDC GLUCOMTR-MCNC: 230 MG/DL — HIGH (ref 70–99)
GLUCOSE BLDC GLUCOMTR-MCNC: 95 MG/DL — SIGNIFICANT CHANGE UP (ref 70–99)

## 2022-05-23 PROCEDURE — 99231 SBSQ HOSP IP/OBS SF/LOW 25: CPT

## 2022-05-23 RX ORDER — APIXABAN 2.5 MG/1
10 TABLET, FILM COATED ORAL EVERY 12 HOURS
Refills: 0 | Status: COMPLETED | OUTPATIENT
Start: 2022-05-23 | End: 2022-05-28

## 2022-05-23 RX ORDER — APIXABAN 2.5 MG/1
5 TABLET, FILM COATED ORAL EVERY 12 HOURS
Refills: 0 | Status: DISCONTINUED | OUTPATIENT
Start: 2022-05-28 | End: 2022-06-27

## 2022-05-23 RX ADMIN — Medication 8 UNIT(S): at 09:29

## 2022-05-23 RX ADMIN — Medication 2: at 13:33

## 2022-05-23 RX ADMIN — INSULIN GLARGINE 4 UNIT(S): 100 INJECTION, SOLUTION SUBCUTANEOUS at 22:47

## 2022-05-23 RX ADMIN — APIXABAN 10 MILLIGRAM(S): 2.5 TABLET, FILM COATED ORAL at 22:47

## 2022-05-23 RX ADMIN — Medication 8 UNIT(S): at 18:21

## 2022-05-23 RX ADMIN — LIDOCAINE 1 APPLICATION(S): 4 CREAM TOPICAL at 22:48

## 2022-05-23 RX ADMIN — LIDOCAINE 1 APPLICATION(S): 4 CREAM TOPICAL at 14:42

## 2022-05-23 RX ADMIN — Medication 2: at 09:29

## 2022-05-23 RX ADMIN — Medication 1 TABLET(S): at 11:58

## 2022-05-23 RX ADMIN — LISINOPRIL 40 MILLIGRAM(S): 2.5 TABLET ORAL at 06:54

## 2022-05-23 RX ADMIN — Medication 8 UNIT(S): at 13:32

## 2022-05-23 RX ADMIN — ATORVASTATIN CALCIUM 40 MILLIGRAM(S): 80 TABLET, FILM COATED ORAL at 22:47

## 2022-05-23 RX ADMIN — PANTOPRAZOLE SODIUM 40 MILLIGRAM(S): 20 TABLET, DELAYED RELEASE ORAL at 06:51

## 2022-05-23 RX ADMIN — DULOXETINE HYDROCHLORIDE 90 MILLIGRAM(S): 30 CAPSULE, DELAYED RELEASE ORAL at 11:57

## 2022-05-23 RX ADMIN — AMLODIPINE BESYLATE 10 MILLIGRAM(S): 2.5 TABLET ORAL at 06:51

## 2022-05-23 RX ADMIN — Medication 81 MILLIGRAM(S): at 11:57

## 2022-05-23 RX ADMIN — ENOXAPARIN SODIUM 60 MILLIGRAM(S): 100 INJECTION SUBCUTANEOUS at 13:01

## 2022-05-23 RX ADMIN — LIDOCAINE 1 APPLICATION(S): 4 CREAM TOPICAL at 06:52

## 2022-05-23 NOTE — PROGRESS NOTE ADULT - SUBJECTIVE AND OBJECTIVE BOX
***Note in progress***    OVERNIGHT EVENTS: NAEO    SUBJECTIVE / INTERVAL HPI: Patient seen and examined at bedside. Patient denying chest pain, SOB, palpitations, cough. Patient denies fever, chills, HA, Dizziness, change in vision/hearing, N/V, abdominal pain, diarrhea, constipation, hematochezia/melena, dysuria, hematuria, new onset weakness/numbness, LE pain and/or swelling.    Remaining ROS negative     PHYSICAL EXAM:  General: NAD, lying in bed comfortably  HEENT: NC/AT; PERRL, anicteric sclera; MMM  Neck: supple  Cardiovascular: +S1/S2, RRR  Respiratory: CTA B/L; no W/R/R  Gastrointestinal: soft, NT/ND; +BSx4  Extremities: WWP; no edema, clubbing or cyanosis  Vascular: 2+ radial, DP/PT pulses B/L  Neurological: AAOx3; no focal deficits  Psychiatric: pleasant mood and affect  Dermatologic: no appreciable wounds or damage to the skin    VITAL SIGNS:  Vital Signs Last 24 Hrs  T(C): 37.1 (23 May 2022 05:59), Max: 37.1 (23 May 2022 05:59)  T(F): 98.7 (23 May 2022 05:59), Max: 98.7 (23 May 2022 05:59)  HR: 86 (23 May 2022 05:59) (68 - 86)  BP: 151/82 (23 May 2022 05:59) (131/60 - 151/82)  BP(mean): --  RR: 18 (23 May 2022 05:59) (18 - 18)  SpO2: 99% (23 May 2022 05:59) (97% - 99%)    MEDICATIONS:  MEDICATIONS  (STANDING):  amLODIPine   Tablet 10 milliGRAM(s) Oral daily  aspirin  chewable 81 milliGRAM(s) Oral daily  atorvastatin 40 milliGRAM(s) Oral at bedtime  dextrose 5%. 1000 milliLiter(s) (50 mL/Hr) IV Continuous <Continuous>  dextrose 5%. 1000 milliLiter(s) (100 mL/Hr) IV Continuous <Continuous>  dextrose 50% Injectable 25 Gram(s) IV Push once  dextrose 50% Injectable 12.5 Gram(s) IV Push once  dextrose 50% Injectable 25 Gram(s) IV Push once  DULoxetine 90 milliGRAM(s) Oral every 24 hours  enoxaparin Injectable 60 milliGRAM(s) SubCutaneous every 12 hours  glucagon  Injectable 1 milliGRAM(s) IntraMuscular once  insulin glargine Injectable (LANTUS) 4 Unit(s) SubCutaneous at bedtime  insulin lispro (ADMELOG) corrective regimen sliding scale   SubCutaneous three times a day before meals  insulin lispro Injectable (ADMELOG) 8 Unit(s) SubCutaneous three times a day before meals  lidocaine 2% Gel 1 Application(s) Topical three times a day  lisinopril 40 milliGRAM(s) Oral every 24 hours  multivitamin 1 Tablet(s) Oral daily  pantoprazole    Tablet 40 milliGRAM(s) Oral before breakfast    MEDICATIONS  (PRN):  acetaminophen     Tablet .. 325 milliGRAM(s) Oral every 6 hours PRN Temp greater or equal to 38C (100.4F), Moderate Pain (4 - 6)  dextrose Oral Gel 15 Gram(s) Oral once PRN Blood Glucose LESS THAN 70 milliGRAM(s)/deciliter      ALLERGIES:  No Known Allergies    CAPILLARY BLOOD GLUCOSE      POCT Blood Glucose.: 156 mg/dL (23 May 2022 08:41)      RADIOLOGY & ADDITIONAL TESTS: Reviewed. OVERNIGHT EVENTS: NAEO    SUBJECTIVE / INTERVAL HPI: Patient seen and examined at bedside. Pain in legs that makes her sleep difficult.    Remaining ROS negative     PHYSICAL EXAM:  General: NAD; elderly female, lying comfortably in bed, speaking in full sentences  HEENT: NC/AT; PERRL, R eye decreased acuity at baseline; EOMI; MMM  Neck: supple; no JVD  Cardiac: RRR; +S1/S2, +systolic murmur II/IV RUSB  Pulm: CTA B/L; no W/R/R, looks comfortable on room air without increased WOB or accessory muscle use  GI: soft, NT/ND, +BSx4,   Extremities: WWP; minimal lower extremity edema, clubbing or cyanosis, 4/5 strength throughout, sensation grossly intact  Vasc: 2+ radial, DP pulses B/L  Neuro: AAOx3; no focal deficits    VITAL SIGNS:  Vital Signs Last 24 Hrs  T(C): 37.1 (23 May 2022 05:59), Max: 37.1 (23 May 2022 05:59)  T(F): 98.7 (23 May 2022 05:59), Max: 98.7 (23 May 2022 05:59)  HR: 86 (23 May 2022 05:59) (68 - 86)  BP: 151/82 (23 May 2022 05:59) (131/60 - 151/82)  BP(mean): --  RR: 18 (23 May 2022 05:59) (18 - 18)  SpO2: 99% (23 May 2022 05:59) (97% - 99%)    MEDICATIONS:  MEDICATIONS  (STANDING):  amLODIPine   Tablet 10 milliGRAM(s) Oral daily  aspirin  chewable 81 milliGRAM(s) Oral daily  atorvastatin 40 milliGRAM(s) Oral at bedtime  dextrose 5%. 1000 milliLiter(s) (50 mL/Hr) IV Continuous <Continuous>  dextrose 5%. 1000 milliLiter(s) (100 mL/Hr) IV Continuous <Continuous>  dextrose 50% Injectable 25 Gram(s) IV Push once  dextrose 50% Injectable 12.5 Gram(s) IV Push once  dextrose 50% Injectable 25 Gram(s) IV Push once  DULoxetine 90 milliGRAM(s) Oral every 24 hours  enoxaparin Injectable 60 milliGRAM(s) SubCutaneous every 12 hours  glucagon  Injectable 1 milliGRAM(s) IntraMuscular once  insulin glargine Injectable (LANTUS) 4 Unit(s) SubCutaneous at bedtime  insulin lispro (ADMELOG) corrective regimen sliding scale   SubCutaneous three times a day before meals  insulin lispro Injectable (ADMELOG) 8 Unit(s) SubCutaneous three times a day before meals  lidocaine 2% Gel 1 Application(s) Topical three times a day  lisinopril 40 milliGRAM(s) Oral every 24 hours  multivitamin 1 Tablet(s) Oral daily  pantoprazole    Tablet 40 milliGRAM(s) Oral before breakfast    MEDICATIONS  (PRN):  acetaminophen     Tablet .. 325 milliGRAM(s) Oral every 6 hours PRN Temp greater or equal to 38C (100.4F), Moderate Pain (4 - 6)  dextrose Oral Gel 15 Gram(s) Oral once PRN Blood Glucose LESS THAN 70 milliGRAM(s)/deciliter    ALLERGIES:  No Known Allergies    CAPILLARY BLOOD GLUCOSE  POCT Blood Glucose.: 156 mg/dL (23 May 2022 08:41)    RADIOLOGY & ADDITIONAL TESTS: Reviewed.

## 2022-05-23 NOTE — PROGRESS NOTE ADULT - ATTENDING COMMENTS
stable painful diabetic neuropathy and vascular cognitive impairment.  more alert now that gabapentin discontinued.  continues to have similar pain.  last week developed DVT despite DVTppx, now on therapeurtic a/c. continue cymbalta 90mg daily

## 2022-05-24 DIAGNOSIS — R05.9 COUGH, UNSPECIFIED: ICD-10-CM

## 2022-05-24 LAB
ANION GAP SERPL CALC-SCNC: 8 MMOL/L — SIGNIFICANT CHANGE UP (ref 5–17)
BUN SERPL-MCNC: 27 MG/DL — HIGH (ref 7–23)
CALCIUM SERPL-MCNC: 8.8 MG/DL — SIGNIFICANT CHANGE UP (ref 8.4–10.5)
CHLORIDE SERPL-SCNC: 98 MMOL/L — SIGNIFICANT CHANGE UP (ref 96–108)
CO2 SERPL-SCNC: 29 MMOL/L — SIGNIFICANT CHANGE UP (ref 22–31)
CREAT SERPL-MCNC: 0.53 MG/DL — SIGNIFICANT CHANGE UP (ref 0.5–1.3)
EGFR: 97 ML/MIN/1.73M2 — SIGNIFICANT CHANGE UP
GLUCOSE BLDC GLUCOMTR-MCNC: 109 MG/DL — HIGH (ref 70–99)
GLUCOSE BLDC GLUCOMTR-MCNC: 149 MG/DL — HIGH (ref 70–99)
GLUCOSE BLDC GLUCOMTR-MCNC: 175 MG/DL — HIGH (ref 70–99)
GLUCOSE BLDC GLUCOMTR-MCNC: 206 MG/DL — HIGH (ref 70–99)
GLUCOSE BLDC GLUCOMTR-MCNC: 259 MG/DL — HIGH (ref 70–99)
GLUCOSE SERPL-MCNC: 164 MG/DL — HIGH (ref 70–99)
HCT VFR BLD CALC: 33.9 % — LOW (ref 34.5–45)
HGB BLD-MCNC: 10.7 G/DL — LOW (ref 11.5–15.5)
MAGNESIUM SERPL-MCNC: 1.6 MG/DL — SIGNIFICANT CHANGE UP (ref 1.6–2.6)
MCHC RBC-ENTMCNC: 26.4 PG — LOW (ref 27–34)
MCHC RBC-ENTMCNC: 31.6 GM/DL — LOW (ref 32–36)
MCV RBC AUTO: 83.5 FL — SIGNIFICANT CHANGE UP (ref 80–100)
NRBC # BLD: 0 /100 WBCS — SIGNIFICANT CHANGE UP (ref 0–0)
PHOSPHATE SERPL-MCNC: 4.3 MG/DL — SIGNIFICANT CHANGE UP (ref 2.5–4.5)
PLATELET # BLD AUTO: 276 K/UL — SIGNIFICANT CHANGE UP (ref 150–400)
POTASSIUM SERPL-MCNC: 4.4 MMOL/L — SIGNIFICANT CHANGE UP (ref 3.5–5.3)
POTASSIUM SERPL-SCNC: 4.4 MMOL/L — SIGNIFICANT CHANGE UP (ref 3.5–5.3)
RBC # BLD: 4.06 M/UL — SIGNIFICANT CHANGE UP (ref 3.8–5.2)
RBC # FLD: 13.3 % — SIGNIFICANT CHANGE UP (ref 10.3–14.5)
SARS-COV-2 RNA SPEC QL NAA+PROBE: NEGATIVE — SIGNIFICANT CHANGE UP
SODIUM SERPL-SCNC: 135 MMOL/L — SIGNIFICANT CHANGE UP (ref 135–145)
WBC # BLD: 4.32 K/UL — SIGNIFICANT CHANGE UP (ref 3.8–10.5)
WBC # FLD AUTO: 4.32 K/UL — SIGNIFICANT CHANGE UP (ref 3.8–10.5)

## 2022-05-24 PROCEDURE — 99231 SBSQ HOSP IP/OBS SF/LOW 25: CPT

## 2022-05-24 RX ORDER — MAGNESIUM SULFATE 500 MG/ML
2 VIAL (ML) INJECTION ONCE
Refills: 0 | Status: COMPLETED | OUTPATIENT
Start: 2022-05-24 | End: 2022-05-24

## 2022-05-24 RX ADMIN — APIXABAN 10 MILLIGRAM(S): 2.5 TABLET, FILM COATED ORAL at 10:35

## 2022-05-24 RX ADMIN — AMLODIPINE BESYLATE 10 MILLIGRAM(S): 2.5 TABLET ORAL at 06:57

## 2022-05-24 RX ADMIN — LIDOCAINE 1 APPLICATION(S): 4 CREAM TOPICAL at 13:35

## 2022-05-24 RX ADMIN — PANTOPRAZOLE SODIUM 40 MILLIGRAM(S): 20 TABLET, DELAYED RELEASE ORAL at 06:57

## 2022-05-24 RX ADMIN — INSULIN GLARGINE 4 UNIT(S): 100 INJECTION, SOLUTION SUBCUTANEOUS at 22:10

## 2022-05-24 RX ADMIN — Medication 8 UNIT(S): at 17:30

## 2022-05-24 RX ADMIN — DULOXETINE HYDROCHLORIDE 90 MILLIGRAM(S): 30 CAPSULE, DELAYED RELEASE ORAL at 10:35

## 2022-05-24 RX ADMIN — Medication 81 MILLIGRAM(S): at 12:14

## 2022-05-24 RX ADMIN — Medication 2: at 17:30

## 2022-05-24 RX ADMIN — Medication 25 GRAM(S): at 12:14

## 2022-05-24 RX ADMIN — LIDOCAINE 1 APPLICATION(S): 4 CREAM TOPICAL at 06:58

## 2022-05-24 RX ADMIN — Medication 8 UNIT(S): at 12:15

## 2022-05-24 RX ADMIN — APIXABAN 10 MILLIGRAM(S): 2.5 TABLET, FILM COATED ORAL at 22:52

## 2022-05-24 RX ADMIN — ATORVASTATIN CALCIUM 40 MILLIGRAM(S): 80 TABLET, FILM COATED ORAL at 22:52

## 2022-05-24 RX ADMIN — Medication 1 TABLET(S): at 12:14

## 2022-05-24 RX ADMIN — Medication 6: at 12:16

## 2022-05-24 RX ADMIN — LISINOPRIL 40 MILLIGRAM(S): 2.5 TABLET ORAL at 06:57

## 2022-05-24 RX ADMIN — LIDOCAINE 1 APPLICATION(S): 4 CREAM TOPICAL at 22:52

## 2022-05-24 NOTE — PROGRESS NOTE ADULT - ASSESSMENT
72y Female with PMHx of T2DM c/b peripheral neuropathy, HTN, recent admission and workup for Naty Sanchez tear, TIA on 3/30 (MR negative) presents to [facility] as transfer from [facility] ED for episode of altered mental status (staring, generalized weakness), back to baseline in [facility] ED, no acute pathology on CTH or MRI, found to have metabolic encephalopathy (resolved) 2/2 UTI (completed 7day abx) vs hypertension, also found to have new LE weakness due to diabetic neuropathy. Due to immigration and insurance status, not able to FEL[facility] as recommended by PT. Pending improvement to one-person assist in order to discharge home with out-of-pocket PT vs home with family assist.     Problem/Plan - 1:  ·  Problem: Cough  ·  Plan: Pt presenting with new onset dry cough overnight without associated fever, chills, acute SOB, hypoxia, or reflux. Likely component of HFpEF. TTE (4/1) showed grade 1 left ventricular diastolic dysfunction, no PFO, EF >83% hyperdynamic left ventricular systolic function.   - COVID swab negative  - Supportive care  - If worsens, can consider repeating TTE  - RESOLVED     Problem/Plan - 2:  ·  Problem: Leg weakness.   ·  Plan: Symmetrical LE weakness, worse distally compared to proximally. Symmetrical diminished sensation, longstanding diabetic peripheral neuropathy. Denies issues with incontinence, or back pain, no sensory line. Likely progression of diabetic neuropathy, however symptoms more severe than to be expected.  MRI L/Spine:  - showing at the L3/4 leveI. There are degenerative   changes involving the facets bilaterally as well as ligamentum flavum   hypertrophy. This combination results in moderate central spinal canal   stenosis.   - At the L4/5 level, there is disc bulge abutting the L4 nerve roots   bilaterally. There are degenerative changes involving the facets   bilaterally (right greater than left) as well as ligamentum flavum   hypertrophy. This combination results in mild central spinal canal   stenosis.  - Neurology team saying that since it is diabetic in origin it is unlikely that the patient will make significant improvement in the short term.  - c/w cymbalta 90mg daily   - c/w Custom AFO boots   - c/w incentive spirometry.     Problem/Plan - 3:  ·  Problem: Neck pain.   ·  Plan: Neck and shoulder pain on 5/3. Tender to palpation, does not radiate  - Hot packs daily  - off ibuprofen now.     Problem/Plan - 4:  ·  Problem: Bilateral leg pain.   ·  Plan: Needle like pain shins downward, interfering with sleep.   - c/w cymbalta 90 Qd for diabetic neuropathy  - f/u LE duplex - acute DVT of  the left peroneal vein, increased Lovenox to therapeutic dose 1mg/kg BID (60mg BID)     Problem/Plan - 5:  ·  Problem: Type II diabetes mellitus.   ·  Plan: Pt with hx of DM, A1c 11.6. Endocrine following. TSH results: 1.120.   - Low C peptide patient will need insulin and NOT oral agents on dc  - C/w mISS, before meals   - C/w Lantus 4U, Lispro 8U   - F/u endocrine recs     Problem/Plan - 6:  ·  Problem: Hypertension.   ·  Plan: Goal -160  - c/w Amlodipine 10mg (new med), home Lisinopril 40mg   - TTE with bubble done prior admission 4/1: grade 1 left ventricular diastolic dysfunction, no PFO, EF >83% hyperdynamic left ventricular systolic function     Problem/Plan - 7:  ·  Problem: Hyperlipidemia.   ·  Plan: -C/w Lipitor 40mg     Problem/Plan - 8:  ·  Problem: Naty-Sanchez tear.   ·  Plan: Hx of Naty Sanchez tear in prior admission   - continue home protonix 40mg daily  - Restarted APT     Problem/Plan - 9:  ·  Problem: UTI (urinary tract infection).   ·  Plan: RESOLVED  UA with +WBCs, UCx growing E faecalis. Will treat as complicated  -completed 7 day course of CTX and then ampicillin     Problem/Plan - 10:  ·  Problem: Nutrition, metabolism, and development symptoms.   ·  Plan: F: None  E: Replete PRN   N: DASH/TLC   DVT: Lovenox 40 qd  Dispo: RMF, PT rec [facility]- however patient is uninsured and cannot go to [facility] attempting to get to one person assist. Son was able to received FMLA. Pending daughter for possible FMLA 72y Female with PMHx of T2DM c/b peripheral neuropathy, HTN, recent admission and workup for Naty Sanchez tear, TIA on 3/30 (MR negative) presents to [facility] as transfer from [facility] ED for episode of altered mental status (staring, generalized weakness), back to baseline in [facility] ED, no acute pathology on CTH or MRI, found to have metabolic encephalopathy (resolved) 2/2 UTI (completed 7day abx) vs hypertension, also found to have new LE weakness due to diabetic neuropathy. Due to immigration and insurance status, not able to FELICIA as recommended by PT. Pending improvement to one-person assist in order to discharge home with out-of-pocket PT vs home with family assist.     Problem/Plan - 1:  ·  Problem: Cough  ·  Plan: Pt presenting with new onset dry cough overnight without associated fever, chills, acute SOB, hypoxia, or reflux. Likely component of HFpEF. TTE (4/1) showed grade 1 left ventricular diastolic dysfunction, no PFO, EF >83% hyperdynamic left ventricular systolic function.   - COVID swab negative  - Supportive care  - If worsens, can consider repeating TTE  - RESOLVED     Problem/Plan - 2:  ·  Problem: Leg weakness.   ·  Plan: Symmetrical LE weakness, worse distally compared to proximally. Symmetrical diminished sensation, longstanding diabetic peripheral neuropathy. Denies issues with incontinence, or back pain, no sensory line. Likely progression of diabetic neuropathy, however symptoms more severe than to be expected.  MRI L/Spine:  - showing at the L3/4 leveI. There are degenerative   changes involving the facets bilaterally as well as ligamentum flavum   hypertrophy. This combination results in moderate central spinal canal   stenosis.   - At the L4/5 level, there is disc bulge abutting the L4 nerve roots   bilaterally. There are degenerative changes involving the facets   bilaterally (right greater than left) as well as ligamentum flavum   hypertrophy. This combination results in mild central spinal canal   stenosis.  - Neurology team saying that since it is diabetic in origin it is unlikely that the patient will make significant improvement in the short term.  - c/w cymbalta 90mg daily   - c/w Custom AFO boots   - c/w incentive spirometry.     Problem/Plan - 3:  ·  Problem: Neck pain.   ·  Plan: Neck and shoulder pain on 5/3. Tender to palpation, does not radiate  - Hot packs daily  - off ibuprofen now.     Problem/Plan - 4:  ·  Problem: Bilateral leg pain.   ·  Plan: Needle like pain shins downward, interfering with sleep.   - c/w cymbalta 90 Qd for diabetic neuropathy  - f/u LE duplex - acute DVT of  the left peroneal vein, increased Lovenox to therapeutic dose 1mg/kg BID (60mg BID)     Problem/Plan - 5:  ·  Problem: Type II diabetes mellitus.   ·  Plan:      Problem/Plan - 6:  ·  Problem: Hypertension.   ·  Plan:      Problem/Plan - 7:  ·  Problem: Hyperlipidemia.   ·  Plan:      Problem/Plan - 8:  ·  Problem: Naty-Sanchez tear.   ·  Plan:      Problem/Plan - 9:  ·  Problem: UTI (urinary tract infection).   ·  Plan:      Problem/Plan - 10:  ·  Problem: Nutrition, metabolism, and development symptoms.   ·  Plan:  72y Female with PMHx of T2DM c/b peripheral neuropathy, HTN, recent admission and workup for Naty Sanchez tear, TIA on 3/30 (MR negative) presents to [facility] as transfer from [facility] ED for episode of altered mental status (staring, generalized weakness), back to baseline in [facility] ED, no acute pathology on CTH or MRI, found to have metabolic encephalopathy (resolved) 2/2 UTI (completed 7day abx) vs hypertension, also found to have new LE weakness due to diabetic neuropathy. Due to immigration and insurance status, not able to F[facility] as recommended by PT. Pending improvement to one-person assist in order to discharge home with out-of-pocket PT vs home with family assist.     Problem/Plan - 1:  ·  Problem: Cough  ·  Plan:      Problem/Plan - 2:  ·  Problem: Leg weakness.   ·  Plan: .     Problem/Plan - 3:  ·  Problem: Neck pain.   ·  Plan:      Problem/Plan - 4:  ·  Problem: Bilateral leg pain.   ·  Plan:      Problem/Plan - 5:  ·  Problem: Type II diabetes mellitus.   ·  Plan:      Problem/Plan - 6:  ·  Problem: Hypertension.   ·  Plan:      Problem/Plan - 7:  ·  Problem: Hyperlipidemia.   ·  Plan:      Problem/Plan - 8:  ·  Problem: Naty-Sanchez tear.   ·  Plan:      Problem/Plan - 9:  ·  Problem: UTI (urinary tract infection).   ·  Plan:      Problem/Plan - 10:  ·  Problem: Nutrition, metabolism, and development symptoms.   ·  Plan:  72y Female with PMHx of T2DM c/b peripheral neuropathy, HTN, recent admission and workup for Naty Sanchez tear, TIA on 3/30 (MR negative) presents to [facility] as transfer from [facility] ED for episode of altered mental status (staring, generalized weakness), back to baseline in [facility] ED, no acute pathology on CTH or MRI, found to have metabolic encephalopathy (resolved) 2/2 UTI (completed 7day abx) vs hypertension, also found to have new LE weakness due to diabetic neuropathy. Due to immigration and insurance status, not able to [facility] as recommended by PT. Pending improvement to one-person assist in order to discharge home with out-of-pocket PT vs home with family assist.

## 2022-05-24 NOTE — PROGRESS NOTE ADULT - PROBLEM SELECTOR PLAN 10
Hx of Naty Sanchez tear in prior admission   - continue home protonix 40mg daily  - Restarted APT F: None  E: Replete PRN   N: DASH/TLC   DVT: Lovenox 40 qd  Dispo: [facility]F, PT rec [facility]- however patient is uninsured and cannot go to [facility] attempting to get to one person assist. Son was able to received FMLA. Pending daughter for possible FMLA

## 2022-05-24 NOTE — PROGRESS NOTE ADULT - PROBLEM SELECTOR PLAN 8
Neck and shoulder pain on 5/3. Tender to palpation, does not radiate  - Hot packs daily  - off ibuprofen now. Goal -160  - c/w Amlodipine 10mg (new med), home Lisinopril 40mg   - TTE with bubble done prior admission 4/1: grade 1 left ventricular diastolic dysfunction, no PFO, EF >83% hyperdynamic left ventricular systolic function

## 2022-05-24 NOTE — PROGRESS NOTE ADULT - ATTENDING COMMENTS
Leg weakness unchanged  Still unable to walk without 2 person assist  continue to work with PT   continue current meds

## 2022-05-24 NOTE — PROGRESS NOTE ADULT - PROBLEM SELECTOR PLAN 2
Pt with hx of DM, A1c 11.6. Endocrine following. TSH results: 1.120.   - Low C peptide patient will need insulin and NOT oral agents on dc  - C/w mISS, before meals   - C/w Lantus 4U, Lispro 8U   - F/u endocrine recs Needle like pain shins downward, interfering with sleep.   - c/w Cymbalta 90 Qd for diabetic neuropathy  - f/u LE duplex - acute DVT of  the left peroneal vein, increased Lovenox to therapeutic dose 1mg/kg BID (60mg BID)

## 2022-05-24 NOTE — PROGRESS NOTE ADULT - PROBLEM SELECTOR PLAN 6
Symmetrical LE weakness, worse distally compared to proximally. Symmetrical diminished sensation, longstanding diabetic peripheral neuropathy. Denies issues with incontinence, or back pain, no sensory line. Likely progression of diabetic neuropathy, however symptoms more severe than to be expected.  MRI L/Spine:  - showing at the L3/4 leveI. There are degenerative   changes involving the facets bilaterally as well as ligamentum flavum   hypertrophy. This combination results in moderate central spinal canal   stenosis.   - At the L4/5 level, there is disc bulge abutting the L4 nerve roots   bilaterally. There are degenerative changes involving the facets   bilaterally (right greater than left) as well as ligamentum flavum   hypertrophy. This combination results in mild central spinal canal   stenosis.  - Neurology team saying that since it is diabetic in origin it is unlikely that the patient will make significant improvement in the short term.  - c/w cymbalta 90mg daily   - c/w Custom AFO boots   - c/w incentive spirometry. Hx of Naty Sanchez tear in prior admission   - continue home protonix 40mg daily  - Restarted APT

## 2022-05-24 NOTE — PROGRESS NOTE ADULT - PROBLEM SELECTOR PLAN 11
F: None  E: Replete PRN   N: DASH/TLC   DVT: Lovenox 40 qd  Dispo: RMF, PT rec [facility]EL[facility]- however patient is uninsured and cannot go to [facility] attempting to get to one person assist. Son was able to received FMLA. Pending daughter for possible FMLA

## 2022-05-24 NOTE — PROGRESS NOTE ADULT - PROBLEM SELECTOR PLAN 4
-C/w Lipitor 40mg Pt presenting with new onset dry cough overnight without associated fever, chills, acute SOB, hypoxia, or reflux. Likely component of HFpEF. TTE (4/1) showed grade 1 left ventricular diastolic dysfunction, no PFO, EF >83% hyperdynamic left ventricular systolic function.   - COVID swab negative  - Supportive care  - If worsens, can consider repeating TTE  - RESOLVED

## 2022-05-24 NOTE — PROGRESS NOTE ADULT - SUBJECTIVE AND OBJECTIVE BOX
OVERNIGHT EVENTS: NAEO    SUBJECTIVE / INTERVAL HPI: Patient seen and examined at bedside.    Remaining ROS negative     PHYSICAL EXAM:  General: NAD; elderly female, lying comfortably in bed, speaking in full sentences  HEENT: NC/AT; PERRL, R eye decreased acuity at baseline; EOMI; MMM  Neck: supple; no JVD  Cardiac: RRR; +S1/S2, +systolic murmur II/IV RUSB  Pulm: CTA B/L; no W/R/R, looks comfortable on room air without increased WOB or accessory muscle use  GI: soft, NT/ND, +BSx4,   Extremities: WWP; minimal lower extremity edema, clubbing or cyanosis, 4/5 strength throughout, sensation grossly intact  Vasc: 2+ radial, DP pulses B/L  Neuro: AAOx3; no focal deficits    VITAL SIGNS:  Vital Signs Last 24 Hrs  T(C): 36.8 (24 May 2022 04:54), Max: 36.8 (23 May 2022 12:10)  T(F): 98.2 (24 May 2022 04:54), Max: 98.2 (23 May 2022 12:10)  HR: 74 (24 May 2022 04:54) (74 - 80)  BP: 152/70 (24 May 2022 04:54) (137/65 - 152/70)  BP(mean): --  RR: 16 (24 May 2022 04:54) (16 - 18)  SpO2: 98% (24 May 2022 04:54) (97% - 98%)    MEDICATIONS:  MEDICATIONS  (STANDING):  amLODIPine   Tablet 10 milliGRAM(s) Oral daily  apixaban 10 milliGRAM(s) Oral every 12 hours  aspirin  chewable 81 milliGRAM(s) Oral daily  atorvastatin 40 milliGRAM(s) Oral at bedtime  dextrose 5%. 1000 milliLiter(s) (50 mL/Hr) IV Continuous <Continuous>  dextrose 5%. 1000 milliLiter(s) (100 mL/Hr) IV Continuous <Continuous>  dextrose 50% Injectable 25 Gram(s) IV Push once  dextrose 50% Injectable 12.5 Gram(s) IV Push once  dextrose 50% Injectable 25 Gram(s) IV Push once  DULoxetine 90 milliGRAM(s) Oral every 24 hours  glucagon  Injectable 1 milliGRAM(s) IntraMuscular once  insulin glargine Injectable (LANTUS) 4 Unit(s) SubCutaneous at bedtime  insulin lispro (ADMELOG) corrective regimen sliding scale   SubCutaneous three times a day before meals  insulin lispro Injectable (ADMELOG) 8 Unit(s) SubCutaneous three times a day before meals  lidocaine 2% Gel 1 Application(s) Topical three times a day  lisinopril 40 milliGRAM(s) Oral every 24 hours  multivitamin 1 Tablet(s) Oral daily  pantoprazole    Tablet 40 milliGRAM(s) Oral before breakfast    MEDICATIONS  (PRN):  acetaminophen     Tablet .. 325 milliGRAM(s) Oral every 6 hours PRN Temp greater or equal to 38C (100.4F), Moderate Pain (4 - 6)  dextrose Oral Gel 15 Gram(s) Oral once PRN Blood Glucose LESS THAN 70 milliGRAM(s)/deciliter      ALLERGIES:  Allergies    No Known Allergies    Intolerances        LABS:              CAPILLARY BLOOD GLUCOSE      POCT Blood Glucose.: 137 mg/dL (23 May 2022 22:44)      RADIOLOGY & ADDITIONAL TESTS: Reviewed. OVERNIGHT EVENTS: NAEO    SUBJECTIVE / INTERVAL HPI: Patient seen and examined at bedside. Right should pain, has not sleep well.     Remaining ROS negative     PHYSICAL EXAM:  General: NAD; elderly female, lying comfortably in bed, speaking in full sentences  HEENT: NC/AT; PERRL, R eye decreased acuity at baseline; EOMI; MMM  Neck: supple; no JVD  Cardiac: RRR; +S1/S2, +systolic murmur II/IV RUSB  Pulm: CTA B/L; no W/R/R, looks comfortable on room air without increased WOB or accessory muscle use  GI: soft, NT/ND, +BSx4,   Extremities: WWP; minimal lower extremity edema, clubbing or cyanosis, 4/5 strength throughout, sensation grossly intact  Vasc: 2+ radial, DP pulses B/L  Neuro: AAOx3; no focal deficits    VITAL SIGNS:  Vital Signs Last 24 Hrs  T(C): 36.8 (24 May 2022 04:54), Max: 36.8 (23 May 2022 12:10)  T(F): 98.2 (24 May 2022 04:54), Max: 98.2 (23 May 2022 12:10)  HR: 74 (24 May 2022 04:54) (74 - 80)  BP: 152/70 (24 May 2022 04:54) (137/65 - 152/70)  BP(mean): --  RR: 16 (24 May 2022 04:54) (16 - 18)  SpO2: 98% (24 May 2022 04:54) (97% - 98%)    MEDICATIONS:  MEDICATIONS  (STANDING):  amLODIPine   Tablet 10 milliGRAM(s) Oral daily  apixaban 10 milliGRAM(s) Oral every 12 hours  aspirin  chewable 81 milliGRAM(s) Oral daily  atorvastatin 40 milliGRAM(s) Oral at bedtime  dextrose 5%. 1000 milliLiter(s) (50 mL/Hr) IV Continuous <Continuous>  dextrose 5%. 1000 milliLiter(s) (100 mL/Hr) IV Continuous <Continuous>  dextrose 50% Injectable 25 Gram(s) IV Push once  dextrose 50% Injectable 12.5 Gram(s) IV Push once  dextrose 50% Injectable 25 Gram(s) IV Push once  DULoxetine 90 milliGRAM(s) Oral every 24 hours  glucagon  Injectable 1 milliGRAM(s) IntraMuscular once  insulin glargine Injectable (LANTUS) 4 Unit(s) SubCutaneous at bedtime  insulin lispro (ADMELOG) corrective regimen sliding scale   SubCutaneous three times a day before meals  insulin lispro Injectable (ADMELOG) 8 Unit(s) SubCutaneous three times a day before meals  lidocaine 2% Gel 1 Application(s) Topical three times a day  lisinopril 40 milliGRAM(s) Oral every 24 hours  multivitamin 1 Tablet(s) Oral daily  pantoprazole    Tablet 40 milliGRAM(s) Oral before breakfast    MEDICATIONS  (PRN):  acetaminophen     Tablet .. 325 milliGRAM(s) Oral every 6 hours PRN Temp greater or equal to 38C (100.4F), Moderate Pain (4 - 6)  dextrose Oral Gel 15 Gram(s) Oral once PRN Blood Glucose LESS THAN 70 milliGRAM(s)/deciliter      ALLERGIES:  Allergies    No Known Allergies    Intolerances        LABS:              CAPILLARY BLOOD GLUCOSE      POCT Blood Glucose.: 137 mg/dL (23 May 2022 22:44)      RADIOLOGY & ADDITIONAL TESTS: Reviewed. OVERNIGHT EVENTS: NAEO    SUBJECTIVE / INTERVAL HPI: Patient seen and examined at bedside. Right should pain, has not sleep well.     Remaining ROS negative     PHYSICAL EXAM:  General: NAD; elderly female, lying comfortably in bed, speaking in full sentences  HEENT: NC/AT; PERRL, R eye decreased acuity at baseline; EOMI; MMM  Neck: supple; no JVD  Cardiac: RRR; +S1/S2, +systolic murmur II/IV RUSB  Pulm: CTA B/L; no W/R/R, looks comfortable on room air without increased WOB or accessory muscle use  GI: soft, NT/ND, +BSx4,   Extremities: WWP; minimal lower extremity edema, clubbing or cyanosis, 4/5 strength throughout, sensation grossly intact  Vasc: 2+ radial, DP pulses B/L  Neuro: AAOx3; no focal deficits  GEN: NAD, pleasant, cooperative  CVS: RRR, no carotid bruit  CHEST: No signs of resp distress, on room air  ABD: Soft, NTTP  NEURO:   Mental status: The patient is alert, attentive, and oriented.   Speech: clear and fluent with good repetition, comprehension, and naming. She recalls 3/3 objects at 5 minutes.   Cranial nerves:    - CN II: Visual fields are full to confrontation. Fundoscopic exam is normal with sharp discs. Pupils are 4 mm and briskly reactive to light. Visual acuity is 20/20 bilaterally.    - CN III, IV, VI: EOMI, no nystagmus, no ptosis    - CN V: Facial sensation is intact to pinprick in all 3 divisions bilaterally.    - CN VII: Face is symmetric with normal eye closure and smile.    - CN VII: Hearing is normal to rubbing fingers    - CN IX, X: Palate elevates symmetrically. Phonation is normal.    - CN XI: Head turning and shoulder shrug are intact    - CN XII: Tongue is midline with normal movements and no atrophy.   Motor: There is no pronator drift of out-stretched arms. Muscle bulk and tone are normal. Strength:         Deltoid	Biceps   Triceps  Wrist ext   Finger abd     Hip flex    Hip ext   Knee flex  Knee ext	Ankle flex    Ankle ext    R	5	      5	     5	       5	            5	      5	       5	           5	  5	      5-           2    L	5	      5	     5	       5	            5	      5           5	           5	  5	      5-           2   Reflexes: Reflexes are 2+ and symmetric at the biceps, triceps. 0 knees and ankles. Plantar responses are flexor.         Biceps   Brachio	Triceps	 Knee  Ankle   Riley   Crossed adductor  Planter    R     2            2	     2	    0       0           -                     -                 down    L      2	           2	     2	    0       0           -	            -                 down  Sensory: Light touch, pinprick, position sense, and vibration sense are intact in fingers and toes. Gradient heel-thigh   Coordination: rapid alternating movements and fine finger movements are intact. There is no dysmetria on finger-to-nose and heel-knee-shin. There are no abnormal or extraneous movements.   Gait/Stance: Unable to walk, able to sit down in bed.     VITAL SIGNS:  Vital Signs Last 24 Hrs  T(C): 36.8 (24 May 2022 04:54), Max: 36.8 (23 May 2022 12:10)  T(F): 98.2 (24 May 2022 04:54), Max: 98.2 (23 May 2022 12:10)  HR: 74 (24 May 2022 04:54) (74 - 80)  BP: 152/70 (24 May 2022 04:54) (137/65 - 152/70)  BP(mean): --  RR: 16 (24 May 2022 04:54) (16 - 18)  SpO2: 98% (24 May 2022 04:54) (97% - 98%)    MEDICATIONS:  MEDICATIONS  (STANDING):  amLODIPine   Tablet 10 milliGRAM(s) Oral daily  apixaban 10 milliGRAM(s) Oral every 12 hours  aspirin  chewable 81 milliGRAM(s) Oral daily  atorvastatin 40 milliGRAM(s) Oral at bedtime  dextrose 5%. 1000 milliLiter(s) (50 mL/Hr) IV Continuous <Continuous>  dextrose 5%. 1000 milliLiter(s) (100 mL/Hr) IV Continuous <Continuous>  dextrose 50% Injectable 25 Gram(s) IV Push once  dextrose 50% Injectable 12.5 Gram(s) IV Push once  dextrose 50% Injectable 25 Gram(s) IV Push once  DULoxetine 90 milliGRAM(s) Oral every 24 hours  glucagon  Injectable 1 milliGRAM(s) IntraMuscular once  insulin glargine Injectable (LANTUS) 4 Unit(s) SubCutaneous at bedtime  insulin lispro (ADMELOG) corrective regimen sliding scale   SubCutaneous three times a day before meals  insulin lispro Injectable (ADMELOG) 8 Unit(s) SubCutaneous three times a day before meals  lidocaine 2% Gel 1 Application(s) Topical three times a day  lisinopril 40 milliGRAM(s) Oral every 24 hours  multivitamin 1 Tablet(s) Oral daily  pantoprazole    Tablet 40 milliGRAM(s) Oral before breakfast    MEDICATIONS  (PRN):  acetaminophen     Tablet .. 325 milliGRAM(s) Oral every 6 hours PRN Temp greater or equal to 38C (100.4F), Moderate Pain (4 - 6)  dextrose Oral Gel 15 Gram(s) Oral once PRN Blood Glucose LESS THAN 70 milliGRAM(s)/deciliter      ALLERGIES:  Allergies    No Known Allergies    Intolerances        LABS:              CAPILLARY BLOOD GLUCOSE      POCT Blood Glucose.: 137 mg/dL (23 May 2022 22:44)      RADIOLOGY & ADDITIONAL TESTS: Reviewed. OVERNIGHT EVENTS: NAEO    SUBJECTIVE / INTERVAL HPI: Patient seen and examined at bedside. Right should pain, has not sleep well.     Remaining ROS negative     PHYSICAL EXAM:  General: NAD; elderly female, lying comfortably in bed, speaking in full sentences  HEENT: NC/AT; PERRL, R eye decreased acuity at baseline; EOMI; MMM  Neck: supple; no JVD  Cardiac: RRR; +S1/S2, +systolic murmur II/IV RUSB  Pulm: CTA B/L; no W/R/R, looks comfortable on room air without increased WOB or accessory muscle use  GI: soft, NT/ND, +BSx4,   Extremities: WWP; minimal lower extremity edema, clubbing or cyanosis, 4/5 strength throughout, sensation grossly intact  Vasc: 2+ radial, DP pulses B/L  Neuro: AAOx3; no focal deficits  GEN: NAD, pleasant, cooperative  CVS: RRR, no carotid bruit  CHEST: No signs of resp distress, on room air  ABD: Soft, NTTP  NEURO:   Mental status: The patient is alert, attentive, and oriented.   Speech: clear and fluent with good repetition, comprehension, and naming. She recalls 3/3 objects at 5 minutes.   Cranial nerves:    - CN II: Visual fields are full to confrontation. Fundoscopic exam is normal with sharp discs. Pupils are 4 mm and briskly reactive to light. Visual acuity is 20/20 bilaterally.    - CN III, IV, VI: EOMI, no nystagmus, no ptosis    - CN V: Facial sensation is intact to pinprick in all 3 divisions bilaterally.    - CN VII: Face is symmetric with normal eye closure and smile.    - CN VII: Hearing is normal to rubbing fingers    - CN IX, X: Palate elevates symmetrically. Phonation is normal.    - CN XI: Head turning and shoulder shrug are intact    - CN XII: Tongue is midline with normal movements and no atrophy.   Motor: There is no pronator drift of out-stretched arms. Muscle bulk and tone are normal. Strength:         Deltoid	Biceps   Triceps  Wrist ext   Finger abd     Hip flex    Hip ext   Knee flex  Knee ext	Ankle flex    Ankle ext    R	5	      5	     5	       5	            5	      5	       5	           5	  5	      5-           2    L	5	      5	     5	       5	            5	      5           5	           5	  5	      5-           2   Reflexes: Reflexes are 2+ and symmetric at the biceps, triceps. 0 knees and ankles. Plantar responses are flexor.         Biceps   Brachio	Triceps	 Knee  Ankle   Riley   Crossed adductor  Planter    R     2            2	     2	    0       0           -                     -                 down    L      2	           2	     2	    0       0           -	            -                 down  Sensory: Light touch, pinprick, position sense, and vibration sense are intact in fingers and toes. Gradient heel-thigh   Coordination: rapid alternating movements and fine finger movements are intact. There is no dysmetria on finger-to-nose and heel-knee-shin. There are no abnormal or extraneous movements.   Gait/Stance: Unable to walk, able to sit down in bed.     VITAL SIGNS:  Vital Signs Last 24 Hrs  T(C): 36.8 (24 May 2022 04:54), Max: 36.8 (23 May 2022 12:10)  T(F): 98.2 (24 May 2022 04:54), Max: 98.2 (23 May 2022 12:10)  HR: 74 (24 May 2022 04:54) (74 - 80)  BP: 152/70 (24 May 2022 04:54) (137/65 - 152/70)  BP(mean): --  RR: 16 (24 May 2022 04:54) (16 - 18)  SpO2: 98% (24 May 2022 04:54) (97% - 98%)    MEDICATIONS:  MEDICATIONS  (STANDING):  amLODIPine   Tablet 10 milliGRAM(s) Oral daily  apixaban 10 milliGRAM(s) Oral every 12 hours  aspirin  chewable 81 milliGRAM(s) Oral daily  atorvastatin 40 milliGRAM(s) Oral at bedtime  dextrose 5%. 1000 milliLiter(s) (50 mL/Hr) IV Continuous <Continuous>  dextrose 5%. 1000 milliLiter(s) (100 mL/Hr) IV Continuous <Continuous>  dextrose 50% Injectable 25 Gram(s) IV Push once  dextrose 50% Injectable 12.5 Gram(s) IV Push once  dextrose 50% Injectable 25 Gram(s) IV Push once  DULoxetine 90 milliGRAM(s) Oral every 24 hours  glucagon  Injectable 1 milliGRAM(s) IntraMuscular once  insulin glargine Injectable (LANTUS) 4 Unit(s) SubCutaneous at bedtime  insulin lispro (ADMELOG) corrective regimen sliding scale   SubCutaneous three times a day before meals  insulin lispro Injectable (ADMELOG) 8 Unit(s) SubCutaneous three times a day before meals  lidocaine 2% Gel 1 Application(s) Topical three times a day  lisinopril 40 milliGRAM(s) Oral every 24 hours  multivitamin 1 Tablet(s) Oral daily  pantoprazole    Tablet 40 milliGRAM(s) Oral before breakfast    MEDICATIONS  (PRN):  acetaminophen     Tablet .. 325 milliGRAM(s) Oral every 6 hours PRN Temp greater or equal to 38C (100.4F), Moderate Pain (4 - 6)  dextrose Oral Gel 15 Gram(s) Oral once PRN Blood Glucose LESS THAN 70 milliGRAM(s)/deciliter    ALLERGIES:  No Known Allergies    POCT Blood Glucose.: 137 mg/dL (23 May 2022 22:44)    RADIOLOGY & ADDITIONAL TESTS: Reviewed. OVERNIGHT EVENTS: NAEO    SUBJECTIVE / INTERVAL HPI: Patient seen and examined at bedside. Right shoulder pain, has not slept well.     Remaining ROS negative          Deltoid	Biceps   Triceps  Wrist ext   Finger abd     Hip flex    Hip ext   Knee flex  Knee ext	Ankle flex    Ankle ext    R	5	      5	     5	       5	            5	      5	       5	           5	  5	      2           2    L	5	      5	     5	       5	            5	      5           5	           5	  5	      2           2   Reflexes: Reflexes are 2+ and symmetric at the biceps, triceps. 0 knees and ankles. Plantar responses are flexor.         Biceps   Brachio	Triceps	 Knee  Ankle   Riley   Crossed adductor  Planter    R     2            2	     2	    0       0           -                     -                 down    L      2	           2	     2	    0       0           -	            -                 down  Sensory: Light touch, pinprick, position sense, and vibration sense are intact in fingers and toes. Gradient heel-thigh   Coordination: rapid alternating movements and fine finger movements are intact. There is no dysmetria on finger-to-nose and heel-knee-shin. There are no abnormal or extraneous movements.   Gait/Stance: Unable to walk, able to sit down in bed.     VITAL SIGNS:  Vital Signs Last 24 Hrs  T(C): 36.8 (24 May 2022 04:54), Max: 36.8 (23 May 2022 12:10)  T(F): 98.2 (24 May 2022 04:54), Max: 98.2 (23 May 2022 12:10)  HR: 74 (24 May 2022 04:54) (74 - 80)  BP: 152/70 (24 May 2022 04:54) (137/65 - 152/70)  BP(mean): --  RR: 16 (24 May 2022 04:54) (16 - 18)  SpO2: 98% (24 May 2022 04:54) (97% - 98%)    MEDICATIONS:  MEDICATIONS  (STANDING):  amLODIPine   Tablet 10 milliGRAM(s) Oral daily  apixaban 10 milliGRAM(s) Oral every 12 hours  aspirin  chewable 81 milliGRAM(s) Oral daily  atorvastatin 40 milliGRAM(s) Oral at bedtime  dextrose 5%. 1000 milliLiter(s) (50 mL/Hr) IV Continuous <Continuous>  dextrose 5%. 1000 milliLiter(s) (100 mL/Hr) IV Continuous <Continuous>  dextrose 50% Injectable 25 Gram(s) IV Push once  dextrose 50% Injectable 12.5 Gram(s) IV Push once  dextrose 50% Injectable 25 Gram(s) IV Push once  DULoxetine 90 milliGRAM(s) Oral every 24 hours  glucagon  Injectable 1 milliGRAM(s) IntraMuscular once  insulin glargine Injectable (LANTUS) 4 Unit(s) SubCutaneous at bedtime  insulin lispro (ADMELOG) corrective regimen sliding scale   SubCutaneous three times a day before meals  insulin lispro Injectable (ADMELOG) 8 Unit(s) SubCutaneous three times a day before meals  lidocaine 2% Gel 1 Application(s) Topical three times a day  lisinopril 40 milliGRAM(s) Oral every 24 hours  multivitamin 1 Tablet(s) Oral daily  pantoprazole    Tablet 40 milliGRAM(s) Oral before breakfast    MEDICATIONS  (PRN):  acetaminophen     Tablet .. 325 milliGRAM(s) Oral every 6 hours PRN Temp greater or equal to 38C (100.4F), Moderate Pain (4 - 6)  dextrose Oral Gel 15 Gram(s) Oral once PRN Blood Glucose LESS THAN 70 milliGRAM(s)/deciliter    ALLERGIES:  No Known Allergies    POCT Blood Glucose.: 137 mg/dL (23 May 2022 22:44)    RADIOLOGY & ADDITIONAL TESTS: Reviewed. OVERNIGHT EVENTS: NAEO    SUBJECTIVE / INTERVAL HPI: Patient seen and examined at bedside. Right shoulder pain, has not slept well.     Remaining ROS negative      PHYSICAL EXAM:  General: NAD; elderly female, lying comfortably in bed, speaking in full sentences  HEENT: NC/AT; PERRL, R eye decreased acuity at baseline; EOMI; MMM  Neck: supple; no JVD  Cardiac: RRR; +S1/S2, +systolic murmur II/IV RUSB  Pulm: CTA B/L; no W/R/R, looks comfortable on room air without increased WOB or accessory muscle use  GI: soft, NT/ND, +BSx4,   Extremities: WWP; minimal lower extremity edema, clubbing or cyanosis, 4/5 strength throughout, sensation grossly intact  Vasc: 2+ radial, DP pulses B/L  Neuro: AAOx3; no focal deficits        Deltoid	Biceps   Triceps  Wrist ext   Finger abd     Hip flex    Hip ext   Knee flex  Knee ext	Ankle flex    Ankle ext    R	5	      5	     5	       5	            5	      5	       5	           5	  5	      2           2    L	5	      5	     5	       5	            5	      5           5	           5	  5	      2           2   Reflexes: Reflexes are 2+ and symmetric at the biceps, triceps. 0 knees and ankles. Plantar responses are flexor.         Biceps   Brachio	Triceps	 Knee  Ankle   Riley   Crossed adductor  Planter    R     2            2	     2	    0       0           -                     -                 down    L      2	           2	     2	    0       0           -	            -                 down  Sensory: Light touch, pinprick, position sense, and vibration sense are intact in fingers and toes. Gradient heel-thigh   Coordination: rapid alternating movements and fine finger movements are intact. There is no dysmetria on finger-to-nose and heel-knee-shin. There are no abnormal or extraneous movements.   Gait/Stance: Unable to walk, able to sit down in bed.     VITAL SIGNS:  Vital Signs Last 24 Hrs  T(C): 36.8 (24 May 2022 04:54), Max: 36.8 (23 May 2022 12:10)  T(F): 98.2 (24 May 2022 04:54), Max: 98.2 (23 May 2022 12:10)  HR: 74 (24 May 2022 04:54) (74 - 80)  BP: 152/70 (24 May 2022 04:54) (137/65 - 152/70)  BP(mean): --  RR: 16 (24 May 2022 04:54) (16 - 18)  SpO2: 98% (24 May 2022 04:54) (97% - 98%)    MEDICATIONS:  MEDICATIONS  (STANDING):  amLODIPine   Tablet 10 milliGRAM(s) Oral daily  apixaban 10 milliGRAM(s) Oral every 12 hours  aspirin  chewable 81 milliGRAM(s) Oral daily  atorvastatin 40 milliGRAM(s) Oral at bedtime  dextrose 5%. 1000 milliLiter(s) (50 mL/Hr) IV Continuous <Continuous>  dextrose 5%. 1000 milliLiter(s) (100 mL/Hr) IV Continuous <Continuous>  dextrose 50% Injectable 25 Gram(s) IV Push once  dextrose 50% Injectable 12.5 Gram(s) IV Push once  dextrose 50% Injectable 25 Gram(s) IV Push once  DULoxetine 90 milliGRAM(s) Oral every 24 hours  glucagon  Injectable 1 milliGRAM(s) IntraMuscular once  insulin glargine Injectable (LANTUS) 4 Unit(s) SubCutaneous at bedtime  insulin lispro (ADMELOG) corrective regimen sliding scale   SubCutaneous three times a day before meals  insulin lispro Injectable (ADMELOG) 8 Unit(s) SubCutaneous three times a day before meals  lidocaine 2% Gel 1 Application(s) Topical three times a day  lisinopril 40 milliGRAM(s) Oral every 24 hours  multivitamin 1 Tablet(s) Oral daily  pantoprazole    Tablet 40 milliGRAM(s) Oral before breakfast    MEDICATIONS  (PRN):  acetaminophen     Tablet .. 325 milliGRAM(s) Oral every 6 hours PRN Temp greater or equal to 38C (100.4F), Moderate Pain (4 - 6)  dextrose Oral Gel 15 Gram(s) Oral once PRN Blood Glucose LESS THAN 70 milliGRAM(s)/deciliter    ALLERGIES:  No Known Allergies    POCT Blood Glucose.: 137 mg/dL (23 May 2022 22:44)    RADIOLOGY & ADDITIONAL TESTS: Reviewed.

## 2022-05-24 NOTE — PROGRESS NOTE ADULT - PROBLEM SELECTOR PLAN 3
Goal -160  - c/w Amlodipine 10mg (new med), home Lisinopril 40mg   - TTE with bubble done prior admission 4/1: grade 1 left ventricular diastolic dysfunction, no PFO, EF >83% hyperdynamic left ventricular systolic function Neck and shoulder pain on 5/3. Tender to palpation, does not radiate  - Hot packs daily  - off ibuprofen now.

## 2022-05-25 LAB
GLUCOSE BLDC GLUCOMTR-MCNC: 111 MG/DL — HIGH (ref 70–99)
GLUCOSE BLDC GLUCOMTR-MCNC: 165 MG/DL — HIGH (ref 70–99)
GLUCOSE BLDC GLUCOMTR-MCNC: 193 MG/DL — HIGH (ref 70–99)
GLUCOSE BLDC GLUCOMTR-MCNC: 90 MG/DL — SIGNIFICANT CHANGE UP (ref 70–99)

## 2022-05-25 PROCEDURE — 99232 SBSQ HOSP IP/OBS MODERATE 35: CPT

## 2022-05-25 RX ORDER — IMMUNE GLOBULIN (HUMAN) 10 G/100ML
25 INJECTION INTRAVENOUS; SUBCUTANEOUS DAILY
Refills: 0 | Status: DISCONTINUED | OUTPATIENT
Start: 2022-05-25 | End: 2022-05-25

## 2022-05-25 RX ADMIN — Medication 8 UNIT(S): at 09:17

## 2022-05-25 RX ADMIN — LIDOCAINE 1 APPLICATION(S): 4 CREAM TOPICAL at 22:53

## 2022-05-25 RX ADMIN — Medication 1 TABLET(S): at 12:43

## 2022-05-25 RX ADMIN — Medication 2: at 12:44

## 2022-05-25 RX ADMIN — Medication 8 UNIT(S): at 17:50

## 2022-05-25 RX ADMIN — LIDOCAINE 1 APPLICATION(S): 4 CREAM TOPICAL at 06:18

## 2022-05-25 RX ADMIN — LISINOPRIL 40 MILLIGRAM(S): 2.5 TABLET ORAL at 07:39

## 2022-05-25 RX ADMIN — APIXABAN 10 MILLIGRAM(S): 2.5 TABLET, FILM COATED ORAL at 22:54

## 2022-05-25 RX ADMIN — INSULIN GLARGINE 4 UNIT(S): 100 INJECTION, SOLUTION SUBCUTANEOUS at 22:54

## 2022-05-25 RX ADMIN — Medication 81 MILLIGRAM(S): at 12:43

## 2022-05-25 RX ADMIN — Medication 8 UNIT(S): at 12:43

## 2022-05-25 RX ADMIN — DULOXETINE HYDROCHLORIDE 90 MILLIGRAM(S): 30 CAPSULE, DELAYED RELEASE ORAL at 09:58

## 2022-05-25 RX ADMIN — PANTOPRAZOLE SODIUM 40 MILLIGRAM(S): 20 TABLET, DELAYED RELEASE ORAL at 07:39

## 2022-05-25 RX ADMIN — Medication 2: at 09:18

## 2022-05-25 RX ADMIN — APIXABAN 10 MILLIGRAM(S): 2.5 TABLET, FILM COATED ORAL at 09:58

## 2022-05-25 RX ADMIN — AMLODIPINE BESYLATE 10 MILLIGRAM(S): 2.5 TABLET ORAL at 06:18

## 2022-05-25 RX ADMIN — LIDOCAINE 1 APPLICATION(S): 4 CREAM TOPICAL at 16:02

## 2022-05-25 RX ADMIN — ATORVASTATIN CALCIUM 40 MILLIGRAM(S): 80 TABLET, FILM COATED ORAL at 22:54

## 2022-05-25 NOTE — PROGRESS NOTE ADULT - PROBLEM SELECTOR PLAN 5
RESOLVED  UA with +WBCs, UCx growing E faecalis. Will treat as complicated  -completed 7 day course of CTX and then ampicillin UA with +WBCs, UCx growing E faecalis. Will treat as complicated. Completed 7 day course of CTX and then ampicillin. RESOLVED.

## 2022-05-25 NOTE — PROGRESS NOTE ADULT - PROBLEM SELECTOR PLAN 8
Goal -160  - c/w Amlodipine 10mg (new med), home Lisinopril 40mg   - TTE with bubble done prior admission 4/1: grade 1 left ventricular diastolic dysfunction, no PFO, EF >83% hyperdynamic left ventricular systolic function Goal -160. TTE with bubble done prior admission 4/1: grade 1 left ventricular diastolic dysfunction, no PFO, EF >83% hyperdynamic left ventricular systolic function  - c/w Amlodipine 10mg (new med), home Lisinopril 40mg

## 2022-05-25 NOTE — PROGRESS NOTE ADULT - PROBLEM SELECTOR PLAN 4
Pt presenting with new onset dry cough overnight without associated fever, chills, acute SOB, hypoxia, or reflux. Likely component of HFpEF. TTE (4/1) showed grade 1 left ventricular diastolic dysfunction, no PFO, EF >83% hyperdynamic left ventricular systolic function.   - COVID swab negative  - Supportive care  - If worsens, can consider repeating TTE  - RESOLVED Pt presenting with new onset dry cough overnight without associated fever, chills, acute SOB, hypoxia, or reflux. Likely component of HFpEF. TTE (4/1) showed grade 1 left ventricular diastolic dysfunction, no PFO, EF >83% hyperdynamic left ventricular systolic function. COVID swab negative.  - Supportive care  - If worsens, can consider repeating TTE  - RESOLVED

## 2022-05-25 NOTE — PROGRESS NOTE ADULT - PROBLEM SELECTOR PLAN 2
Needle like pain shins downward, interfering with sleep.   - c/w Cymbalta 90 Qd for diabetic neuropathy  - f/u LE duplex - acute DVT of  the left peroneal vein, increased Lovenox to therapeutic dose 1mg/kg BID (60mg BID) Needle like pain shins downward, interfering with sleep. LE duplex - acute DVT of  the left peroneal vein  - c/w Cymbalta 90 Qd for diabetic neuropathy  - c/w Apixaban 10mg BID (through 5/28). Then, Apixaban 5mg BID for 3 months.

## 2022-05-25 NOTE — PROGRESS NOTE ADULT - ATTENDING COMMENTS
No improvement in leg strength  Discussed IVIG treatment however would not give in the setting of DVT due to risk of further thromboembolism  Will d/w social work if any other arrangements can be made for safe discharge  Continue current meds

## 2022-05-25 NOTE — PROGRESS NOTE ADULT - ASSESSMENT
72y Female with PMHx of T2DM c/b peripheral neuropathy, HTN, recent admission and workup for Naty Sanchez tear, TIA on 3/30 (MR negative) presents to [facility] as transfer from [facility] ED for episode of altered mental status (staring, generalized weakness), back to baseline in [facility] ED, no acute pathology on CTH or MRI, found to have metabolic encephalopathy (resolved) 2/2 UTI (completed 7day abx) vs hypertension, also found to have new LE weakness due to diabetic neuropathy. Due to immigration and insurance status, not able to [facility] as recommended by PT. Pending improvement to one-person assist in order to discharge home with out-of-pocket PT vs home with family assist.

## 2022-05-25 NOTE — PROGRESS NOTE ADULT - SUBJECTIVE AND OBJECTIVE BOX
OVERNIGHT EVENTS: NAEO    SUBJECTIVE / INTERVAL HPI: Patient seen and examined at bedside. Feeling similar. Trying to work up with PT.    Remaining ROS negative     PHYSICAL EXAM:  General: NAD; elderly female, lying comfortably in bed, speaking in full sentences  HEENT: NC/AT; PERRL, R eye decreased acuity at baseline; EOMI; MMM  Neck: supple; no JVD  Cardiac: RRR; +S1/S2, +systolic murmur II/IV RUSB  Pulm: CTA B/L; no W/R/R, looks comfortable on room air without increased WOB or accessory muscle use  GI: soft, NT/ND, +BSx4,   Extremities: WWP; minimal lower extremity edema, clubbing or cyanosis, 4/5 strength throughout, sensation grossly intact  Vasc: 2+ radial, DP pulses B/L  Neuro: AAOx3.        Deltoid	Biceps   Triceps  Wrist ext   Finger abd     Hip flex    Hip ext   Knee flex  Knee ext	Ankle flex    Ankle ext    R	5	      5	     5	       5	            5	      5	       5	           5	  5	      2           2    L	5	      5	     5	       5	            5	      5           5	           5	  5	      2           2   Reflexes: Reflexes are 2+ and symmetric at the biceps, triceps. 0 knees and ankles. Plantar responses are flexor.         Biceps   Brachio	Triceps	 Knee  Ankle   Riley   Crossed adductor  Planter    R     2            2	     2	    0       0           -                     -                 down    L      2	           2	     2	    0       0           -	            -                 down  Sensory: Bilateral gradient heel-thigh   Coordination: rapid alternating movements and fine finger movements are intact. There is no dysmetria on finger-to-nose and heel-knee-shin. There are no abnormal or extraneous movements.   Gait/Stance: Unable to walk, able to sit down in bed.     VITAL SIGNS:  Vital Signs Last 24 Hrs  T(C): 36.5 (25 May 2022 06:14), Max: 36.7 (24 May 2022 21:55)  T(F): 97.7 (25 May 2022 06:14), Max: 98.1 (24 May 2022 21:55)  HR: 72 (25 May 2022 06:14) (72 - 82)  BP: 164/75 (25 May 2022 06:14) (115/62 - 164/75)  BP(mean): 89 (24 May 2022 14:53) (89 - 89)  RR: 17 (25 May 2022 06:14) (16 - 17)  SpO2: 97% (25 May 2022 06:14) (96% - 99%)    MEDICATIONS:  MEDICATIONS  (STANDING):  amLODIPine   Tablet 10 milliGRAM(s) Oral daily  apixaban 10 milliGRAM(s) Oral every 12 hours  aspirin  chewable 81 milliGRAM(s) Oral daily  atorvastatin 40 milliGRAM(s) Oral at bedtime  dextrose 5%. 1000 milliLiter(s) (50 mL/Hr) IV Continuous <Continuous>  dextrose 5%. 1000 milliLiter(s) (100 mL/Hr) IV Continuous <Continuous>  dextrose 50% Injectable 25 Gram(s) IV Push once  dextrose 50% Injectable 12.5 Gram(s) IV Push once  dextrose 50% Injectable 25 Gram(s) IV Push once  DULoxetine 90 milliGRAM(s) Oral every 24 hours  glucagon  Injectable 1 milliGRAM(s) IntraMuscular once  insulin glargine Injectable (LANTUS) 4 Unit(s) SubCutaneous at bedtime  insulin lispro (ADMELOG) corrective regimen sliding scale   SubCutaneous three times a day before meals  insulin lispro Injectable (ADMELOG) 8 Unit(s) SubCutaneous three times a day before meals  lidocaine 2% Gel 1 Application(s) Topical three times a day  lisinopril 40 milliGRAM(s) Oral every 24 hours  multivitamin 1 Tablet(s) Oral daily  pantoprazole    Tablet 40 milliGRAM(s) Oral before breakfast    MEDICATIONS  (PRN):  acetaminophen     Tablet .. 325 milliGRAM(s) Oral every 6 hours PRN Temp greater or equal to 38C (100.4F), Moderate Pain (4 - 6)  dextrose Oral Gel 15 Gram(s) Oral once PRN Blood Glucose LESS THAN 70 milliGRAM(s)/deciliter      ALLERGIES:  No Known Allergies    LABS:                        10.7   4.32  )-----------( 276      ( 24 May 2022 09:15 )             33.9     05-24    135  |  98  |  27<H>  ----------------------------<  164<H>  4.4   |  29  |  0.53    Ca    8.8      24 May 2022 09:14  Phos  4.3     05-24  Mg     1.6     05-24    CAPILLARY BLOOD GLUCOSE  POCT Blood Glucose.: 165 mg/dL (25 May 2022 08:32)    RADIOLOGY & ADDITIONAL TESTS: Reviewed.

## 2022-05-25 NOTE — PROGRESS NOTE ADULT - PROBLEM SELECTOR PLAN 1
Symmetrical LE weakness, worse distally compared to proximally. Symmetrical diminished sensation, longstanding diabetic peripheral neuropathy. Denies issues with incontinence, or back pain, no sensory line. Likely progression of diabetic neuropathy, however symptoms more severe than to be expected.  MRI L/Spine:  - Showing at the L3/4 leveI. There are degenerative changes involving the facets bilaterally as well as ligamentum flavum hypertrophy. This combination results in moderate central spinal canal stenosis.   - At the L4/5 level, there is disc bulge abutting the L4 nerve roots bilaterally. There are degenerative changes involving the facets bilaterally (right greater than left) as well as ligamentum flavum hypertrophy. This combination results in mild central spinal canal   stenosis.  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   Bilateral lower lumbosacral plexopathies cannot be ruled out based on   these findings.   - Neurology team saying that since it is diabetic in origin it is unlikely that the patient will make significant improvement in the short term.  - c/w Cymbalta 90mg daily   - c/w Custom AFO boots   - c/w incentive spirometry. Symmetrical LE weakness, worse distally compared to proximally. Symmetrical diminished sensation, longstanding diabetic peripheral neuropathy. Denies issues with incontinence, or back pain, no sensory line. Likely progression of diabetic neuropathy, however symptoms more severe than to be expected.  MRI L/Spine:  - Showing at the L3/4 leveI. There are degenerative changes involving the facets bilaterally as well as ligamentum flavum hypertrophy. This combination results in moderate central spinal canal stenosis.   - At the L4/5 level, there is disc bulge abutting the L4 nerve roots bilaterally. There are degenerative changes involving the facets bilaterally (right greater than left) as well as ligamentum flavum hypertrophy. This combination results in mild central spinal canal   stenosis.  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   Bilateral lower lumbosacral plexopathies cannot be ruled out based on   these findings.   - Neurology team saying that since it is diabetic in origin it is unlikely that the patient will make significant improvement in the short term.  - c/w Cymbalta 90mg daily   - Start IVIG (ideal body weight 0.4g/kg) 25 g IV daily for 5 days run over 6 hours. Give: 500cc NS 0.9% IV over 30 min, Tylenol 650mg PO, and Benadryl 25mg PO 30 minutes before IVIG treatment as premedication  - c/w Custom AFO boots   - c/w incentive spirometry. Symmetrical LE weakness, worse distally compared to proximally. Symmetrical diminished sensation, longstanding diabetic peripheral neuropathy. Denies issues with incontinence, or back pain, no sensory line. Likely progression of diabetic neuropathy, however symptoms more severe than to be expected.  MRI L/Spine:  - Showing at the L3/4 leveI. There are degenerative changes involving the facets bilaterally as well as ligamentum flavum hypertrophy. This combination results in moderate central spinal canal stenosis.   - At the L4/5 level, there is disc bulge abutting the L4 nerve roots bilaterally. There are degenerative changes involving the facets bilaterally (right greater than left) as well as ligamentum flavum hypertrophy. This combination results in mild central spinal canal   stenosis.  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 Bilateral lower lumbosacral plexopathies cannot be ruled out based on   these findings.   - Neurology team saying that since it is diabetic in origin it is unlikely that the patient will make significant improvement in the short term.  - c/w Cymbalta 90mg daily   - Started with IVIG (ideal body weight 0.4g/kg) 25 g IV daily for 5 days run over 6 hours (5/25 -). Give: 500cc NS 0.9% IV over 30 min, Tylenol 650mg PO, and Benadryl 25mg PO 30 minutes before IVIG treatment as premedication  - c/w Custom AFO boots   - c/w incentive spirometry. Symmetrical LE weakness, worse distally compared to proximally. Symmetrical diminished sensation, longstanding diabetic peripheral neuropathy. Denies issues with incontinence, or back pain, no sensory line. Likely progression of diabetic neuropathy, however symptoms more severe than to be expected.  MRI L/Spine:  - Showing at the L3/4 leveI. There are degenerative changes involving the facets bilaterally as well as ligamentum flavum hypertrophy. This combination results in moderate central spinal canal stenosis.   - At the L4/5 level, there is disc bulge abutting the L4 nerve roots bilaterally. There are degenerative changes involving the facets bilaterally (right greater than left) as well as ligamentum flavum hypertrophy. This combination results in mild central spinal canal   stenosis.  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 Bilateral lower lumbosacral plexopathies cannot be ruled out based on   these findings.   - Neurology team saying that since it is diabetic in origin it is unlikely that the patient will make significant improvement in the short term.  - c/w Cymbalta 90mg daily   - no IVIG due to DVT   - c/w Custom AFO boots   - c/w incentive spirometry.

## 2022-05-25 NOTE — PROGRESS NOTE ADULT - PROBLEM SELECTOR PLAN 10
F: None  E: Replete PRN   N: DASH/TLC   DVT: Lovenox 40 qd  Dispo: RMF, PT rec [facility]- however patient is uninsured and cannot go to [facility] attempting to get to one person assist. Son was able to received FMLA. Pending daughter for possible FMLA

## 2022-05-25 NOTE — PROGRESS NOTE ADULT - PROBLEM SELECTOR PLAN 3
Neck and shoulder pain on 5/3. Tender to palpation, does not radiate  - Hot packs daily  - off ibuprofen now. Neck and shoulder pain on 5/3. Tender to palpation, does not radiate  - Hot packs daily  - Off ibuprofen now.

## 2022-05-25 NOTE — PROGRESS NOTE ADULT - PROBLEM SELECTOR PLAN 6
Hx of Naty Sanchez tear in prior admission   - continue home protonix 40mg daily  - Restarted APT Hx of Naty Sanchez tear in prior admission   - Continue home protonix 40mg daily  - Restarted APT

## 2022-05-26 LAB
GLUCOSE BLDC GLUCOMTR-MCNC: 111 MG/DL — HIGH (ref 70–99)
GLUCOSE BLDC GLUCOMTR-MCNC: 137 MG/DL — HIGH (ref 70–99)
GLUCOSE BLDC GLUCOMTR-MCNC: 177 MG/DL — HIGH (ref 70–99)
GLUCOSE BLDC GLUCOMTR-MCNC: 208 MG/DL — HIGH (ref 70–99)

## 2022-05-26 PROCEDURE — 99231 SBSQ HOSP IP/OBS SF/LOW 25: CPT

## 2022-05-26 RX ADMIN — LIDOCAINE 1 APPLICATION(S): 4 CREAM TOPICAL at 06:17

## 2022-05-26 RX ADMIN — ATORVASTATIN CALCIUM 40 MILLIGRAM(S): 80 TABLET, FILM COATED ORAL at 21:50

## 2022-05-26 RX ADMIN — INSULIN GLARGINE 4 UNIT(S): 100 INJECTION, SOLUTION SUBCUTANEOUS at 21:50

## 2022-05-26 RX ADMIN — LISINOPRIL 40 MILLIGRAM(S): 2.5 TABLET ORAL at 06:18

## 2022-05-26 RX ADMIN — LIDOCAINE 1 APPLICATION(S): 4 CREAM TOPICAL at 13:12

## 2022-05-26 RX ADMIN — APIXABAN 10 MILLIGRAM(S): 2.5 TABLET, FILM COATED ORAL at 21:50

## 2022-05-26 RX ADMIN — APIXABAN 10 MILLIGRAM(S): 2.5 TABLET, FILM COATED ORAL at 10:20

## 2022-05-26 RX ADMIN — Medication 81 MILLIGRAM(S): at 11:33

## 2022-05-26 RX ADMIN — Medication 4: at 12:25

## 2022-05-26 RX ADMIN — DULOXETINE HYDROCHLORIDE 90 MILLIGRAM(S): 30 CAPSULE, DELAYED RELEASE ORAL at 10:20

## 2022-05-26 RX ADMIN — Medication 8 UNIT(S): at 12:25

## 2022-05-26 RX ADMIN — AMLODIPINE BESYLATE 10 MILLIGRAM(S): 2.5 TABLET ORAL at 06:18

## 2022-05-26 RX ADMIN — Medication 1 TABLET(S): at 11:33

## 2022-05-26 RX ADMIN — Medication 8 UNIT(S): at 08:57

## 2022-05-26 RX ADMIN — PANTOPRAZOLE SODIUM 40 MILLIGRAM(S): 20 TABLET, DELAYED RELEASE ORAL at 06:17

## 2022-05-26 NOTE — PROGRESS NOTE ADULT - PROBLEM SELECTOR PLAN 2
Needle like pain shins downward, interfering with sleep. LE duplex - acute DVT of  the left peroneal vein  - c/w Cymbalta 90 Qd for diabetic neuropathy  - c/w Apixaban 10mg BID (through 5/28). Then, Apixaban 5mg BID for 3 months.

## 2022-05-26 NOTE — PROGRESS NOTE ADULT - PROBLEM SELECTOR PLAN 5
UA with +WBCs, UCx growing E faecalis. Will treat as complicated. Completed 7 day course of CTX and then ampicillin. RESOLVED.

## 2022-05-26 NOTE — PROGRESS NOTE ADULT - SUBJECTIVE AND OBJECTIVE BOX
INTERVAL HPI/OVERNIGHT EVENTS:  Patient was seen and examined at bedside. As per nurse and patient, no o/n events, patient resting comfortably. Patient says she is sleepy and hungry with bilateral leg pain. Denies fever, HA, CP, palpitations, cough, SOB, N/V/D/C, dysuria.    VITAL SIGNS:  T(F): 98.1 (05-26-22 @ 12:11)  HR: 79 (05-26-22 @ 12:11)  BP: 139/71 (05-26-22 @ 12:11)  RR: 16 (05-26-22 @ 12:11)  SpO2: 100% (05-26-22 @ 12:11)  Wt(kg): --    PHYSICAL EXAM:  General: NAD; elderly female, lying comfortably in bed, speaking in full sentences  HEENT: NC/AT; PERRL, R eye decreased acuity at baseline; EOMI; MMM  Neck: supple; no JVD  Cardiac: RRR; +S1/S2, +systolic murmur II/IV RUSB  Pulm: CTA B/L; no W/R/R, looks comfortable on room air without increased WOB or accessory muscle use  GI: soft, NT/ND, +BSx4,   Extremities: WWP; minimal lower extremity edema, clubbing or cyanosis, LLE>RLE swelling  Vasc: 2+ radial, DP pulses B/L  Neuro: AAOx3. full strength other than b/l knee extension 2/5. Absent knee/ankle reflexes.      MEDICATIONS  (STANDING):  amLODIPine   Tablet 10 milliGRAM(s) Oral daily  apixaban 10 milliGRAM(s) Oral every 12 hours  aspirin  chewable 81 milliGRAM(s) Oral daily  atorvastatin 40 milliGRAM(s) Oral at bedtime  dextrose 5%. 1000 milliLiter(s) (50 mL/Hr) IV Continuous <Continuous>  dextrose 5%. 1000 milliLiter(s) (100 mL/Hr) IV Continuous <Continuous>  dextrose 50% Injectable 25 Gram(s) IV Push once  dextrose 50% Injectable 12.5 Gram(s) IV Push once  dextrose 50% Injectable 25 Gram(s) IV Push once  DULoxetine 90 milliGRAM(s) Oral every 24 hours  glucagon  Injectable 1 milliGRAM(s) IntraMuscular once  insulin glargine Injectable (LANTUS) 4 Unit(s) SubCutaneous at bedtime  insulin lispro (ADMELOG) corrective regimen sliding scale   SubCutaneous three times a day before meals  insulin lispro Injectable (ADMELOG) 8 Unit(s) SubCutaneous three times a day before meals  lidocaine 2% Gel 1 Application(s) Topical three times a day  lisinopril 40 milliGRAM(s) Oral every 24 hours  multivitamin 1 Tablet(s) Oral daily  pantoprazole    Tablet 40 milliGRAM(s) Oral before breakfast    MEDICATIONS  (PRN):  acetaminophen     Tablet .. 325 milliGRAM(s) Oral every 6 hours PRN Temp greater or equal to 38C (100.4F), Moderate Pain (4 - 6)  dextrose Oral Gel 15 Gram(s) Oral once PRN Blood Glucose LESS THAN 70 milliGRAM(s)/deciliter      Allergies    No Known Allergies    Intolerances        LABS:                RADIOLOGY & ADDITIONAL TESTS:  Reviewed

## 2022-05-26 NOTE — PROGRESS NOTE ADULT - PROBLEM SELECTOR PLAN 4
Pt presenting with new onset dry cough overnight without associated fever, chills, acute SOB, hypoxia, or reflux. Likely component of HFpEF. TTE (4/1) showed grade 1 left ventricular diastolic dysfunction, no PFO, EF >83% hyperdynamic left ventricular systolic function. COVID swab negative.  - Supportive care  - If worsens, can consider repeating TTE  - RESOLVED

## 2022-05-26 NOTE — PROGRESS NOTE ADULT - PROBLEM SELECTOR PLAN 3
Neck and shoulder pain on 5/3. Tender to palpation, does not radiate  - Hot packs daily  - Off ibuprofen now.

## 2022-05-26 NOTE — PROGRESS NOTE ADULT - ASSESSMENT
72y Female with PMHx of T2DM c/b peripheral neuropathy, HTN, recent admission and workup for Naty Sanchez tear, TIA on 3/30 (MR negative) presents to [facility] as transfer from [facility] ED for episode of altered mental status (staring, generalized weakness), back to baseline in [facility] ED, no acute pathology on CTH or MRI, found to have metabolic encephalopathy (resolved) 2/2 UTI (completed 7day abx) vs hypertension, also found to have new LE weakness due to diabetic neuropathy. Due to immigration and insurance status, not able to [facility] as recommended by PT. Pending improvement to one-person assist in order to discharge home with out-of-pocket PT vs home with family assist.

## 2022-05-26 NOTE — PROGRESS NOTE ADULT - ATTENDING COMMENTS
Still not able to walk without 2 person assist  continue PT and dispo planning  continue current meds

## 2022-05-26 NOTE — PROGRESS NOTE ADULT - PROBLEM SELECTOR PLAN 10
F: None  E: Replete PRN   N: DASH/TLC   DVT: Lovenox 40 qd  Dispo: RMF, PT rec [facility]- however patient is uninsured and cannot go to [facility] attempting to get to one person assist. Son was able to received FMLA. Pending daughter for possible FMLA

## 2022-05-26 NOTE — PROGRESS NOTE ADULT - PROBLEM SELECTOR PLAN 8
Goal -160. TTE with bubble done prior admission 4/1: grade 1 left ventricular diastolic dysfunction, no PFO, EF >83% hyperdynamic left ventricular systolic function  - c/w Amlodipine 10mg (new med), home Lisinopril 40mg

## 2022-05-26 NOTE — PROGRESS NOTE ADULT - PROBLEM SELECTOR PLAN 1
Symmetrical LE weakness, worse distally compared to proximally. Symmetrical diminished sensation, longstanding diabetic peripheral neuropathy. Denies issues with incontinence, or back pain, no sensory line. Likely progression of diabetic neuropathy, however symptoms more severe than to be expected.  MRI L/Spine:  - Showing at the L3/4 leveI. There are degenerative changes involving the facets bilaterally as well as ligamentum flavum hypertrophy. This combination results in moderate central spinal canal stenosis.   - At the L4/5 level, there is disc bulge abutting the L4 nerve roots bilaterally. There are degenerative changes involving the facets bilaterally (right greater than left) as well as ligamentum flavum hypertrophy. This combination results in mild central spinal canal   stenosis.  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 Bilateral lower lumbosacral plexopathies cannot be ruled out based on   these findings.   - Neurology team saying that since it is diabetic in origin it is unlikely that the patient will make significant improvement in the short term.  - c/w Cymbalta 90mg daily   - no IVIG due to DVT   - c/w Custom AFO boots   - c/w incentive spirometry.

## 2022-05-26 NOTE — PROGRESS NOTE ADULT - PROBLEM SELECTOR PROBLEM 7
Type II diabetes mellitus  day # 1  no complaints  exam within limits  labs reviewed  for routine post partum care  discussed contraception, vaccination, breastfeeding  24 hr discharge

## 2022-05-27 LAB
GLUCOSE BLDC GLUCOMTR-MCNC: 127 MG/DL — HIGH (ref 70–99)
GLUCOSE BLDC GLUCOMTR-MCNC: 149 MG/DL — HIGH (ref 70–99)
GLUCOSE BLDC GLUCOMTR-MCNC: 172 MG/DL — HIGH (ref 70–99)
GLUCOSE BLDC GLUCOMTR-MCNC: 207 MG/DL — HIGH (ref 70–99)

## 2022-05-27 PROCEDURE — 99231 SBSQ HOSP IP/OBS SF/LOW 25: CPT

## 2022-05-27 RX ORDER — SODIUM CHLORIDE 9 MG/ML
1000 INJECTION, SOLUTION INTRAVENOUS ONCE
Refills: 0 | Status: COMPLETED | OUTPATIENT
Start: 2022-05-27 | End: 2022-05-27

## 2022-05-27 RX ADMIN — Medication 2: at 12:42

## 2022-05-27 RX ADMIN — DULOXETINE HYDROCHLORIDE 90 MILLIGRAM(S): 30 CAPSULE, DELAYED RELEASE ORAL at 09:07

## 2022-05-27 RX ADMIN — Medication 8 UNIT(S): at 12:42

## 2022-05-27 RX ADMIN — Medication 8 UNIT(S): at 09:04

## 2022-05-27 RX ADMIN — Medication 81 MILLIGRAM(S): at 11:47

## 2022-05-27 RX ADMIN — INSULIN GLARGINE 4 UNIT(S): 100 INJECTION, SOLUTION SUBCUTANEOUS at 22:19

## 2022-05-27 RX ADMIN — LISINOPRIL 40 MILLIGRAM(S): 2.5 TABLET ORAL at 06:54

## 2022-05-27 RX ADMIN — PANTOPRAZOLE SODIUM 40 MILLIGRAM(S): 20 TABLET, DELAYED RELEASE ORAL at 06:54

## 2022-05-27 RX ADMIN — Medication 1 TABLET(S): at 11:47

## 2022-05-27 RX ADMIN — ATORVASTATIN CALCIUM 40 MILLIGRAM(S): 80 TABLET, FILM COATED ORAL at 22:19

## 2022-05-27 RX ADMIN — SODIUM CHLORIDE 666.67 MILLILITER(S): 9 INJECTION, SOLUTION INTRAVENOUS at 11:47

## 2022-05-27 RX ADMIN — LIDOCAINE 1 APPLICATION(S): 4 CREAM TOPICAL at 13:02

## 2022-05-27 RX ADMIN — AMLODIPINE BESYLATE 10 MILLIGRAM(S): 2.5 TABLET ORAL at 06:54

## 2022-05-27 RX ADMIN — APIXABAN 10 MILLIGRAM(S): 2.5 TABLET, FILM COATED ORAL at 09:06

## 2022-05-27 RX ADMIN — LIDOCAINE 1 APPLICATION(S): 4 CREAM TOPICAL at 06:54

## 2022-05-27 RX ADMIN — APIXABAN 10 MILLIGRAM(S): 2.5 TABLET, FILM COATED ORAL at 22:19

## 2022-05-27 RX ADMIN — Medication 8 UNIT(S): at 17:21

## 2022-05-27 NOTE — PROGRESS NOTE ADULT - PROBLEM SELECTOR PLAN 10
F: None  E: Replete PRN   N: DASH/TLC   DVT: Lovenox 40 qd  Dispo: RMF, PT rec [facility]- however patient is uninsured and cannot go to [facility] attempting to get to one person assist. Son was able to received FMLA. Pending daughter for possible FMLA

## 2022-05-27 NOTE — PROGRESS NOTE ADULT - SUBJECTIVE AND OBJECTIVE BOX
INTERVAL HPI/OVERNIGHT EVENTS:  Patient was seen and examined at bedside. As per nurse and patient, no o/n events, patient resting comfortably. Complains of LE pain and weakness.     VITAL SIGNS:  T(F): 98.5 (05-27-22 @ 04:40)  HR: 84 (05-27-22 @ 04:40)  BP: 138/75 (05-27-22 @ 04:40)  RR: 17 (05-27-22 @ 04:40)  SpO2: 100% (05-27-22 @ 04:40)  Wt(kg): --    PHYSICAL EXAM:  General: NAD; elderly female, lying comfortably in bed, speaking in full sentences  HEENT: NC/AT; PERRL, R eye decreased acuity at baseline; EOMI; MMM  Neck: supple; no JVD  Cardiac: RRR; +S1/S2, +systolic murmur II/IV RUSB  Pulm: CTA B/L; no W/R/R, looks comfortable on room air without increased WOB or accessory muscle use  GI: soft, NT/ND, +BSx4,   Extremities: WWP; minimal lower extremity edema, clubbing or cyanosis, LLE>RLE swelling  Vasc: 2+ radial, DP pulses B/L  Neuro: AAOx3. full strength other than b/l knee extension 2/5. Absent knee/ankle reflexes.    MEDICATIONS  (STANDING):  amLODIPine   Tablet 10 milliGRAM(s) Oral daily  apixaban 10 milliGRAM(s) Oral every 12 hours  aspirin  chewable 81 milliGRAM(s) Oral daily  atorvastatin 40 milliGRAM(s) Oral at bedtime  dextrose 5%. 1000 milliLiter(s) (50 mL/Hr) IV Continuous <Continuous>  dextrose 5%. 1000 milliLiter(s) (100 mL/Hr) IV Continuous <Continuous>  dextrose 50% Injectable 25 Gram(s) IV Push once  dextrose 50% Injectable 12.5 Gram(s) IV Push once  dextrose 50% Injectable 25 Gram(s) IV Push once  DULoxetine 90 milliGRAM(s) Oral every 24 hours  glucagon  Injectable 1 milliGRAM(s) IntraMuscular once  insulin glargine Injectable (LANTUS) 4 Unit(s) SubCutaneous at bedtime  insulin lispro (ADMELOG) corrective regimen sliding scale   SubCutaneous three times a day before meals  insulin lispro Injectable (ADMELOG) 8 Unit(s) SubCutaneous three times a day before meals  lidocaine 2% Gel 1 Application(s) Topical three times a day  lisinopril 40 milliGRAM(s) Oral every 24 hours  multivitamin 1 Tablet(s) Oral daily  pantoprazole    Tablet 40 milliGRAM(s) Oral before breakfast    MEDICATIONS  (PRN):  acetaminophen     Tablet .. 325 milliGRAM(s) Oral every 6 hours PRN Temp greater or equal to 38C (100.4F), Moderate Pain (4 - 6)  dextrose Oral Gel 15 Gram(s) Oral once PRN Blood Glucose LESS THAN 70 milliGRAM(s)/deciliter      Allergies    No Known Allergies    Intolerances        LABS:                RADIOLOGY & ADDITIONAL TESTS:  Reviewed INTERVAL HPI/OVERNIGHT EVENTS:  Patient was seen and examined at bedside. As per nurse and patient, no o/n events, patient resting comfortably. Complains of LE pain and weakness.     VITAL SIGNS:  T(F): 98.5 (05-27-22 @ 04:40)  HR: 84 (05-27-22 @ 04:40)  BP: 138/75 (05-27-22 @ 04:40)  RR: 17 (05-27-22 @ 04:40)  SpO2: 100% (05-27-22 @ 04:40)  Wt(kg): --    PHYSICAL EXAM:  General: NAD; elderly female, lying comfortably in bed, speaking in full sentences  HEENT: NC/AT; PERRL, R eye decreased acuity at baseline; EOMI; MMM  Neck: supple; no JVD  Cardiac: RRR; +S1/S2, +systolic murmur II/IV RUSB  Pulm: CTA B/L; no W/R/R, looks comfortable on room air without increased WOB or accessory muscle use  GI: soft, NT/ND, +BSx4,   Extremities: WWP; minimal lower extremity edema, clubbing or cyanosis, LLE>RLE swelling  Vasc: 2+ radial, DP pulses B/L  Neuro: AAOx3. LE hip flexion 4/5, knee extension 4/5, ankle dorsiflexion 2/5 b/l    MEDICATIONS  (STANDING):  amLODIPine   Tablet 10 milliGRAM(s) Oral daily  apixaban 10 milliGRAM(s) Oral every 12 hours  aspirin  chewable 81 milliGRAM(s) Oral daily  atorvastatin 40 milliGRAM(s) Oral at bedtime  dextrose 5%. 1000 milliLiter(s) (50 mL/Hr) IV Continuous <Continuous>  dextrose 5%. 1000 milliLiter(s) (100 mL/Hr) IV Continuous <Continuous>  dextrose 50% Injectable 25 Gram(s) IV Push once  dextrose 50% Injectable 12.5 Gram(s) IV Push once  dextrose 50% Injectable 25 Gram(s) IV Push once  DULoxetine 90 milliGRAM(s) Oral every 24 hours  glucagon  Injectable 1 milliGRAM(s) IntraMuscular once  insulin glargine Injectable (LANTUS) 4 Unit(s) SubCutaneous at bedtime  insulin lispro (ADMELOG) corrective regimen sliding scale   SubCutaneous three times a day before meals  insulin lispro Injectable (ADMELOG) 8 Unit(s) SubCutaneous three times a day before meals  lidocaine 2% Gel 1 Application(s) Topical three times a day  lisinopril 40 milliGRAM(s) Oral every 24 hours  multivitamin 1 Tablet(s) Oral daily  pantoprazole    Tablet 40 milliGRAM(s) Oral before breakfast    MEDICATIONS  (PRN):  acetaminophen     Tablet .. 325 milliGRAM(s) Oral every 6 hours PRN Temp greater or equal to 38C (100.4F), Moderate Pain (4 - 6)  dextrose Oral Gel 15 Gram(s) Oral once PRN Blood Glucose LESS THAN 70 milliGRAM(s)/deciliter      Allergies    No Known Allergies    Intolerances        LABS:                RADIOLOGY & ADDITIONAL TESTS:  Reviewed

## 2022-05-27 NOTE — PROGRESS NOTE ADULT - ASSESSMENT
72y Female with PMHx of T2DM c/b peripheral neuropathy, HTN, recent admission and workup for Naty Sanchez tear, TIA on 3/30 (MR negative) presents to [facility] as transfer from [facility] ED for episode of altered mental status (staring, generalized weakness), back to baseline in [facility] ED, no acute pathology on CTH or MRI, found to have metabolic encephalopathy (resolved) 2/2 UTI (completed 7day abx) vs hypertension, also found to have new LE weakness due to diabetic neuropathy. Due to immigration and insurance status, not able to [facility] as recommended by PT. Pending improvement to one-person assist in order to discharge home with out-of-pocket PT vs home with family assist.

## 2022-05-28 LAB
GLUCOSE BLDC GLUCOMTR-MCNC: 127 MG/DL — HIGH (ref 70–99)
GLUCOSE BLDC GLUCOMTR-MCNC: 143 MG/DL — HIGH (ref 70–99)
GLUCOSE BLDC GLUCOMTR-MCNC: 177 MG/DL — HIGH (ref 70–99)
GLUCOSE BLDC GLUCOMTR-MCNC: 195 MG/DL — HIGH (ref 70–99)

## 2022-05-28 PROCEDURE — 99231 SBSQ HOSP IP/OBS SF/LOW 25: CPT

## 2022-05-28 RX ADMIN — Medication 81 MILLIGRAM(S): at 12:07

## 2022-05-28 RX ADMIN — LIDOCAINE 1 APPLICATION(S): 4 CREAM TOPICAL at 14:26

## 2022-05-28 RX ADMIN — Medication 1 TABLET(S): at 12:07

## 2022-05-28 RX ADMIN — ATORVASTATIN CALCIUM 40 MILLIGRAM(S): 80 TABLET, FILM COATED ORAL at 22:45

## 2022-05-28 RX ADMIN — Medication 8 UNIT(S): at 09:21

## 2022-05-28 RX ADMIN — APIXABAN 10 MILLIGRAM(S): 2.5 TABLET, FILM COATED ORAL at 09:21

## 2022-05-28 RX ADMIN — AMLODIPINE BESYLATE 10 MILLIGRAM(S): 2.5 TABLET ORAL at 06:31

## 2022-05-28 RX ADMIN — PANTOPRAZOLE SODIUM 40 MILLIGRAM(S): 20 TABLET, DELAYED RELEASE ORAL at 06:31

## 2022-05-28 RX ADMIN — DULOXETINE HYDROCHLORIDE 90 MILLIGRAM(S): 30 CAPSULE, DELAYED RELEASE ORAL at 09:21

## 2022-05-28 RX ADMIN — APIXABAN 5 MILLIGRAM(S): 2.5 TABLET, FILM COATED ORAL at 22:45

## 2022-05-28 RX ADMIN — Medication 2: at 09:21

## 2022-05-28 RX ADMIN — Medication 8 UNIT(S): at 13:03

## 2022-05-28 RX ADMIN — Medication 8 UNIT(S): at 17:31

## 2022-05-28 RX ADMIN — INSULIN GLARGINE 4 UNIT(S): 100 INJECTION, SOLUTION SUBCUTANEOUS at 23:49

## 2022-05-28 RX ADMIN — LIDOCAINE 1 APPLICATION(S): 4 CREAM TOPICAL at 06:36

## 2022-05-28 RX ADMIN — LIDOCAINE 1 APPLICATION(S): 4 CREAM TOPICAL at 22:45

## 2022-05-28 RX ADMIN — LISINOPRIL 40 MILLIGRAM(S): 2.5 TABLET ORAL at 06:30

## 2022-05-28 NOTE — PROGRESS NOTE ADULT - SUBJECTIVE AND OBJECTIVE BOX
Interval History: No change in symptoms and no overnight events.    Vital Signs Last 24 Hrs  T(C): 36.8 (28 May 2022 11:37), Max: 36.8 (28 May 2022 11:37)  T(F): 98.2 (28 May 2022 11:37), Max: 98.2 (28 May 2022 11:37)  HR: 76 (28 May 2022 11:37) (73 - 76)  BP: 135/68 (28 May 2022 11:37) (120/80 - 152/80)  BP(mean): --  RR: 18 (28 May 2022 11:37) (18 - 18)  SpO2: 99% (28 May 2022 11:37) (99% - 99%)    No new labs    MEDICATIONS  (STANDING):  amLODIPine   Tablet 10 milliGRAM(s) Oral daily  apixaban 5 milliGRAM(s) Oral every 12 hours  aspirin  chewable 81 milliGRAM(s) Oral daily  atorvastatin 40 milliGRAM(s) Oral at bedtime  dextrose 5%. 1000 milliLiter(s) (50 mL/Hr) IV Continuous <Continuous>  dextrose 5%. 1000 milliLiter(s) (100 mL/Hr) IV Continuous <Continuous>  dextrose 50% Injectable 25 Gram(s) IV Push once  dextrose 50% Injectable 12.5 Gram(s) IV Push once  dextrose 50% Injectable 25 Gram(s) IV Push once  DULoxetine 90 milliGRAM(s) Oral every 24 hours  glucagon  Injectable 1 milliGRAM(s) IntraMuscular once  insulin glargine Injectable (LANTUS) 4 Unit(s) SubCutaneous at bedtime  insulin lispro (ADMELOG) corrective regimen sliding scale   SubCutaneous three times a day before meals  insulin lispro Injectable (ADMELOG) 8 Unit(s) SubCutaneous three times a day before meals  lidocaine 2% Gel 1 Application(s) Topical three times a day  lisinopril 40 milliGRAM(s) Oral every 24 hours  multivitamin 1 Tablet(s) Oral daily  pantoprazole    Tablet 40 milliGRAM(s) Oral before breakfast    MEDICATIONS  (PRN):  acetaminophen     Tablet .. 325 milliGRAM(s) Oral every 6 hours PRN Temp greater or equal to 38C (100.4F), Moderate Pain (4 - 6)  dextrose Oral Gel 15 Gram(s) Oral once PRN Blood Glucose LESS THAN 70 milliGRAM(s)/deciliter

## 2022-05-28 NOTE — PROGRESS NOTE ADULT - ASSESSMENT
Severe leg weakness due to neuropathy, presumably diabetic  DVT, on anticoagulation   Continue current meds  PT/OT  Dispo planning

## 2022-05-29 LAB
ALBUMIN SERPL ELPH-MCNC: 3.2 G/DL — LOW (ref 3.3–5)
ALP SERPL-CCNC: 42 U/L — SIGNIFICANT CHANGE UP (ref 40–120)
ALT FLD-CCNC: 15 U/L — SIGNIFICANT CHANGE UP (ref 10–45)
ANION GAP SERPL CALC-SCNC: 8 MMOL/L — SIGNIFICANT CHANGE UP (ref 5–17)
AST SERPL-CCNC: 15 U/L — SIGNIFICANT CHANGE UP (ref 10–40)
BASOPHILS # BLD AUTO: 0.02 K/UL — SIGNIFICANT CHANGE UP (ref 0–0.2)
BASOPHILS NFR BLD AUTO: 0.4 % — SIGNIFICANT CHANGE UP (ref 0–2)
BILIRUB SERPL-MCNC: 0.2 MG/DL — SIGNIFICANT CHANGE UP (ref 0.2–1.2)
BUN SERPL-MCNC: 35 MG/DL — HIGH (ref 7–23)
CALCIUM SERPL-MCNC: 8.9 MG/DL — SIGNIFICANT CHANGE UP (ref 8.4–10.5)
CHLORIDE SERPL-SCNC: 99 MMOL/L — SIGNIFICANT CHANGE UP (ref 96–108)
CO2 SERPL-SCNC: 27 MMOL/L — SIGNIFICANT CHANGE UP (ref 22–31)
CREAT SERPL-MCNC: 0.55 MG/DL — SIGNIFICANT CHANGE UP (ref 0.5–1.3)
EGFR: 96 ML/MIN/1.73M2 — SIGNIFICANT CHANGE UP
EOSINOPHIL # BLD AUTO: 0.18 K/UL — SIGNIFICANT CHANGE UP (ref 0–0.5)
EOSINOPHIL NFR BLD AUTO: 3.7 % — SIGNIFICANT CHANGE UP (ref 0–6)
GLUCOSE BLDC GLUCOMTR-MCNC: 104 MG/DL — HIGH (ref 70–99)
GLUCOSE BLDC GLUCOMTR-MCNC: 151 MG/DL — HIGH (ref 70–99)
GLUCOSE BLDC GLUCOMTR-MCNC: 172 MG/DL — HIGH (ref 70–99)
GLUCOSE BLDC GLUCOMTR-MCNC: 74 MG/DL — SIGNIFICANT CHANGE UP (ref 70–99)
GLUCOSE SERPL-MCNC: 163 MG/DL — HIGH (ref 70–99)
HCT VFR BLD CALC: 33.3 % — LOW (ref 34.5–45)
HGB BLD-MCNC: 10.6 G/DL — LOW (ref 11.5–15.5)
IMM GRANULOCYTES NFR BLD AUTO: 0.2 % — SIGNIFICANT CHANGE UP (ref 0–1.5)
LYMPHOCYTES # BLD AUTO: 1.85 K/UL — SIGNIFICANT CHANGE UP (ref 1–3.3)
LYMPHOCYTES # BLD AUTO: 37.6 % — SIGNIFICANT CHANGE UP (ref 13–44)
MAGNESIUM SERPL-MCNC: 1.6 MG/DL — SIGNIFICANT CHANGE UP (ref 1.6–2.6)
MCHC RBC-ENTMCNC: 26.6 PG — LOW (ref 27–34)
MCHC RBC-ENTMCNC: 31.8 GM/DL — LOW (ref 32–36)
MCV RBC AUTO: 83.7 FL — SIGNIFICANT CHANGE UP (ref 80–100)
MONOCYTES # BLD AUTO: 0.41 K/UL — SIGNIFICANT CHANGE UP (ref 0–0.9)
MONOCYTES NFR BLD AUTO: 8.3 % — SIGNIFICANT CHANGE UP (ref 2–14)
NEUTROPHILS # BLD AUTO: 2.45 K/UL — SIGNIFICANT CHANGE UP (ref 1.8–7.4)
NEUTROPHILS NFR BLD AUTO: 49.8 % — SIGNIFICANT CHANGE UP (ref 43–77)
NRBC # BLD: 0 /100 WBCS — SIGNIFICANT CHANGE UP (ref 0–0)
PHOSPHATE SERPL-MCNC: 4.2 MG/DL — SIGNIFICANT CHANGE UP (ref 2.5–4.5)
PLATELET # BLD AUTO: 286 K/UL — SIGNIFICANT CHANGE UP (ref 150–400)
POTASSIUM SERPL-MCNC: 4.3 MMOL/L — SIGNIFICANT CHANGE UP (ref 3.5–5.3)
POTASSIUM SERPL-SCNC: 4.3 MMOL/L — SIGNIFICANT CHANGE UP (ref 3.5–5.3)
PROT SERPL-MCNC: 6.3 G/DL — SIGNIFICANT CHANGE UP (ref 6–8.3)
RBC # BLD: 3.98 M/UL — SIGNIFICANT CHANGE UP (ref 3.8–5.2)
RBC # FLD: 13.3 % — SIGNIFICANT CHANGE UP (ref 10.3–14.5)
SODIUM SERPL-SCNC: 134 MMOL/L — LOW (ref 135–145)
WBC # BLD: 4.92 K/UL — SIGNIFICANT CHANGE UP (ref 3.8–10.5)
WBC # FLD AUTO: 4.92 K/UL — SIGNIFICANT CHANGE UP (ref 3.8–10.5)

## 2022-05-29 PROCEDURE — 99231 SBSQ HOSP IP/OBS SF/LOW 25: CPT

## 2022-05-29 RX ORDER — MAGNESIUM SULFATE 500 MG/ML
2 VIAL (ML) INJECTION ONCE
Refills: 0 | Status: COMPLETED | OUTPATIENT
Start: 2022-05-29 | End: 2022-05-29

## 2022-05-29 RX ADMIN — LIDOCAINE 1 APPLICATION(S): 4 CREAM TOPICAL at 22:46

## 2022-05-29 RX ADMIN — INSULIN GLARGINE 4 UNIT(S): 100 INJECTION, SOLUTION SUBCUTANEOUS at 22:45

## 2022-05-29 RX ADMIN — AMLODIPINE BESYLATE 10 MILLIGRAM(S): 2.5 TABLET ORAL at 06:42

## 2022-05-29 RX ADMIN — Medication 8 UNIT(S): at 13:00

## 2022-05-29 RX ADMIN — Medication 25 GRAM(S): at 09:08

## 2022-05-29 RX ADMIN — Medication 81 MILLIGRAM(S): at 11:19

## 2022-05-29 RX ADMIN — APIXABAN 5 MILLIGRAM(S): 2.5 TABLET, FILM COATED ORAL at 22:45

## 2022-05-29 RX ADMIN — ATORVASTATIN CALCIUM 40 MILLIGRAM(S): 80 TABLET, FILM COATED ORAL at 22:45

## 2022-05-29 RX ADMIN — Medication 8 UNIT(S): at 08:55

## 2022-05-29 RX ADMIN — Medication 1 TABLET(S): at 11:19

## 2022-05-29 RX ADMIN — LIDOCAINE 1 APPLICATION(S): 4 CREAM TOPICAL at 07:28

## 2022-05-29 RX ADMIN — Medication 2: at 17:30

## 2022-05-29 RX ADMIN — Medication 8 UNIT(S): at 17:30

## 2022-05-29 RX ADMIN — PANTOPRAZOLE SODIUM 40 MILLIGRAM(S): 20 TABLET, DELAYED RELEASE ORAL at 06:42

## 2022-05-29 RX ADMIN — LISINOPRIL 40 MILLIGRAM(S): 2.5 TABLET ORAL at 07:28

## 2022-05-29 RX ADMIN — DULOXETINE HYDROCHLORIDE 90 MILLIGRAM(S): 30 CAPSULE, DELAYED RELEASE ORAL at 09:53

## 2022-05-29 RX ADMIN — LIDOCAINE 1 APPLICATION(S): 4 CREAM TOPICAL at 14:06

## 2022-05-29 RX ADMIN — Medication 2: at 08:55

## 2022-05-29 RX ADMIN — APIXABAN 5 MILLIGRAM(S): 2.5 TABLET, FILM COATED ORAL at 09:53

## 2022-05-29 NOTE — PROGRESS NOTE ADULT - ASSESSMENT
72y Female with PMHx of T2DM c/b peripheral neuropathy, HTN, recent admission and workup for Naty Snachez tear, TIA on 3/30 (MR negative) presents to [facility] as transfer from [facility] ED for episode of altered mental status (staring, generalized weakness), back to baseline in [facility] ED, no acute pathology on CTH or MRI, found to have metabolic encephalopathy (resolved) 2/2 UTI (completed 7day abx) vs hypertension, also found to have new LE weakness due to diabetic neuropathy. Due to immigration and insurance status, not able to [facility] as recommended by PT. Pending improvement to one-person assist in order to discharge home with out-of-pocket PT vs home with family assist.

## 2022-05-29 NOTE — PROGRESS NOTE ADULT - PROBLEM SELECTOR PLAN 7
Pt with hx of DM, A1c 11.6. Endocrine following. TSH results: 1.120.   - Low C peptide patient will need insulin and NOT oral agents on dc  - C/w mISS, before meals   - C/w Lantus 4U, Lispro 8U   - F/u endocrine recs Pt with hx of DM, A1c 11.6. Endocrine following. TSH results: 1.120.   - Low C peptide patient will need insulin and NOT oral agents on dc  - C/w mISS   - C/w Lispro 8U TID   - F/u endocrine recs

## 2022-05-29 NOTE — PROGRESS NOTE ADULT - ATTENDING COMMENTS
No change in neurologic status  continue current meds including eliquis for DVT  continue dispo planning

## 2022-05-29 NOTE — PROGRESS NOTE ADULT - SUBJECTIVE AND OBJECTIVE BOX
INTERVAL HPI/OVERNIGHT EVENTS:    SUBJECTIVE: Patient seen and examined at bedside.     OBJECTIVE:    VITAL SIGNS:  ICU Vital Signs Last 24 Hrs  T(C): 36.2 (29 May 2022 05:25), Max: 36.8 (28 May 2022 11:37)  T(F): 97.2 (29 May 2022 05:25), Max: 98.2 (28 May 2022 11:37)  HR: 73 (29 May 2022 05:25) (73 - 79)  BP: 134/75 (29 May 2022 05:25) (132/70 - 135/68)  BP(mean): --  ABP: --  ABP(mean): --  RR: 18 (29 May 2022 05:25) (16 - 18)  SpO2: 97% (29 May 2022 05:25) (97% - 99%)        CAPILLARY BLOOD GLUCOSE      POCT Blood Glucose.: 195 mg/dL (28 May 2022 23:36)      PHYSICAL EXAM:    Constitutional: NAD, well-groomed, well-developed  HEENT: PERRLA, EOMI, Normal Hearing, MMM  Neck: No LAD, No JVD  Back: Normal spine flexure, No CVA tenderness  Respiratory: CTAB   Cardiovascular: S1 and S2, RRR, no M/G/R  Gastrointestinal: BS+, soft, NT/ND  Extremities: No peripheral edema  Vascular: 2+ peripheral pulses  Neurological: A/O x 3, no focal deficits  Psychiatric: Normal mood, normal affect  Musculoskeletal: 5/5 strength b/l upper and lower extremities  Skin: No rashes    MEDICATIONS:  MEDICATIONS  (STANDING):  amLODIPine   Tablet 10 milliGRAM(s) Oral daily  apixaban 5 milliGRAM(s) Oral every 12 hours  aspirin  chewable 81 milliGRAM(s) Oral daily  atorvastatin 40 milliGRAM(s) Oral at bedtime  dextrose 5%. 1000 milliLiter(s) (100 mL/Hr) IV Continuous <Continuous>  dextrose 5%. 1000 milliLiter(s) (50 mL/Hr) IV Continuous <Continuous>  dextrose 50% Injectable 25 Gram(s) IV Push once  dextrose 50% Injectable 12.5 Gram(s) IV Push once  dextrose 50% Injectable 25 Gram(s) IV Push once  DULoxetine 90 milliGRAM(s) Oral every 24 hours  glucagon  Injectable 1 milliGRAM(s) IntraMuscular once  insulin glargine Injectable (LANTUS) 4 Unit(s) SubCutaneous at bedtime  insulin lispro (ADMELOG) corrective regimen sliding scale   SubCutaneous three times a day before meals  insulin lispro Injectable (ADMELOG) 8 Unit(s) SubCutaneous three times a day before meals  lidocaine 2% Gel 1 Application(s) Topical three times a day  lisinopril 40 milliGRAM(s) Oral every 24 hours  multivitamin 1 Tablet(s) Oral daily  pantoprazole    Tablet 40 milliGRAM(s) Oral before breakfast    MEDICATIONS  (PRN):  acetaminophen     Tablet .. 325 milliGRAM(s) Oral every 6 hours PRN Temp greater or equal to 38C (100.4F), Moderate Pain (4 - 6)  dextrose Oral Gel 15 Gram(s) Oral once PRN Blood Glucose LESS THAN 70 milliGRAM(s)/deciliter      ALLERGIES:  Allergies    No Known Allergies    Intolerances        LABS:                RADIOLOGY & ADDITIONAL TESTS: Reviewed. INTERVAL HPI/OVERNIGHT EVENTS:    SUBJECTIVE: Patient seen and examined at bedside.     OBJECTIVE:    VITAL SIGNS:  ICU Vital Signs Last 24 Hrs  T(C): 36.2 (29 May 2022 05:25), Max: 36.8 (28 May 2022 11:37)  T(F): 97.2 (29 May 2022 05:25), Max: 98.2 (28 May 2022 11:37)  HR: 73 (29 May 2022 05:25) (73 - 79)  BP: 134/75 (29 May 2022 05:25) (132/70 - 135/68)  BP(mean): --  ABP: --  ABP(mean): --  RR: 18 (29 May 2022 05:25) (16 - 18)  SpO2: 97% (29 May 2022 05:25) (97% - 99%)        CAPILLARY BLOOD GLUCOSE      POCT Blood Glucose.: 195 mg/dL (28 May 2022 23:36)      PHYSICAL EXAM:  General: NAD; elderly female, lying comfortably in bed, speaking in full sentences  HEENT: NC/AT; PERRL, R eye decreased acuity at baseline; EOMI; MMM  Neck: supple; no JVD  Cardiac: RRR; +S1/S2, +systolic murmur II/IV RUSB  Pulm: CTA B/L; no W/R/R, looks comfortable on room air without increased WOB or accessory muscle use  GI: soft, NT/ND, +BSx4,   Extremities: WWP; minimal lower extremity edema, clubbing or cyanosis, LLE>RLE swelling  Vasc: 2+ radial, DP pulses B/L  Neuro: AAOx3. LE hip flexion 4/5, knee extension 4/5, ankle dorsiflexion 2/5 b/l    MEDICATIONS:  MEDICATIONS  (STANDING):  amLODIPine   Tablet 10 milliGRAM(s) Oral daily  apixaban 5 milliGRAM(s) Oral every 12 hours  aspirin  chewable 81 milliGRAM(s) Oral daily  atorvastatin 40 milliGRAM(s) Oral at bedtime  dextrose 5%. 1000 milliLiter(s) (100 mL/Hr) IV Continuous <Continuous>  dextrose 5%. 1000 milliLiter(s) (50 mL/Hr) IV Continuous <Continuous>  dextrose 50% Injectable 25 Gram(s) IV Push once  dextrose 50% Injectable 12.5 Gram(s) IV Push once  dextrose 50% Injectable 25 Gram(s) IV Push once  DULoxetine 90 milliGRAM(s) Oral every 24 hours  glucagon  Injectable 1 milliGRAM(s) IntraMuscular once  insulin glargine Injectable (LANTUS) 4 Unit(s) SubCutaneous at bedtime  insulin lispro (ADMELOG) corrective regimen sliding scale   SubCutaneous three times a day before meals  insulin lispro Injectable (ADMELOG) 8 Unit(s) SubCutaneous three times a day before meals  lidocaine 2% Gel 1 Application(s) Topical three times a day  lisinopril 40 milliGRAM(s) Oral every 24 hours  multivitamin 1 Tablet(s) Oral daily  pantoprazole    Tablet 40 milliGRAM(s) Oral before breakfast    MEDICATIONS  (PRN):  acetaminophen     Tablet .. 325 milliGRAM(s) Oral every 6 hours PRN Temp greater or equal to 38C (100.4F), Moderate Pain (4 - 6)  dextrose Oral Gel 15 Gram(s) Oral once PRN Blood Glucose LESS THAN 70 milliGRAM(s)/deciliter      ALLERGIES:  Allergies    No Known Allergies    Intolerances        LABS:                RADIOLOGY & ADDITIONAL TESTS: Reviewed. INTERVAL HPI/OVERNIGHT EVENTS: NAOE    SUBJECTIVE: Patient seen and examined at bedside. Patient reports feeling the same in regards of the weakness. Rest of ROS negative.     OBJECTIVE:    VITAL SIGNS:  ICU Vital Signs Last 24 Hrs  T(C): 36.2 (29 May 2022 05:25), Max: 36.8 (28 May 2022 11:37)  T(F): 97.2 (29 May 2022 05:25), Max: 98.2 (28 May 2022 11:37)  HR: 73 (29 May 2022 05:25) (73 - 79)  BP: 134/75 (29 May 2022 05:25) (132/70 - 135/68)  BP(mean): --  ABP: --  ABP(mean): --  RR: 18 (29 May 2022 05:25) (16 - 18)  SpO2: 97% (29 May 2022 05:25) (97% - 99%)        CAPILLARY BLOOD GLUCOSE      POCT Blood Glucose.: 195 mg/dL (28 May 2022 23:36)      PHYSICAL EXAM:  General: NAD; elderly female, lying comfortably in bed, speaking in full sentences  HEENT: NC/AT; PERRL, R eye decreased acuity at baseline; EOMI; MMM  Neck: supple; no JVD  Cardiac: RRR; +S1/S2, +systolic murmur II/IV RUSB  Pulm: CTA B/L; no W/R/R, looks comfortable on room air without increased WOB or accessory muscle use  GI: soft, NT/ND, +BSx4,   Extremities: WWP; minimal lower extremity edema, clubbing or cyanosis, LLE>RLE swelling  Vasc: 2+ radial, DP pulses B/L  Neuro: AAOx3. LE hip flexion 4/5, knee extension 4/5, ankle dorsiflexion 2/5 b/l    MEDICATIONS:  MEDICATIONS  (STANDING):  amLODIPine   Tablet 10 milliGRAM(s) Oral daily  apixaban 5 milliGRAM(s) Oral every 12 hours  aspirin  chewable 81 milliGRAM(s) Oral daily  atorvastatin 40 milliGRAM(s) Oral at bedtime  dextrose 5%. 1000 milliLiter(s) (100 mL/Hr) IV Continuous <Continuous>  dextrose 5%. 1000 milliLiter(s) (50 mL/Hr) IV Continuous <Continuous>  dextrose 50% Injectable 25 Gram(s) IV Push once  dextrose 50% Injectable 12.5 Gram(s) IV Push once  dextrose 50% Injectable 25 Gram(s) IV Push once  DULoxetine 90 milliGRAM(s) Oral every 24 hours  glucagon  Injectable 1 milliGRAM(s) IntraMuscular once  insulin glargine Injectable (LANTUS) 4 Unit(s) SubCutaneous at bedtime  insulin lispro (ADMELOG) corrective regimen sliding scale   SubCutaneous three times a day before meals  insulin lispro Injectable (ADMELOG) 8 Unit(s) SubCutaneous three times a day before meals  lidocaine 2% Gel 1 Application(s) Topical three times a day  lisinopril 40 milliGRAM(s) Oral every 24 hours  multivitamin 1 Tablet(s) Oral daily  pantoprazole    Tablet 40 milliGRAM(s) Oral before breakfast    MEDICATIONS  (PRN):  acetaminophen     Tablet .. 325 milliGRAM(s) Oral every 6 hours PRN Temp greater or equal to 38C (100.4F), Moderate Pain (4 - 6)  dextrose Oral Gel 15 Gram(s) Oral once PRN Blood Glucose LESS THAN 70 milliGRAM(s)/deciliter      ALLERGIES:  Allergies    No Known Allergies    Intolerances        LABS:                RADIOLOGY & ADDITIONAL TESTS: Reviewed.

## 2022-05-29 NOTE — PROGRESS NOTE ADULT - PROBLEM SELECTOR PLAN 10
F: None  E: Replete PRN   N: DASH/TLC   DVT: Lovenox 40 qd  Dispo: RMF, PT rec [facility]- however patient is uninsured and cannot go to [facility] attempting to get to one person assist. Son was able to received FMLA. Pending daughter for possible FMLA F: None  E: Replete PRN   N: DASH/TLC   DVT: Apixaban 5 mg BID   Dispo: [facility], PT rec [facility]- however patient is uninsured and cannot go to [facility] attempting to get to one person assist. Son was able to received FMLA. Pending daughter for possible FMLA

## 2022-05-30 LAB
ANION GAP SERPL CALC-SCNC: 9 MMOL/L — SIGNIFICANT CHANGE UP (ref 5–17)
BUN SERPL-MCNC: 27 MG/DL — HIGH (ref 7–23)
CALCIUM SERPL-MCNC: 8.7 MG/DL — SIGNIFICANT CHANGE UP (ref 8.4–10.5)
CHLORIDE SERPL-SCNC: 100 MMOL/L — SIGNIFICANT CHANGE UP (ref 96–108)
CO2 SERPL-SCNC: 26 MMOL/L — SIGNIFICANT CHANGE UP (ref 22–31)
CREAT SERPL-MCNC: 0.43 MG/DL — LOW (ref 0.5–1.3)
EGFR: 102 ML/MIN/1.73M2 — SIGNIFICANT CHANGE UP
GLUCOSE BLDC GLUCOMTR-MCNC: 109 MG/DL — HIGH (ref 70–99)
GLUCOSE BLDC GLUCOMTR-MCNC: 131 MG/DL — HIGH (ref 70–99)
GLUCOSE BLDC GLUCOMTR-MCNC: 143 MG/DL — HIGH (ref 70–99)
GLUCOSE BLDC GLUCOMTR-MCNC: 149 MG/DL — HIGH (ref 70–99)
GLUCOSE BLDC GLUCOMTR-MCNC: 255 MG/DL — HIGH (ref 70–99)
GLUCOSE SERPL-MCNC: 145 MG/DL — HIGH (ref 70–99)
HCT VFR BLD CALC: 33 % — LOW (ref 34.5–45)
HGB BLD-MCNC: 10.5 G/DL — LOW (ref 11.5–15.5)
MAGNESIUM SERPL-MCNC: 2 MG/DL — SIGNIFICANT CHANGE UP (ref 1.6–2.6)
MCHC RBC-ENTMCNC: 26.6 PG — LOW (ref 27–34)
MCHC RBC-ENTMCNC: 31.8 GM/DL — LOW (ref 32–36)
MCV RBC AUTO: 83.5 FL — SIGNIFICANT CHANGE UP (ref 80–100)
NRBC # BLD: 0 /100 WBCS — SIGNIFICANT CHANGE UP (ref 0–0)
PHOSPHATE SERPL-MCNC: 4.3 MG/DL — SIGNIFICANT CHANGE UP (ref 2.5–4.5)
PLATELET # BLD AUTO: 292 K/UL — SIGNIFICANT CHANGE UP (ref 150–400)
POTASSIUM SERPL-MCNC: 4.4 MMOL/L — SIGNIFICANT CHANGE UP (ref 3.5–5.3)
POTASSIUM SERPL-SCNC: 4.4 MMOL/L — SIGNIFICANT CHANGE UP (ref 3.5–5.3)
RBC # BLD: 3.95 M/UL — SIGNIFICANT CHANGE UP (ref 3.8–5.2)
RBC # FLD: 13.6 % — SIGNIFICANT CHANGE UP (ref 10.3–14.5)
SODIUM SERPL-SCNC: 135 MMOL/L — SIGNIFICANT CHANGE UP (ref 135–145)
WBC # BLD: 4.33 K/UL — SIGNIFICANT CHANGE UP (ref 3.8–10.5)
WBC # FLD AUTO: 4.33 K/UL — SIGNIFICANT CHANGE UP (ref 3.8–10.5)

## 2022-05-30 PROCEDURE — 99231 SBSQ HOSP IP/OBS SF/LOW 25: CPT

## 2022-05-30 RX ADMIN — INSULIN GLARGINE 4 UNIT(S): 100 INJECTION, SOLUTION SUBCUTANEOUS at 22:16

## 2022-05-30 RX ADMIN — AMLODIPINE BESYLATE 10 MILLIGRAM(S): 2.5 TABLET ORAL at 06:38

## 2022-05-30 RX ADMIN — LIDOCAINE 1 APPLICATION(S): 4 CREAM TOPICAL at 06:41

## 2022-05-30 RX ADMIN — Medication 8 UNIT(S): at 09:33

## 2022-05-30 RX ADMIN — APIXABAN 5 MILLIGRAM(S): 2.5 TABLET, FILM COATED ORAL at 22:15

## 2022-05-30 RX ADMIN — Medication 81 MILLIGRAM(S): at 13:14

## 2022-05-30 RX ADMIN — Medication 1 TABLET(S): at 13:15

## 2022-05-30 RX ADMIN — LIDOCAINE 1 APPLICATION(S): 4 CREAM TOPICAL at 22:17

## 2022-05-30 RX ADMIN — LISINOPRIL 40 MILLIGRAM(S): 2.5 TABLET ORAL at 07:48

## 2022-05-30 RX ADMIN — APIXABAN 5 MILLIGRAM(S): 2.5 TABLET, FILM COATED ORAL at 09:29

## 2022-05-30 RX ADMIN — LIDOCAINE 1 APPLICATION(S): 4 CREAM TOPICAL at 13:15

## 2022-05-30 RX ADMIN — Medication 6: at 13:00

## 2022-05-30 RX ADMIN — DULOXETINE HYDROCHLORIDE 90 MILLIGRAM(S): 30 CAPSULE, DELAYED RELEASE ORAL at 09:28

## 2022-05-30 RX ADMIN — PANTOPRAZOLE SODIUM 40 MILLIGRAM(S): 20 TABLET, DELAYED RELEASE ORAL at 07:48

## 2022-05-30 RX ADMIN — Medication 8 UNIT(S): at 17:50

## 2022-05-30 RX ADMIN — Medication 8 UNIT(S): at 13:15

## 2022-05-30 RX ADMIN — ATORVASTATIN CALCIUM 40 MILLIGRAM(S): 80 TABLET, FILM COATED ORAL at 22:16

## 2022-05-30 NOTE — PROGRESS NOTE ADULT - ASSESSMENT
72y Female with PMHx of T2DM c/b peripheral neuropathy, HTN, recent admission and workup for Naty Sanchez tear, TIA on 3/30 (MR negative) presents to [facility] as transfer from [facility] ED for episode of altered mental status (staring, generalized weakness), back to baseline in [facility] ED, no acute pathology on CTH or MRI, found to have metabolic encephalopathy (resolved) 2/2 UTI (completed 7day abx) vs hypertension, also found to have new LE weakness due to diabetic neuropathy. Due to immigration and insurance status, not able to [facility] as recommended by PT. Pending improvement to one-person assist in order to discharge home with out-of-pocket PT vs home with family assist.

## 2022-05-30 NOTE — PROGRESS NOTE ADULT - PROBLEM SELECTOR PLAN 2
Needle like pain shins downward, interfering with sleep. LE duplex - acute DVT of  the left peroneal vein  - c/w Cymbalta 90 Qd for diabetic neuropathy  - s/pApixaban 10mg BID (through 5/28).  - c/w Apixaban 5mg BID for 3 months. Needle like pain shins downward, interfering with sleep. LE duplex - acute DVT of  the left peroneal vein  - c/w Cymbalta 90 Qd for diabetic neuropathy  - s/p Apixaban 10mg BID (through 5/28)  - c/w Apixaban 5mg BID for 3 months.

## 2022-05-30 NOTE — PROGRESS NOTE ADULT - PROBLEM SELECTOR PLAN 7
Pt with hx of DM, A1c 11.6. Endocrine following. TSH results: 1.120.   - Low C peptide patient will need insulin and NOT oral agents on dc  - C/w mISS   - C/w Lispro 8U TID   - F/u endocrine recs

## 2022-05-30 NOTE — PROGRESS NOTE ADULT - SUBJECTIVE AND OBJECTIVE BOX
** INCOMPLETE **    OVERNIGHT EVENTS:     SUBJECTIVE:    VITAL SIGNS:  Vital Signs Last 24 Hrs  T(C): 36.7 (29 May 2022 21:40), Max: 36.7 (29 May 2022 21:40)  T(F): 98 (29 May 2022 21:40), Max: 98 (29 May 2022 21:40)  HR: 80 (29 May 2022 21:40) (76 - 83)  BP: 119/68 (29 May 2022 21:40) (102/57 - 135/78)  BP(mean): --  RR: 16 (29 May 2022 21:40) (16 - 16)  SpO2: 98% (29 May 2022 21:40) (96% - 99%)    PHYSICAL EXAM:  General: NAD; speaking in full sentences  HEENT: NC/AT; PERRL; EOMI; MMM  Neck: supple; no JVD  Cardiac: RRR; +S1/S2  Pulm: CTA B/L; no W/R/R  GI: soft, NT/ND, +BS  Extremities: WWP; no edema, clubbing or cyanosis  Vasc: 2+ radial, DP pulses B/L  Neuro: AAOx3; no focal deficits    MEDICATIONS:  MEDICATIONS  (STANDING):  amLODIPine   Tablet 10 milliGRAM(s) Oral daily  apixaban 5 milliGRAM(s) Oral every 12 hours  aspirin  chewable 81 milliGRAM(s) Oral daily  atorvastatin 40 milliGRAM(s) Oral at bedtime  dextrose 5%. 1000 milliLiter(s) (50 mL/Hr) IV Continuous <Continuous>  dextrose 5%. 1000 milliLiter(s) (100 mL/Hr) IV Continuous <Continuous>  dextrose 50% Injectable 25 Gram(s) IV Push once  dextrose 50% Injectable 12.5 Gram(s) IV Push once  dextrose 50% Injectable 25 Gram(s) IV Push once  DULoxetine 90 milliGRAM(s) Oral every 24 hours  glucagon  Injectable 1 milliGRAM(s) IntraMuscular once  insulin glargine Injectable (LANTUS) 4 Unit(s) SubCutaneous at bedtime  insulin lispro (ADMELOG) corrective regimen sliding scale   SubCutaneous three times a day before meals  insulin lispro Injectable (ADMELOG) 8 Unit(s) SubCutaneous three times a day before meals  lidocaine 2% Gel 1 Application(s) Topical three times a day  lisinopril 40 milliGRAM(s) Oral every 24 hours  multivitamin 1 Tablet(s) Oral daily  pantoprazole    Tablet 40 milliGRAM(s) Oral before breakfast    MEDICATIONS  (PRN):  acetaminophen     Tablet .. 325 milliGRAM(s) Oral every 6 hours PRN Temp greater or equal to 38C (100.4F), Moderate Pain (4 - 6)  dextrose Oral Gel 15 Gram(s) Oral once PRN Blood Glucose LESS THAN 70 milliGRAM(s)/deciliter      ALLERGIES:  Allergies    No Known Allergies    Intolerances        LABS:                        10.5   4.33  )-----------( 292      ( 30 May 2022 07:22 )             33.0     05-30    135  |  100  |  27<H>  ----------------------------<  145<H>  4.4   |  26  |  0.43<L>    Ca    8.7      30 May 2022 07:22  Phos  4.3     05-30  Mg     2.0     05-30    TPro  6.3  /  Alb  3.2<L>  /  TBili  0.2  /  DBili  x   /  AST  15  /  ALT  15  /  AlkPhos  42  05-29        RADIOLOGY & ADDITIONAL TESTS: Reviewed. OVERNIGHT EVENTS:   No acute events overnight.     SUBJECTIVE:    VITAL SIGNS:  Vital Signs Last 24 Hrs  T(C): 36.7 (29 May 2022 21:40), Max: 36.7 (29 May 2022 21:40)  T(F): 98 (29 May 2022 21:40), Max: 98 (29 May 2022 21:40)  HR: 80 (29 May 2022 21:40) (76 - 83)  BP: 119/68 (29 May 2022 21:40) (102/57 - 135/78)  BP(mean): --  RR: 16 (29 May 2022 21:40) (16 - 16)  SpO2: 98% (29 May 2022 21:40) (96% - 99%)    PHYSICAL EXAM:  General: NAD; speaking in full sentences  HEENT: NC/AT; PERRL; EOMI; MMM  Neck: supple; no JVD  Cardiac: RRR; +S1/S2  Pulm: CTA B/L; no W/R/R  GI: soft, NT/ND, +BS  Extremities: WWP; no edema, clubbing or cyanosis  Vasc: 2+ radial, DP pulses B/L  Neuro: AAOx3; no focal deficits    MEDICATIONS:  MEDICATIONS  (STANDING):  amLODIPine   Tablet 10 milliGRAM(s) Oral daily  apixaban 5 milliGRAM(s) Oral every 12 hours  aspirin  chewable 81 milliGRAM(s) Oral daily  atorvastatin 40 milliGRAM(s) Oral at bedtime  dextrose 5%. 1000 milliLiter(s) (50 mL/Hr) IV Continuous <Continuous>  dextrose 5%. 1000 milliLiter(s) (100 mL/Hr) IV Continuous <Continuous>  dextrose 50% Injectable 25 Gram(s) IV Push once  dextrose 50% Injectable 12.5 Gram(s) IV Push once  dextrose 50% Injectable 25 Gram(s) IV Push once  DULoxetine 90 milliGRAM(s) Oral every 24 hours  glucagon  Injectable 1 milliGRAM(s) IntraMuscular once  insulin glargine Injectable (LANTUS) 4 Unit(s) SubCutaneous at bedtime  insulin lispro (ADMELOG) corrective regimen sliding scale   SubCutaneous three times a day before meals  insulin lispro Injectable (ADMELOG) 8 Unit(s) SubCutaneous three times a day before meals  lidocaine 2% Gel 1 Application(s) Topical three times a day  lisinopril 40 milliGRAM(s) Oral every 24 hours  multivitamin 1 Tablet(s) Oral daily  pantoprazole    Tablet 40 milliGRAM(s) Oral before breakfast    MEDICATIONS  (PRN):  acetaminophen     Tablet .. 325 milliGRAM(s) Oral every 6 hours PRN Temp greater or equal to 38C (100.4F), Moderate Pain (4 - 6)  dextrose Oral Gel 15 Gram(s) Oral once PRN Blood Glucose LESS THAN 70 milliGRAM(s)/deciliter      ALLERGIES:  Allergies    No Known Allergies    Intolerances        LABS:                        10.5   4.33  )-----------( 292      ( 30 May 2022 07:22 )             33.0     05-30    135  |  100  |  27<H>  ----------------------------<  145<H>  4.4   |  26  |  0.43<L>    Ca    8.7      30 May 2022 07:22  Phos  4.3     05-30  Mg     2.0     05-30    TPro  6.3  /  Alb  3.2<L>  /  TBili  0.2  /  DBili  x   /  AST  15  /  ALT  15  /  AlkPhos  42  05-29        RADIOLOGY & ADDITIONAL TESTS: Reviewed. OVERNIGHT EVENTS:   No acute events overnight.     SUBJECTIVE:  Patient seen and examined at bedside, resting comfortably in bed, and does not appear to be in any acute distress. Patient denies any new complaints. Patient denies any recent fevers, chills, nausea, vomiting, headache, acute sob, chest pain, abdominal pain, genitourinary sx.    VITAL SIGNS:  Vital Signs Last 24 Hrs  T(C): 36.7 (29 May 2022 21:40), Max: 36.7 (29 May 2022 21:40)  T(F): 98 (29 May 2022 21:40), Max: 98 (29 May 2022 21:40)  HR: 80 (29 May 2022 21:40) (76 - 83)  BP: 119/68 (29 May 2022 21:40) (102/57 - 135/78)  BP(mean): --  RR: 16 (29 May 2022 21:40) (16 - 16)  SpO2: 98% (29 May 2022 21:40) (96% - 99%)      PHYSICAL EXAM:  General: NAD; elderly female, lying comfortably in bed, speaking in full sentences  HEENT: NC/AT; PERRL, R eye decreased acuity at baseline; EOMI; MMM  Neck: supple; no JVD  Cardiac: RRR; +S1/S2, +systolic murmur II/IV RUSB  Pulm: CTA B/L; no W/R/R, looks comfortable on room air without increased WOB or accessory muscle use  GI: soft, NT/ND, +BSx4,   Extremities: WWP; minimal lower extremity edema, clubbing or cyanosis, LLE>RLE swelling  Vasc: 2+ radial, DP pulses B/L  Neuro: AAOx3. LE hip flexion 4/5, knee extension 4/5, ankle dorsiflexion 2/5 b/l    MEDICATIONS:  MEDICATIONS  (STANDING):  amLODIPine   Tablet 10 milliGRAM(s) Oral daily  apixaban 5 milliGRAM(s) Oral every 12 hours  aspirin  chewable 81 milliGRAM(s) Oral daily  atorvastatin 40 milliGRAM(s) Oral at bedtime  dextrose 5%. 1000 milliLiter(s) (50 mL/Hr) IV Continuous <Continuous>  dextrose 5%. 1000 milliLiter(s) (100 mL/Hr) IV Continuous <Continuous>  dextrose 50% Injectable 25 Gram(s) IV Push once  dextrose 50% Injectable 12.5 Gram(s) IV Push once  dextrose 50% Injectable 25 Gram(s) IV Push once  DULoxetine 90 milliGRAM(s) Oral every 24 hours  glucagon  Injectable 1 milliGRAM(s) IntraMuscular once  insulin glargine Injectable (LANTUS) 4 Unit(s) SubCutaneous at bedtime  insulin lispro (ADMELOG) corrective regimen sliding scale   SubCutaneous three times a day before meals  insulin lispro Injectable (ADMELOG) 8 Unit(s) SubCutaneous three times a day before meals  lidocaine 2% Gel 1 Application(s) Topical three times a day  lisinopril 40 milliGRAM(s) Oral every 24 hours  multivitamin 1 Tablet(s) Oral daily  pantoprazole    Tablet 40 milliGRAM(s) Oral before breakfast    MEDICATIONS  (PRN):  acetaminophen     Tablet .. 325 milliGRAM(s) Oral every 6 hours PRN Temp greater or equal to 38C (100.4F), Moderate Pain (4 - 6)  dextrose Oral Gel 15 Gram(s) Oral once PRN Blood Glucose LESS THAN 70 milliGRAM(s)/deciliter      ALLERGIES:  Allergies    No Known Allergies    Intolerances        LABS:                        10.5   4.33  )-----------( 292      ( 30 May 2022 07:22 )             33.0     05-30    135  |  100  |  27<H>  ----------------------------<  145<H>  4.4   |  26  |  0.43<L>    Ca    8.7      30 May 2022 07:22  Phos  4.3     05-30  Mg     2.0     05-30    TPro  6.3  /  Alb  3.2<L>  /  TBili  0.2  /  DBili  x   /  AST  15  /  ALT  15  /  AlkPhos  42  05-29        RADIOLOGY & ADDITIONAL TESTS: Reviewed.

## 2022-05-30 NOTE — PROGRESS NOTE ADULT - PROBLEM SELECTOR PLAN 10
F: None  E: Replete PRN   N: DASH/TLC   DVT: Apixaban 5 mg BID   Dispo: RMF, PT rec [facility]- however patient is uninsured and cannot go to [facility] attempting to get to one person assist. Son was able to received FMLA. Pending daughter for possible FMLA F: None  E: Replete PRN   N: DASH/TLC   DVT: Apixaban 5 mg BID   Dispo: RMF, PT rec [facility]- however patient is uninsured and cannot go to [facility] attempting to get to one person assist. Son was able to received FMLA. Daughter was denied FMLA.

## 2022-05-30 NOTE — PROGRESS NOTE ADULT - PROBLEM SELECTOR PLAN 5
UA with +WBCs, UCx growing E faecalis. Will treat as complicated. Completed 7 day course of CTX and then ampicillin. RESOLVED. UA with +WBCs, UCx growing E faecalis. Will treat as complicated. Completed 7 day course of CTX and then ampicillin.   RESOLVED

## 2022-05-30 NOTE — PROGRESS NOTE ADULT - PROBLEM SELECTOR PLAN 1
Symmetrical LE weakness, worse distally compared to proximally. Symmetrical diminished sensation, longstanding diabetic peripheral neuropathy. Denies issues with incontinence, or back pain, no sensory line. Likely progression of diabetic neuropathy, however symptoms more severe than to be expected.  MRI L/Spine:  - Showing at the L3/4 leveI. There are degenerative changes involving the facets bilaterally as well as ligamentum flavum hypertrophy. This combination results in moderate central spinal canal stenosis.   - At the L4/5 level, there is disc bulge abutting the L4 nerve roots bilaterally. There are degenerative changes involving the facets bilaterally (right greater than left) as well as ligamentum flavum hypertrophy. This combination results in mild central spinal canal   stenosis.  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 Bilateral lower lumbosacral plexopathies cannot be ruled out based on   these findings.   - Neurology team saying that since it is diabetic in origin it is unlikely that the patient will make significant improvement in the short term.  - c/w Cymbalta 90mg daily   - no IVIG due to DVT   - c/w Custom AFO boots   - c/w incentive spirometry. Symmetrical LE weakness, worse distally compared to proximally. Symmetrical diminished sensation, longstanding diabetic peripheral neuropathy. Denies issues with incontinence, or back pain, no sensory line. Likely progression of diabetic neuropathy, however symptoms more severe than to be expected.  MRI L/Spine:  - Showing at the L3/4 leveI. There are degenerative changes involving the facets bilaterally as well as ligamentum flavum hypertrophy. This combination results in moderate central spinal canal stenosis.   - At the L4/5 level, there is disc bulge abutting the L4 nerve roots bilaterally. There are degenerative changes involving the facets bilaterally (right greater than left) as well as ligamentum flavum hypertrophy. This combination results in mild central spinal canal   stenosis.  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 Bilateral lower lumbosacral plexopathies cannot be ruled out based on   these findings.   - Neurology team saying that since it is diabetic in origin it is unlikely that the patient will make significant improvement in the short term.  - Patient is currently a 2-person assist and requires continued daily PT sessions for safe disposition  - c/w Cymbalta 90mg daily   - no IVIG due to DVT   - c/w Custom AFO boots   - c/w incentive spirometry.

## 2022-05-31 LAB
GLUCOSE BLDC GLUCOMTR-MCNC: 122 MG/DL — HIGH (ref 70–99)
GLUCOSE BLDC GLUCOMTR-MCNC: 141 MG/DL — HIGH (ref 70–99)
GLUCOSE BLDC GLUCOMTR-MCNC: 186 MG/DL — HIGH (ref 70–99)
GLUCOSE BLDC GLUCOMTR-MCNC: 97 MG/DL — SIGNIFICANT CHANGE UP (ref 70–99)

## 2022-05-31 PROCEDURE — 99232 SBSQ HOSP IP/OBS MODERATE 35: CPT

## 2022-05-31 RX ADMIN — DULOXETINE HYDROCHLORIDE 90 MILLIGRAM(S): 30 CAPSULE, DELAYED RELEASE ORAL at 10:08

## 2022-05-31 RX ADMIN — Medication 8 UNIT(S): at 08:38

## 2022-05-31 RX ADMIN — Medication 1 TABLET(S): at 12:42

## 2022-05-31 RX ADMIN — AMLODIPINE BESYLATE 10 MILLIGRAM(S): 2.5 TABLET ORAL at 06:56

## 2022-05-31 RX ADMIN — INSULIN GLARGINE 4 UNIT(S): 100 INJECTION, SOLUTION SUBCUTANEOUS at 22:07

## 2022-05-31 RX ADMIN — Medication 2: at 08:38

## 2022-05-31 RX ADMIN — LIDOCAINE 1 APPLICATION(S): 4 CREAM TOPICAL at 22:00

## 2022-05-31 RX ADMIN — Medication 81 MILLIGRAM(S): at 12:42

## 2022-05-31 RX ADMIN — Medication 8 UNIT(S): at 12:42

## 2022-05-31 RX ADMIN — Medication 8 UNIT(S): at 17:27

## 2022-05-31 RX ADMIN — ATORVASTATIN CALCIUM 40 MILLIGRAM(S): 80 TABLET, FILM COATED ORAL at 22:06

## 2022-05-31 RX ADMIN — APIXABAN 5 MILLIGRAM(S): 2.5 TABLET, FILM COATED ORAL at 10:08

## 2022-05-31 RX ADMIN — APIXABAN 5 MILLIGRAM(S): 2.5 TABLET, FILM COATED ORAL at 22:07

## 2022-05-31 RX ADMIN — LISINOPRIL 40 MILLIGRAM(S): 2.5 TABLET ORAL at 06:56

## 2022-05-31 RX ADMIN — PANTOPRAZOLE SODIUM 40 MILLIGRAM(S): 20 TABLET, DELAYED RELEASE ORAL at 06:56

## 2022-05-31 RX ADMIN — LIDOCAINE 1 APPLICATION(S): 4 CREAM TOPICAL at 14:03

## 2022-05-31 NOTE — PROGRESS NOTE ADULT - ATTENDING COMMENTS
Pt with distal L/E weakness, hypotonia, toes wiggle, no inversion or eversion, dorsi or plantar flexion.  No improvement, still requiring 2 person assist just to stand.    Will need to consider options in attempts to accelerate recovery, because not typical of simple diabetic polyneuropathy.

## 2022-05-31 NOTE — PROGRESS NOTE ADULT - PROBLEM SELECTOR PLAN 10
F: None  E: Replete PRN   N: DASH/TLC   DVT: Apixaban 5 mg BID   Dispo: RMF, PT rec [facility]- however patient is uninsured and cannot go to [facility] attempting to get to one person assist. Son was able to received FMLA. Daughter was denied FMLA, pending further paperwork

## 2022-05-31 NOTE — PROGRESS NOTE ADULT - PROBLEM SELECTOR PLAN 1
Symmetrical LE weakness, worse distally compared to proximally. Symmetrical diminished sensation, longstanding diabetic peripheral neuropathy. Denies issues with incontinence, or back pain, no sensory line. Likely progression of diabetic neuropathy, however symptoms more severe than to be expected.  MRI L/Spine:  - Showing at the L3/4 leveI. There are degenerative changes involving the facets bilaterally as well as ligamentum flavum hypertrophy. This combination results in moderate central spinal canal stenosis.   - At the L4/5 level, there is disc bulge abutting the L4 nerve roots bilaterally. There are degenerative changes involving the facets bilaterally (right greater than left) as well as ligamentum flavum hypertrophy. This combination results in mild central spinal canal   stenosis.  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 Bilateral lower lumbosacral plexopathies cannot be ruled out based on   these findings.   - Neurology team saying that since it is diabetic in origin it is unlikely that the patient will make significant improvement in the short term.  - Patient is currently a 2-person assist and requires continued daily PT sessions for safe disposition  - c/w Cymbalta 90mg daily   - no IVIG due to DVT   - c/w Custom AFO boots   - c/w incentive spirometry

## 2022-05-31 NOTE — PROGRESS NOTE ADULT - PROBLEM SELECTOR PLAN 2
Needle like pain shins downward, interfering with sleep. LE duplex - acute DVT of  the left peroneal vein  - c/w Cymbalta 90 Qd for diabetic neuropathy  - c/w Apixaban 5mg BID for 3 months (5/28-

## 2022-05-31 NOTE — PROGRESS NOTE ADULT - ASSESSMENT
72y Female with PMHx of T2DM c/b peripheral neuropathy, HTN, recent admission and workup for Naty Sanchez tear, TIA on 3/30 (MR negative) presents to [facility] as transfer from [facility] ED for episode of altered mental status (staring, generalized weakness), back to baseline in [facility] ED, no acute pathology on CTH or MRI, found to have metabolic encephalopathy (resolved) 2/2 UTI (completed 7day abx) vs hypertension, also found to have new LE weakness due to diabetic neuropathy. Due to immigration and insurance status, not able to [facility] as recommended by PT. Pending improvement to one-person assist in order to discharge home with out-of-pocket PT vs home with family assist.

## 2022-05-31 NOTE — PROGRESS NOTE ADULT - SUBJECTIVE AND OBJECTIVE BOX
** INCOMPLETE **    OVERNIGHT EVENTS:     SUBJECTIVE:    VITAL SIGNS:  Vital Signs Last 24 Hrs  T(C): 36.9 (31 May 2022 05:16), Max: 37.1 (30 May 2022 21:10)  T(F): 98.4 (31 May 2022 05:16), Max: 98.7 (30 May 2022 21:10)  HR: 75 (31 May 2022 05:16) (74 - 85)  BP: 144/73 (31 May 2022 05:16) (122/61 - 144/73)  BP(mean): --  RR: 17 (31 May 2022 05:16) (17 - 18)  SpO2: 97% (31 May 2022 05:16) (97% - 100%)    PHYSICAL EXAM:  General: NAD; speaking in full sentences  HEENT: NC/AT; PERRL; EOMI; MMM  Neck: supple; no JVD  Cardiac: RRR; +S1/S2  Pulm: CTA B/L; no W/R/R  GI: soft, NT/ND, +BS  Extremities: WWP; no edema, clubbing or cyanosis  Vasc: 2+ radial, DP pulses B/L  Neuro: AAOx3; no focal deficits    MEDICATIONS:  MEDICATIONS  (STANDING):  amLODIPine   Tablet 10 milliGRAM(s) Oral daily  apixaban 5 milliGRAM(s) Oral every 12 hours  aspirin  chewable 81 milliGRAM(s) Oral daily  atorvastatin 40 milliGRAM(s) Oral at bedtime  dextrose 5%. 1000 milliLiter(s) (50 mL/Hr) IV Continuous <Continuous>  dextrose 5%. 1000 milliLiter(s) (100 mL/Hr) IV Continuous <Continuous>  dextrose 50% Injectable 25 Gram(s) IV Push once  dextrose 50% Injectable 12.5 Gram(s) IV Push once  dextrose 50% Injectable 25 Gram(s) IV Push once  DULoxetine 90 milliGRAM(s) Oral every 24 hours  glucagon  Injectable 1 milliGRAM(s) IntraMuscular once  insulin glargine Injectable (LANTUS) 4 Unit(s) SubCutaneous at bedtime  insulin lispro (ADMELOG) corrective regimen sliding scale   SubCutaneous three times a day before meals  insulin lispro Injectable (ADMELOG) 8 Unit(s) SubCutaneous three times a day before meals  lidocaine 2% Gel 1 Application(s) Topical three times a day  lisinopril 40 milliGRAM(s) Oral every 24 hours  multivitamin 1 Tablet(s) Oral daily  pantoprazole    Tablet 40 milliGRAM(s) Oral before breakfast    MEDICATIONS  (PRN):  acetaminophen     Tablet .. 325 milliGRAM(s) Oral every 6 hours PRN Temp greater or equal to 38C (100.4F), Moderate Pain (4 - 6)  dextrose Oral Gel 15 Gram(s) Oral once PRN Blood Glucose LESS THAN 70 milliGRAM(s)/deciliter      ALLERGIES:  Allergies    No Known Allergies    Intolerances        LABS:                        10.5   4.33  )-----------( 292      ( 30 May 2022 07:22 )             33.0     05-30    135  |  100  |  27<H>  ----------------------------<  145<H>  4.4   |  26  |  0.43<L>    Ca    8.7      30 May 2022 07:22  Phos  4.3     05-30  Mg     2.0     05-30          RADIOLOGY & ADDITIONAL TESTS: Reviewed. OVERNIGHT EVENTS:   No acute events overnight.     SUBJECTIVE:  Patient seen and examined at bedside, resting comfortably in bed, and does not appear to be in any acute distress. Patient denies any new complaints. Patient denies any recent fevers, chills, nausea, vomiting, headache, acute sob, chest pain, abdominal pain, genitourinary sx.    VITAL SIGNS:  Vital Signs Last 24 Hrs  T(C): 36.9 (31 May 2022 05:16), Max: 37.1 (30 May 2022 21:10)  T(F): 98.4 (31 May 2022 05:16), Max: 98.7 (30 May 2022 21:10)  HR: 75 (31 May 2022 05:16) (74 - 85)  BP: 144/73 (31 May 2022 05:16) (122/61 - 144/73)  BP(mean): --  RR: 17 (31 May 2022 05:16) (17 - 18)  SpO2: 97% (31 May 2022 05:16) (97% - 100%)    PHYSICAL EXAM:  General: NAD; elderly female, lying comfortably in bed, speaking in full sentences  HEENT: NC/AT; PERRL, R eye decreased acuity at baseline; EOMI; MMM  Neck: supple; no JVD  Cardiac: RRR; +S1/S2, +systolic murmur II/IV RUSB  Pulm: CTA B/L; no W/R/R, looks comfortable on room air without increased WOB or accessory muscle use  GI: soft, NT/ND, +BSx4,   Extremities: WWP; minimal lower extremity edema, clubbing or cyanosis, LLE>RLE swelling  Vasc: 2+ radial, DP pulses B/L  Neuro: AAOx3. LE hip flexion 4/5, knee extension 4/5, ankle dorsiflexion 2/5 b/l    MEDICATIONS:  MEDICATIONS  (STANDING):  amLODIPine   Tablet 10 milliGRAM(s) Oral daily  apixaban 5 milliGRAM(s) Oral every 12 hours  aspirin  chewable 81 milliGRAM(s) Oral daily  atorvastatin 40 milliGRAM(s) Oral at bedtime  dextrose 5%. 1000 milliLiter(s) (50 mL/Hr) IV Continuous <Continuous>  dextrose 5%. 1000 milliLiter(s) (100 mL/Hr) IV Continuous <Continuous>  dextrose 50% Injectable 25 Gram(s) IV Push once  dextrose 50% Injectable 12.5 Gram(s) IV Push once  dextrose 50% Injectable 25 Gram(s) IV Push once  DULoxetine 90 milliGRAM(s) Oral every 24 hours  glucagon  Injectable 1 milliGRAM(s) IntraMuscular once  insulin glargine Injectable (LANTUS) 4 Unit(s) SubCutaneous at bedtime  insulin lispro (ADMELOG) corrective regimen sliding scale   SubCutaneous three times a day before meals  insulin lispro Injectable (ADMELOG) 8 Unit(s) SubCutaneous three times a day before meals  lidocaine 2% Gel 1 Application(s) Topical three times a day  lisinopril 40 milliGRAM(s) Oral every 24 hours  multivitamin 1 Tablet(s) Oral daily  pantoprazole    Tablet 40 milliGRAM(s) Oral before breakfast    MEDICATIONS  (PRN):  acetaminophen     Tablet .. 325 milliGRAM(s) Oral every 6 hours PRN Temp greater or equal to 38C (100.4F), Moderate Pain (4 - 6)  dextrose Oral Gel 15 Gram(s) Oral once PRN Blood Glucose LESS THAN 70 milliGRAM(s)/deciliter      ALLERGIES:  Allergies    No Known Allergies    Intolerances        LABS:                        10.5   4.33  )-----------( 292      ( 30 May 2022 07:22 )             33.0     05-30    135  |  100  |  27<H>  ----------------------------<  145<H>  4.4   |  26  |  0.43<L>    Ca    8.7      30 May 2022 07:22  Phos  4.3     05-30  Mg     2.0     05-30          RADIOLOGY & ADDITIONAL TESTS: Reviewed. Hospital Course:   72y Female with PMHx of T2DM c/b peripheral neuropathy, HTN, recent admission and workup for Naty Sanchez tear, TIA on 3/30 (MR negative) presents to [facility] as transfer from [facility] ED for episode of altered mental status (staring, generalized weakness), back to baseline in [facility] ED, no acute pathology on CTH or MRI, found to have metabolic encephalopathy (resolved) 2/2 UTI (completed 7day abx) vs hypertension, also found to have new LE weakness of unclear origin, possible diabetic neuropathy. Due to immigration and insurance status, not able to FELICIA as recommended by PT. Pending improvement to one-person assist in order to discharge home with out-of-pocket PT. Son and daughter attempted for FMLA. Son was approved for FMLA, but deferred being able to take patient home given financial reasons. Daughter was denied FMLA, pending further paperwork. Hospital course c/b DVT of the left peroneal vein, now on Eliquis. Patient is pending disposition as mentioned.    OVERNIGHT EVENTS:   No acute events overnight.     SUBJECTIVE:  Patient seen and examined at bedside, resting comfortably in bed, and does not appear to be in any acute distress. Patient denies any new complaints. Patient denies any recent fevers, chills, nausea, vomiting, headache, acute sob, chest pain, abdominal pain, genitourinary sx.    VITAL SIGNS:  Vital Signs Last 24 Hrs  T(C): 36.9 (31 May 2022 05:16), Max: 37.1 (30 May 2022 21:10)  T(F): 98.4 (31 May 2022 05:16), Max: 98.7 (30 May 2022 21:10)  HR: 75 (31 May 2022 05:16) (74 - 85)  BP: 144/73 (31 May 2022 05:16) (122/61 - 144/73)  BP(mean): --  RR: 17 (31 May 2022 05:16) (17 - 18)  SpO2: 97% (31 May 2022 05:16) (97% - 100%)    PHYSICAL EXAM:  General: NAD; elderly female, lying comfortably in bed, speaking in full sentences  HEENT: NC/AT; PERRL, R eye decreased acuity at baseline; EOMI; MMM  Neck: supple; no JVD  Cardiac: RRR; +S1/S2, +systolic murmur II/IV RUSB  Pulm: CTA B/L; no W/R/R, looks comfortable on room air without increased WOB or accessory muscle use  GI: soft, NT/ND, +BSx4,   Extremities: WWP; minimal lower extremity edema, clubbing or cyanosis, LLE>RLE swelling  Vasc: 2+ radial, DP pulses B/L  Neuro: AAOx3. LE hip flexion 4/5, knee extension 4/5, ankle dorsiflexion 2/5 b/l    MEDICATIONS:  MEDICATIONS  (STANDING):  amLODIPine   Tablet 10 milliGRAM(s) Oral daily  apixaban 5 milliGRAM(s) Oral every 12 hours  aspirin  chewable 81 milliGRAM(s) Oral daily  atorvastatin 40 milliGRAM(s) Oral at bedtime  dextrose 5%. 1000 milliLiter(s) (50 mL/Hr) IV Continuous <Continuous>  dextrose 5%. 1000 milliLiter(s) (100 mL/Hr) IV Continuous <Continuous>  dextrose 50% Injectable 25 Gram(s) IV Push once  dextrose 50% Injectable 12.5 Gram(s) IV Push once  dextrose 50% Injectable 25 Gram(s) IV Push once  DULoxetine 90 milliGRAM(s) Oral every 24 hours  glucagon  Injectable 1 milliGRAM(s) IntraMuscular once  insulin glargine Injectable (LANTUS) 4 Unit(s) SubCutaneous at bedtime  insulin lispro (ADMELOG) corrective regimen sliding scale   SubCutaneous three times a day before meals  insulin lispro Injectable (ADMELOG) 8 Unit(s) SubCutaneous three times a day before meals  lidocaine 2% Gel 1 Application(s) Topical three times a day  lisinopril 40 milliGRAM(s) Oral every 24 hours  multivitamin 1 Tablet(s) Oral daily  pantoprazole    Tablet 40 milliGRAM(s) Oral before breakfast    MEDICATIONS  (PRN):  acetaminophen     Tablet .. 325 milliGRAM(s) Oral every 6 hours PRN Temp greater or equal to 38C (100.4F), Moderate Pain (4 - 6)  dextrose Oral Gel 15 Gram(s) Oral once PRN Blood Glucose LESS THAN 70 milliGRAM(s)/deciliter      ALLERGIES:  Allergies    No Known Allergies    Intolerances        LABS:                        10.5   4.33  )-----------( 292      ( 30 May 2022 07:22 )             33.0     05-30    135  |  100  |  27<H>  ----------------------------<  145<H>  4.4   |  26  |  0.43<L>    Ca    8.7      30 May 2022 07:22  Phos  4.3     05-30  Mg     2.0     05-30          RADIOLOGY & ADDITIONAL TESTS: Reviewed. Hospital Course:   72y Female with PMHx of T2DM c/b peripheral neuropathy, HTN, recent admission and workup for Naty Sanchez tear, TIA on 3/30 (MR negative) presents to [facility] as transfer from [facility] ED for episode of altered mental status (staring, generalized weakness), back to baseline in [facility] ED, no acute pathology on CTH or MRI, found to have metabolic encephalopathy (resolved) 2/2 UTI (completed 7day abx) vs hypertension, also found to have new LE weakness of unclear origin, possible diabetic neuropathy. Due to immigration and insurance status, not able to FELICIA as recommended by PT. Pending improvement to one-person assist in order to discharge home with out-of-pocket PT. Son and daughter attempted for FMLA. Son was approved for FMLA, but deferred being able to take patient home given financial reasons. Daughter was denied FMLA, pending further paperwork. Hospital course c/b DVT of the left peroneal vein, now on Eliquis. Patient is pending disposition as mentioned.    OVERNIGHT EVENTS:   No acute events overnight.     SUBJECTIVE:  Patient seen and examined at bedside, resting comfortably in bed, and does not appear to be in any acute distress. Patient denies any new complaints. Patient denies any recent fevers, chills, nausea, vomiting, headache, acute sob, chest pain, abdominal pain, genitourinary sx.    VITAL SIGNS:  Vital Signs Last 24 Hrs  T(C): 36.9 (31 May 2022 05:16), Max: 37.1 (30 May 2022 21:10)  T(F): 98.4 (31 May 2022 05:16), Max: 98.7 (30 May 2022 21:10)  HR: 75 (31 May 2022 05:16) (74 - 85)  BP: 144/73 (31 May 2022 05:16) (122/61 - 144/73)  BP(mean): --  RR: 17 (31 May 2022 05:16) (17 - 18)  SpO2: 97% (31 May 2022 05:16) (97% - 100%)    PHYSICAL EXAM:  General: NAD; elderly female, lying comfortably in bed, speaking in full sentences  HEENT: NC/AT; PERRL, R eye decreased acuity at baseline; EOMI; MMM  Neck: supple; no JVD  Cardiac: RRR; +S1/S2, +systolic murmur II/IV RUSB  Pulm: CTA B/L; no W/R/R, looks comfortable on room air without increased WOB or accessory muscle use  GI: soft, NT/ND, +BSx4,   Extremities: WWP; minimal lower extremity edema, clubbing or cyanosis, LLE>RLE swelling  Vasc: 2+ radial, DP pulses B/L  Neuro: AAOx3. LE hip flexion 4/5, knee extension 4/5, ankle dorsiflexion 2/5 b/l      MEDICATIONS:  MEDICATIONS  (STANDING):  amLODIPine   Tablet 10 milliGRAM(s) Oral daily  apixaban 5 milliGRAM(s) Oral every 12 hours  aspirin  chewable 81 milliGRAM(s) Oral daily  atorvastatin 40 milliGRAM(s) Oral at bedtime  dextrose 5%. 1000 milliLiter(s) (50 mL/Hr) IV Continuous <Continuous>  dextrose 5%. 1000 milliLiter(s) (100 mL/Hr) IV Continuous <Continuous>  dextrose 50% Injectable 25 Gram(s) IV Push once  dextrose 50% Injectable 12.5 Gram(s) IV Push once  dextrose 50% Injectable 25 Gram(s) IV Push once  DULoxetine 90 milliGRAM(s) Oral every 24 hours  glucagon  Injectable 1 milliGRAM(s) IntraMuscular once  insulin glargine Injectable (LANTUS) 4 Unit(s) SubCutaneous at bedtime  insulin lispro (ADMELOG) corrective regimen sliding scale   SubCutaneous three times a day before meals  insulin lispro Injectable (ADMELOG) 8 Unit(s) SubCutaneous three times a day before meals  lidocaine 2% Gel 1 Application(s) Topical three times a day  lisinopril 40 milliGRAM(s) Oral every 24 hours  multivitamin 1 Tablet(s) Oral daily  pantoprazole    Tablet 40 milliGRAM(s) Oral before breakfast    MEDICATIONS  (PRN):  acetaminophen     Tablet .. 325 milliGRAM(s) Oral every 6 hours PRN Temp greater or equal to 38C (100.4F), Moderate Pain (4 - 6)  dextrose Oral Gel 15 Gram(s) Oral once PRN Blood Glucose LESS THAN 70 milliGRAM(s)/deciliter      ALLERGIES:  Allergies    No Known Allergies    Intolerances        LABS:                        10.5   4.33  )-----------( 292      ( 30 May 2022 07:22 )             33.0     05-30    135  |  100  |  27<H>  ----------------------------<  145<H>  4.4   |  26  |  0.43<L>    Ca    8.7      30 May 2022 07:22  Phos  4.3     05-30  Mg     2.0     05-30          RADIOLOGY & ADDITIONAL TESTS: Reviewed.

## 2022-05-31 NOTE — PROGRESS NOTE ADULT - PROBLEM SELECTOR PLAN 5
UA with +WBCs, UCx growing E faecalis. Will treat as complicated. Completed 7 day course of CTX and then ampicillin.   RESOLVED

## 2022-06-01 LAB
GLUCOSE BLDC GLUCOMTR-MCNC: 121 MG/DL — HIGH (ref 70–99)
GLUCOSE BLDC GLUCOMTR-MCNC: 128 MG/DL — HIGH (ref 70–99)
GLUCOSE BLDC GLUCOMTR-MCNC: 129 MG/DL — HIGH (ref 70–99)
GLUCOSE BLDC GLUCOMTR-MCNC: 166 MG/DL — HIGH (ref 70–99)

## 2022-06-01 PROCEDURE — 99233 SBSQ HOSP IP/OBS HIGH 50: CPT

## 2022-06-01 RX ORDER — IMMUNE GLOBULIN (HUMAN) 10 G/100ML
25 INJECTION INTRAVENOUS; SUBCUTANEOUS EVERY 24 HOURS
Refills: 0 | Status: COMPLETED | OUTPATIENT
Start: 2022-06-01 | End: 2022-06-05

## 2022-06-01 RX ORDER — SODIUM CHLORIDE 9 MG/ML
500 INJECTION INTRAMUSCULAR; INTRAVENOUS; SUBCUTANEOUS ONCE
Refills: 0 | Status: DISCONTINUED | OUTPATIENT
Start: 2022-06-01 | End: 2022-06-01

## 2022-06-01 RX ORDER — SODIUM CHLORIDE 9 MG/ML
1000 INJECTION INTRAMUSCULAR; INTRAVENOUS; SUBCUTANEOUS
Refills: 0 | Status: COMPLETED | OUTPATIENT
Start: 2022-06-01 | End: 2022-06-05

## 2022-06-01 RX ORDER — SODIUM CHLORIDE 9 MG/ML
500 INJECTION INTRAMUSCULAR; INTRAVENOUS; SUBCUTANEOUS
Refills: 0 | Status: COMPLETED | OUTPATIENT
Start: 2022-06-01 | End: 2022-06-05

## 2022-06-01 RX ORDER — DIPHENHYDRAMINE HCL 50 MG
25 CAPSULE ORAL EVERY 24 HOURS
Refills: 0 | Status: COMPLETED | OUTPATIENT
Start: 2022-06-01 | End: 2022-06-05

## 2022-06-01 RX ORDER — ACETAMINOPHEN 500 MG
650 TABLET ORAL EVERY 24 HOURS
Refills: 0 | Status: COMPLETED | OUTPATIENT
Start: 2022-06-01 | End: 2022-06-05

## 2022-06-01 RX ADMIN — Medication 8 UNIT(S): at 17:28

## 2022-06-01 RX ADMIN — SODIUM CHLORIDE 1000 MILLILITER(S): 9 INJECTION INTRAMUSCULAR; INTRAVENOUS; SUBCUTANEOUS at 22:33

## 2022-06-01 RX ADMIN — LISINOPRIL 40 MILLIGRAM(S): 2.5 TABLET ORAL at 06:56

## 2022-06-01 RX ADMIN — SODIUM CHLORIDE 1000 MILLILITER(S): 9 INJECTION INTRAMUSCULAR; INTRAVENOUS; SUBCUTANEOUS at 15:58

## 2022-06-01 RX ADMIN — APIXABAN 5 MILLIGRAM(S): 2.5 TABLET, FILM COATED ORAL at 22:30

## 2022-06-01 RX ADMIN — AMLODIPINE BESYLATE 10 MILLIGRAM(S): 2.5 TABLET ORAL at 06:56

## 2022-06-01 RX ADMIN — Medication 8 UNIT(S): at 08:27

## 2022-06-01 RX ADMIN — Medication 650 MILLIGRAM(S): at 17:30

## 2022-06-01 RX ADMIN — Medication 25 MILLIGRAM(S): at 15:57

## 2022-06-01 RX ADMIN — INSULIN GLARGINE 4 UNIT(S): 100 INJECTION, SOLUTION SUBCUTANEOUS at 22:30

## 2022-06-01 RX ADMIN — Medication 650 MILLIGRAM(S): at 15:57

## 2022-06-01 RX ADMIN — LIDOCAINE 1 APPLICATION(S): 4 CREAM TOPICAL at 22:31

## 2022-06-01 RX ADMIN — ATORVASTATIN CALCIUM 40 MILLIGRAM(S): 80 TABLET, FILM COATED ORAL at 22:30

## 2022-06-01 RX ADMIN — LIDOCAINE 1 APPLICATION(S): 4 CREAM TOPICAL at 15:27

## 2022-06-01 RX ADMIN — LIDOCAINE 1 APPLICATION(S): 4 CREAM TOPICAL at 06:57

## 2022-06-01 RX ADMIN — PANTOPRAZOLE SODIUM 40 MILLIGRAM(S): 20 TABLET, DELAYED RELEASE ORAL at 06:56

## 2022-06-01 RX ADMIN — IMMUNE GLOBULIN (HUMAN) 41.67 GRAM(S): 10 INJECTION INTRAVENOUS; SUBCUTANEOUS at 16:48

## 2022-06-01 NOTE — PROGRESS NOTE ADULT - PROBLEM SELECTOR PLAN 3
Neck and shoulder pain on 5/3. Tender to palpation, does not radiate.    - Hot packs daily  - Off ibuprofen now

## 2022-06-01 NOTE — PROGRESS NOTE ADULT - PROBLEM SELECTOR PLAN 1
Symmetrical LE weakness, worse distally compared to proximally. Symmetrical diminished sensation, longstanding diabetic peripheral neuropathy. Denies issues with incontinence, or back pain, no sensory line. Likely progression of diabetic neuropathy, however symptoms more severe than to be expected.  MRI L/Spine:  - Showing at the L3/4 leveI. There are degenerative changes involving the facets bilaterally as well as ligamentum flavum hypertrophy. This combination results in moderate central spinal canal stenosis.   - At the L4/5 level, there is disc bulge abutting the L4 nerve roots bilaterally. There are degenerative changes involving the facets bilaterally (right greater than left) as well as ligamentum flavum hypertrophy. This combination results in mild central spinal canal   stenosis.  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 Bilateral lower lumbosacral plexopathies cannot be ruled out based on   these findings.   - Neurology team saying that since it is diabetic in origin it is unlikely that the patient will make significant improvement in the short term.  - Patient is currently a 2-person assist and requires continued daily PT sessions for safe disposition  - c/w Cymbalta 90mg daily   - no IVIG due to DVT   - c/w Custom AFO boots   - c/w incentive spirometry Symmetrical LE weakness, worse distally compared to proximally. Symmetrical diminished sensation, longstanding diabetic peripheral neuropathy. Denies issues with incontinence, or back pain, no sensory line. Likely progression of diabetic neuropathy, however symptoms more severe than to be expected.  MRI L/Spine:  - Showing at the L3/4 leveI. There are degenerative changes involving the facets bilaterally as well as ligamentum flavum hypertrophy. This combination results in moderate central spinal canal stenosis.   - At the L4/5 level, there is disc bulge abutting the L4 nerve roots bilaterally. There are degenerative changes involving the facets bilaterally (right greater than left) as well as ligamentum flavum hypertrophy. This combination results in mild central spinal canal   stenosis.  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 Bilateral lower lumbosacral plexopathies cannot be ruled out based on   these findings.   - Neurology team saying that since it is diabetic in origin it is unlikely that the patient will make significant improvement in the short term.  - Patient is currently a 2-person assist and requires continued daily PT sessions for safe disposition  - c/w Cymbalta 90mg daily   - no IVIG due to DVT initially, but now at day 9 post-diagnosis.  - c/w Custom AFO boots   - c/w incentive spirometry

## 2022-06-01 NOTE — PROGRESS NOTE ADULT - ATTENDING COMMENTS
Unusual profound distal L/E extremity weakness with polyneuropathy identified, but sparing in the U/E and also relative sparing of the quadraceps.  Minimal improvement in the distal L/E extremity weakness, and reasonable to consider other options, such as an axonal polyneuropathy affecting only the L/E, including CIDP which has a higher incidence in diabetic patients.    IVIg is a treatment for CIDP.  DVT was a relative contraindication, though now on treatment and 9d post-discovery.  Pt will continue to have poor venous return in the L/E with continued weakness, so reasonable to initiate IVIg.    Plan:  1) IVIg 0.5gm/kg/d x 4 day, with generous hydration, 500cc NS pre-treatment and 1000cc NS post-treatment, along with benadryl 25 and tyenol 650mg PO.

## 2022-06-01 NOTE — PROGRESS NOTE ADULT - SUBJECTIVE AND OBJECTIVE BOX
SUBJECTIVE/OVERNIGHT EVENTS: No acute overnight events. Pt seen in AM at bedside, resting comfortably in bed, and does not appear to be in any acute distress. Patient states that her legs feel weak. Patient denies any recent or active fever, chills, nausea, vomiting, headache, acute sob, chest pain, abdominal pain, genitourinary sx, extremity pain or swelling.    VITAL SIGNS:  Vital Signs Last 24 Hrs  T(C): 36.7 (01 Jun 2022 11:36), Max: 36.7 (01 Jun 2022 11:36)  T(F): 98 (01 Jun 2022 11:36), Max: 98 (01 Jun 2022 11:36)  HR: 70 (01 Jun 2022 11:36) (70 - 88)  BP: 133/64 (01 Jun 2022 11:36) (85/39 - 134/77)  BP(mean): --  RR: 18 (01 Jun 2022 11:36) (18 - 18)  SpO2: 97% (01 Jun 2022 11:36) (95% - 98%)    PHYSICAL EXAM:  General: NAD; elderly female, lying in bed, speaking in full sentences  HEENT: NC/AT; PERRL, R eye decreased acuity at baseline; EOMI; MMM  Neck: supple; no JVD  Cardiac: RRR; +S1/S2, +systolic murmur II/IV RUSB  Pulm: CTA B/L; no W/R/R, no increased WOB or accessory muscle use  GI: soft, NT/ND, +BSx4,   Extremities: WWP; b/l legs in SCDs  Vasc: 2+ radial, DP pulses B/L  Neuro: AAOx3. LE hip flexion 4/5, knee extension 3/5, ankle dorsiflexion 2/5 b/l    MEDICATIONS:  MEDICATIONS  (STANDING):  amLODIPine   Tablet 10 milliGRAM(s) Oral daily  apixaban 5 milliGRAM(s) Oral every 12 hours  aspirin  chewable 81 milliGRAM(s) Oral daily  atorvastatin 40 milliGRAM(s) Oral at bedtime  dextrose 5%. 1000 milliLiter(s) (50 mL/Hr) IV Continuous <Continuous>  dextrose 5%. 1000 milliLiter(s) (100 mL/Hr) IV Continuous <Continuous>  dextrose 50% Injectable 25 Gram(s) IV Push once  dextrose 50% Injectable 12.5 Gram(s) IV Push once  dextrose 50% Injectable 25 Gram(s) IV Push once  DULoxetine 90 milliGRAM(s) Oral every 24 hours  glucagon  Injectable 1 milliGRAM(s) IntraMuscular once  insulin glargine Injectable (LANTUS) 4 Unit(s) SubCutaneous at bedtime  insulin lispro (ADMELOG) corrective regimen sliding scale   SubCutaneous three times a day before meals  insulin lispro Injectable (ADMELOG) 8 Unit(s) SubCutaneous three times a day before meals  lidocaine 2% Gel 1 Application(s) Topical three times a day  lisinopril 40 milliGRAM(s) Oral every 24 hours  multivitamin 1 Tablet(s) Oral daily  pantoprazole    Tablet 40 milliGRAM(s) Oral before breakfast    MEDICATIONS  (PRN):  acetaminophen     Tablet .. 325 milliGRAM(s) Oral every 6 hours PRN Temp greater or equal to 38C (100.4F), Moderate Pain (4 - 6)  dextrose Oral Gel 15 Gram(s) Oral once PRN Blood Glucose LESS THAN 70 milliGRAM(s)/deciliter      ALLERGIES:  Allergies    No Known Allergies    Intolerances        LABS:              RADIOLOGY & ADDITIONAL TESTS: Reviewed. SUBJECTIVE/OVERNIGHT EVENTS: No acute overnight events. Pt seen in AM at bedside, resting comfortably in bed, and does not appear to be in any acute distress. Patient states that her legs feel weak. Patient denies any recent or active fever, chills, nausea, vomiting, headache, acute sob, chest pain, abdominal pain, genitourinary sx, extremity pain or swelling.    VITAL SIGNS:  Vital Signs Last 24 Hrs  T(C): 36.7 (01 Jun 2022 11:36), Max: 36.7 (01 Jun 2022 11:36)  T(F): 98 (01 Jun 2022 11:36), Max: 98 (01 Jun 2022 11:36)  HR: 70 (01 Jun 2022 11:36) (70 - 88)  BP: 133/64 (01 Jun 2022 11:36) (85/39 - 134/77)  BP(mean): --  RR: 18 (01 Jun 2022 11:36) (18 - 18)  SpO2: 97% (01 Jun 2022 11:36) (95% - 98%)    PHYSICAL EXAM:  General: NAD; elderly female, lying in bed, speaking in full sentences  HEENT: NC/AT; PERRL, R eye decreased acuity at baseline; EOMI; MMM  Neck: supple; no JVD  Cardiac: RRR; +S1/S2, +systolic murmur II/IV RUSB  Pulm: CTA B/L; no W/R/R, no increased WOB or accessory muscle use  GI: soft, NT/ND, +BSx4,   Extremities: WWP; b/l legs in SCDs  Vasc: 2+ radial, DP pulses B/L  Neuro: AAOx3. L  U/E: full power proximal and distal, except mild weakness of the left proximally  Left L/E: hip flexion 3/5, hip extension 4/5, knee extension 5/5, knee flexion 4-/5, ankle dorsiflexion/plantar 1-2/5 b/l (barely more than a flicker)  Right L/E: hip flexion 3-/5, hip extension 4/5, knee extension 4+/5, knee flexion 4-/5, ankle dorsiflexion 1-2/5 b/l (barely more than a flicker)    MEDICATIONS:  MEDICATIONS  (STANDING):  amLODIPine   Tablet 10 milliGRAM(s) Oral daily  apixaban 5 milliGRAM(s) Oral every 12 hours  aspirin  chewable 81 milliGRAM(s) Oral daily  atorvastatin 40 milliGRAM(s) Oral at bedtime  dextrose 5%. 1000 milliLiter(s) (50 mL/Hr) IV Continuous <Continuous>  dextrose 5%. 1000 milliLiter(s) (100 mL/Hr) IV Continuous <Continuous>  dextrose 50% Injectable 25 Gram(s) IV Push once  dextrose 50% Injectable 12.5 Gram(s) IV Push once  dextrose 50% Injectable 25 Gram(s) IV Push once  DULoxetine 90 milliGRAM(s) Oral every 24 hours  glucagon  Injectable 1 milliGRAM(s) IntraMuscular once  insulin glargine Injectable (LANTUS) 4 Unit(s) SubCutaneous at bedtime  insulin lispro (ADMELOG) corrective regimen sliding scale   SubCutaneous three times a day before meals  insulin lispro Injectable (ADMELOG) 8 Unit(s) SubCutaneous three times a day before meals  lidocaine 2% Gel 1 Application(s) Topical three times a day  lisinopril 40 milliGRAM(s) Oral every 24 hours  multivitamin 1 Tablet(s) Oral daily  pantoprazole    Tablet 40 milliGRAM(s) Oral before breakfast    MEDICATIONS  (PRN):  acetaminophen     Tablet .. 325 milliGRAM(s) Oral every 6 hours PRN Temp greater or equal to 38C (100.4F), Moderate Pain (4 - 6)  dextrose Oral Gel 15 Gram(s) Oral once PRN Blood Glucose LESS THAN 70 milliGRAM(s)/deciliter      ALLERGIES:  Allergies    No Known Allergies    Intolerances        LABS:              RADIOLOGY & ADDITIONAL TESTS: Reviewed.

## 2022-06-01 NOTE — PROGRESS NOTE ADULT - PROBLEM SELECTOR PLAN 7
Pt with hx of DM, A1c 11.6. Endocrine following. TSH results: 1.120. Low C peptide patient will need insulin and NOT oral agents on dc.    - C/w mISS   - C/w Lispro 8U TID   - F/u endocrine recs

## 2022-06-01 NOTE — PROGRESS NOTE ADULT - PROBLEM SELECTOR PLAN 10
F: None  E: Replete PRN   N: DASH/TLC   DVT: Apixaban 5 mg BID   Dispo: RMF, PT rec [facility]- however patient is uninsured and cannot go to [facility] attempting to get to one person assist. Son was able to received FMLA. Daughter was denied FMLA, pending further paperwork

## 2022-06-01 NOTE — PROGRESS NOTE ADULT - ASSESSMENT
72y Female with PMHx of T2DM c/b peripheral neuropathy, HTN, recent admission and workup for Naty Sanchez tear, TIA on 3/30 (MR negative) presents to [facility] as transfer from [facility] ED for episode of altered mental status (staring, generalized weakness), back to baseline in [facility] ED, no acute pathology on CTH or MRI, found to have metabolic encephalopathy (resolved) 2/2 UTI (completed 7day abx) vs hypertension, also found to have new LE weakness due to diabetic neuropathy. Due to immigration and insurance status, not able to [facility] as recommended by PT. Pending improvement to one-person assist in order to discharge home with out-of-pocket PT vs home with family assist.

## 2022-06-02 LAB
GLUCOSE BLDC GLUCOMTR-MCNC: 124 MG/DL — HIGH (ref 70–99)
GLUCOSE BLDC GLUCOMTR-MCNC: 137 MG/DL — HIGH (ref 70–99)
GLUCOSE BLDC GLUCOMTR-MCNC: 142 MG/DL — HIGH (ref 70–99)
GLUCOSE BLDC GLUCOMTR-MCNC: 190 MG/DL — HIGH (ref 70–99)
GLUCOSE BLDC GLUCOMTR-MCNC: 190 MG/DL — HIGH (ref 70–99)

## 2022-06-02 PROCEDURE — 99233 SBSQ HOSP IP/OBS HIGH 50: CPT

## 2022-06-02 RX ADMIN — Medication 8 UNIT(S): at 09:23

## 2022-06-02 RX ADMIN — APIXABAN 5 MILLIGRAM(S): 2.5 TABLET, FILM COATED ORAL at 21:52

## 2022-06-02 RX ADMIN — Medication 2: at 13:16

## 2022-06-02 RX ADMIN — IMMUNE GLOBULIN (HUMAN) 41.67 GRAM(S): 10 INJECTION INTRAVENOUS; SUBCUTANEOUS at 16:26

## 2022-06-02 RX ADMIN — SODIUM CHLORIDE 1000 MILLILITER(S): 9 INJECTION INTRAMUSCULAR; INTRAVENOUS; SUBCUTANEOUS at 21:59

## 2022-06-02 RX ADMIN — LIDOCAINE 1 APPLICATION(S): 4 CREAM TOPICAL at 07:02

## 2022-06-02 RX ADMIN — Medication 25 MILLIGRAM(S): at 15:37

## 2022-06-02 RX ADMIN — AMLODIPINE BESYLATE 10 MILLIGRAM(S): 2.5 TABLET ORAL at 07:01

## 2022-06-02 RX ADMIN — LISINOPRIL 40 MILLIGRAM(S): 2.5 TABLET ORAL at 07:01

## 2022-06-02 RX ADMIN — ATORVASTATIN CALCIUM 40 MILLIGRAM(S): 80 TABLET, FILM COATED ORAL at 21:52

## 2022-06-02 RX ADMIN — LIDOCAINE 1 APPLICATION(S): 4 CREAM TOPICAL at 22:39

## 2022-06-02 RX ADMIN — Medication 8 UNIT(S): at 13:16

## 2022-06-02 RX ADMIN — PANTOPRAZOLE SODIUM 40 MILLIGRAM(S): 20 TABLET, DELAYED RELEASE ORAL at 07:01

## 2022-06-02 RX ADMIN — APIXABAN 5 MILLIGRAM(S): 2.5 TABLET, FILM COATED ORAL at 09:23

## 2022-06-02 RX ADMIN — Medication 1 TABLET(S): at 12:38

## 2022-06-02 RX ADMIN — INSULIN GLARGINE 4 UNIT(S): 100 INJECTION, SOLUTION SUBCUTANEOUS at 21:55

## 2022-06-02 RX ADMIN — DULOXETINE HYDROCHLORIDE 90 MILLIGRAM(S): 30 CAPSULE, DELAYED RELEASE ORAL at 09:23

## 2022-06-02 RX ADMIN — Medication 81 MILLIGRAM(S): at 12:38

## 2022-06-02 RX ADMIN — Medication 2: at 09:24

## 2022-06-02 RX ADMIN — SODIUM CHLORIDE 1000 MILLILITER(S): 9 INJECTION INTRAMUSCULAR; INTRAVENOUS; SUBCUTANEOUS at 15:37

## 2022-06-02 RX ADMIN — Medication 650 MILLIGRAM(S): at 15:37

## 2022-06-02 RX ADMIN — Medication 650 MILLIGRAM(S): at 16:05

## 2022-06-02 RX ADMIN — Medication 8 UNIT(S): at 17:48

## 2022-06-02 NOTE — PROGRESS NOTE ADULT - PROBLEM SELECTOR PLAN 1
Symmetrical LE weakness, worse distally compared to proximally. Symmetrical diminished sensation, longstanding diabetic peripheral neuropathy. Denies issues with incontinence, or back pain, no sensory line. Likely progression of diabetic neuropathy, however symptoms more severe than to be expected.  MRI L/Spine:  - Showing at the L3/4 leveI. There are degenerative changes involving the facets bilaterally as well as ligamentum flavum hypertrophy. This combination results in moderate central spinal canal stenosis.   - At the L4/5 level, there is disc bulge abutting the L4 nerve roots bilaterally. There are degenerative changes involving the facets bilaterally (right greater than left) as well as ligamentum flavum hypertrophy. This combination results in mild central spinal canal   stenosis.  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 Bilateral lower lumbosacral plexopathies cannot be ruled out based on   these findings.   - Neurology team saying that since it is diabetic in origin it is unlikely that the patient will make significant improvement in the short term.  - Patient is currently a 2-person assist and requires continued daily PT sessions for safe disposition  - c/w Cymbalta 90mg daily   - c/w Custom AFO boots   - c/w incentive spirometry  - c/w IVIG (6/1-6/5)

## 2022-06-02 NOTE — PROGRESS NOTE ADULT - ATTENDING COMMENTS
Distal sensory and motor neuropathy.  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    Continue IVIg treatment.

## 2022-06-02 NOTE — PROGRESS NOTE ADULT - PROBLEM SELECTOR PLAN 2
Needle like pain shins downward, interfering with sleep. LE duplex - acute DVT of  the left peroneal vein  - c/w Cymbalta 90 Qd for diabetic neuropathy  - c/w Apixaban 5mg BID for 3 months (5/28- Needle like pain shins downward, interfering with sleep. LE duplex - acute DVT of  the left peroneal vein  - c/w Cymbalta 90 Qd for diabetic neuropathy  - c/w Apixaban 5mg BID for 3 months (5/28-  - Repeat LE Duplex requested.

## 2022-06-02 NOTE — PROGRESS NOTE ADULT - ASSESSMENT
72y Female with PMHx of T2DM c/b peripheral neuropathy, HTN, recent admission and workup for Naty Sanchez tear, TIA on 3/30 (MR negative) presents to [facility] as transfer from [facility] ED for episode of altered mental status (staring, generalized weakness), back to baseline in [facility] ED, no acute pathology on CTH or MRI, found to have metabolic encephalopathy (resolved) 2/2 UTI (completed 7day abx) vs hypertension, also found to have new LE weakness due to diabetic neuropathy. Due to immigration and insurance status, not able to [facility] as recommended by PT. Pending improvement to one-person assist in order to discharge home with out-of-pocket PT vs home with family assist.

## 2022-06-02 NOTE — PROGRESS NOTE ADULT - SUBJECTIVE AND OBJECTIVE BOX
SUBJECTIVE/OVERNIGHT EVENTS: No acute overnight events. Pt seen in AM at bedside, resting comfortably in bed, and does not appear to be in any acute distress. When asked, pt denies any recent or active fever, chills, nausea, vomiting, headache, acute sob, chest pain, abdominal pain, genitourinary sx, extremity pain or swelling.    VITAL SIGNS:  Vital Signs Last 24 Hrs  T(C): 36.7 (02 Jun 2022 05:06), Max: 36.7 (01 Jun 2022 16:52)  T(F): 98 (02 Jun 2022 05:06), Max: 98 (01 Jun 2022 16:52)  HR: 74 (02 Jun 2022 05:06) (73 - 75)  BP: 146/82 (02 Jun 2022 05:06) (128/65 - 148/71)  BP(mean): --  RR: 18 (02 Jun 2022 05:06) (18 - 18)  SpO2: 97% (02 Jun 2022 05:06) (96% - 97%)    PHYSICAL EXAM:  General: NAD; elderly female, lying in bed, speaking in full sentences  HEENT: NC/AT; PERRL, R eye decreased acuity at baseline; EOMI; MMM  Neck: supple; no JVD  Cardiac: RRR; +S1/S2, +systolic murmur II/IV RUSB  Pulm: CTA B/L; no W/R/R, no increased WOB or accessory muscle use  GI: soft, NT/ND, +BSx4,   Extremities: WWP; b/l legs in SCDs  Vasc: 2+ radial, DP pulses B/L  Neuro: AAOx3. L  U/E: full power proximal and distal, except mild weakness of the left proximally  Left L/E: hip flexion 3/5, hip extension 4/5, knee extension 5/5, knee flexion 4-/5, ankle dorsiflexion/plantar 1-2/5 b/l   Right L/E: hip flexion 3-/5, hip extension 4/5, knee extension 4+/5, knee flexion 4-/5, ankle dorsiflexion 1-2/5 b/l       MEDICATIONS:  MEDICATIONS  (STANDING):  acetaminophen     Tablet .. 650 milliGRAM(s) Oral every 24 hours  amLODIPine   Tablet 10 milliGRAM(s) Oral daily  apixaban 5 milliGRAM(s) Oral every 12 hours  aspirin  chewable 81 milliGRAM(s) Oral daily  atorvastatin 40 milliGRAM(s) Oral at bedtime  dextrose 5%. 1000 milliLiter(s) (50 mL/Hr) IV Continuous <Continuous>  dextrose 5%. 1000 milliLiter(s) (100 mL/Hr) IV Continuous <Continuous>  dextrose 50% Injectable 25 Gram(s) IV Push once  dextrose 50% Injectable 12.5 Gram(s) IV Push once  dextrose 50% Injectable 25 Gram(s) IV Push once  diphenhydrAMINE 25 milliGRAM(s) Oral every 24 hours  DULoxetine 90 milliGRAM(s) Oral every 24 hours  glucagon  Injectable 1 milliGRAM(s) IntraMuscular once  immune   globulin 10% (GAMMAGARD) IVPB 25 Gram(s) IV Intermittent every 24 hours  insulin glargine Injectable (LANTUS) 4 Unit(s) SubCutaneous at bedtime  insulin lispro (ADMELOG) corrective regimen sliding scale   SubCutaneous three times a day before meals  insulin lispro Injectable (ADMELOG) 8 Unit(s) SubCutaneous three times a day before meals  lidocaine 2% Gel 1 Application(s) Topical three times a day  lisinopril 40 milliGRAM(s) Oral every 24 hours  multivitamin 1 Tablet(s) Oral daily  pantoprazole    Tablet 40 milliGRAM(s) Oral before breakfast  sodium chloride 0.9% Bolus 500 milliLiter(s) IV Bolus <User Schedule>  sodium chloride 0.9% Bolus 1000 milliLiter(s) IV Bolus <User Schedule>    MEDICATIONS  (PRN):  acetaminophen     Tablet .. 325 milliGRAM(s) Oral every 6 hours PRN Temp greater or equal to 38C (100.4F), Moderate Pain (4 - 6)  dextrose Oral Gel 15 Gram(s) Oral once PRN Blood Glucose LESS THAN 70 milliGRAM(s)/deciliter      ALLERGIES:  Allergies    No Known Allergies    Intolerances        LABS:              RADIOLOGY & ADDITIONAL TESTS: Reviewed. SUBJECTIVE/OVERNIGHT EVENTS: No acute overnight events. Pt seen in AM at bedside, resting comfortably in bed, and does not appear to be in any acute distress. When asked, pt denies any recent or active fever, chills, nausea, vomiting, headache, acute sob, chest pain, abdominal pain, genitourinary sx, extremity pain or swelling.    VITAL SIGNS:  Vital Signs Last 24 Hrs  T(C): 36.7 (02 Jun 2022 05:06), Max: 36.7 (01 Jun 2022 16:52)  T(F): 98 (02 Jun 2022 05:06), Max: 98 (01 Jun 2022 16:52)  HR: 74 (02 Jun 2022 05:06) (73 - 75)  BP: 146/82 (02 Jun 2022 05:06) (128/65 - 148/71)  BP(mean): --  RR: 18 (02 Jun 2022 05:06) (18 - 18)  SpO2: 97% (02 Jun 2022 05:06) (96% - 97%)    PHYSICAL EXAM:  General: NAD; elderly female, lying in bed, speaking in full sentences  HEENT: NC/AT; PERRL, R eye decreased acuity at baseline; EOMI; MMM  Neck: supple; no JVD  Cardiac: RRR; +S1/S2, +systolic murmur II/IV RUSB  Pulm: CTA B/L; no W/R/R, no increased WOB or accessory muscle use  GI: soft, NT/ND, +BSx4,   Extremities: WWP; b/l legs in SCDs  Vasc: 2+ radial, DP pulses B/L    Neurologic:  -Mental status: Awake, alert, oriented to person, place, and time. Hypophonic, Speech is fluent with intact naming, repetition, and comprehension, no dysarthria. Recent and remote memory intact. Follows commands. Fund of knowledge appropriate.  -Cranial nerves:   II: Visual fields are full to confrontation.  III, IV, VI: Extraocular movements are slowed and saccadic, without nystagmus. Pupils equally round and reactive to light  V:  Facial sensation V1-V3 equal and intact   VII: Face is symmetric with normal eye closure and smile, facial expression is decreased.   VIII: Hearing is bilaterally intact to finger rub  IX, X: Uvula is midline and soft palate rises symmetrically  XI: Head turning and shoulder shrug are intact.  XII: Tongue protrudes midline  Motor: U/E: full power proximal and distal, except mild weakness of the left proximally  Left L/E: hip flexion 3/5, hip extension 4/5, knee extension 5/5, knee flexion 4-/5, ankle dorsiflexion/plantar 1-2/5 b/l, toes 1/5 ext and flex.   Right L/E: hip flexion 3-/5, hip extension 4/5, knee extension 4+/5, knee flexion 4-/5, ankle dorsiflexion 1-2/5 b/l, toes 1/5 ext and flex.  Bilateral UE cogwheel rigidity L>R wrist, L>R elbow.   Rapid alternating movements decreased on the L side.   Sensation: Intact to light touch bilaterally on the UE, LE decreased sensation b/l bellow the knees, Vibration sensation impaired on the RLE up to the level of the R knee, and up to 4 inches bellow the knee on the L side.   Coordination: No dysmetria on finger-to-nose  Reflexes: Downgoing toes bilaterally   Gait: Unable.       MEDICATIONS:  MEDICATIONS  (STANDING):  acetaminophen     Tablet .. 650 milliGRAM(s) Oral every 24 hours  amLODIPine   Tablet 10 milliGRAM(s) Oral daily  apixaban 5 milliGRAM(s) Oral every 12 hours  aspirin  chewable 81 milliGRAM(s) Oral daily  atorvastatin 40 milliGRAM(s) Oral at bedtime  dextrose 5%. 1000 milliLiter(s) (50 mL/Hr) IV Continuous <Continuous>  dextrose 5%. 1000 milliLiter(s) (100 mL/Hr) IV Continuous <Continuous>  dextrose 50% Injectable 25 Gram(s) IV Push once  dextrose 50% Injectable 12.5 Gram(s) IV Push once  dextrose 50% Injectable 25 Gram(s) IV Push once  diphenhydrAMINE 25 milliGRAM(s) Oral every 24 hours  DULoxetine 90 milliGRAM(s) Oral every 24 hours  glucagon  Injectable 1 milliGRAM(s) IntraMuscular once  immune   globulin 10% (GAMMAGARD) IVPB 25 Gram(s) IV Intermittent every 24 hours  insulin glargine Injectable (LANTUS) 4 Unit(s) SubCutaneous at bedtime  insulin lispro (ADMELOG) corrective regimen sliding scale   SubCutaneous three times a day before meals  insulin lispro Injectable (ADMELOG) 8 Unit(s) SubCutaneous three times a day before meals  lidocaine 2% Gel 1 Application(s) Topical three times a day  lisinopril 40 milliGRAM(s) Oral every 24 hours  multivitamin 1 Tablet(s) Oral daily  pantoprazole    Tablet 40 milliGRAM(s) Oral before breakfast  sodium chloride 0.9% Bolus 500 milliLiter(s) IV Bolus <User Schedule>  sodium chloride 0.9% Bolus 1000 milliLiter(s) IV Bolus <User Schedule>    MEDICATIONS  (PRN):  acetaminophen     Tablet .. 325 milliGRAM(s) Oral every 6 hours PRN Temp greater or equal to 38C (100.4F), Moderate Pain (4 - 6)  dextrose Oral Gel 15 Gram(s) Oral once PRN Blood Glucose LESS THAN 70 milliGRAM(s)/deciliter      ALLERGIES:  Allergies    No Known Allergies    Intolerances    LABS:      RADIOLOGY & ADDITIONAL TESTS: Reviewed.   SUBJECTIVE/OVERNIGHT EVENTS: No acute overnight events. Pt seen in AM at bedside, resting comfortably in bed, and does not appear to be in any acute distress. When asked, pt denies any recent or active fever, chills, nausea, vomiting, headache, acute sob, chest pain, abdominal pain, genitourinary sx, extremity pain or swelling.    VITAL SIGNS:  Vital Signs Last 24 Hrs  T(C): 36.7 (02 Jun 2022 05:06), Max: 36.7 (01 Jun 2022 16:52)  T(F): 98 (02 Jun 2022 05:06), Max: 98 (01 Jun 2022 16:52)  HR: 74 (02 Jun 2022 05:06) (73 - 75)  BP: 146/82 (02 Jun 2022 05:06) (128/65 - 148/71)  BP(mean): --  RR: 18 (02 Jun 2022 05:06) (18 - 18)  SpO2: 97% (02 Jun 2022 05:06) (96% - 97%)    PHYSICAL EXAM:  General: NAD; elderly female, lying in bed, speaking in full sentences  HEENT: NC/AT; PERRL, R eye decreased acuity at baseline; EOMI; MMM  Neck: supple; no JVD  Cardiac: RRR; +S1/S2, +systolic murmur II/IV RUSB  Pulm: CTA B/L; no W/R/R, no increased WOB or accessory muscle use  GI: soft, NT/ND, +BSx4,   Extremities: WWP; b/l legs in SCDs  Vasc: 2+ radial, DP pulses B/L    Neurologic:  -Mental status: Awake, alert, oriented to person, place, and time. Hypophonic, Speech is fluent with intact naming, repetition, and comprehension, no dysarthria. Recent and remote memory intact. Follows commands. Fund of knowledge appropriate.  -Cranial nerves:   II: Visual fields are full to confrontation.  III, IV, VI: Extraocular movements are slowed and saccadic, without nystagmus. Pupils equally round and reactive to light  V:  Facial sensation V1-V3 equal and intact   VII: Face is symmetric with normal eye closure and smile, facial expression is decreased.   VIII: Hearing is bilaterally intact to finger rub  IX, X: Uvula is midline and soft palate rises symmetrically  XI: Head turning and shoulder shrug are intact.  XII: Tongue protrudes midline  Motor: U/E: full power proximal and distal, except mild weakness of the left proximally  Left L/E: hip flexion 3/5, hip extension 4/5, knee extension 5/5, knee flexion 4-/5, ankle dorsiflexion/plantar 1-2/5 b/l, toes 1/5 ext and flex.   Right L/E: hip flexion 3-/5, hip extension 4/5, knee extension 4+/5, knee flexion 4-/5, ankle dorsiflexion 1-2/5 b/l, toes 1/5 ext and flex.  Bilateral UE cogwheel rigidity L>R wrist, L>R elbow.   Rapid alternating movements decreased on the L side.   Sensation: Intact to light touch bilaterally on the UE, LE decreased sensation b/l bellow the knees, Vibration sensation impaired on the RLE up to the level of the R knee, and up to 4 inches bellow the knee on the L side.   Coordination: No dysmetria on finger-to-nose  Reflexes: UE 2+ b/l, LE 0 patellar b/l, achillis 1+,downgoing toes mute b/l.   Gait: Unable.       MEDICATIONS:  MEDICATIONS  (STANDING):  acetaminophen     Tablet .. 650 milliGRAM(s) Oral every 24 hours  amLODIPine   Tablet 10 milliGRAM(s) Oral daily  apixaban 5 milliGRAM(s) Oral every 12 hours  aspirin  chewable 81 milliGRAM(s) Oral daily  atorvastatin 40 milliGRAM(s) Oral at bedtime  dextrose 5%. 1000 milliLiter(s) (50 mL/Hr) IV Continuous <Continuous>  dextrose 5%. 1000 milliLiter(s) (100 mL/Hr) IV Continuous <Continuous>  dextrose 50% Injectable 25 Gram(s) IV Push once  dextrose 50% Injectable 12.5 Gram(s) IV Push once  dextrose 50% Injectable 25 Gram(s) IV Push once  diphenhydrAMINE 25 milliGRAM(s) Oral every 24 hours  DULoxetine 90 milliGRAM(s) Oral every 24 hours  glucagon  Injectable 1 milliGRAM(s) IntraMuscular once  immune   globulin 10% (GAMMAGARD) IVPB 25 Gram(s) IV Intermittent every 24 hours  insulin glargine Injectable (LANTUS) 4 Unit(s) SubCutaneous at bedtime  insulin lispro (ADMELOG) corrective regimen sliding scale   SubCutaneous three times a day before meals  insulin lispro Injectable (ADMELOG) 8 Unit(s) SubCutaneous three times a day before meals  lidocaine 2% Gel 1 Application(s) Topical three times a day  lisinopril 40 milliGRAM(s) Oral every 24 hours  multivitamin 1 Tablet(s) Oral daily  pantoprazole    Tablet 40 milliGRAM(s) Oral before breakfast  sodium chloride 0.9% Bolus 500 milliLiter(s) IV Bolus <User Schedule>  sodium chloride 0.9% Bolus 1000 milliLiter(s) IV Bolus <User Schedule>    MEDICATIONS  (PRN):  acetaminophen     Tablet .. 325 milliGRAM(s) Oral every 6 hours PRN Temp greater or equal to 38C (100.4F), Moderate Pain (4 - 6)  dextrose Oral Gel 15 Gram(s) Oral once PRN Blood Glucose LESS THAN 70 milliGRAM(s)/deciliter      ALLERGIES:  Allergies    No Known Allergies    Intolerances    LABS:      RADIOLOGY & ADDITIONAL TESTS: Reviewed.   SUBJECTIVE/OVERNIGHT EVENTS: No acute overnight events. Pt seen in AM at bedside, resting comfortably in bed, and does not appear to be in any acute distress. Endorses tingling b/l LE.     VITAL SIGNS:  Vital Signs Last 24 Hrs  T(C): 36.7 (02 Jun 2022 05:06), Max: 36.7 (01 Jun 2022 16:52)  T(F): 98 (02 Jun 2022 05:06), Max: 98 (01 Jun 2022 16:52)  HR: 74 (02 Jun 2022 05:06) (73 - 75)  BP: 146/82 (02 Jun 2022 05:06) (128/65 - 148/71)  BP(mean): --  RR: 18 (02 Jun 2022 05:06) (18 - 18)  SpO2: 97% (02 Jun 2022 05:06) (96% - 97%)    PHYSICAL EXAM:  General: NAD; elderly female, lying in bed, speaking in full sentences  HEENT: NC/AT; PERRL, R eye decreased acuity at baseline; EOMI; MMM  Neck: supple; no JVD  Cardiac: RRR; +S1/S2, +systolic murmur II/IV RUSB  Pulm: CTA B/L; no W/R/R, no increased WOB or accessory muscle use  GI: soft, NT/ND, +BSx4,   Extremities: WWP; b/l legs in SCDs  Vasc: 2+ radial, DP pulses B/L    Neurologic:  -Mental status: Awake, alert, oriented to person, place, and time. Hypophonic, Speech is fluent with intact naming, repetition, and comprehension, no dysarthria. Recent and remote memory intact. Follows commands. Fund of knowledge appropriate.  -Cranial nerves:   II: Visual fields are full to confrontation.  III, IV, VI: Extraocular movements are slowed and saccadic, without nystagmus. Pupils equally round and reactive to light  V:  Facial sensation V1-V3 equal and intact   VII: Face is symmetric with normal eye closure and smile, facial expression is decreased.   VIII: Hearing is bilaterally intact to finger rub  IX, X: Uvula is midline and soft palate rises symmetrically  XI: Head turning and shoulder shrug are intact.  XII: Tongue protrudes midline  Motor: U/E: full power proximal and distal, except mild weakness of the left proximally  Left L/E: hip flexion 3/5, hip extension 4/5, knee extension 5/5, knee flexion 4-/5, ankle dorsiflexion/plantar 1-2/5 b/l, toes 1/5 ext and flex.   Right L/E: hip flexion 3-/5, hip extension 4/5, knee extension 4+/5, knee flexion 4-/5, ankle dorsiflexion 1-2/5 b/l, toes 1/5 ext and flex.  Bilateral UE cogwheel rigidity L>R wrist, L>R elbow.   Rapid alternating movements decreased on the L side.   Sensation: Intact to light touch bilaterally on the UE, LE decreased sensation b/l bellow the knees, Vibration sensation impaired on the RLE up to the level of the R knee, and up to 4 inches bellow the knee on the L side.   Coordination: No dysmetria on finger-to-nose  Reflexes: UE 2+ b/l, LE 0 patellar b/l, achillis 1+,downgoing toes mute b/l.   Gait: Unable.       MEDICATIONS:  MEDICATIONS  (STANDING):  acetaminophen     Tablet .. 650 milliGRAM(s) Oral every 24 hours  amLODIPine   Tablet 10 milliGRAM(s) Oral daily  apixaban 5 milliGRAM(s) Oral every 12 hours  aspirin  chewable 81 milliGRAM(s) Oral daily  atorvastatin 40 milliGRAM(s) Oral at bedtime  dextrose 5%. 1000 milliLiter(s) (50 mL/Hr) IV Continuous <Continuous>  dextrose 5%. 1000 milliLiter(s) (100 mL/Hr) IV Continuous <Continuous>  dextrose 50% Injectable 25 Gram(s) IV Push once  dextrose 50% Injectable 12.5 Gram(s) IV Push once  dextrose 50% Injectable 25 Gram(s) IV Push once  diphenhydrAMINE 25 milliGRAM(s) Oral every 24 hours  DULoxetine 90 milliGRAM(s) Oral every 24 hours  glucagon  Injectable 1 milliGRAM(s) IntraMuscular once  immune   globulin 10% (GAMMAGARD) IVPB 25 Gram(s) IV Intermittent every 24 hours  insulin glargine Injectable (LANTUS) 4 Unit(s) SubCutaneous at bedtime  insulin lispro (ADMELOG) corrective regimen sliding scale   SubCutaneous three times a day before meals  insulin lispro Injectable (ADMELOG) 8 Unit(s) SubCutaneous three times a day before meals  lidocaine 2% Gel 1 Application(s) Topical three times a day  lisinopril 40 milliGRAM(s) Oral every 24 hours  multivitamin 1 Tablet(s) Oral daily  pantoprazole    Tablet 40 milliGRAM(s) Oral before breakfast  sodium chloride 0.9% Bolus 500 milliLiter(s) IV Bolus <User Schedule>  sodium chloride 0.9% Bolus 1000 milliLiter(s) IV Bolus <User Schedule>    MEDICATIONS  (PRN):  acetaminophen     Tablet .. 325 milliGRAM(s) Oral every 6 hours PRN Temp greater or equal to 38C (100.4F), Moderate Pain (4 - 6)  dextrose Oral Gel 15 Gram(s) Oral once PRN Blood Glucose LESS THAN 70 milliGRAM(s)/deciliter      ALLERGIES:  Allergies    No Known Allergies    Intolerances    LABS:      RADIOLOGY & ADDITIONAL TESTS: Reviewed.

## 2022-06-02 NOTE — PROGRESS NOTE ADULT - PROBLEM SELECTOR PLAN 10
F: None  E: Replete PRN   N: DASH/TLC   DVT: Apixaban 5 mg BID   Dispo: RMF, PT rec [facility]- however patient is uninsured and cannot go to [facility] attempting to get to one person assist. Son was able to received FMLA. Daughter was denied FMLA, pending further paperwork

## 2022-06-03 LAB
ANION GAP SERPL CALC-SCNC: 7 MMOL/L — SIGNIFICANT CHANGE UP (ref 5–17)
APTT BLD: 35.4 SEC — SIGNIFICANT CHANGE UP (ref 27.5–35.5)
BUN SERPL-MCNC: 25 MG/DL — HIGH (ref 7–23)
CALCIUM SERPL-MCNC: 8.6 MG/DL — SIGNIFICANT CHANGE UP (ref 8.4–10.5)
CHLORIDE SERPL-SCNC: 102 MMOL/L — SIGNIFICANT CHANGE UP (ref 96–108)
CO2 SERPL-SCNC: 25 MMOL/L — SIGNIFICANT CHANGE UP (ref 22–31)
CREAT SERPL-MCNC: 0.54 MG/DL — SIGNIFICANT CHANGE UP (ref 0.5–1.3)
EGFR: 97 ML/MIN/1.73M2 — SIGNIFICANT CHANGE UP
GLUCOSE BLDC GLUCOMTR-MCNC: 125 MG/DL — HIGH (ref 70–99)
GLUCOSE BLDC GLUCOMTR-MCNC: 160 MG/DL — HIGH (ref 70–99)
GLUCOSE BLDC GLUCOMTR-MCNC: 215 MG/DL — HIGH (ref 70–99)
GLUCOSE BLDC GLUCOMTR-MCNC: 73 MG/DL — SIGNIFICANT CHANGE UP (ref 70–99)
GLUCOSE SERPL-MCNC: 182 MG/DL — HIGH (ref 70–99)
HCT VFR BLD CALC: 33 % — LOW (ref 34.5–45)
HGB BLD-MCNC: 10.6 G/DL — LOW (ref 11.5–15.5)
INR BLD: 1.12 — SIGNIFICANT CHANGE UP (ref 0.88–1.16)
MAGNESIUM SERPL-MCNC: 1.6 MG/DL — SIGNIFICANT CHANGE UP (ref 1.6–2.6)
MCHC RBC-ENTMCNC: 26.8 PG — LOW (ref 27–34)
MCHC RBC-ENTMCNC: 32.1 GM/DL — SIGNIFICANT CHANGE UP (ref 32–36)
MCV RBC AUTO: 83.5 FL — SIGNIFICANT CHANGE UP (ref 80–100)
NRBC # BLD: 0 /100 WBCS — SIGNIFICANT CHANGE UP (ref 0–0)
PHOSPHATE SERPL-MCNC: 3.6 MG/DL — SIGNIFICANT CHANGE UP (ref 2.5–4.5)
PLATELET # BLD AUTO: 263 K/UL — SIGNIFICANT CHANGE UP (ref 150–400)
POTASSIUM SERPL-MCNC: 4.4 MMOL/L — SIGNIFICANT CHANGE UP (ref 3.5–5.3)
POTASSIUM SERPL-SCNC: 4.4 MMOL/L — SIGNIFICANT CHANGE UP (ref 3.5–5.3)
PROTHROM AB SERPL-ACNC: 13.3 SEC — SIGNIFICANT CHANGE UP (ref 10.5–13.4)
RBC # BLD: 3.95 M/UL — SIGNIFICANT CHANGE UP (ref 3.8–5.2)
RBC # FLD: 13.6 % — SIGNIFICANT CHANGE UP (ref 10.3–14.5)
SODIUM SERPL-SCNC: 134 MMOL/L — LOW (ref 135–145)
WBC # BLD: 3 K/UL — LOW (ref 3.8–10.5)
WBC # FLD AUTO: 3 K/UL — LOW (ref 3.8–10.5)

## 2022-06-03 PROCEDURE — 93970 EXTREMITY STUDY: CPT | Mod: 26

## 2022-06-03 PROCEDURE — 99232 SBSQ HOSP IP/OBS MODERATE 35: CPT

## 2022-06-03 RX ORDER — MAGNESIUM SULFATE 500 MG/ML
2 VIAL (ML) INJECTION ONCE
Refills: 0 | Status: COMPLETED | OUTPATIENT
Start: 2022-06-03 | End: 2022-06-03

## 2022-06-03 RX ADMIN — ATORVASTATIN CALCIUM 40 MILLIGRAM(S): 80 TABLET, FILM COATED ORAL at 21:59

## 2022-06-03 RX ADMIN — DULOXETINE HYDROCHLORIDE 90 MILLIGRAM(S): 30 CAPSULE, DELAYED RELEASE ORAL at 10:11

## 2022-06-03 RX ADMIN — Medication 1 TABLET(S): at 12:14

## 2022-06-03 RX ADMIN — INSULIN GLARGINE 4 UNIT(S): 100 INJECTION, SOLUTION SUBCUTANEOUS at 22:00

## 2022-06-03 RX ADMIN — AMLODIPINE BESYLATE 10 MILLIGRAM(S): 2.5 TABLET ORAL at 06:25

## 2022-06-03 RX ADMIN — LIDOCAINE 1 APPLICATION(S): 4 CREAM TOPICAL at 07:01

## 2022-06-03 RX ADMIN — SODIUM CHLORIDE 1000 MILLILITER(S): 9 INJECTION INTRAMUSCULAR; INTRAVENOUS; SUBCUTANEOUS at 23:30

## 2022-06-03 RX ADMIN — Medication 8 UNIT(S): at 12:38

## 2022-06-03 RX ADMIN — Medication 8 UNIT(S): at 08:45

## 2022-06-03 RX ADMIN — Medication 25 MILLIGRAM(S): at 16:48

## 2022-06-03 RX ADMIN — SODIUM CHLORIDE 1000 MILLILITER(S): 9 INJECTION INTRAMUSCULAR; INTRAVENOUS; SUBCUTANEOUS at 16:48

## 2022-06-03 RX ADMIN — Medication 81 MILLIGRAM(S): at 12:14

## 2022-06-03 RX ADMIN — LIDOCAINE 1 APPLICATION(S): 4 CREAM TOPICAL at 16:36

## 2022-06-03 RX ADMIN — IMMUNE GLOBULIN (HUMAN) 41.67 GRAM(S): 10 INJECTION INTRAVENOUS; SUBCUTANEOUS at 17:43

## 2022-06-03 RX ADMIN — LISINOPRIL 40 MILLIGRAM(S): 2.5 TABLET ORAL at 06:24

## 2022-06-03 RX ADMIN — Medication 8 UNIT(S): at 17:49

## 2022-06-03 RX ADMIN — Medication 650 MILLIGRAM(S): at 17:30

## 2022-06-03 RX ADMIN — APIXABAN 5 MILLIGRAM(S): 2.5 TABLET, FILM COATED ORAL at 22:00

## 2022-06-03 RX ADMIN — Medication 650 MILLIGRAM(S): at 16:48

## 2022-06-03 RX ADMIN — Medication 2: at 08:45

## 2022-06-03 RX ADMIN — APIXABAN 5 MILLIGRAM(S): 2.5 TABLET, FILM COATED ORAL at 10:10

## 2022-06-03 RX ADMIN — PANTOPRAZOLE SODIUM 40 MILLIGRAM(S): 20 TABLET, DELAYED RELEASE ORAL at 06:24

## 2022-06-03 NOTE — PROGRESS NOTE ADULT - ATTENDING COMMENTS
Pt with primarily distal sensorimotor polyneuropathy  IVIg now day 3, with 2 more days.  Following DVT on left calf.    < from: US Duplex Venous Lower Ext Complete, Bilateral (06.03.22 @ 15:51) >  Since 5/21/2022, resolved left peroneal vein deep venous thrombosis.  No new deep venous thrombosis in either lower extremity.  < end of copied text >    Plan: continue IVIg, not expecting immediate improvement but hopeful to see something by late next week.

## 2022-06-03 NOTE — PROGRESS NOTE ADULT - PROBLEM SELECTOR PLAN 10
F: None  E: Replete PRN   N: DASH/TLC   DVT: Apixaban 5 mg BID   Dispo: RMF, PT rec [facility]- however patient is uninsured and cannot go to [facility] attempting to get to one person assist. Son was able to received FMLA. Daughter was denied FMLA, pending further paperwork

## 2022-06-03 NOTE — PROGRESS NOTE ADULT - SUBJECTIVE AND OBJECTIVE BOX
SUBJECTIVE/OVERNIGHT EVENTS: No acute overnight events. Pt seen in AM at bedside, resting comfortably in bed, and does not appear to be in any acute distress. States that her legs still feel heavy.  When asked, pt denies any recent or active fever, chills, nausea, vomiting, headache, acute sob, chest pain, abdominal pain, genitourinary sx, extremity pain or swelling.    VITAL SIGNS:  Vital Signs Last 24 Hrs  T(C): 37.2 (03 Jun 2022 06:05), Max: 37.2 (03 Jun 2022 06:05)  T(F): 98.9 (03 Jun 2022 06:05), Max: 98.9 (03 Jun 2022 06:05)  HR: 81 (03 Jun 2022 06:05) (76 - 81)  BP: 141/89 (03 Jun 2022 06:05) (125/68 - 145/82)  BP(mean): 103 (02 Jun 2022 12:56) (87 - 103)  RR: 18 (03 Jun 2022 06:05) (18 - 18)  SpO2: 98% (03 Jun 2022 06:05) (95% - 98%)    PHYSICAL EXAM:  General: NAD; elderly female, lying in bed, speaking in full sentences  HEENT: NC/AT; PERRL, R eye decreased acuity at baseline; EOMI; MMM  Neck: supple; no JVD  Cardiac: RRR; +S1/S2, +systolic murmur II/IV RUSB  Pulm: CTA B/L; no W/R/R, no increased WOB or accessory muscle use  GI: soft, NT/ND, +BSx4,   Extremities: WWP; b/l legs in SCDs  Vasc: 2+ radial, DP pulses B/L  Neuro AAO x3   Motor: U/E: full ROM tremors in upper extremities  Left L/E: hip flexion 3/5, hip extension 4/5, knee extension 5/5, knee flexion 4-/5, ankle dorsiflexion/plantar 1-2/5 b/l, toes 1/5 ext and flex.   Right L/E: hip flexion 3-/5, hip extension 4/5, knee extension 4+/5, knee flexion 4-/5, ankle dorsiflexion 1-2/5 b/l, toes 1/5 ext and flex.    MEDICATIONS:  MEDICATIONS  (STANDING):  acetaminophen     Tablet .. 650 milliGRAM(s) Oral every 24 hours  amLODIPine   Tablet 10 milliGRAM(s) Oral daily  apixaban 5 milliGRAM(s) Oral every 12 hours  aspirin  chewable 81 milliGRAM(s) Oral daily  atorvastatin 40 milliGRAM(s) Oral at bedtime  dextrose 5%. 1000 milliLiter(s) (50 mL/Hr) IV Continuous <Continuous>  dextrose 5%. 1000 milliLiter(s) (100 mL/Hr) IV Continuous <Continuous>  dextrose 50% Injectable 25 Gram(s) IV Push once  dextrose 50% Injectable 12.5 Gram(s) IV Push once  dextrose 50% Injectable 25 Gram(s) IV Push once  diphenhydrAMINE 25 milliGRAM(s) Oral every 24 hours  DULoxetine 90 milliGRAM(s) Oral every 24 hours  glucagon  Injectable 1 milliGRAM(s) IntraMuscular once  immune   globulin 10% (GAMMAGARD) IVPB 25 Gram(s) IV Intermittent every 24 hours  insulin glargine Injectable (LANTUS) 4 Unit(s) SubCutaneous at bedtime  insulin lispro (ADMELOG) corrective regimen sliding scale   SubCutaneous three times a day before meals  insulin lispro Injectable (ADMELOG) 8 Unit(s) SubCutaneous three times a day before meals  lidocaine 2% Gel 1 Application(s) Topical three times a day  lisinopril 40 milliGRAM(s) Oral every 24 hours  multivitamin 1 Tablet(s) Oral daily  pantoprazole    Tablet 40 milliGRAM(s) Oral before breakfast  sodium chloride 0.9% Bolus 500 milliLiter(s) IV Bolus <User Schedule>  sodium chloride 0.9% Bolus 1000 milliLiter(s) IV Bolus <User Schedule>    MEDICATIONS  (PRN):  acetaminophen     Tablet .. 325 milliGRAM(s) Oral every 6 hours PRN Temp greater or equal to 38C (100.4F), Moderate Pain (4 - 6)  dextrose Oral Gel 15 Gram(s) Oral once PRN Blood Glucose LESS THAN 70 milliGRAM(s)/deciliter      ALLERGIES:  Allergies    No Known Allergies    Intolerances        LABS:                        10.6   3.00  )-----------( 263      ( 03 Jun 2022 07:09 )             33.0     06-03    134<L>  |  102  |  25<H>  ----------------------------<  182<H>  4.4   |  25  |  0.54    Ca    8.6      03 Jun 2022 07:09  Phos  3.6     06-03  Mg     1.6     06-03      PT/INR - ( 03 Jun 2022 07:09 )   PT: 13.3 sec;   INR: 1.12          PTT - ( 03 Jun 2022 07:09 )  PTT:35.4 sec    RADIOLOGY & ADDITIONAL TESTS: Reviewed.

## 2022-06-03 NOTE — PROGRESS NOTE ADULT - ASSESSMENT
72y Female with PMHx of T2DM c/b peripheral neuropathy, HTN, recent admission and workup for Naty Sanchez tear, TIA on 3/30 (MR negative) presents to [facility] as transfer from [facility] ED for episode of altered mental status (staring, generalized weakness), back to baseline in [facility] ED, no acute pathology on CTH or MRI, found to have metabolic encephalopathy (resolved) 2/2 UTI (completed 7day abx) vs hypertension, also found to have new LE weakness due to diabetic neuropathy. Due to immigration and insurance status, not able to F[facility] as recommended by PT. Pending improvement to one-person assist in order to discharge home with out-of-pocket PT vs home with family assist.

## 2022-06-03 NOTE — PROGRESS NOTE ADULT - PROBLEM SELECTOR PLAN 2
Needle like pain shins downward, interfering with sleep. LE duplex - acute DVT of  the left peroneal vein  - c/w Cymbalta 90 Qd for diabetic neuropathy  - c/w Apixaban 5mg BID for 3 months (5/28-  - f/u repeat LE Duplex

## 2022-06-04 LAB
GLUCOSE BLDC GLUCOMTR-MCNC: 100 MG/DL — HIGH (ref 70–99)
GLUCOSE BLDC GLUCOMTR-MCNC: 151 MG/DL — HIGH (ref 70–99)
GLUCOSE BLDC GLUCOMTR-MCNC: 153 MG/DL — HIGH (ref 70–99)
GLUCOSE BLDC GLUCOMTR-MCNC: 85 MG/DL — SIGNIFICANT CHANGE UP (ref 70–99)

## 2022-06-04 PROCEDURE — 99231 SBSQ HOSP IP/OBS SF/LOW 25: CPT

## 2022-06-04 RX ADMIN — LISINOPRIL 40 MILLIGRAM(S): 2.5 TABLET ORAL at 06:12

## 2022-06-04 RX ADMIN — Medication 8 UNIT(S): at 17:26

## 2022-06-04 RX ADMIN — Medication 1 TABLET(S): at 11:02

## 2022-06-04 RX ADMIN — Medication 2: at 08:24

## 2022-06-04 RX ADMIN — INSULIN GLARGINE 4 UNIT(S): 100 INJECTION, SOLUTION SUBCUTANEOUS at 21:49

## 2022-06-04 RX ADMIN — AMLODIPINE BESYLATE 10 MILLIGRAM(S): 2.5 TABLET ORAL at 06:12

## 2022-06-04 RX ADMIN — APIXABAN 5 MILLIGRAM(S): 2.5 TABLET, FILM COATED ORAL at 21:49

## 2022-06-04 RX ADMIN — Medication 25 MILLIGRAM(S): at 15:08

## 2022-06-04 RX ADMIN — Medication 12.5 GRAM(S): at 00:43

## 2022-06-04 RX ADMIN — DULOXETINE HYDROCHLORIDE 90 MILLIGRAM(S): 30 CAPSULE, DELAYED RELEASE ORAL at 11:02

## 2022-06-04 RX ADMIN — Medication 650 MILLIGRAM(S): at 15:08

## 2022-06-04 RX ADMIN — PANTOPRAZOLE SODIUM 40 MILLIGRAM(S): 20 TABLET, DELAYED RELEASE ORAL at 06:12

## 2022-06-04 RX ADMIN — APIXABAN 5 MILLIGRAM(S): 2.5 TABLET, FILM COATED ORAL at 11:02

## 2022-06-04 RX ADMIN — Medication 8 UNIT(S): at 08:24

## 2022-06-04 RX ADMIN — Medication 650 MILLIGRAM(S): at 16:08

## 2022-06-04 RX ADMIN — SODIUM CHLORIDE 1000 MILLILITER(S): 9 INJECTION INTRAMUSCULAR; INTRAVENOUS; SUBCUTANEOUS at 15:08

## 2022-06-04 RX ADMIN — Medication 81 MILLIGRAM(S): at 11:02

## 2022-06-04 RX ADMIN — LIDOCAINE 1 APPLICATION(S): 4 CREAM TOPICAL at 06:12

## 2022-06-04 RX ADMIN — LIDOCAINE 1 APPLICATION(S): 4 CREAM TOPICAL at 13:09

## 2022-06-04 RX ADMIN — SODIUM CHLORIDE 1000 MILLILITER(S): 9 INJECTION INTRAMUSCULAR; INTRAVENOUS; SUBCUTANEOUS at 23:30

## 2022-06-04 RX ADMIN — IMMUNE GLOBULIN (HUMAN) 41.67 GRAM(S): 10 INJECTION INTRAVENOUS; SUBCUTANEOUS at 15:53

## 2022-06-04 RX ADMIN — Medication 8 UNIT(S): at 12:42

## 2022-06-04 RX ADMIN — ATORVASTATIN CALCIUM 40 MILLIGRAM(S): 80 TABLET, FILM COATED ORAL at 21:50

## 2022-06-04 NOTE — PROGRESS NOTE ADULT - ASSESSMENT
72y Female with PMHx of T2DM c/b peripheral neuropathy, HTN, recent admission and workup for Naty Sanchez tear, TIA on 3/30 (MR negative) presents to [facility] as transfer from [facility] ED for episode of altered mental status (staring, generalized weakness), back to baseline in [facility] ED, no acute pathology on CTH or MRI, found to have metabolic encephalopathy (resolved) 2/2 UTI (completed 7day abx) vs hypertension, also found to have new LE weakness due to diabetic neuropathy. Due to immigration and insurance status, not able to [facility] as recommended by PT. Pending improvement to one-person assist in order to discharge home with out-of-pocket PT vs home with family assist.

## 2022-06-04 NOTE — PROGRESS NOTE ADULT - PROBLEM SELECTOR PLAN 1
Symmetrical LE weakness, worse distally compared to proximally. Symmetrical diminished sensation, longstanding diabetic peripheral neuropathy. Denies issues with incontinence, or back pain, no sensory line. Likely progression of diabetic neuropathy, however symptoms more severe than to be expected.  MRI L/Spine:  - Showing at the L3/4 leveI. There are degenerative changes involving the facets bilaterally as well as ligamentum flavum hypertrophy. This combination results in moderate central spinal canal stenosis.   - At the L4/5 level, there is disc bulge abutting the L4 nerve roots bilaterally. There are degenerative changes involving the facets bilaterally (right greater than left) as well as ligamentum flavum hypertrophy. This combination results in mild central spinal canal   stenosis.  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 Bilateral lower lumbosacral plexopathies cannot be ruled out based on   these findings.   - Neurology team saying that since it is diabetic in origin it is unlikely that the patient will make significant improvement in the short term.  - Patient is currently a 2-person assist and requires continued daily PT sessions for safe disposition  - c/w Cymbalta 90mg daily   - c/w Custom AFO boots   - c/w incentive spirometry  - c/w IVIG (6/1-6/5), dose 4 of 5 today

## 2022-06-04 NOTE — PROGRESS NOTE ADULT - PROBLEM SELECTOR PLAN 10
F: None  E: Replete PRN   N: DASH/TLC   DVT: Apixaban 5 mg BID   Dispo: RMF, PT rec [facility]- however patient is uninsured and cannot go to [facility] attempting to get to one person assist. Son was able to received FMLA. Daughter was denied FMLA, pending further paperwork

## 2022-06-04 NOTE — PROGRESS NOTE ADULT - SUBJECTIVE AND OBJECTIVE BOX
Neurology Progress Note    Interval History:    No acute events overnight. Pt resting comfortably in bed this morning.    Medications:  acetaminophen     Tablet .. 325 milliGRAM(s) Oral every 6 hours PRN  acetaminophen     Tablet .. 650 milliGRAM(s) Oral every 24 hours  amLODIPine   Tablet 10 milliGRAM(s) Oral daily  apixaban 5 milliGRAM(s) Oral every 12 hours  aspirin  chewable 81 milliGRAM(s) Oral daily  atorvastatin 40 milliGRAM(s) Oral at bedtime  dextrose 5%. 1000 milliLiter(s) IV Continuous <Continuous>  dextrose 5%. 1000 milliLiter(s) IV Continuous <Continuous>  dextrose 50% Injectable 25 Gram(s) IV Push once  dextrose 50% Injectable 12.5 Gram(s) IV Push once  dextrose 50% Injectable 25 Gram(s) IV Push once  dextrose Oral Gel 15 Gram(s) Oral once PRN  diphenhydrAMINE 25 milliGRAM(s) Oral every 24 hours  DULoxetine 90 milliGRAM(s) Oral every 24 hours  glucagon  Injectable 1 milliGRAM(s) IntraMuscular once  immune   globulin 10% (GAMMAGARD) IVPB 25 Gram(s) IV Intermittent every 24 hours  insulin glargine Injectable (LANTUS) 4 Unit(s) SubCutaneous at bedtime  insulin lispro (ADMELOG) corrective regimen sliding scale   SubCutaneous three times a day before meals  insulin lispro Injectable (ADMELOG) 8 Unit(s) SubCutaneous three times a day before meals  lidocaine 2% Gel 1 Application(s) Topical three times a day  lisinopril 40 milliGRAM(s) Oral every 24 hours  multivitamin 1 Tablet(s) Oral daily  pantoprazole    Tablet 40 milliGRAM(s) Oral before breakfast  sodium chloride 0.9% Bolus 500 milliLiter(s) IV Bolus <User Schedule>  sodium chloride 0.9% Bolus 1000 milliLiter(s) IV Bolus <User Schedule>      Vital Signs Last 24 Hrs  T(C): 36.8 (04 Jun 2022 12:41), Max: 36.8 (04 Jun 2022 12:41)  T(F): 98.2 (04 Jun 2022 12:41), Max: 98.2 (04 Jun 2022 12:41)  HR: 69 (04 Jun 2022 12:41) (69 - 83)  BP: 135/71 (04 Jun 2022 12:41) (134/76 - 162/77)  BP(mean): 105 (04 Jun 2022 05:39) (105 - 105)  RR: 18 (04 Jun 2022 12:41) (18 - 18)  SpO2: 95% (04 Jun 2022 12:41) (95% - 99%)    Neurological Examination:  General:  Appearance is consistent with chronologic age.  No abnormal facies.  Gross skin survey within normal limits.    Cognitive/Language:  Awake, alert, and oriented to person, place, time and date.  Recent and remote memory intact.  Fund of knowledge is appropriate.  Naming, repetition and comprehension intact.   Nondysarthric.    Cranial Nerves  - Eyes: Visual acuity intact, Visual fields full.  EOMI w/o nystagmus, skew or reported double vision.  PERRL.  No ptosis/weakness of eyelid closure.    - Face:  Facial sensation normal V1 - 3, no facial asymmetry.    - Ears/Nose/Throat:  Hearing grossly intact  Motor examination:  U/E: full ROM tremors in upper extremities  Left L/E: hip flexion 3/5, hip extension 4/5, knee extension 5/5, knee flexion 4-/5, ankle dorsiflexion/plantar 1-2/5 b/l, toes 1/5 ext and flex.   Right L/E: hip flexion 3-/5, hip extension 4/5, knee extension 4+/5, knee flexion 4-/5, ankle dorsiflexion 1-2/5 b/l, toes 1/5 ext and flex.  Sensory examination:   Intact to light touch throughout  Cerebellum:   Gait deferred    Labs:  CBC Full  -  ( 03 Jun 2022 07:09 )  WBC Count : 3.00 K/uL  RBC Count : 3.95 M/uL  Hemoglobin : 10.6 g/dL  Hematocrit : 33.0 %  Platelet Count - Automated : 263 K/uL  Mean Cell Volume : 83.5 fl  Mean Cell Hemoglobin : 26.8 pg  Mean Cell Hemoglobin Concentration : 32.1 gm/dL  Auto Neutrophil # : x  Auto Lymphocyte # : x  Auto Monocyte # : x  Auto Eosinophil # : x  Auto Basophil # : x  Auto Neutrophil % : x  Auto Lymphocyte % : x  Auto Monocyte % : x  Auto Eosinophil % : x  Auto Basophil % : x    06-03    134<L>  |  102  |  25<H>  ----------------------------<  182<H>  4.4   |  25  |  0.54    Ca    8.6      03 Jun 2022 07:09  Phos  3.6     06-03  Mg     1.6     06-03        PT/INR - ( 03 Jun 2022 07:09 )   PT: 13.3 sec;   INR: 1.12          PTT - ( 03 Jun 2022 07:09 )  PTT:35.4 sec

## 2022-06-05 LAB
ANION GAP SERPL CALC-SCNC: 7 MMOL/L — SIGNIFICANT CHANGE UP (ref 5–17)
BASOPHILS # BLD AUTO: 0.02 K/UL — SIGNIFICANT CHANGE UP (ref 0–0.2)
BASOPHILS NFR BLD AUTO: 0.7 % — SIGNIFICANT CHANGE UP (ref 0–2)
BUN SERPL-MCNC: 15 MG/DL — SIGNIFICANT CHANGE UP (ref 7–23)
CALCIUM SERPL-MCNC: 8.3 MG/DL — LOW (ref 8.4–10.5)
CHLORIDE SERPL-SCNC: 102 MMOL/L — SIGNIFICANT CHANGE UP (ref 96–108)
CO2 SERPL-SCNC: 26 MMOL/L — SIGNIFICANT CHANGE UP (ref 22–31)
CREAT SERPL-MCNC: 0.4 MG/DL — LOW (ref 0.5–1.3)
EGFR: 104 ML/MIN/1.73M2 — SIGNIFICANT CHANGE UP
EOSINOPHIL # BLD AUTO: 0.14 K/UL — SIGNIFICANT CHANGE UP (ref 0–0.5)
EOSINOPHIL NFR BLD AUTO: 4.7 % — SIGNIFICANT CHANGE UP (ref 0–6)
GLUCOSE BLDC GLUCOMTR-MCNC: 159 MG/DL — HIGH (ref 70–99)
GLUCOSE BLDC GLUCOMTR-MCNC: 183 MG/DL — HIGH (ref 70–99)
GLUCOSE BLDC GLUCOMTR-MCNC: 86 MG/DL — SIGNIFICANT CHANGE UP (ref 70–99)
GLUCOSE BLDC GLUCOMTR-MCNC: 91 MG/DL — SIGNIFICANT CHANGE UP (ref 70–99)
GLUCOSE SERPL-MCNC: 154 MG/DL — HIGH (ref 70–99)
HCT VFR BLD CALC: 32.9 % — LOW (ref 34.5–45)
HGB BLD-MCNC: 10.4 G/DL — LOW (ref 11.5–15.5)
IMM GRANULOCYTES NFR BLD AUTO: 0 % — SIGNIFICANT CHANGE UP (ref 0–1.5)
LYMPHOCYTES # BLD AUTO: 1.1 K/UL — SIGNIFICANT CHANGE UP (ref 1–3.3)
LYMPHOCYTES # BLD AUTO: 36.9 % — SIGNIFICANT CHANGE UP (ref 13–44)
MAGNESIUM SERPL-MCNC: 1.7 MG/DL — SIGNIFICANT CHANGE UP (ref 1.6–2.6)
MCHC RBC-ENTMCNC: 26.1 PG — LOW (ref 27–34)
MCHC RBC-ENTMCNC: 31.6 GM/DL — LOW (ref 32–36)
MCV RBC AUTO: 82.7 FL — SIGNIFICANT CHANGE UP (ref 80–100)
MONOCYTES # BLD AUTO: 0.28 K/UL — SIGNIFICANT CHANGE UP (ref 0–0.9)
MONOCYTES NFR BLD AUTO: 9.4 % — SIGNIFICANT CHANGE UP (ref 2–14)
NEUTROPHILS # BLD AUTO: 1.44 K/UL — LOW (ref 1.8–7.4)
NEUTROPHILS NFR BLD AUTO: 48.3 % — SIGNIFICANT CHANGE UP (ref 43–77)
NRBC # BLD: 0 /100 WBCS — SIGNIFICANT CHANGE UP (ref 0–0)
PHOSPHATE SERPL-MCNC: 3.6 MG/DL — SIGNIFICANT CHANGE UP (ref 2.5–4.5)
PLATELET # BLD AUTO: 249 K/UL — SIGNIFICANT CHANGE UP (ref 150–400)
POTASSIUM SERPL-MCNC: 4 MMOL/L — SIGNIFICANT CHANGE UP (ref 3.5–5.3)
POTASSIUM SERPL-SCNC: 4 MMOL/L — SIGNIFICANT CHANGE UP (ref 3.5–5.3)
RBC # BLD: 3.98 M/UL — SIGNIFICANT CHANGE UP (ref 3.8–5.2)
RBC # FLD: 13.9 % — SIGNIFICANT CHANGE UP (ref 10.3–14.5)
SODIUM SERPL-SCNC: 135 MMOL/L — SIGNIFICANT CHANGE UP (ref 135–145)
WBC # BLD: 2.98 K/UL — LOW (ref 3.8–10.5)
WBC # FLD AUTO: 2.98 K/UL — LOW (ref 3.8–10.5)

## 2022-06-05 PROCEDURE — 99231 SBSQ HOSP IP/OBS SF/LOW 25: CPT

## 2022-06-05 RX ADMIN — Medication 1 TABLET(S): at 11:24

## 2022-06-05 RX ADMIN — AMLODIPINE BESYLATE 10 MILLIGRAM(S): 2.5 TABLET ORAL at 06:27

## 2022-06-05 RX ADMIN — DULOXETINE HYDROCHLORIDE 90 MILLIGRAM(S): 30 CAPSULE, DELAYED RELEASE ORAL at 11:24

## 2022-06-05 RX ADMIN — SODIUM CHLORIDE 1000 MILLILITER(S): 9 INJECTION INTRAMUSCULAR; INTRAVENOUS; SUBCUTANEOUS at 15:31

## 2022-06-05 RX ADMIN — APIXABAN 5 MILLIGRAM(S): 2.5 TABLET, FILM COATED ORAL at 22:47

## 2022-06-05 RX ADMIN — INSULIN GLARGINE 4 UNIT(S): 100 INJECTION, SOLUTION SUBCUTANEOUS at 22:47

## 2022-06-05 RX ADMIN — LIDOCAINE 1 APPLICATION(S): 4 CREAM TOPICAL at 06:29

## 2022-06-05 RX ADMIN — SODIUM CHLORIDE 1000 MILLILITER(S): 9 INJECTION INTRAMUSCULAR; INTRAVENOUS; SUBCUTANEOUS at 22:48

## 2022-06-05 RX ADMIN — Medication 2: at 17:17

## 2022-06-05 RX ADMIN — LIDOCAINE 1 APPLICATION(S): 4 CREAM TOPICAL at 15:30

## 2022-06-05 RX ADMIN — Medication 8 UNIT(S): at 17:17

## 2022-06-05 RX ADMIN — Medication 650 MILLIGRAM(S): at 16:19

## 2022-06-05 RX ADMIN — Medication 81 MILLIGRAM(S): at 11:24

## 2022-06-05 RX ADMIN — LISINOPRIL 40 MILLIGRAM(S): 2.5 TABLET ORAL at 06:28

## 2022-06-05 RX ADMIN — PANTOPRAZOLE SODIUM 40 MILLIGRAM(S): 20 TABLET, DELAYED RELEASE ORAL at 06:27

## 2022-06-05 RX ADMIN — Medication 2: at 08:48

## 2022-06-05 RX ADMIN — Medication 8 UNIT(S): at 08:48

## 2022-06-05 RX ADMIN — Medication 650 MILLIGRAM(S): at 15:31

## 2022-06-05 RX ADMIN — Medication 25 MILLIGRAM(S): at 15:31

## 2022-06-05 RX ADMIN — APIXABAN 5 MILLIGRAM(S): 2.5 TABLET, FILM COATED ORAL at 11:24

## 2022-06-05 RX ADMIN — Medication 8 UNIT(S): at 12:24

## 2022-06-05 RX ADMIN — ATORVASTATIN CALCIUM 40 MILLIGRAM(S): 80 TABLET, FILM COATED ORAL at 22:47

## 2022-06-05 RX ADMIN — IMMUNE GLOBULIN (HUMAN) 41.67 GRAM(S): 10 INJECTION INTRAVENOUS; SUBCUTANEOUS at 16:07

## 2022-06-05 NOTE — PROGRESS NOTE ADULT - SUBJECTIVE AND OBJECTIVE BOX
SUBJECTIVE/OVERNIGHT EVENTS: No acute overnight events. Pt seen in AM at bedside, resting comfortably in bed, and does not appear to be in any acute distress. Sleepy.    VITAL SIGNS:  Vital Signs Last 24 Hrs  T(C): 36.8 (05 Jun 2022 05:46), Max: 37 (04 Jun 2022 21:19)  T(F): 98.2 (05 Jun 2022 05:46), Max: 98.6 (04 Jun 2022 21:19)  HR: 76 (05 Jun 2022 05:46) (69 - 76)  BP: 145/82 (05 Jun 2022 05:46) (135/71 - 145/82)  BP(mean): --  RR: 18 (05 Jun 2022 05:46) (18 - 18)  SpO2: 98% (05 Jun 2022 05:46) (95% - 98%)    PHYSICAL EXAM:  General: NAD; elderly female, lying in bed, speaking in full sentences  HEENT: NC/AT; PERRL, R eye decreased acuity at baseline; EOMI; MMM  Neck: supple; no JVD  Cardiac: RRR; +S1/S2, +systolic murmur II/IV RUSB  Pulm: CTA B/L; no W/R/R, no increased WOB or accessory muscle use  GI: soft, NT/ND, +BSx4,   Extremities: WWP; b/l legs in SCDs  Vasc: 2+ radial, DP pulses B/L  Neuro AAO x3   Motor: U/E: full ROM tremors in upper extremities  Left L/E: hip flexion 3/5, hip extension 4/5, knee extension 5/5, knee flexion 4-/5, ankle dorsiflexion/plantar 1-2/5 b/l, toes 1/5 ext and flex.   Right L/E: hip flexion 3-/5, hip extension 4/5, knee extension 4+/5, knee flexion 4-/5, ankle dorsiflexion 1-2/5 b/l, toes 1/5 ext and flex.      MEDICATIONS:  MEDICATIONS  (STANDING):  acetaminophen     Tablet .. 650 milliGRAM(s) Oral every 24 hours  amLODIPine   Tablet 10 milliGRAM(s) Oral daily  apixaban 5 milliGRAM(s) Oral every 12 hours  aspirin  chewable 81 milliGRAM(s) Oral daily  atorvastatin 40 milliGRAM(s) Oral at bedtime  dextrose 5%. 1000 milliLiter(s) (50 mL/Hr) IV Continuous <Continuous>  dextrose 5%. 1000 milliLiter(s) (100 mL/Hr) IV Continuous <Continuous>  dextrose 50% Injectable 25 Gram(s) IV Push once  dextrose 50% Injectable 12.5 Gram(s) IV Push once  dextrose 50% Injectable 25 Gram(s) IV Push once  diphenhydrAMINE 25 milliGRAM(s) Oral every 24 hours  DULoxetine 90 milliGRAM(s) Oral every 24 hours  glucagon  Injectable 1 milliGRAM(s) IntraMuscular once  immune   globulin 10% (GAMMAGARD) IVPB 25 Gram(s) IV Intermittent every 24 hours  insulin glargine Injectable (LANTUS) 4 Unit(s) SubCutaneous at bedtime  insulin lispro (ADMELOG) corrective regimen sliding scale   SubCutaneous three times a day before meals  insulin lispro Injectable (ADMELOG) 8 Unit(s) SubCutaneous three times a day before meals  lidocaine 2% Gel 1 Application(s) Topical three times a day  lisinopril 40 milliGRAM(s) Oral every 24 hours  multivitamin 1 Tablet(s) Oral daily  pantoprazole    Tablet 40 milliGRAM(s) Oral before breakfast  sodium chloride 0.9% Bolus 500 milliLiter(s) IV Bolus <User Schedule>  sodium chloride 0.9% Bolus 1000 milliLiter(s) IV Bolus <User Schedule>    MEDICATIONS  (PRN):  acetaminophen     Tablet .. 325 milliGRAM(s) Oral every 6 hours PRN Temp greater or equal to 38C (100.4F), Moderate Pain (4 - 6)  dextrose Oral Gel 15 Gram(s) Oral once PRN Blood Glucose LESS THAN 70 milliGRAM(s)/deciliter      ALLERGIES:  Allergies    No Known Allergies    Intolerances        LABS:                        10.4   2.98  )-----------( 249      ( 05 Jun 2022 07:13 )             32.9     06-05    135  |  102  |  15  ----------------------------<  154<H>  4.0   |  26  |  0.40<L>    Ca    8.3<L>      05 Jun 2022 07:13  Phos  3.6     06-05  Mg     1.7     06-05          RADIOLOGY & ADDITIONAL TESTS: Reviewed.

## 2022-06-05 NOTE — PROGRESS NOTE ADULT - ASSESSMENT
72y Female with PMHx of T2DM c/b peripheral neuropathy, HTN, recent admission and workup for Naty Sanchez tear, TIA on 3/30 (MR negative) presents to [facility] as transfer from [facility] ED for episode of altered mental status (staring, generalized weakness), back to baseline in [facility] ED, no acute pathology on CTH or MRI, found to have metabolic encephalopathy (resolved) 2/2 UTI (completed 7day abx) vs hypertension, also found to have new LE weakness due to diabetic neuropathy. Due to immigration and insurance status, not able to [facility] as recommended by PT. Pending improvement to one-person assist in order to discharge home with out-of-pocket PT vs home with family assist.

## 2022-06-05 NOTE — PROGRESS NOTE ADULT - PROBLEM SELECTOR PLAN 10
F: None  E: Replete PRN   N: DASH/TLC   DVT: Apixaban 5 mg BID   Dispo: RMF, PT rec [facility]- however patient is uninsured and cannot go to [facility] attempting to get to one person assist. Son was able to received FMLA. Daughter was denied FMLA, pending further paperwork

## 2022-06-05 NOTE — PROGRESS NOTE ADULT - PROBLEM SELECTOR PLAN 1
Symmetrical LE weakness, worse distally compared to proximally. Symmetrical diminished sensation, longstanding diabetic peripheral neuropathy. Denies issues with incontinence, or back pain, no sensory line. Likely progression of diabetic neuropathy, however symptoms more severe than to be expected.  MRI L/Spine:  - Showing at the L3/4 leveI. There are degenerative changes involving the facets bilaterally as well as ligamentum flavum hypertrophy. This combination results in moderate central spinal canal stenosis.   - At the L4/5 level, there is disc bulge abutting the L4 nerve roots bilaterally. There are degenerative changes involving the facets bilaterally (right greater than left) as well as ligamentum flavum hypertrophy. This combination results in mild central spinal canal   stenosis.  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 Bilateral lower lumbosacral plexopathies cannot be ruled out based on   these findings.   - Neurology team saying that since it is diabetic in origin it is unlikely that the patient will make significant improvement in the short term.  - Patient is currently a 2-person assist and requires continued daily PT sessions for safe disposition  - c/w Cymbalta 90mg daily   - c/w Custom AFO boots   - c/w incentive spirometry  - c/w IVIG (6/1-6/5), dose 5 of 5 today

## 2022-06-06 LAB
GLUCOSE BLDC GLUCOMTR-MCNC: 185 MG/DL — HIGH (ref 70–99)
GLUCOSE BLDC GLUCOMTR-MCNC: 187 MG/DL — HIGH (ref 70–99)
GLUCOSE BLDC GLUCOMTR-MCNC: 200 MG/DL — HIGH (ref 70–99)
GLUCOSE BLDC GLUCOMTR-MCNC: 229 MG/DL — HIGH (ref 70–99)

## 2022-06-06 PROCEDURE — 99232 SBSQ HOSP IP/OBS MODERATE 35: CPT

## 2022-06-06 RX ADMIN — INSULIN GLARGINE 4 UNIT(S): 100 INJECTION, SOLUTION SUBCUTANEOUS at 21:47

## 2022-06-06 RX ADMIN — Medication 8 UNIT(S): at 17:33

## 2022-06-06 RX ADMIN — Medication 8 UNIT(S): at 11:54

## 2022-06-06 RX ADMIN — AMLODIPINE BESYLATE 10 MILLIGRAM(S): 2.5 TABLET ORAL at 06:15

## 2022-06-06 RX ADMIN — LISINOPRIL 40 MILLIGRAM(S): 2.5 TABLET ORAL at 06:15

## 2022-06-06 RX ADMIN — Medication 4: at 17:32

## 2022-06-06 RX ADMIN — Medication 81 MILLIGRAM(S): at 11:09

## 2022-06-06 RX ADMIN — Medication 1 TABLET(S): at 11:09

## 2022-06-06 RX ADMIN — DULOXETINE HYDROCHLORIDE 90 MILLIGRAM(S): 30 CAPSULE, DELAYED RELEASE ORAL at 11:09

## 2022-06-06 RX ADMIN — LIDOCAINE 1 APPLICATION(S): 4 CREAM TOPICAL at 14:49

## 2022-06-06 RX ADMIN — PANTOPRAZOLE SODIUM 40 MILLIGRAM(S): 20 TABLET, DELAYED RELEASE ORAL at 06:15

## 2022-06-06 RX ADMIN — APIXABAN 5 MILLIGRAM(S): 2.5 TABLET, FILM COATED ORAL at 11:09

## 2022-06-06 RX ADMIN — Medication 8 UNIT(S): at 08:55

## 2022-06-06 RX ADMIN — APIXABAN 5 MILLIGRAM(S): 2.5 TABLET, FILM COATED ORAL at 21:35

## 2022-06-06 RX ADMIN — Medication 2: at 11:55

## 2022-06-06 RX ADMIN — Medication 2: at 08:55

## 2022-06-06 RX ADMIN — ATORVASTATIN CALCIUM 40 MILLIGRAM(S): 80 TABLET, FILM COATED ORAL at 21:35

## 2022-06-06 RX ADMIN — LIDOCAINE 1 APPLICATION(S): 4 CREAM TOPICAL at 21:35

## 2022-06-06 NOTE — PROGRESS NOTE ADULT - PROBLEM SELECTOR PLAN 8
Goal -160. TTE with bubble done prior admission 4/1: grade 1 left ventricular diastolic dysfunction, no PFO, EF >83% hyperdynamic left ventricular systolic function    - c/w Amlodipine 10mg, home Lisinopril 40mg

## 2022-06-06 NOTE — PROGRESS NOTE ADULT - PROBLEM SELECTOR PLAN 7
Pt with hx of DM, A1c 11.6. Endocrine following. TSH results: 1.120.     - Low C peptide patient will need insulin and NOT oral agents on dc  - C/w mISS   - C/w Lantus 4U & Lispro 8U TID   - F/u endocrine recs

## 2022-06-06 NOTE — PROGRESS NOTE ADULT - PROBLEM SELECTOR PLAN 4
Pt presenting with new onset dry cough overnight without associated fever, chills, acute SOB, hypoxia, or reflux. Likely component of HFpEF. TTE (4/1) showed grade 1 left ventricular diastolic dysfunction, no PFO, EF >83% hyperdynamic left ventricular systolic function. COVID swab negative.  RESOLVED

## 2022-06-06 NOTE — PROGRESS NOTE ADULT - PROBLEM SELECTOR PLAN 10
F: None  E: Replete PRN   N: DASH/TLC   DVT: Apixaban 5 mg BID   Dispo: RMF, PT rec [facility]- however patient is uninsured and cannot go to [facility] attempting to get to one person assist. Son was able to received FMLA. Daughter was denied FMLA, pending further paperwork

## 2022-06-06 NOTE — PROGRESS NOTE ADULT - SUBJECTIVE AND OBJECTIVE BOX
SUBJECTIVE/OVERNIGHT EVENTS: No acute overnight events. Pt seen in AM at bedside, resting comfortably in bed, and does not appear to be in any acute distress. Has leg heaviness.    VITAL SIGNS:  Vital Signs Last 24 Hrs  T(C): 36.7 (06 Jun 2022 05:43), Max: 36.7 (05 Jun 2022 12:14)  T(F): 98.1 (06 Jun 2022 05:43), Max: 98.1 (06 Jun 2022 05:43)  HR: 87 (06 Jun 2022 05:43) (70 - 87)  BP: 176/75 (06 Jun 2022 05:43) (136/72 - 176/75)  BP(mean): --  RR: 18 (06 Jun 2022 05:43) (16 - 18)  SpO2: 96% (06 Jun 2022 05:43) (96% - 99%)    PHYSICAL EXAM:  General: NAD; elderly female, sleeping, lying in bed  HEENT: NC/AT; PERRL, R eye decreased acuity at baseline; EOMI; MMM  Neck: supple; no JVD  Cardiac: RRR; +S1/S2, +systolic murmur II/IV RUSB  Pulm: CTA B/L; no W/R/R, no increased WOB or accessory muscle use  GI: soft, NT/ND, +BSx4,   Extremities: WWP; b/l legs in SCDs  Vasc: 2+ radial, DP pulses B/L  Neuro AAO x3   Motor: U/E: full ROM tremors in upper extremities  Left L/E:  knee extension 5/5, knee flexion 4-/5, ankle dorsiflexion/plantar 1-2/5 b/l, toes 1/5 ext and flex.   Right L/E: knee extension 4+/5, knee flexion 4-/5, ankle dorsiflexion 1-2/5 b/l, toes 1/5 ext and flex.      MEDICATIONS:  MEDICATIONS  (STANDING):  amLODIPine   Tablet 10 milliGRAM(s) Oral daily  apixaban 5 milliGRAM(s) Oral every 12 hours  aspirin  chewable 81 milliGRAM(s) Oral daily  atorvastatin 40 milliGRAM(s) Oral at bedtime  dextrose 5%. 1000 milliLiter(s) (50 mL/Hr) IV Continuous <Continuous>  dextrose 5%. 1000 milliLiter(s) (100 mL/Hr) IV Continuous <Continuous>  dextrose 50% Injectable 25 Gram(s) IV Push once  dextrose 50% Injectable 12.5 Gram(s) IV Push once  dextrose 50% Injectable 25 Gram(s) IV Push once  DULoxetine 90 milliGRAM(s) Oral every 24 hours  glucagon  Injectable 1 milliGRAM(s) IntraMuscular once  insulin glargine Injectable (LANTUS) 4 Unit(s) SubCutaneous at bedtime  insulin lispro (ADMELOG) corrective regimen sliding scale   SubCutaneous three times a day before meals  insulin lispro Injectable (ADMELOG) 8 Unit(s) SubCutaneous three times a day before meals  lidocaine 2% Gel 1 Application(s) Topical three times a day  lisinopril 40 milliGRAM(s) Oral every 24 hours  multivitamin 1 Tablet(s) Oral daily  pantoprazole    Tablet 40 milliGRAM(s) Oral before breakfast    MEDICATIONS  (PRN):  acetaminophen     Tablet .. 325 milliGRAM(s) Oral every 6 hours PRN Temp greater or equal to 38C (100.4F), Moderate Pain (4 - 6)  dextrose Oral Gel 15 Gram(s) Oral once PRN Blood Glucose LESS THAN 70 milliGRAM(s)/deciliter      ALLERGIES:  Allergies    No Known Allergies    Intolerances        LABS:                        10.4   2.98  )-----------( 249      ( 05 Jun 2022 07:13 )             32.9     06-05    135  |  102  |  15  ----------------------------<  154<H>  4.0   |  26  |  0.40<L>    Ca    8.3<L>      05 Jun 2022 07:13  Phos  3.6     06-05  Mg     1.7     06-05          RADIOLOGY & ADDITIONAL TESTS: Reviewed.

## 2022-06-06 NOTE — PROGRESS NOTE ADULT - PROBLEM SELECTOR PLAN 1
Symmetrical LE weakness, worse distally compared to proximally. Symmetrical diminished sensation, longstanding diabetic peripheral neuropathy. Denies issues with incontinence, or back pain, no sensory line. Likely progression of diabetic neuropathy, however symptoms more severe than to be expected.  MRI L/Spine: L3/4 leveI degenerative changes w/  moderate central spinal canal stenosis. L4/5 level  disc bulge   EMG: severe distal-predominant polyneuropathy in the lower extremities. IVIG course completed.  (6/1-6/5)    - Patient is currently a 2-person assist and requires continued daily PT sessions for safe disposition  - c/w Cymbalta 90mg daily   - c/w Custom AFO boots   - c/w incentive spirometry

## 2022-06-06 NOTE — PROGRESS NOTE ADULT - PROBLEM SELECTOR PLAN 2
Needle like pain shins downward, interfering with sleep. LE duplex - acute DVT of  the left peroneal vein. repeat 6/3 with resolution of DVT.     - c/w Cymbalta 90 Qd for diabetic neuropathy  - c/w Apixaban 5mg BID for 3 months (5/28-8/28)

## 2022-06-06 NOTE — PROGRESS NOTE ADULT - ASSESSMENT
72y Female with PMHx of T2DM c/b peripheral neuropathy, HTN, recent admission and workup for Naty Sanchez tear, TIA on 3/30 (MR negative) presents to [facility] as transfer from [facility] ED for episode of altered mental status (staring, generalized weakness), back to baseline in [facility] ED, no acute pathology on CTH or MRI, found to have metabolic encephalopathy (resolved) 2/2 UTI (completed 7day abx) vs hypertension, also found to have new LE weakness due to diabetic neuropathy. Due to immigration and insurance status, not able to F[facility] as recommended by PT. Pending improvement to one-person assist in order to discharge home with out-of-pocket PT vs home with family assist.

## 2022-06-07 LAB
GLUCOSE BLDC GLUCOMTR-MCNC: 107 MG/DL — HIGH (ref 70–99)
GLUCOSE BLDC GLUCOMTR-MCNC: 149 MG/DL — HIGH (ref 70–99)
GLUCOSE BLDC GLUCOMTR-MCNC: 182 MG/DL — HIGH (ref 70–99)
GLUCOSE BLDC GLUCOMTR-MCNC: 90 MG/DL — SIGNIFICANT CHANGE UP (ref 70–99)

## 2022-06-07 PROCEDURE — 99232 SBSQ HOSP IP/OBS MODERATE 35: CPT

## 2022-06-07 RX ADMIN — Medication 8 UNIT(S): at 12:40

## 2022-06-07 RX ADMIN — APIXABAN 5 MILLIGRAM(S): 2.5 TABLET, FILM COATED ORAL at 11:09

## 2022-06-07 RX ADMIN — APIXABAN 5 MILLIGRAM(S): 2.5 TABLET, FILM COATED ORAL at 21:49

## 2022-06-07 RX ADMIN — PANTOPRAZOLE SODIUM 40 MILLIGRAM(S): 20 TABLET, DELAYED RELEASE ORAL at 06:02

## 2022-06-07 RX ADMIN — LIDOCAINE 1 APPLICATION(S): 4 CREAM TOPICAL at 06:03

## 2022-06-07 RX ADMIN — Medication 2: at 12:40

## 2022-06-07 RX ADMIN — DULOXETINE HYDROCHLORIDE 90 MILLIGRAM(S): 30 CAPSULE, DELAYED RELEASE ORAL at 11:08

## 2022-06-07 RX ADMIN — LIDOCAINE 1 APPLICATION(S): 4 CREAM TOPICAL at 22:00

## 2022-06-07 RX ADMIN — Medication 81 MILLIGRAM(S): at 11:09

## 2022-06-07 RX ADMIN — ATORVASTATIN CALCIUM 40 MILLIGRAM(S): 80 TABLET, FILM COATED ORAL at 21:49

## 2022-06-07 RX ADMIN — Medication 8 UNIT(S): at 08:57

## 2022-06-07 RX ADMIN — LISINOPRIL 40 MILLIGRAM(S): 2.5 TABLET ORAL at 06:01

## 2022-06-07 RX ADMIN — AMLODIPINE BESYLATE 10 MILLIGRAM(S): 2.5 TABLET ORAL at 06:02

## 2022-06-07 RX ADMIN — Medication 8 UNIT(S): at 17:59

## 2022-06-07 RX ADMIN — LIDOCAINE 1 APPLICATION(S): 4 CREAM TOPICAL at 17:22

## 2022-06-07 RX ADMIN — Medication 1 TABLET(S): at 11:09

## 2022-06-07 RX ADMIN — INSULIN GLARGINE 4 UNIT(S): 100 INJECTION, SOLUTION SUBCUTANEOUS at 21:50

## 2022-06-07 NOTE — PROGRESS NOTE ADULT - ASSESSMENT
72y Female with PMHx of T2DM c/b peripheral neuropathy, HTN, recent admission and workup for Naty Sanchez tear, TIA on 3/30 (MR negative) presents to [facility] as transfer from [facility] ED for episode of altered mental status (staring, generalized weakness), back to baseline in [facility] ED, no acute pathology on CTH or MRI, found to have metabolic encephalopathy (resolved) 2/2 UTI (completed 7day abx) vs hypertension, also found to have new LE weakness due to diabetic neuropathy. Due to immigration and insurance status, not able to FELICIA as recommended by PT. Pending improvement to one-person assist in order to discharge home with out-of-pocket PT vs home with family assist.

## 2022-06-07 NOTE — PROGRESS NOTE ADULT - ATTENDING COMMENTS
stable diabetic neuropathy and mild cognitive impairment.  actually with less neuropathic pain today than previously    no issues from DVT    no change in cymbalta 90mg daily  PT/OT  anticoagulation for 3 months    cannot safely discharge home due to needing 2 person assist

## 2022-06-07 NOTE — PROGRESS NOTE ADULT - SUBJECTIVE AND OBJECTIVE BOX
SUBJECTIVE/OVERNIGHT EVENTS: No acute overnight events. Pt seen in AM at bedside, resting comfortably in bed, and does not appear to be in any acute distress. When asked, pt denies any recent or active fever, chills, nausea, vomiting, headache, acute sob, chest pain, abdominal pain, genitourinary sx, extremity pain or swelling.    VITAL SIGNS:  Vital Signs Last 24 Hrs  T(C): 36.6 (07 Jun 2022 05:25), Max: 36.7 (06 Jun 2022 20:33)  T(F): 97.8 (07 Jun 2022 05:25), Max: 98.1 (06 Jun 2022 20:33)  HR: 72 (07 Jun 2022 05:25) (70 - 72)  BP: 157/78 (07 Jun 2022 05:25) (142/71 - 157/78)  BP(mean): --  RR: 16 (07 Jun 2022 05:25) (16 - 16)  SpO2: 100% (07 Jun 2022 05:25) (97% - 100%)    PHYSICAL EXAM:  General: NAD; elderly female, lying in bed  HEENT: NC/AT; PERRL, R eye decreased acuity at baseline; EOMI; MMM  Neck: supple; no JVD  Cardiac: RRR; +S1/S2, +systolic murmur II/IV RUSB  Pulm: CTA B/L; no W/R/R, no increased WOB or accessory muscle use  GI: soft, NT/ND, +BSx4,   Extremities: WWP; b/l legs in SCDs  Vasc: 2+ radial, DP pulses B/L  Neuro AAO x3   Motor: U/E: full ROM tremors in upper extremities  Left L/E:  knee extension 4/5, knee flexion 3/5, ankle dorsiflexion/plantar 1/5 b/l, toes 1/5 ext and flex.   Right L/E: knee extension 4/5, knee flexion 3/5, ankle dorsiflexion 1/5 b/l, toes 1/5 ext and flex.      MEDICATIONS:  MEDICATIONS  (STANDING):  amLODIPine   Tablet 10 milliGRAM(s) Oral daily  apixaban 5 milliGRAM(s) Oral every 12 hours  aspirin  chewable 81 milliGRAM(s) Oral daily  atorvastatin 40 milliGRAM(s) Oral at bedtime  dextrose 5%. 1000 milliLiter(s) (50 mL/Hr) IV Continuous <Continuous>  dextrose 5%. 1000 milliLiter(s) (100 mL/Hr) IV Continuous <Continuous>  dextrose 50% Injectable 25 Gram(s) IV Push once  dextrose 50% Injectable 12.5 Gram(s) IV Push once  dextrose 50% Injectable 25 Gram(s) IV Push once  DULoxetine 90 milliGRAM(s) Oral every 24 hours  glucagon  Injectable 1 milliGRAM(s) IntraMuscular once  insulin glargine Injectable (LANTUS) 4 Unit(s) SubCutaneous at bedtime  insulin lispro (ADMELOG) corrective regimen sliding scale   SubCutaneous three times a day before meals  insulin lispro Injectable (ADMELOG) 8 Unit(s) SubCutaneous three times a day before meals  lidocaine 2% Gel 1 Application(s) Topical three times a day  lisinopril 40 milliGRAM(s) Oral every 24 hours  multivitamin 1 Tablet(s) Oral daily  pantoprazole    Tablet 40 milliGRAM(s) Oral before breakfast    MEDICATIONS  (PRN):  acetaminophen     Tablet .. 325 milliGRAM(s) Oral every 6 hours PRN Temp greater or equal to 38C (100.4F), Moderate Pain (4 - 6)  dextrose Oral Gel 15 Gram(s) Oral once PRN Blood Glucose LESS THAN 70 milliGRAM(s)/deciliter      ALLERGIES:  Allergies    No Known Allergies    Intolerances        LABS:              RADIOLOGY & ADDITIONAL TESTS: Reviewed.

## 2022-06-07 NOTE — PROGRESS NOTE ADULT - PROBLEM SELECTOR PLAN 10
F: None  E: Replete PRN   N: DASH/TLC   DVT: Apixaban 5 mg BID   Dispo: RMF, PT rec FEL[facility]- however patient is uninsured and cannot go to [facility] attempting to get to one person assist. Son was able to received FMLA. Daughter was denied FMLA, pending further paperwork

## 2022-06-08 LAB
GLUCOSE BLDC GLUCOMTR-MCNC: 139 MG/DL — HIGH (ref 70–99)
GLUCOSE BLDC GLUCOMTR-MCNC: 150 MG/DL — HIGH (ref 70–99)
GLUCOSE BLDC GLUCOMTR-MCNC: 170 MG/DL — HIGH (ref 70–99)
GLUCOSE BLDC GLUCOMTR-MCNC: 343 MG/DL — HIGH (ref 70–99)
SARS-COV-2 RNA SPEC QL NAA+PROBE: SIGNIFICANT CHANGE UP

## 2022-06-08 PROCEDURE — 99231 SBSQ HOSP IP/OBS SF/LOW 25: CPT

## 2022-06-08 RX ORDER — INSULIN LISPRO 100/ML
VIAL (ML) SUBCUTANEOUS
Refills: 0 | Status: DISCONTINUED | OUTPATIENT
Start: 2022-06-08 | End: 2022-06-27

## 2022-06-08 RX ORDER — INSULIN LISPRO 100/ML
8 VIAL (ML) SUBCUTANEOUS ONCE
Refills: 0 | Status: COMPLETED | OUTPATIENT
Start: 2022-06-08 | End: 2022-06-11

## 2022-06-08 RX ADMIN — Medication 81 MILLIGRAM(S): at 11:12

## 2022-06-08 RX ADMIN — LIDOCAINE 1 APPLICATION(S): 4 CREAM TOPICAL at 06:05

## 2022-06-08 RX ADMIN — Medication 8 UNIT(S): at 09:05

## 2022-06-08 RX ADMIN — Medication 1 TABLET(S): at 11:12

## 2022-06-08 RX ADMIN — INSULIN GLARGINE 4 UNIT(S): 100 INJECTION, SOLUTION SUBCUTANEOUS at 22:16

## 2022-06-08 RX ADMIN — Medication 2: at 09:06

## 2022-06-08 RX ADMIN — LISINOPRIL 40 MILLIGRAM(S): 2.5 TABLET ORAL at 06:06

## 2022-06-08 RX ADMIN — APIXABAN 5 MILLIGRAM(S): 2.5 TABLET, FILM COATED ORAL at 21:33

## 2022-06-08 RX ADMIN — AMLODIPINE BESYLATE 10 MILLIGRAM(S): 2.5 TABLET ORAL at 06:06

## 2022-06-08 RX ADMIN — Medication 8 UNIT(S): at 12:59

## 2022-06-08 RX ADMIN — ATORVASTATIN CALCIUM 40 MILLIGRAM(S): 80 TABLET, FILM COATED ORAL at 21:33

## 2022-06-08 RX ADMIN — APIXABAN 5 MILLIGRAM(S): 2.5 TABLET, FILM COATED ORAL at 11:12

## 2022-06-08 RX ADMIN — LIDOCAINE 1 APPLICATION(S): 4 CREAM TOPICAL at 15:45

## 2022-06-08 RX ADMIN — DULOXETINE HYDROCHLORIDE 90 MILLIGRAM(S): 30 CAPSULE, DELAYED RELEASE ORAL at 11:12

## 2022-06-08 RX ADMIN — Medication 8 UNIT(S): at 17:55

## 2022-06-08 RX ADMIN — LIDOCAINE 1 APPLICATION(S): 4 CREAM TOPICAL at 22:16

## 2022-06-08 RX ADMIN — PANTOPRAZOLE SODIUM 40 MILLIGRAM(S): 20 TABLET, DELAYED RELEASE ORAL at 06:06

## 2022-06-08 NOTE — PROGRESS NOTE ADULT - PROBLEM SELECTOR PLAN 10
F: None  E: Replete PRN   N: DASH/TLC   DVT: Apixaban 5 mg BID   Dispo: RMF, PT rec FEL[facility]- however patient is uninsured and cannot go to [facility] attempting to get to one person assist. Son was able to received FMLA. Daughter was denied FMLA, pending further paperwork

## 2022-06-08 NOTE — PROGRESS NOTE ADULT - ASSESSMENT
72y Female with PMHx of T2DM c/b peripheral neuropathy, HTN, recent admission and workup for Naty Sanchez tear, TIA on 3/30 (MR negative) presents to [facility] as transfer from [facility] ED for episode of altered mental status (staring, generalized weakness), back to baseline in [facility] ED, no acute pathology on CTH or MRI, found to have metabolic encephalopathy (resolved) 2/2 UTI (completed 7day abx) vs hypertension, also found to have new LE weakness due to diabetic neuropathy. Due to immigration and insurance status, not able to FELIC[facility] as recommended by PT. Pending improvement to one-person assist in order to discharge home with out-of-pocket PT vs home with family assist.

## 2022-06-08 NOTE — PROGRESS NOTE ADULT - SUBJECTIVE AND OBJECTIVE BOX
SUBJECTIVE/OVERNIGHT EVENTS: No acute overnight events. Pt seen in AM at bedside, resting comfortably in bed, and does not appear to be in any acute distress.     VITAL SIGNS:  Vital Signs Last 24 Hrs  T(C): 37.1 (08 Jun 2022 06:02), Max: 37.1 (08 Jun 2022 06:02)  T(F): 98.8 (08 Jun 2022 06:02), Max: 98.8 (08 Jun 2022 06:02)  HR: 86 (08 Jun 2022 06:02) (83 - 86)  BP: 159/75 (08 Jun 2022 06:02) (138/73 - 159/75)  BP(mean): --  RR: 18 (08 Jun 2022 06:02) (18 - 18)  SpO2: 98% (08 Jun 2022 06:02) (97% - 98%)    PHYSICAL EXAM:  General: NAD; elderly female, lying in bed  HEENT: NC/AT; PERRL, R eye decreased acuity at baseline; EOMI; MMM  Neck: supple; no JVD  Cardiac: RRR; +S1/S2, +systolic murmur II/IV RUSB  Pulm: CTA B/L; no W/R/R, no increased WOB or accessory muscle use  GI: soft, NT/ND, +BSx4,   Extremities: WWP; b/l legs in SCDs  Vasc: 2+ radial, DP pulses B/L  Neuro AAO x3   Motor: U/E: full ROM tremors in upper extremities  Left L/E:  knee extension 4/5, knee flexion 3/5, ankle dorsiflexion/plantar 1/5 b/l, toes 1/5 ext and flex.   Right L/E: knee extension 4/5, knee flexion 3/5, ankle dorsiflexion 1/5 b/l, toes 1/5 ext and flex.      MEDICATIONS:  MEDICATIONS  (STANDING):  amLODIPine   Tablet 10 milliGRAM(s) Oral daily  apixaban 5 milliGRAM(s) Oral every 12 hours  aspirin  chewable 81 milliGRAM(s) Oral daily  atorvastatin 40 milliGRAM(s) Oral at bedtime  dextrose 5%. 1000 milliLiter(s) (50 mL/Hr) IV Continuous <Continuous>  dextrose 5%. 1000 milliLiter(s) (100 mL/Hr) IV Continuous <Continuous>  dextrose 50% Injectable 25 Gram(s) IV Push once  dextrose 50% Injectable 12.5 Gram(s) IV Push once  dextrose 50% Injectable 25 Gram(s) IV Push once  DULoxetine 90 milliGRAM(s) Oral every 24 hours  glucagon  Injectable 1 milliGRAM(s) IntraMuscular once  insulin glargine Injectable (LANTUS) 4 Unit(s) SubCutaneous at bedtime  insulin lispro (ADMELOG) corrective regimen sliding scale   SubCutaneous three times a day before meals  insulin lispro Injectable (ADMELOG) 8 Unit(s) SubCutaneous three times a day before meals  lidocaine 2% Gel 1 Application(s) Topical three times a day  lisinopril 40 milliGRAM(s) Oral every 24 hours  multivitamin 1 Tablet(s) Oral daily  pantoprazole    Tablet 40 milliGRAM(s) Oral before breakfast    MEDICATIONS  (PRN):  acetaminophen     Tablet .. 325 milliGRAM(s) Oral every 6 hours PRN Temp greater or equal to 38C (100.4F), Moderate Pain (4 - 6)  dextrose Oral Gel 15 Gram(s) Oral once PRN Blood Glucose LESS THAN 70 milliGRAM(s)/deciliter      ALLERGIES:  Allergies    No Known Allergies    Intolerances        LABS:              RADIOLOGY & ADDITIONAL TESTS: Reviewed.

## 2022-06-08 NOTE — PROGRESS NOTE ADULT - ATTENDING COMMENTS
stable painful neuropathy and mild cognitive impairment    no change in cymbalta 90mg daily  PT/OT  cannot be discharged due to needing 2 person assist

## 2022-06-09 LAB
GLUCOSE BLDC GLUCOMTR-MCNC: 106 MG/DL — HIGH (ref 70–99)
GLUCOSE BLDC GLUCOMTR-MCNC: 158 MG/DL — HIGH (ref 70–99)
GLUCOSE BLDC GLUCOMTR-MCNC: 165 MG/DL — HIGH (ref 70–99)
GLUCOSE BLDC GLUCOMTR-MCNC: 94 MG/DL — SIGNIFICANT CHANGE UP (ref 70–99)

## 2022-06-09 PROCEDURE — 99231 SBSQ HOSP IP/OBS SF/LOW 25: CPT

## 2022-06-09 RX ADMIN — APIXABAN 5 MILLIGRAM(S): 2.5 TABLET, FILM COATED ORAL at 21:21

## 2022-06-09 RX ADMIN — LISINOPRIL 40 MILLIGRAM(S): 2.5 TABLET ORAL at 05:22

## 2022-06-09 RX ADMIN — APIXABAN 5 MILLIGRAM(S): 2.5 TABLET, FILM COATED ORAL at 10:02

## 2022-06-09 RX ADMIN — ATORVASTATIN CALCIUM 40 MILLIGRAM(S): 80 TABLET, FILM COATED ORAL at 21:21

## 2022-06-09 RX ADMIN — Medication 8 UNIT(S): at 17:40

## 2022-06-09 RX ADMIN — PANTOPRAZOLE SODIUM 40 MILLIGRAM(S): 20 TABLET, DELAYED RELEASE ORAL at 06:09

## 2022-06-09 RX ADMIN — DULOXETINE HYDROCHLORIDE 90 MILLIGRAM(S): 30 CAPSULE, DELAYED RELEASE ORAL at 10:02

## 2022-06-09 RX ADMIN — LIDOCAINE 1 APPLICATION(S): 4 CREAM TOPICAL at 13:45

## 2022-06-09 RX ADMIN — Medication 8 UNIT(S): at 12:36

## 2022-06-09 RX ADMIN — Medication 1 TABLET(S): at 11:11

## 2022-06-09 RX ADMIN — INSULIN GLARGINE 4 UNIT(S): 100 INJECTION, SOLUTION SUBCUTANEOUS at 21:21

## 2022-06-09 RX ADMIN — Medication 2: at 08:36

## 2022-06-09 RX ADMIN — Medication 81 MILLIGRAM(S): at 11:10

## 2022-06-09 RX ADMIN — LIDOCAINE 1 APPLICATION(S): 4 CREAM TOPICAL at 05:20

## 2022-06-09 RX ADMIN — Medication 2: at 12:36

## 2022-06-09 RX ADMIN — AMLODIPINE BESYLATE 10 MILLIGRAM(S): 2.5 TABLET ORAL at 05:20

## 2022-06-09 RX ADMIN — Medication 8 UNIT(S): at 08:36

## 2022-06-09 NOTE — PROGRESS NOTE ADULT - SUBJECTIVE AND OBJECTIVE BOX
SUBJECTIVE/OVERNIGHT EVENTS: No acute overnight events. Pt seen in AM at bedside, resting comfortably in bed, and does not appear to be in any acute distress.     VITAL SIGNS:  Vital Signs Last 24 Hrs  T(C): 36.9 (09 Jun 2022 11:49), Max: 37.2 (08 Jun 2022 20:51)  T(F): 98.4 (09 Jun 2022 11:49), Max: 98.9 (08 Jun 2022 20:51)  HR: 70 (09 Jun 2022 11:49) (70 - 85)  BP: 144/76 (09 Jun 2022 11:49) (144/76 - 151/85)  BP(mean): --  RR: 20 (09 Jun 2022 11:49) (16 - 20)  SpO2: 98% (09 Jun 2022 11:49) (95% - 99%)    PHYSICAL EXAM:  General: NAD; elderly female, lying in bed  HEENT: NC/AT; PERRL, R eye decreased acuity at baseline; EOMI; MMM  Neck: supple; no JVD  Cardiac: RRR; +S1/S2, +systolic murmur II/IV RUSB  Pulm: CTA B/L; no W/R/R, no increased WOB or accessory muscle use  GI: soft, NT/ND, +BSx4,   Extremities: WWP; b/l legs in SCDs  Vasc: 2+ radial, DP pulses B/L  Neuro AAO x3   Motor: U/E: full ROM tremors in upper extremities  Left L/E:  knee extension 4/5, knee flexion 3/5, ankle dorsiflexion/plantar 1/5 b/l, toes 1/5 ext and flex.   Right L/E: knee extension 4/5, knee flexion 3/5, ankle dorsiflexion 1/5 b/l, toes 1/5 ext and flex.    MEDICATIONS:  MEDICATIONS  (STANDING):  amLODIPine   Tablet 10 milliGRAM(s) Oral daily  apixaban 5 milliGRAM(s) Oral every 12 hours  aspirin  chewable 81 milliGRAM(s) Oral daily  atorvastatin 40 milliGRAM(s) Oral at bedtime  dextrose 5%. 1000 milliLiter(s) (50 mL/Hr) IV Continuous <Continuous>  dextrose 5%. 1000 milliLiter(s) (100 mL/Hr) IV Continuous <Continuous>  dextrose 50% Injectable 25 Gram(s) IV Push once  dextrose 50% Injectable 12.5 Gram(s) IV Push once  dextrose 50% Injectable 25 Gram(s) IV Push once  DULoxetine 90 milliGRAM(s) Oral every 24 hours  glucagon  Injectable 1 milliGRAM(s) IntraMuscular once  insulin glargine Injectable (LANTUS) 4 Unit(s) SubCutaneous at bedtime  insulin lispro (ADMELOG) corrective regimen sliding scale   SubCutaneous Before meals and at bedtime  insulin lispro Injectable (ADMELOG) 8 Unit(s) SubCutaneous three times a day before meals  insulin lispro Injectable (ADMELOG). 8 Unit(s) SubCutaneous once  lidocaine 2% Gel 1 Application(s) Topical three times a day  lisinopril 40 milliGRAM(s) Oral every 24 hours  multivitamin 1 Tablet(s) Oral daily  pantoprazole    Tablet 40 milliGRAM(s) Oral before breakfast    MEDICATIONS  (PRN):  acetaminophen     Tablet .. 325 milliGRAM(s) Oral every 6 hours PRN Temp greater or equal to 38C (100.4F), Moderate Pain (4 - 6)  dextrose Oral Gel 15 Gram(s) Oral once PRN Blood Glucose LESS THAN 70 milliGRAM(s)/deciliter      ALLERGIES:  Allergies    No Known Allergies    Intolerances        LABS:              RADIOLOGY & ADDITIONAL TESTS: Reviewed.

## 2022-06-09 NOTE — PROGRESS NOTE ADULT - ATTENDING COMMENTS
stable severe neuropathy and mild cognitive impairment, unsafe discharge due to needing 2 person assist at home.    continue cymbalta 90mg daily for pain

## 2022-06-09 NOTE — PROGRESS NOTE ADULT - ASSESSMENT
72y Female with PMHx of T2DM c/b peripheral neuropathy, HTN, recent admission and workup for Naty Sanchez tear, TIA on 3/30 (MR negative) presents to [facility] as transfer from [facility] ED for episode of altered mental status (staring, generalized weakness), back to baseline in [facility] ED, no acute pathology on CTH or MRI, found to have metabolic encephalopathy (resolved) 2/2 UTI (completed 7day abx) vs hypertension, also found to have new LE weakness due to diabetic neuropathy. Due to immigration and insurance status, not able to F[facility] as recommended by PT. Pending improvement to one-person assist in order to discharge home with out-of-pocket PT vs home with family assist.

## 2022-06-09 NOTE — PROGRESS NOTE ADULT - PROBLEM SELECTOR PLAN 10
F: None  E: Replete PRN   N: DASH/TLC   DVT: Apixaban 5 mg BID   Dispo: RMF, PT rec [facility]- however patient is uninsured and cannot go to [facility] attempting to get to one person assist. Son was able to received FMLA. Daughter was denied FMLA, pending further paperwork

## 2022-06-10 LAB
GLUCOSE BLDC GLUCOMTR-MCNC: 117 MG/DL — HIGH (ref 70–99)
GLUCOSE BLDC GLUCOMTR-MCNC: 177 MG/DL — HIGH (ref 70–99)
GLUCOSE BLDC GLUCOMTR-MCNC: 186 MG/DL — HIGH (ref 70–99)
GLUCOSE BLDC GLUCOMTR-MCNC: 83 MG/DL — SIGNIFICANT CHANGE UP (ref 70–99)

## 2022-06-10 PROCEDURE — 99232 SBSQ HOSP IP/OBS MODERATE 35: CPT

## 2022-06-10 RX ORDER — SODIUM CHLORIDE 9 MG/ML
1000 INJECTION INTRAMUSCULAR; INTRAVENOUS; SUBCUTANEOUS
Refills: 0 | Status: DISCONTINUED | OUTPATIENT
Start: 2022-06-10 | End: 2022-06-14

## 2022-06-10 RX ADMIN — AMLODIPINE BESYLATE 10 MILLIGRAM(S): 2.5 TABLET ORAL at 07:08

## 2022-06-10 RX ADMIN — Medication 2: at 12:27

## 2022-06-10 RX ADMIN — DULOXETINE HYDROCHLORIDE 90 MILLIGRAM(S): 30 CAPSULE, DELAYED RELEASE ORAL at 09:51

## 2022-06-10 RX ADMIN — ATORVASTATIN CALCIUM 40 MILLIGRAM(S): 80 TABLET, FILM COATED ORAL at 21:47

## 2022-06-10 RX ADMIN — Medication 1 TABLET(S): at 11:39

## 2022-06-10 RX ADMIN — Medication 8 UNIT(S): at 12:27

## 2022-06-10 RX ADMIN — APIXABAN 5 MILLIGRAM(S): 2.5 TABLET, FILM COATED ORAL at 09:51

## 2022-06-10 RX ADMIN — LIDOCAINE 1 APPLICATION(S): 4 CREAM TOPICAL at 21:47

## 2022-06-10 RX ADMIN — INSULIN GLARGINE 4 UNIT(S): 100 INJECTION, SOLUTION SUBCUTANEOUS at 21:30

## 2022-06-10 RX ADMIN — Medication 81 MILLIGRAM(S): at 11:39

## 2022-06-10 RX ADMIN — PANTOPRAZOLE SODIUM 40 MILLIGRAM(S): 20 TABLET, DELAYED RELEASE ORAL at 07:08

## 2022-06-10 RX ADMIN — SODIUM CHLORIDE 100 MILLILITER(S): 9 INJECTION INTRAMUSCULAR; INTRAVENOUS; SUBCUTANEOUS at 15:09

## 2022-06-10 RX ADMIN — LISINOPRIL 40 MILLIGRAM(S): 2.5 TABLET ORAL at 07:08

## 2022-06-10 RX ADMIN — APIXABAN 5 MILLIGRAM(S): 2.5 TABLET, FILM COATED ORAL at 21:47

## 2022-06-10 RX ADMIN — LIDOCAINE 1 APPLICATION(S): 4 CREAM TOPICAL at 07:08

## 2022-06-10 RX ADMIN — Medication 2: at 08:41

## 2022-06-10 RX ADMIN — LIDOCAINE 1 APPLICATION(S): 4 CREAM TOPICAL at 13:24

## 2022-06-10 RX ADMIN — Medication 8 UNIT(S): at 08:41

## 2022-06-10 RX ADMIN — Medication 8 UNIT(S): at 17:46

## 2022-06-10 NOTE — PROGRESS NOTE ADULT - SUBJECTIVE AND OBJECTIVE BOX
SUBJECTIVE/OVERNIGHT EVENTS: No acute overnight events. Pt seen in AM at bedside, resting comfortably in bed, and does not appear to be in any acute distress. When asked, pt denies any recent or active fever, chills, nausea, vomiting, headache, acute sob, chest pain, abdominal pain, genitourinary sx, extremity pain or swelling.    VITAL SIGNS:  Vital Signs Last 24 Hrs  T(C): 36.8 (10 Ryan 2022 10:23), Max: 36.9 (10 Ryan 2022 05:22)  T(F): 98.3 (10 Ryan 2022 10:23), Max: 98.5 (10 Ryan 2022 05:22)  HR: 80 (10 Ryan 2022 10:23) (75 - 80)  BP: 127/63 (10 Ryan 2022 10:23) (127/63 - 149/81)  BP(mean): --  RR: 20 (10 Ryan 2022 10:23) (18 - 20)  SpO2: 97% (10 Ryan 2022 10:23) (96% - 98%)    PHYSICAL EXAM:  General: NAD; elderly female, lying in bed  HEENT: NC/AT; PERRL, R eye decreased acuity at baseline; EOMI; MMM  Neck: supple; no JVD  Cardiac: RRR; +S1/S2, +systolic murmur II/IV RUSB  Pulm: CTA B/L; no W/R/R, no increased WOB or accessory muscle use  GI: soft, NT/ND, +BSx4,   Extremities: WWP; b/l legs in SCDs  Vasc: 2+ radial, DP pulses B/L  Neuro AAO x3   Motor: U/E: full ROM tremors in upper extremities  Left L/E:  knee extension 4/5, knee flexion 3/5, ankle dorsiflexion/plantar 1/5 b/l, toes 1/5 ext and flex.   Right L/E: knee extension 4/5, knee flexion 3/5, ankle dorsiflexion 1/5 b/l, toes 1/5 ext and flex.    MEDICATIONS:  MEDICATIONS  (STANDING):  amLODIPine   Tablet 10 milliGRAM(s) Oral daily  apixaban 5 milliGRAM(s) Oral every 12 hours  aspirin  chewable 81 milliGRAM(s) Oral daily  atorvastatin 40 milliGRAM(s) Oral at bedtime  dextrose 5%. 1000 milliLiter(s) (50 mL/Hr) IV Continuous <Continuous>  dextrose 5%. 1000 milliLiter(s) (100 mL/Hr) IV Continuous <Continuous>  dextrose 50% Injectable 25 Gram(s) IV Push once  dextrose 50% Injectable 12.5 Gram(s) IV Push once  dextrose 50% Injectable 25 Gram(s) IV Push once  DULoxetine 90 milliGRAM(s) Oral every 24 hours  glucagon  Injectable 1 milliGRAM(s) IntraMuscular once  insulin glargine Injectable (LANTUS) 4 Unit(s) SubCutaneous at bedtime  insulin lispro (ADMELOG) corrective regimen sliding scale   SubCutaneous Before meals and at bedtime  insulin lispro Injectable (ADMELOG) 8 Unit(s) SubCutaneous three times a day before meals  insulin lispro Injectable (ADMELOG). 8 Unit(s) SubCutaneous once  lidocaine 2% Gel 1 Application(s) Topical three times a day  lisinopril 40 milliGRAM(s) Oral every 24 hours  multivitamin 1 Tablet(s) Oral daily  pantoprazole    Tablet 40 milliGRAM(s) Oral before breakfast    MEDICATIONS  (PRN):  acetaminophen     Tablet .. 325 milliGRAM(s) Oral every 6 hours PRN Temp greater or equal to 38C (100.4F), Moderate Pain (4 - 6)  dextrose Oral Gel 15 Gram(s) Oral once PRN Blood Glucose LESS THAN 70 milliGRAM(s)/deciliter      ALLERGIES:  Allergies    No Known Allergies    Intolerances        LABS:              RADIOLOGY & ADDITIONAL TESTS: Reviewed.

## 2022-06-10 NOTE — PROGRESS NOTE ADULT - ASSESSMENT
72y Female with PMHx of T2DM c/b peripheral neuropathy, HTN, recent admission and workup for Naty Sanchez tear, TIA on 3/30 (MR negative) presents to [facility] as transfer from [facility] ED for episode of altered mental status (staring, generalized weakness), back to baseline in [facility] ED, no acute pathology on CTH or MRI, found to have metabolic encephalopathy (resolved) 2/2 UTI (completed 7day abx) vs hypertension, also found to have new LE weakness due to diabetic neuropathy. Due to immigration and insurance status, not able to FELICIA as recommended by PT. Pending improvement to one-person assist in order to discharge home with out-of-pocket PT vs home with family assist.

## 2022-06-10 NOTE — PROGRESS NOTE ADULT - ATTENDING COMMENTS
stable severe neuropathy and mild cognitive impairment    having underarm pain and has a small non-tender nodule, will assess with ultrasound    continue cymbalta 90mg daily

## 2022-06-10 NOTE — PROGRESS NOTE ADULT - PROBLEM SELECTOR PLAN 10
F: None  E: Replete PRN   N: DASH/TLC   DVT: Apixaban 5 mg BID   Dispo: RMF, PT rec [facility]- however patient is uninsured and cannot go to [facility] attempting to get to one person assist. Son was able to received FMLA. Daughter was denied FMLA, pending further paperwork

## 2022-06-11 LAB
ANION GAP SERPL CALC-SCNC: 8 MMOL/L — SIGNIFICANT CHANGE UP (ref 5–17)
BUN SERPL-MCNC: 16 MG/DL — SIGNIFICANT CHANGE UP (ref 7–23)
CALCIUM SERPL-MCNC: 8.4 MG/DL — SIGNIFICANT CHANGE UP (ref 8.4–10.5)
CHLORIDE SERPL-SCNC: 102 MMOL/L — SIGNIFICANT CHANGE UP (ref 96–108)
CO2 SERPL-SCNC: 25 MMOL/L — SIGNIFICANT CHANGE UP (ref 22–31)
CREAT SERPL-MCNC: 0.41 MG/DL — LOW (ref 0.5–1.3)
EGFR: 103 ML/MIN/1.73M2 — SIGNIFICANT CHANGE UP
GLUCOSE BLDC GLUCOMTR-MCNC: 101 MG/DL — HIGH (ref 70–99)
GLUCOSE BLDC GLUCOMTR-MCNC: 115 MG/DL — HIGH (ref 70–99)
GLUCOSE BLDC GLUCOMTR-MCNC: 128 MG/DL — HIGH (ref 70–99)
GLUCOSE BLDC GLUCOMTR-MCNC: 158 MG/DL — HIGH (ref 70–99)
GLUCOSE SERPL-MCNC: 131 MG/DL — HIGH (ref 70–99)
HCT VFR BLD CALC: 30 % — LOW (ref 34.5–45)
HGB BLD-MCNC: 9.8 G/DL — LOW (ref 11.5–15.5)
MAGNESIUM SERPL-MCNC: 1.6 MG/DL — SIGNIFICANT CHANGE UP (ref 1.6–2.6)
MCHC RBC-ENTMCNC: 26.8 PG — LOW (ref 27–34)
MCHC RBC-ENTMCNC: 32.7 GM/DL — SIGNIFICANT CHANGE UP (ref 32–36)
MCV RBC AUTO: 82.2 FL — SIGNIFICANT CHANGE UP (ref 80–100)
NRBC # BLD: 0 /100 WBCS — SIGNIFICANT CHANGE UP (ref 0–0)
PHOSPHATE SERPL-MCNC: 3.9 MG/DL — SIGNIFICANT CHANGE UP (ref 2.5–4.5)
PLATELET # BLD AUTO: 253 K/UL — SIGNIFICANT CHANGE UP (ref 150–400)
POTASSIUM SERPL-MCNC: 4.2 MMOL/L — SIGNIFICANT CHANGE UP (ref 3.5–5.3)
POTASSIUM SERPL-SCNC: 4.2 MMOL/L — SIGNIFICANT CHANGE UP (ref 3.5–5.3)
RBC # BLD: 3.65 M/UL — LOW (ref 3.8–5.2)
RBC # FLD: 13.5 % — SIGNIFICANT CHANGE UP (ref 10.3–14.5)
SODIUM SERPL-SCNC: 135 MMOL/L — SIGNIFICANT CHANGE UP (ref 135–145)
WBC # BLD: 4.15 K/UL — SIGNIFICANT CHANGE UP (ref 3.8–10.5)
WBC # FLD AUTO: 4.15 K/UL — SIGNIFICANT CHANGE UP (ref 3.8–10.5)

## 2022-06-11 PROCEDURE — 99231 SBSQ HOSP IP/OBS SF/LOW 25: CPT

## 2022-06-11 RX ADMIN — APIXABAN 5 MILLIGRAM(S): 2.5 TABLET, FILM COATED ORAL at 11:40

## 2022-06-11 RX ADMIN — Medication 81 MILLIGRAM(S): at 11:41

## 2022-06-11 RX ADMIN — INSULIN GLARGINE 4 UNIT(S): 100 INJECTION, SOLUTION SUBCUTANEOUS at 22:13

## 2022-06-11 RX ADMIN — LIDOCAINE 1 APPLICATION(S): 4 CREAM TOPICAL at 17:13

## 2022-06-11 RX ADMIN — APIXABAN 5 MILLIGRAM(S): 2.5 TABLET, FILM COATED ORAL at 22:13

## 2022-06-11 RX ADMIN — AMLODIPINE BESYLATE 10 MILLIGRAM(S): 2.5 TABLET ORAL at 06:10

## 2022-06-11 RX ADMIN — ATORVASTATIN CALCIUM 40 MILLIGRAM(S): 80 TABLET, FILM COATED ORAL at 22:13

## 2022-06-11 RX ADMIN — LISINOPRIL 40 MILLIGRAM(S): 2.5 TABLET ORAL at 06:10

## 2022-06-11 RX ADMIN — Medication 1 TABLET(S): at 11:40

## 2022-06-11 RX ADMIN — Medication 8 UNIT(S): at 14:03

## 2022-06-11 RX ADMIN — LIDOCAINE 1 APPLICATION(S): 4 CREAM TOPICAL at 06:11

## 2022-06-11 RX ADMIN — PANTOPRAZOLE SODIUM 40 MILLIGRAM(S): 20 TABLET, DELAYED RELEASE ORAL at 06:11

## 2022-06-11 RX ADMIN — Medication 2: at 09:10

## 2022-06-11 RX ADMIN — LIDOCAINE 1 APPLICATION(S): 4 CREAM TOPICAL at 22:18

## 2022-06-11 RX ADMIN — DULOXETINE HYDROCHLORIDE 90 MILLIGRAM(S): 30 CAPSULE, DELAYED RELEASE ORAL at 11:40

## 2022-06-11 RX ADMIN — Medication 8 UNIT(S): at 18:29

## 2022-06-11 RX ADMIN — Medication 8 UNIT(S): at 09:10

## 2022-06-11 NOTE — PROGRESS NOTE ADULT - PROBLEM SELECTOR PLAN 10
F: None  E: Replete PRN   N: DASH/TLC   DVT: Apixaban 5 mg BID   Dispo: RMF, PT rec FELICIA- however patient is uninsured and cannot go to [facility] attempting to get to one person assist. Son was able to received FMLA. Daughter was denied FMLA, pending further paperwork

## 2022-06-11 NOTE — PROGRESS NOTE ADULT - ASSESSMENT
72y Female with PMHx of T2DM c/b peripheral neuropathy, HTN, recent admission and workup for Naty Sanchez tear, TIA on 3/30 (MR negative) presents to [facility] as transfer from [facility] ED for episode of altered mental status (staring, generalized weakness), back to baseline in [facility] ED, no acute pathology on CTH or MRI, found to have metabolic encephalopathy (resolved) 2/2 UTI (completed 7day abx) vs hypertension, also found to have new LE weakness due to diabetic neuropathy. Due to immigration and insurance status, not able to FELICIA as recommended by PT. Pending improvement to one-person assist in order to discharge home with out-of-pocket PT vs home with family assist.

## 2022-06-11 NOTE — PROGRESS NOTE ADULT - SUBJECTIVE AND OBJECTIVE BOX
SUBJECTIVE/OVERNIGHT EVENTS: No acute overnight events. Pt seen in AM at bedside, resting comfortably in bed, and does not appear to be in any acute distress. Sleeping, stated discomfort R posterior axilla    VITAL SIGNS:  Vital Signs Last 24 Hrs  T(C): 36.4 (11 Jun 2022 14:08), Max: 36.6 (10 Ryan 2022 21:58)  T(F): 97.5 (11 Jun 2022 14:08), Max: 97.9 (11 Jun 2022 05:40)  HR: 68 (11 Jun 2022 14:08) (68 - 76)  BP: 152/68 (11 Jun 2022 14:08) (135/68 - 165/72)  BP(mean): --  RR: 18 (11 Jun 2022 14:08) (18 - 18)  SpO2: 95% (11 Jun 2022 14:08) (94% - 97%)    PHYSICAL EXAM:  General: NAD; elderly female, lying in bed  HEENT: NC/AT; PERRL, R eye decreased acuity at baseline; EOMI; MMM  Neck: supple; no JVD  Cardiac: RRR; +S1/S2, +systolic murmur II/IV RUSB  Pulm: CTA B/L; no W/R/R, no increased WOB or accessory muscle use  GI: soft, NT/ND, +BSx4,   Extremities: WWP; b/l legs in SCDs, axilla without any lesions or TTP  Vasc: 2+ radial, DP pulses B/L  Neuro AAO x3   Motor: U/E: full ROM tremors in upper extremities  Left L/E:  knee extension 4/5, knee flexion 3/5, ankle dorsiflexion/plantar 1/5 b/l, toes 1/5 ext and flex.   Right L/E: knee extension 4/5, knee flexion 3/5, ankle dorsiflexion 1/5 b/l, toes 1/5 ext and flex.      MEDICATIONS:  MEDICATIONS  (STANDING):  amLODIPine   Tablet 10 milliGRAM(s) Oral daily  apixaban 5 milliGRAM(s) Oral every 12 hours  aspirin  chewable 81 milliGRAM(s) Oral daily  atorvastatin 40 milliGRAM(s) Oral at bedtime  dextrose 5%. 1000 milliLiter(s) (50 mL/Hr) IV Continuous <Continuous>  dextrose 5%. 1000 milliLiter(s) (100 mL/Hr) IV Continuous <Continuous>  dextrose 50% Injectable 25 Gram(s) IV Push once  dextrose 50% Injectable 12.5 Gram(s) IV Push once  dextrose 50% Injectable 25 Gram(s) IV Push once  DULoxetine 90 milliGRAM(s) Oral every 24 hours  glucagon  Injectable 1 milliGRAM(s) IntraMuscular once  insulin glargine Injectable (LANTUS) 4 Unit(s) SubCutaneous at bedtime  insulin lispro (ADMELOG) corrective regimen sliding scale   SubCutaneous Before meals and at bedtime  insulin lispro Injectable (ADMELOG) 8 Unit(s) SubCutaneous three times a day before meals  insulin lispro Injectable (ADMELOG). 8 Unit(s) SubCutaneous once  lidocaine 2% Gel 1 Application(s) Topical three times a day  lisinopril 40 milliGRAM(s) Oral every 24 hours  multivitamin 1 Tablet(s) Oral daily  pantoprazole    Tablet 40 milliGRAM(s) Oral before breakfast  sodium chloride 0.9%. 1000 milliLiter(s) (100 mL/Hr) IV Continuous <Continuous>    MEDICATIONS  (PRN):  acetaminophen     Tablet .. 325 milliGRAM(s) Oral every 6 hours PRN Temp greater or equal to 38C (100.4F), Moderate Pain (4 - 6)  dextrose Oral Gel 15 Gram(s) Oral once PRN Blood Glucose LESS THAN 70 milliGRAM(s)/deciliter      ALLERGIES:  Allergies    No Known Allergies    Intolerances        LABS:                        9.8    4.15  )-----------( 253      ( 11 Jun 2022 05:30 )             30.0     06-11    135  |  102  |  16  ----------------------------<  131<H>  4.2   |  25  |  0.41<L>    Ca    8.4      11 Jun 2022 05:30  Phos  3.9     06-11  Mg     1.6     06-11          RADIOLOGY & ADDITIONAL TESTS: Reviewed.

## 2022-06-11 NOTE — PROGRESS NOTE ADULT - ATTENDING COMMENTS
I was physically present for the key portions of the evaluation and managemnent (E/M) service provided.  I agree with the above history, physical, and plan which I have reviewed and edited where appropriate, with the exceptions as per my note.    pt has no neurological complaints    neuro exam stable.    dispo planning

## 2022-06-12 LAB
GLUCOSE BLDC GLUCOMTR-MCNC: 153 MG/DL — HIGH (ref 70–99)
GLUCOSE BLDC GLUCOMTR-MCNC: 157 MG/DL — HIGH (ref 70–99)
GLUCOSE BLDC GLUCOMTR-MCNC: 158 MG/DL — HIGH (ref 70–99)
GLUCOSE BLDC GLUCOMTR-MCNC: 257 MG/DL — HIGH (ref 70–99)

## 2022-06-12 PROCEDURE — 99231 SBSQ HOSP IP/OBS SF/LOW 25: CPT

## 2022-06-12 RX ADMIN — Medication 2: at 22:04

## 2022-06-12 RX ADMIN — PANTOPRAZOLE SODIUM 40 MILLIGRAM(S): 20 TABLET, DELAYED RELEASE ORAL at 06:27

## 2022-06-12 RX ADMIN — Medication 81 MILLIGRAM(S): at 13:17

## 2022-06-12 RX ADMIN — LIDOCAINE 1 APPLICATION(S): 4 CREAM TOPICAL at 21:57

## 2022-06-12 RX ADMIN — Medication 8 UNIT(S): at 18:29

## 2022-06-12 RX ADMIN — Medication 1 TABLET(S): at 13:17

## 2022-06-12 RX ADMIN — Medication 2: at 09:45

## 2022-06-12 RX ADMIN — APIXABAN 5 MILLIGRAM(S): 2.5 TABLET, FILM COATED ORAL at 21:57

## 2022-06-12 RX ADMIN — AMLODIPINE BESYLATE 10 MILLIGRAM(S): 2.5 TABLET ORAL at 06:27

## 2022-06-12 RX ADMIN — APIXABAN 5 MILLIGRAM(S): 2.5 TABLET, FILM COATED ORAL at 09:46

## 2022-06-12 RX ADMIN — Medication 2: at 13:17

## 2022-06-12 RX ADMIN — LISINOPRIL 40 MILLIGRAM(S): 2.5 TABLET ORAL at 06:27

## 2022-06-12 RX ADMIN — Medication 8 UNIT(S): at 13:16

## 2022-06-12 RX ADMIN — DULOXETINE HYDROCHLORIDE 90 MILLIGRAM(S): 30 CAPSULE, DELAYED RELEASE ORAL at 09:46

## 2022-06-12 RX ADMIN — Medication 6: at 18:29

## 2022-06-12 RX ADMIN — ATORVASTATIN CALCIUM 40 MILLIGRAM(S): 80 TABLET, FILM COATED ORAL at 21:57

## 2022-06-12 RX ADMIN — LIDOCAINE 1 APPLICATION(S): 4 CREAM TOPICAL at 06:28

## 2022-06-12 RX ADMIN — INSULIN GLARGINE 4 UNIT(S): 100 INJECTION, SOLUTION SUBCUTANEOUS at 22:05

## 2022-06-12 RX ADMIN — Medication 8 UNIT(S): at 09:45

## 2022-06-12 RX ADMIN — LIDOCAINE 1 APPLICATION(S): 4 CREAM TOPICAL at 13:16

## 2022-06-12 NOTE — PROGRESS NOTE ADULT - SUBJECTIVE AND OBJECTIVE BOX
Pt seen and examined.    Pt denies new neurological sx.    neuro exam is stable.    AP: continue pain control, cont dispo planning.

## 2022-06-13 LAB
GLUCOSE BLDC GLUCOMTR-MCNC: 117 MG/DL — HIGH (ref 70–99)
GLUCOSE BLDC GLUCOMTR-MCNC: 151 MG/DL — HIGH (ref 70–99)
GLUCOSE BLDC GLUCOMTR-MCNC: 155 MG/DL — HIGH (ref 70–99)
GLUCOSE BLDC GLUCOMTR-MCNC: 171 MG/DL — HIGH (ref 70–99)

## 2022-06-13 PROCEDURE — 99231 SBSQ HOSP IP/OBS SF/LOW 25: CPT

## 2022-06-13 RX ADMIN — APIXABAN 5 MILLIGRAM(S): 2.5 TABLET, FILM COATED ORAL at 09:19

## 2022-06-13 RX ADMIN — Medication 2: at 08:56

## 2022-06-13 RX ADMIN — AMLODIPINE BESYLATE 10 MILLIGRAM(S): 2.5 TABLET ORAL at 06:37

## 2022-06-13 RX ADMIN — Medication 81 MILLIGRAM(S): at 09:19

## 2022-06-13 RX ADMIN — Medication 1 TABLET(S): at 13:34

## 2022-06-13 RX ADMIN — DULOXETINE HYDROCHLORIDE 90 MILLIGRAM(S): 30 CAPSULE, DELAYED RELEASE ORAL at 09:18

## 2022-06-13 RX ADMIN — Medication 2: at 17:37

## 2022-06-13 RX ADMIN — LIDOCAINE 1 APPLICATION(S): 4 CREAM TOPICAL at 13:34

## 2022-06-13 RX ADMIN — INSULIN GLARGINE 4 UNIT(S): 100 INJECTION, SOLUTION SUBCUTANEOUS at 21:22

## 2022-06-13 RX ADMIN — APIXABAN 5 MILLIGRAM(S): 2.5 TABLET, FILM COATED ORAL at 21:23

## 2022-06-13 RX ADMIN — Medication 8 UNIT(S): at 12:10

## 2022-06-13 RX ADMIN — Medication 2: at 12:10

## 2022-06-13 RX ADMIN — LIDOCAINE 1 APPLICATION(S): 4 CREAM TOPICAL at 21:22

## 2022-06-13 RX ADMIN — ATORVASTATIN CALCIUM 40 MILLIGRAM(S): 80 TABLET, FILM COATED ORAL at 21:23

## 2022-06-13 RX ADMIN — PANTOPRAZOLE SODIUM 40 MILLIGRAM(S): 20 TABLET, DELAYED RELEASE ORAL at 06:37

## 2022-06-13 RX ADMIN — LISINOPRIL 40 MILLIGRAM(S): 2.5 TABLET ORAL at 06:37

## 2022-06-13 RX ADMIN — LIDOCAINE 1 APPLICATION(S): 4 CREAM TOPICAL at 06:36

## 2022-06-13 RX ADMIN — Medication 8 UNIT(S): at 17:36

## 2022-06-13 RX ADMIN — Medication 8 UNIT(S): at 08:55

## 2022-06-13 NOTE — PROGRESS NOTE ADULT - PROBLEM SELECTOR PLAN 1
Symmetrical LE weakness, worse distally compared to proximally. Symmetrical diminished sensation, longstanding diabetic peripheral neuropathy. Denies issues with incontinence, or back pain, no sensory line. Likely progression of diabetic neuropathy, however symptoms more severe than to be expected.  MRI L/Spine: L3/4 leveI degenerative changes w/  moderate central spinal canal stenosis. L4/5 level  disc bulge   EMG: severe distal-predominant polyneuropathy in the lower extremities. IVIG course completed.  (6/1-6/5)    - Patient is currently a 1-person assist, continued daily PT sessions for safe disposition  - c/w Cymbalta 90mg daily   - c/w Custom AFO boots   - c/w incentive spirometry

## 2022-06-13 NOTE — PROGRESS NOTE ADULT - SUBJECTIVE AND OBJECTIVE BOX
SUBJECTIVE/OVERNIGHT EVENTS: No acute overnight events. Pt seen in AM at bedside, resting comfortably in bed, and does not appear to be in any acute distress.     VITAL SIGNS:  Vital Signs Last 24 Hrs  T(C): 36.4 (13 Jun 2022 05:01), Max: 37.1 (12 Jun 2022 20:30)  T(F): 97.5 (13 Jun 2022 05:01), Max: 98.7 (12 Jun 2022 20:30)  HR: 68 (13 Jun 2022 05:01) (68 - 77)  BP: 153/70 (13 Jun 2022 05:01) (130/65 - 153/70)  BP(mean): --  RR: 17 (13 Jun 2022 05:01) (16 - 17)  SpO2: 100% (13 Jun 2022 05:01) (95% - 100%)    PHYSICAL EXAM:  General: NAD; elderly female, lying in bed  HEENT: NC/AT; PERRL, R eye decreased acuity at baseline; EOMI; MMM  Neck: supple; no JVD  Cardiac: RRR; +S1/S2, +systolic murmur II/IV RUSB  Pulm: CTA B/L; no W/R/R, no increased WOB or accessory muscle use  GI: soft, NT/ND, +BSx4,   Extremities: WWP; b/l legs in SCDs, axilla without any lesions or TTP  Vasc: 2+ radial, DP pulses B/L  Neuro AAO x3   Motor: U/E: full ROM tremors in upper extremities  Left L/E:  knee extension 4/5, knee flexion 3/5, ankle dorsiflexion/plantar 1/5 b/l, toes 1/5 ext and flex.   Right L/E: knee extension 4/5, knee flexion 3/5, ankle dorsiflexion 1/5 b/l, toes 1/5 ext and flex.    MEDICATIONS:  MEDICATIONS  (STANDING):  amLODIPine   Tablet 10 milliGRAM(s) Oral daily  apixaban 5 milliGRAM(s) Oral every 12 hours  aspirin  chewable 81 milliGRAM(s) Oral daily  atorvastatin 40 milliGRAM(s) Oral at bedtime  dextrose 5%. 1000 milliLiter(s) (50 mL/Hr) IV Continuous <Continuous>  dextrose 5%. 1000 milliLiter(s) (100 mL/Hr) IV Continuous <Continuous>  dextrose 50% Injectable 25 Gram(s) IV Push once  dextrose 50% Injectable 12.5 Gram(s) IV Push once  dextrose 50% Injectable 25 Gram(s) IV Push once  DULoxetine 90 milliGRAM(s) Oral every 24 hours  glucagon  Injectable 1 milliGRAM(s) IntraMuscular once  insulin glargine Injectable (LANTUS) 4 Unit(s) SubCutaneous at bedtime  insulin lispro (ADMELOG) corrective regimen sliding scale   SubCutaneous Before meals and at bedtime  insulin lispro Injectable (ADMELOG) 8 Unit(s) SubCutaneous three times a day before meals  lidocaine 2% Gel 1 Application(s) Topical three times a day  lisinopril 40 milliGRAM(s) Oral every 24 hours  multivitamin 1 Tablet(s) Oral daily  pantoprazole    Tablet 40 milliGRAM(s) Oral before breakfast  sodium chloride 0.9%. 1000 milliLiter(s) (100 mL/Hr) IV Continuous <Continuous>    MEDICATIONS  (PRN):  acetaminophen     Tablet .. 325 milliGRAM(s) Oral every 6 hours PRN Temp greater or equal to 38C (100.4F), Moderate Pain (4 - 6)  dextrose Oral Gel 15 Gram(s) Oral once PRN Blood Glucose LESS THAN 70 milliGRAM(s)/deciliter      ALLERGIES:  Allergies    No Known Allergies    Intolerances        LABS:              RADIOLOGY & ADDITIONAL TESTS: Reviewed.

## 2022-06-13 NOTE — PROGRESS NOTE ADULT - PROBLEM SELECTOR PLAN 10
F: None  E: Replete PRN   N: DASH/TLC   DVT: Apixaban 5 mg BID   Dispo: RMF, PT rec [facility]- however patient is uninsured and cannot go to [facility] attempting to get to one person assist. Son was able to received FMLA. Daughter was denied FMLA, pending further paperwork

## 2022-06-13 NOTE — PROGRESS NOTE ADULT - ATTENDING COMMENTS
I was physically present for the key portions of the evaluation and managemnent (E/M) service provided.  I agree with the above history, physical, and plan which I have reviewed and edited where appropriate, with the exceptions as per my note.    pt seen and examined, neuro exam stable, dispo planning.

## 2022-06-13 NOTE — PROGRESS NOTE ADULT - ASSESSMENT
72y Female with PMHx of T2DM c/b peripheral neuropathy, HTN, recent admission and workup for Naty Sanchez tear, TIA on 3/30 (MR negative) presents to [facility] as transfer from [facility] ED for episode of altered mental status (staring, generalized weakness), back to baseline in [facility] ED, no acute pathology on CTH or MRI, found to have metabolic encephalopathy (resolved) 2/2 UTI (completed 7day abx) vs hypertension, also found to have new LE weakness due to diabetic neuropathy. Due to immigration and insurance status, not able to [facility] as recommended by PT. Pending improvement to one-person assist in order to discharge home with out-of-pocket PT vs home with family assist.

## 2022-06-14 LAB
GLUCOSE BLDC GLUCOMTR-MCNC: 123 MG/DL — HIGH (ref 70–99)
GLUCOSE BLDC GLUCOMTR-MCNC: 130 MG/DL — HIGH (ref 70–99)
GLUCOSE BLDC GLUCOMTR-MCNC: 140 MG/DL — HIGH (ref 70–99)
GLUCOSE BLDC GLUCOMTR-MCNC: 153 MG/DL — HIGH (ref 70–99)

## 2022-06-14 PROCEDURE — 99231 SBSQ HOSP IP/OBS SF/LOW 25: CPT

## 2022-06-14 RX ORDER — SODIUM CHLORIDE 9 MG/ML
1000 INJECTION INTRAMUSCULAR; INTRAVENOUS; SUBCUTANEOUS
Refills: 0 | Status: DISCONTINUED | OUTPATIENT
Start: 2022-06-14 | End: 2022-06-17

## 2022-06-14 RX ADMIN — Medication 8 UNIT(S): at 17:45

## 2022-06-14 RX ADMIN — Medication 8 UNIT(S): at 12:39

## 2022-06-14 RX ADMIN — APIXABAN 5 MILLIGRAM(S): 2.5 TABLET, FILM COATED ORAL at 22:17

## 2022-06-14 RX ADMIN — DULOXETINE HYDROCHLORIDE 90 MILLIGRAM(S): 30 CAPSULE, DELAYED RELEASE ORAL at 10:22

## 2022-06-14 RX ADMIN — PANTOPRAZOLE SODIUM 40 MILLIGRAM(S): 20 TABLET, DELAYED RELEASE ORAL at 06:32

## 2022-06-14 RX ADMIN — INSULIN GLARGINE 4 UNIT(S): 100 INJECTION, SOLUTION SUBCUTANEOUS at 22:16

## 2022-06-14 RX ADMIN — Medication 81 MILLIGRAM(S): at 10:23

## 2022-06-14 RX ADMIN — LIDOCAINE 1 APPLICATION(S): 4 CREAM TOPICAL at 22:17

## 2022-06-14 RX ADMIN — Medication 2: at 08:33

## 2022-06-14 RX ADMIN — LIDOCAINE 1 APPLICATION(S): 4 CREAM TOPICAL at 13:17

## 2022-06-14 RX ADMIN — AMLODIPINE BESYLATE 10 MILLIGRAM(S): 2.5 TABLET ORAL at 06:32

## 2022-06-14 RX ADMIN — ATORVASTATIN CALCIUM 40 MILLIGRAM(S): 80 TABLET, FILM COATED ORAL at 22:17

## 2022-06-14 RX ADMIN — LIDOCAINE 1 APPLICATION(S): 4 CREAM TOPICAL at 06:33

## 2022-06-14 RX ADMIN — LISINOPRIL 40 MILLIGRAM(S): 2.5 TABLET ORAL at 06:33

## 2022-06-14 RX ADMIN — SODIUM CHLORIDE 100 MILLILITER(S): 9 INJECTION INTRAMUSCULAR; INTRAVENOUS; SUBCUTANEOUS at 11:46

## 2022-06-14 RX ADMIN — Medication 1 TABLET(S): at 10:23

## 2022-06-14 RX ADMIN — Medication 8 UNIT(S): at 08:33

## 2022-06-14 RX ADMIN — APIXABAN 5 MILLIGRAM(S): 2.5 TABLET, FILM COATED ORAL at 10:22

## 2022-06-14 NOTE — PROGRESS NOTE ADULT - PROBLEM SELECTOR PLAN 1
Symmetrical LE weakness, worse distally compared to proximally. Symmetrical diminished sensation, longstanding diabetic peripheral neuropathy. Denies issues with incontinence, or back pain, no sensory line. Likely progression of diabetic neuropathy, however symptoms more severe than to be expected.  MRI L/Spine: L3/4 leveI degenerative changes w/  moderate central spinal canal stenosis. L4/5 level  disc bulge   EMG: severe distal-predominant polyneuropathy in the lower extremities. IVIG course completed.  (6/1-6/5)    - Patient is currently a 1-person assist, continued daily PT sessions for safe disposition  - c/w Cymbalta 90mg daily   - c/w Custom AFO boots   - c/w incentive spirometry  - c/w maintenance fluids today for orthostatics with PT

## 2022-06-14 NOTE — PROGRESS NOTE ADULT - PROBLEM SELECTOR PLAN 10
F: None  E: Replete PRN   N: DASH/TLC   DVT: Apixaban 5 mg BID   Dispo: RMF, PT rec [facility]- however patient is uninsured and cannot go to [facility] attempting to get to one person assist. Son was able to received FMLA. Daughter was denied FMLA, pending further paperwork

## 2022-06-14 NOTE — PROGRESS NOTE ADULT - ASSESSMENT
72y Female with PMHx of T2DM c/b peripheral neuropathy, HTN, recent admission and workup for Naty Sanchez tear, TIA on 3/30 (MR negative) presents to [facility] as transfer from [facility] ED for episode of altered mental status (staring, generalized weakness), back to baseline in [facility] ED, no acute pathology on CTH or MRI, found to have metabolic encephalopathy (resolved) 2/2 UTI (completed 7day abx) vs hypertension, also found to have new LE weakness due to diabetic neuropathy. Due to immigration and insurance status, not able to [facility] as recommended by PT. Pending improvement to one-person assist in order to discharge home with out-of-pocket PT vs home with family assist.

## 2022-06-14 NOTE — PROGRESS NOTE ADULT - SUBJECTIVE AND OBJECTIVE BOX
SUBJECTIVE/OVERNIGHT EVENTS: No acute overnight events. Pt seen in AM at bedside, resting comfortably in bed, and does not appear to be in any acute distress.     VITAL SIGNS:  Vital Signs Last 24 Hrs  T(C): 36.3 (14 Jun 2022 10:30), Max: 36.9 (14 Jun 2022 05:54)  T(F): 97.3 (14 Jun 2022 10:30), Max: 98.4 (14 Jun 2022 05:54)  HR: 70 (14 Jun 2022 10:30) (70 - 76)  BP: 131/66 (14 Jun 2022 10:30) (117/73 - 155/69)  BP(mean): 88 (13 Jun 2022 16:40) (88 - 88)  RR: 18 (14 Jun 2022 10:30) (18 - 18)  SpO2: 96% (14 Jun 2022 10:30) (96% - 100%)    PHYSICAL EXAM:  General: NAD; elderly female, lying in bed  HEENT: NC/AT; PERRL, R eye decreased acuity at baseline; EOMI; MMM  Neck: supple; no JVD  Cardiac: RRR; +S1/S2, +systolic murmur II/IV RUSB  Pulm: CTA B/L; no W/R/R, no increased WOB or accessory muscle use  GI: soft, NT/ND, +BSx4,   Extremities: WWP; b/l legs in SCDs, axilla without any lesions or TTP  Vasc: 2+ radial, DP pulses B/L  Neuro AAO x3   Motor: U/E: full ROM tremors in upper extremities  Left L/E:  knee extension 4/5, knee flexion 3/5, ankle dorsiflexion/plantar 1/5 b/l, toes 1/5 ext and flex.   Right L/E: knee extension 4/5, knee flexion 3/5, ankle dorsiflexion 1/5 b/l, toes 1/5 ext and flex.      MEDICATIONS:  MEDICATIONS  (STANDING):  amLODIPine   Tablet 10 milliGRAM(s) Oral daily  apixaban 5 milliGRAM(s) Oral every 12 hours  aspirin  chewable 81 milliGRAM(s) Oral daily  atorvastatin 40 milliGRAM(s) Oral at bedtime  dextrose 5%. 1000 milliLiter(s) (50 mL/Hr) IV Continuous <Continuous>  dextrose 5%. 1000 milliLiter(s) (100 mL/Hr) IV Continuous <Continuous>  dextrose 50% Injectable 25 Gram(s) IV Push once  dextrose 50% Injectable 12.5 Gram(s) IV Push once  dextrose 50% Injectable 25 Gram(s) IV Push once  DULoxetine 90 milliGRAM(s) Oral every 24 hours  glucagon  Injectable 1 milliGRAM(s) IntraMuscular once  insulin glargine Injectable (LANTUS) 4 Unit(s) SubCutaneous at bedtime  insulin lispro (ADMELOG) corrective regimen sliding scale   SubCutaneous Before meals and at bedtime  insulin lispro Injectable (ADMELOG) 8 Unit(s) SubCutaneous three times a day before meals  lidocaine 2% Gel 1 Application(s) Topical three times a day  lisinopril 40 milliGRAM(s) Oral every 24 hours  multivitamin 1 Tablet(s) Oral daily  pantoprazole    Tablet 40 milliGRAM(s) Oral before breakfast  sodium chloride 0.9%. 1000 milliLiter(s) (100 mL/Hr) IV Continuous <Continuous>    MEDICATIONS  (PRN):  acetaminophen     Tablet .. 325 milliGRAM(s) Oral every 6 hours PRN Temp greater or equal to 38C (100.4F), Moderate Pain (4 - 6)  dextrose Oral Gel 15 Gram(s) Oral once PRN Blood Glucose LESS THAN 70 milliGRAM(s)/deciliter      ALLERGIES:  Allergies    No Known Allergies    Intolerances        LABS:              RADIOLOGY & ADDITIONAL TESTS: Reviewed.

## 2022-06-15 LAB
ANION GAP SERPL CALC-SCNC: 8 MMOL/L — SIGNIFICANT CHANGE UP (ref 5–17)
BUN SERPL-MCNC: 20 MG/DL — SIGNIFICANT CHANGE UP (ref 7–23)
CALCIUM SERPL-MCNC: 8.6 MG/DL — SIGNIFICANT CHANGE UP (ref 8.4–10.5)
CHLORIDE SERPL-SCNC: 100 MMOL/L — SIGNIFICANT CHANGE UP (ref 96–108)
CO2 SERPL-SCNC: 28 MMOL/L — SIGNIFICANT CHANGE UP (ref 22–31)
CREAT SERPL-MCNC: 0.49 MG/DL — LOW (ref 0.5–1.3)
EGFR: 99 ML/MIN/1.73M2 — SIGNIFICANT CHANGE UP
GLUCOSE BLDC GLUCOMTR-MCNC: 101 MG/DL — HIGH (ref 70–99)
GLUCOSE BLDC GLUCOMTR-MCNC: 106 MG/DL — HIGH (ref 70–99)
GLUCOSE BLDC GLUCOMTR-MCNC: 122 MG/DL — HIGH (ref 70–99)
GLUCOSE BLDC GLUCOMTR-MCNC: 179 MG/DL — HIGH (ref 70–99)
GLUCOSE BLDC GLUCOMTR-MCNC: 92 MG/DL — SIGNIFICANT CHANGE UP (ref 70–99)
GLUCOSE SERPL-MCNC: 142 MG/DL — HIGH (ref 70–99)
HCT VFR BLD CALC: 32.8 % — LOW (ref 34.5–45)
HGB BLD-MCNC: 10.7 G/DL — LOW (ref 11.5–15.5)
MAGNESIUM SERPL-MCNC: 1.6 MG/DL — SIGNIFICANT CHANGE UP (ref 1.6–2.6)
MCHC RBC-ENTMCNC: 27.1 PG — SIGNIFICANT CHANGE UP (ref 27–34)
MCHC RBC-ENTMCNC: 32.6 GM/DL — SIGNIFICANT CHANGE UP (ref 32–36)
MCV RBC AUTO: 83 FL — SIGNIFICANT CHANGE UP (ref 80–100)
NRBC # BLD: 0 /100 WBCS — SIGNIFICANT CHANGE UP (ref 0–0)
PHOSPHATE SERPL-MCNC: 4.1 MG/DL — SIGNIFICANT CHANGE UP (ref 2.5–4.5)
PLATELET # BLD AUTO: 294 K/UL — SIGNIFICANT CHANGE UP (ref 150–400)
POTASSIUM SERPL-MCNC: 4.2 MMOL/L — SIGNIFICANT CHANGE UP (ref 3.5–5.3)
POTASSIUM SERPL-SCNC: 4.2 MMOL/L — SIGNIFICANT CHANGE UP (ref 3.5–5.3)
RBC # BLD: 3.95 M/UL — SIGNIFICANT CHANGE UP (ref 3.8–5.2)
RBC # FLD: 13.5 % — SIGNIFICANT CHANGE UP (ref 10.3–14.5)
SODIUM SERPL-SCNC: 136 MMOL/L — SIGNIFICANT CHANGE UP (ref 135–145)
WBC # BLD: 4.47 K/UL — SIGNIFICANT CHANGE UP (ref 3.8–10.5)
WBC # FLD AUTO: 4.47 K/UL — SIGNIFICANT CHANGE UP (ref 3.8–10.5)

## 2022-06-15 PROCEDURE — 99231 SBSQ HOSP IP/OBS SF/LOW 25: CPT

## 2022-06-15 RX ORDER — MAGNESIUM SULFATE 500 MG/ML
2 VIAL (ML) INJECTION ONCE
Refills: 0 | Status: COMPLETED | OUTPATIENT
Start: 2022-06-15 | End: 2022-06-15

## 2022-06-15 RX ADMIN — ATORVASTATIN CALCIUM 40 MILLIGRAM(S): 80 TABLET, FILM COATED ORAL at 22:20

## 2022-06-15 RX ADMIN — PANTOPRAZOLE SODIUM 40 MILLIGRAM(S): 20 TABLET, DELAYED RELEASE ORAL at 06:34

## 2022-06-15 RX ADMIN — LIDOCAINE 1 APPLICATION(S): 4 CREAM TOPICAL at 13:06

## 2022-06-15 RX ADMIN — AMLODIPINE BESYLATE 10 MILLIGRAM(S): 2.5 TABLET ORAL at 06:33

## 2022-06-15 RX ADMIN — Medication 8 UNIT(S): at 09:10

## 2022-06-15 RX ADMIN — DULOXETINE HYDROCHLORIDE 90 MILLIGRAM(S): 30 CAPSULE, DELAYED RELEASE ORAL at 09:10

## 2022-06-15 RX ADMIN — Medication 8 UNIT(S): at 13:02

## 2022-06-15 RX ADMIN — LISINOPRIL 40 MILLIGRAM(S): 2.5 TABLET ORAL at 06:33

## 2022-06-15 RX ADMIN — LIDOCAINE 1 APPLICATION(S): 4 CREAM TOPICAL at 22:24

## 2022-06-15 RX ADMIN — Medication 1 TABLET(S): at 09:10

## 2022-06-15 RX ADMIN — Medication 25 GRAM(S): at 16:23

## 2022-06-15 RX ADMIN — APIXABAN 5 MILLIGRAM(S): 2.5 TABLET, FILM COATED ORAL at 09:10

## 2022-06-15 RX ADMIN — Medication 2: at 13:04

## 2022-06-15 RX ADMIN — LIDOCAINE 1 APPLICATION(S): 4 CREAM TOPICAL at 06:34

## 2022-06-15 RX ADMIN — Medication 81 MILLIGRAM(S): at 09:10

## 2022-06-15 RX ADMIN — APIXABAN 5 MILLIGRAM(S): 2.5 TABLET, FILM COATED ORAL at 22:26

## 2022-06-15 RX ADMIN — Medication 8 UNIT(S): at 17:35

## 2022-06-15 RX ADMIN — INSULIN GLARGINE 4 UNIT(S): 100 INJECTION, SOLUTION SUBCUTANEOUS at 22:19

## 2022-06-15 NOTE — PROGRESS NOTE ADULT - PROBLEM SELECTOR PLAN 5
UA with +WBCs, UCx growing E faecalis. Will treat as complicated. Completed 7 day course of CTX and then ampicillin.     RESOLVED Known h/o HTN, takes Amlodipine 10mg qd and Lisinopril 40mg qd at home.  - goal -160  - TTE with bubble 4/1: mild concentric LVH, hyperdynamic LV systolic function w/ EF >83%; grade I diastolic dysfunction w/o elevated filling pressure; no R to L shunt; mild-to-moderate AS; trace AR/MR/TR/MI  - c/w amlodipine and lisinopril

## 2022-06-15 NOTE — PROGRESS NOTE ADULT - PROBLEM SELECTOR PLAN 3
Neck and shoulder pain on 5/3. Tender to palpation, does not radiate    - Hot packs daily Known h/o T2DM, A1c 11-11.6% (3/27-4/9).  - endocrine consulted initially, now signed off  - low C peptide therefore pt will need insulin and NOT oral agents on dc  - c/w mISS   - c/w Lantus 4u qhs & Lispro 8u TID

## 2022-06-15 NOTE — PROGRESS NOTE ADULT - ASSESSMENT
72y Female with PMHx of T2DM c/b peripheral neuropathy, HTN, recent admission and workup for Naty Sanchez tear, TIA on 3/30 (MR negative) presents to [facility] as transfer from [facility] ED for episode of altered mental status (staring, generalized weakness), back to baseline in [facility] ED, no acute pathology on CTH or MRI, found to have metabolic encephalopathy (resolved) 2/2 UTI (completed 7day abx) vs hypertension, also found to have new LE weakness due to diabetic neuropathy. Due to immigration and insurance status, not able to [facility] as recommended by PT. Pending improvement to one-person assist in order to discharge home with out-of-pocket PT vs home with family assist.   71yo F PMH T2DM c/b peripheral neuropathy, HTN, recent admission and workup for Naty Sanchez tear, TIA 3/30 (MR negative) presented for episode of AMS (staring, generalized weakness), course c/b metabolic encephalopathy 2/2 UTI (resolved and s/p abx x7d) vs HTN. Also found to have new LE weakness 2/2 diabetic neuropathy. Due to immigration and insurance status, unable to dc to [facility] [place]. Now 1-person assist, pending dispo.

## 2022-06-15 NOTE — PROGRESS NOTE ADULT - ATTENDING COMMENTS
I was physically present for the key portions of the evaluation and managemnent (E/M) service provided.  I agree with the above history, physical, and plan which I have reviewed and edited where appropriate, with the exceptions as per my note.    pt seen and examined, neuro exam stable, awaiting dispo

## 2022-06-15 NOTE — PROGRESS NOTE ADULT - PROBLEM SELECTOR PLAN 1
Symmetrical LE weakness, worse distally compared to proximally. Symmetrical diminished sensation, longstanding diabetic peripheral neuropathy. Denies issues with incontinence, or back pain, no sensory line. Likely progression of diabetic neuropathy, however symptoms more severe than to be expected.  MRI L/Spine: L3/4 leveI degenerative changes w/  moderate central spinal canal stenosis. L4/5 level  disc bulge   EMG: severe distal-predominant polyneuropathy in the lower extremities. IVIG course completed.  (6/1-6/5)    - Patient is currently a 1-person assist, continued daily PT sessions for safe disposition  - c/w Cymbalta 90mg daily   - c/w Custom AFO boots   - c/w incentive spirometry  - c/w maintenance fluids today for orthostatics with PT Symmetrical LE weakness, distal worse than proximal. Symmetrical diminished sensation, longstanding diabetic peripheral neuropathy. Denies issues with incontinence, or back pain, no sensory line. Likely progression of diabetic neuropathy and symptoms more severe than expected.  MRI L/Spine: L3/4 degenerative changes w/ moderate central spinal canal stenosis. L4/5 level disc bulge.  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  - s/p IVIG 5d course (6/1-6/5)  - c/w Cymbalta 90mg qd  - c/w Custom AFO boots   - c/w incentive spirometry  - currently a 1-person assist  - c/w daily PT sessions for safe disposition

## 2022-06-15 NOTE — PROGRESS NOTE ADULT - PROBLEM SELECTOR PLAN 9
-C/w Lipitor 40mg
F: None  E: Replete PRN   N: DASH/TLC   DVT: Lovenox 40 qd  Dispo: RMF, PT rec [facility]- however patient is uninsured and cannot go to [facility] attempting to get to one person assist
F: None  E: Replete PRN   N: DASH/TLC   DVT: Lovenox 40 qd  Dispo: RMF, PT rec FEL[facility]- however patient is uninsured and cannot go to [facility] attempting to get to one person assist
-C/w Lipitor 40mg
-C/w Lipitor 40mg
F: None  E: Replete PRN   N: DASH/TLC   DVT: Lovenox 40 qd  Dispo: RMF, PT rec [facility]EL[facility]- however patient is uninsured and cannot go to [facility] attempting to get to one person assist
F: None  E: Replete PRN   N: DASH/TLC   DVT: Lovenox 40 qd  Dispo: RMF, PT rec [facility]EL[facility]- however patient is uninsured and cannot go to [facility] attempting to get to one person assist
F: None  E: Replete PRN   N: DASH/TLC   DVT: Lovenox 40 qd  Dispo: RMF, PT rec FELICIA- however patient is uninsured and cannot go to [facility] attempting to get to one person assist
-C/w Lipitor 40mg
F: None  E: Replete PRN   N: DASH/TLC   DVT: Lovenox 40 qd  Dispo: RMF, PT rec [facility]EL[facility]- however patient is uninsured and cannot go to [facility] attempting to get to one person assist
F: None  E: Replete PRN   N: DASH/TLC   DVT: Lovenox 40 qd  Dispo: RMF, PT rec [facility]EL[facility]- however patient is uninsured and cannot go to [facility] attempting to get to one person assist
-C/w Lipitor 40mg
F: None  E: Replete PRN   N: DASH/TLC   DVT: Lovenox 40 qd  Dispo: RMF, PT rec [facility]- however patient is uninsured and cannot go to [facility] attempting to get to one person assist
-C/w Lipitor 40mg
F: None  E: Replete PRN   N: DASH/TLC   DVT: Lovenox 40 qd  Dispo: RMF, PT rec FEL[facility]- however patient is uninsured and cannot go to [facility] attempting to get to one person assist
-C/w Lipitor 40mg

## 2022-06-15 NOTE — PROGRESS NOTE ADULT - PROBLEM SELECTOR PLAN 2
Needle like pain shins downward, interfering with sleep. LE duplex - acute DVT of  the left peroneal vein. repeat 6/3 with resolution of DVT.     - c/w Cymbalta 90 Qd for diabetic neuropathy  - c/w Apixaban 5mg BID for 3 months (5/28-8/28) Needle-like pain in b/l shins extending downward, interfering with sleep.  - LE dopplers 5/21: acute DVT of L peroneal vein  - LE dopplers 6/3: resolution of L peroneal DVT  - c/w Cymbalta 90mg qd for diabetic neuropathy  - c/w eliquis 5mg BID for 3 months (5/28-8/28)

## 2022-06-15 NOTE — PROGRESS NOTE ADULT - PROBLEM SELECTOR PLAN 4
Pt presenting with new onset dry cough overnight without associated fever, chills, acute SOB, hypoxia, or reflux. Likely component of HFpEF. TTE (4/1) showed grade 1 left ventricular diastolic dysfunction, no PFO, EF >83% hyperdynamic left ventricular systolic function. COVID swab negative.    RESOLVED Hx of Naty Sanchez tear in prior admission. Takes Protonix 40mg qd at home.  - c/w protonix

## 2022-06-15 NOTE — PROGRESS NOTE ADULT - SUBJECTIVE AND OBJECTIVE BOX
**INCOMPLETE NOTE    OVERNIGHT EVENTS:    SUBJECTIVE:  Patient seen and examined at bedside.    Vital Signs Last 12 Hrs  T(F): 98.7 (06-15-22 @ 04:47), Max: 98.7 (06-15-22 @ 04:47)  HR: 68 (06-15-22 @ 04:47) (68 - 71)  BP: 142/77 (06-15-22 @ 04:47) (128/65 - 142/77)  BP(mean): --  RR: 18 (06-15-22 @ 04:47) (18 - 18)  SpO2: 99% (06-15-22 @ 04:47) (96% - 99%)  I&O's Summary      PHYSICAL EXAM:  Constitutional: NAD, comfortable in bed.  HEENT: NC/AT, PERRLA, EOMI, no conjunctival pallor or scleral icterus, MMM  Neck: Supple, no JVD  Respiratory: CTA B/L. No w/r/r.   Cardiovascular: RRR, normal S1 and S2, no m/r/g.   Gastrointestinal: +BS, soft NTND, no guarding or rebound tenderness, no palpable masses   Extremities: wwp; no cyanosis, clubbing or edema.   Vascular: Pulses equal and strong throughout.   Neurological: AAOx3, no CN deficits, strength and sensation intact throughout.   Skin: No gross skin abnormalities or rashes        LABS:                  RADIOLOGY & ADDITIONAL TESTS:    MEDICATIONS  (STANDING):  amLODIPine   Tablet 10 milliGRAM(s) Oral daily  apixaban 5 milliGRAM(s) Oral every 12 hours  aspirin  chewable 81 milliGRAM(s) Oral daily  atorvastatin 40 milliGRAM(s) Oral at bedtime  dextrose 5%. 1000 milliLiter(s) (50 mL/Hr) IV Continuous <Continuous>  dextrose 5%. 1000 milliLiter(s) (100 mL/Hr) IV Continuous <Continuous>  dextrose 50% Injectable 25 Gram(s) IV Push once  dextrose 50% Injectable 12.5 Gram(s) IV Push once  dextrose 50% Injectable 25 Gram(s) IV Push once  DULoxetine 90 milliGRAM(s) Oral every 24 hours  glucagon  Injectable 1 milliGRAM(s) IntraMuscular once  insulin glargine Injectable (LANTUS) 4 Unit(s) SubCutaneous at bedtime  insulin lispro (ADMELOG) corrective regimen sliding scale   SubCutaneous Before meals and at bedtime  insulin lispro Injectable (ADMELOG) 8 Unit(s) SubCutaneous three times a day before meals  lidocaine 2% Gel 1 Application(s) Topical three times a day  lisinopril 40 milliGRAM(s) Oral every 24 hours  multivitamin 1 Tablet(s) Oral daily  pantoprazole    Tablet 40 milliGRAM(s) Oral before breakfast  sodium chloride 0.9%. 1000 milliLiter(s) (100 mL/Hr) IV Continuous <Continuous>    MEDICATIONS  (PRN):  acetaminophen     Tablet .. 325 milliGRAM(s) Oral every 6 hours PRN Temp greater or equal to 38C (100.4F), Moderate Pain (4 - 6)  dextrose Oral Gel 15 Gram(s) Oral once PRN Blood Glucose LESS THAN 70 milliGRAM(s)/deciliter   OVERNIGHT EVENTS: jayro    SUBJECTIVE:  Patient seen and examined at bedside. Has no acute complaints and denies CP, SOB, abdominal pain, N/V/D.    Vital Signs Last 12 Hrs  T(F): 98.7 (06-15-22 @ 04:47), Max: 98.7 (06-15-22 @ 04:47)  HR: 68 (06-15-22 @ 04:47) (68 - 71)  BP: 142/77 (06-15-22 @ 04:47) (128/65 - 142/77)  BP(mean): --  RR: 18 (06-15-22 @ 04:47) (18 - 18)  SpO2: 99% (06-15-22 @ 04:47) (96% - 99%)  I&O's Summary      PHYSICAL EXAM:  General: elderly female lying in bed in no apparent distress  HEENT: NC/AT; EOMI but R eye decreased acuity (baseline); MMM  Neck: supple  Cardiac: RRR; +S1/S2, +systolic murmur at RUSB  Pulm: CTAB  GI: +BS, soft, NT/ND   Extremities: WWP; no LE edema  Vasc: 2+ radial, DP pulses b/l  Neuro: AAOx3   Motor: full ROM and tremors in b/l UEs    LLE: knee extension 4/5, knee flexion 3/5, ankle dorsiflexion/plantar 1/5 b/l, toes 1/5 ext and flex    RLE: knee extension 4/5, knee flexion 3/5, ankle dorsiflexion 1/5 b/l, toes 1/5 ext and flex        LABS:                  RADIOLOGY & ADDITIONAL TESTS:    MEDICATIONS  (STANDING):  amLODIPine   Tablet 10 milliGRAM(s) Oral daily  apixaban 5 milliGRAM(s) Oral every 12 hours  aspirin  chewable 81 milliGRAM(s) Oral daily  atorvastatin 40 milliGRAM(s) Oral at bedtime  dextrose 5%. 1000 milliLiter(s) (50 mL/Hr) IV Continuous <Continuous>  dextrose 5%. 1000 milliLiter(s) (100 mL/Hr) IV Continuous <Continuous>  dextrose 50% Injectable 25 Gram(s) IV Push once  dextrose 50% Injectable 12.5 Gram(s) IV Push once  dextrose 50% Injectable 25 Gram(s) IV Push once  DULoxetine 90 milliGRAM(s) Oral every 24 hours  glucagon  Injectable 1 milliGRAM(s) IntraMuscular once  insulin glargine Injectable (LANTUS) 4 Unit(s) SubCutaneous at bedtime  insulin lispro (ADMELOG) corrective regimen sliding scale   SubCutaneous Before meals and at bedtime  insulin lispro Injectable (ADMELOG) 8 Unit(s) SubCutaneous three times a day before meals  lidocaine 2% Gel 1 Application(s) Topical three times a day  lisinopril 40 milliGRAM(s) Oral every 24 hours  multivitamin 1 Tablet(s) Oral daily  pantoprazole    Tablet 40 milliGRAM(s) Oral before breakfast  sodium chloride 0.9%. 1000 milliLiter(s) (100 mL/Hr) IV Continuous <Continuous>    MEDICATIONS  (PRN):  acetaminophen     Tablet .. 325 milliGRAM(s) Oral every 6 hours PRN Temp greater or equal to 38C (100.4F), Moderate Pain (4 - 6)  dextrose Oral Gel 15 Gram(s) Oral once PRN Blood Glucose LESS THAN 70 milliGRAM(s)/deciliter

## 2022-06-15 NOTE — PROGRESS NOTE ADULT - PROBLEM SELECTOR PLAN 6
Hx of Naty Sanchez tear in prior admission     - c/w home protonix 40mg daily  - Restarted APT Known HLD, takes Atorvastatin 40mg qhs at home.  - c/w atorvastatin

## 2022-06-15 NOTE — PROGRESS NOTE ADULT - PROBLEM SELECTOR PLAN 7
Pt with hx of DM, A1c 11.6. Endocrine following. TSH results: 1.120.     - Low C peptide patient will need insulin and NOT oral agents on dc  - C/w mISS   - C/w Lantus 4U & Lispro 8U TID   - F/u endocrine recs F: None  E: Replete PRN   N: DASH/TLC  GI ppx: protonix 40mg qd  DVT ppx: eliquis 5mg BID  Dispo: RMF --> pending functional status and assist level

## 2022-06-16 LAB
GLUCOSE BLDC GLUCOMTR-MCNC: 112 MG/DL — HIGH (ref 70–99)
GLUCOSE BLDC GLUCOMTR-MCNC: 123 MG/DL — HIGH (ref 70–99)
GLUCOSE BLDC GLUCOMTR-MCNC: 190 MG/DL — HIGH (ref 70–99)
GLUCOSE BLDC GLUCOMTR-MCNC: 196 MG/DL — HIGH (ref 70–99)
GLUCOSE BLDC GLUCOMTR-MCNC: 219 MG/DL — HIGH (ref 70–99)

## 2022-06-16 PROCEDURE — 99231 SBSQ HOSP IP/OBS SF/LOW 25: CPT

## 2022-06-16 RX ADMIN — DULOXETINE HYDROCHLORIDE 90 MILLIGRAM(S): 30 CAPSULE, DELAYED RELEASE ORAL at 09:28

## 2022-06-16 RX ADMIN — Medication 2: at 21:52

## 2022-06-16 RX ADMIN — INSULIN GLARGINE 4 UNIT(S): 100 INJECTION, SOLUTION SUBCUTANEOUS at 21:12

## 2022-06-16 RX ADMIN — PANTOPRAZOLE SODIUM 40 MILLIGRAM(S): 20 TABLET, DELAYED RELEASE ORAL at 07:55

## 2022-06-16 RX ADMIN — APIXABAN 5 MILLIGRAM(S): 2.5 TABLET, FILM COATED ORAL at 09:29

## 2022-06-16 RX ADMIN — ATORVASTATIN CALCIUM 40 MILLIGRAM(S): 80 TABLET, FILM COATED ORAL at 21:12

## 2022-06-16 RX ADMIN — LISINOPRIL 40 MILLIGRAM(S): 2.5 TABLET ORAL at 06:54

## 2022-06-16 RX ADMIN — Medication 8 UNIT(S): at 13:03

## 2022-06-16 RX ADMIN — LIDOCAINE 1 APPLICATION(S): 4 CREAM TOPICAL at 06:55

## 2022-06-16 RX ADMIN — Medication 2: at 13:04

## 2022-06-16 RX ADMIN — AMLODIPINE BESYLATE 10 MILLIGRAM(S): 2.5 TABLET ORAL at 06:54

## 2022-06-16 RX ADMIN — APIXABAN 5 MILLIGRAM(S): 2.5 TABLET, FILM COATED ORAL at 21:12

## 2022-06-16 RX ADMIN — Medication 8 UNIT(S): at 09:27

## 2022-06-16 RX ADMIN — Medication 8 UNIT(S): at 17:17

## 2022-06-16 RX ADMIN — LIDOCAINE 1 APPLICATION(S): 4 CREAM TOPICAL at 13:05

## 2022-06-16 RX ADMIN — Medication 1 TABLET(S): at 09:28

## 2022-06-16 NOTE — PROGRESS NOTE ADULT - ASSESSMENT
73yo F PMH T2DM c/b peripheral neuropathy, HTN, recent admission and workup for Naty Sanchez tear, TIA 3/30 (MR negative) presented for episode of AMS (staring, generalized weakness), course c/b metabolic encephalopathy 2/2 UTI (resolved and s/p abx x7d) vs HTN. Also found to have new LE weakness 2/2 diabetic neuropathy. Due to immigration and insurance status, unable to dc to [facility]. Now 1-person assist, pending dispo.

## 2022-06-16 NOTE — PROGRESS NOTE ADULT - PROBLEM SELECTOR PLAN 3
Known h/o T2DM, A1c 11-11.6% (3/27-4/9).  - endocrine consulted initially, now signed off  - low C peptide therefore pt will need insulin and NOT oral agents on dc  - c/w mISS   - c/w Lantus 4u qhs & Lispro 8u TID

## 2022-06-16 NOTE — PROGRESS NOTE ADULT - PROBLEM SELECTOR PLAN 5
Known h/o HTN, takes Amlodipine 10mg qd and Lisinopril 40mg qd at home.  - goal -160  - TTE with bubble 4/1: mild concentric LVH, hyperdynamic LV systolic function w/ EF >83%; grade I diastolic dysfunction w/o elevated filling pressure; no R to L shunt; mild-to-moderate AS; trace AR/MR/TR/TN  - c/w amlodipine and lisinopril

## 2022-06-16 NOTE — PROGRESS NOTE ADULT - PROBLEM SELECTOR PLAN 7
F: None  E: Replete PRN   N: DASH/TLC  GI ppx: protonix 40mg qd  DVT ppx: eliquis 5mg BID  Dispo: RMF --> pending functional status and assist level

## 2022-06-16 NOTE — PROGRESS NOTE ADULT - PROBLEM SELECTOR PLAN 1
Symmetrical LE weakness, distal worse than proximal. Symmetrical diminished sensation, longstanding diabetic peripheral neuropathy. Denies issues with incontinence, or back pain, no sensory line. Likely progression of diabetic neuropathy and symptoms more severe than expected.  MRI L/Spine: L3/4 degenerative changes w/ moderate central spinal canal stenosis. L4/5 level disc bulge.  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  - s/p IVIG 5d course (6/1-6/5)  - c/w Cymbalta 90mg qd  - c/w Custom AFO boots   - c/w incentive spirometry  - currently a 1-person assist  - c/w daily PT sessions for safe disposition Symmetrical LE weakness, distal worse than proximal. Symmetrical diminished sensation, longstanding diabetic peripheral neuropathy. Denies issues with incontinence, or back pain, no sensory line. Likely progression of diabetic neuropathy and symptoms more severe than expected.  MRI L/Spine: L3/4 degenerative changes w/ moderate central spinal canal stenosis. L4/5 level disc bulge.  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  - s/p IVIG 5d course (6/1-6/5)  - c/w Cymbalta 90mg qd  - c/w Custom AFO boots   - c/w incentive spirometry  - currently a 1-person assist  - c/w daily PT sessions for safe disposition  - planned for dc to home on Monday 6/20

## 2022-06-16 NOTE — PROGRESS NOTE ADULT - SUBJECTIVE AND OBJECTIVE BOX
**INCOMPLETE NOTE    OVERNIGHT EVENTS:    SUBJECTIVE:  Patient seen and examined at bedside.    Vital Signs Last 12 Hrs  T(F): 98.8 (06-16-22 @ 06:12), Max: 98.8 (06-16-22 @ 06:12)  HR: 76 (06-16-22 @ 06:12) (76 - 78)  BP: 136/70 (06-16-22 @ 06:12) (122/66 - 136/70)  BP(mean): --  RR: 18 (06-16-22 @ 06:12) (18 - 18)  SpO2: 98% (06-16-22 @ 06:12) (96% - 98%)  I&O's Summary      PHYSICAL EXAM:  Constitutional: NAD, comfortable in bed.  HEENT: NC/AT, PERRLA, EOMI, no conjunctival pallor or scleral icterus, MMM  Neck: Supple, no JVD  Respiratory: CTA B/L. No w/r/r.   Cardiovascular: RRR, normal S1 and S2, no m/r/g.   Gastrointestinal: +BS, soft NTND, no guarding or rebound tenderness, no palpable masses   Extremities: wwp; no cyanosis, clubbing or edema.   Vascular: Pulses equal and strong throughout.   Neurological: AAOx3, no CN deficits, strength and sensation intact throughout.   Skin: No gross skin abnormalities or rashes        LABS:                        10.7   4.47  )-----------( 294      ( 15 Ryan 2022 06:31 )             32.8     06-15    136  |  100  |  20  ----------------------------<  142<H>  4.2   |  28  |  0.49<L>    Ca    8.6      15 Ryan 2022 06:31  Phos  4.1     06-15  Mg     1.6     06-15              RADIOLOGY & ADDITIONAL TESTS:    MEDICATIONS  (STANDING):  amLODIPine   Tablet 10 milliGRAM(s) Oral daily  apixaban 5 milliGRAM(s) Oral every 12 hours  aspirin  chewable 81 milliGRAM(s) Oral daily  atorvastatin 40 milliGRAM(s) Oral at bedtime  dextrose 5%. 1000 milliLiter(s) (50 mL/Hr) IV Continuous <Continuous>  dextrose 5%. 1000 milliLiter(s) (100 mL/Hr) IV Continuous <Continuous>  dextrose 50% Injectable 25 Gram(s) IV Push once  dextrose 50% Injectable 12.5 Gram(s) IV Push once  dextrose 50% Injectable 25 Gram(s) IV Push once  DULoxetine 90 milliGRAM(s) Oral every 24 hours  glucagon  Injectable 1 milliGRAM(s) IntraMuscular once  insulin glargine Injectable (LANTUS) 4 Unit(s) SubCutaneous at bedtime  insulin lispro (ADMELOG) corrective regimen sliding scale   SubCutaneous Before meals and at bedtime  insulin lispro Injectable (ADMELOG) 8 Unit(s) SubCutaneous three times a day before meals  lidocaine 2% Gel 1 Application(s) Topical three times a day  lisinopril 40 milliGRAM(s) Oral every 24 hours  multivitamin 1 Tablet(s) Oral daily  pantoprazole    Tablet 40 milliGRAM(s) Oral before breakfast  sodium chloride 0.9%. 1000 milliLiter(s) (100 mL/Hr) IV Continuous <Continuous>    MEDICATIONS  (PRN):  acetaminophen     Tablet .. 325 milliGRAM(s) Oral every 6 hours PRN Temp greater or equal to 38C (100.4F), Moderate Pain (4 - 6)  dextrose Oral Gel 15 Gram(s) Oral once PRN Blood Glucose LESS THAN 70 milliGRAM(s)/deciliter   OVERNIGHT EVENTS: jayro    SUBJECTIVE:  Patient seen and examined at bedside. Has no acute complaints.    Vital Signs Last 12 Hrs  T(F): 98.8 (06-16-22 @ 06:12), Max: 98.8 (06-16-22 @ 06:12)  HR: 76 (06-16-22 @ 06:12) (76 - 78)  BP: 136/70 (06-16-22 @ 06:12) (122/66 - 136/70)  BP(mean): --  RR: 18 (06-16-22 @ 06:12) (18 - 18)  SpO2: 98% (06-16-22 @ 06:12) (96% - 98%)  I&O's Summary      PHYSICAL EXAM:  General: elderly female lying in bed in no apparent distress  HEENT: NC/AT; EOMI but R eye decreased acuity (baseline); MMM  Neck: supple  Cardiac: RRR; +S1/S2, +systolic murmur at RUSB  Pulm: CTAB  GI: +BS, soft, NT/ND   Extremities: WWP; no LE edema  Vasc: 2+ radial, DP pulses b/l  Neuro: AAOx3   Motor: full ROM and tremors in b/l UEs    LLE: knee extension 4/5, knee flexion 3/5, ankle dorsiflexion/plantar 1/5 b/l, toes 1/5 ext and flex    RLE: knee extension 4/5, knee flexion 3/5, ankle dorsiflexion 1/5 b/l, toes 1/5 ext and flex        LABS:                        10.7   4.47  )-----------( 294      ( 15 Ryan 2022 06:31 )             32.8     06-15    136  |  100  |  20  ----------------------------<  142<H>  4.2   |  28  |  0.49<L>    Ca    8.6      15 Ryan 2022 06:31  Phos  4.1     06-15  Mg     1.6     06-15              RADIOLOGY & ADDITIONAL TESTS:    MEDICATIONS  (STANDING):  amLODIPine   Tablet 10 milliGRAM(s) Oral daily  apixaban 5 milliGRAM(s) Oral every 12 hours  aspirin  chewable 81 milliGRAM(s) Oral daily  atorvastatin 40 milliGRAM(s) Oral at bedtime  dextrose 5%. 1000 milliLiter(s) (50 mL/Hr) IV Continuous <Continuous>  dextrose 5%. 1000 milliLiter(s) (100 mL/Hr) IV Continuous <Continuous>  dextrose 50% Injectable 25 Gram(s) IV Push once  dextrose 50% Injectable 12.5 Gram(s) IV Push once  dextrose 50% Injectable 25 Gram(s) IV Push once  DULoxetine 90 milliGRAM(s) Oral every 24 hours  glucagon  Injectable 1 milliGRAM(s) IntraMuscular once  insulin glargine Injectable (LANTUS) 4 Unit(s) SubCutaneous at bedtime  insulin lispro (ADMELOG) corrective regimen sliding scale   SubCutaneous Before meals and at bedtime  insulin lispro Injectable (ADMELOG) 8 Unit(s) SubCutaneous three times a day before meals  lidocaine 2% Gel 1 Application(s) Topical three times a day  lisinopril 40 milliGRAM(s) Oral every 24 hours  multivitamin 1 Tablet(s) Oral daily  pantoprazole    Tablet 40 milliGRAM(s) Oral before breakfast  sodium chloride 0.9%. 1000 milliLiter(s) (100 mL/Hr) IV Continuous <Continuous>    MEDICATIONS  (PRN):  acetaminophen     Tablet .. 325 milliGRAM(s) Oral every 6 hours PRN Temp greater or equal to 38C (100.4F), Moderate Pain (4 - 6)  dextrose Oral Gel 15 Gram(s) Oral once PRN Blood Glucose LESS THAN 70 milliGRAM(s)/deciliter

## 2022-06-16 NOTE — PROGRESS NOTE ADULT - ATTENDING COMMENTS
I was physically present for the key portions of the evaluation and managemnent (E/M) service provided.  I agree with the above history, physical, and plan which I have reviewed and edited where appropriate, with the exceptions as per my note.    dispo planning.    neuro exam unchanged

## 2022-06-16 NOTE — PROGRESS NOTE ADULT - PROBLEM SELECTOR PLAN 2
Needle-like pain in b/l shins extending downward, interfering with sleep.  - LE dopplers 5/21: acute DVT of L peroneal vein  - LE dopplers 6/3: resolution of L peroneal DVT  - c/w Cymbalta 90mg qd for diabetic neuropathy  - c/w eliquis 5mg BID for 3 months (5/28-8/28) Needle-like pain in b/l shins extending downward, interfering with sleep.  - LE dopplers 5/21: acute DVT of L peroneal vein  - LE dopplers 6/3: resolution of L peroneal DVT  - c/w Cymbalta 90mg qd for diabetic neuropathy  - c/w eliquis 5mg BID for 3 months (5/28-8/28), then resume ASA 81mg qd

## 2022-06-17 LAB
GLUCOSE BLDC GLUCOMTR-MCNC: 138 MG/DL — HIGH (ref 70–99)
GLUCOSE BLDC GLUCOMTR-MCNC: 145 MG/DL — HIGH (ref 70–99)
GLUCOSE BLDC GLUCOMTR-MCNC: 194 MG/DL — HIGH (ref 70–99)
GLUCOSE BLDC GLUCOMTR-MCNC: 82 MG/DL — SIGNIFICANT CHANGE UP (ref 70–99)

## 2022-06-17 PROCEDURE — 99231 SBSQ HOSP IP/OBS SF/LOW 25: CPT

## 2022-06-17 RX ADMIN — PANTOPRAZOLE SODIUM 40 MILLIGRAM(S): 20 TABLET, DELAYED RELEASE ORAL at 06:10

## 2022-06-17 RX ADMIN — Medication 1 TABLET(S): at 09:48

## 2022-06-17 RX ADMIN — INSULIN GLARGINE 4 UNIT(S): 100 INJECTION, SOLUTION SUBCUTANEOUS at 22:26

## 2022-06-17 RX ADMIN — Medication 8 UNIT(S): at 08:54

## 2022-06-17 RX ADMIN — LIDOCAINE 1 APPLICATION(S): 4 CREAM TOPICAL at 14:21

## 2022-06-17 RX ADMIN — Medication 8 UNIT(S): at 12:54

## 2022-06-17 RX ADMIN — LIDOCAINE 1 APPLICATION(S): 4 CREAM TOPICAL at 22:12

## 2022-06-17 RX ADMIN — LISINOPRIL 40 MILLIGRAM(S): 2.5 TABLET ORAL at 06:10

## 2022-06-17 RX ADMIN — APIXABAN 5 MILLIGRAM(S): 2.5 TABLET, FILM COATED ORAL at 22:12

## 2022-06-17 RX ADMIN — LIDOCAINE 1 APPLICATION(S): 4 CREAM TOPICAL at 06:10

## 2022-06-17 RX ADMIN — Medication 8 UNIT(S): at 18:04

## 2022-06-17 RX ADMIN — ATORVASTATIN CALCIUM 40 MILLIGRAM(S): 80 TABLET, FILM COATED ORAL at 22:12

## 2022-06-17 RX ADMIN — Medication 2: at 12:54

## 2022-06-17 RX ADMIN — APIXABAN 5 MILLIGRAM(S): 2.5 TABLET, FILM COATED ORAL at 09:48

## 2022-06-17 RX ADMIN — AMLODIPINE BESYLATE 10 MILLIGRAM(S): 2.5 TABLET ORAL at 06:10

## 2022-06-17 RX ADMIN — DULOXETINE HYDROCHLORIDE 90 MILLIGRAM(S): 30 CAPSULE, DELAYED RELEASE ORAL at 09:48

## 2022-06-17 NOTE — PROGRESS NOTE ADULT - ATTENDING COMMENTS
I was physically present for the key portions of the evaluation and managemnent (E/M) service provided.  I agree with the above history, physical, and plan which I have reviewed and edited where appropriate, with the exceptions as per my note.    neuro exam stable.    awaiting dispo planning.

## 2022-06-17 NOTE — PROGRESS NOTE ADULT - PROBLEM SELECTOR PLAN 2
Needle-like pain in b/l shins extending downward, interfering with sleep.  - LE dopplers 5/21: acute DVT of L peroneal vein  - LE dopplers 6/3: resolution of L peroneal DVT  - c/w Cymbalta 90mg qd for diabetic neuropathy  - c/w eliquis 5mg BID for 3 months (5/28-8/28), then resume ASA 81mg qd

## 2022-06-17 NOTE — PROGRESS NOTE ADULT - PROBLEM SELECTOR PLAN 1
Symmetrical LE weakness, distal worse than proximal. Symmetrical diminished sensation, longstanding diabetic peripheral neuropathy. Denies issues with incontinence, or back pain, no sensory line. Likely progression of diabetic neuropathy and symptoms more severe than expected.  MRI L/Spine: L3/4 degenerative changes w/ moderate central spinal canal stenosis. L4/5 level disc bulge.  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  - s/p IVIG 5d course (6/1-6/5)  - c/w Cymbalta 90mg qd  - c/w Custom AFO boots   - c/w incentive spirometry  - currently a 1-person assist  - c/w daily PT sessions for safe disposition  - planned for dc to home on Monday 6/20

## 2022-06-17 NOTE — PROGRESS NOTE ADULT - PROBLEM SELECTOR PLAN 7
F: None  E: Replete PRN   N: DASH/TLC  GI ppx: protonix 40mg qd  DVT ppx: eliquis 5mg BID  Dispo: [facility] --> pending functional status and assist level F: None  E: Replete PRN   N: DASH/TLC  GI ppx: protonix 40mg qd  DVT ppx: eliquis 5mg BID  Dispo: [facility] --> home on 6/20

## 2022-06-17 NOTE — PROGRESS NOTE ADULT - SUBJECTIVE AND OBJECTIVE BOX
Self **INCOMPLETE NOTE    OVERNIGHT EVENTS:    SUBJECTIVE:  Patient seen and examined at bedside.    Vital Signs Last 12 Hrs  T(F): 98 (06-17-22 @ 06:04), Max: 98 (06-17-22 @ 06:04)  HR: 85 (06-17-22 @ 06:04) (81 - 85)  BP: 170/71 (06-17-22 @ 06:04) (135/64 - 170/71)  BP(mean): --  RR: 20 (06-17-22 @ 06:04) (20 - 20)  SpO2: 95% (06-17-22 @ 06:04) (95% - 97%)  I&O's Summary      PHYSICAL EXAM:  Constitutional: NAD, comfortable in bed.  HEENT: NC/AT, PERRLA, EOMI, no conjunctival pallor or scleral icterus, MMM  Neck: Supple, no JVD  Respiratory: CTA B/L. No w/r/r.   Cardiovascular: RRR, normal S1 and S2, no m/r/g.   Gastrointestinal: +BS, soft NTND, no guarding or rebound tenderness, no palpable masses   Extremities: wwp; no cyanosis, clubbing or edema.   Vascular: Pulses equal and strong throughout.   Neurological: AAOx3, no CN deficits, strength and sensation intact throughout.   Skin: No gross skin abnormalities or rashes        LABS:                  RADIOLOGY & ADDITIONAL TESTS:    MEDICATIONS  (STANDING):  amLODIPine   Tablet 10 milliGRAM(s) Oral daily  apixaban 5 milliGRAM(s) Oral every 12 hours  atorvastatin 40 milliGRAM(s) Oral at bedtime  dextrose 5%. 1000 milliLiter(s) (50 mL/Hr) IV Continuous <Continuous>  dextrose 5%. 1000 milliLiter(s) (100 mL/Hr) IV Continuous <Continuous>  dextrose 50% Injectable 25 Gram(s) IV Push once  dextrose 50% Injectable 12.5 Gram(s) IV Push once  dextrose 50% Injectable 25 Gram(s) IV Push once  DULoxetine 90 milliGRAM(s) Oral every 24 hours  glucagon  Injectable 1 milliGRAM(s) IntraMuscular once  insulin glargine Injectable (LANTUS) 4 Unit(s) SubCutaneous at bedtime  insulin lispro (ADMELOG) corrective regimen sliding scale   SubCutaneous Before meals and at bedtime  insulin lispro Injectable (ADMELOG) 8 Unit(s) SubCutaneous three times a day before meals  lidocaine 2% Gel 1 Application(s) Topical three times a day  lisinopril 40 milliGRAM(s) Oral every 24 hours  multivitamin 1 Tablet(s) Oral daily  pantoprazole    Tablet 40 milliGRAM(s) Oral before breakfast  sodium chloride 0.9%. 1000 milliLiter(s) (100 mL/Hr) IV Continuous <Continuous>    MEDICATIONS  (PRN):  acetaminophen     Tablet .. 325 milliGRAM(s) Oral every 6 hours PRN Temp greater or equal to 38C (100.4F), Moderate Pain (4 - 6)  dextrose Oral Gel 15 Gram(s) Oral once PRN Blood Glucose LESS THAN 70 milliGRAM(s)/deciliter   OVERNIGHT EVENTS: jayro    SUBJECTIVE:  Patient seen and examined at bedside. No acute complaints.    Vital Signs Last 12 Hrs  T(F): 98 (06-17-22 @ 06:04), Max: 98 (06-17-22 @ 06:04)  HR: 85 (06-17-22 @ 06:04) (81 - 85)  BP: 170/71 (06-17-22 @ 06:04) (135/64 - 170/71)  BP(mean): --  RR: 20 (06-17-22 @ 06:04) (20 - 20)  SpO2: 95% (06-17-22 @ 06:04) (95% - 97%)  I&O's Summary      PHYSICAL EXAM:  General: elderly female lying in bed in no apparent distress  HEENT: NC/AT; EOMI but R eye decreased acuity (baseline); MMM  Neck: supple  Cardiac: RRR; +S1/S2, +systolic murmur at RUSB  Pulm: CTAB  GI: +BS, soft, NT/ND   Extremities: WWP; no LE edema  Vasc: 2+ radial, DP pulses b/l  Neuro: AAOx3   Motor: full ROM and tremors in b/l UEs, b/l LEs symmetrically weak        LABS:                  RADIOLOGY & ADDITIONAL TESTS:    MEDICATIONS  (STANDING):  amLODIPine   Tablet 10 milliGRAM(s) Oral daily  apixaban 5 milliGRAM(s) Oral every 12 hours  atorvastatin 40 milliGRAM(s) Oral at bedtime  dextrose 5%. 1000 milliLiter(s) (50 mL/Hr) IV Continuous <Continuous>  dextrose 5%. 1000 milliLiter(s) (100 mL/Hr) IV Continuous <Continuous>  dextrose 50% Injectable 25 Gram(s) IV Push once  dextrose 50% Injectable 12.5 Gram(s) IV Push once  dextrose 50% Injectable 25 Gram(s) IV Push once  DULoxetine 90 milliGRAM(s) Oral every 24 hours  glucagon  Injectable 1 milliGRAM(s) IntraMuscular once  insulin glargine Injectable (LANTUS) 4 Unit(s) SubCutaneous at bedtime  insulin lispro (ADMELOG) corrective regimen sliding scale   SubCutaneous Before meals and at bedtime  insulin lispro Injectable (ADMELOG) 8 Unit(s) SubCutaneous three times a day before meals  lidocaine 2% Gel 1 Application(s) Topical three times a day  lisinopril 40 milliGRAM(s) Oral every 24 hours  multivitamin 1 Tablet(s) Oral daily  pantoprazole    Tablet 40 milliGRAM(s) Oral before breakfast  sodium chloride 0.9%. 1000 milliLiter(s) (100 mL/Hr) IV Continuous <Continuous>    MEDICATIONS  (PRN):  acetaminophen     Tablet .. 325 milliGRAM(s) Oral every 6 hours PRN Temp greater or equal to 38C (100.4F), Moderate Pain (4 - 6)  dextrose Oral Gel 15 Gram(s) Oral once PRN Blood Glucose LESS THAN 70 milliGRAM(s)/deciliter

## 2022-06-17 NOTE — PROGRESS NOTE ADULT - ASSESSMENT
71yo F PMH T2DM c/b peripheral neuropathy, HTN, recent admission and workup for Naty Sanchez tear, TIA 3/30 (MR negative) presented for episode of AMS (staring, generalized weakness), course c/b metabolic encephalopathy 2/2 UTI (resolved and s/p abx x7d) vs HTN. Also found to have new LE weakness 2/2 diabetic neuropathy. Due to immigration and insurance status, unable to dc to [facility]. Now 1-person assist, pending dispo.

## 2022-06-17 NOTE — PROGRESS NOTE ADULT - PROBLEM SELECTOR PLAN 5
Known h/o HTN, takes Amlodipine 10mg qd and Lisinopril 40mg qd at home.  - goal -160  - TTE with bubble 4/1: mild concentric LVH, hyperdynamic LV systolic function w/ EF >83%; grade I diastolic dysfunction w/o elevated filling pressure; no R to L shunt; mild-to-moderate AS; trace AR/MR/TR/NJ  - c/w amlodipine and lisinopril

## 2022-06-17 NOTE — PROGRESS NOTE ADULT - PROBLEM SELECTOR PLAN 6
Patient Seen in: [facility] Emergency Department    History   Patient presents with:  Seizures    Stated Complaint: seizures    HPI    [facility]   She has a history of mental retardation as well as dementia and frequent tobacco: Never Used                      Alcohol use:  No                Review of Systems  Unable to obtain    Physical Exam   ED Triage Vitals [12/28/17 0829]  BP: 133/77  Pulse: 95  Resp: 16  Temp: 100.5 °F (38.1 °C)  Temp src: Temporal  SpO2: 98 %  O2 D Phenytoin (Dilantin) 7.3 (*)     All other components within normal limits   CBC WITH DIFFERENTIAL WITH PLATELET    Narrative: The following orders were created for panel order CBC WITH DIFFERENTIAL WITH PLATELET.   Procedure [facility])  (primary encounter diagnosis)  Thrombocytopenia ([facility] [address]    Disposition:  Admit  12/28/2017 11:55 am    Follow-up:  No follow-up provider specified.       Medications Prescribed:  Current Discharge Medication List        Present on Admission  Date Revie Known HLD, takes Atorvastatin 40mg qhs at home.  - c/w atorvastatin

## 2022-06-18 LAB
GLUCOSE BLDC GLUCOMTR-MCNC: 129 MG/DL — HIGH (ref 70–99)
GLUCOSE BLDC GLUCOMTR-MCNC: 156 MG/DL — HIGH (ref 70–99)
GLUCOSE BLDC GLUCOMTR-MCNC: 158 MG/DL — HIGH (ref 70–99)
GLUCOSE BLDC GLUCOMTR-MCNC: 197 MG/DL — HIGH (ref 70–99)

## 2022-06-18 PROCEDURE — 99232 SBSQ HOSP IP/OBS MODERATE 35: CPT

## 2022-06-18 RX ORDER — ACETAMINOPHEN 500 MG
650 TABLET ORAL EVERY 6 HOURS
Refills: 0 | Status: COMPLETED | OUTPATIENT
Start: 2022-06-18 | End: 2022-06-20

## 2022-06-18 RX ADMIN — INSULIN GLARGINE 4 UNIT(S): 100 INJECTION, SOLUTION SUBCUTANEOUS at 22:08

## 2022-06-18 RX ADMIN — Medication 2: at 08:41

## 2022-06-18 RX ADMIN — AMLODIPINE BESYLATE 10 MILLIGRAM(S): 2.5 TABLET ORAL at 06:17

## 2022-06-18 RX ADMIN — APIXABAN 5 MILLIGRAM(S): 2.5 TABLET, FILM COATED ORAL at 22:08

## 2022-06-18 RX ADMIN — Medication 8 UNIT(S): at 17:25

## 2022-06-18 RX ADMIN — LISINOPRIL 40 MILLIGRAM(S): 2.5 TABLET ORAL at 06:16

## 2022-06-18 RX ADMIN — Medication 8 UNIT(S): at 08:41

## 2022-06-18 RX ADMIN — Medication 650 MILLIGRAM(S): at 11:47

## 2022-06-18 RX ADMIN — LIDOCAINE 1 APPLICATION(S): 4 CREAM TOPICAL at 14:59

## 2022-06-18 RX ADMIN — Medication 2: at 11:59

## 2022-06-18 RX ADMIN — DULOXETINE HYDROCHLORIDE 90 MILLIGRAM(S): 30 CAPSULE, DELAYED RELEASE ORAL at 11:48

## 2022-06-18 RX ADMIN — Medication 650 MILLIGRAM(S): at 12:47

## 2022-06-18 RX ADMIN — ATORVASTATIN CALCIUM 40 MILLIGRAM(S): 80 TABLET, FILM COATED ORAL at 22:07

## 2022-06-18 RX ADMIN — Medication 650 MILLIGRAM(S): at 18:27

## 2022-06-18 RX ADMIN — Medication 2: at 17:25

## 2022-06-18 RX ADMIN — LIDOCAINE 1 APPLICATION(S): 4 CREAM TOPICAL at 22:08

## 2022-06-18 RX ADMIN — Medication 650 MILLIGRAM(S): at 17:24

## 2022-06-18 RX ADMIN — LIDOCAINE 1 APPLICATION(S): 4 CREAM TOPICAL at 06:17

## 2022-06-18 RX ADMIN — APIXABAN 5 MILLIGRAM(S): 2.5 TABLET, FILM COATED ORAL at 11:48

## 2022-06-18 RX ADMIN — Medication 1 TABLET(S): at 11:48

## 2022-06-18 RX ADMIN — PANTOPRAZOLE SODIUM 40 MILLIGRAM(S): 20 TABLET, DELAYED RELEASE ORAL at 06:16

## 2022-06-18 RX ADMIN — Medication 8 UNIT(S): at 11:59

## 2022-06-18 NOTE — PROGRESS NOTE ADULT - ATTENDING COMMENTS
clinically stable severe neuropathy and mild cognitive impairment. today with shoulder pain and has pain with a/c joint compression and decreased passive ROM    continue cymbalta 90 for neuropathy  PT/OT  add tylenol 650mg q8h PRN for shoulder pain    cannot be discharged due to needing more assistance at home and ineligible for rehab transfer

## 2022-06-18 NOTE — PROGRESS NOTE ADULT - SUBJECTIVE AND OBJECTIVE BOX
Neurology Stroke Progress Note    INTERVAL HPI/OVERNIGHT EVENTS:  Patient seen and examined.   No acte events overnight  Complaining of R shoulder pain    MEDICATIONS  (STANDING):  acetaminophen     Tablet .. 650 milliGRAM(s) Oral every 6 hours  amLODIPine   Tablet 10 milliGRAM(s) Oral daily  apixaban 5 milliGRAM(s) Oral every 12 hours  atorvastatin 40 milliGRAM(s) Oral at bedtime  dextrose 5%. 1000 milliLiter(s) (50 mL/Hr) IV Continuous <Continuous>  dextrose 5%. 1000 milliLiter(s) (100 mL/Hr) IV Continuous <Continuous>  dextrose 50% Injectable 25 Gram(s) IV Push once  dextrose 50% Injectable 12.5 Gram(s) IV Push once  dextrose 50% Injectable 25 Gram(s) IV Push once  DULoxetine 90 milliGRAM(s) Oral every 24 hours  glucagon  Injectable 1 milliGRAM(s) IntraMuscular once  insulin glargine Injectable (LANTUS) 4 Unit(s) SubCutaneous at bedtime  insulin lispro (ADMELOG) corrective regimen sliding scale   SubCutaneous Before meals and at bedtime  insulin lispro Injectable (ADMELOG) 8 Unit(s) SubCutaneous three times a day before meals  lidocaine 2% Gel 1 Application(s) Topical three times a day  lisinopril 40 milliGRAM(s) Oral every 24 hours  multivitamin 1 Tablet(s) Oral daily  pantoprazole    Tablet 40 milliGRAM(s) Oral before breakfast    MEDICATIONS  (PRN):  dextrose Oral Gel 15 Gram(s) Oral once PRN Blood Glucose LESS THAN 70 milliGRAM(s)/deciliter      Allergies    No Known Allergies    Intolerances        Vital Signs Last 24 Hrs  T(C): 36.7 (18 Jun 2022 11:50), Max: 36.8 (18 Jun 2022 06:10)  T(F): 98.1 (18 Jun 2022 11:50), Max: 98.2 (18 Jun 2022 06:10)  HR: 78 (18 Jun 2022 11:50) (71 - 81)  BP: 131/77 (18 Jun 2022 11:50) (131/77 - 154/84)  BP(mean): --  RR: 17 (18 Jun 2022 11:50) (16 - 17)  SpO2: 98% (18 Jun 2022 11:50) (96% - 98%)    Physical exam:  General: No acute distress, awake and alert    Neurologic:  General: elderly female lying in bed in no apparent distress  HEENT: NC/AT; EOMI but R eye decreased acuity (baseline); MMM  Neck: supple  Cardiac: RRR; +S1/S2, +systolic murmur at RUSB  Pulm: CTAB  GI: +BS, soft, NT/ND   Extremities: WWP; no LE edema  Vasc: 2+ radial, DP pulses b/l  Neuro: AAOx3   Motor: full ROM and tremors in b/l UEs, b/l LEs symmetrically weakness    LABS:    No labs for today        RADIOLOGY & ADDITIONAL TESTS:  < from: MR Head No Cont (04.08.22 @ 22:55) >  Severe chronic microvascular ischemic disease. 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    < end of copied text >

## 2022-06-18 NOTE — PROGRESS NOTE ADULT - ASSESSMENT
73yo F PMH T2DM c/b peripheral neuropathy, HTN, recent admission and workup for Naty Sanchez tear, TIA 3/30 (MR negative) presented for episode of AMS (staring, generalized weakness), course c/b metabolic encephalopathy 2/2 UTI (resolved and s/p abx x7d) vs HTN. Also found to have new LE weakness 2/2 diabetic neuropathy. Due to immigration and insurance status, unable to dc to [facility]. Now 1-person assist, pending dispo.      Problem/Plan - 1:  ·  Problem: Leg weakness.   ·  Plan: Symmetrical LE weakness, distal worse than proximal. Symmetrical diminished sensation, longstanding diabetic peripheral neuropathy. Denies issues with incontinence, or back pain, no sensory line. Likely progression of diabetic neuropathy and symptoms more severe than expected.  MRI L/Spine: L3/4 degenerative changes w/ moderate central spinal canal stenosis. L4/5 level disc bulge.  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  - s/p IVIG 5d course (6/1-6/5)  - c/w Cymbalta 90mg qd  - c/w Custom AFO boots   - c/w incentive spirometry  - currently a 1-person assist  - c/w daily PT sessions for safe disposition  - planned for dc to home on Monday 6/20.    Problem/Plan - 2:  ·  Problem: Bilateral leg pain.   ·  Plan: Needle-like pain in b/l shins extending downward, interfering with sleep.  - LE dopplers 5/21: acute DVT of L peroneal vein  - LE dopplers 6/3: resolution of L peroneal DVT  - c/w Cymbalta 90mg qd for diabetic neuropathy  - c/w eliquis 5mg BID for 3 months (5/28-8/28), then resume ASA 81mg qd.    Problem/Plan - 3:  ·  Problem: Type II diabetes mellitus.   ·  Plan: Known h/o T2DM, A1c 11-11.6% (3/27-4/9).  - endocrine consulted initially, now signed off  - low C peptide therefore pt will need insulin and NOT oral agents on dc  - c/w mISS   - c/w Lantus 4u qhs & Lispro 8u TID.    Problem/Plan - 4:  ·  Problem: Naty-Sanchez tear.   ·  Plan: Hx of Naty Sanchez tear in prior admission. Takes Protonix 40mg qd at home.  - c/w protonix.    Problem/Plan - 5:  ·  Problem: Hypertension.   ·  Plan: Known h/o HTN, takes Amlodipine 10mg qd and Lisinopril 40mg qd at home.  - goal -160  - TTE with bubble 4/1: mild concentric LVH, hyperdynamic LV systolic function w/ EF >83%; grade I diastolic dysfunction w/o elevated filling pressure; no R to L shunt; mild-to-moderate AS; trace AR/MR/TR/NH  - c/w amlodipine and lisinopril.    Problem/Plan - 6:  ·  Problem: Hyperlipidemia.   ·  Plan: Known HLD, takes Atorvastatin 40mg qhs at home.  - c/w atorvastatin.    Problem/Plan - 7:  ·  Problem: Nutrition, metabolism, and development symptoms.   ·  Plan: F: None  E: Replete PRN   N: DASH/TLC  GI ppx: protonix 40mg qd  DVT ppx: eliquis 5mg BID  Dispo: [facility] --> hd/c to rehab

## 2022-06-19 LAB
GLUCOSE BLDC GLUCOMTR-MCNC: 117 MG/DL — HIGH (ref 70–99)
GLUCOSE BLDC GLUCOMTR-MCNC: 127 MG/DL — HIGH (ref 70–99)
GLUCOSE BLDC GLUCOMTR-MCNC: 131 MG/DL — HIGH (ref 70–99)
GLUCOSE BLDC GLUCOMTR-MCNC: 140 MG/DL — HIGH (ref 70–99)

## 2022-06-19 PROCEDURE — 99232 SBSQ HOSP IP/OBS MODERATE 35: CPT

## 2022-06-19 RX ADMIN — Medication 650 MILLIGRAM(S): at 12:11

## 2022-06-19 RX ADMIN — APIXABAN 5 MILLIGRAM(S): 2.5 TABLET, FILM COATED ORAL at 22:37

## 2022-06-19 RX ADMIN — ATORVASTATIN CALCIUM 40 MILLIGRAM(S): 80 TABLET, FILM COATED ORAL at 22:37

## 2022-06-19 RX ADMIN — Medication 650 MILLIGRAM(S): at 06:33

## 2022-06-19 RX ADMIN — Medication 650 MILLIGRAM(S): at 17:35

## 2022-06-19 RX ADMIN — APIXABAN 5 MILLIGRAM(S): 2.5 TABLET, FILM COATED ORAL at 11:11

## 2022-06-19 RX ADMIN — Medication 650 MILLIGRAM(S): at 07:20

## 2022-06-19 RX ADMIN — Medication 650 MILLIGRAM(S): at 11:11

## 2022-06-19 RX ADMIN — Medication 650 MILLIGRAM(S): at 00:45

## 2022-06-19 RX ADMIN — INSULIN GLARGINE 4 UNIT(S): 100 INJECTION, SOLUTION SUBCUTANEOUS at 22:37

## 2022-06-19 RX ADMIN — Medication 8 UNIT(S): at 12:26

## 2022-06-19 RX ADMIN — LISINOPRIL 40 MILLIGRAM(S): 2.5 TABLET ORAL at 06:33

## 2022-06-19 RX ADMIN — DULOXETINE HYDROCHLORIDE 90 MILLIGRAM(S): 30 CAPSULE, DELAYED RELEASE ORAL at 11:11

## 2022-06-19 RX ADMIN — Medication 650 MILLIGRAM(S): at 18:26

## 2022-06-19 RX ADMIN — PANTOPRAZOLE SODIUM 40 MILLIGRAM(S): 20 TABLET, DELAYED RELEASE ORAL at 06:33

## 2022-06-19 RX ADMIN — Medication 650 MILLIGRAM(S): at 00:00

## 2022-06-19 RX ADMIN — Medication 8 UNIT(S): at 17:36

## 2022-06-19 RX ADMIN — Medication 1 TABLET(S): at 11:11

## 2022-06-19 RX ADMIN — AMLODIPINE BESYLATE 10 MILLIGRAM(S): 2.5 TABLET ORAL at 06:33

## 2022-06-19 RX ADMIN — LIDOCAINE 1 APPLICATION(S): 4 CREAM TOPICAL at 13:36

## 2022-06-19 RX ADMIN — LIDOCAINE 1 APPLICATION(S): 4 CREAM TOPICAL at 06:34

## 2022-06-19 RX ADMIN — Medication 8 UNIT(S): at 09:02

## 2022-06-19 NOTE — PROGRESS NOTE ADULT - SUBJECTIVE AND OBJECTIVE BOX
MORGAN RAVI, 72y, Female  MRN-[id number]  Patient is a 72y old  Female who presents with a chief complaint of episode of AMS (18 Jun 2022 13:11)      OVERNIGHT EVENTS: NAEO     SUBJECTIVE: Pt seen/examined at bedside. She states she did not sleep well last night so she would like to rest. ROS otherwise negative.     12 Point ROS Negative unless noted otherwise above.  -------------------------------------------------------------------------------  VITAL SIGNS:  Vital Signs Last 24 Hrs  T(C): 37.1 (19 Jun 2022 06:05), Max: 37.1 (19 Jun 2022 06:05)  T(F): 98.7 (19 Jun 2022 06:05), Max: 98.7 (19 Jun 2022 06:05)  HR: 81 (19 Jun 2022 06:05) (74 - 81)  BP: 148/86 (19 Jun 2022 06:05) (130/64 - 148/86)  BP(mean): --  RR: 16 (19 Jun 2022 06:05) (16 - 17)  SpO2: 98% (19 Jun 2022 06:05) (97% - 98%)  I&O's Summary      PHYSICAL EXAM:    General: NAD; elderly female laying in bed   HEENT: NC/AT; EOMI with baseline reduced R sided acuity   Neck: supple  Cardiovascular: RRR, +S1/S2; NO M/R/G  Respiratory: CTA B/L; no W/R/R  Gastrointestinal: soft, NT/ND; +BSx4  Extremities: WWP; no edema or cyanosis  Neurological: AAOx3    ALLERGIES:  Allergies    No Known Allergies    Intolerances        MEDICATIONS:  MEDICATIONS  (STANDING):  acetaminophen     Tablet .. 650 milliGRAM(s) Oral every 6 hours  amLODIPine   Tablet 10 milliGRAM(s) Oral daily  apixaban 5 milliGRAM(s) Oral every 12 hours  atorvastatin 40 milliGRAM(s) Oral at bedtime  dextrose 5%. 1000 milliLiter(s) (50 mL/Hr) IV Continuous <Continuous>  dextrose 5%. 1000 milliLiter(s) (100 mL/Hr) IV Continuous <Continuous>  dextrose 50% Injectable 25 Gram(s) IV Push once  dextrose 50% Injectable 12.5 Gram(s) IV Push once  dextrose 50% Injectable 25 Gram(s) IV Push once  DULoxetine 90 milliGRAM(s) Oral every 24 hours  glucagon  Injectable 1 milliGRAM(s) IntraMuscular once  insulin glargine Injectable (LANTUS) 4 Unit(s) SubCutaneous at bedtime  insulin lispro (ADMELOG) corrective regimen sliding scale   SubCutaneous Before meals and at bedtime  insulin lispro Injectable (ADMELOG) 8 Unit(s) SubCutaneous three times a day before meals  lidocaine 2% Gel 1 Application(s) Topical three times a day  lisinopril 40 milliGRAM(s) Oral every 24 hours  multivitamin 1 Tablet(s) Oral daily  pantoprazole    Tablet 40 milliGRAM(s) Oral before breakfast    MEDICATIONS  (PRN):  dextrose Oral Gel 15 Gram(s) Oral once PRN Blood Glucose LESS THAN 70 milliGRAM(s)/deciliter      -------------------------------------------------------------------------------  LABS:      CAPILLARY BLOOD GLUCOSE      POCT Blood Glucose.: 129 mg/dL (18 Jun 2022 21:41)      COVID-19 PCR: NotDetec (08 Jun 2022 04:40)  COVID-19 PCR: Negative (24 May 2022 21:04)  COVID-19 PCR: NotDetec (19 May 2022 07:42)  COVID-19 PCR: NotDetec (03 May 2022 06:38)  COVID-19 PCR: NotDetec (21 Apr 2022 06:24)  COVID-19 PCR: NotDetec (15 Apr 2022 06:16)  SARS-CoV-2: NotDetec (08 Apr 2022 09:57)  COVID-19 PCR: NotDetec (01 Apr 2022 08:52)  COVID-19 PCR: NotDetec (26 Mar 2022 00:22)      RADIOLOGY & ADDITIONAL TESTS: Reviewed.

## 2022-06-19 NOTE — PROGRESS NOTE ADULT - ASSESSMENT
Discussed importance of compliance with ocular meds and follow up exams to prevent loss of vision. 73yo F PMH T2DM c/b peripheral neuropathy, HTN, recent admission and workup for Naty Sanchez tear, TIA 3/30 (MR negative) presented for episode of AMS (staring, generalized weakness), course c/b metabolic encephalopathy 2/2 UTI (resolved and s/p abx x7d) vs HTN. Also found to have new LE weakness 2/2 diabetic neuropathy. Due to immigration and insurance status, unable to dc to [facility]. Now 1-person assist, pending dispo.      Problem/Plan - 1:  ·  Problem: Leg weakness.   ·  Plan: Symmetrical LE weakness, distal worse than proximal. Symmetrical diminished sensation, longstanding diabetic peripheral neuropathy. Denies issues with incontinence, or back pain, no sensory line. Likely progression of diabetic neuropathy and symptoms more severe than expected.  MRI L/Spine: L3/4 degenerative changes w/ moderate central spinal canal stenosis. L4/5 level disc bulge.  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  - s/p IVIG 5d course (6/1-6/5)  - c/w Cymbalta 90mg qd  - c/w Custom AFO boots   - c/w incentive spirometry  - currently a 1-person assist  - c/w daily PT sessions for safe disposition  - planned for dc to home on Monday 6/20.    Problem/Plan - 2:  ·  Problem: Bilateral leg pain.   ·  Plan: Needle-like pain in b/l shins extending downward, interfering with sleep.  - LE dopplers 5/21: acute DVT of L peroneal vein  - LE dopplers 6/3: resolution of L peroneal DVT  - c/w Cymbalta 90mg qd for diabetic neuropathy  - c/w eliquis 5mg BID for 3 months (5/28-8/28), then resume ASA 81mg qd.    Problem/Plan - 3:  ·  Problem: Type II diabetes mellitus.   ·  Plan: Known h/o T2DM, A1c 11-11.6% (3/27-4/9).  - endocrine consulted initially, now signed off  - low C peptide therefore pt will need insulin and NOT oral agents on dc  - c/w mISS   - c/w Lantus 4u qhs & Lispro 8u TID.    Problem/Plan - 4:  ·  Problem: Naty-Sanchez tear.   ·  Plan: Hx of Naty Sanchez tear in prior admission. Takes Protonix 40mg qd at home.  - c/w protonix.    Problem/Plan - 5:  ·  Problem: Hypertension.   ·  Plan: Known h/o HTN, takes Amlodipine 10mg qd and Lisinopril 40mg qd at home.  - goal -160  - TTE with bubble 4/1: mild concentric LVH, hyperdynamic LV systolic function w/ EF >83%; grade I diastolic dysfunction w/o elevated filling pressure; no R to L shunt; mild-to-moderate AS; trace AR/MR/TR/KS  - c/w amlodipine and lisinopril.    Problem/Plan - 6:  ·  Problem: Hyperlipidemia.   ·  Plan: Known HLD, takes Atorvastatin 40mg qhs at home.  - c/w atorvastatin.    Problem/Plan - 7:  ·  Problem: Nutrition, metabolism, and development symptoms.   ·  Plan: F: None  E: Replete PRN   N: DASH/TLC  GI ppx: protonix 40mg qd  DVT ppx: eliquis 5mg BID  Dispo: [facility] --> hd/c to rehab

## 2022-06-19 NOTE — PROGRESS NOTE ADULT - ATTENDING COMMENTS
stable painful diabetic neuropathy and mild cognitive impairment.  no episodes of AMS since off gabapentin.  continues cymbalta 90mg and seems to have less pain on this medication    shoulder tightness yesterday milder today, she didn't want the tylenol PRN so will d/c it    continue PT/OT    cannot be discharged due to lack of help at home with transfers and not eligible for [facility]

## 2022-06-20 LAB
GLUCOSE BLDC GLUCOMTR-MCNC: 160 MG/DL — HIGH (ref 70–99)
GLUCOSE BLDC GLUCOMTR-MCNC: 165 MG/DL — HIGH (ref 70–99)
GLUCOSE BLDC GLUCOMTR-MCNC: 179 MG/DL — HIGH (ref 70–99)
GLUCOSE BLDC GLUCOMTR-MCNC: 76 MG/DL — SIGNIFICANT CHANGE UP (ref 70–99)
SARS-COV-2 RNA SPEC QL NAA+PROBE: SIGNIFICANT CHANGE UP

## 2022-06-20 PROCEDURE — 99231 SBSQ HOSP IP/OBS SF/LOW 25: CPT

## 2022-06-20 RX ORDER — SODIUM CHLORIDE 9 MG/ML
1000 INJECTION INTRAMUSCULAR; INTRAVENOUS; SUBCUTANEOUS
Refills: 0 | Status: DISCONTINUED | OUTPATIENT
Start: 2022-06-20 | End: 2022-06-20

## 2022-06-20 RX ORDER — ASPIRIN/CALCIUM CARB/MAGNESIUM 324 MG
1 TABLET ORAL
Qty: 0 | Refills: 0 | DISCHARGE

## 2022-06-20 RX ORDER — APIXABAN 2.5 MG/1
1 TABLET, FILM COATED ORAL
Qty: 140 | Refills: 0
Start: 2022-06-20 | End: 2022-08-28

## 2022-06-20 RX ORDER — ATORVASTATIN CALCIUM 80 MG/1
1 TABLET, FILM COATED ORAL
Qty: 30 | Refills: 1
Start: 2022-06-20 | End: 2022-08-18

## 2022-06-20 RX ORDER — AMLODIPINE BESYLATE 2.5 MG/1
1 TABLET ORAL
Qty: 30 | Refills: 1
Start: 2022-06-20 | End: 2022-08-18

## 2022-06-20 RX ADMIN — LIDOCAINE 1 APPLICATION(S): 4 CREAM TOPICAL at 05:50

## 2022-06-20 RX ADMIN — LIDOCAINE 1 APPLICATION(S): 4 CREAM TOPICAL at 13:06

## 2022-06-20 RX ADMIN — Medication 2: at 21:51

## 2022-06-20 RX ADMIN — LISINOPRIL 40 MILLIGRAM(S): 2.5 TABLET ORAL at 05:50

## 2022-06-20 RX ADMIN — Medication 650 MILLIGRAM(S): at 05:50

## 2022-06-20 RX ADMIN — APIXABAN 5 MILLIGRAM(S): 2.5 TABLET, FILM COATED ORAL at 22:09

## 2022-06-20 RX ADMIN — ATORVASTATIN CALCIUM 40 MILLIGRAM(S): 80 TABLET, FILM COATED ORAL at 22:09

## 2022-06-20 RX ADMIN — INSULIN GLARGINE 4 UNIT(S): 100 INJECTION, SOLUTION SUBCUTANEOUS at 22:10

## 2022-06-20 RX ADMIN — Medication 650 MILLIGRAM(S): at 06:17

## 2022-06-20 RX ADMIN — DULOXETINE HYDROCHLORIDE 90 MILLIGRAM(S): 30 CAPSULE, DELAYED RELEASE ORAL at 11:03

## 2022-06-20 RX ADMIN — Medication 8 UNIT(S): at 13:04

## 2022-06-20 RX ADMIN — APIXABAN 5 MILLIGRAM(S): 2.5 TABLET, FILM COATED ORAL at 11:03

## 2022-06-20 RX ADMIN — Medication 8 UNIT(S): at 08:45

## 2022-06-20 RX ADMIN — AMLODIPINE BESYLATE 10 MILLIGRAM(S): 2.5 TABLET ORAL at 05:50

## 2022-06-20 RX ADMIN — PANTOPRAZOLE SODIUM 40 MILLIGRAM(S): 20 TABLET, DELAYED RELEASE ORAL at 05:49

## 2022-06-20 RX ADMIN — LIDOCAINE 1 APPLICATION(S): 4 CREAM TOPICAL at 22:13

## 2022-06-20 RX ADMIN — Medication 2: at 13:05

## 2022-06-20 RX ADMIN — Medication 1 TABLET(S): at 11:03

## 2022-06-20 RX ADMIN — Medication 2: at 08:45

## 2022-06-20 NOTE — PROGRESS NOTE ADULT - SUBJECTIVE AND OBJECTIVE BOX
OVERNIGHT EVENTS: jayro    SUBJECTIVE:  Patient seen and examined at bedside.    Vital Signs Last 12 Hrs  T(F): 97.1 (06-20-22 @ 12:31), Max: 100.5 (06-20-22 @ 05:23)  HR: 100 (06-20-22 @ 12:31) (75 - 100)  BP: 129/82 (06-20-22 @ 12:31) (129/82 - 144/75)  BP(mean): --  RR: 19 (06-20-22 @ 12:31) (18 - 19)  SpO2: 98% (06-20-22 @ 12:31) (98% - 99%)  I&O's Summary      PHYSICAL EXAM:  General: elderly female lying in bed in no apparent distress  HEENT: NC/AT; EOMI but R eye decreased acuity (baseline); MMM  Neck: supple  Cardiac: RRR; +S1/S2, +systolic murmur at RUSB  Pulm: CTAB  GI: +BS, soft, NT/ND   Extremities: WWP; no LE edema  Vasc: 2+ radial, DP pulses b/l  Neuro: AAOx3   Motor: full ROM and tremors in b/l UEs, b/l LEs symmetrically weak        LABS:                  RADIOLOGY & ADDITIONAL TESTS:    MEDICATIONS  (STANDING):  amLODIPine   Tablet 10 milliGRAM(s) Oral daily  apixaban 5 milliGRAM(s) Oral every 12 hours  atorvastatin 40 milliGRAM(s) Oral at bedtime  dextrose 5%. 1000 milliLiter(s) (50 mL/Hr) IV Continuous <Continuous>  dextrose 5%. 1000 milliLiter(s) (100 mL/Hr) IV Continuous <Continuous>  dextrose 50% Injectable 25 Gram(s) IV Push once  dextrose 50% Injectable 12.5 Gram(s) IV Push once  dextrose 50% Injectable 25 Gram(s) IV Push once  DULoxetine 90 milliGRAM(s) Oral every 24 hours  glucagon  Injectable 1 milliGRAM(s) IntraMuscular once  insulin glargine Injectable (LANTUS) 4 Unit(s) SubCutaneous at bedtime  insulin lispro (ADMELOG) corrective regimen sliding scale   SubCutaneous Before meals and at bedtime  insulin lispro Injectable (ADMELOG) 8 Unit(s) SubCutaneous three times a day before meals  lidocaine 2% Gel 1 Application(s) Topical three times a day  lisinopril 40 milliGRAM(s) Oral every 24 hours  multivitamin 1 Tablet(s) Oral daily  pantoprazole    Tablet 40 milliGRAM(s) Oral before breakfast    MEDICATIONS  (PRN):  dextrose Oral Gel 15 Gram(s) Oral once PRN Blood Glucose LESS THAN 70 milliGRAM(s)/deciliter

## 2022-06-20 NOTE — CHART NOTE - NSCHARTNOTESELECT_GEN_ALL_CORE
Follow Up/Nutrition Services
Follow Up/Nutrition Services
Endocrinology
Event Note
Follow Up/Nutrition Services
need for medical equipment

## 2022-06-20 NOTE — PROGRESS NOTE ADULT - ATTENDING COMMENTS
severe diabetic polyneuropathy and mild cognitive impairment.  clinically stable, continues to have shoulder discomfort and on exam limited passive ROM with pain with a/c joint compression.  previously had episodes of AMS due to gabapentin.  now stable on cymbalta 90mg daily    PT/OT, will ask for more focus on shoulder ROM  cannot discharge as caregivers planning to take her home have active COVID

## 2022-06-20 NOTE — CHART NOTE - NSCHARTNOTEFT_GEN_A_CORE
Admitting Diagnosis:   Patient is a 72y old  Female who presents with a chief complaint of episode of AMS (19 May 2022 06:29)    PAST MEDICAL & SURGICAL HISTORY:  HTN (hypertension)    DM (diabetes mellitus)    TIA (transient ischemic attack)    No significant past surgical history    Current Nutrition Order:  DASH, consistent carbohydrate  Ensure Max QD (150kcal/30gpro)     PO Intake: Good (%) [   ]  Fair (50-75%) [ x ] Poor (<25%) [   ]    GI Issues:   Last BM     Pain:  No pain noted    Skin Integrity:  Wdl    Labs:       135  |  99  |  30<H>  ----------------------------<  137<H>  4.4   |  28  |  0.53    Ca    8.7      19 May 2022 07:15  Phos  3.7       Mg     1.7         TPro  6.5  /  Alb  3.2<L>  /  TBili  0.2  /  DBili  x   /  AST  14  /  ALT  12  /  AlkPhos  36<L>      CAPILLARY BLOOD GLUCOSE    POCT Blood Glucose.: 133 mg/dL (19 May 2022 09:13)  POCT Blood Glucose.: 131 mg/dL (18 May 2022 22:42)  POCT Blood Glucose.: 112 mg/dL (18 May 2022 17:00)  POCT Blood Glucose.: 166 mg/dL (18 May 2022 12:15)    Medications:  MEDICATIONS  (STANDING):  amLODIPine   Tablet 10 milliGRAM(s) Oral daily  aspirin  chewable 81 milliGRAM(s) Oral daily  atorvastatin 40 milliGRAM(s) Oral at bedtime  dextrose 5%. 1000 milliLiter(s) (50 mL/Hr) IV Continuous <Continuous>  dextrose 5%. 1000 milliLiter(s) (100 mL/Hr) IV Continuous <Continuous>  dextrose 50% Injectable 25 Gram(s) IV Push once  dextrose 50% Injectable 12.5 Gram(s) IV Push once  dextrose 50% Injectable 25 Gram(s) IV Push once  DULoxetine 90 milliGRAM(s) Oral every 24 hours  enoxaparin Injectable 40 milliGRAM(s) SubCutaneous every 24 hours  glucagon  Injectable 1 milliGRAM(s) IntraMuscular once  insulin glargine Injectable (LANTUS) 4 Unit(s) SubCutaneous at bedtime  insulin lispro (ADMELOG) corrective regimen sliding scale   SubCutaneous three times a day before meals  insulin lispro Injectable (ADMELOG) 8 Unit(s) SubCutaneous three times a day before meals  lidocaine 2% Gel 1 Application(s) Topical three times a day  lisinopril 40 milliGRAM(s) Oral every 24 hours  multivitamin 1 Tablet(s) Oral daily  pantoprazole    Tablet 40 milliGRAM(s) Oral before breakfast    MEDICATIONS  (PRN):  acetaminophen     Tablet .. 325 milliGRAM(s) Oral every 6 hours PRN Temp greater or equal to 38C (100.4F), Moderate Pain (4 - 6)  dextrose Oral Gel 15 Gram(s) Oral once PRN Blood Glucose LESS THAN 70 milliGRAM(s)/deciliter    Height for BMI (FEET)	5 Feet  Height for BMI (INCHES)	6 Inch(s)  Height for BMI (CENTIMETERS)	167.64 Centimeter(s)  Weight for BMI (lbs)	140 lb  Weight for BMI (kg)	63.5 kg  Body Mass Index	22.6    Weight Change: Pt denies wt changes PTA    Estimated energy needs:   ABW used for calculations as pt between % of IBW, adjusted for age  25-30kcal/k-1905kcal  1-1.2g/k-76gprotein    Subjective:   72y Female with PMHx of T2DM c/b peripheral neuropathy, HTN, recent admission and workup for Naty Sanchez tear, TIA on 3/30 (MR negative) presents to [facility] as transfer from [facility] ED for episode of altered mental status (staring, generalized weakness), back to baseline in [facility] ED, no acute pathology on CTH or MRI, found to have metabolic encephalopathy (resolved) 2/2 UTI (completed 7day abx) vs hypertension, also found to have new LE weakness of unclear origin, possible diabetic neuropathy. Due to immigration and insurance status, not able to FELICIA as recommended by PT. Pending improvement to one-person assist in order to discharge home with out-of-pocket PT vs home with family assist    Pt seen in room, resting in bed. Pt reports good appetite PTA, no chewing/swallowing issues noted. Currently on consistent carbohydrate/DASH diet, eating % of meals during hospital stay. Pt reports continued good appetite today , no food preferences noted. Bedscale wt taken 5/11 75kg? likely scale error please obtain updated wt when able. No nutrition issues noted at present. RD discussed current diet and provided education. Please see full recs below. Will continue to follow per RD protocol.     Previous Nutrition Diagnosis: None    Recommendations:  1. Continue DASH, consistent carbohydrate diet  2. Continue Ensure Max QD (150kcal/30gpro) per pt preference  3. Please obtain updated wts   4. Monitor lytes, BG  5. RD to remain available prn    Education: Reinforced education     Risk Level: High [   ] Moderate [ x ] Low [   ].
Admitting Diagnosis:   Patient is a 72y old  Female who presents with a chief complaint of episode of AMS (2022 12:01)    PAST MEDICAL & SURGICAL HISTORY:  HTN (hypertension)    DM (diabetes mellitus)    TIA (transient ischemic attack)    No significant past surgical history    Current Nutrition Order:  DASH, consistent carbohydrate     PO Intake: Good (%) [ x  ]  Fair (50-75%) [   ] Poor (<25%) [   ]    GI Issues:   Pt denies N/V/D/C    Pain:  No pain noted    Skin Integrity:  No edema noted    Labs:       134<L>  |  101  |  25<H>  ----------------------------<  163<H>  4.6   |  25  |  0.59    Ca    8.4      2022 06:25  Phos  3.8       Mg     1.7         CAPILLARY BLOOD GLUCOSE    POCT Blood Glucose.: 185 mg/dL (2022 12:43)  POCT Blood Glucose.: 161 mg/dL (2022 09:24)  POCT Blood Glucose.: 109 mg/dL (2022 21:36)  POCT Blood Glucose.: 84 mg/dL (2022 16:49)    Medications:  MEDICATIONS  (STANDING):  amLODIPine   Tablet 10 milliGRAM(s) Oral daily  aspirin  chewable 81 milliGRAM(s) Oral daily  atorvastatin 40 milliGRAM(s) Oral at bedtime  dextrose 5%. 1000 milliLiter(s) (50 mL/Hr) IV Continuous <Continuous>  dextrose 5%. 1000 milliLiter(s) (100 mL/Hr) IV Continuous <Continuous>  dextrose 50% Injectable 25 Gram(s) IV Push once  dextrose 50% Injectable 12.5 Gram(s) IV Push once  dextrose 50% Injectable 25 Gram(s) IV Push once  DULoxetine 60 milliGRAM(s) Oral daily  enoxaparin Injectable 40 milliGRAM(s) SubCutaneous every 24 hours  gabapentin 100 milliGRAM(s) Oral <User Schedule>  glucagon  Injectable 1 milliGRAM(s) IntraMuscular once  insulin glargine Injectable (LANTUS) 4 Unit(s) SubCutaneous at bedtime  insulin lispro (ADMELOG) corrective regimen sliding scale   SubCutaneous three times a day before meals  insulin lispro Injectable (ADMELOG) 8 Unit(s) SubCutaneous three times a day before meals  lisinopril 40 milliGRAM(s) Oral every 24 hours  pantoprazole    Tablet 40 milliGRAM(s) Oral before breakfast    MEDICATIONS  (PRN):  dextrose Oral Gel 15 Gram(s) Oral once PRN Blood Glucose LESS THAN 70 milliGRAM(s)/deciliter    Height for BMI (FEET)	5 Feet  Height for BMI (INCHES)	6 Inch(s)  Height for BMI (CENTIMETERS)	167.64 Centimeter(s)  Weight for BMI (lbs)	140 lb  Weight for BMI (kg)	63.5 kg  Body Mass Index	22.6    Weight Change: Pt denies wt changes PTA    Estimated energy needs:   ABW used for calculations as pt between % of IBW, adjusted for age  25-30kcal/k-1905kcal  1-1.2g/k-76gprotein    Subjective:   72y Female with PMHx of T2DM c/b peripheral neuropathy, HTN, recent admission and workup for Naty Sanchez tear, TIA on 3/30 (MR negative) presents to [facility] as transfer from [facility] ED for episode of altered mental status (staring, generalized weakness), back to baseline in [facility] ED, no acute pathology on CTH or MRI, found to have metabolic encephalopathy (resolved) 2/2 UTI (completed 7day abx) vs hypertension, also found to have new LE weakness of unclear origin, possible diabetic neuropathy. Due to immigration and insurance status, not able to FELICIA as recommended by PT. Pending improvement to one-person assist in order to discharge home with out-of-pocket PT.     Pt seen in room, resting in bed. Pt reports good appetite PTA, no chewing/swallowing issues noted. Currently on consistent carbohydrate/DASH diet, eating % of meals during hospital stay. No nutrition issues noted at present. RD discussed current diet and provided education. Please see full recs below. Will continue to follow per RD protocol.     Previous Nutrition Diagnosis: None    Recommendations:  1. Continue DASH, consistent carbohydrate diet  2. Monitor intake   3. Trend wts  4. Monitor lytes, BG  5. RD to remain available prn    Education: Reinforced education     Risk Level: High [   ] Moderate [ x ] Low [   ]
Admitting Diagnosis:   Patient is a 72y old  Female who presents with a chief complaint of episode of AMS (2022 12:10)    PAST MEDICAL & SURGICAL HISTORY:  HTN (hypertension)    DM (diabetes mellitus)    TIA (transient ischemic attack)    No significant past surgical history    Current Nutrition Order:  DASH, consistent carbohydrate    PO Intake: Good (%) [ x ]  Fair (50-75%) [   ] Poor (<25%) [   ]    GI Issues:   Pt denies N/V/D/C    Pain:  No pain noted    Skin Integrity:  1+edema to right and left leg  1+edema to right and left hip    Labs:     CAPILLARY BLOOD GLUCOSE    POCT Blood Glucose.: 170 mg/dL (2022 08:22)  POCT Blood Glucose.: 90 mg/dL (2022 21:43)  POCT Blood Glucose.: 107 mg/dL (2022 17:14)  POCT Blood Glucose.: 182 mg/dL (2022 12:21)    Medications:  MEDICATIONS  (STANDING):  amLODIPine   Tablet 10 milliGRAM(s) Oral daily  apixaban 5 milliGRAM(s) Oral every 12 hours  aspirin  chewable 81 milliGRAM(s) Oral daily  atorvastatin 40 milliGRAM(s) Oral at bedtime  dextrose 5%. 1000 milliLiter(s) (50 mL/Hr) IV Continuous <Continuous>  dextrose 5%. 1000 milliLiter(s) (100 mL/Hr) IV Continuous <Continuous>  dextrose 50% Injectable 25 Gram(s) IV Push once  dextrose 50% Injectable 12.5 Gram(s) IV Push once  dextrose 50% Injectable 25 Gram(s) IV Push once  DULoxetine 90 milliGRAM(s) Oral every 24 hours  glucagon  Injectable 1 milliGRAM(s) IntraMuscular once  insulin glargine Injectable (LANTUS) 4 Unit(s) SubCutaneous at bedtime  insulin lispro (ADMELOG) corrective regimen sliding scale   SubCutaneous three times a day before meals  insulin lispro Injectable (ADMELOG) 8 Unit(s) SubCutaneous three times a day before meals  lidocaine 2% Gel 1 Application(s) Topical three times a day  lisinopril 40 milliGRAM(s) Oral every 24 hours  multivitamin 1 Tablet(s) Oral daily  pantoprazole    Tablet 40 milliGRAM(s) Oral before breakfast    MEDICATIONS  (PRN):  acetaminophen     Tablet .. 325 milliGRAM(s) Oral every 6 hours PRN Temp greater or equal to 38C (100.4F), Moderate Pain (4 - 6)  dextrose Oral Gel 15 Gram(s) Oral once PRN Blood Glucose LESS THAN 70 milliGRAM(s)/deciliter    Height for BMI (FEET)	5 Feet  Height for BMI (INCHES)	6 Inch(s)  Height for BMI (CENTIMETERS)	167.64 Centimeter(s)  Weight for BMI (lbs)	140 lb  Weight for BMI (kg)	63.5 kg  Body Mass Index	22.6    Weight Change: Pt denies wt changes PTA    Estimated energy needs:   ABW used for calculations as pt between % of IBW, adjusted for age  25-30kcal/k-1905kcal  1-1.2g/k-76gprotein    Subjective:   72y Female with PMHx of T2DM c/b peripheral neuropathy, HTN, recent admission and workup for Naty Sanchez tear, TIA on 3/30 (MR negative) presents to [facility] as transfer from [facility] ED for episode of altered mental status (staring, generalized weakness), back to baseline in [facility] ED, no acute pathology on CTH or MRI, found to have metabolic encephalopathy (resolved) 2/2 UTI (completed 7day abx) vs hypertension, also found to have new LE weakness due to diabetic neuropathy. Due to immigration and insurance status, not able to FELICIA as recommended by PT. Pending improvement to one-person assist in order to discharge home with out-of-pocket PT vs home with family assist.    Pt seen in room, resting in bed. Pt reports good appetite PTA, no chewing/swallowing issues noted. Currently on consistent carbohydrate/DASH diet, eating % of meals during hospital stay. Pt continues to report good appetite during visits, no food preferences noted. Please obtain updated wt when able. No nutrition issues noted at present. RD discussed current diet and provided education. Please see full recs below. Will continue to follow per RD protocol.     Previous Nutrition Diagnosis: None    Recommendations:  1. Continue DASH, consistent carbohydrate diet  2. Continue Ensure Max QD (150kcal/30gpro) per pt preference  3. Please obtain updated wts   4. Monitor lytes, BG  5. RD to remain available prn    Education: Reinforced education     Risk Level: High [   ] Moderate [ x ] Low [   ].
Admitting Diagnosis:   Patient is a 72y old  Female who presents with a chief complaint of episode of AMS (31 May 2022 08:46)    PAST MEDICAL & SURGICAL HISTORY:  HTN (hypertension)    DM (diabetes mellitus)    TIA (transient ischemic attack)    No significant past surgical history    Current Nutrition Order:  DASH, consistent carbohydrate   Ensure Max QD (150kcal/30gpro)     PO Intake: Good (%) [ x  ]  Fair (50-75%) [  ] Poor (<25%) [   ]    GI Issues:   Last BM     Pain:  No pain noted    Skin Integrity:  2+ edema to right and left hip    Labs:     CAPILLARY BLOOD GLUCOSE    POCT Blood Glucose.: 128 mg/dL (2022 08:08)  POCT Blood Glucose.: 97 mg/dL (31 May 2022 21:21)  POCT Blood Glucose.: 122 mg/dL (31 May 2022 17:17)  POCT Blood Glucose.: 141 mg/dL (31 May 2022 12:12)    Medications:  MEDICATIONS  (STANDING):  amLODIPine   Tablet 10 milliGRAM(s) Oral daily  apixaban 5 milliGRAM(s) Oral every 12 hours  aspirin  chewable 81 milliGRAM(s) Oral daily  atorvastatin 40 milliGRAM(s) Oral at bedtime  dextrose 5%. 1000 milliLiter(s) (50 mL/Hr) IV Continuous <Continuous>  dextrose 5%. 1000 milliLiter(s) (100 mL/Hr) IV Continuous <Continuous>  dextrose 50% Injectable 25 Gram(s) IV Push once  dextrose 50% Injectable 12.5 Gram(s) IV Push once  dextrose 50% Injectable 25 Gram(s) IV Push once  DULoxetine 90 milliGRAM(s) Oral every 24 hours  glucagon  Injectable 1 milliGRAM(s) IntraMuscular once  insulin glargine Injectable (LANTUS) 4 Unit(s) SubCutaneous at bedtime  insulin lispro (ADMELOG) corrective regimen sliding scale   SubCutaneous three times a day before meals  insulin lispro Injectable (ADMELOG) 8 Unit(s) SubCutaneous three times a day before meals  lidocaine 2% Gel 1 Application(s) Topical three times a day  lisinopril 40 milliGRAM(s) Oral every 24 hours  multivitamin 1 Tablet(s) Oral daily  pantoprazole    Tablet 40 milliGRAM(s) Oral before breakfast    MEDICATIONS  (PRN):  acetaminophen     Tablet .. 325 milliGRAM(s) Oral every 6 hours PRN Temp greater or equal to 38C (100.4F), Moderate Pain (4 - 6)  dextrose Oral Gel 15 Gram(s) Oral once PRN Blood Glucose LESS THAN 70 milliGRAM(s)/deciliter    Height for BMI (FEET)	5 Feet  Height for BMI (INCHES)	6 Inch(s)  Height for BMI (CENTIMETERS)	167.64 Centimeter(s)  Weight for BMI (lbs)	140 lb  Weight for BMI (kg)	63.5 kg  Body Mass Index	22.6    Weight Change: Pt denies wt changes PTA    Estimated energy needs:   ABW used for calculations as pt between % of IBW, adjusted for age  25-30kcal/k-1905kcal  1-1.2g/k-76gprotein    Subjective:   72y Female with PMHx of T2DM c/b peripheral neuropathy, HTN, recent admission and workup for Naty Sanchez tear, TIA on 3/30 (MR negative) presents to [facility] as transfer from [facility] ED for episode of altered mental status (staring, generalized weakness), back to baseline in [facility] ED, no acute pathology on CTH or MRI, found to have metabolic encephalopathy (resolved) 2/2 UTI (completed 7day abx) vs hypertension, also found to have new LE weakness due to diabetic neuropathy. Due to immigration and insurance status, not able to FELICIA as recommended by PT. Pending improvement to one-person assist in order to discharge home with out-of-pocket PT vs home with family assist.    Pt seen in room, resting in bed. Pt reports good appetite PTA, no chewing/swallowing issues noted. Currently on consistent carbohydrate/DASH diet, eating % of meals during hospital stay. Pt continues to report good appetite during visits, no food preferences noted. Please obtain updated wt when able. No nutrition issues noted at present. RD discussed current diet and provided education. Please see full recs below. Will continue to follow per RD protocol.     Previous Nutrition Diagnosis: None    Recommendations:  1. Continue DASH, consistent carbohydrate diet  2. Continue Ensure Max QD (150kcal/30gpro) per pt preference  3. Please obtain updated wts   4. Monitor lytes, BG  5. RD to remain available prn    Education: Reinforced education     Risk Level: High [   ] Moderate [ x ] Low [   ].
Called patient's daughter Charles Doss [phone] around 2pm today to inform her that her mother is medically ready for discharge. She was explained that if she would like to appeal for this discharge, we can give her a number to call. Patient's daughter continued over the phone that she does not feel her mother is safe to go home with her and hung up the phone. Later attempts were made to reach her again and she said she will call back.
Due to severe de-conditioning and peripheral axonal neuropathy patient requires a semi electric hospital bed (with mattress) in order to position her body in ways she cannot on her own in a ordinary bed. She also requires traction equipment that can only be attached to a hospital bed.
Patient doing well on current insulin regimen.  No adjustments have been needed.   May Keep on 4units Lantus and 8units Lispro pre-meal  Will sign off for now  Do re-consult if needed
Admitting Diagnosis:   Patient is a 72y old  Female who presents with a chief complaint of episode of AMS (11 May 2022 11:31)    PAST MEDICAL & SURGICAL HISTORY:  HTN (hypertension)    DM (diabetes mellitus)    TIA (transient ischemic attack)    No significant past surgical history    Current Nutrition Order:  DASH, consistent carbohydrate   Ensure Max QD (150kcal/30gpro)     PO Intake: Good (%) [   ]  Fair (50-75%) [ x ] Poor (<25%) [   ]    GI Issues:   Last BM     Pain:  No pain noted    Skin Integrity:  No edema    Labs:     CAPILLARY BLOOD GLUCOSE    POCT Blood Glucose.: 136 mg/dL (11 May 2022 12:55)  POCT Blood Glucose.: 153 mg/dL (11 May 2022 09:19)  POCT Blood Glucose.: 97 mg/dL (10 May 2022 21:05)  POCT Blood Glucose.: 110 mg/dL (10 May 2022 16:58)    Medications:  MEDICATIONS  (STANDING):  amLODIPine   Tablet 10 milliGRAM(s) Oral daily  aspirin  chewable 81 milliGRAM(s) Oral daily  atorvastatin 40 milliGRAM(s) Oral at bedtime  dextrose 5%. 1000 milliLiter(s) (50 mL/Hr) IV Continuous <Continuous>  dextrose 5%. 1000 milliLiter(s) (100 mL/Hr) IV Continuous <Continuous>  dextrose 50% Injectable 25 Gram(s) IV Push once  dextrose 50% Injectable 12.5 Gram(s) IV Push once  dextrose 50% Injectable 25 Gram(s) IV Push once  enoxaparin Injectable 40 milliGRAM(s) SubCutaneous every 24 hours  gabapentin 100 milliGRAM(s) Oral every 12 hours  glucagon  Injectable 1 milliGRAM(s) IntraMuscular once  insulin glargine Injectable (LANTUS) 4 Unit(s) SubCutaneous at bedtime  insulin lispro (ADMELOG) corrective regimen sliding scale   SubCutaneous three times a day before meals  insulin lispro Injectable (ADMELOG) 8 Unit(s) SubCutaneous three times a day before meals  lidocaine 2% Gel 1 Application(s) Topical three times a day  lisinopril 40 milliGRAM(s) Oral every 24 hours  pantoprazole    Tablet 40 milliGRAM(s) Oral before breakfast    MEDICATIONS  (PRN):  dextrose Oral Gel 15 Gram(s) Oral once PRN Blood Glucose LESS THAN 70 milliGRAM(s)/deciliter    Height for BMI (FEET)	5 Feet  Height for BMI (INCHES)	6 Inch(s)  Height for BMI (CENTIMETERS)	167.64 Centimeter(s)  Weight for BMI (lbs)	140 lb  Weight for BMI (kg)	63.5 kg  Body Mass Index	22.6    Weight Change: Pt denies wt changes PTA    Estimated energy needs:   ABW used for calculations as pt between % of IBW, adjusted for age  25-30kcal/k-1905kcal  1-1.2g/k-76gprotein    Subjective:   72y Female with PMHx of T2DM c/b peripheral neuropathy, HTN, recent admission and workup for Naty Sanchez tear, TIA on 3/30 (MR negative) presents to [facility] as transfer from [facility] ED for episode of altered mental status (staring, generalized weakness), back to baseline in [facility] ED, no acute pathology on CTH or MRI, found to have metabolic encephalopathy (resolved) 2/2 UTI (completed 7day abx) vs hypertension, also found to have new LE weakness of unclear origin, possible diabetic neuropathy. Due to immigration and insurance status, not able to FELICIA as recommended by PT. Pending improvement to one-person assist in order to discharge home with out-of-pocket PT vs home with family assist    Pt seen in room, resting in bed. Pt reports good appetite PTA, no chewing/swallowing issues noted. Currently on consistent carbohydrate/DASH diet, eating % of meals during hospital stay. Bedscale wt taken today  75kg? likely scale error please obtain updated wt when able. No nutrition issues noted at present. RD discussed current diet and provided education. Please see full recs below. Will continue to follow per RD protocol.     Previous Nutrition Diagnosis: None    Recommendations:  1. Continue DASH, consistent carbohydrate diet  2. Continue Ensure Max QD (150kcal/30gpro) per pt preference  3. Trend wts  4. Monitor lytes, BG  5. RD to remain available prn    Education: Reinforced education     Risk Level: High [   ] Moderate [ x ] Low [   ].
Admitting Diagnosis:   Patient is a 72y old  Female who presents with a chief complaint of episode of AMS (2022 14:07)    PAST MEDICAL & SURGICAL HISTORY:  HTN (hypertension)    DM (diabetes mellitus)    TIA (transient ischemic attack)    No significant past surgical history    Current Nutrition Order:  consistent carbohydrate diet w/evening snack  DASH/TLC    PO Intake: Good (%) [ x  ]  Fair (50-75%) [  ] Poor (<25%) [   ]    GI Issues:   Pt denies N/V/D/C  Last      Pain:  No pain noted    Skin Integrity:  Wdl    Labs:       134<L>  |  101  |  25<H>  ----------------------------<  188<H>  4.6   |  24  |  0.60    Ca    8.7      2022 08:01  Phos  3.7       Mg     1.6         TPro  6.1  /  Alb  3.0<L>  /  TBili  0.2  /  DBili  x   /  AST  15  /  ALT  11  /  AlkPhos  33<L>      CAPILLARY BLOOD GLUCOSE    POCT Blood Glucose.: 188 mg/dL (2022 12:07)  POCT Blood Glucose.: 150 mg/dL (2022 08:17)  POCT Blood Glucose.: 103 mg/dL (2022 21:16)  POCT Blood Glucose.: 93 mg/dL (2022 17:08)    Medications:  MEDICATIONS  (STANDING):  amLODIPine   Tablet 10 milliGRAM(s) Oral daily  aspirin  chewable 81 milliGRAM(s) Oral daily  atorvastatin 40 milliGRAM(s) Oral at bedtime  dextrose 5%. 1000 milliLiter(s) (50 mL/Hr) IV Continuous <Continuous>  dextrose 5%. 1000 milliLiter(s) (100 mL/Hr) IV Continuous <Continuous>  dextrose 50% Injectable 25 Gram(s) IV Push once  dextrose 50% Injectable 12.5 Gram(s) IV Push once  dextrose 50% Injectable 25 Gram(s) IV Push once  DULoxetine 60 milliGRAM(s) Oral daily  enoxaparin Injectable 40 milliGRAM(s) SubCutaneous every 24 hours  gabapentin 100 milliGRAM(s) Oral <User Schedule>  glucagon  Injectable 1 milliGRAM(s) IntraMuscular once  insulin glargine Injectable (LANTUS) 4 Unit(s) SubCutaneous at bedtime  insulin lispro (ADMELOG) corrective regimen sliding scale   SubCutaneous three times a day before meals  insulin lispro Injectable (ADMELOG) 8 Unit(s) SubCutaneous three times a day before meals  lisinopril 40 milliGRAM(s) Oral every 24 hours  pantoprazole    Tablet 40 milliGRAM(s) Oral before breakfast    MEDICATIONS  (PRN):  dextrose Oral Gel 15 Gram(s) Oral once PRN Blood Glucose LESS THAN 70 milliGRAM(s)/deciliter    Height for BMI (FEET)	5 Feet  Height for BMI (INCHES)	6 Inch(s)  Height for BMI (CENTIMETERS)	167.64 Centimeter(s)  Weight for BMI (lbs)	140 lb  Weight for BMI (kg)	63.5 kg  Body Mass Index	22.6    Weight Change: Pt denies wt changes PTA    Estimated energy needs:   ABW used for calculations as pt between % of IBW, adjusted for age  25-30kcal/k-1905kcal  1-1.2g/k-76gprotein    Subjective:   72y Female with PMHx of T2DM c/b peripheral neuropathy, HTN, recent admission and workup for Naty Sanchez tear, TIA on 3/30 (MR negative) presents to [facility] as transfer from [facility] ED for episode of altered mental status (staring, generalized weakness), back to baseline in [facility] ED, no acute pathology on CTH or MRI, found to have metabolic encephalopathy (resolved) 2/2 UTI (completed 7day abx) vs hypertension, also found to have new LE weakness of unclear origin, possible diabetic neuropathy. Due to immigration and insurance status, not able to FELIC[facility] as recommended by PT. Pending improvement to one-person assist in order to discharge home with out-of-pocket PT.     Pt seen in room, resting in bed. Pt reports good appetite PTA, no chewing/swallowing issues noted. Currently on consistent carbohydrate/DASH diet, eating % of meals during hospital stay. No nutrition issues noted at present. RD discussed current diet and provided education. Please see full recs below. Will continue to follow per RD protocol.     Previous Nutrition Diagnosis: None    Recommendations:  1. Continue DASH, consistent carbohydrate diet  2. Add Ensure Max QD (150kcal/30gpro) per pt preference  3. Trend wts  4. Monitor lytes, BG  5. RD to remain available prn    Education: Reinforced education     Risk Level: High [   ] Moderate [ x ] Low [   ]

## 2022-06-20 NOTE — PROGRESS NOTE ADULT - ASSESSMENT
73yo F PMH T2DM c/b peripheral neuropathy, HTN, recent admission and workup for Naty Sanchez tear, TIA 3/30 (MR negative) presented for episode of AMS (staring, generalized weakness), course c/b metabolic encephalopathy 2/2 UTI (resolved and s/p abx x7d) vs HTN. Also found to have new LE weakness 2/2 diabetic neuropathy. Due to immigration and insurance status, unable to dc to [facility]. Now 1-person assist, pending dispo.    Problem/Plan - 1:  ·  Problem: Leg weakness.   ·  Plan: Symmetrical LE weakness, distal worse than proximal. Symmetrical diminished sensation, longstanding diabetic peripheral neuropathy. Denies issues with incontinence, or back pain, no sensory line. Likely progression of diabetic neuropathy and symptoms more severe than expected.  MRI L/Spine: L3/4 degenerative changes w/ moderate central spinal canal stenosis. L4/5 level disc bulge.  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  - s/p IVIG 5d course (6/1-6/5)  - c/w Cymbalta 90mg qd  - c/w Custom AFO boots   - c/w incentive spirometry  - currently a 1-person assist  - c/w daily PT sessions for safe disposition  - planned for dc to home on Monday 6/20; however daughter giving pushback as two of her children are COVID+ and she is concerned about isolation and exposures    Problem/Plan - 2:  ·  Problem: Bilateral leg pain.   ·  Plan: Needle-like pain in b/l shins extending downward, interfering with sleep.  - LE dopplers 5/21: acute DVT of L peroneal vein  - LE dopplers 6/3: resolution of L peroneal DVT  - c/w Cymbalta 90mg qd for diabetic neuropathy  - c/w eliquis 5mg BID for 3 months (5/28-8/28), then resume ASA 81mg qd    Problem/Plan - 3:  ·  Problem: Type II diabetes mellitus.   ·  Plan: Known h/o T2DM, A1c 11-11.6% (3/27-4/9).  - endocrine consulted initially, now signed off  - low C peptide therefore pt will need insulin and NOT oral agents on dc  - c/w mISS   - c/w Lantus 4u qhs & Lispro 8u TID    Problem/Plan - 4:  ·  Problem: Naty-Sanchez tear.   ·  Plan: Hx of Naty Sanchez tear in prior admission. Takes Protonix 40mg qd at home.  - c/w protonix    Problem/Plan - 5:  ·  Problem: Hypertension.   ·  Plan: Known h/o HTN, takes Amlodipine 10mg qd and Lisinopril 40mg qd at home.  - goal -160  - TTE with bubble 4/1: mild concentric LVH, hyperdynamic LV systolic function w/ EF >83%; grade I diastolic dysfunction w/o elevated filling pressure; no R to L shunt; mild-to-moderate AS; trace AR/MR/TR/IN  - c/w amlodipine and lisinopril    Problem/Plan - 6:  ·  Problem: Hyperlipidemia.   ·  Plan: Known HLD, takes Atorvastatin 40mg qhs at home.  - c/w atorvastatin    Problem/Plan - 7:  ·  Problem: Nutrition, metabolism, and development symptoms.   ·  Plan: F: None  E: Replete PRN   N: DASH/TLC  GI ppx: protonix 40mg qd  DVT ppx: eliquis 5mg BID  Dispo: RMF --> home

## 2022-06-21 LAB
GLUCOSE BLDC GLUCOMTR-MCNC: 109 MG/DL — HIGH (ref 70–99)
GLUCOSE BLDC GLUCOMTR-MCNC: 164 MG/DL — HIGH (ref 70–99)
GLUCOSE BLDC GLUCOMTR-MCNC: 170 MG/DL — HIGH (ref 70–99)
GLUCOSE BLDC GLUCOMTR-MCNC: 187 MG/DL — HIGH (ref 70–99)

## 2022-06-21 PROCEDURE — 99231 SBSQ HOSP IP/OBS SF/LOW 25: CPT

## 2022-06-21 RX ORDER — INSULIN GLARGINE 100 [IU]/ML
18 INJECTION, SOLUTION SUBCUTANEOUS
Qty: 0 | Refills: 0 | DISCHARGE

## 2022-06-21 RX ORDER — INSULIN LISPRO 100/ML
8 VIAL (ML) SUBCUTANEOUS
Qty: 1 | Refills: 0
Start: 2022-06-21 | End: 2022-07-20

## 2022-06-21 RX ORDER — INSULIN GLARGINE 100 [IU]/ML
4 INJECTION, SOLUTION SUBCUTANEOUS
Qty: 1 | Refills: 0
Start: 2022-06-21

## 2022-06-21 RX ADMIN — PANTOPRAZOLE SODIUM 40 MILLIGRAM(S): 20 TABLET, DELAYED RELEASE ORAL at 06:24

## 2022-06-21 RX ADMIN — Medication 2: at 09:15

## 2022-06-21 RX ADMIN — Medication 8 UNIT(S): at 18:30

## 2022-06-21 RX ADMIN — ATORVASTATIN CALCIUM 40 MILLIGRAM(S): 80 TABLET, FILM COATED ORAL at 21:53

## 2022-06-21 RX ADMIN — AMLODIPINE BESYLATE 10 MILLIGRAM(S): 2.5 TABLET ORAL at 06:23

## 2022-06-21 RX ADMIN — APIXABAN 5 MILLIGRAM(S): 2.5 TABLET, FILM COATED ORAL at 21:53

## 2022-06-21 RX ADMIN — APIXABAN 5 MILLIGRAM(S): 2.5 TABLET, FILM COATED ORAL at 11:11

## 2022-06-21 RX ADMIN — LISINOPRIL 40 MILLIGRAM(S): 2.5 TABLET ORAL at 06:23

## 2022-06-21 RX ADMIN — Medication 8 UNIT(S): at 12:52

## 2022-06-21 RX ADMIN — LIDOCAINE 1 APPLICATION(S): 4 CREAM TOPICAL at 06:25

## 2022-06-21 RX ADMIN — Medication 2: at 12:54

## 2022-06-21 RX ADMIN — DULOXETINE HYDROCHLORIDE 90 MILLIGRAM(S): 30 CAPSULE, DELAYED RELEASE ORAL at 11:11

## 2022-06-21 RX ADMIN — Medication 8 UNIT(S): at 09:14

## 2022-06-21 RX ADMIN — Medication 2: at 21:24

## 2022-06-21 RX ADMIN — INSULIN GLARGINE 4 UNIT(S): 100 INJECTION, SOLUTION SUBCUTANEOUS at 21:25

## 2022-06-21 RX ADMIN — LIDOCAINE 1 APPLICATION(S): 4 CREAM TOPICAL at 15:48

## 2022-06-21 RX ADMIN — Medication 1 TABLET(S): at 11:12

## 2022-06-21 RX ADMIN — LIDOCAINE 1 APPLICATION(S): 4 CREAM TOPICAL at 21:52

## 2022-06-21 NOTE — PROGRESS NOTE ADULT - ASSESSMENT
73yo F PMH T2DM c/b peripheral neuropathy, HTN, recent admission and workup for Naty Sanchez tear, TIA 3/30 (MR negative) presented for episode of AMS (staring, generalized weakness), course c/b metabolic encephalopathy 2/2 UTI (resolved and s/p abx x7d) vs HTN. Also found to have new LE weakness 2/2 diabetic neuropathy. Due to immigration and insurance status, unable to dc to [facility]. Now 1-person assist, pending dispo.    Problem/Plan - 1:  ·  Problem: Leg weakness.   ·  Plan: Symmetrical LE weakness, distal worse than proximal. Symmetrical diminished sensation, longstanding diabetic peripheral neuropathy. Denies issues with incontinence, or back pain, no sensory line. Likely progression of diabetic neuropathy and symptoms more severe than expected.  MRI L/Spine: L3/4 degenerative changes w/ moderate central spinal canal stenosis. L4/5 level disc bulge.  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  - s/p IVIG 5d course (6/1-6/5)  - c/w Cymbalta 90mg qd  - c/w Custom AFO boots   - c/w incentive spirometry  - currently a 1-person assist  - c/w daily PT sessions --> got nauseous today while working w/ PT and vomited after drinking a lot of water prior to working w/ PT  - given 24h notice today 6/22 as pt medically ready for discharge    Problem/Plan - 2:  ·  Problem: Bilateral leg pain.   ·  Plan: Needle-like pain in b/l shins extending downward, interfering with sleep.  - LE dopplers 5/21: acute DVT of L peroneal vein  - LE dopplers 6/3: resolution of L peroneal DVT  - c/w Cymbalta 90mg qd for diabetic neuropathy  - c/w eliquis 5mg BID for 3 months (5/28-8/28), then resume ASA 81mg qd    Problem/Plan - 3:  ·  Problem: Type II diabetes mellitus.   ·  Plan: Known h/o T2DM, A1c 11-11.6% (3/27-4/9).  - endocrine consulted initially, now signed off  - low C peptide therefore pt will need insulin and NOT oral agents on dc  - c/w mISS   - c/w Lantus 4u qhs & Lispro 8u TID    Problem/Plan - 4:  ·  Problem: Naty-Sanchez tear.   ·  Plan: Hx of Naty Sanchez tear in prior admission. Takes Protonix 40mg qd at home.  - c/w protonix    Problem/Plan - 5:  ·  Problem: Hypertension.   ·  Plan: Known h/o HTN, takes Amlodipine 10mg qd and Lisinopril 40mg qd at home.  - goal -160  - TTE with bubble 4/1: mild concentric LVH, hyperdynamic LV systolic function w/ EF >83%; grade I diastolic dysfunction w/o elevated filling pressure; no R to L shunt; mild-to-moderate AS; trace AR/MR/TR/DE  - c/w amlodipine and lisinopril    Problem/Plan - 6:  ·  Problem: Hyperlipidemia.   ·  Plan: Known HLD, takes Atorvastatin 40mg qhs at home.  - c/w atorvastatin    Problem/Plan - 7:  ·  Problem: Nutrition, metabolism, and development symptoms.   ·  Plan: F: None  E: Replete PRN   N: DASH/TLC  GI ppx: protonix 40mg qd  DVT ppx: eliquis 5mg BID  Dispo: RMF --> home

## 2022-06-21 NOTE — PROGRESS NOTE ADULT - SUBJECTIVE AND OBJECTIVE BOX
OVERNIGHT EVENTS: jayro    SUBJECTIVE:  Patient seen and examined at bedside.    Vital Signs Last 12 Hrs  T(F): 98 (06-21-22 @ 12:31), Max: 98.8 (06-21-22 @ 06:09)  HR: 72 (06-21-22 @ 12:31) (72 - 89)  BP: 139/76 (06-21-22 @ 12:31) (139/76 - 151/75)  BP(mean): --  RR: 17 (06-21-22 @ 12:31) (16 - 17)  SpO2: 96% (06-21-22 @ 12:31) (96% - 98%)  I&O's Summary      PHYSICAL EXAM:  General: elderly female lying in bed in no apparent distress  HEENT: NC/AT; EOMI but R eye decreased acuity (baseline); MMM  Neck: supple  Cardiac: did not examine  Pulm: did not examine  GI: did not examine  Extremities: did not examine  Vasc: did not examine  Neuro: AAOx3  Motor: full ROM, symmetric weakness        LABS:                  RADIOLOGY & ADDITIONAL TESTS:    MEDICATIONS  (STANDING):  amLODIPine   Tablet 10 milliGRAM(s) Oral daily  apixaban 5 milliGRAM(s) Oral every 12 hours  atorvastatin 40 milliGRAM(s) Oral at bedtime  dextrose 5%. 1000 milliLiter(s) (50 mL/Hr) IV Continuous <Continuous>  dextrose 5%. 1000 milliLiter(s) (100 mL/Hr) IV Continuous <Continuous>  dextrose 50% Injectable 25 Gram(s) IV Push once  dextrose 50% Injectable 12.5 Gram(s) IV Push once  dextrose 50% Injectable 25 Gram(s) IV Push once  DULoxetine 90 milliGRAM(s) Oral every 24 hours  glucagon  Injectable 1 milliGRAM(s) IntraMuscular once  insulin glargine Injectable (LANTUS) 4 Unit(s) SubCutaneous at bedtime  insulin lispro (ADMELOG) corrective regimen sliding scale   SubCutaneous Before meals and at bedtime  insulin lispro Injectable (ADMELOG) 8 Unit(s) SubCutaneous three times a day before meals  lidocaine 2% Gel 1 Application(s) Topical three times a day  lisinopril 40 milliGRAM(s) Oral every 24 hours  multivitamin 1 Tablet(s) Oral daily  pantoprazole    Tablet 40 milliGRAM(s) Oral before breakfast    MEDICATIONS  (PRN):  dextrose Oral Gel 15 Gram(s) Oral once PRN Blood Glucose LESS THAN 70 milliGRAM(s)/deciliter   OVERNIGHT EVENTS: jayro    SUBJECTIVE:  Patient seen and examined at bedside.    Vital Signs Last 12 Hrs  T(F): 98 (06-21-22 @ 12:31), Max: 98.8 (06-21-22 @ 06:09)  HR: 72 (06-21-22 @ 12:31) (72 - 89)  BP: 139/76 (06-21-22 @ 12:31) (139/76 - 151/75)  BP(mean): --  RR: 17 (06-21-22 @ 12:31) (16 - 17)  SpO2: 96% (06-21-22 @ 12:31) (96% - 98%)  I&O's Summary      PHYSICAL EXAM:  General: elderly female lying in bed in no apparent distress  HEENT: NC/AT; EOMI but R eye decreased acuity (baseline); MMM  Neck: supple  Cardiac: did not examine  Pulm: did not examine  GI: did not examine  Extremities: did not examine  Vasc: did not examine  Neuro: AAOx3  Motor: severe distal LE weakness 1/5 b/l         LABS:                  RADIOLOGY & ADDITIONAL TESTS:    MEDICATIONS  (STANDING):  amLODIPine   Tablet 10 milliGRAM(s) Oral daily  apixaban 5 milliGRAM(s) Oral every 12 hours  atorvastatin 40 milliGRAM(s) Oral at bedtime  dextrose 5%. 1000 milliLiter(s) (50 mL/Hr) IV Continuous <Continuous>  dextrose 5%. 1000 milliLiter(s) (100 mL/Hr) IV Continuous <Continuous>  dextrose 50% Injectable 25 Gram(s) IV Push once  dextrose 50% Injectable 12.5 Gram(s) IV Push once  dextrose 50% Injectable 25 Gram(s) IV Push once  DULoxetine 90 milliGRAM(s) Oral every 24 hours  glucagon  Injectable 1 milliGRAM(s) IntraMuscular once  insulin glargine Injectable (LANTUS) 4 Unit(s) SubCutaneous at bedtime  insulin lispro (ADMELOG) corrective regimen sliding scale   SubCutaneous Before meals and at bedtime  insulin lispro Injectable (ADMELOG) 8 Unit(s) SubCutaneous three times a day before meals  lidocaine 2% Gel 1 Application(s) Topical three times a day  lisinopril 40 milliGRAM(s) Oral every 24 hours  multivitamin 1 Tablet(s) Oral daily  pantoprazole    Tablet 40 milliGRAM(s) Oral before breakfast    MEDICATIONS  (PRN):  dextrose Oral Gel 15 Gram(s) Oral once PRN Blood Glucose LESS THAN 70 milliGRAM(s)/deciliter

## 2022-06-22 LAB
APPEARANCE UR: CLEAR — SIGNIFICANT CHANGE UP
BILIRUB UR-MCNC: NEGATIVE — SIGNIFICANT CHANGE UP
COLOR SPEC: YELLOW — SIGNIFICANT CHANGE UP
DIFF PNL FLD: NEGATIVE — SIGNIFICANT CHANGE UP
GLUCOSE BLDC GLUCOMTR-MCNC: 121 MG/DL — HIGH (ref 70–99)
GLUCOSE BLDC GLUCOMTR-MCNC: 126 MG/DL — HIGH (ref 70–99)
GLUCOSE BLDC GLUCOMTR-MCNC: 138 MG/DL — HIGH (ref 70–99)
GLUCOSE BLDC GLUCOMTR-MCNC: 150 MG/DL — HIGH (ref 70–99)
GLUCOSE UR QL: NEGATIVE — SIGNIFICANT CHANGE UP
KETONES UR-MCNC: NEGATIVE — SIGNIFICANT CHANGE UP
LEUKOCYTE ESTERASE UR-ACNC: NEGATIVE — SIGNIFICANT CHANGE UP
NITRITE UR-MCNC: NEGATIVE — SIGNIFICANT CHANGE UP
PH UR: 6 — SIGNIFICANT CHANGE UP (ref 5–8)
PROT UR-MCNC: NEGATIVE MG/DL — SIGNIFICANT CHANGE UP
SP GR SPEC: 1.01 — SIGNIFICANT CHANGE UP (ref 1–1.03)
UROBILINOGEN FLD QL: 0.2 E.U./DL — SIGNIFICANT CHANGE UP

## 2022-06-22 PROCEDURE — 99232 SBSQ HOSP IP/OBS MODERATE 35: CPT

## 2022-06-22 RX ADMIN — Medication 1 TABLET(S): at 10:16

## 2022-06-22 RX ADMIN — PANTOPRAZOLE SODIUM 40 MILLIGRAM(S): 20 TABLET, DELAYED RELEASE ORAL at 06:21

## 2022-06-22 RX ADMIN — DULOXETINE HYDROCHLORIDE 90 MILLIGRAM(S): 30 CAPSULE, DELAYED RELEASE ORAL at 10:16

## 2022-06-22 RX ADMIN — LIDOCAINE 1 APPLICATION(S): 4 CREAM TOPICAL at 13:06

## 2022-06-22 RX ADMIN — INSULIN GLARGINE 4 UNIT(S): 100 INJECTION, SOLUTION SUBCUTANEOUS at 22:11

## 2022-06-22 RX ADMIN — Medication 8 UNIT(S): at 12:44

## 2022-06-22 RX ADMIN — Medication 8 UNIT(S): at 08:40

## 2022-06-22 RX ADMIN — ATORVASTATIN CALCIUM 40 MILLIGRAM(S): 80 TABLET, FILM COATED ORAL at 22:11

## 2022-06-22 RX ADMIN — APIXABAN 5 MILLIGRAM(S): 2.5 TABLET, FILM COATED ORAL at 22:11

## 2022-06-22 RX ADMIN — LIDOCAINE 1 APPLICATION(S): 4 CREAM TOPICAL at 22:11

## 2022-06-22 RX ADMIN — LISINOPRIL 40 MILLIGRAM(S): 2.5 TABLET ORAL at 06:21

## 2022-06-22 RX ADMIN — APIXABAN 5 MILLIGRAM(S): 2.5 TABLET, FILM COATED ORAL at 10:16

## 2022-06-22 RX ADMIN — LIDOCAINE 1 APPLICATION(S): 4 CREAM TOPICAL at 06:22

## 2022-06-22 RX ADMIN — AMLODIPINE BESYLATE 10 MILLIGRAM(S): 2.5 TABLET ORAL at 06:21

## 2022-06-22 RX ADMIN — Medication 8 UNIT(S): at 16:38

## 2022-06-22 NOTE — PROGRESS NOTE ADULT - ASSESSMENT
71yo F PMH T2DM c/b peripheral neuropathy, HTN, recent admission and workup for Naty Sanchez tear, TIA 3/30 (MR negative) presented for episode of AMS (staring, generalized weakness), course c/b metabolic encephalopathy 2/2 UTI (resolved and s/p abx x7d) vs HTN. Also found to have new LE weakness 2/2 diabetic neuropathy. Due to immigration and insurance status, unable to dc to [facility]. Now 1-person assist, pending dispo.    Problem/Plan - 1:  ·  Problem: Leg weakness.   ·  Plan: Symmetrical LE weakness, distal worse than proximal. Symmetrical diminished sensation, longstanding diabetic peripheral neuropathy. Denies issues with incontinence, or back pain, no sensory line. Likely progression of diabetic neuropathy and symptoms more severe than expected.  MRI L/Spine: L3/4 degenerative changes w/ moderate central spinal canal stenosis. L4/5 level disc bulge.  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  - s/p IVIG 5d course (6/1-6/5)  - c/w Cymbalta 90mg qd  - c/w Custom AFO boots   - c/w incentive spirometry  - currently a 1-person assist  - c/w daily PT sessions --> got nauseous today while working w/ PT and vomited after drinking a lot of water prior to working w/ PT  - given 24h notice 6/22 as pt medically ready for discharge    Problem/Plan - 2:  ·  Problem: Bilateral leg pain.   ·  Plan: Needle-like pain in b/l shins extending downward, interfering with sleep.  - LE dopplers 5/21: acute DVT of L peroneal vein  - LE dopplers 6/3: resolution of L peroneal DVT  - c/w Cymbalta 90mg qd for diabetic neuropathy  - c/w eliquis 5mg BID for 3 months (5/28-8/28), then resume ASA 81mg qd    Problem/Plan - 3:  ·  Problem: Type II diabetes mellitus.   ·  Plan: Known h/o T2DM, A1c 11-11.6% (3/27-4/9).  - endocrine consulted initially, now signed off  - low C peptide therefore pt will need insulin and NOT oral agents on dc  - c/w mISS   - c/w Lantus 4u qhs & Lispro 8u TID    Problem/Plan - 4:  ·  Problem: Naty-Sanchez tear.   ·  Plan: Hx of Naty Sanchez tear in prior admission. Takes Protonix 40mg qd at home.  - c/w protonix    Problem/Plan - 5:  ·  Problem: Hypertension.   ·  Plan: Known h/o HTN, takes Amlodipine 10mg qd and Lisinopril 40mg qd at home.  - goal -160  - TTE with bubble 4/1: mild concentric LVH, hyperdynamic LV systolic function w/ EF >83%; grade I diastolic dysfunction w/o elevated filling pressure; no R to L shunt; mild-to-moderate AS; trace AR/MR/TR/VA  - c/w amlodipine and lisinopril    Problem/Plan - 6:  ·  Problem: Hyperlipidemia.   ·  Plan: Known HLD, takes Atorvastatin 40mg qhs at home.  - c/w atorvastatin    Problem/Plan - 7:  ·  Problem: Nutrition, metabolism, and development symptoms.   ·  Plan: F: None  E: Replete PRN   N: DASH/TLC  GI ppx: protonix 40mg qd  DVT ppx: eliquis 5mg BID  Dispo: RMF --> home

## 2022-06-22 NOTE — PROGRESS NOTE ADULT - ATTENDING COMMENTS
1 episode of vomiting yesterday, currently no nausea   c/o some dysuria - urinalysis negative - now resolved  stable for d/c home

## 2022-06-22 NOTE — PROGRESS NOTE ADULT - SUBJECTIVE AND OBJECTIVE BOX
OVERNIGHT EVENTS: jayro    SUBJECTIVE:  Patient seen at bedside.    Vital Signs Last 12 Hrs  T(F): 97.6 (06-22-22 @ 12:02), Max: 98.4 (06-22-22 @ 05:00)  HR: 70 (06-22-22 @ 12:02) (68 - 70)  BP: 117/65 (06-22-22 @ 12:02) (117/65 - 148/78)  BP(mean): --  RR: 18 (06-22-22 @ 12:02) (17 - 18)  SpO2: 99% (06-22-22 @ 12:02) (99% - 99%)  I&O's Summary      PHYSICAL EXAM:  Constitutional: NAD, comfortable in bed.          LABS:                  RADIOLOGY & ADDITIONAL TESTS:    MEDICATIONS  (STANDING):  amLODIPine   Tablet 10 milliGRAM(s) Oral daily  apixaban 5 milliGRAM(s) Oral every 12 hours  atorvastatin 40 milliGRAM(s) Oral at bedtime  dextrose 5%. 1000 milliLiter(s) (50 mL/Hr) IV Continuous <Continuous>  dextrose 5%. 1000 milliLiter(s) (100 mL/Hr) IV Continuous <Continuous>  dextrose 50% Injectable 25 Gram(s) IV Push once  dextrose 50% Injectable 12.5 Gram(s) IV Push once  dextrose 50% Injectable 25 Gram(s) IV Push once  DULoxetine 90 milliGRAM(s) Oral every 24 hours  glucagon  Injectable 1 milliGRAM(s) IntraMuscular once  insulin glargine Injectable (LANTUS) 4 Unit(s) SubCutaneous at bedtime  insulin lispro (ADMELOG) corrective regimen sliding scale   SubCutaneous Before meals and at bedtime  insulin lispro Injectable (ADMELOG) 8 Unit(s) SubCutaneous three times a day before meals  lidocaine 2% Gel 1 Application(s) Topical three times a day  lisinopril 40 milliGRAM(s) Oral every 24 hours  multivitamin 1 Tablet(s) Oral daily  pantoprazole    Tablet 40 milliGRAM(s) Oral before breakfast    MEDICATIONS  (PRN):  dextrose Oral Gel 15 Gram(s) Oral once PRN Blood Glucose LESS THAN 70 milliGRAM(s)/deciliter   OVERNIGHT EVENTS: she had an episode of vomiting yesterday while working with PT.     SUBJECTIVE:  Patient seen at bedside. complaining of dysuria, denies nausea or further vomiting     Vital Signs Last 12 Hrs  T(F): 97.6 (06-22-22 @ 12:02), Max: 98.4 (06-22-22 @ 05:00)  HR: 70 (06-22-22 @ 12:02) (68 - 70)  BP: 117/65 (06-22-22 @ 12:02) (117/65 - 148/78)  BP(mean): --  RR: 18 (06-22-22 @ 12:02) (17 - 18)  SpO2: 99% (06-22-22 @ 12:02) (99% - 99%)  I&O's Summary      PHYSICAL EXAM:  Constitutional: NAD, comfortable in bed.      MEDICATIONS  (STANDING):  amLODIPine   Tablet 10 milliGRAM(s) Oral daily  apixaban 5 milliGRAM(s) Oral every 12 hours  atorvastatin 40 milliGRAM(s) Oral at bedtime  dextrose 5%. 1000 milliLiter(s) (50 mL/Hr) IV Continuous <Continuous>  dextrose 5%. 1000 milliLiter(s) (100 mL/Hr) IV Continuous <Continuous>  dextrose 50% Injectable 25 Gram(s) IV Push once  dextrose 50% Injectable 12.5 Gram(s) IV Push once  dextrose 50% Injectable 25 Gram(s) IV Push once  DULoxetine 90 milliGRAM(s) Oral every 24 hours  glucagon  Injectable 1 milliGRAM(s) IntraMuscular once  insulin glargine Injectable (LANTUS) 4 Unit(s) SubCutaneous at bedtime  insulin lispro (ADMELOG) corrective regimen sliding scale   SubCutaneous Before meals and at bedtime  insulin lispro Injectable (ADMELOG) 8 Unit(s) SubCutaneous three times a day before meals  lidocaine 2% Gel 1 Application(s) Topical three times a day  lisinopril 40 milliGRAM(s) Oral every 24 hours  multivitamin 1 Tablet(s) Oral daily  pantoprazole    Tablet 40 milliGRAM(s) Oral before breakfast    MEDICATIONS  (PRN):  dextrose Oral Gel 15 Gram(s) Oral once PRN Blood Glucose LESS THAN 70 milliGRAM(s)/deciliter

## 2022-06-23 ENCOUNTER — TRANSCRIPTION ENCOUNTER (OUTPATIENT)
Age: 72
End: 2022-06-23

## 2022-06-23 LAB
GLUCOSE BLDC GLUCOMTR-MCNC: 100 MG/DL — HIGH (ref 70–99)
GLUCOSE BLDC GLUCOMTR-MCNC: 126 MG/DL — HIGH (ref 70–99)
GLUCOSE BLDC GLUCOMTR-MCNC: 156 MG/DL — HIGH (ref 70–99)
GLUCOSE BLDC GLUCOMTR-MCNC: 175 MG/DL — HIGH (ref 70–99)

## 2022-06-23 PROCEDURE — 99231 SBSQ HOSP IP/OBS SF/LOW 25: CPT

## 2022-06-23 RX ADMIN — Medication 8 UNIT(S): at 09:26

## 2022-06-23 RX ADMIN — LIDOCAINE 1 APPLICATION(S): 4 CREAM TOPICAL at 22:54

## 2022-06-23 RX ADMIN — Medication 8 UNIT(S): at 17:34

## 2022-06-23 RX ADMIN — APIXABAN 5 MILLIGRAM(S): 2.5 TABLET, FILM COATED ORAL at 22:54

## 2022-06-23 RX ADMIN — LIDOCAINE 1 APPLICATION(S): 4 CREAM TOPICAL at 06:50

## 2022-06-23 RX ADMIN — LISINOPRIL 40 MILLIGRAM(S): 2.5 TABLET ORAL at 06:45

## 2022-06-23 RX ADMIN — INSULIN GLARGINE 4 UNIT(S): 100 INJECTION, SOLUTION SUBCUTANEOUS at 22:00

## 2022-06-23 RX ADMIN — APIXABAN 5 MILLIGRAM(S): 2.5 TABLET, FILM COATED ORAL at 09:48

## 2022-06-23 RX ADMIN — ATORVASTATIN CALCIUM 40 MILLIGRAM(S): 80 TABLET, FILM COATED ORAL at 22:54

## 2022-06-23 RX ADMIN — DULOXETINE HYDROCHLORIDE 90 MILLIGRAM(S): 30 CAPSULE, DELAYED RELEASE ORAL at 09:48

## 2022-06-23 RX ADMIN — Medication 1 TABLET(S): at 09:48

## 2022-06-23 RX ADMIN — PANTOPRAZOLE SODIUM 40 MILLIGRAM(S): 20 TABLET, DELAYED RELEASE ORAL at 06:45

## 2022-06-23 RX ADMIN — AMLODIPINE BESYLATE 10 MILLIGRAM(S): 2.5 TABLET ORAL at 06:45

## 2022-06-23 RX ADMIN — Medication 2: at 09:26

## 2022-06-23 NOTE — DISCHARGE NOTE NURSING/CASE MANAGEMENT/SOCIAL WORK - NSDCPEFALRISK_GEN_ALL_CORE
For information on Fall & Injury Prevention, visit: [email or web address] OR  [email or web address] OR  https://www.cdc.gov/steadi/patient.html

## 2022-06-23 NOTE — PROGRESS NOTE ADULT - SUBJECTIVE AND OBJECTIVE BOX
OVERNIGHT EVENTS: jayro    SUBJECTIVE:  Patient seen at bedside.    Vital Signs Last 12 Hrs  T(F): 98.1 (22 @ 11:17), Max: 98.1 (22 @ 11:17)  HR: 72 (22 @ 11:17) (72 - 76)  BP: 131/69 (22 @ 11:17) (121/65 - 131/69)  BP(mean): --  RR: 18 (22 @ 11:17) (18 - 18)  SpO2: 97% (22 @ 11:17) (97% - 98%)  I&O's Summary      PHYSICAL EXAM:  Constitutional: NAD, comfortable in bed.        LABS:            Urinalysis Basic - ( 2022 19:41 )    Color: Yellow / Appearance: Clear / S.015 / pH: x  Gluc: x / Ketone: NEGATIVE  / Bili: Negative / Urobili: 0.2 E.U./dL   Blood: x / Protein: NEGATIVE mg/dL / Nitrite: NEGATIVE   Leuk Esterase: NEGATIVE / RBC: x / WBC x   Sq Epi: x / Non Sq Epi: x / Bacteria: x          RADIOLOGY & ADDITIONAL TESTS:    MEDICATIONS  (STANDING):  amLODIPine   Tablet 10 milliGRAM(s) Oral daily  apixaban 5 milliGRAM(s) Oral every 12 hours  atorvastatin 40 milliGRAM(s) Oral at bedtime  dextrose 5%. 1000 milliLiter(s) (50 mL/Hr) IV Continuous <Continuous>  dextrose 5%. 1000 milliLiter(s) (100 mL/Hr) IV Continuous <Continuous>  dextrose 50% Injectable 25 Gram(s) IV Push once  dextrose 50% Injectable 12.5 Gram(s) IV Push once  dextrose 50% Injectable 25 Gram(s) IV Push once  DULoxetine 90 milliGRAM(s) Oral every 24 hours  glucagon  Injectable 1 milliGRAM(s) IntraMuscular once  insulin glargine Injectable (LANTUS) 4 Unit(s) SubCutaneous at bedtime  insulin lispro (ADMELOG) corrective regimen sliding scale   SubCutaneous Before meals and at bedtime  insulin lispro Injectable (ADMELOG) 8 Unit(s) SubCutaneous three times a day before meals  lidocaine 2% Gel 1 Application(s) Topical three times a day  lisinopril 40 milliGRAM(s) Oral every 24 hours  multivitamin 1 Tablet(s) Oral daily  pantoprazole    Tablet 40 milliGRAM(s) Oral before breakfast    MEDICATIONS  (PRN):  dextrose Oral Gel 15 Gram(s) Oral once PRN Blood Glucose LESS THAN 70 milliGRAM(s)/deciliter   OVERNIGHT EVENTS: jayro    SUBJECTIVE:  Patient seen at bedside. No new complaints    Vital Signs Last 12 Hrs  T(F): 98.1 (22 @ 11:17), Max: 98.1 (22 @ 11:17)  HR: 72 (22 @ 11:17) (72 - 76)  BP: 131/69 (22 @ 11:17) (121/65 - 131/69)  BP(mean): --  RR: 18 (22 @ 11:17) (18 - 18)  SpO2: 97% (22 @ 11:17) (97% - 98%)  I&O's Summary      PHYSICAL EXAM:  Constitutional: NAD, comfortable in bed.        LABS:            Urinalysis Basic - ( 2022 19:41 )    Color: Yellow / Appearance: Clear / S.015 / pH: x  Gluc: x / Ketone: NEGATIVE  / Bili: Negative / Urobili: 0.2 E.U./dL   Blood: x / Protein: NEGATIVE mg/dL / Nitrite: NEGATIVE   Leuk Esterase: NEGATIVE / RBC: x / WBC x   Sq Epi: x / Non Sq Epi: x / Bacteria: x          RADIOLOGY & ADDITIONAL TESTS:    MEDICATIONS  (STANDING):  amLODIPine   Tablet 10 milliGRAM(s) Oral daily  apixaban 5 milliGRAM(s) Oral every 12 hours  atorvastatin 40 milliGRAM(s) Oral at bedtime  dextrose 5%. 1000 milliLiter(s) (50 mL/Hr) IV Continuous <Continuous>  dextrose 5%. 1000 milliLiter(s) (100 mL/Hr) IV Continuous <Continuous>  dextrose 50% Injectable 25 Gram(s) IV Push once  dextrose 50% Injectable 12.5 Gram(s) IV Push once  dextrose 50% Injectable 25 Gram(s) IV Push once  DULoxetine 90 milliGRAM(s) Oral every 24 hours  glucagon  Injectable 1 milliGRAM(s) IntraMuscular once  insulin glargine Injectable (LANTUS) 4 Unit(s) SubCutaneous at bedtime  insulin lispro (ADMELOG) corrective regimen sliding scale   SubCutaneous Before meals and at bedtime  insulin lispro Injectable (ADMELOG) 8 Unit(s) SubCutaneous three times a day before meals  lidocaine 2% Gel 1 Application(s) Topical three times a day  lisinopril 40 milliGRAM(s) Oral every 24 hours  multivitamin 1 Tablet(s) Oral daily  pantoprazole    Tablet 40 milliGRAM(s) Oral before breakfast    MEDICATIONS  (PRN):  dextrose Oral Gel 15 Gram(s) Oral once PRN Blood Glucose LESS THAN 70 milliGRAM(s)/deciliter

## 2022-06-23 NOTE — DISCHARGE NOTE NURSING/CASE MANAGEMENT/SOCIAL WORK - PATIENT PORTAL LINK FT
You can access the FollowMyHealth Patient Portal offered by [facility] by registering at the following website: [email or web address]. By joining [facility]’s FollowMyHealth portal, you will also be able to view your health information using other applications (apps) compatible with our system.

## 2022-06-23 NOTE — PROGRESS NOTE ADULT - ASSESSMENT
73yo F PMH T2DM c/b peripheral neuropathy, HTN, recent admission and workup for Naty Sanchez tear, TIA 3/30 (MR negative) presented for episode of AMS (staring, generalized weakness), course c/b metabolic encephalopathy 2/2 UTI (resolved and s/p abx x7d) vs HTN. Also found to have new LE weakness 2/2 diabetic neuropathy. Due to immigration and insurance status, unable to dc to [facility]. Now 1-person assist and medically ready for discharge.    Problem/Plan - 1:  ·  Problem: Leg weakness.   ·  Plan: Symmetrical LE weakness, distal worse than proximal. Symmetrical diminished sensation, longstanding diabetic peripheral neuropathy. Denies issues with incontinence, or back pain, no sensory line. Likely progression of diabetic neuropathy and symptoms more severe than expected.  MRI L/Spine: L3/4 degenerative changes w/ moderate central spinal canal stenosis. L4/5 level disc bulge.  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  - s/p IVIG 5d course (6/1-6/5)  - c/w Cymbalta 90mg qd  - c/w Custom AFO boots   - c/w incentive spirometry  - currently a 1-person assist  - c/w daily PT sessions --> got nauseous today while working w/ PT and vomited after drinking a lot of water prior to working w/ PT  - given 24h notice 6/22 as pt medically ready for discharge    Problem/Plan - 2:  ·  Problem: Bilateral leg pain.   ·  Plan: Needle-like pain in b/l shins extending downward, interfering with sleep.  - LE dopplers 5/21: acute DVT of L peroneal vein  - LE dopplers 6/3: resolution of L peroneal DVT  - c/w Cymbalta 90mg qd for diabetic neuropathy  - c/w eliquis 5mg BID for 3 months (5/28-8/28), then resume ASA 81mg qd  - medically ready for discharge    Problem/Plan - 3:  ·  Problem: Type II diabetes mellitus.   ·  Plan: Known h/o T2DM, A1c 11-11.6% (3/27-4/9).  - endocrine consulted initially, now signed off  - low C peptide therefore pt will need insulin and NOT oral agents on dc  - c/w mISS   - c/w Lantus 4u qhs & Lispro 8u TID  - medically ready for discharge    Problem/Plan - 4:  ·  Problem: Naty-Sanchez tear.   ·  Plan: Hx of Naty Sanchez tear in prior admission. Takes Protonix 40mg qd at home.  - c/w protonix  - medically ready for discharge    Problem/Plan - 5:  ·  Problem: Hypertension.   ·  Plan: Known h/o HTN, takes Amlodipine 10mg qd and Lisinopril 40mg qd at home.  - goal -160  - TTE with bubble 4/1: mild concentric LVH, hyperdynamic LV systolic function w/ EF >83%; grade I diastolic dysfunction w/o elevated filling pressure; no R to L shunt; mild-to-moderate AS; trace AR/MR/TR/CO  - c/w amlodipine and lisinopril  - medically ready for discharge    Problem/Plan - 6:  ·  Problem: Hyperlipidemia.   ·  Plan: Known HLD, takes Atorvastatin 40mg qhs at home.  - c/w atorvastatin  - medically ready for discharge    Problem/Plan - 7:  ·  Problem: Nutrition, metabolism, and development symptoms.   ·  Plan: F: None  E: Replete PRN   N: DASH/TLC  GI ppx: protonix 40mg qd  DVT ppx: eliquis 5mg BID  Dispo: RMF --> home 73yo F PMH T2DM c/b peripheral neuropathy, HTN, recent admission and workup for Naty Sanchez tear, TIA 3/30 (MR negative) presented for episode of AMS (staring, generalized weakness), course c/b metabolic encephalopathy 2/2 UTI (resolved and s/p abx x7d) vs HTN. Also found to have new LE weakness 2/2 diabetic neuropathy. Due to immigration and insurance status, unable to dc to [facility]. Now 1-person assist and medically ready for discharge.    Problem/Plan - 1:  ·  Problem: Leg weakness.   ·  Plan: Symmetrical LE weakness, distal worse than proximal. Symmetrical diminished sensation, longstanding diabetic peripheral neuropathy. Denies issues with incontinence, or back pain, no sensory line. Likely progression of diabetic neuropathy and symptoms more severe than expected.  MRI L/Spine: L3/4 degenerative changes w/ moderate central spinal canal stenosis. L4/5 level disc bulge.  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  - s/p IVIG 5d course (6/1-6/5)  - c/w Cymbalta 90mg qd  - c/w Custom AFO boots   - c/w incentive spirometry  - currently a 1-person assist  - c/w daily PT sessions --> got nauseous 2 days ago while working w/ PT and vomited after drinking a lot of water prior to working w/ PT  - awaiting patient's family to no longer be infectious from COVID prior to sending her home - likely Monday 6/27/22    Problem/Plan - 2:  ·  Problem: Bilateral leg pain.   ·  Plan: Needle-like pain in b/l shins extending downward, interfering with sleep.  - LE dopplers 5/21: acute DVT of L peroneal vein  - LE dopplers 6/3: resolution of L peroneal DVT  - c/w Cymbalta 90mg qd for diabetic neuropathy  - c/w eliquis 5mg BID for 3 months (5/28-8/28), then resume ASA 81mg qd  - medically ready for discharge    Problem/Plan - 3:  ·  Problem: Type II diabetes mellitus.   ·  Plan: Known h/o T2DM, A1c 11-11.6% (3/27-4/9).  - endocrine consulted initially, now signed off  - low C peptide therefore pt will need insulin and NOT oral agents on dc  - c/w mISS   - c/w Lantus 4u qhs & Lispro 8u TID  - medically ready for discharge    Problem/Plan - 4:  ·  Problem: Naty-Sanchez tear.   ·  Plan: Hx of Naty Sanchez tear in prior admission. Takes Protonix 40mg qd at home.  - c/w protonix  - medically ready for discharge    Problem/Plan - 5:  ·  Problem: Hypertension.   ·  Plan: Known h/o HTN, takes Amlodipine 10mg qd and Lisinopril 40mg qd at home.  - goal -160  - TTE with bubble 4/1: mild concentric LVH, hyperdynamic LV systolic function w/ EF >83%; grade I diastolic dysfunction w/o elevated filling pressure; no R to L shunt; mild-to-moderate AS; trace AR/MR/TR/MN  - c/w amlodipine and lisinopril  - medically ready for discharge    Problem/Plan - 6:  ·  Problem: Hyperlipidemia.   ·  Plan: Known HLD, takes Atorvastatin 40mg qhs at home.  - c/w atorvastatin  - medically ready for discharge    Problem/Plan - 7:  ·  Problem: Nutrition, metabolism, and development symptoms.   ·  Plan: F: None  E: Replete PRN   N: DASH/TLC  GI ppx: protonix 40mg qd  DVT ppx: eliquis 5mg BID  Dispo: RMF --> home

## 2022-06-23 NOTE — PROGRESS NOTE ADULT - ATTENDING COMMENTS
No new complaints  Awaiting pt's family to no longer be infectious from COVID prior to d/c home - likely monday 6/27

## 2022-06-23 NOTE — DISCHARGE NOTE NURSING/CASE MANAGEMENT/SOCIAL WORK - NSDCFUADDAPPT_GEN_ALL_CORE_FT
Please follow up at the clinic at [facility] or [facility] for further management of your care.  [facility]:  [phone] OR [phone]    [facility]:  [phone] for more information, [phone] to schedule an appointment

## 2022-06-24 LAB
GLUCOSE BLDC GLUCOMTR-MCNC: 108 MG/DL — HIGH (ref 70–99)
GLUCOSE BLDC GLUCOMTR-MCNC: 158 MG/DL — HIGH (ref 70–99)
GLUCOSE BLDC GLUCOMTR-MCNC: 171 MG/DL — HIGH (ref 70–99)
GLUCOSE BLDC GLUCOMTR-MCNC: 73 MG/DL — SIGNIFICANT CHANGE UP (ref 70–99)

## 2022-06-24 PROCEDURE — 99231 SBSQ HOSP IP/OBS SF/LOW 25: CPT

## 2022-06-24 RX ORDER — ACETAMINOPHEN 500 MG
325 TABLET ORAL EVERY 6 HOURS
Refills: 0 | Status: DISCONTINUED | OUTPATIENT
Start: 2022-06-24 | End: 2022-06-27

## 2022-06-24 RX ADMIN — LIDOCAINE 1 APPLICATION(S): 4 CREAM TOPICAL at 06:41

## 2022-06-24 RX ADMIN — Medication 8 UNIT(S): at 08:53

## 2022-06-24 RX ADMIN — Medication 1 TABLET(S): at 10:48

## 2022-06-24 RX ADMIN — AMLODIPINE BESYLATE 10 MILLIGRAM(S): 2.5 TABLET ORAL at 06:42

## 2022-06-24 RX ADMIN — APIXABAN 5 MILLIGRAM(S): 2.5 TABLET, FILM COATED ORAL at 22:41

## 2022-06-24 RX ADMIN — PANTOPRAZOLE SODIUM 40 MILLIGRAM(S): 20 TABLET, DELAYED RELEASE ORAL at 06:40

## 2022-06-24 RX ADMIN — Medication 2: at 08:53

## 2022-06-24 RX ADMIN — INSULIN GLARGINE 4 UNIT(S): 100 INJECTION, SOLUTION SUBCUTANEOUS at 22:40

## 2022-06-24 RX ADMIN — APIXABAN 5 MILLIGRAM(S): 2.5 TABLET, FILM COATED ORAL at 10:49

## 2022-06-24 RX ADMIN — Medication 2: at 12:50

## 2022-06-24 RX ADMIN — LISINOPRIL 40 MILLIGRAM(S): 2.5 TABLET ORAL at 07:14

## 2022-06-24 RX ADMIN — ATORVASTATIN CALCIUM 40 MILLIGRAM(S): 80 TABLET, FILM COATED ORAL at 22:40

## 2022-06-24 RX ADMIN — DULOXETINE HYDROCHLORIDE 90 MILLIGRAM(S): 30 CAPSULE, DELAYED RELEASE ORAL at 10:48

## 2022-06-24 RX ADMIN — Medication 8 UNIT(S): at 12:47

## 2022-06-24 RX ADMIN — LIDOCAINE 1 APPLICATION(S): 4 CREAM TOPICAL at 22:42

## 2022-06-24 RX ADMIN — Medication 8 UNIT(S): at 17:28

## 2022-06-24 NOTE — PROGRESS NOTE ADULT - ASSESSMENT
71yo F PMH T2DM c/b peripheral neuropathy, HTN, recent admission and workup for Naty Sanchez tear, TIA 3/30 (MR negative) presented for episode of AMS (staring, generalized weakness), course c/b metabolic encephalopathy 2/2 UTI (resolved and s/p abx x7d) vs HTN. Also found to have new LE weakness 2/2 diabetic neuropathy. Due to immigration and insurance status, unable to dc to [facility]. Now 1-person assist and medically ready for discharge.    Problem/Plan - 1:  ·  Problem: Leg weakness.   ·  Plan: Symmetrical LE weakness, distal worse than proximal. Symmetrical diminished sensation, longstanding diabetic peripheral neuropathy. Denies issues with incontinence, or back pain, no sensory line. Likely progression of diabetic neuropathy and symptoms more severe than expected.  MRI L/Spine: L3/4 degenerative changes w/ moderate central spinal canal stenosis. L4/5 level disc bulge.  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  - s/p IVIG 5d course (6/1-6/5)  - c/w Cymbalta 90mg qd  - c/w Custom AFO boots   - c/w incentive spirometry  - currently a 1-person assist  - c/w daily PT sessions --> got nauseous 2 days ago while working w/ PT and vomited after drinking a lot of water prior to working w/ PT  - awaiting patient's family to no longer be infectious from COVID prior to sending her home - likely Monday 6/27/22    Problem/Plan - 2:  ·  Problem: Bilateral leg pain.   ·  Plan: Needle-like pain in b/l shins extending downward, interfering with sleep.  - LE dopplers 5/21: acute DVT of L peroneal vein  - LE dopplers 6/3: resolution of L peroneal DVT  - c/w Cymbalta 90mg qd for diabetic neuropathy  - c/w eliquis 5mg BID for 3 months (5/28-8/28), then resume ASA 81mg qd  - medically ready for discharge    Problem/Plan - 3:  ·  Problem: Type II diabetes mellitus.   ·  Plan: Known h/o T2DM, A1c 11-11.6% (3/27-4/9).  - endocrine consulted initially, now signed off  - low C peptide therefore pt will need insulin and NOT oral agents on dc  - c/w mISS   - c/w Lantus 4u qhs & Lispro 8u TID  - medically ready for discharge    Problem/Plan - 4:  ·  Problem: Naty-Sanchez tear.   ·  Plan: Hx of Naty Sanchez tear in prior admission. Takes Protonix 40mg qd at home.  - c/w protonix  - medically ready for discharge    Problem/Plan - 5:  ·  Problem: Hypertension.   ·  Plan: Known h/o HTN, takes Amlodipine 10mg qd and Lisinopril 40mg qd at home.  - goal -160  - TTE with bubble 4/1: mild concentric LVH, hyperdynamic LV systolic function w/ EF >83%; grade I diastolic dysfunction w/o elevated filling pressure; no R to L shunt; mild-to-moderate AS; trace AR/MR/TR/MS  - c/w amlodipine and lisinopril  - medically ready for discharge    Problem/Plan - 6:  ·  Problem: Hyperlipidemia.   ·  Plan: Known HLD, takes Atorvastatin 40mg qhs at home.  - c/w atorvastatin  - medically ready for discharge    Problem/Plan - 7:  ·  Problem: Nutrition, metabolism, and development symptoms.   ·  Plan: F: None  E: Replete PRN   N: DASH/TLC  GI ppx: protonix 40mg qd  DVT ppx: eliquis 5mg BID  Dispo: RMF --> home 71yo F PMH T2DM c/b peripheral neuropathy, HTN, recent admission and workup for Naty Sanchez tear, TIA 3/30 (MR negative) presented for episode of AMS (staring, generalized weakness), course c/b metabolic encephalopathy 2/2 UTI (resolved and s/p abx x7d) vs HTN. Also found to have new LE weakness 2/2 diabetic neuropathy. Due to immigration and insurance status, unable to dc to [facility]. Now 1-person assist and medically ready for discharge.    Problem/Plan - 1:  ·  Problem: Leg weakness.   ·  Plan: Symmetrical LE weakness, distal worse than proximal. Symmetrical diminished sensation, longstanding diabetic peripheral neuropathy. Denies issues with incontinence, or back pain, no sensory line. Likely progression of diabetic neuropathy and symptoms more severe than expected.  MRI L/Spine: L3/4 degenerative changes w/ moderate central spinal canal stenosis. L4/5 level disc bulge.  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  - s/p IVIG 5d course (6/1-6/5)  - c/w Cymbalta 90mg qd  - c/w Custom AFO boots   - c/w incentive spirometry  - currently a 1-person assist  - awaiting patient's family to no longer be infectious from COVID prior to sending her home - likely Monday 6/27/22    Problem/Plan - 2:  ·  Problem: Bilateral leg pain.   ·  Plan: Needle-like pain in b/l shins extending downward, interfering with sleep.  - LE dopplers 5/21: acute DVT of L peroneal vein  - LE dopplers 6/3: resolution of L peroneal DVT  - c/w Cymbalta 90mg qd for diabetic neuropathy  - c/w eliquis 5mg BID for 3 months (5/28-8/28), then resume ASA 81mg qd  - medically ready for discharge    Problem/Plan - 3:  ·  Problem: Type II diabetes mellitus.   ·  Plan: Known h/o T2DM, A1c 11-11.6% (3/27-4/9).  - endocrine consulted initially, now signed off  - low C peptide therefore pt will need insulin and NOT oral agents on dc  - c/w mISS   - c/w Lantus 4u qhs & Lispro 8u TID  - medically ready for discharge    Problem/Plan - 4:  ·  Problem: Naty-Sanchez tear.   ·  Plan: Hx of Naty Sanchez tear in prior admission. Takes Protonix 40mg qd at home.  - c/w protonix  - medically ready for discharge    Problem/Plan - 5:  ·  Problem: Hypertension.   ·  Plan: Known h/o HTN, takes Amlodipine 10mg qd and Lisinopril 40mg qd at home.  - goal -160  - TTE with bubble 4/1: mild concentric LVH, hyperdynamic LV systolic function w/ EF >83%; grade I diastolic dysfunction w/o elevated filling pressure; no R to L shunt; mild-to-moderate AS; trace AR/MR/TR/SC  - c/w amlodipine and lisinopril  - medically ready for discharge    Problem/Plan - 6:  ·  Problem: Hyperlipidemia.   ·  Plan: Known HLD, takes Atorvastatin 40mg qhs at home.  - c/w atorvastatin  - medically ready for discharge    Problem/Plan - 7:  ·  Problem: Nutrition, metabolism, and development symptoms.   ·  Plan: F: None  E: Replete PRN   N: DASH/TLC  GI ppx: protonix 40mg qd  DVT ppx: eliquis 5mg BID  Dispo: RMF --> home

## 2022-06-24 NOTE — PROGRESS NOTE ADULT - SUBJECTIVE AND OBJECTIVE BOX
OVERNIGHT EVENTS:    SUBJECTIVE:  Patient seen and examined at bedside. No complaints.    Vital Signs Last 12 Hrs  T(F): 98.1 (22 @ 05:51), Max: 98.1 (22 @ 22:15)  HR: 73 (22 @ 05:51) (73 - 79)  BP: 151/71 (22 @ 05:51) (138/62 - 151/71)  BP(mean): --  RR: 16 (22 @ 05:51) (16 - 16)  SpO2: 99% (22 @ 05:51) (99% - 100%)  I&O's Summary      PHYSICAL EXAM:  Constitutional: NAD, comfortable in bed.        LABS:            Urinalysis Basic - ( 2022 19:41 )    Color: Yellow / Appearance: Clear / S.015 / pH: x  Gluc: x / Ketone: NEGATIVE  / Bili: Negative / Urobili: 0.2 E.U./dL   Blood: x / Protein: NEGATIVE mg/dL / Nitrite: NEGATIVE   Leuk Esterase: NEGATIVE / RBC: x / WBC x   Sq Epi: x / Non Sq Epi: x / Bacteria: x          RADIOLOGY & ADDITIONAL TESTS:    MEDICATIONS  (STANDING):  amLODIPine   Tablet 10 milliGRAM(s) Oral daily  apixaban 5 milliGRAM(s) Oral every 12 hours  atorvastatin 40 milliGRAM(s) Oral at bedtime  dextrose 5%. 1000 milliLiter(s) (100 mL/Hr) IV Continuous <Continuous>  dextrose 5%. 1000 milliLiter(s) (50 mL/Hr) IV Continuous <Continuous>  dextrose 50% Injectable 25 Gram(s) IV Push once  dextrose 50% Injectable 12.5 Gram(s) IV Push once  dextrose 50% Injectable 25 Gram(s) IV Push once  DULoxetine 90 milliGRAM(s) Oral every 24 hours  glucagon  Injectable 1 milliGRAM(s) IntraMuscular once  insulin glargine Injectable (LANTUS) 4 Unit(s) SubCutaneous at bedtime  insulin lispro (ADMELOG) corrective regimen sliding scale   SubCutaneous Before meals and at bedtime  insulin lispro Injectable (ADMELOG) 8 Unit(s) SubCutaneous three times a day before meals  lidocaine 2% Gel 1 Application(s) Topical three times a day  lisinopril 40 milliGRAM(s) Oral every 24 hours  multivitamin 1 Tablet(s) Oral daily  pantoprazole    Tablet 40 milliGRAM(s) Oral before breakfast    MEDICATIONS  (PRN):  dextrose Oral Gel 15 Gram(s) Oral once PRN Blood Glucose LESS THAN 70 milliGRAM(s)/deciliter

## 2022-06-25 LAB
GLUCOSE BLDC GLUCOMTR-MCNC: 125 MG/DL — HIGH (ref 70–99)
GLUCOSE BLDC GLUCOMTR-MCNC: 135 MG/DL — HIGH (ref 70–99)
GLUCOSE BLDC GLUCOMTR-MCNC: 144 MG/DL — HIGH (ref 70–99)
GLUCOSE BLDC GLUCOMTR-MCNC: 146 MG/DL — HIGH (ref 70–99)
GLUCOSE BLDC GLUCOMTR-MCNC: 155 MG/DL — HIGH (ref 70–99)

## 2022-06-25 PROCEDURE — 99231 SBSQ HOSP IP/OBS SF/LOW 25: CPT

## 2022-06-25 RX ADMIN — Medication 8 UNIT(S): at 12:38

## 2022-06-25 RX ADMIN — PANTOPRAZOLE SODIUM 40 MILLIGRAM(S): 20 TABLET, DELAYED RELEASE ORAL at 07:08

## 2022-06-25 RX ADMIN — DULOXETINE HYDROCHLORIDE 90 MILLIGRAM(S): 30 CAPSULE, DELAYED RELEASE ORAL at 09:03

## 2022-06-25 RX ADMIN — LISINOPRIL 40 MILLIGRAM(S): 2.5 TABLET ORAL at 07:08

## 2022-06-25 RX ADMIN — APIXABAN 5 MILLIGRAM(S): 2.5 TABLET, FILM COATED ORAL at 09:04

## 2022-06-25 RX ADMIN — LIDOCAINE 1 APPLICATION(S): 4 CREAM TOPICAL at 16:32

## 2022-06-25 RX ADMIN — LIDOCAINE 1 APPLICATION(S): 4 CREAM TOPICAL at 22:25

## 2022-06-25 RX ADMIN — Medication 8 UNIT(S): at 09:03

## 2022-06-25 RX ADMIN — INSULIN GLARGINE 4 UNIT(S): 100 INJECTION, SOLUTION SUBCUTANEOUS at 22:12

## 2022-06-25 RX ADMIN — AMLODIPINE BESYLATE 10 MILLIGRAM(S): 2.5 TABLET ORAL at 06:47

## 2022-06-25 RX ADMIN — Medication 1 TABLET(S): at 09:03

## 2022-06-25 RX ADMIN — LIDOCAINE 1 APPLICATION(S): 4 CREAM TOPICAL at 06:48

## 2022-06-25 RX ADMIN — Medication 325 MILLIGRAM(S): at 23:00

## 2022-06-25 RX ADMIN — ATORVASTATIN CALCIUM 40 MILLIGRAM(S): 80 TABLET, FILM COATED ORAL at 22:15

## 2022-06-25 RX ADMIN — Medication 8 UNIT(S): at 18:09

## 2022-06-25 RX ADMIN — APIXABAN 5 MILLIGRAM(S): 2.5 TABLET, FILM COATED ORAL at 22:16

## 2022-06-25 RX ADMIN — Medication 325 MILLIGRAM(S): at 22:15

## 2022-06-25 NOTE — PROGRESS NOTE ADULT - SUBJECTIVE AND OBJECTIVE BOX
Interval history: Still complaining of some shoulder pain / stiffness    Vital Signs Last 24 Hrs  T(C): 36.6 (25 Jun 2022 12:06), Max: 36.8 (25 Jun 2022 05:59)  T(F): 97.9 (25 Jun 2022 12:06), Max: 98.3 (25 Jun 2022 05:59)  HR: 76 (25 Jun 2022 12:06) (76 - 81)  BP: 124/66 (25 Jun 2022 12:06) (124/66 - 147/70)  BP(mean): --  RR: 18 (25 Jun 2022 12:06) (16 - 18)  SpO2: 99% (25 Jun 2022 12:06) (96% - 99%)    Exam:  MS: awake, alert, speech fluent, follows commands  MSK: limited ROM of both shoulders worse on the left     MEDICATIONS  (STANDING):  amLODIPine   Tablet 10 milliGRAM(s) Oral daily  apixaban 5 milliGRAM(s) Oral every 12 hours  atorvastatin 40 milliGRAM(s) Oral at bedtime  dextrose 5%. 1000 milliLiter(s) (50 mL/Hr) IV Continuous <Continuous>  dextrose 5%. 1000 milliLiter(s) (100 mL/Hr) IV Continuous <Continuous>  dextrose 50% Injectable 25 Gram(s) IV Push once  dextrose 50% Injectable 12.5 Gram(s) IV Push once  dextrose 50% Injectable 25 Gram(s) IV Push once  DULoxetine 90 milliGRAM(s) Oral every 24 hours  glucagon  Injectable 1 milliGRAM(s) IntraMuscular once  insulin glargine Injectable (LANTUS) 4 Unit(s) SubCutaneous at bedtime  insulin lispro (ADMELOG) corrective regimen sliding scale   SubCutaneous Before meals and at bedtime  insulin lispro Injectable (ADMELOG) 8 Unit(s) SubCutaneous three times a day before meals  lidocaine 2% Gel 1 Application(s) Topical three times a day  lisinopril 40 milliGRAM(s) Oral every 24 hours  multivitamin 1 Tablet(s) Oral daily  pantoprazole    Tablet 40 milliGRAM(s) Oral before breakfast    MEDICATIONS  (PRN):  acetaminophen     Tablet .. 325 milliGRAM(s) Oral every 6 hours PRN Temp greater or equal to 38C (100.4F), Moderate Pain (4 - 6)  dextrose Oral Gel 15 Gram(s) Oral once PRN Blood Glucose LESS THAN 70 milliGRAM(s)/deciliter

## 2022-06-25 NOTE — PROGRESS NOTE ADULT - ASSESSMENT
Severe LE neuropathy in the setting of diabetes, no alternative etiology found  Severe LE neuropathy in the setting of diabetes, no alternative etiology found   Improved with PT and now 1-person assist  course complicated by DVT  Awaiting family members to clear COVID infection before d/c home, likely monday 6/27/22    Continue cymbalta for neuropathic pain  Continue eliquis for 3 months total for DVT  Continue insulin, amodipine, lisinopril, atorvastatin

## 2022-06-26 LAB
GLUCOSE BLDC GLUCOMTR-MCNC: 113 MG/DL — HIGH (ref 70–99)
GLUCOSE BLDC GLUCOMTR-MCNC: 121 MG/DL — HIGH (ref 70–99)
GLUCOSE BLDC GLUCOMTR-MCNC: 177 MG/DL — HIGH (ref 70–99)
SARS-COV-2 RNA SPEC QL NAA+PROBE: SIGNIFICANT CHANGE UP

## 2022-06-26 PROCEDURE — 99231 SBSQ HOSP IP/OBS SF/LOW 25: CPT

## 2022-06-26 RX ADMIN — Medication 2: at 12:51

## 2022-06-26 RX ADMIN — AMLODIPINE BESYLATE 10 MILLIGRAM(S): 2.5 TABLET ORAL at 06:59

## 2022-06-26 RX ADMIN — LISINOPRIL 40 MILLIGRAM(S): 2.5 TABLET ORAL at 06:59

## 2022-06-26 RX ADMIN — LIDOCAINE 1 APPLICATION(S): 4 CREAM TOPICAL at 06:59

## 2022-06-26 RX ADMIN — PANTOPRAZOLE SODIUM 40 MILLIGRAM(S): 20 TABLET, DELAYED RELEASE ORAL at 07:58

## 2022-06-26 RX ADMIN — Medication 1 TABLET(S): at 09:53

## 2022-06-26 RX ADMIN — LIDOCAINE 1 APPLICATION(S): 4 CREAM TOPICAL at 22:24

## 2022-06-26 RX ADMIN — Medication 8 UNIT(S): at 08:47

## 2022-06-26 RX ADMIN — APIXABAN 5 MILLIGRAM(S): 2.5 TABLET, FILM COATED ORAL at 22:24

## 2022-06-26 RX ADMIN — Medication 8 UNIT(S): at 17:13

## 2022-06-26 RX ADMIN — LIDOCAINE 1 APPLICATION(S): 4 CREAM TOPICAL at 18:03

## 2022-06-26 RX ADMIN — APIXABAN 5 MILLIGRAM(S): 2.5 TABLET, FILM COATED ORAL at 09:54

## 2022-06-26 RX ADMIN — DULOXETINE HYDROCHLORIDE 90 MILLIGRAM(S): 30 CAPSULE, DELAYED RELEASE ORAL at 09:54

## 2022-06-26 RX ADMIN — ATORVASTATIN CALCIUM 40 MILLIGRAM(S): 80 TABLET, FILM COATED ORAL at 22:26

## 2022-06-26 RX ADMIN — Medication 8 UNIT(S): at 12:51

## 2022-06-26 NOTE — PROGRESS NOTE ADULT - SUBJECTIVE AND OBJECTIVE BOX
OVERNIGHT EVENTS: jayro    SUBJECTIVE:  Patient seen at bedside.    Vital Signs Last 12 Hrs  T(F): 97.9 (06-26-22 @ 05:00), Max: 98.7 (06-25-22 @ 22:00)  HR: 79 (06-26-22 @ 05:00) (79 - 84)  BP: 169/89 (06-26-22 @ 05:00) (149/69 - 169/89)  BP(mean): --  RR: 14 (06-26-22 @ 05:00) (14 - 16)  SpO2: 94% (06-26-22 @ 05:00) (94% - 97%)  I&O's Summary    25 Jun 2022 07:01  -  26 Jun 2022 07:00  --------------------------------------------------------  IN: 0 mL / OUT: 1100 mL / NET: -1100 mL        PHYSICAL EXAM:  Constitutional: NAD, comfortable in bed.        LABS:                  RADIOLOGY & ADDITIONAL TESTS:    MEDICATIONS  (STANDING):  amLODIPine   Tablet 10 milliGRAM(s) Oral daily  apixaban 5 milliGRAM(s) Oral every 12 hours  atorvastatin 40 milliGRAM(s) Oral at bedtime  dextrose 5%. 1000 milliLiter(s) (50 mL/Hr) IV Continuous <Continuous>  dextrose 5%. 1000 milliLiter(s) (100 mL/Hr) IV Continuous <Continuous>  dextrose 50% Injectable 25 Gram(s) IV Push once  dextrose 50% Injectable 12.5 Gram(s) IV Push once  dextrose 50% Injectable 25 Gram(s) IV Push once  DULoxetine 90 milliGRAM(s) Oral every 24 hours  glucagon  Injectable 1 milliGRAM(s) IntraMuscular once  insulin glargine Injectable (LANTUS) 4 Unit(s) SubCutaneous at bedtime  insulin lispro (ADMELOG) corrective regimen sliding scale   SubCutaneous Before meals and at bedtime  insulin lispro Injectable (ADMELOG) 8 Unit(s) SubCutaneous three times a day before meals  lidocaine 2% Gel 1 Application(s) Topical three times a day  lisinopril 40 milliGRAM(s) Oral every 24 hours  multivitamin 1 Tablet(s) Oral daily  pantoprazole    Tablet 40 milliGRAM(s) Oral before breakfast    MEDICATIONS  (PRN):  acetaminophen     Tablet .. 325 milliGRAM(s) Oral every 6 hours PRN Temp greater or equal to 38C (100.4F), Moderate Pain (4 - 6)  dextrose Oral Gel 15 Gram(s) Oral once PRN Blood Glucose LESS THAN 70 milliGRAM(s)/deciliter   OVERNIGHT EVENTS: jayro    SUBJECTIVE:  Patient seen at bedside.    Vital Signs Last 12 Hrs  T(F): 97.9 (06-26-22 @ 05:00), Max: 98.7 (06-25-22 @ 22:00)  HR: 79 (06-26-22 @ 05:00) (79 - 84)  BP: 169/89 (06-26-22 @ 05:00) (149/69 - 169/89)  BP(mean): --  RR: 14 (06-26-22 @ 05:00) (14 - 16)  SpO2: 94% (06-26-22 @ 05:00) (94% - 97%)  I&O's Summary    25 Jun 2022 07:01  -  26 Jun 2022 07:00  --------------------------------------------------------  IN: 0 mL / OUT: 1100 mL / NET: -1100 mL        PHYSICAL EXAM:  Constitutional: NAD, comfortable in bed.      MEDICATIONS  (STANDING):  amLODIPine   Tablet 10 milliGRAM(s) Oral daily  apixaban 5 milliGRAM(s) Oral every 12 hours  atorvastatin 40 milliGRAM(s) Oral at bedtime  dextrose 5%. 1000 milliLiter(s) (50 mL/Hr) IV Continuous <Continuous>  dextrose 5%. 1000 milliLiter(s) (100 mL/Hr) IV Continuous <Continuous>  dextrose 50% Injectable 25 Gram(s) IV Push once  dextrose 50% Injectable 12.5 Gram(s) IV Push once  dextrose 50% Injectable 25 Gram(s) IV Push once  DULoxetine 90 milliGRAM(s) Oral every 24 hours  glucagon  Injectable 1 milliGRAM(s) IntraMuscular once  insulin glargine Injectable (LANTUS) 4 Unit(s) SubCutaneous at bedtime  insulin lispro (ADMELOG) corrective regimen sliding scale   SubCutaneous Before meals and at bedtime  insulin lispro Injectable (ADMELOG) 8 Unit(s) SubCutaneous three times a day before meals  lidocaine 2% Gel 1 Application(s) Topical three times a day  lisinopril 40 milliGRAM(s) Oral every 24 hours  multivitamin 1 Tablet(s) Oral daily  pantoprazole    Tablet 40 milliGRAM(s) Oral before breakfast    MEDICATIONS  (PRN):  acetaminophen     Tablet .. 325 milliGRAM(s) Oral every 6 hours PRN Temp greater or equal to 38C (100.4F), Moderate Pain (4 - 6)  dextrose Oral Gel 15 Gram(s) Oral once PRN Blood Glucose LESS THAN 70 milliGRAM(s)/deciliter

## 2022-06-26 NOTE — PROGRESS NOTE ADULT - REASON FOR ADMISSION
episode of AMS

## 2022-06-26 NOTE — PROGRESS NOTE ADULT - ATTENDING SUPERVISION STATEMENT
Fellow
Resident
Fellow
Resident
Fellow
Fellow
Resident
Fellow
Resident
Fellow
Resident

## 2022-06-26 NOTE — PROGRESS NOTE ADULT - ASSESSMENT
71yo F PMH T2DM c/b peripheral neuropathy, HTN, recent admission and workup for Naty Sanchez tear, TIA 3/30 (MR negative) presented for episode of AMS (staring, generalized weakness), course c/b metabolic encephalopathy 2/2 UTI (resolved and s/p abx x7d) vs HTN. Also found to have new LE weakness 2/2 diabetic neuropathy. Due to immigration and insurance status, unable to dc to [facility]. Now 1-person assist and medically ready for discharge.    Problem/Plan - 1:  ·  Problem: Leg weakness.   ·  Plan: Symmetrical LE weakness, distal worse than proximal. Symmetrical diminished sensation, longstanding diabetic peripheral neuropathy. Denies issues with incontinence, or back pain, no sensory line. Likely progression of diabetic neuropathy and symptoms more severe than expected.  MRI L/Spine: L3/4 degenerative changes w/ moderate central spinal canal stenosis. L4/5 level disc bulge.  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  - s/p IVIG 5d course (6/1-6/5)  - c/w Cymbalta 90mg qd  - c/w Custom AFO boots   - c/w incentive spirometry  - currently a 1-person assist  - awaiting patient's family to no longer be infectious from COVID prior to sending her home - likely Monday 6/27/22    Problem/Plan - 2:  ·  Problem: Bilateral leg pain.   ·  Plan: Needle-like pain in b/l shins extending downward, interfering with sleep.  - LE dopplers 5/21: acute DVT of L peroneal vein  - LE dopplers 6/3: resolution of L peroneal DVT  - c/w Cymbalta 90mg qd for diabetic neuropathy  - c/w eliquis 5mg BID for 3 months (5/28-8/28), then resume ASA 81mg qd  - medically ready for discharge    Problem/Plan - 3:  ·  Problem: Type II diabetes mellitus.   ·  Plan: Known h/o T2DM, A1c 11-11.6% (3/27-4/9).  - endocrine consulted initially, now signed off  - low C peptide therefore pt will need insulin and NOT oral agents on dc  - c/w mISS   - c/w Lantus 4u qhs & Lispro 8u TID  - medically ready for discharge    Problem/Plan - 4:  ·  Problem: Naty-Sanchez tear.   ·  Plan: Hx of Naty Sanchez tear in prior admission. Takes Protonix 40mg qd at home.  - c/w protonix  - medically ready for discharge    Problem/Plan - 5:  ·  Problem: Hypertension.   ·  Plan: Known h/o HTN, takes Amlodipine 10mg qd and Lisinopril 40mg qd at home.  - goal -160  - TTE with bubble 4/1: mild concentric LVH, hyperdynamic LV systolic function w/ EF >83%; grade I diastolic dysfunction w/o elevated filling pressure; no R to L shunt; mild-to-moderate AS; trace AR/MR/TR/AL  - c/w amlodipine and lisinopril  - medically ready for discharge    Problem/Plan - 6:  ·  Problem: Hyperlipidemia.   ·  Plan: Known HLD, takes Atorvastatin 40mg qhs at home.  - c/w atorvastatin  - medically ready for discharge    Problem/Plan - 7:  ·  Problem: Nutrition, metabolism, and development symptoms.   ·  Plan: F: None  E: Replete PRN   N: DASH/TLC  GI ppx: protonix 40mg qd  DVT ppx: eliquis 5mg BID  Dispo: [facility] --> home

## 2022-06-27 VITALS
RESPIRATION RATE: 17 BRPM | SYSTOLIC BLOOD PRESSURE: 167 MMHG | OXYGEN SATURATION: 97 % | HEART RATE: 70 BPM | DIASTOLIC BLOOD PRESSURE: 74 MMHG | TEMPERATURE: 98 F

## 2022-06-27 DIAGNOSIS — M79.2 NEURALGIA AND NEURITIS, UNSPECIFIED: ICD-10-CM

## 2022-06-27 DIAGNOSIS — I10 ESSENTIAL (PRIMARY) HYPERTENSION: ICD-10-CM

## 2022-06-27 DIAGNOSIS — I82.409 ACUTE EMBOLISM AND THROMBOSIS OF UNSPECIFIED DEEP VEINS OF UNSPECIFIED LOWER EXTREMITY: ICD-10-CM

## 2022-06-27 DIAGNOSIS — E78.5 HYPERLIPIDEMIA, UNSPECIFIED: ICD-10-CM

## 2022-06-27 LAB
GLUCOSE BLDC GLUCOMTR-MCNC: 206 MG/DL — HIGH (ref 70–99)
GLUCOSE BLDC GLUCOMTR-MCNC: 80 MG/DL — SIGNIFICANT CHANGE UP (ref 70–99)
GLUCOSE BLDC GLUCOMTR-MCNC: 91 MG/DL — SIGNIFICANT CHANGE UP (ref 70–99)

## 2022-06-27 PROCEDURE — 99239 HOSP IP/OBS DSCHRG MGMT >30: CPT

## 2022-06-27 RX ORDER — ATORVASTATIN CALCIUM 80 MG/1
1 TABLET, FILM COATED ORAL
Qty: 90 | Refills: 1
Start: 2022-06-27 | End: 2022-12-23

## 2022-06-27 RX ORDER — AMLODIPINE BESYLATE 2.5 MG/1
1 TABLET ORAL
Qty: 180 | Refills: 1
Start: 2022-06-27 | End: 2023-06-21

## 2022-06-27 RX ORDER — ATORVASTATIN CALCIUM 20 MG/1
20 TABLET, FILM COATED ORAL
Qty: 90 | Refills: 3 | Status: ACTIVE | COMMUNITY
Start: 2022-06-27 | End: 1900-01-01

## 2022-06-27 RX ORDER — APIXABAN 2.5 MG/1
1 TABLET, FILM COATED ORAL
Qty: 180 | Refills: 0
Start: 2022-06-27 | End: 2022-09-24

## 2022-06-27 RX ORDER — APIXABAN 5 MG/1
5 TABLET, FILM COATED ORAL
Qty: 180 | Refills: 0 | Status: ACTIVE | COMMUNITY
Start: 2022-06-27 | End: 1900-01-01

## 2022-06-27 RX ORDER — DULOXETINE HYDROCHLORIDE 30 MG/1
30 CAPSULE, DELAYED RELEASE PELLETS ORAL DAILY
Qty: 270 | Refills: 3 | Status: ACTIVE | COMMUNITY
Start: 2022-06-27 | End: 1900-01-01

## 2022-06-27 RX ORDER — AMLODIPINE BESYLATE 2.5 MG/1
1 TABLET ORAL
Qty: 90 | Refills: 2
Start: 2022-06-27 | End: 2023-12-18

## 2022-06-27 RX ORDER — PEN NEEDLE, DIABETIC 32GX 5/32"
32G X 4 MM NEEDLE, DISPOSABLE MISCELLANEOUS
Qty: 1 | Refills: 3 | Status: ACTIVE | COMMUNITY
Start: 2022-06-27 | End: 1900-01-01

## 2022-06-27 RX ORDER — AMLODIPINE BESYLATE 10 MG/1
10 TABLET ORAL DAILY
Qty: 90 | Refills: 3 | Status: ACTIVE | COMMUNITY
Start: 2022-06-27 | End: 1900-01-01

## 2022-06-27 RX ORDER — DULOXETINE HYDROCHLORIDE 30 MG/1
3 CAPSULE, DELAYED RELEASE ORAL
Qty: 90 | Refills: 2
Start: 2022-06-27

## 2022-06-27 RX ORDER — ATORVASTATIN CALCIUM 80 MG/1
1 TABLET, FILM COATED ORAL
Qty: 90 | Refills: 2
Start: 2022-06-27 | End: 2023-03-23

## 2022-06-27 RX ORDER — DULOXETINE HYDROCHLORIDE 30 MG/1
3 CAPSULE, DELAYED RELEASE ORAL
Qty: 270 | Refills: 2
Start: 2022-06-27

## 2022-06-27 RX ORDER — APIXABAN 2.5 MG/1
1 TABLET, FILM COATED ORAL
Qty: 130 | Refills: 0
Start: 2022-06-27 | End: 2022-09-24

## 2022-06-27 RX ADMIN — LISINOPRIL 40 MILLIGRAM(S): 2.5 TABLET ORAL at 06:40

## 2022-06-27 RX ADMIN — PANTOPRAZOLE SODIUM 40 MILLIGRAM(S): 20 TABLET, DELAYED RELEASE ORAL at 06:40

## 2022-06-27 RX ADMIN — Medication 8 UNIT(S): at 08:53

## 2022-06-27 RX ADMIN — LIDOCAINE 1 APPLICATION(S): 4 CREAM TOPICAL at 06:41

## 2022-06-27 RX ADMIN — DULOXETINE HYDROCHLORIDE 90 MILLIGRAM(S): 30 CAPSULE, DELAYED RELEASE ORAL at 10:07

## 2022-06-27 RX ADMIN — Medication 4: at 00:18

## 2022-06-27 RX ADMIN — AMLODIPINE BESYLATE 10 MILLIGRAM(S): 2.5 TABLET ORAL at 06:40

## 2022-06-27 RX ADMIN — APIXABAN 5 MILLIGRAM(S): 2.5 TABLET, FILM COATED ORAL at 10:07

## 2022-06-27 RX ADMIN — INSULIN GLARGINE 4 UNIT(S): 100 INJECTION, SOLUTION SUBCUTANEOUS at 00:19

## 2022-06-27 RX ADMIN — Medication 1 TABLET(S): at 10:07

## 2022-06-28 ENCOUNTER — FORM ENCOUNTER (OUTPATIENT)
Age: 72
End: 2022-06-28

## 2022-06-29 ENCOUNTER — APPOINTMENT (OUTPATIENT)
Dept: CARE COORDINATION | Facility: HOME HEALTH | Age: 72
End: 2022-06-29

## 2022-06-29 DIAGNOSIS — Z86.79 PERSONAL HISTORY OF OTHER DISEASES OF THE CIRCULATORY SYSTEM: ICD-10-CM

## 2022-06-29 DIAGNOSIS — Z86.69 PERSONAL HISTORY OF OTHER DISEASES OF THE NERVOUS SYSTEM AND SENSE ORGANS: ICD-10-CM

## 2022-06-29 DIAGNOSIS — Z78.9 OTHER SPECIFIED HEALTH STATUS: ICD-10-CM

## 2022-06-29 DIAGNOSIS — R41.82 ALTERED MENTAL STATUS, UNSPECIFIED: ICD-10-CM

## 2022-06-29 DIAGNOSIS — G93.41 METABOLIC ENCEPHALOPATHY: ICD-10-CM

## 2022-06-29 DIAGNOSIS — K22.6 GASTRO-ESOPHAGEAL LACERATION-HEMORRHAGE SYNDROME: ICD-10-CM

## 2022-06-29 DIAGNOSIS — Z86.73 PERSONAL HISTORY OF TRANSIENT ISCHEMIC ATTACK (TIA), AND CEREBRAL INFARCTION W/OUT RESIDUAL DEFICITS: ICD-10-CM

## 2022-06-29 DIAGNOSIS — E11.9 TYPE 2 DIABETES MELLITUS W/OUT COMPLICATIONS: ICD-10-CM

## 2022-06-29 PROCEDURE — 99348 HOME/RES VST EST LOW MDM 30: CPT | Mod: GT

## 2022-06-30 ENCOUNTER — NON-APPOINTMENT (OUTPATIENT)
Age: 72
End: 2022-06-30

## 2022-07-01 ENCOUNTER — NON-APPOINTMENT (OUTPATIENT)
Age: 72
End: 2022-07-01

## 2022-07-01 DIAGNOSIS — M51.36 OTHER INTERVERTEBRAL DISC DEGENERATION, LUMBAR REGION: ICD-10-CM

## 2022-07-01 DIAGNOSIS — K31.84 GASTROPARESIS: ICD-10-CM

## 2022-07-01 DIAGNOSIS — R41.0 DISORIENTATION, UNSPECIFIED: ICD-10-CM

## 2022-07-01 DIAGNOSIS — N39.0 URINARY TRACT INFECTION, SITE NOT SPECIFIED: ICD-10-CM

## 2022-07-01 DIAGNOSIS — I82.452 ACUTE EMBOLISM AND THROMBOSIS OF LEFT PERONEAL VEIN: ICD-10-CM

## 2022-07-01 DIAGNOSIS — I11.0 HYPERTENSIVE HEART DISEASE WITH HEART FAILURE: ICD-10-CM

## 2022-07-01 DIAGNOSIS — Z79.4 LONG TERM (CURRENT) USE OF INSULIN: ICD-10-CM

## 2022-07-01 DIAGNOSIS — I50.30 UNSPECIFIED DIASTOLIC (CONGESTIVE) HEART FAILURE: ICD-10-CM

## 2022-07-01 DIAGNOSIS — T42.75XA ADVERSE EFFECT OF UNSPECIFIED ANTIEPILEPTIC AND SEDATIVE-HYPNOTIC DRUGS, INITIAL ENCOUNTER: ICD-10-CM

## 2022-07-01 DIAGNOSIS — G93.41 METABOLIC ENCEPHALOPATHY: ICD-10-CM
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
## 2022-07-01 DIAGNOSIS — B95.2 ENTEROCOCCUS AS THE CAUSE OF DISEASES CLASSIFIED ELSEWHERE: ICD-10-CM

## 2022-07-01 DIAGNOSIS — M43.16 SPONDYLOLISTHESIS, LUMBAR REGION: ICD-10-CM

## 2022-07-01 DIAGNOSIS — K22.6 GASTRO-ESOPHAGEAL LACERATION-HEMORRHAGE SYNDROME: ICD-10-CM

## 2022-07-01 DIAGNOSIS — E13.43 OTHER SPECIFIED DIABETES MELLITUS WITH DIABETIC AUTONOMIC (POLY)NEUROPATHY: ICD-10-CM

## 2022-07-01 DIAGNOSIS — E78.5 HYPERLIPIDEMIA, UNSPECIFIED: ICD-10-CM

## 2022-07-01 DIAGNOSIS — Z86.73 PERSONAL HISTORY OF TRANSIENT ISCHEMIC ATTACK (TIA), AND CEREBRAL INFARCTION WITHOUT RESIDUAL DEFICITS: ICD-10-CM

## 2022-07-01 DIAGNOSIS — Z79.82 LONG TERM (CURRENT) USE OF ASPIRIN: ICD-10-CM

## 2022-07-01 DIAGNOSIS — M48.061 SPINAL STENOSIS, LUMBAR REGION WITHOUT NEUROGENIC CLAUDICATION: ICD-10-CM

## 2022-07-01 PROBLEM — G45.9 TRANSIENT CEREBRAL ISCHEMIC ATTACK, UNSPECIFIED: Chronic | Status: ACTIVE | Noted: 2022-04-08

## 2022-07-05 PROBLEM — Z78.9 DOES NOT USE ILLICIT DRUGS: Status: ACTIVE | Noted: 2022-07-05

## 2022-07-05 PROBLEM — K22.6 MALLORY-WEISS TEAR: Status: RESOLVED | Noted: 2022-07-05 | Resolved: 2022-07-05

## 2022-07-05 PROBLEM — Z86.69 HISTORY OF PERIPHERAL NEUROPATHY: Status: RESOLVED | Noted: 2022-07-05 | Resolved: 2022-07-05

## 2022-07-05 PROBLEM — Z86.79 HISTORY OF HYPERTENSION: Status: RESOLVED | Noted: 2022-07-05 | Resolved: 2022-07-05

## 2022-07-05 PROBLEM — Z78.9 DENIES ALCOHOL CONSUMPTION: Status: ACTIVE | Noted: 2022-07-05

## 2022-07-05 PROBLEM — E11.9 DIABETES MELLITUS: Status: ACTIVE | Noted: 2022-06-27

## 2022-07-05 PROBLEM — Z86.73 HISTORY OF TRANSIENT ISCHEMIC ATTACK: Status: RESOLVED | Noted: 2022-07-05 | Resolved: 2022-07-05

## 2022-07-05 PROBLEM — Z78.9 CURRENT NON-SMOKER: Status: ACTIVE | Noted: 2022-07-05

## 2022-07-05 PROBLEM — G93.41 METABOLIC ENCEPHALOPATHY: Status: ACTIVE | Noted: 2022-07-05

## 2022-07-05 PROBLEM — R41.82 AMS (ALTERED MENTAL STATUS): Status: ACTIVE | Noted: 2022-07-05

## 2022-07-05 PROBLEM — G93.41 METABOLIC ENCEPHALOPATHY: Status: RESOLVED | Noted: 2022-07-05 | Resolved: 2022-07-05

## 2022-07-05 NOTE — ASSESSMENT
[FreeTextEntry1] : \par "72 year old F PMH T2DM c/b peripheral neuropathy, HTN, recent admission and workup for Naty Sanchez tear, TIA 3/30 (MR negative) presented for episode of AMS (staring, generalized weakness), course c/b metabolic encephalopathy 2/2 UTI and also gabapentin (resolved and s/p abx x7d). Also found to have lower extremity weakness 2/2 diabetic neuropathy. Due to immigration and insurance status, unable to dc to [facility] and was discharged home." The patient was discharged home in stable condition with home care services and follow up care.\par \par AMS: stable\par Continue current medication regimen\par Follow up with PCP and Neurology\par \par \par HTN: stable\par Continue current medication regimen Lisinopril\par Follow up with PCP and Cardiology\par \par \par DM: stable\par Continue monitoring FS via glucometer\par Continue medications: Insulin\par Continue diabetic heart healthy diet\par Follow up PCP and Endocrinology\par \par \par \par \par \par \par \par \par

## 2022-07-05 NOTE — PHYSICAL EXAM
[No Acute Distress] : no acute distress [Well Nourished] : well nourished [Well Developed] : well developed [Normal Sclera/Conjunctiva] : normal sclera/conjunctiva [Normal Outer Ear/Nose] : the outer ears and nose were normal in appearance [No JVD] : no jugular venous distention [No Respiratory Distress] : no respiratory distress  [No Accessory Muscle Use] : no accessory muscle use [Non Tender] : non-tender [de-identified] : Limited Visual Physical Exam during TeleHealth Visit [de-identified] : Awake and alert [de-identified] : Calm

## 2022-07-05 NOTE — PLAN
[FreeTextEntry1] : CV and pulmonary status stable\par Patient advised to continue with medication regimen as directed \par Medication education discussed in full detail\par Report all symptoms not relieved by rest or medication\par Educated on monitoring blood pressure\par Maintain Balanced diet \par Exercise as appropriate\par Patient educated on s/s of when to call medical providers with + teach back. \par Reminded of NP role and advised to call with any questions/concerns. \par Follow up with medical providers as scheduled\par Instructed the patient to call TCM Team or CCC with any questions or concerns.\par

## 2022-07-05 NOTE — HISTORY OF PRESENT ILLNESS
[Home] : at home, [unfilled] , at the time of the visit. [Other Location: e.g. Home (Enter Location, City,State)___] : at [unfilled] [Family Member] : family member [Other:____] : [unfilled] [Verbal consent obtained from patient] : the patient, [unfilled] [de-identified] : This patient is Enrolled in the [employer] Follow Up Program through [employer] [facility] [email or web address]\par Copied As per [facility] Discharge Summary\par \par "72 year old F PMH T2DM c/b peripheral neuropathy, HTN, recent admission and workup for Naty Sanchez tear, TIA 3/30 (MR negative) presented for episode of AMS (staring, generalized weakness), course c/b metabolic encephalopathy 2/2 UTI and also gabapentin (resolved and s/p abx x7d). Also found to have lower extremity weakness 2/2 diabetic neuropathy. Due to immigration and insurance \par status, unable to dc to [facility] and was discharged home." The patient was discharged home in stable condition with home care services and follow up care.\par \par During the TeleHealth the patient was in no acute distress.  Able to speak with complete sentences and no audible wheeze noted.\par The patient denies chest pain, shortness of breath, cough, hemoptysis, fever, palpitations, syncope\par \par CN reviewed with the pt that she is under the [facility] program for home care until she is seen by her PCP.   CN will be assigned to sign Home Care orders post-discharge.\par

## 2022-07-25 DIAGNOSIS — K21.9 GASTRO-ESOPHAGEAL REFLUX DISEASE W/OUT ESOPHAGITIS: ICD-10-CM

## 2022-07-25 RX ORDER — ELECTROLYTES/DEXTROSE
32G X 4 MM SOLUTION, ORAL ORAL
Qty: 1 | Refills: 1 | Status: ACTIVE | COMMUNITY
Start: 2022-07-25 | End: 1900-01-01

## 2022-07-25 RX ORDER — INSULIN GLARGINE 100 [IU]/ML
100 INJECTION, SOLUTION SUBCUTANEOUS
Qty: 1 | Refills: 1 | Status: ACTIVE | COMMUNITY
Start: 2022-07-25 | End: 1900-01-01

## 2022-07-25 RX ORDER — PANTOPRAZOLE 40 MG/1
40 TABLET, DELAYED RELEASE ORAL
Qty: 60 | Refills: 1 | Status: ACTIVE | COMMUNITY
Start: 2022-07-25 | End: 1900-01-01

## 2022-07-25 RX ORDER — INSULIN LISPRO 100 [IU]/ML
100 INJECTION, SOLUTION INTRAVENOUS; SUBCUTANEOUS
Qty: 1 | Refills: 1 | Status: ACTIVE | COMMUNITY
Start: 2022-07-25 | End: 1900-01-01

## 2022-07-28 NOTE — ED PROVIDER NOTE - WR INTERPRETED BY 1
Cancel Note    Patient: Mindy Mai  Date of Session: 7/28/2022  MRN: [id number]    Mindy Mai did not attend her scheduled therapy appointment today. Caregiver reported the following as the reason for cancellation: family is on vacation.    Aarti Dumas CCC-SLP   7/28/2022      
Mackenzie Harvey

## 2022-08-11 PROCEDURE — 97116 GAIT TRAINING THERAPY: CPT

## 2022-08-11 PROCEDURE — 96372 THER/PROPH/DIAG INJ SC/IM: CPT

## 2022-08-11 PROCEDURE — 80053 COMPREHEN METABOLIC PANEL: CPT

## 2022-08-11 PROCEDURE — U0003: CPT

## 2022-08-11 PROCEDURE — 81003 URINALYSIS AUTO W/O SCOPE: CPT

## 2022-08-11 PROCEDURE — 84100 ASSAY OF PHOSPHORUS: CPT

## 2022-08-11 PROCEDURE — 83036 HEMOGLOBIN GLYCOSYLATED A1C: CPT

## 2022-08-11 PROCEDURE — 87635 SARS-COV-2 COVID-19 AMP PRB: CPT

## 2022-08-11 PROCEDURE — 85025 COMPLETE CBC W/AUTO DIFF WBC: CPT

## 2022-08-11 PROCEDURE — 81001 URINALYSIS AUTO W/SCOPE: CPT

## 2022-08-11 PROCEDURE — 86901 BLOOD TYPING SEROLOGIC RH(D): CPT

## 2022-08-11 PROCEDURE — 97164 PT RE-EVAL EST PLAN CARE: CPT

## 2022-08-11 PROCEDURE — 82140 ASSAY OF AMMONIA: CPT

## 2022-08-11 PROCEDURE — 83690 ASSAY OF LIPASE: CPT

## 2022-08-11 PROCEDURE — 86900 BLOOD TYPING SEROLOGIC ABO: CPT

## 2022-08-11 PROCEDURE — 85610 PROTHROMBIN TIME: CPT

## 2022-08-11 PROCEDURE — 36415 COLL VENOUS BLD VENIPUNCTURE: CPT

## 2022-08-11 PROCEDURE — 97530 THERAPEUTIC ACTIVITIES: CPT

## 2022-08-11 PROCEDURE — 85730 THROMBOPLASTIN TIME PARTIAL: CPT

## 2022-08-11 PROCEDURE — 84681 ASSAY OF C-PEPTIDE: CPT

## 2022-08-11 PROCEDURE — 83605 ASSAY OF LACTIC ACID: CPT

## 2022-08-11 PROCEDURE — 83735 ASSAY OF MAGNESIUM: CPT

## 2022-08-11 PROCEDURE — 70450 CT HEAD/BRAIN W/O DYE: CPT

## 2022-08-11 PROCEDURE — 97162 PT EVAL MOD COMPLEX 30 MIN: CPT

## 2022-08-11 PROCEDURE — 93970 EXTREMITY STUDY: CPT

## 2022-08-11 PROCEDURE — 83516 IMMUNOASSAY NONANTIBODY: CPT

## 2022-08-11 PROCEDURE — 97112 NEUROMUSCULAR REEDUCATION: CPT

## 2022-08-11 PROCEDURE — 84484 ASSAY OF TROPONIN QUANT: CPT

## 2022-08-11 PROCEDURE — 80061 LIPID PANEL: CPT

## 2022-08-11 PROCEDURE — 82962 GLUCOSE BLOOD TEST: CPT

## 2022-08-11 PROCEDURE — U0005: CPT

## 2022-08-11 PROCEDURE — 87186 SC STD MICRODIL/AGAR DIL: CPT

## 2022-08-11 PROCEDURE — 97110 THERAPEUTIC EXERCISES: CPT

## 2022-08-11 PROCEDURE — 93005 ELECTROCARDIOGRAM TRACING: CPT

## 2022-08-11 PROCEDURE — 71045 X-RAY EXAM CHEST 1 VIEW: CPT

## 2022-08-11 PROCEDURE — 99285 EMERGENCY DEPT VISIT HI MDM: CPT

## 2022-08-11 PROCEDURE — 86850 RBC ANTIBODY SCREEN: CPT

## 2022-08-11 PROCEDURE — 87086 URINE CULTURE/COLONY COUNT: CPT

## 2022-08-11 PROCEDURE — 84443 ASSAY THYROID STIM HORMONE: CPT

## 2022-08-11 PROCEDURE — 95700 EEG CONT REC W/VID EEG TECH: CPT

## 2022-08-11 PROCEDURE — 97161 PT EVAL LOW COMPLEX 20 MIN: CPT

## 2022-08-11 PROCEDURE — 0225U NFCT DS DNA&RNA 21 SARSCOV2: CPT

## 2022-08-11 PROCEDURE — 80048 BASIC METABOLIC PNL TOTAL CA: CPT

## 2022-08-11 PROCEDURE — 70551 MRI BRAIN STEM W/O DYE: CPT

## 2022-08-11 PROCEDURE — 82550 ASSAY OF CK (CPK): CPT

## 2022-08-11 PROCEDURE — 97535 SELF CARE MNGMENT TRAINING: CPT

## 2022-08-11 PROCEDURE — 85027 COMPLETE CBC AUTOMATED: CPT

## 2022-08-11 PROCEDURE — 95714 VEEG EA 12-26 HR UNMNTR: CPT

## 2022-12-29 NOTE — H&P ADULT - NSHPPOASURGSITEINCISION_GEN_ALL_CORE
Patient was given an appointment on the 9th @130 could you give her a refill of her prednisone until then.   no

## 2023-01-26 NOTE — ED ADULT TRIAGE NOTE - PAIN RATING/NUMBER SCALE (0-10): REST
Deep Gorman   [address]  [place] [id number]    BOWEL PREP     supplies needed for bowel preparation. Your doctor may have prescribed these items or you may have been instructed to  the following items from your local pharmacy which are available over the counter.     • 4 Dulcolax Laxative tablets 5mg  • 1 bottle MiraLAX (238 grams) or a generic form  • Gatorade or similar flavored water (Crystal Light) 64-ounce bottle. Buy any color except purple or red.  • 4 Gas Relief tablets.     CONTINUE TO TAKE MEDICATION AS PRESCRIBED BY YOUR PHYSICIAN THROUGHOUT THIS PREPARATION.    Note: Individual responses to laxatives vary so remain close to toilet facilities once your have started the bowel cleansing procedure.    1 Day before your CT Colonography   • Drink only clear liquids for breakfast, lunch and dinner. Drink at least 8 ounces every hour while awake. Drinking will help prevent dehydration from the bowel prep.  • Clear liquids include soft drinks (orange, ginger ale, cola, Sprite), Gatorade, Rylan Aid, strained fruit juices (no pulp) (apple, white grape, orange, lemonade), water, tea, coffee (no milk or creamer), low sodium broth, hard candy, Popsicles (no sherbet or fruit bars), plain gelatin (lemon, lime, orange).  • Do not drink anything colored red or purple.  • Do not eat solid foods.  • Do not drink alcoholic beverages.    Starting colon prep:  • At 3 P.M:  · Take 2 Dulcolax Laxative tablets and 2 gas relief tablets with 8 ounces of clear liquid.  · Mix the MiraLAX (238g) with Gatorade, Crystal Light, Propel, or other clear liquid (64 ounces).  • At 4 P.M:  · Begin to drink the prepared solution and take 2 gas relief tablets. Drink 8 ounces every 10-15 minutes until all the solution is gone.  · At 6 P.M:  · Take 2 Dulcolax laxative tablets with 8 ounces of clear liquid.    DAY OF PROCEDURE - DO NOT EAT OR DRINK ANYTHING  
0

## 2023-02-17 NOTE — DISCHARGE NOTE PROVIDER - NSDCHHENCOUNTER_GEN_ALL_CORE
Patient:  Pal Sandoval 32 y.o. male     02/17/23      Chiefcomplaint:   Chief Complaint   Patient presents with    Establish Care    Head Injury     Head trauma in November--needs referred to neurologist     Problems and Overview     Patient has syncopal event resulting in traumatic brain injury with subdural hemorrhage and subarachnoid hemorrhage. He left the hospital AMA. He had residual fatigue and headaches. He eventually followed up with trauma and had been on different meds like Keppra and Fioricet for headaches and seizure prophylactics. He has experienced no seizures. His headaches are improving. However recently he had another event which was presyncopal in nature he became very foggy he fell like he was going to pass out he was unable to grasp at something to hold him up someone caught him and sat him down. He denies losing consciousness. His initial syncopal event causing TBI is of unclear etiology but he says he had fentanyl in the system and is not sure why. He was using kratom and wonders if maybe this was tainted. He does drink a lot. A pint of vodka last 2 days and he drinks daily. He did not have any cardiac or other prodromal symptoms prior to most recent presyncopal event. He did not follow-up with neurology as recommended previously.       Patient Active Problem List    Diagnosis Date Noted    Closed skull base fx ([facility] [address] 11/04/2022     Priority: Medium    SAH (subarachnoid hemorrhage) ([facility] [address] 10/21/2022     Priority: Medium    Traumatic brain injury with loss of consciousness ([facility] [address] 10/21/2022     Priority: Medium    SDH (subdural hematoma) 10/09/2022     Priority: Medium      Prevention:  Health Maintenance Due   Topic Date Due    Pneumococcal 0-64 years Vaccine (1 - PCV) Never done    HIV screen  Never done    Varicella vaccine (1 of 2 - 2-dose childhood series) Never done    Hepatitis C screen  Never done    DTaP/Tdap/Td vaccine (1 - Tdap) Never done    COVID-19 Vaccine (3 - Booster for Drew Mercado series) 09/20/2021    Flu vaccine (1) 08/01/2022      Meds prior:  No current outpatient medications   Physical Exam   Vitals: /75 (Site: Right Upper Arm, Position: Sitting, Cuff Size: Medium Adult)   Pulse 87   Temp 99 °F (37.2 °C)   Resp 16   Ht 6' (1.829 m)   Wt 144 lb 9.6 oz (65.6 kg)   SpO2 97%   BMI 19.61 kg/m²   General Appearance: Alert, oriented, no acute distress  HEENT: No scleral icterus. No visible discharge from eyes  Neck: Not rigid. No visible masses  Chest wall/Lung: Clear to auscultation bilaterally,  respirations unlabored. No ronchi/wheezing/rales  Heart: RRR, no murmur  Abdomen: Soft, nontender   Extremities:  No edema  Skin: No rashes. No jaundice  Neuro: Alert and oriented  CN II: Visual acuity is normal. Visual fields full to confrontation. CN III, IV, VI: Extraocular movements intact bilaterally. Normal lids and orbits bilaterally. Pupils equal round and reactive to light bilaterally. CN V: Normal V1-V3 sensation. CN VII: No facial droop   CN VIII: Hearing grossly intact   CN IX-XII: No palatal asymmetry, tongue midline  SCM/Trapezii: 5/5 power  Motor: Grossly intact 5/5 power in the upper and lower extremities without tremor or drift. Sensory: Reports grossly intact subjectively sensation to light touch. Coordination/Gait: Does not ambulate with a limp or antalgic gait pattern. There is no truncal or cerebellar gait ataxia. No dysmetria          Psych: Appropriate mood and appropriate affect  Assessment and Plan   Patient with recent presyncopal event after recent syncopal event with TBI and hemorrhage. I would recommend repeat MRI and referral to neurology for follow-up testing. Consideration of cardiac etiology if other neurologic work-up is unremarkable. He will also need to reduce alcohol use and stop any supplement or drug use. No neurologic abnormalities were present on exam today.   Traumatic brain injury with loss of consciousness, sequela [facility])  Syncope, unspecified syncope type    Return in about 4 weeks (around 3/17/2023). Alois , DO     This document may have been prepared at least partially through the use of voice recognition software. Although effort is taken to assure the accuracy of this document, it is possible that grammatical, syntax,  or spelling errors may occur. 20-Jun-2022

## 2023-05-05 NOTE — PROGRESS NOTE ADULT - ATTENDING COMMENTS
No new complaints  She is medically ready for discharge, awaiting family to bring her home   Continue current meds, wear boot braces when in bed, AFOs when working / walking with PT Otolaryngologist Procedure Text (F): After obtaining clear surgical margins the patient was sent to otolaryngology for surgical repair.  The patient understands they will receive post-surgical care and follow-up from the referring physician's office.

## 2023-05-10 NOTE — OCCUPATIONAL THERAPY INITIAL EVALUATION ADULT - FINE MOTOR COORDINATION, RIGHT HAND THUMB/FINGER OPPOSITION SKILLS, OT EVAL
Anesthesia Post Evaluation    Patient: Reji Plasencia    Procedure(s) Performed: Procedure(s) (LRB):  DEBRIDEMENT, WOUND, SACRUM (N/A)    Final Anesthesia Type: general      Patient location during evaluation: PACU  Patient participation: Yes- Able to Participate  Level of consciousness: awake and alert  Post-procedure vital signs: reviewed and stable  Pain management: adequate  Airway patency: patent    PONV status at discharge: No PONV  Anesthetic complications: no      Cardiovascular status: hemodynamically stable  Respiratory status: unassisted  Hydration status: euvolemic  Follow-up not needed.          Vitals Value Taken Time   /75 05/10/23 1621   Temp ** 05/10/23 1622   Pulse 115 05/10/23 1622   Resp 23 05/10/23 1622   SpO2 96 % 05/10/23 1622   Vitals shown include unvalidated device data.      No case tracking events are documented in the log.      Pain/Robinson Score: Pain Rating Prior to Med Admin: 8 (5/10/2023  4:10 PM)  Pain Rating Post Med Admin: 3 (5/10/2023  5:56 AM)  Robinson Score: 8 (5/10/2023  4:00 PM)        
normal performance

## 2023-06-01 NOTE — DISCHARGE NOTE PROVIDER - NSDCCPCAREPLAN_GEN_ALL_CORE_FT
None PRINCIPAL DISCHARGE DIAGNOSIS  Diagnosis: Urinary tract infection  Assessment and Plan of Treatment: Urinary tract infections, also called "UTIs," are infections that affect either the bladder or the kidneys. Bladder infections are more common than kidney infections. Bladder infections happen when bacteria get into the urethra and travel up into the bladder. Kidney infections happen when the bacteria travel even higher, up into the kidneys. The symptoms of a bladder infection include pain or a burning feeling when you urinate, the need to urinate often, the need to urinate suddenly or in a hurry, blood in the urine. Signs that an infection has spread to the kidneys include fever, back pain, or nausea/vomiting. It is important that you take your antibiotics as prescribed and to completion to properly treat your urinary tract infection and prevent antibiotic resistance.         PRINCIPAL DISCHARGE DIAGNOSIS  Diagnosis: Urinary tract infection  Assessment and Plan of Treatment: Urinary tract infections, also called "UTIs," are infections that affect either the bladder or the kidneys. Bladder infections are more common than kidney infections. Bladder infections happen when bacteria get into the urethra and travel up into the bladder. Kidney infections happen when the bacteria travel even higher, up into the kidneys. The symptoms of a bladder infection include pain or a burning feeling when you urinate, the need to urinate often, the need to urinate suddenly or in a hurry, blood in the urine. Signs that an infection has spread to the kidneys include fever, back pain, or nausea/vomiting. You completed a 7 day course of Antibiotics (Ceftrixaone and Ampicillin) for your UTI. Please follow up with your Primary Care Physician within 1 week.        SECONDARY DISCHARGE DIAGNOSES  Diagnosis: Type II diabetes mellitus  Assessment and Plan of Treatment: You have a diagnosis of type 2 diabetes (sometimes called type 2 "diabetes mellitus"). This is a disorder that disrupts the way your body uses sugar. All the cells in your body need sugar to work normally. Sugar gets into the cells with the help of a hormone called insulin. If there is not enough insulin, or if the body stops responding to insulin, sugar builds up in the blood. That is what happens to people with diabetes. Type 2 diabetes usually causes no symptoms. When symptoms do occur, they include the need to urinate often, intense thirst and blurry vision. Even though type 2 diabetes might not make you feel sick, it can cause serious problems over time, if it is not treated. The disorder can lead to heart attacks, strokes, kidney disease, vision problems (or even blindness), pain or loss of feeling in the hands and feet, and the need to have fingers, toes, or other body parts removed (amputated). In addition to maintaining an active lifestyle, losing weight, eating right, and not smoking it is important to take your diabetes medications as directed to control your blood sugar and prevent the possible complications from this disease. You will require INSULIN to better control your blood sugars. Please continue to take _____.    Diagnosis: Hypertension  Assessment and Plan of Treatment: Hypertension is the medical term for high blood pressure. Blood pressure refers to the pressure that blood applies to the inner walls of the arteries. Arteries carry blood from the heart to other organs and parts of the body. Untreated high blood pressure increases the strain on the heart and arteries, eventually causing organ damage. High blood pressure increases the risk of heart failure, heart attack (myocardial infarction), stroke, and kidney failure. High blood pressure does not usually cause any symptoms. Please continue to take your home Lisinopril 40mg every day. We also added a new medication (AMLODIPINE 10mg) for your hypertension. Some of the side effects of amlodipine include lightheadeadedness, dizziness, swelling of your legs, etc. Please follow up with your Primary Care Physician within 1 week to further monitor your response on these medications.     PRINCIPAL DISCHARGE DIAGNOSIS  Diagnosis: Urinary tract infection  Assessment and Plan of Treatment: Urinary tract infections, also called "UTIs," are infections that affect either the bladder or the kidneys. Bladder infections are more common than kidney infections. Bladder infections happen when bacteria get into the urethra and travel up into the bladder. Kidney infections happen when the bacteria travel even higher, up into the kidneys. The symptoms of a bladder infection include pain or a burning feeling when you urinate, the need to urinate often, the need to urinate suddenly or in a hurry, blood in the urine. Signs that an infection has spread to the kidneys include fever, back pain, or nausea/vomiting. You completed a 7 day course of Antibiotics (Ceftrixaone and Ampicillin) for your UTI. Please follow up with your Primary Care Physician within 1 week.        SECONDARY DISCHARGE DIAGNOSES  Diagnosis: Type II diabetes mellitus  Assessment and Plan of Treatment: You have a diagnosis of type 2 diabetes (sometimes called type 2 "diabetes mellitus"). This is a disorder that disrupts the way your body uses sugar. All the cells in your body need sugar to work normally. Sugar gets into the cells with the help of a hormone called insulin. If there is not enough insulin, or if the body stops responding to insulin, sugar builds up in the blood. That is what happens to people with diabetes. Type 2 diabetes usually causes no symptoms. When symptoms do occur, they include the need to urinate often, intense thirst and blurry vision. Even though type 2 diabetes might not make you feel sick, it can cause serious problems over time, if it is not treated. The disorder can lead to heart attacks, strokes, kidney disease, vision problems (or even blindness), pain or loss of feeling in the hands and feet, and the need to have fingers, toes, or other body parts removed (amputated). In addition to maintaining an active lifestyle, losing weight, eating right, and not smoking it is important to take your diabetes medications as directed to control your blood sugar and prevent the possible complications from this disease. You will require INSULIN to better control your blood sugars. Please continue to take _____.    Diagnosis: Hyperlipidemia  Assessment and Plan of Treatment: You had been taking a medication to help lower your cholesterol at home, but you were started on a higher dose of a similar medication while in the hospital after concern that you had a stroke. This medication will help to lower your cholesterol to prevent strokes in the future. Please continue to take Atorvastatin 80mg every evening before bed to continue to lower your cholesterol.    Diagnosis: Hypertension  Assessment and Plan of Treatment: Hypertension is the medical term for high blood pressure. Blood pressure refers to the pressure that blood applies to the inner walls of the arteries. Arteries carry blood from the heart to other organs and parts of the body. Untreated high blood pressure increases the strain on the heart and arteries, eventually causing organ damage. High blood pressure increases the risk of heart failure, heart attack (myocardial infarction), stroke, and kidney failure. High blood pressure does not usually cause any symptoms. Please continue to take your home Lisinopril 40mg every day. Your blood pressure was not well controlled on this medication alone, so you were started on the medication Amlodipine which has helped to control your blood pressure. Please continue to take Amlodipine once a day at home, along with your Lisinopril, to control your blood pressure. Please see your primary care doctor and neurologist within 2 weeks of discharge so they can further manage your blood pressure medications.     PRINCIPAL DISCHARGE DIAGNOSIS  Diagnosis: Urinary tract infection  Assessment and Plan of Treatment: Urinary tract infections, also called "UTIs," are infections that affect either the bladder or the kidneys. Bladder infections are more common than kidney infections. Bladder infections happen when bacteria get into the urethra and travel up into the bladder. Kidney infections happen when the bacteria travel even higher, up into the kidneys. The symptoms of a bladder infection include pain or a burning feeling when you urinate, the need to urinate often, the need to urinate suddenly or in a hurry, blood in the urine. Signs that an infection has spread to the kidneys include fever, back pain, or nausea/vomiting. You completed a 7 day course of Antibiotics (Ceftrixaone and Ampicillin) for your UTI. Please follow up with your Primary Care Physician within 1 week.        SECONDARY DISCHARGE DIAGNOSES  Diagnosis: Type II diabetes mellitus  Assessment and Plan of Treatment: You have a diagnosis of type 2 diabetes (sometimes called type 2 "diabetes mellitus"). This is a disorder that disrupts the way your body uses sugar. All the cells in your body need sugar to work normally. Sugar gets into the cells with the help of a hormone called insulin. If there is not enough insulin, or if the body stops responding to insulin, sugar builds up in the blood. That is what happens to people with diabetes. Type 2 diabetes usually causes no symptoms. When symptoms do occur, they include the need to urinate often, intense thirst and blurry vision. Even though type 2 diabetes might not make you feel sick, it can cause serious problems over time, if it is not treated. The disorder can lead to heart attacks, strokes, kidney disease, vision problems (or even blindness), pain or loss of feeling in the hands and feet, and the need to have fingers, toes, or other body parts removed (amputated). In addition to maintaining an active lifestyle, losing weight, eating right, and not smoking it is important to take use your insulin as directed to control your blood sugar and prevent the possible complications from this disease. Please continue to take 4 units of long acting insulin, 8 units of short acting insulin before meals.  Please see a doctor at the [facility], [place] or the [facility] to further manage your diabetes.    Diagnosis: Hypertension  Assessment and Plan of Treatment: Hypertension is the medical term for high blood pressure. Blood pressure refers to the pressure that blood applies to the inner walls of the arteries. Arteries carry blood from the heart to other organs and parts of the body. Untreated high blood pressure increases the strain on the heart and arteries, eventually causing organ damage. High blood pressure increases the risk of heart failure, heart attack (myocardial infarction), stroke, and kidney failure. High blood pressure does not usually cause any symptoms. Please continue to take your home Lisinopril 40mg every day. Your blood pressure was not well controlled on this medication alone, so you were started on the medication Amlodipine which has helped to control your blood pressure. Please continue to take Amlodipine once a day at home, along with your Lisinopril, to control your blood pressure. Please see your primary care doctor and neurologist within 2 weeks of discharge so they can further manage your blood pressure medications.    Diagnosis: Hyperlipidemia  Assessment and Plan of Treatment: You had been taking a medication to help lower your cholesterol at home, but you were started on a higher dose of a similar medication while in the hospital after concern that you had a stroke. This medication will help to lower your cholesterol to prevent strokes in the future. Please continue to take Atorvastatin 80mg every evening before bed to continue to lower your cholesterol.

## 2023-06-10 NOTE — PATIENT PROFILE ADULT - FUNCTIONAL ASSESSMENT - BASIC MOBILITY 3.
Unique: I have seen and examined the patient face to face, have reviewed and addended the HPI, PE and a/p as necessary.     51-year-old female with DM not on metformin on insulin, HTN, HLD, gastric bypass in 2015 at [facility], vertigo, uterine fibroid a/w abdominal pain in LLQ x 1 week.  Noted to have been seen 5 days ago at [facility] [place] and had a CAT scan that showed left lumbar hernia and right lumbar hernia, possible enteritis and ultrasounds that showed uterine fibroid. and discharged on percocet.  Pt noted to have persistent pain since discharge despite pain meds.  Patient denies vaginal bleeding, pelvic pain, abnormal discharge.     GEN - NAD; well appearing; A+O x3; non-toxic appearing  CARD -s1s2, RRR, no M,G,R;   PULM - CTA b/l, symmetric breath sounds;   ABD -  +BS, TTP in LLQ, soft, no guarding, no rebound, no masses;   BACK - no CVA tenderness, Normal  spine;   EXT - symmetric pulses, 2+ dp, capillary refill < 2 seconds, no cyanosis, no edema;   NEURO - no focal neuro deficits, no slurred speech    History of gastric bypass, concern for possible intraabdominal pathology, not vomiting so less likely sbo, though will need to r/o with PO and IV constrast ct study.  Check cbc, cmp, lactate, cct with po and IV contrast.  Re-eval after pain control.
3 = A little assistance

## 2023-08-22 NOTE — PROGRESS NOTE ADULT - PROBLEM SELECTOR PLAN 1
Pt presenting with new onset dry cough overnight without associated fever, chills, acute SOB, hypoxia, or reflux. Likely component of HFpEF. TTE (4/1) showed grade 1 left ventricular diastolic dysfunction, no PFO, EF >83% hyperdynamic left ventricular systolic function.   - COVID swab negative  - Supportive care  - If worsens, can consider repeating TTE  - RESOLVED Symmetrical LE weakness, worse distally compared to proximally. Symmetrical diminished sensation, longstanding diabetic peripheral neuropathy. Denies issues with incontinence, or back pain, no sensory line. Likely progression of diabetic neuropathy, however symptoms more severe than to be expected.  MRI L/Spine:  - Showing at the L3/4 leveI. There are degenerative changes involving the facets bilaterally as well as ligamentum flavum hypertrophy. This combination results in moderate central spinal canal stenosis.   - At the L4/5 level, there is disc bulge abutting the L4 nerve roots bilaterally. There are degenerative changes involving the facets bilaterally (right greater than left) as well as ligamentum flavum hypertrophy. This combination results in mild central spinal canal   stenosis.  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   Bilateral lower lumbosacral plexopathies cannot be ruled out based on   these findings.   - Neurology team saying that since it is diabetic in origin it is unlikely that the patient will make significant improvement in the short term.  - c/w Cymbalta 90mg daily   - c/w Custom AFO boots   - c/w incentive spirometry. Graft Donor Site Bandage (Optional-Leave Blank If You Don't Want In Note): Steri-strips and a pressure bandage were applied to the donor site.

## 2023-09-19 NOTE — ED PROVIDER NOTE - DATA REVIEWED, MDM
Patient Quality Outreach    Patient is due for the following:   Depression  -  PHQ-9 Needed    NEXT STEPS:   Awaiting patient responses to [employer] PHQ/ED sent today.     Type of outreach:    Sent [employer] message.      Questions for provider review:    None     Amelia Ray RN          
vital signs
Topical Steroids Applications Pregnancy And Lactation Text: Most topical steroids are considered safe to use during pregnancy and lactation.  Any topical steroid applied to the breast or nipple should be washed off before breastfeeding.

## 2023-11-29 NOTE — PROCEDURE NOTE - NSPROCDETAILS_GEN_ALL_CORE
location identified, draped/prepped, sterile technique used/blood seen on insertion/dressing applied/flushes easily/secured in place/sterile technique, catheter placed 162.56

## 2023-12-06 NOTE — OCCUPATIONAL THERAPY INITIAL EVALUATION ADULT - TRANSFER SAFETY CONCERNS NOTED: SIT/STAND, REHAB EVAL
Labs reviewed.  Ferritin 43.  I recommended canceling phlebotomy scheduled for next week.  I also recommended changing his follow-up visit to 6 months, rather than 3 months.    This was discussed with the patient via phone and he is agreeable  
decreased sequencing ability/decreased weight-shifting ability

## 2024-01-01 NOTE — PROGRESS NOTE ADULT - PROBLEM SELECTOR PLAN 8
RESOLVED  UA with +WBCs, UCx growing E faecalis. Will treat as complicated  -completed 7 day course of CTX and then ampicillin ARCELIA

## 2024-05-02 NOTE — PROGRESS NOTE ADULT - ATTENDING COMMENTS
Provided information on local AA meeting places and phone number for [place] AA hotline.       05/02/24 [id number]   Post-Acute Status   Hospital Resources/Appts/Education Provided Community resources provided        Plan as above.  No new complaints   US LE pending

## 2024-12-24 NOTE — ED PROVIDER NOTE - INTERNATIONAL TRAVEL
Pt called today to let me know she has not missed any calls from Dr. Adame's office and was unsure why they said they have called but she hasn't received any calls from them. Pt stated she did call Dr. Adame's office and the nurse acted like she did not know what the pt was talking about regarding an appt, and pt still did not get an appt. The nurse told her that she would talk with Dr. Adame and give her a call back. I told pt I will let dr ortega know when she gets back Monday and that I will call Dr. Adame's office Monday as well to see what is going on. Pt v/u.   No

## 2025-01-08 NOTE — PROGRESS NOTE ADULT - PROBLEM/PLAN-2
Detail Level: Generalized
Laser Recommendations: IPL treatment 3-5 sessions
Bleaching Agents Recommendations: Apply nightly Q2 month on, 1 month off , follow up in 3 months
DISPLAY PLAN FREE TEXT

## 2025-01-31 NOTE — PROGRESS NOTE ADULT - PROBLEM SELECTOR PLAN 4
ID Follow-up NOTE    CC:   Right foot infection with suspected OM   Antibiotics: Vanco, piptazo    Admit Date: 1/29/2025  Hospital Day: 3    Subjective:     Patient denies any fevers or chills.  Went to the OR this morning for right foot incision and drainage with bone biopsy, complains of post op pain.  Plans to return to the OR next week to convert wound from dirty to clean.      Objective:     Patient Vitals for the past 8 hrs:   BP Temp Temp src Pulse Resp SpO2   01/31/25 0938 (!) 161/78 97.9 °F (36.6 °C) Oral 73 16 94 %   01/31/25 0915 115/89 97.6 °F (36.4 °C) Temporal 70 13 100 %   01/31/25 0900 139/77 -- -- 68 11 97 %   01/31/25 0845 137/71 -- -- 69 (!) 0 97 %   01/31/25 0830 (!) 142/67 -- -- 70 15 96 %   01/31/25 0815 (!) 149/68 -- -- 77 11 100 %   01/31/25 0811 (!) 153/80 97 °F (36.1 °C) Temporal 83 10 92 %   01/31/25 0500 (!) 155/63 97.7 °F (36.5 °C) Oral 72 18 95 %     I/O last 3 completed shifts:  In: 1266.9 [P.O.:770; IV Piggyback:496.9]  Out: 900 [Urine:900]  I/O this shift:  In: 400 [I.V.:400]  Out: -     EXAM:  GENERAL: No apparent distress.    HEENT: Membranes moist, no oral lesion  NECK:  Supple, no lymphadenopathy  LUNGS: Clear b/l, no rales, no dullness  CARDIAC: RRR, no murmur appreciated  ABD:  + BS, soft / NT  EXT:  Patient with dressing in the right foot post op.   See photos R foot 1/29 prior to debridement.     NEURO: No focal neurologic findings  PSYCH: Orientation, sensorium, mood normal  LINES:  Peripheral iv       Data Review:  Lab Results   Component Value Date    WBC 11.6 (H) 01/30/2025    HGB 11.6 (L) 01/30/2025    HCT 35.4 (L) 01/30/2025    MCV 88.2 01/30/2025     01/30/2025     Lab Results   Component Value Date    CREATININE 1.4 (H) 01/31/2025    BUN 15 01/31/2025     01/31/2025    K 4.7 01/31/2025     01/31/2025    CO2 26 01/31/2025       Hepatic Function Panel: No results found for: \"ALKPHOS\", \"ALT\", \"AST\", \"BILITOT\", \"BILIDIR\", \"IBILI\",  Pt presenting with new onset dry cough overnight without associated fever, chills, acute SOB, hypoxia, or reflux. Likely component of HFpEF. TTE (4/1) showed grade 1 left ventricular diastolic dysfunction, no PFO, EF >83% hyperdynamic left ventricular systolic function. COVID swab negative.  - Supportive care  - If worsens, can consider repeating TTE  - RESOLVED

## 2025-03-16 NOTE — PHYSICAL THERAPY INITIAL EVALUATION ADULT - IMPAIRMENTS FOUND, PT EVAL
SUBJECTIVE:    Patient is a 52y old Female who presents with a chief complaint of   Currently admitted to medicine with the primary diagnosis of Impaired ambulation       Today is hospital day 1d. This morning she is resting comfortably in bed and reports no new issues or overnight events. reports feeling better         PAST MEDICAL & SURGICAL HISTORY  Asthma    Hypertension    Vertigo    Sciatica    H/O abdominal hysterectomy    H/O  section    History of cholecystectomy      SOCIAL HISTORY:    ALLERGIES:  No Known Allergies    MEDICATIONS:  STANDING MEDICATIONS  celecoxib 100 milliGRAM(s) Oral two times a day  heparin   Injectable 5000 Unit(s) SubCutaneous every 12 hours  metoprolol succinate ER 25 milliGRAM(s) Oral daily  montelukast 10 milliGRAM(s) Oral daily  nicotine -  14 mG/24Hr(s) Patch 1 Patch Transdermal daily  pantoprazole    Tablet 40 milliGRAM(s) Oral before breakfast  polyethylene glycol 3350 17 Gram(s) Oral daily  predniSONE   Tablet 40 milliGRAM(s) Oral daily  senna 2 Tablet(s) Oral at bedtime    PRN MEDICATIONS  HYDROmorphone  Injectable 1 milliGRAM(s) IV Push every 8 hours PRN  oxyCODONE    IR 2.5 milliGRAM(s) Oral every 4 hours PRN    VITALS:   T(F): 97.6  HR: 83  BP: 152/69  RR: 16  SpO2: 96%    LABS:  Negative for smoking/alcohol/drug use.                         13.0   11.96 )-----------( 150      ( 16 Mar 2025 06:44 )             39.0     03-16    139  |  102  |  19  ----------------------------<  113[H]  4.7   |  25  |  0.7    Ca    9.2      16 Mar 2025 06:44  Phos  2.8     03-16  Mg     1.9     03-16    TPro  6.4  /  Alb  3.8  /  TBili  0.5  /  DBili  x   /  AST  92[H]  /  ALT  120[H]  /  AlkPhos  209[H]  03-15      Urinalysis Basic - ( 16 Mar 2025 06:44 )    Color: x / Appearance: x / SG: x / pH: x  Gluc: 113 mg/dL / Ketone: x  / Bili: x / Urobili: x   Blood: x / Protein: x / Nitrite: x   Leuk Esterase: x / RBC: x / WBC x   Sq Epi: x / Non Sq Epi: x / Bacteria: x    RADIOLOGY:    PHYSICAL EXAM:  GEN: No acute distress  HEENT: normocephalic, atraumatic, aniceteric  LUNGS: bl breath sounds   HEART: S1/S2 present. RRR, no murmurs  ABD: Soft, non-tender, non-distended. Bowel sounds present  EXT: warm   NEURO: AAOX3, normal affect      ASSESSMENT AND PLAN:    53 y/o F with a pmhx of sciatica, HTN and asthma who presents complaining of worsening right lower back pain radiating down the right leg x 4 days.     # Sciatica  # H/O Lumbar stenosis   - H/O steroid injections op without minimal help   - no neurological deficits   - CT L/S: moderate to severe spinal stenosis as well as forminal narrowing most significant at L4-L5 and L5-S1, severe spinal stenosis at L4-L5  - Pain control   - PT EVAL - op PT   - MRI Lumbar Spine  - Grade 1 anterolisthesis of L4 and L5 contributing to severe spinal stenosis as well as severe left foraminal narrowing. Disc bulging and degenerative changes at L5-S1 contributing to severe   left foraminal narrowing.  - Neurosurgery to review films , called     # Transaminitis  # H/O cholecystectomy ()  - Denies abdominal pain , exam benign   - RUQ sono - Increased hepatic echogenicity may be secondary to fatty infiltration or   hepatocellular diseaseNo evidence of CBD dilatation  - Hepatitis panel   - GI EVAL     # H/O HTN  - on metoprolol 25mg QD    # H/O Asthma-  on Singulair 10mg daily - not in exacerbation       dvt/ gi ppx/diet  dispo: acute - poss dc in 24-48 hrs     
aerobic capacity/endurance/gait, locomotion, and balance/muscle strength/neuromotor development and sensory integration/poor safety awareness/posture

## 2025-03-25 NOTE — PROGRESS NOTE ADULT - PROBLEM SELECTOR PLAN 2
Needle like pain shins downward, interfering with sleep. LE duplex - acute DVT of  the left peroneal vein  - c/w Cymbalta 90 Qd for diabetic neuropathy  - c/w Apixaban 10mg BID (through 5/28). Then, Apixaban 5mg BID for 3 months. (2) Male Needle like pain shins downward, interfering with sleep. LE duplex - acute DVT of  the left peroneal vein  - c/w Cymbalta 90 Qd for diabetic neuropathy  - s/pApixaban 10mg BID (through 5/28).  - c/w Apixaban 5mg BID for 3 months.